# Patient Record
Sex: FEMALE | Race: BLACK OR AFRICAN AMERICAN | NOT HISPANIC OR LATINO | Employment: FULL TIME | URBAN - METROPOLITAN AREA
[De-identification: names, ages, dates, MRNs, and addresses within clinical notes are randomized per-mention and may not be internally consistent; named-entity substitution may affect disease eponyms.]

---

## 2017-02-13 ENCOUNTER — TELEPHONE (OUTPATIENT)
Dept: FAMILY MEDICINE | Facility: CLINIC | Age: 72
End: 2017-02-13

## 2017-02-13 DIAGNOSIS — Z00.00 ROUTINE GENERAL MEDICAL EXAMINATION AT A HEALTH CARE FACILITY: Primary | ICD-10-CM

## 2017-02-13 DIAGNOSIS — I10 ESSENTIAL HYPERTENSION: ICD-10-CM

## 2017-02-13 NOTE — TELEPHONE ENCOUNTER
----- Message from Ellen Mcrae sent at 2/13/2017  8:58 AM CST -----  Contact: call  work [phone]  Doctor appointment and lab have been scheduled.  Please link lab orders to the lab appointment.  Date of doctor appointment:  02/17/17  Physical or EP:  epp  Date of lab appointment:  02/17/17  Comments: pt called this morning asking about lab appointment for Friday, if orders are not able to be put in this quickly please call pt to let her know

## 2017-02-14 ENCOUNTER — LAB VISIT (OUTPATIENT)
Dept: LAB | Facility: HOSPITAL | Age: 72
End: 2017-02-14
Attending: FAMILY MEDICINE
Payer: MEDICARE

## 2017-02-14 DIAGNOSIS — I10 ESSENTIAL HYPERTENSION: ICD-10-CM

## 2017-02-14 DIAGNOSIS — Z00.00 ROUTINE GENERAL MEDICAL EXAMINATION AT A HEALTH CARE FACILITY: ICD-10-CM

## 2017-02-14 LAB
ALBUMIN SERPL BCP-MCNC: 3.2 G/DL
ALP SERPL-CCNC: 126 U/L
ALT SERPL W/O P-5'-P-CCNC: 17 U/L
ANION GAP SERPL CALC-SCNC: 8 MMOL/L
AST SERPL-CCNC: 25 U/L
BASOPHILS # BLD AUTO: 0.04 K/UL
BASOPHILS NFR BLD: 0.7 %
BILIRUB SERPL-MCNC: 0.5 MG/DL
BUN SERPL-MCNC: 11 MG/DL
CALCIUM SERPL-MCNC: 9.6 MG/DL
CHLORIDE SERPL-SCNC: 105 MMOL/L
CHOLEST/HDLC SERPL: 3.1 {RATIO}
CO2 SERPL-SCNC: 26 MMOL/L
CREAT SERPL-MCNC: 0.8 MG/DL
DIFFERENTIAL METHOD: ABNORMAL
EOSINOPHIL # BLD AUTO: 0.3 K/UL
EOSINOPHIL NFR BLD: 5.1 %
ERYTHROCYTE [DISTWIDTH] IN BLOOD BY AUTOMATED COUNT: 15.5 %
EST. GFR  (AFRICAN AMERICAN): >60 ML/MIN/1.73 M^2
EST. GFR  (NON AFRICAN AMERICAN): >60 ML/MIN/1.73 M^2
GLUCOSE SERPL-MCNC: 96 MG/DL
HCT VFR BLD AUTO: 37.7 %
HDL/CHOLESTEROL RATIO: 32.7 %
HDLC SERPL-MCNC: 208 MG/DL
HDLC SERPL-MCNC: 68 MG/DL
HGB BLD-MCNC: 12.6 G/DL
LDLC SERPL CALC-MCNC: 128.4 MG/DL
LYMPHOCYTES # BLD AUTO: 1.9 K/UL
LYMPHOCYTES NFR BLD: 32 %
MCH RBC QN AUTO: 27.3 PG
MCHC RBC AUTO-ENTMCNC: 33.4 %
MCV RBC AUTO: 82 FL
MONOCYTES # BLD AUTO: 0.5 K/UL
MONOCYTES NFR BLD: 8.6 %
NEUTROPHILS # BLD AUTO: 3.2 K/UL
NEUTROPHILS NFR BLD: 53.3 %
NONHDLC SERPL-MCNC: 140 MG/DL
PLATELET # BLD AUTO: 300 K/UL
PMV BLD AUTO: 11 FL
POTASSIUM SERPL-SCNC: 3.7 MMOL/L
PROT SERPL-MCNC: 7.4 G/DL
RBC # BLD AUTO: 4.61 M/UL
SODIUM SERPL-SCNC: 139 MMOL/L
TRIGL SERPL-MCNC: 58 MG/DL
TSH SERPL DL<=0.005 MIU/L-ACNC: 2.16 UIU/ML
WBC # BLD AUTO: 5.91 K/UL

## 2017-02-14 PROCEDURE — 36415 COLL VENOUS BLD VENIPUNCTURE: CPT | Mod: PO

## 2017-02-14 PROCEDURE — 84443 ASSAY THYROID STIM HORMONE: CPT

## 2017-02-14 PROCEDURE — 80053 COMPREHEN METABOLIC PANEL: CPT

## 2017-02-14 PROCEDURE — 80061 LIPID PANEL: CPT

## 2017-02-14 PROCEDURE — 85025 COMPLETE CBC W/AUTO DIFF WBC: CPT

## 2017-02-17 ENCOUNTER — OFFICE VISIT (OUTPATIENT)
Dept: FAMILY MEDICINE | Facility: CLINIC | Age: 72
End: 2017-02-17
Payer: MEDICARE

## 2017-02-17 VITALS — HEART RATE: 80 BPM | TEMPERATURE: 98 F | BODY MASS INDEX: 34.16 KG/M2 | HEIGHT: 65 IN | WEIGHT: 205 LBS

## 2017-02-17 DIAGNOSIS — M79.605 PAIN IN BOTH LOWER EXTREMITIES: ICD-10-CM

## 2017-02-17 DIAGNOSIS — Z13.9 SCREENING: ICD-10-CM

## 2017-02-17 DIAGNOSIS — E78.5 HYPERLIPIDEMIA, UNSPECIFIED HYPERLIPIDEMIA TYPE: ICD-10-CM

## 2017-02-17 DIAGNOSIS — M79.606 PAIN OF LOWER EXTREMITY, UNSPECIFIED LATERALITY: ICD-10-CM

## 2017-02-17 DIAGNOSIS — I10 ESSENTIAL HYPERTENSION: ICD-10-CM

## 2017-02-17 DIAGNOSIS — E27.9 ADRENAL ABNORMALITY: ICD-10-CM

## 2017-02-17 DIAGNOSIS — Z29.89 NEED FOR PROPHYLACTIC IMMUNOTHERAPY: ICD-10-CM

## 2017-02-17 DIAGNOSIS — M79.604 PAIN IN BOTH LOWER EXTREMITIES: ICD-10-CM

## 2017-02-17 DIAGNOSIS — E87.8 ELECTROLYTE ABNORMALITY: ICD-10-CM

## 2017-02-17 DIAGNOSIS — I70.0 AORTIC ATHEROSCLEROSIS: ICD-10-CM

## 2017-02-17 DIAGNOSIS — Z78.0 MENOPAUSE: ICD-10-CM

## 2017-02-17 DIAGNOSIS — R06.2 WHEEZING: Primary | ICD-10-CM

## 2017-02-17 DIAGNOSIS — Z12.31 OTHER SCREENING MAMMOGRAM: ICD-10-CM

## 2017-02-17 DIAGNOSIS — J45.20 MILD INTERMITTENT ASTHMA WITHOUT COMPLICATION: ICD-10-CM

## 2017-02-17 DIAGNOSIS — D64.9 ANEMIA, UNSPECIFIED TYPE: ICD-10-CM

## 2017-02-17 PROCEDURE — 99397 PER PM REEVAL EST PAT 65+ YR: CPT | Mod: S$GLB,,, | Performed by: FAMILY MEDICINE

## 2017-02-17 PROCEDURE — 99499 UNLISTED E&M SERVICE: CPT | Mod: S$GLB,,, | Performed by: FAMILY MEDICINE

## 2017-02-17 PROCEDURE — 99999 PR PBB SHADOW E&M-EST. PATIENT-LVL IV: CPT | Mod: PBBFAC,,, | Performed by: FAMILY MEDICINE

## 2017-02-17 RX ORDER — FOLIC ACID 0.8 MG
800 TABLET ORAL DAILY
Qty: 100 TABLET | Refills: 3 | COMMUNITY
Start: 2017-02-17 | End: 2020-07-16 | Stop reason: SDUPTHER

## 2017-02-17 RX ORDER — TRAMADOL HYDROCHLORIDE 50 MG/1
50 TABLET ORAL EVERY 6 HOURS PRN
COMMUNITY
End: 2017-02-17 | Stop reason: SDUPTHER

## 2017-02-17 RX ORDER — ALBUTEROL SULFATE 90 UG/1
2 AEROSOL, METERED RESPIRATORY (INHALATION) EVERY 6 HOURS PRN
Qty: 54 G | Refills: 3 | Status: SHIPPED | OUTPATIENT
Start: 2017-02-17 | End: 2018-04-10 | Stop reason: SDUPTHER

## 2017-02-17 RX ORDER — POTASSIUM CHLORIDE 20 MEQ/1
TABLET, EXTENDED RELEASE ORAL
Qty: 180 TABLET | Refills: 3 | Status: SHIPPED | OUTPATIENT
Start: 2017-02-17 | End: 2018-02-19 | Stop reason: SDUPTHER

## 2017-02-17 RX ORDER — HYDROCHLOROTHIAZIDE 25 MG/1
25 TABLET ORAL DAILY
Qty: 90 TABLET | Refills: 3 | Status: SHIPPED | OUTPATIENT
Start: 2017-02-17 | End: 2018-03-06 | Stop reason: SDUPTHER

## 2017-02-17 RX ORDER — AMLODIPINE BESYLATE 10 MG/1
10 TABLET ORAL DAILY
Qty: 90 TABLET | Refills: 3 | Status: SHIPPED | OUTPATIENT
Start: 2017-02-17 | End: 2018-03-06 | Stop reason: SDUPTHER

## 2017-02-17 RX ORDER — CETIRIZINE HYDROCHLORIDE 10 MG/1
10 TABLET ORAL DAILY
Qty: 90 TABLET | Refills: 3 | COMMUNITY
Start: 2017-02-17 | End: 2017-07-05 | Stop reason: ALTCHOICE

## 2017-02-17 RX ORDER — ROSUVASTATIN CALCIUM 5 MG/1
5 TABLET, COATED ORAL DAILY
Qty: 90 TABLET | Refills: 3 | Status: SHIPPED | OUTPATIENT
Start: 2017-02-17 | End: 2018-03-20

## 2017-02-17 RX ORDER — TRAMADOL HYDROCHLORIDE 50 MG/1
50 TABLET ORAL EVERY 6 HOURS PRN
Qty: 60 TABLET | Refills: 2 | Status: SHIPPED | OUTPATIENT
Start: 2017-02-17 | End: 2017-09-19

## 2017-02-17 RX ORDER — MONTELUKAST SODIUM 10 MG/1
10 TABLET ORAL NIGHTLY
Qty: 90 TABLET | Refills: 3 | Status: SHIPPED | OUTPATIENT
Start: 2017-02-17 | End: 2017-07-31

## 2017-02-17 NOTE — MR AVS SNAPSHOT
[facility]  [address], [address]  [facility] [address]  Phone: [phone]  Fax: [phone]                  Michela Franco   2017 8:20 AM   Office Visit    Description:  Female : 1945   Provider:  Elizabeth Blank MD   Department:  [facility]           Reason for Visit     Annual Exam           Diagnoses this Visit        Comments    Wheezing    -  Primary     Pain in both lower extremities         Hyperlipidemia, unspecified hyperlipidemia type         Need for prophylactic immunotherapy         Mild intermittent asthma without complication         Electrolyte abnormality         Essential hypertension         Anemia, unspecified type         Pain of lower extremity, unspecified laterality         Adrenal abnormality         Other screening mammogram         Menopause         Screening                To Do List           Goals (5 Years of Data)     None       These Medications        Disp Refills Start End    rosuvastatin (CRESTOR) 5 MG tablet 90 tablet 3 2017    Take 1 tablet (5 mg total) by mouth once daily. - Oral    Pharmacy: [facility] - [place] [id number] [facility] Ph #: [phone]       albuterol 90 mcg/actuation inhaler 54 g 3 2017     Inhale 2 puffs into the lungs every 6 (six) hours as needed for Wheezing. - Inhalation    Pharmacy: [facility] - [facility] [address] [facility] Ph #: [phone]       amlodipine (NORVASC) 10 MG tablet 90 tablet 3 2017     Take 1 tablet (10 mg total) by mouth once daily. - Oral    Pharmacy: [facility] - [facility] [address] [facility] Ph #: [phone]       hydrochlorothiazide (HYDRODIURIL) 25 MG tablet 90 tablet 3 2017     Take 1 tablet (25 mg total) by mouth once daily. - Oral    Pharmacy: [facility] - [facility] [address] [facility] Ph #: [phone]        montelukast (SINGULAIR) 10 mg tablet 90 tablet 3 2/17/2017     Take 1 tablet (10 mg total) by mouth every evening. - Oral    Pharmacy: [facility] - [place] [id number] [facility] Ph #: [phone]       potassium chloride SA (K-DUR,KLOR-CON) 20 MEQ tablet 180 tablet 3 2/17/2017     2 tablets daily po    Pharmacy: [facility] - [address] Ph #: [phone]       tramadol (ULTRAM) 50 mg tablet 60 tablet 2 2/17/2017     Take 1 tablet (50 mg total) by mouth every 6 (six) hours as needed for Pain. - Oral    Pharmacy: [facility] - [address] Ph #: [phone]         PURCHASE these Medications (No prescription required)        Start End    cetirizine (ZYRTEC) 10 MG tablet 2/17/2017     Sig - Route: Take 1 tablet (10 mg total) by mouth once daily. - Oral    Class: OTC    folic acid (FOLVITE) 800 MCG Tab 2/17/2017     Sig - Route: Take 1 tablet (800 mcg total) by mouth once daily. - Oral    Class: OTC      [facility]     [facility] On Call Nurse Care Line - 24/7 Assistance  Registered nurses in the [facility] provide clinical advisement, health education, appointment booking, and other advisory services.  Call for this free service at [phone].             Medications           Message regarding Medications     Verify the changes and/or additions to your medication regime listed below are the same as discussed with your clinician today.  If any of these changes or additions are incorrect, please notify your healthcare provider.        START taking these NEW medications        Refills    rosuvastatin (CRESTOR) 5 MG tablet 3    Sig: Take 1 tablet (5 mg total) by mouth once daily.    Class: Normal    Route: Oral    tramadol (ULTRAM) 50 mg tablet 2    Sig: Take 1 tablet (50 mg total) by mouth every 6 (six) hours as needed for Pain.    Class: Print    Route: Oral      CHANGE how you are taking  these medications     Start Taking Instead of    amlodipine (NORVASC) 10 MG tablet amlodipine (NORVASC) 10 MG tablet    Dosage:  Take 1 tablet (10 mg total) by mouth once daily. Dosage:  TAKE 1 TABLET EVERY DAY    Reason for Change:  Reorder     cetirizine (ZYRTEC) 10 MG tablet cetirizine (ZYRTEC) 10 MG tablet    Dosage:  Take 1 tablet (10 mg total) by mouth once daily. Dosage:  Take 10 mg by mouth once daily.    Reason for Change:  Reorder     montelukast (SINGULAIR) 10 mg tablet montelukast (SINGULAIR) 10 mg tablet    Dosage:  Take 1 tablet (10 mg total) by mouth every evening. Dosage:  TAKE 1 TABLET EVERY EVENING    Reason for Change:  Reorder       STOP taking these medications     baclofen (LIORESAL) 10 MG tablet Take 1 tablet (10 mg total) by mouth 3 (three) times daily.    hydrOXYzine HCl (ATARAX) 25 MG tablet Take 25 mg by mouth every 6 (six) hours as needed for Itching.    ranitidine (ZANTAC) 300 MG tablet Take 1 tablet daily for the next 7-10 days    ADVAIR DISKUS 500-50 mcg/dose DsDv diskus inhaler INHALE 1 PUFF INTO THE LUNGS TWO TIMES DAILY           Verify that the below list of medications is an accurate representation of the medications you are currently taking.  If none reported, the list may be blank. If incorrect, please contact your healthcare provider. Carry this list with you in case of emergency.           Current Medications     ACTIVATED CHARCOAL (CHARCOCAPS ORAL) Take by mouth.    albuterol 90 mcg/actuation inhaler Inhale 2 puffs into the lungs every 6 (six) hours as needed for Wheezing.    amlodipine (NORVASC) 10 MG tablet Take 1 tablet (10 mg total) by mouth once daily.    budesonide (PULMICORT) 0.5 mg/2 mL nebulizer solution Instill 1/2 respule each nostril twice per day.  Let dwell for 2-5 min.    cetirizine (ZYRTEC) 10 MG tablet Take 1 tablet (10 mg total) by mouth once daily.    cholecalciferol, vitamin D3, 10,000 unit Cap Take 360 mg by mouth once daily. Take 6 capsules (6,000 units  "total) by mouth once daily    COD LIVER OIL ORAL Take 1 tablet by mouth once daily.    DYMISTA 137-50 mcg/spray [address] 1 SPRAY BY NASAL ROUTE 2 (TWO) TIMES DAILY AS NEEDED.    ferrous sulfate 325 (65 FE) MG EC tablet Take 1 tablet (325 mg total) by mouth 3 (three) times daily with meals.    folic acid (FOLVITE) 800 MCG Tab Take 1 tablet (800 mcg total) by mouth once daily.    hydrochlorothiazide (HYDRODIURIL) 25 MG tablet Take 1 tablet (25 mg total) by mouth once daily.    ibuprofen (ADVIL,MOTRIN) 800 MG tablet Take 1 tablet (800 mg total) by mouth every 6 (six) hours as needed.    inhalation device (AEROCHAMBER PLUS FLOW-VU) Use with inhaler    montelukast (SINGULAIR) 10 mg tablet Take 1 tablet (10 mg total) by mouth every evening.    multivitamin (MULTIVITAMIN) per tablet Take 1 tablet by mouth once daily.      potassium chloride SA (K-DUR,KLOR-CON) 20 MEQ tablet 2 tablets daily po    rosuvastatin (CRESTOR) 5 MG tablet Take 1 tablet (5 mg total) by mouth once daily.    tramadol (ULTRAM) 50 mg tablet Take 1 tablet (50 mg total) by mouth every 6 (six) hours as needed for Pain.    VITAMIN B-12 50 mcg [facility]            Clinical Reference Information           Your Vitals Were     Pulse Temp Height Weight BMI    80 98.2 °F (36.8 °C) (Oral) 5' 4.96" (1.65 m) 93 kg (205 lb 0.4 oz) 34.16 kg/m2      Allergies as of 2/17/2017     Adhesive    Diazepam    Gabapentin    Keppra [Levetiracetam]    Mold      Immunizations Administered on Date of Encounter - 2/17/2017     None      Orders Placed During Today's Visit      Normal Orders This Visit    Ambulatory referral to Allergy     Future Labs/Procedures Expected by Expires    CT Abdomen With Contrast  2/17/2017 2/17/2018    DXA Bone Density Spine And Hip  2/17/2017 2/17/2018    Hepatitis C antibody  2/17/2017 4/18/2018    Mammo Digital Screening Bilateral With CAD  2/17/2017 4/17/2018    Cardiology Lab Ankle Brachial Indices W Stress  As directed 2/17/2018      [facility] " Sign-Up     Activating your [facility] account is as easy as 1-2-3!     1) Visit [email or web address], select Sign Up Now, enter this activation code and your date of birth, then select Next.  Activation code not generated  Current Patient Portal Status: Account disabled      2) Create a username and password to use when you visit [facility] in the future and select a security question in case you lose your password and select Next.    3) Enter your e-mail address and click Sign Up!    Additional Information  If you have questions, please e-mail [email or web address] or call [phone] to talk to our [facility] staff. Remember, [facility] is NOT to be used for urgent needs. For medical emergencies, dial 911.         Language Assistance Services     ATTENTION: Language assistance services are available, free of charge. Please call [phone].      ATENCIÓN: Si habla jenniferañol, tiene a ferreira disposición servicios gratuitos de asistencia lingüística. Llame al [phone].     CHÚ Ý: N?u b?n nói Ti?ng Vi?t, có các d?ch v? h? tr? ngôn ng? mi?n phí dành cho b?n. G?i s? [phone].         [facility] complies with applicable Federal civil rights laws and does not discriminate on the basis of race, color, national origin, age, disability, or sex.

## 2017-02-19 NOTE — PROGRESS NOTES
Michela Franco is a 71 y.o. female   Routine physical  Source of history: Patient  Past Medical History   Diagnosis Date    Allergy     Hyperlipidemia     Hypertension     Neuromuscular disorder     Osteoporosis     Recurrent sinusitis 3/25/2014     Patient  reports that she quit smoking about 6 years ago. Her smoking use included Cigarettes. She has never used smokeless tobacco. She reports that she does not drink alcohol or use illicit drugs.  Family History   Problem Relation Age of Onset    No Known Problems Mother     No Known Problems Father     Kidney cancer Sister     Cancer Sister      renal    Hypertension Daughter     No Known Problems Sister     Hypertension Daughter      ROS:  GENERAL: No fever, chills, fatigability or weight loss.  SKIN: No rashes, itching or changes in color or texture of skin.  HEAD: No headaches or recent head trauma.  EYES: Visual acuity fine. No photophobia, ocular pain or diplopia.  EARS: Denies ear pain, discharge or vertigo.  NOSE: No loss of smell, no epistaxis or postnasal drip.  MOUTH & THROAT: No hoarseness or change in voice. No excessive gum bleeding.  NODES: Denies swollen glands.  CHEST: Denies SHAH, cyanosis, wheezing, cough and sputum production.  CARDIOVASCULAR: Denies chest pain, PND, orthopnea or reduced exercise tolerance.  ABDOMEN: Appetite fine. No weight loss. Denies diarrhea, abdominal pain, hematemesis or blood in stool.  URINARY: No flank pain, dysuria or hematuria.  PERIPHERAL VASCULAR: No claudication or cyanosis.  MUSCULOSKELETAL: No joint stiffness or swelling. Denies back pain.  NEUROLOGIC: No history of seizures, paralysis, alteration of gait or coordination.    OBJECTIVE:  APPEARANCE: normal appearance  Vitals:    02/17/17 0801   Pulse: 80   Temp: 98.2 °F (36.8 °C)     SKIN: Normal skin turgor, no lesions.  HEENT: Both external auditory canals clear. Both tympanic membranes intact. PERRL. EOMI. Disk margins sharp. No tonsillar  enlargement. No pharyngeal erythema or exudate. No stridor.  NECK: No bruits. No cervical spine tenderness. No cervical lymphadenopathy. No thyromegaly.  NODES: No cervical, axillary or inguinal lymph node enlargement.  CHEST: Breath sounds clear bilaterally. Lungs clear to auscultation & percussion. Good air movement. No rales. No retractions. No rhonchi. No stridor. No wheezes.  CARDIOVASCULAR: Normal S1, S2. No murmurs. No edema.  BREASTS: no masses palpated in either breast or axillary area, symmetry noted.  ABDOMEN: Bowel sounds normal. No palpable aortic enlargement. No CVA tenderness. No pulsatile mass. No rebound tenderness.  Large midline diaphysis, umbilical hernia non-strangulated  PERIPHERAL VASCULAR: Femoral pulses present and symmetrical. No edema.  MUSCULOSKELETAL: Degenerative changes of both ankles, foot, knee, wrist and hand.  BACK: No CVA tenderness. There is no spasm, tenderness or radiculopathy noted with palpation and there is full range of motion.   NEUROLOGIC:   Cranial Nerves: II-XII grossly intact.  Motor: 5/5 strength major flexors/extensors. No tremor.  DTR's: Knees, Ankles 2+ and equal bilaterally; downgoing toes.  Sensory: Intact to light touch distally.  Gait & Posture: Normal gait and fine motion. No cerebellar signs.  MENTAL STATUS: Alert. Oriented x 3. Language skills normal. Memory intact. No suicidal ideation. Normal affect. Normal cognitive functions. Normal serial 7s. Can do simple math. Well kept appearance.    ASSESSMENT/PLAN:   Michela was seen today for annual exam.    Diagnoses and all orders for this visit:    Wheezing  -     Cancel: Ambulatory referral to Pulmonology  -     albuterol 90 mcg/actuation inhaler; Inhale 2 puffs into the lungs every 6 (six) hours as needed for Wheezing.  -     montelukast (SINGULAIR) 10 mg tablet; Take 1 tablet (10 mg total) by mouth every evening.    Pain in both lower extremities  -     Cardiology Lab Ankle Brachial Indices W Stress;  Future    Hyperlipidemia, unspecified hyperlipidemia type  -     rosuvastatin (CRESTOR) 5 MG tablet; Take 1 tablet (5 mg total) by mouth once daily.    Need for prophylactic immunotherapy  -     Ambulatory referral to Allergy    Mild intermittent asthma without complication  -     albuterol 90 mcg/actuation inhaler; Inhale 2 puffs into the lungs every 6 (six) hours as needed for Wheezing.  -     cetirizine (ZYRTEC) 10 MG tablet; Take 1 tablet (10 mg total) by mouth once daily.    Electrolyte abnormality  -     potassium chloride SA (K-DUR,KLOR-CON) 20 MEQ tablet; 2 tablets daily po    Essential hypertension  -     amlodipine (NORVASC) 10 MG tablet; Take 1 tablet (10 mg total) by mouth once daily.  -     hydrochlorothiazide (HYDRODIURIL) 25 MG tablet; Take 1 tablet (25 mg total) by mouth once daily.    Anemia, unspecified type  -     folic acid (FOLVITE) 800 MCG Tab; Take 1 tablet (800 mcg total) by mouth once daily.    Pain of lower extremity, unspecified laterality  -     tramadol (ULTRAM) 50 mg tablet; Take 1 tablet (50 mg total) by mouth every 6 (six) hours as needed for Pain.    Adrenal abnormality  -     CT Abdomen With Contrast; Future    Other screening mammogram  -     Mammo Digital Screening Bilateral With CAD; Future    Menopause  -     DXA Bone Density Spine And Hip; Future    Screening  -     Hepatitis C antibody; Future    labs discussed at this appt will contact pt when results of the other tests availalble

## 2017-03-03 RX ORDER — IBUPROFEN 800 MG/1
TABLET ORAL
Qty: 270 TABLET | Refills: 1 | Status: SHIPPED | OUTPATIENT
Start: 2017-03-03 | End: 2017-06-12 | Stop reason: SDUPTHER

## 2017-03-03 RX ORDER — METHYLPREDNISOLONE 4 MG/1
TABLET ORAL
Qty: 10 TABLET | Refills: 0 | Status: SHIPPED | OUTPATIENT
Start: 2017-03-03 | End: 2017-03-14

## 2017-03-13 ENCOUNTER — HOSPITAL ENCOUNTER (OUTPATIENT)
Dept: RADIOLOGY | Facility: HOSPITAL | Age: 72
Discharge: HOME | End: 2017-03-13
Attending: FAMILY MEDICINE
Payer: MEDICARE

## 2017-03-13 ENCOUNTER — CLINICAL SUPPORT (OUTPATIENT)
Dept: CARDIOLOGY | Facility: CLINIC | Age: 72
End: 2017-03-13
Payer: MEDICARE

## 2017-03-13 ENCOUNTER — HOSPITAL ENCOUNTER (OUTPATIENT)
Dept: RADIOLOGY | Facility: CLINIC | Age: 72
Discharge: HOME | End: 2017-03-13
Attending: FAMILY MEDICINE
Payer: MEDICARE

## 2017-03-13 DIAGNOSIS — M79.605 PAIN IN BOTH LOWER EXTREMITIES: ICD-10-CM

## 2017-03-13 DIAGNOSIS — Z12.31 OTHER SCREENING MAMMOGRAM: ICD-10-CM

## 2017-03-13 DIAGNOSIS — Z78.0 MENOPAUSE: ICD-10-CM

## 2017-03-13 DIAGNOSIS — M79.604 PAIN IN BOTH LOWER EXTREMITIES: ICD-10-CM

## 2017-03-13 DIAGNOSIS — E27.9 ADRENAL ABNORMALITY: ICD-10-CM

## 2017-03-13 LAB — VASCULAR ANKLE BRACHIAL INDEX (ABI) LEFT: 0.96 (ref 0.9–1.2)

## 2017-03-13 PROCEDURE — 25500020 PHARM REV CODE 255: Performed by: FAMILY MEDICINE

## 2017-03-13 PROCEDURE — 77067 SCR MAMMO BI INCL CAD: CPT | Mod: TC

## 2017-03-13 PROCEDURE — 77063 BREAST TOMOSYNTHESIS BI: CPT | Mod: 26,,, | Performed by: RADIOLOGY

## 2017-03-13 PROCEDURE — 74160 CT ABDOMEN W/CONTRAST: CPT | Mod: TC

## 2017-03-13 PROCEDURE — 93924 LWR XTR VASC STDY BILAT: CPT | Mod: S$GLB,,, | Performed by: INTERNAL MEDICINE

## 2017-03-13 PROCEDURE — 74160 CT ABDOMEN W/CONTRAST: CPT | Mod: 26,,, | Performed by: RADIOLOGY

## 2017-03-13 PROCEDURE — 77080 DXA BONE DENSITY AXIAL: CPT | Mod: 26,,, | Performed by: INTERNAL MEDICINE

## 2017-03-13 PROCEDURE — 77067 SCR MAMMO BI INCL CAD: CPT | Mod: 26,,, | Performed by: RADIOLOGY

## 2017-03-13 RX ADMIN — IOHEXOL 100 ML: 350 INJECTION, SOLUTION INTRAVENOUS at 04:03

## 2017-03-14 ENCOUNTER — OFFICE VISIT (OUTPATIENT)
Dept: ALLERGY | Facility: CLINIC | Age: 72
End: 2017-03-14
Payer: MEDICARE

## 2017-03-14 VITALS
BODY MASS INDEX: 39.73 KG/M2 | HEART RATE: 92 BPM | WEIGHT: 202.38 LBS | OXYGEN SATURATION: 96 % | SYSTOLIC BLOOD PRESSURE: 110 MMHG | HEIGHT: 60 IN | DIASTOLIC BLOOD PRESSURE: 68 MMHG

## 2017-03-14 DIAGNOSIS — R93.89 ABNORMAL CHEST X-RAY: ICD-10-CM

## 2017-03-14 DIAGNOSIS — J45.41 MODERATE PERSISTENT ASTHMA WITH ACUTE EXACERBATION: ICD-10-CM

## 2017-03-14 DIAGNOSIS — J06.9 RECURRENT URI (UPPER RESPIRATORY INFECTION): ICD-10-CM

## 2017-03-14 DIAGNOSIS — J32.4 CHRONIC PANSINUSITIS: Primary | ICD-10-CM

## 2017-03-14 PROCEDURE — 99499 UNLISTED E&M SERVICE: CPT | Mod: S$GLB,,, | Performed by: ALLERGY & IMMUNOLOGY

## 2017-03-14 PROCEDURE — 99999 PR PBB SHADOW E&M-EST. PATIENT-LVL V: CPT | Mod: PBBFAC,,, | Performed by: ALLERGY & IMMUNOLOGY

## 2017-03-14 RX ORDER — ALBUTEROL SULFATE 0.63 MG/3ML
0.63 SOLUTION RESPIRATORY (INHALATION) EVERY 6 HOURS PRN
Qty: 60 VIAL | Refills: 3 | Status: SHIPPED | OUTPATIENT
Start: 2017-03-14 | End: 2018-03-14

## 2017-03-14 RX ORDER — EPINEPHRINE 0.3 MG/.3ML
INJECTION SUBCUTANEOUS
COMMUNITY
End: 2017-10-30

## 2017-03-14 RX ORDER — BUDESONIDE AND FORMOTEROL FUMARATE DIHYDRATE 160; 4.5 UG/1; UG/1
2 AEROSOL RESPIRATORY (INHALATION) EVERY 12 HOURS
Qty: 1 INHALER | Refills: 3 | Status: SHIPPED | OUTPATIENT
Start: 2017-03-14 | End: 2018-04-04 | Stop reason: SDUPTHER

## 2017-03-14 RX ORDER — BUDESONIDE AND FORMOTEROL FUMARATE DIHYDRATE 160; 4.5 UG/1; UG/1
2 AEROSOL RESPIRATORY (INHALATION) EVERY 12 HOURS
Qty: 3 INHALER | Refills: 3 | Status: SHIPPED | OUTPATIENT
Start: 2017-03-14 | End: 2017-04-04 | Stop reason: SDUPTHER

## 2017-03-14 RX ORDER — BUDESONIDE 0.5 MG/2ML
INHALANT ORAL
Qty: 120 ML | Refills: 2 | Status: SHIPPED | OUTPATIENT
Start: 2017-03-14 | End: 2017-10-24

## 2017-03-14 NOTE — LETTER
March 19, 2017      Elizabeth Blank MD  [address] [facility]  [address]  [facility] [address]           [facility]  [address]  [facility] [address]  Phone: [phone]  Fax: [phone]          Patient: Michela Franco   MR Number: [id number]   YOB: 1945   Date of Visit: 3/14/2017       Dear Dr. Elizabeth Blank:    Thank you for referring Michela Franco to me for evaluation. Attached you will find relevant portions of my assessment and plan of care.    If you have questions, please do not hesitate to call me. I look forward to following Michela Franco along with you.    Sincerely,    Brit Finney, [facility]    Enclosure  CC:  No Recipients    If you would like to receive this communication electronically, please contact [email or web address] or [phone] to request more information on [employer] access.    For providers and/or their staff who would like to refer a patient to [facility], please contact us through our one-stop-shop provider referral line, [facility], at [phone].    If you feel you have received this communication in error or would no longer like to receive these types of communications, please e-mail [email or web address]

## 2017-03-14 NOTE — MR AVS SNAPSHOT
[facility] - Allergy/ Immunology  [address]  [place] [address]  Phone: [phone]  Fax: [phone]                  Michela Franco   3/14/2017 9:00 AM   Office Visit    Description:  Female : 1945   Provider:  KASIE Valencia   Department:  [facility] - Allergy/ Immunology           Reason for Visit     Follow-up     Sinusitis           Diagnoses this Visit        Comments    Chronic pansinusitis    -  Primary     Recurrent URI (upper respiratory infection)         Abnormal chest x-ray         Moderate persistent asthma with acute exacerbation                To Do List           Goals (5 Years of Data)     None      Follow-Up and Disposition     Return in about 2 weeks (around 3/28/2017).       These Medications        Disp Refills Start End    budesonide-formoterol 160-4.5 mcg (SYMBICORT) 160-4.5 mcg/actuation HFAA 1 Inhaler 3 3/14/2017 3/14/2018    Inhale 2 puffs into the lungs every 12 (twelve) hours. Controller - Inhalation    Pharmacy: [id number] [place] - [address] Ph #: [phone]       albuterol (ACCUNEB) 0.63 mg/3 mL Nebu 60 vial 3 3/14/2017 3/14/2018    Take 3 mLs (0.63 mg total) by nebulization every 6 (six) hours as needed. Rescue - Nebulization    Pharmacy: [facility] #[id number] - [place], [place] - [address] Ph #: [phone]       budesonide-formoterol 160-4.5 mcg (SYMBICORT) 160-4.5 mcg/actuation HFAA 3 Inhaler 3 3/14/2017 3/14/2018    Inhale 2 puffs into the lungs every 12 (twelve) hours. Controller - Inhalation    Pharmacy: [facility] [place] [address] [facility] Ph #: [phone]         [facility]     [facility] On Call Nurse Care Line -  Assistance  Registered nurses in the [facility] On Call Center provide clinical advisement, health education, appointment booking, and other advisory services.  Call for this free service at [phone].             Medications            Message regarding Medications     Verify the changes and/or additions to your medication regime listed below are the same as discussed with your clinician today.  If any of these changes or additions are incorrect, please notify your healthcare provider.        START taking these NEW medications        Refills    budesonide-formoterol 160-4.5 mcg (SYMBICORT) 160-4.5 mcg/actuation HFAA 3    Sig: Inhale 2 puffs into the lungs every 12 (twelve) hours. Controller    Class: Normal    Route: Inhalation    albuterol (ACCUNEB) 0.63 mg/3 mL Nebu 3    Sig: Take 3 mLs (0.63 mg total) by nebulization every 6 (six) hours as needed. Rescue    Class: Normal    Route: Nebulization    budesonide-formoterol 160-4.5 mcg (SYMBICORT) 160-4.5 mcg/actuation HFAA 3    Sig: Inhale 2 puffs into the lungs every 12 (twelve) hours. Controller    Class: Normal    Route: Inhalation      STOP taking these medications     methylPREDNISolone (MEDROL) 4 MG Tab TAKE 1 TABLET ONE TIME DAILY    VITAMIN B-12 50 mcg Lozg            Verify that the below list of medications is an accurate representation of the medications you are currently taking.  If none reported, the list may be blank. If incorrect, please contact your healthcare provider. Carry this list with you in case of emergency.           Current Medications     ACTIVATED CHARCOAL (CHARCOCAPS ORAL) Take by mouth.    albuterol 90 mcg/actuation inhaler Inhale 2 puffs into the lungs every 6 (six) hours as needed for Wheezing.    amlodipine (NORVASC) 10 MG tablet Take 1 tablet (10 mg total) by mouth once daily.    budesonide (PULMICORT) 0.5 mg/2 mL nebulizer solution Instill 1/2 respule each nostril twice per day.  Let dwell for 2-5 min.    Ca cmb no.9-[id number]-[id number] 120-1,000-10 mg-unit-mg Tab Take 1 tablet by mouth once daily.    cetirizine (ZYRTEC) 10 MG tablet Take 1 tablet (10 mg total) by mouth once daily.    cholecalciferol, vitamin D3, 10,000 unit Cap Take 360 mg by mouth once daily.  Take 6 capsules (6,000 units total) by mouth once daily    COD LIVER OIL ORAL Take 1 tablet by mouth once daily.    DYMISTA 137-50 mcg/spray [place] [address] 1 SPRAY BY NASAL ROUTE 2 (TWO) TIMES DAILY AS NEEDED.    epinephrine (EPIPEN) 0.3 mg/0.3 mL AtIn Inject into the muscle.    ferrous sulfate 325 (65 FE) MG EC tablet Take 1 tablet (325 mg total) by mouth 3 (three) times daily with meals.    folic acid (FOLVITE) 800 MCG Tab Take 1 tablet (800 mcg total) by mouth once daily.    hydrochlorothiazide (HYDRODIURIL) 25 MG tablet Take 1 tablet (25 mg total) by mouth once daily.    ibuprofen (ADVIL,MOTRIN) 800 MG tablet TAKE 1 TABLET EVERY 6 HOURS AS NEEDED    inhalation device (AEROCHAMBER PLUS FLOW-VU) Use with inhaler    montelukast (SINGULAIR) 10 mg tablet Take 1 tablet (10 mg total) by mouth every evening.    multivitamin (MULTIVITAMIN) per tablet Take 1 tablet by mouth once daily.      potassium chloride SA (K-DUR,KLOR-CON) 20 MEQ tablet 2 tablets daily po    rosuvastatin (CRESTOR) 5 MG tablet Take 1 tablet (5 mg total) by mouth once daily.    tramadol (ULTRAM) 50 mg tablet Take 1 tablet (50 mg total) by mouth every 6 (six) hours as needed for Pain.    albuterol (ACCUNEB) 0.63 mg/3 mL Nebu Take 3 mLs (0.63 mg total) by nebulization every 6 (six) hours as needed. Rescue    budesonide-formoterol 160-4.5 mcg (SYMBICORT) 160-4.5 mcg/actuation HFAA Inhale 2 puffs into the lungs every 12 (twelve) hours. Controller    budesonide-formoterol 160-4.5 mcg (SYMBICORT) 160-4.5 mcg/actuation HFAA Inhale 2 puffs into the lungs every 12 (twelve) hours. Controller           Clinical Reference Information           Your Vitals Were     BP Pulse Height Weight SpO2 BMI    110/68 (BP Location: Right arm, Patient Position: Sitting, BP Method: Manual) 92 5' (1.524 m) 91.8 kg (202 lb 6.1 oz) 96% 39.53 kg/m2      Blood Pressure          Most Recent Value    BP  110/68      Allergies as of 3/14/2017     Adhesive    Diazepam    Gabapentin     Keppra [Levetiracetam]    Mold      Immunizations Administered on Date of Encounter - 3/14/2017     None      Orders Placed During Today's Visit     Future Labs/Procedures Expected by Expires    C-reactive protein  3/14/2017 5/13/2018    CT Chest W Wo Contrast  3/14/2017 3/14/2018    Haemophilius influenzae B Ab IgG  3/14/2017 5/13/2018    IgA  3/14/2017 5/13/2018    IgE  3/14/2017 5/13/2018    IgG 1, 2, 3, and 4  3/14/2017 5/13/2018    IgG  3/14/2017 5/13/2018    IgM  3/14/2017 5/13/2018    NEUTROPHIL OXIDATIVE BURST, BLOOD  3/14/2017 5/13/2018    S.pneumoniae IgG Serotypes  3/14/2017 5/13/2018    Tetanus toxoid, IgG  3/14/2017 5/13/2018    Tetanus toxoid, IgG  3/14/2017 5/13/2018      [facility] Sign-Up     Activating your [facility] account is as easy as 1-2-3!     1) Visit [email or web address], select Sign Up Now, enter this activation code and your date of birth, then select Next.  Activation code not generated  Current Patient Portal Status: Account disabled      2) Create a username and password to use when you visit [facility] in the future and select a security question in case you lose your password and select Next.    3) Enter your e-mail address and click Sign Up!    Additional Information  If you have questions, please e-mail [email or web address] or call [phone] to talk to our [facility] staff. Remember, [facility] is NOT to be used for urgent needs. For medical emergencies, dial 911.         Instructions    Start Budesonide 0.5 1/2 ampule in each nostril in the evening  Dymista 1 spary each nostril in the AM  Start:  Albuterol 0.083% 1 ampule in the nebulizer AM and PM before your Symbicort dose  Start Symbicort 160/4.5 HFA 2 puffs inhaled twice a day  Continue until revisit in 2 weeks    Continue: Singulair and  Zyrtec  Skin testing at a later date, Must hold Zyrtec and all antihistamines, Zantac, Pepcid, sleep aids 5 days prior to skin testing. If positive consider weekly allergy injections    Get Lab  work drawn  Get CT scan of the chest with and without contrast    Follow up with Dr. Boo ENT for evaluation of Pansinusitis for evaluation and treatment including culture and sensitivity.       Language Assistance Services     ATTENTION: Language assistance services are available, free of charge. Please call [phone].      ATENCIÓN: Si habla español, tiene a ferreira disposición servicios gratuitos de asistencia lingüística. Llame al [phone].     CHÚ Ý: N?u b?n nói Ti?ng Vi?t, có các d?ch v? h? tr? ngôn ng? mi?n phí dành cho b?n. G?i s? [phone].         [facility] - All[facility]/ Immun[facility] complies with applicable Federal civil rights laws and does not discriminate on the basis of race, color, national origin, age, disability, or sex.

## 2017-03-14 NOTE — PATIENT INSTRUCTIONS
Start Budesonide 0.5 1/2 ampule in each nostril in the evening  Dymista 1 spary each nostril in the AM  Start:  Albuterol 0.083% 1 ampule in the nebulizer AM and PM before your Symbicort dose  Start Symbicort 160/4.5 HFA 2 puffs inhaled twice a day  Continue until revisit in 2 weeks    Continue: Singulair and  Zyrtec  Skin testing at a later date, Must hold Zyrtec and all antihistamines, Zantac, Pepcid, sleep aids 5 days prior to skin testing. If positive consider weekly allergy injections    Get Lab work drawn  Get CT scan of the chest with and without contrast    Follow up with Dr. Boo ENT for evaluation of Pansinusitis for evaluation and treatment including culture and sensitivity.

## 2017-03-14 NOTE — PROGRESS NOTES
Subjective:       Patient ID: Michela Franco is a 71 y.o. female.    Chief Complaint: Follow-up and Sinusitis (prednisone makes me feel better for 2 weeks and then it starts up again.  wants to find out the cause of chronic sinus issues)    HPI Comments: Patient is new to me in the [facility] system. She presents with significant Allergic rhinitis, severe post nasal drip that triggers her asthma, feels like she has pneumonia. Chest is heavy, occasional wheezing and chronic cough. Most recent exacerbation was in the last 3 weeks. She has a history of balloon sinuplasty approximately 2 years ago. Which was effective for 1 year, then symptoms flared up again. She feels that her post nasal drip drains down her throat and causes a cough and asthma symptoms. The PND makes her throat raw and irritated.    Review of Systems   Constitutional: Negative for activity change, appetite change, chills, diaphoresis, fatigue, fever and unexpected weight change.   HENT: Positive for congestion, postnasal drip, rhinorrhea, sinus pressure and sore throat. Negative for dental problem, drooling, ear discharge, ear pain, facial swelling, hearing loss, mouth sores, nosebleeds, sneezing, tinnitus, trouble swallowing and voice change.    Eyes: Negative for photophobia, pain, discharge, redness, itching and visual disturbance.   Respiratory: Positive for cough and wheezing. Negative for apnea, choking, chest tightness, shortness of breath and stridor.    Cardiovascular: Negative for chest pain, palpitations and leg swelling.   Gastrointestinal: Negative for abdominal distention, abdominal pain, constipation, diarrhea, nausea and vomiting.   Endocrine: Negative for cold intolerance, heat intolerance, polydipsia, polyphagia and polyuria.   Genitourinary: Negative for difficulty urinating, dysuria, enuresis, flank pain, frequency, hematuria, menstrual problem, pelvic pain and urgency.   Musculoskeletal: Negative for arthralgias, back pain, gait  problem, joint swelling, myalgias, neck pain and neck stiffness.   Skin: Negative for color change, pallor, rash and wound.   Allergic/Immunologic: Negative for environmental allergies, food allergies and immunocompromised state.   Neurological: Negative for dizziness, tremors, seizures, syncope, facial asymmetry, speech difficulty, weakness, light-headedness, numbness and headaches.   Hematological: Negative for adenopathy. Does not bruise/bleed easily.   Psychiatric/Behavioral: Negative for agitation, behavioral problems, confusion, decreased concentration, dysphoric mood, hallucinations, self-injury, sleep disturbance and suicidal ideas. The patient is not nervous/anxious and is not hyperactive.    All other systems reviewed and are negative.    Objective:   Physical Exam   Constitutional: She is oriented to person, place, and time. She appears well-developed and well-nourished. She is active and cooperative.  Non-toxic appearance. She does not have a sickly appearance. She does not appear ill. No distress. She is not intubated.   HENT:   Head: Normocephalic and atraumatic.   Right Ear: Hearing, tympanic membrane, external ear and ear canal normal. No lacerations. No drainage, swelling or tenderness. No foreign bodies. No mastoid tenderness. Tympanic membrane is not injected, not scarred, not perforated, not erythematous, not retracted and not bulging. Tympanic membrane mobility is normal. No middle ear effusion. No hemotympanum. No decreased hearing is noted.   Left Ear: Hearing, tympanic membrane, external ear and ear canal normal. No lacerations. No drainage, swelling or tenderness. No foreign bodies. No mastoid tenderness. Tympanic membrane is not injected, not scarred, not perforated, not erythematous, not retracted and not bulging. Tympanic membrane mobility is normal.  No middle ear effusion. No hemotympanum. No decreased hearing is noted.   Nose: Mucosal edema and rhinorrhea present. No nose lacerations,  sinus tenderness, nasal deformity, septal deviation or nasal septal hematoma. No epistaxis.  No foreign bodies. Right sinus exhibits no maxillary sinus tenderness and no frontal sinus tenderness. Left sinus exhibits no maxillary sinus tenderness and no frontal sinus tenderness.   Mouth/Throat: Uvula is midline, oropharynx is clear and moist and mucous membranes are normal. She does not have dentures. No oral lesions. No trismus in the jaw. Normal dentition. No dental abscesses, uvula swelling, lacerations or dental caries. No oropharyngeal exudate, posterior oropharyngeal edema, posterior oropharyngeal erythema or tonsillar abscesses. No tonsillar exudate.   Edematous nasal turbinates encompassing 90-95% of nasal passage way. Mucosal edema with clear rhinorrhea.  Posterior pharynx cobblestone in appearance.   Eyes: Conjunctivae, EOM and lids are normal. Pupils are equal, round, and reactive to light. Right eye exhibits no chemosis, no discharge, no exudate and no hordeolum. No foreign body present in the right eye. Left eye exhibits no chemosis, no discharge, no exudate and no hordeolum. No foreign body present in the left eye. Right conjunctiva is not injected. Right conjunctiva has no hemorrhage. Left conjunctiva is not injected. Left conjunctiva has no hemorrhage. No scleral icterus.   Neck: Trachea normal, normal range of motion, full passive range of motion without pain and phonation normal. Neck supple. No tracheal tenderness, no spinous process tenderness and no muscular tenderness present. No rigidity. No tracheal deviation, no edema, no erythema and normal range of motion present. No thyroid mass and no thyromegaly present.   Cardiovascular: Normal rate, regular rhythm, S1 normal, S2 normal, normal heart sounds and normal pulses.  Exam reveals no gallop and no friction rub.    No murmur heard.  Pulmonary/Chest: Effort normal and breath sounds normal. No accessory muscle usage or stridor. No apnea, no  tachypnea and no bradypnea. She is not intubated. No respiratory distress. She has no decreased breath sounds. She has no wheezes. She has no rhonchi. She has no rales. She exhibits no tenderness.   Hacky cough, no wheezing today.   Abdominal: Soft. Normal appearance and bowel sounds are normal. She exhibits no distension and no mass. There is no tenderness.   Musculoskeletal: Normal range of motion. She exhibits no edema, tenderness or deformity.   Lymphadenopathy:        Head (right side): No submental, no submandibular, no tonsillar, no preauricular, no posterior auricular and no occipital adenopathy present.        Head (left side): No submental, no submandibular, no tonsillar, no preauricular, no posterior auricular and no occipital adenopathy present.     She has no cervical adenopathy.        Right cervical: No superficial cervical, no deep cervical and no posterior cervical adenopathy present.       Left cervical: No superficial cervical, no deep cervical and no posterior cervical adenopathy present.   Neurological: She is alert and oriented to person, place, and time. She has normal strength. She is not disoriented.   Skin: Skin is warm, dry and intact. No abrasion, no bruising, no burn, no ecchymosis, no laceration, no lesion, no petechiae, no purpura and no rash noted. Rash is not macular, not maculopapular, not nodular, not pustular, not vesicular and not urticarial. She is not diaphoretic. No cyanosis or erythema. No pallor. Nails show no clubbing.   Psychiatric: She has a normal mood and affect. Her speech is normal and behavior is normal. Judgment and thought content normal. Cognition and memory are normal.   Nursing note and vitals reviewed.    Assessment:     1. Chronic pansinusitis    2. Recurrent URI (upper respiratory infection)    3. Abnormal chest x-ray    4. Moderate persistent asthma with acute exacerbation    Rule out immune deficiency lab work up  Plan:   Michela was seen today for  follow-up and sinusitis.    Diagnoses and all orders for this visit:    Chronic pansinusitis  -     S.pneumoniae IgG Serotypes; Future  -     budesonide (PULMICORT) 0.5 mg/2 mL nebulizer solution; 1/2 ampule in nasally at hour of sleep as directed    Recurrent URI (upper respiratory infection)  -     IgE; Future  -     IgG; Future  -     IgG 1, 2, 3, and 4; Future  -     IgA; Future  -     IgM; Future  -     Tetanus toxoid, IgG; Future  -     NEUTROPHIL OXIDATIVE BURST, BLOOD; Future  -     Haemophilius influenzae B Ab IgG; Future  -     C-reactive protein; Future  -     Tetanus toxoid, IgG; Future  -     S.pneumoniae IgG Serotypes; Future  -     budesonide (PULMICORT) 0.5 mg/2 mL nebulizer solution; 1/2 ampule in nasally at hour of sleep as directed    Abnormal chest x-ray  -     Cancel: CT Chest W Wo Contrast; Future    Moderate persistent asthma with acute exacerbation  -     budesonide-formoterol 160-4.5 mcg (SYMBICORT) 160-4.5 mcg/actuation HFAA; Inhale 2 puffs into the lungs every 12 (twelve) hours. Controller  -     albuterol (ACCUNEB) 0.63 mg/3 mL Nebu; Take 3 mLs (0.63 mg total) by nebulization every 6 (six) hours as needed. Rescue  -     Discontinue: budesonide-formoterol 160-4.5 mcg (SYMBICORT) 160-4.5 mcg/actuation HFAA; Inhale 2 puffs into the lungs every 12 (twelve) hours. Controller      Return in about 2 weeks (around 3/28/2017) for review lab work and medication treatment plan, hold antihistamines for skin testing.    Start Budesonide 0.5 1/2 ampule in each nostril in the evening  Dymista 1 spary each nostril in the AM  Start:  Albuterol 0.083% 1 ampule in the nebulizer AM and PM before your Symbicort dose  Start Symbicort 160/4.5 HFA 2 puffs inhaled twice a day  Continue until revisit in 2 weeks    Continue: Singulair and  Zyrtec  Skin testing at a later date, Must hold Zyrtec and all antihistamines, Zantac, Pepcid, sleep aids 5 days prior to skin testing. If positive consider weekly allergy  injections    Get Lab work drawn  Get CT scan of the chest with and without contrast    Follow up with Dr. oBo ENT for evaluation of Pansinusitis for evaluation and treatment including culture and sensitivity.    Discussed options and strategies  Reviewed medications risk v. Benefit  Reviewed pathophysiology  All questions answered  Emotional support provided  Pt verbalized understanding of all the above and treatment plan.      KASIE Valencia

## 2017-03-15 ENCOUNTER — TELEPHONE (OUTPATIENT)
Dept: FAMILY MEDICINE | Facility: CLINIC | Age: 72
End: 2017-03-15

## 2017-03-16 RX ORDER — METHYLPREDNISOLONE 4 MG/1
TABLET ORAL
Qty: 10 TABLET | Refills: 0 | OUTPATIENT
Start: 2017-03-16

## 2017-03-16 NOTE — TELEPHONE ENCOUNTER
Attempted to call patient regarding her CAT scan results no answer left a message for patient to return our call.  Hemorrhagic cyst seen patient needs follow-up with urology Dr. Daniels in the near future.  We'll attempt to contact the patient again

## 2017-03-17 ENCOUNTER — TELEPHONE (OUTPATIENT)
Dept: ORTHOPEDICS | Facility: CLINIC | Age: 72
End: 2017-03-17

## 2017-03-17 NOTE — TELEPHONE ENCOUNTER
Pt called in stating received message from our office stating to call back. Pt denies seeing Dr. Esparza. Pt informed no one from the office called. Understanding verbalized.

## 2017-03-24 ENCOUNTER — TELEPHONE (OUTPATIENT)
Dept: ALLERGY | Facility: CLINIC | Age: 72
End: 2017-03-24

## 2017-03-24 NOTE — TELEPHONE ENCOUNTER
Spoke with patient.  Appointment for CT scheduled for 4/1/17.  Appointment with Lenore Finney 4/4/17.  Patient will also have labs drawn on day of her CT.

## 2017-03-24 NOTE — TELEPHONE ENCOUNTER
Spoke with pt regarding her CT scan which in unchanged from the previous. Will repeat in 6 months because of the hemorrhagic cyst and hx of renal cell carcinoma in her sister.

## 2017-03-24 NOTE — TELEPHONE ENCOUNTER
----- Message from Shanda Chaudhari sent at 3/22/2017 12:49 PM CDT -----  Contact: Self/[phone]  Pt said that she is calling in regards to needing to speak with someone in the office pt stated that she was told by  Lenore Hazel that someone was going to call her to schedule some tests but no one has contacted pt. Please call and advise            Thank you

## 2017-04-01 ENCOUNTER — HOSPITAL ENCOUNTER (OUTPATIENT)
Dept: RADIOLOGY | Facility: HOSPITAL | Age: 72
Discharge: HOME | End: 2017-04-01
Attending: ALLERGY & IMMUNOLOGY
Payer: MEDICARE

## 2017-04-01 DIAGNOSIS — R93.89 ABNORMAL CHEST X-RAY: ICD-10-CM

## 2017-04-01 PROCEDURE — 71250 CT THORAX DX C-: CPT | Mod: 26,,, | Performed by: RADIOLOGY

## 2017-04-01 PROCEDURE — 71250 CT THORAX DX C-: CPT | Mod: TC

## 2017-04-04 ENCOUNTER — OFFICE VISIT (OUTPATIENT)
Dept: ALLERGY | Facility: CLINIC | Age: 72
End: 2017-04-04
Payer: MEDICARE

## 2017-04-04 VITALS
WEIGHT: 207.69 LBS | OXYGEN SATURATION: 99 % | HEIGHT: 60 IN | HEART RATE: 80 BPM | DIASTOLIC BLOOD PRESSURE: 80 MMHG | SYSTOLIC BLOOD PRESSURE: 142 MMHG | BODY MASS INDEX: 40.78 KG/M2

## 2017-04-04 DIAGNOSIS — J06.9 RECURRENT UPPER RESPIRATORY INFECTION (URI): ICD-10-CM

## 2017-04-04 DIAGNOSIS — J30.89 ALLERGIC RHINITIS DUE TO OTHER ALLERGEN: ICD-10-CM

## 2017-04-04 DIAGNOSIS — J45.41 MODERATE PERSISTENT ASTHMA WITH ACUTE EXACERBATION: Primary | ICD-10-CM

## 2017-04-04 DIAGNOSIS — J32.4 CHRONIC PANSINUSITIS: ICD-10-CM

## 2017-04-04 PROBLEM — J32.0 MAXILLARY SINUSITIS: Status: ACTIVE | Noted: 2017-04-04

## 2017-04-04 PROBLEM — J32.9 CHRONIC SINUSITIS: Status: ACTIVE | Noted: 2017-04-04

## 2017-04-04 PROCEDURE — 99499 UNLISTED E&M SERVICE: CPT | Mod: S$GLB,,, | Performed by: ALLERGY & IMMUNOLOGY

## 2017-04-04 PROCEDURE — 99999 PR PBB SHADOW E&M-EST. PATIENT-LVL V: CPT | Mod: PBBFAC,,, | Performed by: ALLERGY & IMMUNOLOGY

## 2017-04-04 NOTE — PATIENT INSTRUCTIONS
Continue Budesonide 0.5 1/2 ampule in each nostril in the evening  Dymista 1 spary each nostril in the AM  Start:  Albuterol 0.083% 1 ampule in the nebulizer AM and PM before your Symbicort dose  Start Symbicort 160/4.5 HFA 2 puffs inhaled twice a day  Continue until revisit in 2 weeks     Continue: Singulair and Zyrtec  Skin testing Must hold Zyrtec and all antihistamines, Zantac, Pepcid, sleep aids 5 days prior to skin testing. If positive consider weekly allergy injections    Follow up with Dr. Boo ENT for evaluation of Pansinusitis for evaluation and treatment including culture and sensitivity.

## 2017-04-04 NOTE — MR AVS SNAPSHOT
[facility] - All[facility] Immunology  [address]  [place] [address]  Phone: [phone]  Fax: [phone]                  Michela Franco   2017 8:30 AM   Office Visit    Description:  Female : 1945   Provider:  KASIE Valencia   Department:  [facility]           Reason for Visit     Follow-up           Diagnoses this Visit        Comments    Mild intermittent asthma without complication    -  Primary     Allergic rhinitis due to other allergen         Chronic maxillary sinusitis                To Do List           Goals (5 Years of Data)     None      Follow-Up and Disposition     Return in about 2 weeks (around 2017) for 60 minute EEP for skin testing hold all antihistamines 5 days prior to skin testing.      [facility] On [facility]     [facility] On Call Nurse Care Line -  Assistance  Unless otherwise directed by your provider, please contact [facility], our nurse care line that is available for  assistance.     Registered nurses in the [facility] On Call Center provide: appointment scheduling, clinical advisement, health education, and other advisory services.  Call: [phone] (toll free)               Medications           Message regarding Medications     Verify the changes and/or additions to your medication regime listed below are the same as discussed with your clinician today.  If any of these changes or additions are incorrect, please notify your healthcare provider.        STOP taking these medications     Ca cmb [id number] 120-1,000-10 mg-unit-mg Tab Take 1 tablet by mouth once daily.           Verify that the below list of medications is an accurate representation of the medications you are currently taking.  If none reported, the list may be blank. If incorrect, please contact your healthcare provider. Carry this list with you in case of emergency.           Current Medications     ACTIVATED CHARCOAL (CHARCOCAPS ORAL) Take  by mouth as needed.     albuterol (ACCUNEB) 0.63 mg/3 mL Nebu Take 3 mLs (0.63 mg total) by nebulization every 6 (six) hours as needed. Rescue    albuterol 90 mcg/actuation inhaler Inhale 2 puffs into the lungs every 6 (six) hours as needed for Wheezing.    amlodipine (NORVASC) 10 MG tablet Take 1 tablet (10 mg total) by mouth once daily.    budesonide (PULMICORT) 0.5 mg/2 mL nebulizer solution Instill 1/2 respule each nostril twice per day.  Let dwell for 2-5 min.    budesonide (PULMICORT) 0.5 mg/2 mL nebulizer solution 1/2 ampule in nasally at hour of sleep as directed    budesonide-formoterol 160-4.5 mcg (SYMBICORT) 160-4.5 mcg/actuation HFAA Inhale 2 puffs into the lungs every 12 (twelve) hours. Controller    cetirizine (ZYRTEC) 10 MG tablet Take 1 tablet (10 mg total) by mouth once daily.    cholecalciferol, vitamin D3, 10,000 unit Cap Take 360 mg by mouth once daily. Take 6 capsules (6,000 units total) by mouth once daily    COD LIVER OIL ORAL Take 1 tablet by mouth once daily.    DYMISTA 137-50 mcg/spray [place] PLACE 1 SPRAY BY NASAL ROUTE 2 (TWO) TIMES DAILY AS NEEDED.    epinephrine (EPIPEN) 0.3 mg/0.3 mL AtIn Inject into the muscle.    ferrous sulfate 325 (65 FE) MG EC tablet Take 1 tablet (325 mg total) by mouth 3 (three) times daily with meals.    folic acid (FOLVITE) 800 MCG Tab Take 1 tablet (800 mcg total) by mouth once daily.    hydrochlorothiazide (HYDRODIURIL) 25 MG tablet Take 1 tablet (25 mg total) by mouth once daily.    ibuprofen (ADVIL,MOTRIN) 800 MG tablet TAKE 1 TABLET EVERY 6 HOURS AS NEEDED    inhalation device (AEROCHAMBER PLUS FLOW-VU) Use with inhaler    montelukast (SINGULAIR) 10 mg tablet Take 1 tablet (10 mg total) by mouth every evening.    multivitamin (MULTIVITAMIN) per tablet Take 1 tablet by mouth once daily.      potassium chloride SA (K-DUR,KLOR-CON) 20 MEQ tablet 2 tablets daily po    rosuvastatin (CRESTOR) 5 MG tablet Take 1 tablet (5 mg total) by mouth once daily.    tramadol  (ULTRAM) 50 mg tablet Take 1 tablet (50 mg total) by mouth every 6 (six) hours as needed for Pain.           Clinical Reference Information           Your Vitals Were     BP Pulse Height Weight SpO2 BMI    142/80 (BP Location: Left arm, Patient Position: Sitting, BP Method: Manual) 80 5' (1.524 m) 94.2 kg (207 lb 10.8 oz) 99% 40.56 kg/m2      Blood Pressure          Most Recent Value    BP  (!)  142/80      Allergies as of 4/4/2017     Adhesive    Diazepam    Gabapentin    Keppra [Levetiracetam]    Mold      Immunizations Administered on Date of Encounter - 4/4/2017     None      Orders Placed During Today's Visit      Normal Orders This Visit    Ambulatory Referral to ENT       [facility] Sign-Up     Activating your [facility] account is as easy as 1-2-3!     1) Visit [email or web address], select Sign Up Now, enter this activation code and your date of birth, then select Next.  Activation code not generated  Current Patient Portal Status: Account disabled      2) Create a username and password to use when you visit [facility] in the future and select a security question in case you lose your password and select Next.    3) Enter your e-mail address and click Sign Up!    Additional Information  If you have questions, please e-mail [email or web address] or call [phone] to talk to our [facility] staff. Remember, [facility] is NOT to be used for urgent needs. For medical emergencies, dial 911.         Instructions    Continue Budesonide 0.5 1/2 ampule in each nostril in the evening  Dymista 1 spary each nostril in the AM  Start:  Albuterol 0.083% 1 ampule in the nebulizer AM and PM before your Symbicort dose  Start Symbicort 160/4.5 HFA 2 puffs inhaled twice a day  Continue until revisit in 2 weeks     Continue: Singulair and Zyrtec  Skin testing Must hold Zyrtec and all antihistamines, Zantac, Pepcid, sleep aids 5 days prior to skin testing. If positive consider weekly allergy injections            Language Assistance  Services     ATTENTION: Language assistance services are available, free of charge. Please call [phone].      ATENCIÓN: Si habla jenniferañol, tiene a ferreira disposición servicios gratuitos de asistencia lingüística. Llame al [phone].     CHÚ Ý: N?u b?n nói Ti?ng Vi?t, có các d?ch v? h? tr? ngôn ng? mi?n phí dành cho b?n. G?i s? [phone].         [facility] complies with applicable Federal civil rights laws and does not discriminate on the basis of race, color, national origin, age, disability, or sex.

## 2017-04-04 NOTE — PROGRESS NOTES
Subjective:       Patient ID: Michela Franco is a 71 y.o. female.    Chief Complaint: Follow-up (sinus drainage, breathing treatment before coming today)    HPI Comments: Patient is known to me in the allergy department. She is here for follow up. She has had some improvement on the current treatment plan. Still has suboptimally symptom control. She did not hold her antihistamines for today's visit she states she did not know she was supposed to. She has not seen or made a follow up appt. With  ENT. Reviewed lab work up and CT of Chest results. Some lab still pending. Will continue with immune evaluation lab work up.  Long discussion with patient with question and answer and review of pathophysiology. Multifactorial issue of suspected severe allergic rhinitis contributing to Chronic sinusitis, and triggering asthma symptoms. ENT consult is necessary for culture guided antibiotic therapy. Strongly suggest skin testing and pending those results, allergy injections. Patient may be a Xolair candidate.  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 She has a hearing aid, however it only makes everyone's voices louder and she does not care for it.    Review of Systems   Constitutional: Negative for activity change, appetite change, chills, diaphoresis, fatigue, fever and unexpected weight change.   HENT: Positive for congestion, postnasal drip, rhinorrhea and sore throat. Negative for dental problem, drooling, ear discharge, ear pain, facial swelling, hearing loss, mouth sores, nosebleeds, sinus pressure, sneezing, tinnitus, trouble swallowing and voice change.    Eyes: Negative for photophobia, pain, discharge, redness, itching and visual disturbance.   Respiratory: Positive for cough. Negative for apnea, choking, chest tightness, shortness of breath, wheezing and stridor.    Cardiovascular: Negative for chest pain,  palpitations and leg swelling.   Gastrointestinal: Negative for abdominal distention, abdominal pain, constipation, diarrhea, nausea and vomiting.   Endocrine: Negative for cold intolerance, heat intolerance, polydipsia, polyphagia and polyuria.   Genitourinary: Negative for difficulty urinating, dysuria, enuresis, flank pain, frequency, hematuria, menstrual problem, pelvic pain and urgency.   Musculoskeletal: Negative for arthralgias, back pain, gait problem, joint swelling, myalgias, neck pain and neck stiffness.   Skin: Negative for color change, pallor, rash and wound.   Allergic/Immunologic: Negative for environmental allergies, food allergies and immunocompromised state.   Neurological: Negative for dizziness, tremors, seizures, syncope, facial asymmetry, speech difficulty, weakness, light-headedness, numbness and headaches.   Hematological: Negative for adenopathy. Does not bruise/bleed easily.   Psychiatric/Behavioral: Negative for agitation, behavioral problems, confusion, decreased concentration, dysphoric mood, hallucinations, self-injury, sleep disturbance and suicidal ideas. The patient is not nervous/anxious and is not hyperactive.    All other systems reviewed and are negative.    Objective:   Physical Exam   Constitutional: She is oriented to person, place, and time. She appears well-developed and well-nourished. She is active and cooperative.  Non-toxic appearance. She does not have a sickly appearance. She does not appear ill. No distress. She is not intubated.   HENT:   Head: Normocephalic and atraumatic.   Right Ear: Hearing, tympanic membrane, external ear and ear canal normal. No lacerations. No drainage, swelling or tenderness. No foreign bodies. No mastoid tenderness. Tympanic membrane is not injected, not scarred, not perforated, not erythematous, not retracted and not bulging. Tympanic membrane mobility is normal. No middle ear effusion. No hemotympanum. No decreased hearing is noted.    Left Ear: Hearing, tympanic membrane, external ear and ear canal normal. No lacerations. No drainage, swelling or tenderness. No foreign bodies. No mastoid tenderness. Tympanic membrane is not injected, not scarred, not perforated, not erythematous, not retracted and not bulging. Tympanic membrane mobility is normal.  No middle ear effusion. No hemotympanum. No decreased hearing is noted.   Nose: Mucosal edema present. No rhinorrhea, nose lacerations, sinus tenderness, nasal deformity, septal deviation or nasal septal hematoma. No epistaxis.  No foreign bodies. Right sinus exhibits maxillary sinus tenderness. Right sinus exhibits no frontal sinus tenderness. Left sinus exhibits maxillary sinus tenderness. Left sinus exhibits no frontal sinus tenderness.   Mouth/Throat: Uvula is midline, oropharynx is clear and moist and mucous membranes are normal. She does not have dentures. No oral lesions. No trismus in the jaw. Normal dentition. No dental abscesses, uvula swelling, lacerations or dental caries. No oropharyngeal exudate, posterior oropharyngeal edema, posterior oropharyngeal erythema or tonsillar abscesses. No tonsillar exudate.   Enlarged lower nasal turbinates approximately 95%-100% of nasal passage way with mucosal edema and clear rhinorrhea.  Posterior pharynx with cobblestone appearance and mild erythema. Clear PND   Eyes: Conjunctivae, EOM and lids are normal. Pupils are equal, round, and reactive to light. Right eye exhibits no chemosis, no discharge, no exudate and no hordeolum. No foreign body present in the right eye. Left eye exhibits no chemosis, no discharge, no exudate and no hordeolum. No foreign body present in the left eye. Right conjunctiva is not injected. Right conjunctiva has no hemorrhage. Left conjunctiva is not injected. Left conjunctiva has no hemorrhage. No scleral icterus.   Neck: Trachea normal, normal range of motion, full passive range of motion without pain and phonation normal.  Neck supple. No tracheal tenderness, no spinous process tenderness and no muscular tenderness present. No rigidity. No tracheal deviation, no edema, no erythema and normal range of motion present. No thyroid mass and no thyromegaly present.   Cardiovascular: Normal rate, regular rhythm, S1 normal, S2 normal, normal heart sounds and normal pulses.  Exam reveals no gallop and no friction rub.    No murmur heard.  Pulmonary/Chest: Effort normal and breath sounds normal. No accessory muscle usage or stridor. No apnea, no tachypnea and no bradypnea. She is not intubated. No respiratory distress. She has no decreased breath sounds. She has no wheezes. She has no rhonchi. She has no rales. She exhibits no tenderness.   Abdominal: Soft. Normal appearance and bowel sounds are normal. She exhibits no distension and no mass. There is no tenderness.   Musculoskeletal: Normal range of motion. She exhibits no edema, tenderness or deformity.   Lymphadenopathy:        Head (right side): No submental, no submandibular, no tonsillar, no preauricular, no posterior auricular and no occipital adenopathy present.        Head (left side): No submental, no submandibular, no tonsillar, no preauricular, no posterior auricular and no occipital adenopathy present.     She has no cervical adenopathy.        Right cervical: No superficial cervical, no deep cervical and no posterior cervical adenopathy present.       Left cervical: No superficial cervical, no deep cervical and no posterior cervical adenopathy present.   Neurological: She is alert and oriented to person, place, and time. She has normal strength. She is not disoriented.   Skin: Skin is warm, dry and intact. No abrasion, no bruising, no burn, no ecchymosis, no laceration, no lesion, no petechiae, no purpura and no rash noted. Rash is not macular, not maculopapular, not nodular, not pustular, not vesicular and not urticarial. She is not diaphoretic. No cyanosis or erythema. No  pallor. Nails show no clubbing.   Psychiatric: She has a normal mood and affect. Her speech is normal and behavior is normal. Judgment and thought content normal. Cognition and memory are normal.   Nursing note and vitals reviewed.    Assessment:     1. Moderate persistent asthma with acute exacerbation    2. Allergic rhinitis due to other allergen    3. Chronic pansinusitis    4. Recurrent upper respiratory infection (URI)      Plan:   Michela was seen today for follow-up.    Diagnoses and all orders for this visit:    Moderate persistent asthma with acute exacerbation    Allergic rhinitis due to other allergen    Chronic pansinusitis  -     Ambulatory Referral to ENT    Recurrent upper respiratory infection (URI)    Continue Budesonide 0.5 1/2 ampule in each nostril in the evening  Dymista 1 spary each nostril in the AM  Start:  Albuterol 0.083% 1 ampule in the nebulizer AM and PM before your Symbicort dose  Start Symbicort 160/4.5 HFA 2 puffs inhaled twice a day  Continue until revisit in 2 weeks     Continue: Singulair and Zyrtec  Skin testing Must hold Zyrtec and all antihistamines, Zantac, Pepcid, sleep aids 5 days prior to skin testing. If positive consider weekly allergy injections    Follow up with Dr. Boo ENT for evaluation of Pansinusitis for evaluation and treatment including culture and sensitivity. Culture guided antibiotic therapy.    Consider GERD/LPR contributing to cough    Return in about 2 weeks (around 4/18/2017) for 60 minute EEP for skin testing hold all antihistamines 5 days prior to skin testing.  40 minute face to face discussion of Pathophysiology and review of lab and radiologic testing  Patient is frustrated she is not well despite medication management she states she is consistent with treatment plan  Discussed options and strategies  Reviewed medications risk v. Benefit  Reviewed pathophysiology  All questions answered  Emotional support provided  Pt verbalized understanding of all  the above and treatment plan.      KASIE Valencia

## 2017-04-06 ENCOUNTER — TELEPHONE (OUTPATIENT)
Dept: INTERNAL MEDICINE | Facility: CLINIC | Age: 72
End: 2017-04-06

## 2017-04-06 NOTE — TELEPHONE ENCOUNTER
----- Message from Yuko Jones sent at 4/6/2017  9:05 AM CDT -----    There was an issue with a test ordered on this patient from 04/01/17.  Please call the [facility] as soon as possible at [phone] ext. [phone] for complete details.  Anyone in the [facility] lab will be able to assist you with further information.

## 2017-04-21 ENCOUNTER — OFFICE VISIT (OUTPATIENT)
Dept: ALLERGY | Facility: CLINIC | Age: 72
End: 2017-04-21
Payer: MEDICARE

## 2017-04-21 VITALS
WEIGHT: 206.56 LBS | HEART RATE: 88 BPM | HEIGHT: 65 IN | DIASTOLIC BLOOD PRESSURE: 60 MMHG | SYSTOLIC BLOOD PRESSURE: 122 MMHG | BODY MASS INDEX: 34.41 KG/M2

## 2017-04-21 DIAGNOSIS — J45.41 MODERATE PERSISTENT ASTHMA WITH ACUTE EXACERBATION: ICD-10-CM

## 2017-04-21 DIAGNOSIS — J32.4 CHRONIC PANSINUSITIS: Primary | ICD-10-CM

## 2017-04-21 PROCEDURE — 99999 PR PBB SHADOW E&M-EST. PATIENT-LVL IV: CPT | Mod: PBBFAC,,, | Performed by: ALLERGY & IMMUNOLOGY

## 2017-04-21 PROCEDURE — 99499 UNLISTED E&M SERVICE: CPT | Mod: S$GLB,,, | Performed by: ALLERGY & IMMUNOLOGY

## 2017-04-21 RX ORDER — PREDNISONE 20 MG/1
TABLET ORAL
Qty: 27 TABLET | Refills: 0 | Status: SHIPPED | OUTPATIENT
Start: 2017-04-21 | End: 2017-05-05

## 2017-04-21 RX ORDER — AMOXICILLIN AND CLAVULANATE POTASSIUM 875; 125 MG/1; MG/1
1 TABLET, FILM COATED ORAL 2 TIMES DAILY
Qty: 42 TABLET | Refills: 0 | Status: SHIPPED | OUTPATIENT
Start: 2017-04-21 | End: 2017-05-19 | Stop reason: ALTCHOICE

## 2017-04-21 NOTE — PROGRESS NOTES
Subjective:       Patient ID: Michela Franco is a 71 y.o. female.    Chief Complaint: Other (feels like she has pneumonia, had fever, this week, SOB, chest tightness, coughing up thick mucus, altuterol used at 2 p.m.)    Patient is new to me in the [facility] system. She presents today with symptoms of a URI, chronic pansinusitis, allergic rhinitis. She would like to be skin tested to see what she is allergic to. She has a significant atopic history and past sinus surgery.      Review of Systems   Constitutional: Negative for activity change, appetite change, chills, diaphoresis, fatigue, fever and unexpected weight change.   HENT: Positive for congestion, postnasal drip, rhinorrhea and sinus pressure. Negative for dental problem, drooling, ear discharge, ear pain, facial swelling, hearing loss, mouth sores, nosebleeds, sneezing, sore throat, tinnitus, trouble swallowing and voice change.    Eyes: Negative for photophobia, pain, discharge, redness, itching and visual disturbance.   Respiratory: Positive for cough. Negative for apnea, choking, chest tightness, shortness of breath, wheezing and stridor.    Cardiovascular: Negative for chest pain, palpitations and leg swelling.   Gastrointestinal: Negative for abdominal distention, abdominal pain, constipation, diarrhea, nausea and vomiting.   Endocrine: Negative for cold intolerance, heat intolerance, polydipsia, polyphagia and polyuria.   Genitourinary: Negative for difficulty urinating, dysuria, enuresis, flank pain, frequency, hematuria, menstrual problem, pelvic pain and urgency.   Musculoskeletal: Negative for arthralgias, back pain, gait problem, joint swelling, myalgias, neck pain and neck stiffness.   Skin: Negative for color change, pallor, rash and wound.   Allergic/Immunologic: Negative for environmental allergies, food allergies and immunocompromised state.   Neurological: Negative for dizziness, tremors, seizures, syncope, facial asymmetry, speech  difficulty, weakness, light-headedness, numbness and headaches.   Hematological: Negative for adenopathy. Does not bruise/bleed easily.   Psychiatric/Behavioral: Negative for agitation, behavioral problems, confusion, decreased concentration, dysphoric mood, hallucinations, self-injury, sleep disturbance and suicidal ideas. The patient is not nervous/anxious and is not hyperactive.    All other systems reviewed and are negative.    Objective:   Physical Exam   Constitutional: She is oriented to person, place, and time. She appears well-developed and well-nourished. She is active and cooperative.  Non-toxic appearance. She does not have a sickly appearance. She does not appear ill. No distress. She is not intubated.   HENT:   Head: Normocephalic and atraumatic.   Right Ear: Hearing, tympanic membrane, external ear and ear canal normal. No lacerations. No drainage, swelling or tenderness. No foreign bodies. No mastoid tenderness. Tympanic membrane is not injected, not scarred, not perforated, not erythematous, not retracted and not bulging. Tympanic membrane mobility is normal. No middle ear effusion. No hemotympanum. No decreased hearing is noted.   Left Ear: Hearing, tympanic membrane, external ear and ear canal normal. No lacerations. No drainage, swelling or tenderness. No foreign bodies. No mastoid tenderness. Tympanic membrane is not injected, not scarred, not perforated, not erythematous, not retracted and not bulging. Tympanic membrane mobility is normal.  No middle ear effusion. No hemotympanum. No decreased hearing is noted.   Nose: Nose normal. No mucosal edema, rhinorrhea, nose lacerations, sinus tenderness, nasal deformity, septal deviation or nasal septal hematoma. No epistaxis.  No foreign bodies. Right sinus exhibits no maxillary sinus tenderness and no frontal sinus tenderness. Left sinus exhibits no maxillary sinus tenderness and no frontal sinus tenderness.   Mouth/Throat: Uvula is midline,  oropharynx is clear and moist and mucous membranes are normal. She does not have dentures. No oral lesions. No trismus in the jaw. Normal dentition. No dental abscesses, uvula swelling, lacerations or dental caries. No oropharyngeal exudate, posterior oropharyngeal edema, posterior oropharyngeal erythema or tonsillar abscesses. No tonsillar exudate.   Eyes: Conjunctivae, EOM and lids are normal. Pupils are equal, round, and reactive to light. Right eye exhibits no chemosis, no discharge, no exudate and no hordeolum. No foreign body present in the right eye. Left eye exhibits no chemosis, no discharge, no exudate and no hordeolum. No foreign body present in the left eye. Right conjunctiva is not injected. Right conjunctiva has no hemorrhage. Left conjunctiva is not injected. Left conjunctiva has no hemorrhage. No scleral icterus.   Neck: Trachea normal, normal range of motion, full passive range of motion without pain and phonation normal. Neck supple. No tracheal tenderness, no spinous process tenderness and no muscular tenderness present. No no neck rigidity. No tracheal deviation, no edema, no erythema and normal range of motion present. No thyroid mass and no thyromegaly present.   Cardiovascular: Normal rate, regular rhythm, S1 normal, S2 normal, normal heart sounds and normal pulses.  Exam reveals no gallop and no friction rub.    No murmur heard.  Pulmonary/Chest: Effort normal and breath sounds normal. No accessory muscle usage or stridor. No apnea, no tachypnea and no bradypnea. She is not intubated. No respiratory distress. She has no decreased breath sounds. She has no wheezes. She has no rhonchi. She has no rales. She exhibits no tenderness.   Abdominal: Soft. Normal appearance and bowel sounds are normal. She exhibits no distension and no mass. There is no tenderness.   Musculoskeletal: Normal range of motion. She exhibits no edema, tenderness or deformity.   Lymphadenopathy:        Head (right side): No  submental, no submandibular, no tonsillar, no preauricular, no posterior auricular and no occipital adenopathy present.        Head (left side): No submental, no submandibular, no tonsillar, no preauricular, no posterior auricular and no occipital adenopathy present.     She has no cervical adenopathy.        Right cervical: No superficial cervical, no deep cervical and no posterior cervical adenopathy present.       Left cervical: No superficial cervical, no deep cervical and no posterior cervical adenopathy present.   Neurological: She is alert and oriented to person, place, and time. She has normal strength. She is not disoriented.   Skin: Skin is warm, dry and intact. No abrasion, no bruising, no burn, no ecchymosis, no laceration, no lesion, no petechiae, no purpura and no rash noted. Rash is not macular, not maculopapular, not nodular, not pustular, not vesicular and not urticarial. She is not diaphoretic. No cyanosis or erythema. No pallor. Nails show no clubbing.   Psychiatric: She has a normal mood and affect. Her speech is normal and behavior is normal. Judgment and thought content normal. Cognition and memory are normal.   Nursing note and vitals reviewed.    Assessment:     1. Chronic pansinusitis    2. Moderate persistent asthma with acute exacerbation      Plan:   Michela was seen today for other.    Diagnoses and all orders for this visit:    Chronic pansinusitis  -     Discontinue: amoxicillin-clavulanate 875-125mg (AUGMENTIN) 875-125 mg per tablet; Take 1 tablet by mouth 2 (two) times daily.    Moderate persistent asthma with acute exacerbation  -     Discontinue: predniSONE (DELTASONE) 20 MG tablet; 1 tab 3 times a day for 6 days then 1 tab 2 times a day for 3 days then 1 tab once a day for 3 days      Return in about 6 weeks (around 6/2/2017) for 60 min EEP for skin testing inhalents. Pnemovax.    Repeat titers 6 weeks post vaccination  Repeat IgG,M,A, E and subclasses lymphocyte panel at that  time as well  Once off Prednisone get Immunizations for travel to [facility]    Discussed options and strategies  Reviewed medications risk v. Benefit  Reviewed pathophysiology  All questions answered  Emotional support provided  Pt verbalized understanding of all the above and treatment plan.      KASIE Valencia

## 2017-05-04 ENCOUNTER — TELEPHONE (OUTPATIENT)
Dept: FAMILY MEDICINE | Facility: CLINIC | Age: 72
End: 2017-05-04

## 2017-05-04 NOTE — TELEPHONE ENCOUNTER
Attempted to contact patient regarding scheduled appt on may 8 (Need Vacinnation to travel to [place]) detailed voicemail left advising patient that she would need to schedule an appt with our travel clinic who will be able to assist her with the required vaccines needed in the area she's traveling.  Traveling clinic number provided.

## 2017-05-05 ENCOUNTER — OFFICE VISIT (OUTPATIENT)
Dept: INFECTIOUS DISEASES | Facility: CLINIC | Age: 72
End: 2017-05-05
Payer: MEDICARE

## 2017-05-05 VITALS
DIASTOLIC BLOOD PRESSURE: 70 MMHG | HEIGHT: 65 IN | SYSTOLIC BLOOD PRESSURE: 138 MMHG | HEART RATE: 80 BPM | BODY MASS INDEX: 35.55 KG/M2 | TEMPERATURE: 98 F | WEIGHT: 213.38 LBS

## 2017-05-05 DIAGNOSIS — Z71.84 TRAVEL ADVICE ENCOUNTER: Primary | ICD-10-CM

## 2017-05-05 DIAGNOSIS — Z00.00 ROUTINE HEALTH MAINTENANCE: ICD-10-CM

## 2017-05-05 PROCEDURE — 3078F DIAST BP <80 MM HG: CPT | Mod: S$GLB,,, | Performed by: INTERNAL MEDICINE

## 2017-05-05 PROCEDURE — 90632 HEPA VACCINE ADULT IM: CPT | Mod: S$GLB,,, | Performed by: INTERNAL MEDICINE

## 2017-05-05 PROCEDURE — 90471 IMMUNIZATION ADMIN: CPT | Mod: S$GLB,,, | Performed by: INTERNAL MEDICINE

## 2017-05-05 PROCEDURE — 1159F MED LIST DOCD IN RCRD: CPT | Mod: S$GLB,,, | Performed by: INTERNAL MEDICINE

## 2017-05-05 PROCEDURE — 99999 PR PBB SHADOW E&M-EST. PATIENT-LVL IV: CPT | Mod: PBBFAC,,, | Performed by: INTERNAL MEDICINE

## 2017-05-05 PROCEDURE — 3075F SYST BP GE 130 - 139MM HG: CPT | Mod: S$GLB,,, | Performed by: INTERNAL MEDICINE

## 2017-05-05 PROCEDURE — 99204 OFFICE O/P NEW MOD 45 MIN: CPT | Mod: 25,S$GLB,, | Performed by: INTERNAL MEDICINE

## 2017-05-05 PROCEDURE — 1160F RVW MEDS BY RX/DR IN RCRD: CPT | Mod: S$GLB,,, | Performed by: INTERNAL MEDICINE

## 2017-05-05 PROCEDURE — 90734 MENACWYD/MENACWYCRM VACC IM: CPT | Mod: S$GLB,,, | Performed by: INTERNAL MEDICINE

## 2017-05-05 PROCEDURE — 90472 IMMUNIZATION ADMIN EACH ADD: CPT | Mod: S$GLB,,, | Performed by: INTERNAL MEDICINE

## 2017-05-05 PROCEDURE — 90717 YELLOW FEVER VACCINE SUBQ: CPT | Mod: S$GLB,,, | Performed by: INTERNAL MEDICINE

## 2017-05-05 PROCEDURE — 1126F AMNT PAIN NOTED NONE PRSNT: CPT | Mod: S$GLB,,, | Performed by: INTERNAL MEDICINE

## 2017-05-05 RX ORDER — ATOVAQUONE AND PROGUANIL HYDROCHLORIDE 250; 100 MG/1; MG/1
TABLET, FILM COATED ORAL
Qty: 18 TABLET | Refills: 0 | Status: SHIPPED | OUTPATIENT
Start: 2017-05-05 | End: 2017-09-19

## 2017-05-05 RX ORDER — AZITHROMYCIN 500 MG/1
TABLET, FILM COATED ORAL
Qty: 2 TABLET | Refills: 0 | Status: SHIPPED | OUTPATIENT
Start: 2017-05-05 | End: 2017-07-05 | Stop reason: ALTCHOICE

## 2017-05-05 RX ORDER — BACLOFEN 10 MG/1
TABLET ORAL
COMMUNITY
Start: 2017-04-25 | End: 2017-07-05 | Stop reason: ALTCHOICE

## 2017-05-05 NOTE — PROGRESS NOTES
Travel Consult  Chief Complaint   Patient presents with    Travel Consult     [facility]     Michela Franco is here for travel consultation.  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  Past Medical History:   Diagnosis Date    Allergy     Asthma     allergy induced asthms    Hyperlipidemia     Hypertension     Neuromuscular disorder     Osteoporosis     Recurrent sinusitis 3/25/2014    Recurrent upper respiratory infection (URI)     Urticaria      Adhesive; Diazepam; Gabapentin; Keppra [levetiracetam]; and Mold  Immunization History   Administered Date(s) Administered    Influenza - High Dose 11/06/2012, 12/03/2013, 10/10/2016    Pneumococcal Conjugate 06/03/2014    Pneumococcal Polysaccharide - 23 Valent 03/25/2014    Tdap 12/03/2013     Review of Systems   Constitutional: Negative for chills and fever.   HENT: Negative for congestion.    Eyes: Negative for blurred vision.   Respiratory: Negative for cough, sputum production and shortness of breath.    Cardiovascular: Negative for chest pain and leg swelling.   Gastrointestinal: Negative for abdominal pain, diarrhea, nausea and vomiting.   Genitourinary: Negative for dysuria and urgency.   Musculoskeletal: Negative for neck pain.   Skin: Negative for rash.   Neurological: Negative for dizziness and headaches.   Endo/Heme/Allergies: Negative for polydipsia.   Psychiatric/Behavioral: Negative for depression.     Vitals:    05/05/17 1505   BP: 138/70   Pulse: 80   Temp: 98.3 °F (36.8 °C)     Physical Exam   Constitutional: She is oriented to person, place, and time and well-developed, well-nourished, and  in no distress. No distress.   HENT:   Head: Normocephalic and atraumatic.   Mouth/Throat: No oropharyngeal exudate.   Eyes: Conjunctivae are normal.   Neck: Neck supple.   Cardiovascular: Normal rate and regular rhythm.    No murmur heard.  Pulmonary/Chest: Effort normal and breath sounds normal. No respiratory distress. She has no wheezes.   Abdominal: Soft. Bowel sounds are normal. She exhibits no distension. There is no tenderness. There is no rebound.   Musculoskeletal: She exhibits no edema or tenderness.   Neurological: She is alert and oriented to person, place, and time.   Skin: Skin is warm and dry.           ASSESSMENT: 72 y/o F h/o HTN Traveling to [facility] x 10 days  Will give:  Yellow fever, hep A, MMR, menactra, PO typhoid, malarone ppx, azithro PRN complicated diarrhea  Has had tdap, prevnar/pneumovax, flu  PLAN:  1. The Patient was provided with an extensive travel guidance packet which provides travel information specific to the patients itinerary.   2. The patient's medical history was reviewed and the patient was counseled on:  · Dietary precautions.  · Personal protective measures to prevent insect-borne diseases (e.g., malaria, dengue).  · Precautions to prevent exposure to rabies and seek treatment for possible exposures.  · Precautions against sun exposure.  · Precautions against development of DVT during flight.  · Personal and travel safety.  3. The patient's immunization history was reviewed and, based on the patient's itinerary, immunizations were ordered.    4. The patient was encouraged to contact us about any problems that may develop after immunization and possible side effects were reviewed.    5. The patient was instructed to purchase Imodium over the counter to take in case diarrhea (without blood or fever) develops.  An antibiotic was ordered for treatment if severe or bloody diarrhea develops and the patient was instructed on use and possible side effects.    6. The patient was  also instructed to purchase insect repellent containing DEET or [place] and apply according to repellent label instructions.  If indicated by the patients itinerary an anti-malarial agent was prescribed for malaria prophylaxis and possible side effects were reviewed.    7. The patient was instructed to contact us if problems develop after travel.

## 2017-05-05 NOTE — PROGRESS NOTES
Patient received Menactra, Hep A, and Yellow Fever vaccines w/yellow card documentation. Tolerated well and left in NAD

## 2017-05-19 ENCOUNTER — OFFICE VISIT (OUTPATIENT)
Dept: ALLERGY | Facility: CLINIC | Age: 72
End: 2017-05-19
Payer: MEDICARE

## 2017-05-19 VITALS
WEIGHT: 207.25 LBS | BODY MASS INDEX: 34.53 KG/M2 | DIASTOLIC BLOOD PRESSURE: 64 MMHG | HEART RATE: 80 BPM | SYSTOLIC BLOOD PRESSURE: 108 MMHG | HEIGHT: 65 IN

## 2017-05-19 DIAGNOSIS — J30.81 ALLERGIC RHINITIS DUE TO ANIMAL (CAT) (DOG) HAIR AND DANDER: ICD-10-CM

## 2017-05-19 DIAGNOSIS — J45.40 MODERATE PERSISTENT ASTHMA WITHOUT COMPLICATION: ICD-10-CM

## 2017-05-19 DIAGNOSIS — J32.4 CHRONIC PANSINUSITIS: Primary | ICD-10-CM

## 2017-05-19 DIAGNOSIS — J06.9 RECURRENT URI (UPPER RESPIRATORY INFECTION): ICD-10-CM

## 2017-05-19 DIAGNOSIS — J30.89 ALLERGIC RHINITIS DUE TO OTHER ALLERGEN: ICD-10-CM

## 2017-05-19 PROCEDURE — 99499 UNLISTED E&M SERVICE: CPT | Mod: S$GLB,,, | Performed by: ALLERGY & IMMUNOLOGY

## 2017-05-19 PROCEDURE — 99999 PR PBB SHADOW E&M-EST. PATIENT-LVL V: CPT | Mod: PBBFAC,,, | Performed by: ALLERGY & IMMUNOLOGY

## 2017-05-19 NOTE — MR AVS SNAPSHOT
[facility] - Allergy/ Immunology  [address]  [facility] [address]  Phone: [phone]  Fax: [phone]                  Michela Franco   2017 8:00 AM   Office Visit    Description:  Female : 1945   Provider:  KASIE Valencia   Department:  [facility] - Allergy/ Immunology           Reason for Visit     Other           Diagnoses this Visit        Comments    Chronic pansinusitis    -  Primary     Allergic rhinitis due to other allergen         Moderate persistent asthma without complication         Allergic rhinitis due to animal (cat) (dog) hair and dander         Recurrent URI (upper respiratory infection)                To Do List           Future Appointments        Provider Department Dept Phone    2017 1:00 PM MD [facility] - Otorhinolaryngology [phone]    2017 4:30 PM INJECTION, INFECTIOUS DISEASES [facility] ID Injection Room [phone]      Goals (5 Years of Data)     None      Follow-Up and Disposition     Return in about 3 months (around 2017) for after follow up with Dr. Boo.      [facility]     [facility] Nurse Care Line -  Assistance  Unless otherwise directed by your provider, please contact [facility], our nurse care line that is available for  assistance.     Registered nurses in the [facility] provide: appointment scheduling, clinical advisement, health education, and other advisory services.  Call: [phone] (toll free)               Medications           Message regarding Medications     Verify the changes and/or additions to your medication regime listed below are the same as discussed with your clinician today.  If any of these changes or additions are incorrect, please notify your healthcare provider.        These medications were administered today        Dose Freq    pneumococcal vaccine injection 0.5 mL 0.5 mL vaccine x 1 dose    Sig: Inject 0.5 mLs into the muscle vaccine x 1  dose for Meets Vaccination Criteria.    Class: Normal    Route: Intramuscular      STOP taking these medications     amoxicillin-clavulanate 875-125mg (AUGMENTIN) 875-125 mg per tablet Take 1 tablet by mouth 2 (two) times daily.           Verify that the below list of medications is an accurate representation of the medications you are currently taking.  If none reported, the list may be blank. If incorrect, please contact your healthcare provider. Carry this list with you in case of emergency.           Current Medications     albuterol (ACCUNEB) 0.63 mg/3 mL Nebu Take 3 mLs (0.63 mg total) by nebulization every 6 (six) hours as needed. Rescue    albuterol 90 mcg/actuation inhaler Inhale 2 puffs into the lungs every 6 (six) hours as needed for Wheezing.    amlodipine (NORVASC) 10 MG tablet Take 1 tablet (10 mg total) by mouth once daily.    budesonide (PULMICORT) 0.5 mg/2 mL nebulizer solution 1/2 ampule in nasally at hour of sleep as directed    budesonide-formoterol 160-4.5 mcg (SYMBICORT) 160-4.5 mcg/actuation HFAA Inhale 2 puffs into the lungs every 12 (twelve) hours. Controller    cholecalciferol, vitamin D3, 10,000 unit Cap Take 360 mg by mouth once daily. Take 6 capsules (6,000 units total) by mouth once daily    COD LIVER OIL ORAL Take 1 tablet by mouth once daily.    ferrous sulfate 325 (65 FE) MG EC tablet Take 1 tablet (325 mg total) by mouth 3 (three) times daily with meals.    folic acid (FOLVITE) 800 MCG Tab Take 1 tablet (800 mcg total) by mouth once daily.    hydrochlorothiazide (HYDRODIURIL) 25 MG tablet Take 1 tablet (25 mg total) by mouth once daily.    ibuprofen (ADVIL,MOTRIN) 800 MG tablet TAKE 1 TABLET EVERY 6 HOURS AS NEEDED    inhalation device (AEROCHAMBER PLUS FLOW-VU) Use with inhaler    montelukast (SINGULAIR) 10 mg tablet Take 1 tablet (10 mg total) by mouth every evening.    multivitamin (MULTIVITAMIN) per tablet Take 1 tablet by mouth once daily.      potassium chloride SA  "(K-DUR,KLOR-CON) 20 MEQ tablet 2 tablets daily po    rosuvastatin (CRESTOR) 5 MG tablet Take 1 tablet (5 mg total) by mouth once daily.    ACTIVATED CHARCOAL (CHARCOCAPS ORAL) Take by mouth as needed.     atovaquone-proguanil (MALARONE) 250-100 mg Tab Take one tablet daily for malaria prevention. Begin one day before entering malarious area and continue for 1 week after return.    azithromycin (ZITHROMAX) 500 MG tablet Take 2 tablets once if needed for severe diarrhea.    baclofen (LIORESAL) 10 MG tablet     cetirizine (ZYRTEC) 10 MG tablet Take 1 tablet (10 mg total) by mouth once daily.    DYMISTA 137-50 mcg/spray [place] PLACE 1 SPRAY BY NASAL ROUTE 2 (TWO) TIMES DAILY AS NEEDED.    epinephrine (EPIPEN) 0.3 mg/0.3 mL AtIn Inject into the muscle.    tramadol (ULTRAM) 50 mg tablet Take 1 tablet (50 mg total) by mouth every 6 (six) hours as needed for Pain.    typhoid (VIVOTIF) DR capsule One capsule every other day by mouth for 4 doses.           Clinical Reference Information           Your Vitals Were     BP Pulse Height Weight BMI    108/64 80 5' 5" (1.651 m) 94 kg (207 lb 3.7 oz) 34.49 kg/m2      Blood Pressure          Most Recent Value    BP  108/64      Allergies as of 5/19/2017     Adhesive    Diazepam    Gabapentin    Keppra [Levetiracetam]    Mold      Immunizations Administered on Date of Encounter - 5/19/2017     Name Date Dose VIS Date Route    Pneumococcal Polysaccharide - 23 Valent  Incomplete 0.5 mL 4/24/2015 Intramuscular      Orders Placed During Today's Visit     Future Labs/Procedures Expected by Expires    S.pneumoniae IgG Serotypes  6/23/2017 7/18/2018      Instructions    Follow up with Dr. Boo ENT for Chronic sinusitis rhinoscopy and culture       Language Assistance Services     ATTENTION: Language assistance services are available, free of charge. Please call [phone].      ATENCIÓN: Si habla español, tiene a ferreira disposición servicios gratuitos de asistencia lingüística. Llame al " [phone].     MEI Ý: N?u b?n nói Ti?ng Vi?t, có các d?ch v? h? tr? ngôn ng? mi?n phí dành cho b?n. G?i s? [phone].         [facility] complies with applicable Federal civil rights laws and does not discriminate on the basis of race, color, national origin, age, disability, or sex.

## 2017-05-19 NOTE — PROGRESS NOTES
Subjective:       Patient ID: Michela Franco is a 71 y.o. female.    Chief Complaint: Other (here for skin testing, needs refill of Dymista.  Has drip from sinuses that drips into lungs and causes wheezing)    HPI Comments: Patient is known to me in the [facility] system. 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 PND drips into throat and triggers chest symptoms. Today she is significantly improved on medication regimen. Cough decreased, PND significantly improved but still present, ET dysfunction improved, still decreased hearing. She has a significant history for Chronic sinusitis. She is pended to see Dr. Boo ENT for evaluation. She is here today for follow up skin testing. Her goal is to be well for August trip to [place].    Review of Systems   Constitutional: Negative for activity change, appetite change, chills, diaphoresis, fatigue, fever and unexpected weight change.   HENT: Positive for congestion, postnasal drip, rhinorrhea and sinus pressure. Negative for dental problem, drooling, ear discharge, ear pain, facial swelling, hearing loss, mouth sores, nosebleeds, sneezing, sore throat, tinnitus, trouble swallowing and voice change.    Eyes: Negative for photophobia, pain, discharge, redness, itching and visual disturbance.   Respiratory: Positive for cough. Negative for apnea, choking, chest tightness, shortness of breath, wheezing and stridor.    Cardiovascular: Negative for chest pain, palpitations and leg swelling.   Gastrointestinal: Negative for abdominal distention, abdominal pain, constipation, diarrhea, nausea and vomiting.   Endocrine: Negative for cold intolerance, heat intolerance, polydipsia, polyphagia and polyuria.   Genitourinary: Negative for difficulty urinating, dysuria, enuresis, flank pain, frequency, hematuria, menstrual problem, pelvic pain and urgency.   Musculoskeletal: Negative for arthralgias, back  pain, gait problem, joint swelling, myalgias, neck pain and neck stiffness.   Skin: Negative for color change, pallor, rash and wound.   Allergic/Immunologic: Positive for environmental allergies. Negative for food allergies and immunocompromised state.   Neurological: Negative for dizziness, tremors, seizures, syncope, facial asymmetry, speech difficulty, weakness, light-headedness, numbness and headaches.   Hematological: Negative for adenopathy. Does not bruise/bleed easily.   Psychiatric/Behavioral: Negative for agitation, behavioral problems, confusion, decreased concentration, dysphoric mood, hallucinations, self-injury, sleep disturbance and suicidal ideas. The patient is not nervous/anxious and is not hyperactive.    All other systems reviewed and are negative.    Objective:   Physical Exam   Constitutional: She is oriented to person, place, and time. She appears well-developed and well-nourished. She is active and cooperative.  Non-toxic appearance. She does not have a sickly appearance. She does not appear ill. No distress. She is not intubated.   HENT:   Head: Normocephalic and atraumatic.   Right Ear: Hearing, tympanic membrane, external ear and ear canal normal. No lacerations. No drainage, swelling or tenderness. No foreign bodies. No mastoid tenderness. Tympanic membrane is not injected, not scarred, not perforated, not erythematous, not retracted and not bulging. Tympanic membrane mobility is normal. No middle ear effusion. No hemotympanum. No decreased hearing is noted.   Left Ear: Hearing, tympanic membrane, external ear and ear canal normal. No lacerations. No drainage, swelling or tenderness. No foreign bodies. No mastoid tenderness. Tympanic membrane is not injected, not scarred, not perforated, not erythematous, not retracted and not bulging. Tympanic membrane mobility is normal.  No middle ear effusion. No hemotympanum. No decreased hearing is noted.   Nose: Nose normal. No mucosal edema,  rhinorrhea, nose lacerations, sinus tenderness, nasal deformity, septal deviation or nasal septal hematoma. No epistaxis.  No foreign bodies. Right sinus exhibits no maxillary sinus tenderness and no frontal sinus tenderness. Left sinus exhibits no maxillary sinus tenderness and no frontal sinus tenderness.   Mouth/Throat: Uvula is midline, oropharynx is clear and moist and mucous membranes are normal. She does not have dentures. No oral lesions. No trismus in the jaw. Normal dentition. No dental abscesses, uvula swelling, lacerations or dental caries. No oropharyngeal exudate, posterior oropharyngeal edema, posterior oropharyngeal erythema or tonsillar abscesses. No tonsillar exudate.   Eyes: Conjunctivae, EOM and lids are normal. Pupils are equal, round, and reactive to light. Right eye exhibits no chemosis, no discharge, no exudate and no hordeolum. No foreign body present in the right eye. Left eye exhibits no chemosis, no discharge, no exudate and no hordeolum. No foreign body present in the left eye. Right conjunctiva is not injected. Right conjunctiva has no hemorrhage. Left conjunctiva is not injected. Left conjunctiva has no hemorrhage. No scleral icterus.   Neck: Trachea normal, normal range of motion, full passive range of motion without pain and phonation normal. Neck supple. No tracheal tenderness, no spinous process tenderness and no muscular tenderness present. No rigidity. No tracheal deviation, no edema, no erythema and normal range of motion present. No thyroid mass and no thyromegaly present.   Cardiovascular: Normal rate, regular rhythm, S1 normal, S2 normal, normal heart sounds and normal pulses.  Exam reveals no gallop and no friction rub.    No murmur heard.  Pulmonary/Chest: Effort normal and breath sounds normal. No accessory muscle usage or stridor. No apnea, no tachypnea and no bradypnea. She is not intubated. No respiratory distress. She has no decreased breath sounds. She has no wheezes.  She has no rhonchi. She has no rales. She exhibits no tenderness.   Abdominal: Soft. Normal appearance and bowel sounds are normal. She exhibits no distension and no mass. There is no tenderness.   Musculoskeletal: Normal range of motion. She exhibits no edema, tenderness or deformity.   Lymphadenopathy:        Head (right side): No submental, no submandibular, no tonsillar, no preauricular, no posterior auricular and no occipital adenopathy present.        Head (left side): No submental, no submandibular, no tonsillar, no preauricular, no posterior auricular and no occipital adenopathy present.     She has no cervical adenopathy.        Right cervical: No superficial cervical, no deep cervical and no posterior cervical adenopathy present.       Left cervical: No superficial cervical, no deep cervical and no posterior cervical adenopathy present.   Neurological: She is alert and oriented to person, place, and time. She has normal strength. She is not disoriented.   Skin: Skin is warm, dry and intact. No abrasion, no bruising, no burn, no ecchymosis, no laceration, no lesion, no petechiae, no purpura and no rash noted. Rash is not macular, not maculopapular, not nodular, not pustular, not vesicular and not urticarial. She is not diaphoretic. No cyanosis or erythema. No pallor. Nails show no clubbing.   Psychiatric: She has a normal mood and affect. Her speech is normal and behavior is normal. Judgment and thought content normal. Cognition and memory are normal.   Nursing note and vitals reviewed.    Assessment:     1. Chronic pansinusitis    2. Allergic rhinitis due to other allergen    3. Moderate persistent asthma without complication    4. Allergic rhinitis due to animal (cat) (dog) hair and dander    5. Recurrent URI (upper respiratory infection)    5. Prick skin test #60 inhalants: No strong evidence of atopy for explain ation of symptoms by prick skin testing: Cat +2  Plan:   Michela was seen today for  other.    Diagnoses and all orders for this visit:    Chronic pansinusitis  -     S.pneumoniae IgG Serotypes; Future    Allergic rhinitis due to other allergen    Moderate persistent asthma without complication    Allergic rhinitis due to animal (cat) (dog) hair and dander    Recurrent URI (upper respiratory infection)  -     pneumococcal vaccine injection 0.5 mL; Inject 0.5 mLs into the muscle vaccine x 1 dose for Meets Vaccination Criteria.  -     S.pneumoniae IgG Serotypes; Future      Return in about 3 months (around 8/19/2017) for after follow up with Dr. Boo.    Discussed options and strategies  Reviewed medications risk v. Benefit  Reviewed pathophysiology  All questions answered  Emotional support provided  Pt verbalized understanding of all the above and treatment plan.      KASIE Valencia

## 2017-06-13 RX ORDER — IBUPROFEN 800 MG/1
TABLET ORAL
Qty: 270 TABLET | Refills: 1 | Status: SHIPPED | OUTPATIENT
Start: 2017-06-13 | End: 2017-09-18 | Stop reason: SDUPTHER

## 2017-07-05 ENCOUNTER — OFFICE VISIT (OUTPATIENT)
Dept: OTOLARYNGOLOGY | Facility: CLINIC | Age: 72
End: 2017-07-05
Payer: MEDICARE

## 2017-07-05 VITALS
DIASTOLIC BLOOD PRESSURE: 84 MMHG | SYSTOLIC BLOOD PRESSURE: 138 MMHG | BODY MASS INDEX: 34.45 KG/M2 | HEART RATE: 91 BPM | WEIGHT: 207 LBS

## 2017-07-05 DIAGNOSIS — J34.3 NASAL TURBINATE HYPERTROPHY: ICD-10-CM

## 2017-07-05 DIAGNOSIS — J32.4 CHRONIC PANSINUSITIS: Primary | ICD-10-CM

## 2017-07-05 DIAGNOSIS — J31.0 CHRONIC RHINITIS: ICD-10-CM

## 2017-07-05 PROCEDURE — 87077 CULTURE AEROBIC IDENTIFY: CPT

## 2017-07-05 PROCEDURE — 87186 SC STD MICRODIL/AGAR DIL: CPT

## 2017-07-05 PROCEDURE — 1126F AMNT PAIN NOTED NONE PRSNT: CPT | Mod: S$GLB,,, | Performed by: OTOLARYNGOLOGY

## 2017-07-05 PROCEDURE — 87075 CULTR BACTERIA EXCEPT BLOOD: CPT

## 2017-07-05 PROCEDURE — 87070 CULTURE OTHR SPECIMN AEROBIC: CPT

## 2017-07-05 PROCEDURE — 99214 OFFICE O/P EST MOD 30 MIN: CPT | Mod: 25,S$GLB,, | Performed by: OTOLARYNGOLOGY

## 2017-07-05 PROCEDURE — 31231 NASAL ENDOSCOPY DX: CPT | Mod: S$GLB,,, | Performed by: OTOLARYNGOLOGY

## 2017-07-05 PROCEDURE — 99999 PR PBB SHADOW E&M-EST. PATIENT-LVL III: CPT | Mod: PBBFAC,,, | Performed by: OTOLARYNGOLOGY

## 2017-07-05 PROCEDURE — 1159F MED LIST DOCD IN RCRD: CPT | Mod: S$GLB,,, | Performed by: OTOLARYNGOLOGY

## 2017-07-05 NOTE — LETTER
2017    Brit Finney, FN  [address] [place] [address]           OTOLARYNGOLOGY AND COMMUNICATION SCIENCES    Jonathan Alfaro MD, FACS          SURGICAL AND MEDICAL DISEASES OF HEAD AND NECK  MD Jonathan Emery MD, FACS  Min Bella III, MD    OTOLOGY, NEUROTOLOGY and SKULL-BASE SURGERY  John Jensen MD, FACS  Joshua Suresh MD, Director    PEDIATRIC OTOLARYNGOLOGY & OTOLOGY  YUE Blair MD, [facility]  Mike Hong MD, FACS    FACIAL PLASTIC and RECONSTRUCTIVE SURGERY  MCKAY Jensen III, MD, FACS    RHINOLOGY and SINUS SURGERY  Alexys Boo MD, MPH, FACS  Director   MCKAY Jensen III, MD, FACS    LARYNGOLOGY  Mu Buckley MD    SPEECH-LANGUAGE PATHOLOGY  Cherelle Dutta, PhD, [facility]-SLP  Sammie Miller, MS, CCC-SLP  Purvi Renae, MS, CCC-SLP  Yoselin Eduardo MA, [facility]-SLP    AUDIOLOGY SECTION  Mercedez Ahmadi, MS, CCC-A  ROD Casey, CCC-A  Gerald Escalante, CCC-A  Gerald German, CCC-A  Gokul Pham Jr., ROD, CCA-A  ROD Olson, CCC-A  Gerald Kuo, CCC-A    [facility]  Head and Neck Surgical Oncology  LUIS Cordova, FNP-C  Pedatric Otolaryngology  LUIS Sandoval, PNP-C       Re:  Michela Franco  :  1945    Dear Dr. Finney,    Thank you for referring your patient, Michela Franco, to us for evaluation and treatment.  I have enclosed a copy of my clinic note for your review and records.  If you have any questions please do not hesitate to contact our office.     Thank you for allowing me to participate in the care of your patient.    Sincerely,        Alexys Boo MD, MPH, FACS    Director, Rhinology and Sinus Surgery  Department of Otorhinolaryngology  [facility]    Encl:  Progress note       [facility] [address] [place] [address]  phone [phone]  fax [phone]   [email or web address]

## 2017-07-05 NOTE — Clinical Note
July 5, 2017      Brit Finney, FNP  [address]  [facility] [address]           [facility]  [address]  [facility] [address]  Phone: [phone]  Fax: [phone]          Patient: Michela Franco   MR Number: [id number]   YOB: 1945   Date of Visit: 7/5/2017       Dear Brit Finney:    Thank you for referring Michela Franco to me for evaluation. Attached you will find relevant portions of my assessment and plan of care.    If you have questions, please do not hesitate to call me. I look forward to following Michela Franco along with you.    Sincerely,    Alexys Boo MD    Enclosure  CC:  No Recipients    If you would like to receive this communication electronically, please contact [email or web address] or [phone] to request more information on [employer] access.    For providers and/or their staff who would like to refer a patient to [facility], please contact us through our one-stop-shop provider referral line, [facility], at [phone].    If you feel you have received this communication in error or would no longer like to receive these types of communications, please e-mail [email or web address]

## 2017-07-05 NOTE — PROGRESS NOTES
Subjective:      Michela Franco is a 72 y.o. female who was referred to me by Brit Finney in consultation for sinusitis.    She relates a long history for many years of chronic sinonasal congestion, thick postnasal drip that causes her to cough and become dyspneic, with associated hyposmia and bilateral facial and frontal sinus pressure.  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  She has used nasal saline extensively as well as Dymista as prescribed by her allergist, though feels this does not help that much and moreover causes nosebleeds.  She has had allergy testing which reportedly has been negative.  She denies prior nasal trauma, though had a sinus balloon dilation in [place] about 2 years ago with limited improvements.    SNOT-22 score = 33, NOSE score = 60%    Global QOL = 55%    Days missed = Less than 5    PTC = deferred      Past Medical History  She has a past medical history of Allergy; Asthma; Hyperlipidemia; Hypertension; Neuromuscular disorder; Osteoporosis; Recurrent sinusitis; Recurrent upper respiratory infection (URI); and Urticaria.    Past Surgical History  She has a past surgical history that includes Hysterectomy and Sinus surgery.    Family History  Her family history includes Cancer in her sister; Hypertension in her daughter and daughter; Kidney cancer in her sister; No Known Problems in her father, mother, and sister.    Social History  She reports that she quit smoking about 6 years ago. Her smoking use included Cigarettes. She has never used smokeless tobacco. She reports that she drinks alcohol. She reports that she does not use drugs.    Allergies  She is allergic to adhesive; diazepam; gabapentin; keppra [levetiracetam]; and mold.    Medications   She has a current medication list which includes the following prescription(s): activated charcoal, albuterol, albuterol, amlodipine, atovaquone-proguanil, budesonide, budesonide-formoterol  160-4.5 mcg, cholecalciferol (vitamin d3), cod liver oil, epinephrine, ferrous sulfate, folic acid, hydrochlorothiazide, ibuprofen, inhalation spacing device, montelukast, multivitamin, potassium chloride sa, rosuvastatin, tramadol, typhoid, and dymista.    Review of Systems  Review of Systems   Constitutional: Negative for fatigue, fever and unexpected weight change.   HENT: Positive for congestion, hearing loss, postnasal drip, rhinorrhea and sinus pressure. Negative for dental problem, ear discharge, ear pain, facial swelling, hoarse voice, nosebleeds, sore throat, tinnitus, trouble swallowing and voice change.    Eyes: Negative for photophobia, discharge, itching and visual disturbance.   Respiratory: Positive for apnea, cough and wheezing. Negative for shortness of breath.    Cardiovascular: Negative for chest pain and palpitations.   Gastrointestinal: Negative for abdominal pain, nausea and vomiting.   Endocrine: Negative for cold intolerance and heat intolerance.   Genitourinary: Negative for difficulty urinating.   Musculoskeletal: Positive for arthralgias. Negative for back pain, myalgias and neck pain.   Skin: Negative for rash.   Allergic/Immunologic: Positive for environmental allergies. Negative for food allergies.   Neurological: Negative for dizziness, seizures, syncope, weakness and headaches.   Hematological: Negative for adenopathy. Does not bruise/bleed easily.   Psychiatric/Behavioral: Negative for decreased concentration, dysphoric mood and sleep disturbance. The patient is not nervous/anxious.           Objective:     /84   Pulse 91   Wt 93.9 kg (207 lb 0.2 oz)   BMI 34.45 kg/m²        Constitutional:   She appears well-developed. She is cooperative. Normal speech.  No hoarse voice.      Head:  Normocephalic. Salivary glands normal.  Facial strength is normal.      Ears:    Right Ear: No drainage or tenderness. Tympanic membrane is not perforated. Tympanic membrane mobility is normal.  No middle ear effusion. No decreased hearing is noted.   Left Ear: No drainage or tenderness. Tympanic membrane is not perforated. Tympanic membrane mobility is normal.  No middle ear effusion. No decreased hearing is noted.     Nose:  Mucosal edema present. No rhinorrhea, septal deviation or polyps. No epistaxis. Turbinate hypertrophy.  Turbinates normal and no turbinate masses.  Right sinus exhibits no maxillary sinus tenderness and no frontal sinus tenderness. Left sinus exhibits no maxillary sinus tenderness and no frontal sinus tenderness.     Mouth/Throat  Oropharynx clear and moist without lesions or asymmetry and normal uvula midline. She does not have dentures. Normal dentition. No oral lesions or mucous membrane lesions. No oropharyngeal exudate or posterior oropharyngeal erythema. Tonsils present, +1.  Mirror exam not performed due to patient tolerance.  Mirror exam not performed due to patient tolerance.      Neck:  Neck normal without thyromegaly masses, asymmetry, normal tracheal structure, crepitus, and tenderness, thyroid normal, trachea normal and no adenopathy. Normal range of motion present.     She has no cervical adenopathy.     Cardiovascular:   Regular rhythm.      Pulmonary/Chest:   Effort normal.     Psychiatric:   She has a normal mood and affect. Her speech is normal and behavior is normal.     Neurological:   No cranial nerve deficit.     Skin:   No rash noted.       Procedure    Nasal endoscopy performed.  See procedure note.     Left nasal valve     Left MT     Left posterior exudate, swabbed for culture     Right MT     Right posterior middle meatus exudate        Data Reviewed    WBC (K/uL)   Date Value   02/14/2017 5.91     Eosinophil% (%)   Date Value   02/14/2017 5.1     Eos # (K/uL)   Date Value   02/14/2017 0.3     Platelets (K/uL)   Date Value   02/14/2017 300     Glucose (mg/dL)   Date Value   02/14/2017 96     IgE (IU/mL)   Date Value   04/01/2017 1476 (H)     IgG - Serum    Date Value Ref Range Status   04/01/2017 1176 650 - 1600 mg/dL Final     Comment:     IgG Cord Blood Reference Range: 650-1600 mg/dL.     IgM (mg/dL)   Date Value   04/01/2017 37 (L)     IgA (mg/dL)   Date Value   04/01/2017 415 (H)       I independently reviewed the images of the CT sinuses dated 3/28/14. Pertinent findings include diffuse patchy opacification in all sinuses with relatively straight septum.       Assessment:     1. Chronic pansinusitis    2. Nasal turbinate hypertrophy    3. Chronic rhinitis         Plan:     I had a long discussion with the patient regarding her condition and the further workup and management options.  I swabbed her sinus exudate for culture and will use the results to direct antibiotic therapy as indicated.  If directed antibacterial therapy is not successful, then repead CT imaging would be indicated, with an eye toward possible surgical management.    Return for test results.

## 2017-07-05 NOTE — PROCEDURES
Nasal/sinus endoscopy  Date/Time: 7/5/2017 1:51 PM  Performed by: AIDA MCKENZIE  Authorized by: AIDA MCKENZIE     Consent Done?:  Yes (Verbal)  Anesthesia:     Local anesthetic:  Lidocaine 4% and Emerson-Synephrine 1/2%    Patient tolerance:  Patient tolerated the procedure well with no immediate complications  Nose:     Procedure Performed:  Nasal Endoscopy  External:      No external nasal deformity  Intranasal:      Mucosa no polyps     Mucosa ulcers not present     No mucosa lesions present     Enlarged turbinates     No septum gross deformity  Nasopharynx:      No mucosa lesions     Adenoids not present     Posterior choanae patent     Eustachian tube patent     Thick mucopurulent exudate from both middle meatus flowing posteriorly.  Swabbed for culture on left.

## 2017-07-08 LAB — BACTERIA SPEC AEROBE CULT: NORMAL

## 2017-07-10 LAB — BACTERIA SPEC ANAEROBE CULT: NORMAL

## 2017-07-13 ENCOUNTER — TELEPHONE (OUTPATIENT)
Dept: OTOLARYNGOLOGY | Facility: CLINIC | Age: 72
End: 2017-07-13

## 2017-07-13 DIAGNOSIS — J32.4 CHRONIC PANSINUSITIS: Primary | ICD-10-CM

## 2017-07-13 RX ORDER — CIPROFLOXACIN 500 MG/1
500 TABLET ORAL 2 TIMES DAILY
Qty: 28 TABLET | Refills: 0 | Status: SHIPPED | OUTPATIENT
Start: 2017-07-13 | End: 2017-07-27

## 2017-07-13 NOTE — TELEPHONE ENCOUNTER
The sinus culture showed a bacteria called pseudomonas.  I'm calling in cipro to her [facility].  Please let her know.

## 2017-07-31 ENCOUNTER — OFFICE VISIT (OUTPATIENT)
Dept: ALLERGY | Facility: CLINIC | Age: 72
End: 2017-07-31
Payer: MEDICARE

## 2017-07-31 ENCOUNTER — TELEPHONE (OUTPATIENT)
Dept: INFECTIOUS DISEASES | Facility: CLINIC | Age: 72
End: 2017-07-31

## 2017-07-31 VITALS
BODY MASS INDEX: 35.01 KG/M2 | HEIGHT: 65 IN | DIASTOLIC BLOOD PRESSURE: 66 MMHG | HEART RATE: 85 BPM | TEMPERATURE: 99 F | WEIGHT: 210.13 LBS | SYSTOLIC BLOOD PRESSURE: 122 MMHG | OXYGEN SATURATION: 95 %

## 2017-07-31 DIAGNOSIS — J30.89 ALLERGIC RHINITIS DUE TO OTHER ALLERGEN: ICD-10-CM

## 2017-07-31 DIAGNOSIS — J32.4 CHRONIC PANSINUSITIS: Primary | ICD-10-CM

## 2017-07-31 DIAGNOSIS — J30.81 ALLERGIC RHINITIS DUE TO ANIMAL (CAT) (DOG) HAIR AND DANDER: ICD-10-CM

## 2017-07-31 DIAGNOSIS — J06.9 RECURRENT UPPER RESPIRATORY INFECTION (URI): ICD-10-CM

## 2017-07-31 DIAGNOSIS — J41.1 CHRONIC BRONCHITIS WITH PRODUCTIVE MUCOPURULENT COUGH: ICD-10-CM

## 2017-07-31 PROCEDURE — 99999 PR PBB SHADOW E&M-EST. PATIENT-LVL V: CPT | Mod: PBBFAC,,, | Performed by: ALLERGY & IMMUNOLOGY

## 2017-07-31 PROCEDURE — 99499 UNLISTED E&M SERVICE: CPT | Mod: S$GLB,,, | Performed by: ALLERGY & IMMUNOLOGY

## 2017-07-31 RX ORDER — CETIRIZINE HYDROCHLORIDE 10 MG/1
10 TABLET ORAL DAILY
COMMUNITY
End: 2017-09-19

## 2017-07-31 RX ORDER — ALENDRONATE SODIUM 70 MG/1
70 TABLET ORAL
COMMUNITY
End: 2017-09-19

## 2017-07-31 NOTE — TELEPHONE ENCOUNTER
----- Message from Melyssa Rosario sent at 7/31/2017  9:31 AM CDT -----  Contact: Dr montenegro pt   Pt finished antiboitic     cipro   -  Not any better     -   Allergy called and pt   Wants pt seen today     Please call pt   [phone]   With work in     Thanks

## 2017-07-31 NOTE — TELEPHONE ENCOUNTER
----- Message from Melyssa Rosario sent at 7/31/2017  9:31 AM CDT -----  Contact: Dr montenegro pt   Pt finished antiboitic     cipro   -  Not any better     -   Allergy called and pt   Wants pt seen today     Please call pt   [phone]   With work in     Thanks

## 2017-07-31 NOTE — PATIENT INSTRUCTIONS
Follow up with Dr. Kimbrough Infectious disease  Follow up with Dr. Chowdhury Pulmonology  Call Dr. Boo to see if he would like to continue Cipro until able to see Dr. Kimbrough ID if unable to get appt. Today.   Culture positive: Pseudomonal Pan-Sinusitis

## 2017-07-31 NOTE — TELEPHONE ENCOUNTER
Patient stated allergy wants her to follow up with . Patient states she wants to be seen today. SHe has finished her cipro and allergy prescribe her another script for it but she still wants to be seen. Please Advise.

## 2017-07-31 NOTE — PROGRESS NOTES
Subjective:       Patient ID: Michela Franco is a 72 y.o. female.    Chief Complaint: Follow-up (sinusitis,nasal congestion, just completed 14 days Cipro)    Patient is known to me in the [facility] system. She presents for follow up. She stated she had seen Dr. Boo ENT who did a culture and placed on ora Cipro for 14 days. He also referred her to Dr. Kimbrough in [place]. However she has not had an appointment with him yet. She finished her oral antibiotics this past Friday 7/28/17. Reviewed culture report with patient and sensitivities. Discussed Cipro is the only oral antibiotic the other therapies are IV therapies. Patient is concerned as she is going to [place] in 2 weeks. She still has sinus symptoms and the cough has not resolved. Reviewed risk of under treated or incomplete resolution of the infection could lead to multidrug resistance. Cautioned her on the risk of her traveling to [place] with this type of URI and risk of additional infection with this type of travel including pre travel vaccinations. Patient verbalized understanding of all the above and treatment plan. She is to call Dr. Boo's office today to see if he would like to continue antibiotics orally until she is seen by ID. She is to call ID to see if she can get an appointment today. Referral made to [facility] Pulmonology Dr. Chowdhury for further evaluation of chronic cough and possibility of pseudomonal lung infection contributing to chronic cough. Patient verbalized understanding of all the above and treatment plan.    5-6/14 Pneumococcal titers protective post vaccination with pneumovax.      Alexys Boo MD on 7/13/2017 17:43  Micro Results Same Day   Aerobic culture   Order: [id number]   Status:  Final result   Visible to patient:  No (Not Released) Next appt:  11/06/2017 at 04:30 PM in Infectious Diseases (INJECTION, INFECTIOUS DISEASES) Dx:  Chronic pansinusitis    3wk ago  Aerobic Bacterial Culture  PSEUDOMONAS AERUGINOSA   Moderate   Resulting  "Agency [facility]  Susceptibility        Pseudomonas aeruginosa    CULTURE, AEROBIC  (SPECIFY SOURCE)    Amikacin <=16 mcg/mL"><=16 mcg/mL Sensitive    Cefepime <=8 mcg/mL"><=8 mcg/mL Sensitive    Ciprofloxacin <=1 mcg/mL"><=1 mcg/mL Sensitive    Gentamicin <=4 mcg/mL"><=4 mcg/mL Sensitive    Meropenem <=4 mcg/mL"><=4 mcg/mL Sensitive    Piperacillin/Tazo <=16 mcg/mL"><=16 mcg/mL Sensitive    Tobramycin <=4 mcg/mL"><=4 mcg/mL Sensitive         Narrative       Paranasal sinus culture    Specimen Collected: 07/05/17 14:03 Last Resulted: 07/08/17 13:08 Order Details Lab and Collection Details Routing Result History           Lab Inquiry View Complete Results  Culture, Anaerobic   Order: [id number]   Status:  Final result   Visible to patient:  No (Not Released) Next appt:  11/06/2017 at 04:30 PM in Infectious Diseases (INJECTION, INFECTIOUS DISEASES) Dx:  Chronic pansinusitis    3wk ago  Anaerobic Culture  No anaerobes isolated  Resulting Agency [facility]    Specimen Collected: 07/05/17 14:03 Last Resulted: 07/10/17 09:49           Reviewed By       S.pneumoniae IgG Serotypes   Order: [id number]   Status:  Final result   Visible to patient:  No (Not Released) Next appt:  11/06/2017 at 04:30 PM in Infectious Diseases (INJECTION, INFECTIOUS DISEASES) Dx:  Chronic pansinusitis; Recurrent URI (...     Ref Range & Units 3wk ago  (7/5/17) 4mo ago  (4/1/17) 3yr ago  (7/2/14) 3yr ago  (5/6/14) 3yr ago  (3/14/14)   S.pneumoniae Type 1 mcg/mL 11.5 2.6 5.1 7.1 <0.3   S.pneumoniae Type 3 mcg/mL 3.8 1.5 0.7 0.6 0.7   Strep pneumo Type 4 mcg/mL 0.8 <0.3 <0.3 <0.3 <0.3   S.pneumoniae Type 5 mcg/mL 2.8 1.1 0.6 0.8 <0.3   S.pneumoniae Type 8 mcg/mL 0.9 <0.3 0.6 1.0 <0.3   S.pneumoniae Type 9Nmcg/mL 1.0 <0.3 0.3 0.5 <0.3   S.pneumoniae Type 12Fmcg/mL <0.3 <0.3 <0.3 <0.3 <0.3   Strep pneumo Type 14mcg/mL 5.5 3.2 2.9 4.7 3.4   S.pneumoniae Type 19Fmcg/mL 2.5 0.6 0.9 1.3 <0.3   S.pneumoniae Type 23Fmcg/mL 1.2 <0.3 <0.3 <0.3 <0.3   S.pneumoniae Type " 6Bmcg/mL 0.3 0.3 <0.3 <0.3 <0.3   S.pneumoniae Type 7Fmcg/mL 0.8 <0.3 <0.3 <0.3 <0.3   S.pneumoniae Type 18Cmcg/mL 1.9 0.4 0.7 1.2 <0.3   S.pneumoniae Type 9V Absmcg/mL 0.9 0.4CM <0.3CM <0.3CM <0.3CM  Comments: Note: Serotype designations are American nomenclature, with   [place] nomenclature in parentheses.   Studies from the 1980's using radioimmunoassay suggested that   vaccine-induced S. pneumoniae type-specific antibody levels of   approximately 2.0 mcg/mL were protective against invasive   pneumococcal disease. Newer methods (CITLALI and multiplexed   immunoassay)incorporating an absorption step to remove cross-   reactive antibodies yield results that are comparable to each   other, but are lower than those obtained with the original   radioimmunoassay. Rigorous studies of protective antibody   levels as determined by the newer methods have not been   performed. In addition to antibody quantity, protection also   depends on antibody avidity and opsonophagocytic activity.   Evaluation of the response to pneumococcal vaccination is   best accomplished by comparing pre-vaccination and post-   vaccination antibody levels. A 2- to 4-fold increase in type-   specific antibodies measureed 4-6 weeks after vaccination   is expected in immunocompetent adults. The number of serotypes   for which a 2- to 4-fold increase is observed varies greatly   among individuals; a consensus panel has suggested that   individuals older than 5 years should respond to at least   70% of pneumococcal serotypes. Adults >65 years old may   exhibit a smaller (<2-fold) increase in type-specific antibody   levels.   This test was developed and its performance characteristics   have been determined by [email or web address]. Performance   characteristics refer to the analytical performance of the   test.   Test(s) performed at:   [email or web address]   Caio Cosme M.D.,    [address] [place] [id number] [id number]    Resulting Agency  [facility]    Specimen Collected: 07/05/17 12:15 Last Resulted: 07/10/17 07:10 Lab Flowsheet Order Details View Encounter Lab and Collection Details Routing                       Review of Systems   Constitutional: Negative for activity change, appetite change, chills, diaphoresis, fatigue, fever and unexpected weight change.   HENT: Positive for congestion, postnasal drip, rhinorrhea and sinus pressure. Negative for dental problem, drooling, ear discharge, ear pain, facial swelling, hearing loss, mouth sores, nosebleeds, sneezing, sore throat, tinnitus, trouble swallowing and voice change.         Culture of sinus Pseudomonas see micro lab work up and sensitivities per Dr. Boo   Eyes: Negative for photophobia, pain, discharge, redness, itching and visual disturbance.   Respiratory: Positive for cough. Negative for apnea, choking, chest tightness, shortness of breath, wheezing and stridor.    Cardiovascular: Negative for chest pain, palpitations and leg swelling.   Gastrointestinal: Negative for abdominal distention, abdominal pain, constipation, diarrhea, nausea and vomiting.   Endocrine: Negative for cold intolerance, heat intolerance, polydipsia, polyphagia and polyuria.   Genitourinary: Negative for difficulty urinating, dysuria, enuresis, flank pain, frequency, hematuria, menstrual problem, pelvic pain and urgency.   Musculoskeletal: Negative for arthralgias, back pain, gait problem, joint swelling, myalgias, neck pain and neck stiffness.   Skin: Negative for color change, pallor, rash and wound.   Allergic/Immunologic: Positive for environmental allergies and immunocompromised state. Negative for food allergies.   Neurological: Negative for dizziness, tremors, seizures, syncope, facial asymmetry, speech difficulty, weakness, light-headedness, numbness and headaches.   Hematological: Negative for adenopathy. Does not bruise/bleed easily.   Psychiatric/Behavioral: Negative  for agitation, behavioral problems, confusion, decreased concentration, dysphoric mood, hallucinations, self-injury, sleep disturbance and suicidal ideas. The patient is not nervous/anxious and is not hyperactive.    All other systems reviewed and are negative.    Objective:   Physical Exam   Constitutional: She is oriented to person, place, and time. She appears well-developed and well-nourished. She is active and cooperative.  Non-toxic appearance. She does not have a sickly appearance. She does not appear ill. No distress. She is not intubated.   HENT:   Head: Normocephalic and atraumatic.   Right Ear: Hearing, tympanic membrane, external ear and ear canal normal. No lacerations. No drainage, swelling or tenderness. No foreign bodies. No mastoid tenderness. Tympanic membrane is not injected, not scarred, not perforated, not erythematous, not retracted and not bulging. Tympanic membrane mobility is normal. No middle ear effusion. No hemotympanum. No decreased hearing is noted.   Left Ear: Hearing, tympanic membrane, external ear and ear canal normal. No lacerations. No drainage, swelling or tenderness. No foreign bodies. No mastoid tenderness. Tympanic membrane is not injected, not scarred, not perforated, not erythematous, not retracted and not bulging. Tympanic membrane mobility is normal.  No middle ear effusion. No hemotympanum. No decreased hearing is noted.   Nose: Nose normal. No mucosal edema, rhinorrhea, nose lacerations, sinus tenderness, nasal deformity, septal deviation or nasal septal hematoma. No epistaxis.  No foreign bodies. Right sinus exhibits no maxillary sinus tenderness and no frontal sinus tenderness. Left sinus exhibits no maxillary sinus tenderness and no frontal sinus tenderness.   Mouth/Throat: Uvula is midline, oropharynx is clear and moist and mucous membranes are normal. She does not have dentures. No oral lesions. No trismus in the jaw. Normal dentition. No dental abscesses, uvula  swelling, lacerations or dental caries. No oropharyngeal exudate, posterior oropharyngeal edema, posterior oropharyngeal erythema or tonsillar abscesses. No tonsillar exudate.   Eyes: Conjunctivae, EOM and lids are normal. Pupils are equal, round, and reactive to light. Right eye exhibits no chemosis, no discharge, no exudate and no hordeolum. No foreign body present in the right eye. Left eye exhibits no chemosis, no discharge, no exudate and no hordeolum. No foreign body present in the left eye. Right conjunctiva is not injected. Right conjunctiva has no hemorrhage. Left conjunctiva is not injected. Left conjunctiva has no hemorrhage. No scleral icterus.   Neck: Trachea normal, normal range of motion, full passive range of motion without pain and phonation normal. Neck supple. No tracheal tenderness, no spinous process tenderness and no muscular tenderness present. No neck rigidity. No tracheal deviation, no edema, no erythema and normal range of motion present. No thyroid mass and no thyromegaly present.   Cardiovascular: Normal rate, regular rhythm, S1 normal, S2 normal, normal heart sounds and normal pulses.  Exam reveals no gallop and no friction rub.    No murmur heard.  Pulmonary/Chest: Effort normal and breath sounds normal. No accessory muscle usage or stridor. No apnea, no tachypnea and no bradypnea. She is not intubated. No respiratory distress. She has no decreased breath sounds. She has no wheezes. She has no rhonchi. She has no rales. She exhibits no tenderness.   Bilateral lung sounds are clear with good airflow in all fields.   Cough wet, barky junky sounding, occasionally productive.   Abdominal: Soft. Normal appearance and bowel sounds are normal. She exhibits no distension and no mass. There is no tenderness.   Musculoskeletal: Normal range of motion. She exhibits no edema, tenderness or deformity.   Lymphadenopathy:        Head (right side): No submental, no submandibular, no tonsillar, no  preauricular, no posterior auricular and no occipital adenopathy present.        Head (left side): No submental, no submandibular, no tonsillar, no preauricular, no posterior auricular and no occipital adenopathy present.     She has no cervical adenopathy.        Right cervical: No superficial cervical, no deep cervical and no posterior cervical adenopathy present.       Left cervical: No superficial cervical, no deep cervical and no posterior cervical adenopathy present.   Neurological: She is alert and oriented to person, place, and time. She has normal strength. She is not disoriented.   Skin: Skin is warm, dry and intact. No abrasion, no bruising, no burn, no ecchymosis, no laceration, no lesion, no petechiae, no purpura and no rash noted. Rash is not macular, not maculopapular, not nodular, not pustular, not vesicular and not urticarial. She is not diaphoretic. No cyanosis or erythema. No pallor. Nails show no clubbing.   Psychiatric: She has a normal mood and affect. Her speech is normal and behavior is normal. Judgment and thought content normal. Cognition and memory are normal.   Nursing note and vitals reviewed.    Assessment:     1. Chronic pansinusitis    2. Chronic bronchitis with productive mucopurulent cough    3. Recurrent upper respiratory infection (URI)    4. Allergic rhinitis due to other allergen    5. Allergic rhinitis due to animal (cat) (dog) hair and dander      Plan:   Michela was seen today for follow-up.    Diagnoses and all orders for this visit:    Chronic pansinusitis  -     Ambulatory referral to Infectious Disease  -     Ambulatory referral to Pulmonology    Chronic bronchitis with productive mucopurulent cough  -     Ambulatory referral to Pulmonology    Recurrent upper respiratory infection (URI)    Allergic rhinitis due to other allergen    Allergic rhinitis due to animal (cat) (dog) hair and dander      Return in about 3 months (around 10/31/2017) for after follow up with  specialty: [place] Dr. Kimbrough, Pulmo Dr. Chowdhury,  Dr. Boo ENT.    Discussed options and strategies  Reviewed medications risk v. Benefit  Reviewed pathophysiology  All questions answered  Emotional support provided  Pt verbalized understanding of all the above and treatment plan.      KASIE Valencia

## 2017-08-02 ENCOUNTER — TELEPHONE (OUTPATIENT)
Dept: OTOLARYNGOLOGY | Facility: CLINIC | Age: 72
End: 2017-08-02

## 2017-08-02 ENCOUNTER — OFFICE VISIT (OUTPATIENT)
Dept: OTOLARYNGOLOGY | Facility: CLINIC | Age: 72
End: 2017-08-02
Payer: MEDICARE

## 2017-08-02 VITALS
WEIGHT: 209.44 LBS | HEART RATE: 80 BPM | DIASTOLIC BLOOD PRESSURE: 82 MMHG | BODY MASS INDEX: 34.85 KG/M2 | SYSTOLIC BLOOD PRESSURE: 141 MMHG

## 2017-08-02 DIAGNOSIS — J32.4 CHRONIC PANSINUSITIS: Primary | ICD-10-CM

## 2017-08-02 DIAGNOSIS — J34.3 NASAL TURBINATE HYPERTROPHY: ICD-10-CM

## 2017-08-02 PROCEDURE — 99213 OFFICE O/P EST LOW 20 MIN: CPT | Mod: S$GLB,,, | Performed by: NURSE PRACTITIONER

## 2017-08-02 PROCEDURE — 1159F MED LIST DOCD IN RCRD: CPT | Mod: S$GLB,,, | Performed by: NURSE PRACTITIONER

## 2017-08-02 PROCEDURE — 99999 PR PBB SHADOW E&M-EST. PATIENT-LVL III: CPT | Mod: PBBFAC,,, | Performed by: NURSE PRACTITIONER

## 2017-08-02 RX ORDER — TRIAMCINOLONE ACETONIDE 55 UG/1
2 SPRAY, METERED NASAL DAILY
Qty: 10.8 G | Refills: 2 | Status: CANCELLED | OUTPATIENT
Start: 2017-08-02

## 2017-08-02 RX ORDER — AZELASTINE HYDROCHLORIDE, FLUTICASONE PROPIONATE 137; 50 UG/1; UG/1
1 SPRAY, METERED NASAL 2 TIMES DAILY
Qty: 23 G | Refills: 2 | Status: SHIPPED | OUTPATIENT
Start: 2017-08-02 | End: 2017-08-12

## 2017-08-02 NOTE — PROGRESS NOTES
Subjective:       Patient ID: Michela Franco is a 72 y.o. female.    Chief Complaint: Sinus Problem (DR MCKENZIE)    Michela Franco presents with worsening sinusitis. She was recently treated with oral Cipro post sinus culture which was positive for pseudomonas.   Sinusitis   This is a chronic problem. The current episode started more than 1 month ago. The problem has been gradually worsening since onset. There has been no fever. She is experiencing no pain. Associated symptoms include congestion and sinus pressure. Pertinent negatives include no chills, coughing, diaphoresis, ear pain, headaches, hoarse voice, neck pain, shortness of breath, sneezing, sore throat or swollen glands. Past treatments include antibiotics. The treatment provided no relief.      Past Medical History: Patient has a past medical history of Allergy; Asthma; Hyperlipidemia; Hypertension; Neuromuscular disorder; Osteoporosis; Recurrent sinusitis (3/25/2014); Recurrent upper respiratory infection (URI); and Urticaria.    Past Surgical History: Patient has a past surgical history that includes Hysterectomy and Sinus surgery.    Social History: Patient reports that she quit smoking about 6 years ago. Her smoking use included Cigarettes. She has never used smokeless tobacco. She reports that she drinks alcohol. She reports that she does not use drugs.    Family History: family history includes Cancer in her sister; Hypertension in her daughter and daughter; Kidney cancer in her sister; No Known Problems in her father, mother, and sister.    Medications:   Current Outpatient Prescriptions   Medication Sig    ACTIVATED CHARCOAL (CHARCOCAPS ORAL) Take by mouth as needed.     albuterol (ACCUNEB) 0.63 mg/3 mL Nebu Take 3 mLs (0.63 mg total) by nebulization every 6 (six) hours as needed. Rescue    albuterol 90 mcg/actuation inhaler Inhale 2 puffs into the lungs every 6 (six) hours as needed for Wheezing.    alendronate (FOSAMAX) 70 MG tablet Take  70 mg by mouth.    amlodipine (NORVASC) 10 MG tablet Take 1 tablet (10 mg total) by mouth once daily.    atovaquone-proguanil (MALARONE) 250-100 mg Tab Take one tablet daily for malaria prevention. Begin one day before entering malarious area and continue for 1 week after return.    azelastine-fluticasone (DYMISTA) 137-50 mcg/spray Spry nassal spray 1 spray by Each Nare route 2 (two) times daily.    benzonatate (TESSALON) 200 MG capsule Take 1 capsule (200 mg total) by mouth 3 (three) times daily as needed for Cough.    budesonide (PULMICORT) 0.5 mg/2 mL nebulizer solution 1/2 ampule in nasally at hour of sleep as directed    budesonide-formoterol 160-4.5 mcg (SYMBICORT) 160-4.5 mcg/actuation HFAA Inhale 2 puffs into the lungs every 12 (twelve) hours. Controller    cetirizine (ZYRTEC) 10 MG tablet Take 10 mg by mouth once daily.    cholecalciferol, vitamin D3, 10,000 unit Cap Take 360 mg by mouth once daily. Take 6 capsules (6,000 units total) by mouth once daily    COD LIVER OIL ORAL Take 1 tablet by mouth once daily.    DYMISTA 137-50 mcg/spray Fedora PLACE 1 SPRAY BY NASAL ROUTE 2 (TWO) TIMES DAILY AS NEEDED.    epinephrine (EPIPEN) 0.3 mg/0.3 mL AtIn Inject into the muscle.    esomeprazole (NEXIUM) 40 mg GrPS Take 40 mg by mouth 2 (two) times daily before meals.    ferrous sulfate 325 (65 FE) MG EC tablet Take 1 tablet (325 mg total) by mouth 3 (three) times daily with meals. (Patient taking differently: Take 325 mg by mouth once daily. )    folic acid (FOLVITE) 800 MCG Tab Take 1 tablet (800 mcg total) by mouth once daily.    hydrochlorothiazide (HYDRODIURIL) 25 MG tablet Take 1 tablet (25 mg total) by mouth once daily.    ibuprofen (ADVIL,MOTRIN) 800 MG tablet TAKE 1 TABLET EVERY 6 HOURS AS NEEDED    inhalation device (AEROCHAMBER PLUS FLOW-VU) Use with inhaler    multivitamin (MULTIVITAMIN) per tablet Take 1 tablet by mouth once daily.      potassium chloride SA (K-DUR,KLOR-CON) 20 MEQ tablet  2 tablets daily po    rosuvastatin (CRESTOR) 5 MG tablet Take 1 tablet (5 mg total) by mouth once daily.    tramadol (ULTRAM) 50 mg tablet Take 1 tablet (50 mg total) by mouth every 6 (six) hours as needed for Pain.    typhoid (VIVOTIF) DR capsule One capsule every other day by mouth for 4 doses.     No current facility-administered medications for this visit.        Allergies: Patient is allergic to adhesive; diazepam; gabapentin; keppra [levetiracetam]; and mold.    Review of Systems   Constitutional: Negative for activity change, appetite change, chills, diaphoresis, fatigue, fever and unexpected weight change.   HENT: Positive for congestion and sinus pressure. Negative for dental problem, ear discharge, ear pain, facial swelling, hearing loss, hoarse voice, nosebleeds, postnasal drip, rhinorrhea, sneezing, sore throat, tinnitus, trouble swallowing and voice change.    Eyes: Negative for pain and visual disturbance.   Respiratory: Negative for cough, chest tightness, shortness of breath, wheezing and stridor.    Cardiovascular: Negative for chest pain.   Musculoskeletal: Negative for gait problem and neck pain.   Skin: Negative for color change and rash.   Allergic/Immunologic: Negative for environmental allergies.   Neurological: Negative for dizziness, seizures, syncope, facial asymmetry, speech difficulty, weakness, light-headedness, numbness and headaches.   Psychiatric/Behavioral: Negative for agitation and confusion. The patient is not nervous/anxious.        Objective:       BP (!) 141/82 (BP Location: Right arm, Patient Position: Sitting, BP Method: Automatic)   Pulse 80   Wt 95 kg (209 lb 7 oz)   BMI 34.85 kg/m²     Physical Exam   Constitutional: She is oriented to person, place, and time. She appears well-developed and well-nourished.   HENT:   Head: Normocephalic and atraumatic. Not macrocephalic and not microcephalic. Head is without raccoon's eyes, without Frausto's sign, without abrasion,  without contusion, without laceration, without right periorbital erythema and without left periorbital erythema. Hair is normal.   Right Ear: Tympanic membrane, external ear and ear canal normal. No lacerations. No drainage, swelling or tenderness. No foreign bodies. No mastoid tenderness. Tympanic membrane is not injected, not scarred, not perforated, not erythematous, not retracted and not bulging. Tympanic membrane mobility is normal. No middle ear effusion. No hemotympanum. No decreased hearing is noted.   Left Ear: Tympanic membrane, external ear and ear canal normal. No lacerations. No drainage, swelling or tenderness. No foreign bodies. No mastoid tenderness. Tympanic membrane is not injected, not scarred, not perforated, not erythematous, not retracted and not bulging. Tympanic membrane mobility is normal.  No middle ear effusion. No hemotympanum. No decreased hearing is noted.   Nose: Nose normal. No mucosal edema, rhinorrhea, nose lacerations, sinus tenderness, nasal deformity, septal deviation or nasal septal hematoma. No epistaxis.  No foreign bodies. Right sinus exhibits no maxillary sinus tenderness and no frontal sinus tenderness. Left sinus exhibits no maxillary sinus tenderness and no frontal sinus tenderness.   Mouth/Throat: Uvula is midline, oropharynx is clear and moist and mucous membranes are normal.   Eyes: Conjunctivae, EOM and lids are normal. Pupils are equal, round, and reactive to light.   Neck: Trachea normal and normal range of motion. Neck supple. No spinous process tenderness and no muscular tenderness present. No neck rigidity. No edema, no erythema and normal range of motion present. No thyroid mass and no thyromegaly present.   Pulmonary/Chest: Effort normal.   Abdominal: Soft.   Musculoskeletal: Normal range of motion.   Lymphadenopathy:        Head (right side): No submental, no submandibular, no tonsillar, no preauricular and no posterior auricular adenopathy present.         Head (left side): No submental, no submandibular, no tonsillar, no preauricular, no posterior auricular and no occipital adenopathy present.     She has no cervical adenopathy.   Neurological: She is alert and oriented to person, place, and time. No cranial nerve deficit or sensory deficit.   Skin: Skin is warm and dry.   Psychiatric: She has a normal mood and affect. Her behavior is normal. Judgment and thought content normal.   Nursing note and vitals reviewed.      Assessment:       1. Chronic pansinusitis    2. Nasal turbinate hypertrophy        Plan:       CT [employer] of Sinuses.  Sent culture inf. to [facility] for appropriate topical antibiotic.  OTC Nasonex.  RTC  In 1 week and F/U with Dr. Boo.

## 2017-08-02 NOTE — PATIENT INSTRUCTIONS
CT [employer] of Sinuses.  Sent culture inf. to [facility] for appropriate topical antibiotic.  OTC Nasonex.  RTC  In 1 week and F/U with Dr. Boo.

## 2017-08-02 NOTE — TELEPHONE ENCOUNTER
----- Message from Vasquez Dan sent at 8/2/2017 12:08 PM CDT -----  Contact: [phone]  Pt returning staff call, please follow up at soonest convenience

## 2017-08-03 DIAGNOSIS — R05.9 COUGH: ICD-10-CM

## 2017-08-03 DIAGNOSIS — K21.9 LPRD (LARYNGOPHARYNGEAL REFLUX DISEASE): Primary | ICD-10-CM

## 2017-08-03 PROCEDURE — 99499 UNLISTED E&M SERVICE: CPT | Mod: S$GLB,,, | Performed by: ALLERGY & IMMUNOLOGY

## 2017-08-03 RX ORDER — ESOMEPRAZOLE MAGNESIUM 40 MG/1
40 GRANULE, DELAYED RELEASE ORAL
Qty: 60 EACH | Refills: 6 | Status: SHIPPED | OUTPATIENT
Start: 2017-08-03 | End: 2017-09-19

## 2017-08-03 RX ORDER — BENZONATATE 200 MG/1
200 CAPSULE ORAL 3 TIMES DAILY PRN
Qty: 90 CAPSULE | Refills: 1 | Status: SHIPPED | OUTPATIENT
Start: 2017-08-03 | End: 2017-08-13

## 2017-08-04 ENCOUNTER — TELEPHONE (OUTPATIENT)
Dept: OTOLARYNGOLOGY | Facility: CLINIC | Age: 72
End: 2017-08-04

## 2017-08-04 NOTE — TELEPHONE ENCOUNTER
----- Message from Sally Lacy sent at 8/4/2017  9:04 AM CDT -----  Contact: patient  Please call above patient said she was waiting on a call from yesterday

## 2017-08-04 NOTE — TELEPHONE ENCOUNTER
----- Message from Paty De Dios sent at 8/4/2017 11:36 AM CDT -----  Contact: [facility] at [phone]  [facility] is calling about the above pt.  Please call at above number.  They did receive a culture and report and will be sending a recommendation over to you.  Will be faxing it over.  Thanks

## 2017-08-09 ENCOUNTER — HOSPITAL ENCOUNTER (OUTPATIENT)
Dept: RADIOLOGY | Facility: HOSPITAL | Age: 72
Discharge: HOME | End: 2017-08-09
Attending: NURSE PRACTITIONER
Payer: MEDICARE

## 2017-08-09 DIAGNOSIS — J32.4 CHRONIC PANSINUSITIS: ICD-10-CM

## 2017-08-09 PROCEDURE — 70486 CT MAXILLOFACIAL W/O DYE: CPT | Mod: 26,,, | Performed by: RADIOLOGY

## 2017-08-09 PROCEDURE — 70486 CT MAXILLOFACIAL W/O DYE: CPT | Mod: TC

## 2017-08-30 ENCOUNTER — OFFICE VISIT (OUTPATIENT)
Dept: OTOLARYNGOLOGY | Facility: CLINIC | Age: 72
End: 2017-08-30
Payer: MEDICARE

## 2017-08-30 VITALS
WEIGHT: 212.31 LBS | BODY MASS INDEX: 35.33 KG/M2 | SYSTOLIC BLOOD PRESSURE: 138 MMHG | HEART RATE: 98 BPM | DIASTOLIC BLOOD PRESSURE: 84 MMHG

## 2017-08-30 DIAGNOSIS — J32.4 CHRONIC PANSINUSITIS: Primary | ICD-10-CM

## 2017-08-30 DIAGNOSIS — J34.3 NASAL TURBINATE HYPERTROPHY: ICD-10-CM

## 2017-08-30 PROCEDURE — 3075F SYST BP GE 130 - 139MM HG: CPT | Mod: S$GLB,,, | Performed by: OTOLARYNGOLOGY

## 2017-08-30 PROCEDURE — 1126F AMNT PAIN NOTED NONE PRSNT: CPT | Mod: S$GLB,,, | Performed by: OTOLARYNGOLOGY

## 2017-08-30 PROCEDURE — 3008F BODY MASS INDEX DOCD: CPT | Mod: S$GLB,,, | Performed by: OTOLARYNGOLOGY

## 2017-08-30 PROCEDURE — 99214 OFFICE O/P EST MOD 30 MIN: CPT | Mod: S$GLB,,, | Performed by: OTOLARYNGOLOGY

## 2017-08-30 PROCEDURE — 1159F MED LIST DOCD IN RCRD: CPT | Mod: S$GLB,,, | Performed by: OTOLARYNGOLOGY

## 2017-08-30 PROCEDURE — 3079F DIAST BP 80-89 MM HG: CPT | Mod: S$GLB,,, | Performed by: OTOLARYNGOLOGY

## 2017-08-30 PROCEDURE — 99999 PR PBB SHADOW E&M-EST. PATIENT-LVL III: CPT | Mod: PBBFAC,,, | Performed by: OTOLARYNGOLOGY

## 2017-08-30 NOTE — PROGRESS NOTES
Subjective:      Michela is a 72 y.o. female who comes for follow-up of sinusitis.  Marginally better on tobramycin sinus rinse, unable to tolerate oral cipro very well.  Saw NP Regino without, ordered scan and referred back to me.  Still very congested bilaterally, thick postnasal drip, midfacial pressure.    SNOT-22 score = 44, NOSE score = 80%    The patient's medications, allergies, past medical, surgical, social and family histories were reviewed and updated as appropriate.    A detailed review of systems was obtained with pertinent positives as per the above HPI, and otherwise negative.        Objective:     /84   Pulse 98   Wt 96.3 kg (212 lb 4.9 oz)   BMI 35.33 kg/m²        Constitutional:   She appears well-developed. She is cooperative.     Head:  Normocephalic.     Nose:  Mucosal edema present. No rhinorrhea, septal deviation or polyps. No epistaxis. Turbinate hypertrophy.  Turbinates normal and no turbinate masses.  Right sinus exhibits maxillary sinus tenderness. Right sinus exhibits no frontal sinus tenderness. Left sinus exhibits maxillary sinus tenderness. Left sinus exhibits no frontal sinus tenderness.     Mouth/Throat  Oropharynx clear and moist without lesions or asymmetry. No oropharyngeal exudate or posterior oropharyngeal erythema.     Neck:  No adenopathy. Normal range of motion present.     She has no cervical adenopathy.       Procedure    None        Data Reviewed    I independently reviewed the images of the CT sinuses dated 8/9/17. Pertinent findings include complete opacification of bilateral ethmoid, with partial thickening of all other sinuses.      Assessment:     1. Chronic pansinusitis    2. Nasal turbinate hypertrophy         Plan:     She would benefit from endoscopic sinus surgery for the treatment of her condition.  This would include all sinuses and would be bilateral.  Inferior turbinate reduction would be included.  I discussed the risks, benefits and  alternatives to surgery with the patient, as well as the expected postoperative course.  I gave her the opportunity to ask questions and I answered all of them.  I provided relevant printed information on her condition for her to review at home.  Same-day discharge is anticipated.  She will need evaluation in the pre-anesthesia clinic.   The surgery will be tentatively scheduled for early October.  She will need to return for a postoperative visit 1 week after surgery.  Return for surgery.

## 2017-09-01 ENCOUNTER — TELEPHONE (OUTPATIENT)
Dept: OTOLARYNGOLOGY | Facility: CLINIC | Age: 72
End: 2017-09-01

## 2017-09-01 NOTE — TELEPHONE ENCOUNTER
Patient advised that she should not travel for 2 weeks as per Dr. Boo. Patient stated that she will call me back 9/5/17 to let me know when she can schedule surgery.

## 2017-09-05 ENCOUNTER — TELEPHONE (OUTPATIENT)
Dept: OTOLARYNGOLOGY | Facility: CLINIC | Age: 72
End: 2017-09-05

## 2017-09-05 DIAGNOSIS — J32.4 CHRONIC PANSINUSITIS: Primary | ICD-10-CM

## 2017-09-05 DIAGNOSIS — J34.3 NASAL TURBINATE HYPERTROPHY: ICD-10-CM

## 2017-09-14 ENCOUNTER — DOCUMENTATION ONLY (OUTPATIENT)
Dept: OTOLARYNGOLOGY | Facility: CLINIC | Age: 72
End: 2017-09-14

## 2017-09-14 NOTE — PROGRESS NOTES
Asked to clarify recent antibiotic usage, as follows (from to EPIC record):    4/21/17 Augmentin 28 days  5/5/17 Azithromycin  7/13/17 Ciprofloxacin 14 days  8/4/17 Tobramycin 30 days (ongoing)

## 2017-09-18 RX ORDER — IBUPROFEN 800 MG/1
TABLET ORAL
Qty: 270 TABLET | Refills: 1 | Status: ON HOLD | OUTPATIENT
Start: 2017-09-18 | End: 2017-09-22 | Stop reason: HOSPADM

## 2017-09-19 ENCOUNTER — HOSPITAL ENCOUNTER (OUTPATIENT)
Dept: PREADMISSION TESTING | Facility: HOSPITAL | Age: 72
Discharge: HOME | End: 2017-09-19
Attending: ANESTHESIOLOGY
Payer: MEDICARE

## 2017-09-19 ENCOUNTER — HOSPITAL ENCOUNTER (OUTPATIENT)
Dept: CARDIOLOGY | Facility: CLINIC | Age: 72
Discharge: HOME | End: 2017-09-19
Attending: ANESTHESIOLOGY
Payer: MEDICARE

## 2017-09-19 ENCOUNTER — ANESTHESIA EVENT (OUTPATIENT)
Dept: SURGERY | Facility: HOSPITAL | Age: 72
End: 2017-09-19
Payer: MEDICARE

## 2017-09-19 VITALS
TEMPERATURE: 98 F | HEIGHT: 65 IN | RESPIRATION RATE: 16 BRPM | HEART RATE: 85 BPM | OXYGEN SATURATION: 98 % | DIASTOLIC BLOOD PRESSURE: 67 MMHG | BODY MASS INDEX: 34.49 KG/M2 | SYSTOLIC BLOOD PRESSURE: 137 MMHG | WEIGHT: 207 LBS

## 2017-09-19 DIAGNOSIS — J32.4 CHRONIC PANSINUSITIS: ICD-10-CM

## 2017-09-19 DIAGNOSIS — I10 ESSENTIAL HYPERTENSION: Primary | ICD-10-CM

## 2017-09-19 DIAGNOSIS — I10 ESSENTIAL HYPERTENSION: ICD-10-CM

## 2017-09-19 PROCEDURE — 93000 ELECTROCARDIOGRAM COMPLETE: CPT | Mod: S$GLB,,, | Performed by: INTERNAL MEDICINE

## 2017-09-19 NOTE — DISCHARGE INSTRUCTIONS
Your surgery has been scheduled for:__________________________________________    You should report to:  ____[facility], located on the [place] side of the first floor of the           [facility] ([phone])  ____[facility], located on the [facility] side of the            Second floor of the [facility] ([phone])  ____3rd Floor [facility] located on the [facility] side of the [facility] [phone]  Please Note   - Tell your doctor if you take Aspirin, products containing Aspirin, herbal medications  or blood thinners, such as Coumadin, Ticlid, or Plavix.  (Consult your provider regarding holding or stopping before surgery).  - Arrange for someone to drive you home following surgery.  You will not be allowed to leave the surgical facility alone or drive yourself home following sedation and anesthesia.  Before Surgery  - Stop taking all herbal medications 14days prior to surgery  - No Motrin/Advil (Ibuprofen) 7 days before surgery  - No Aleve (Naproxen) 7 days before surgery  - Stop Taking Asprin, products containing Asprin _____days before surgery  - Stop taking blood thinners_______days before surgery  - Refrain from drinking alcoholic beverages for 24hours before and after surgery  - Stop or limit smoking _________days before surgery  Night before Surgery  - DO NOT EAT OR DRINK ANYTHING AFTER MIDNIGHT, INCLUDING GUM, HARD CANDY, MINTS, OR CHEWING TOBACCO.  - Take a shower or bath (shower is recommended).  Bathe with Hibiclens soap or an antibacterial soap from the neck down.  If not supplied by your surgeon, hibiclens soap will need to be purchased over the counter in pharmacy.  Rinse soap off thoroughly.  - Shampoo your hair with your regular shampoo  The Day of Surgery  - Take another bath or shower with hibiclens or any antibacterial soap, to reduce the chance of infection.  - Take heart and blood  pressure medications with a small sip of water, as advised by the perioperative team.  - Do not take fluid pills  - You may brush your teeth and rinse your mouth, but do not swall any additional water.   - Do not apply perfumes, powder, body lotions or deodorant on the day of surgery.  - Nail polish should be removed.  - Do not wear makeup or moisturizer  - Wear comfortable clothes, such as a button front shirt and loose fitting pants.  - Leave all jewelry, including body piercings, and valuables at home.    - Bring any devices you will neeed after surgery such as crutches or canes.  - If you have sleep apnea, please bring your CPAP machine  In the event that your physical condition changes including the onset of a cold or respiratory illness, or if you have to delay or cancel your surgery, please notify your surgeon.  Anesthesia: General Anesthesia  Youre due to have surgery. During surgery, youll be given medication called anesthesia. (It is also called anesthetic.) This will keep you comfortable and pain-free. Your anesthesia provider will use general anesthesia. This sheet tells you more about it.  What is general anesthesia?     You are watched continuously during your procedure by the anesthesia provider   General anesthesia puts you into a state like deep sleep. It goes into the bloodstream (IV anesthetics), into the lungs (gas anesthetics), or both. You feel nothing during the procedure. You will not remember it. During the procedure, the anesthesia provider monitors you continuously. He or she checks your heart rate and rhythm, blood pressure, breathing, and blood oxygen.  · IV Anesthetics. IV anesthetics are given through an IV line in your arm. Theyre often given first. This is so you are asleep before a gas anesthetic is started. Some kinds of IV anesthetics relieve pain. Others relax you. Your doctor will decide which kind is best in your case.  · Gas Anesthetics. Gas anesthetics are breathed into  the lungs. They are often used to keep you asleep. They can be given through a facemask or a tube placed in your larynx or trachea (breathing tube).  ? If you have a facemask, your anesthesia provider will most likely place it over your nose and mouth while youre still awake. Youll breathe oxygen through the mask as your IV anesthetic is started. Gas anesthetic may be added through the mask.  ? If you have a tube in the larynx or trachea, it will be inserted into your throat after youre asleep.  Anesthesia tools and medications  You will likely have:  · IV anesthetics. These are put into an IV line into your bloodstream.  · Gas anesthetics. You breathe these anesthetics into your lungs, where they pass into your bloodstream.  · Pulse oximeter. This is a small clip that is attached to the end of your finger. This measures your blood oxygen level.  · Electrocardiography leads (electrodes). These are small sticky pads that are placed on your chest. They record your heart rate and rhythm.  · Blood pressure cuff. This reads your blood pressure.  Risks and possible complications  General anesthesia has some risks. These include:  · Breathing problems  · Nausea and vomiting  · Sore throat or hoarseness (usually temporary)  · Allergic reaction to the anesthetic  · Irregular heartbeat (rare)  · Cardiac arrest (rare)   Anesthesia safety  · Follow all instructions you are given for how long not to eat or drink before your procedure.  · Be sure your doctor knows what medications and drugs you take. This includes over-the-counter medications, herbs, supplements, alcohol or other drugs. You will be asked when those were last taken.  · Have an adult family member or friend drive you home after the procedure.  · For the first 24 hours after your surgery:  ? Do not drive or use heavy equipment.  ? Have a trusted family member or spouse make important decisions or sign documents.  ? Avoid alcohol.  ? Have a responsible adult stay  with you. He or she can watch for problems and help keep you safe.  Date Last Reviewed: 10/16/2014  © 9424-5884 [employer]. [address] [place] [address], [place] [address]. All rights reserved. This information is not intended as a substitute for professional medical care. Always follow your healthcare professional's instructions.

## 2017-09-19 NOTE — ANESTHESIA PREPROCEDURE EVALUATION
09/19/2017  Michela Franco is a 72 y.o., female.    Anesthesia Evaluation         Review of Systems  Anesthesia Hx:  History of prior surgery of interest to airway management or planning: Previous anesthesia: MAC 12/2014: Colonoscopy with MAC.  Denies Family Hx of Anesthesia complications.   Denies Personal Hx of Anesthesia complications.   Social:  Patient's occupation is . Tobacco Use: Former smoker, quit smoking <10 years Alcohol Use: Pt consumes occasional,    Hematology/Oncology:         -- Anemia:   EENT/Dental:   Nasal Problems: include Hypertrophic Turbinates Chronic pansinusitis Denies Throat Disease.  Denies Jaw Problems   Cardiovascular:  Functional Capacity good / => 4 METS, walking 2x weekly 1 mile: + SOB/denies CP  Denies Coronary Artery Disease.  Denies Congestive Heart Failure (CHF)  Denies Deep Venous Thrombosis (DVT)  Hypertension , Recent typical clinic B/P of 137/67 @ POC visit    Pulmonary:  Asthma: (last use of inhaler x 1 day ago; due to chest tightness)   Inhaler use is rescue inhaler PRN.  Obstructive Sleep Apnea (JOHN), CPAP used.   Renal/:  Denies Kidney Function/Disease    Hepatic/GI:  Esophageal / Stomach Disorders Gerd  Denies Liver Disease    Musculoskeletal:  Musculoskeletal General/Symptoms: sciatica.  Joint Disease:  Arthritis neck  DJD Bone Disorders: Osteoporosis    Neurological:  Denies Seizure Disorder  Denies CVA - Cerebrovasular Accident  Denies TIA - Transient Ischemic Attack    Endocrine:  Denies Diabetes  Denies Thyroid Disease  Metabolic Disorders, Hyperlipoproteinemia      Physical Exam  General:  Well nourished    Airway/Jaw/Neck:  Airway Findings: Mallampati: III TM Distance: Normal, at least 6 cm  Jaw/Neck Findings:  Neck ROM: Normal ROM     Eyes/Ears/Nose:  Eyes/Ears/Nose Findings:    Dental:  Dental Findings: Upper Dentures, lower partial  dentures   Chest/Lungs:  Chest/Lungs Findings: Decreased Breath Sounds Bilateral, Normal Respiratory Rate     Heart/Vascular:  Heart Findings: Rate: Normal  Rhythm: Regular Rhythm  Vascular Findings:  Edema  Edema Locations: RLE  Vascular Exam Findings: trace edema        Mental Status:  Mental Status Findings:  Cooperative, Alert and Oriented         Anesthesia Plan  Type of Anesthesia, risks & benefits discussed:  Anesthesia Type:  general  Patient's Preference: GA  Intra-op Monitoring Plan: standard ASA monitors  Intra-op Monitoring Plan Comments:   Post Op Pain Control Plan: multimodal analgesia, IV/PO Opiods PRN and per primary service following discharge from PACU  Post Op Pain Control Plan Comments:   Induction:   IV  Beta Blocker:  Patient is not currently on a Beta-Blocker (No further documentation required).       Informed Consent: Patient understands risks and agrees with Anesthesia plan.  Questions answered. Anesthesia consent signed with patient.  ASA Score: 2     Day of Surgery Review of History & Physical:    H&P update referred to the surgeon.     Anesthesia Plan Notes: The patient's PMH was reviewed and PE was performed  Plan for GETA        Ready For Surgery From Anesthesia Perspective.         Tori Schneider RN

## 2017-09-21 ENCOUNTER — TELEPHONE (OUTPATIENT)
Dept: OTOLARYNGOLOGY | Facility: CLINIC | Age: 72
End: 2017-09-21

## 2017-09-21 NOTE — TELEPHONE ENCOUNTER
Spoke with pt and gave her arrival time of 6:15am for surgery on Friday 09/22/17 wit Dr. Wiggins. Pt understood and agreed.

## 2017-09-22 ENCOUNTER — ANESTHESIA (OUTPATIENT)
Dept: SURGERY | Facility: HOSPITAL | Age: 72
End: 2017-09-22
Payer: MEDICARE

## 2017-09-22 ENCOUNTER — SURGERY (OUTPATIENT)
Age: 72
End: 2017-09-22

## 2017-09-22 ENCOUNTER — HOSPITAL ENCOUNTER (OUTPATIENT)
Facility: HOSPITAL | Age: 72
Discharge: HOME | End: 2017-09-22
Attending: OTOLARYNGOLOGY | Admitting: OTOLARYNGOLOGY
Payer: MEDICARE

## 2017-09-22 DIAGNOSIS — J32.4 PANSINUSITIS: ICD-10-CM

## 2017-09-22 DIAGNOSIS — J32.4 CHRONIC PANSINUSITIS: ICD-10-CM

## 2017-09-22 DIAGNOSIS — J34.3 NASAL TURBINATE HYPERTROPHY: ICD-10-CM

## 2017-09-22 PROBLEM — J34.89: Status: ACTIVE | Noted: 2017-09-22

## 2017-09-22 PROCEDURE — 27000221 HC OXYGEN, UP TO 24 HOURS

## 2017-09-22 PROCEDURE — 25000003 PHARM REV CODE 250: Performed by: NURSE ANESTHETIST, CERTIFIED REGISTERED

## 2017-09-22 PROCEDURE — 71000039 HC RECOVERY, EACH ADD'L HOUR: Performed by: OTOLARYNGOLOGY

## 2017-09-22 PROCEDURE — 88305 TISSUE EXAM BY PATHOLOGIST: CPT | Mod: 26,,, | Performed by: PATHOLOGY

## 2017-09-22 PROCEDURE — D9220A PRA ANESTHESIA: Mod: CRNA,,, | Performed by: NURSE ANESTHETIST, CERTIFIED REGISTERED

## 2017-09-22 PROCEDURE — 87076 CULTURE ANAEROBE IDENT EACH: CPT

## 2017-09-22 PROCEDURE — 37000009 HC ANESTHESIA EA ADD 15 MINS: Performed by: OTOLARYNGOLOGY

## 2017-09-22 PROCEDURE — 25000003 PHARM REV CODE 250: Performed by: OTOLARYNGOLOGY

## 2017-09-22 PROCEDURE — 63600175 PHARM REV CODE 636 W HCPCS: Performed by: STUDENT IN AN ORGANIZED HEALTH CARE EDUCATION/TRAINING PROGRAM

## 2017-09-22 PROCEDURE — 71000033 HC RECOVERY, INTIAL HOUR: Performed by: OTOLARYNGOLOGY

## 2017-09-22 PROCEDURE — 63600175 PHARM REV CODE 636 W HCPCS: Performed by: OTOLARYNGOLOGY

## 2017-09-22 PROCEDURE — 94760 N-INVAS EAR/PLS OXIMETRY 1: CPT

## 2017-09-22 PROCEDURE — 61782 SCAN PROC CRANIAL EXTRA: CPT | Mod: ,,, | Performed by: OTOLARYNGOLOGY

## 2017-09-22 PROCEDURE — 30140 RESECT INFERIOR TURBINATE: CPT | Mod: 50,51,, | Performed by: OTOLARYNGOLOGY

## 2017-09-22 PROCEDURE — 87102 FUNGUS ISOLATION CULTURE: CPT

## 2017-09-22 PROCEDURE — 31276 NSL/SINS NDSC FRNT TISS RMVL: CPT | Mod: 50,,, | Performed by: OTOLARYNGOLOGY

## 2017-09-22 PROCEDURE — 36000710: Performed by: OTOLARYNGOLOGY

## 2017-09-22 PROCEDURE — 31255 NSL/SINS NDSC W/TOT ETHMDCT: CPT | Mod: 50,51,, | Performed by: OTOLARYNGOLOGY

## 2017-09-22 PROCEDURE — 71000015 HC POSTOP RECOV 1ST HR: Performed by: OTOLARYNGOLOGY

## 2017-09-22 PROCEDURE — 25000003 PHARM REV CODE 250

## 2017-09-22 PROCEDURE — 71000016 HC POSTOP RECOV ADDL HR: Performed by: OTOLARYNGOLOGY

## 2017-09-22 PROCEDURE — 37000008 HC ANESTHESIA 1ST 15 MINUTES: Performed by: OTOLARYNGOLOGY

## 2017-09-22 PROCEDURE — 27201423 OPTIME MED/SURG SUP & DEVICES STERILE SUPPLY: Performed by: OTOLARYNGOLOGY

## 2017-09-22 PROCEDURE — 63600175 PHARM REV CODE 636 W HCPCS: Performed by: NURSE ANESTHETIST, CERTIFIED REGISTERED

## 2017-09-22 PROCEDURE — 31267 ENDOSCOPY MAXILLARY SINUS: CPT | Mod: 50,51,, | Performed by: OTOLARYNGOLOGY

## 2017-09-22 PROCEDURE — D9220A PRA ANESTHESIA: Mod: ANES,,, | Performed by: ANESTHESIOLOGY

## 2017-09-22 PROCEDURE — C2625 STENT, NON-COR, TEM W/DEL SY: HCPCS | Performed by: OTOLARYNGOLOGY

## 2017-09-22 PROCEDURE — 87075 CULTR BACTERIA EXCEPT BLOOD: CPT

## 2017-09-22 PROCEDURE — 87070 CULTURE OTHR SPECIMN AEROBIC: CPT

## 2017-09-22 PROCEDURE — 31288 NASAL/SINUS ENDOSCOPY SURG: CPT | Mod: 50,51,, | Performed by: OTOLARYNGOLOGY

## 2017-09-22 PROCEDURE — 36000711: Performed by: OTOLARYNGOLOGY

## 2017-09-22 PROCEDURE — 88305 TISSUE EXAM BY PATHOLOGIST: CPT | Performed by: PATHOLOGY

## 2017-09-22 DEVICE — IMPLANT PROPEL MOMETASONE: Type: IMPLANTABLE DEVICE | Site: SINUS | Status: FUNCTIONAL

## 2017-09-22 RX ORDER — CEPHALEXIN 500 MG/1
500 CAPSULE ORAL EVERY 8 HOURS
Qty: 15 CAPSULE | Refills: 0 | OUTPATIENT
Start: 2017-09-22 | End: 2017-09-22

## 2017-09-22 RX ORDER — PROPOFOL 10 MG/ML
VIAL (ML) INTRAVENOUS CONTINUOUS PRN
Status: DISCONTINUED | OUTPATIENT
Start: 2017-09-22 | End: 2017-09-22

## 2017-09-22 RX ORDER — CEFAZOLIN SODIUM 2 G/50ML
2 SOLUTION INTRAVENOUS ONCE
Status: COMPLETED | OUTPATIENT
Start: 2017-09-22 | End: 2017-09-22

## 2017-09-22 RX ORDER — PROPOFOL 10 MG/ML
VIAL (ML) INTRAVENOUS
Status: DISCONTINUED | OUTPATIENT
Start: 2017-09-22 | End: 2017-09-22

## 2017-09-22 RX ORDER — ONDANSETRON 2 MG/ML
4 INJECTION INTRAMUSCULAR; INTRAVENOUS EVERY 6 HOURS PRN
Status: DISCONTINUED | OUTPATIENT
Start: 2017-09-22 | End: 2017-09-22 | Stop reason: HOSPADM

## 2017-09-22 RX ORDER — SODIUM CHLORIDE 9 MG/ML
INJECTION, SOLUTION INTRAVENOUS CONTINUOUS PRN
Status: DISCONTINUED | OUTPATIENT
Start: 2017-09-22 | End: 2017-09-22

## 2017-09-22 RX ORDER — LIDOCAINE HCL/PF 100 MG/5ML
SYRINGE (ML) INTRAVENOUS
Status: DISCONTINUED | OUTPATIENT
Start: 2017-09-22 | End: 2017-09-22

## 2017-09-22 RX ORDER — EPINEPHRINE 1 MG/ML
INJECTION, SOLUTION INTRACARDIAC; INTRAMUSCULAR; INTRAVENOUS; SUBCUTANEOUS
Status: DISCONTINUED | OUTPATIENT
Start: 2017-09-22 | End: 2017-09-22 | Stop reason: HOSPADM

## 2017-09-22 RX ORDER — ONDANSETRON 4 MG/1
8 TABLET, ORALLY DISINTEGRATING ORAL EVERY 8 HOURS PRN
Qty: 20 TABLET | Refills: 0 | Status: SHIPPED | OUTPATIENT
Start: 2017-09-22 | End: 2017-09-28

## 2017-09-22 RX ORDER — CEPHALEXIN 500 MG/1
500 CAPSULE ORAL EVERY 8 HOURS
Qty: 15 CAPSULE | Refills: 0 | Status: SHIPPED | OUTPATIENT
Start: 2017-09-22 | End: 2017-09-22 | Stop reason: HOSPADM

## 2017-09-22 RX ORDER — AMOXICILLIN AND CLAVULANATE POTASSIUM 875; 125 MG/1; MG/1
1 TABLET, FILM COATED ORAL 2 TIMES DAILY
Qty: 20 TABLET | Refills: 0 | Status: SHIPPED | OUTPATIENT
Start: 2017-09-22 | End: 2017-10-02

## 2017-09-22 RX ORDER — HYDROMORPHONE HYDROCHLORIDE 1 MG/ML
1 INJECTION, SOLUTION INTRAMUSCULAR; INTRAVENOUS; SUBCUTANEOUS EVERY 4 HOURS PRN
Status: DISCONTINUED | OUTPATIENT
Start: 2017-09-22 | End: 2017-09-22 | Stop reason: HOSPADM

## 2017-09-22 RX ORDER — HYDROCODONE BITARTRATE AND ACETAMINOPHEN 5; 325 MG/1; MG/1
TABLET ORAL
Status: COMPLETED
Start: 2017-09-22 | End: 2017-09-22

## 2017-09-22 RX ORDER — PROMETHAZINE HYDROCHLORIDE 25 MG/1
25 TABLET ORAL EVERY 6 HOURS PRN
Status: DISCONTINUED | OUTPATIENT
Start: 2017-09-22 | End: 2017-09-22 | Stop reason: HOSPADM

## 2017-09-22 RX ORDER — PHENYLEPHRINE HYDROCHLORIDE 10 MG/ML
INJECTION INTRAVENOUS
Status: DISCONTINUED | OUTPATIENT
Start: 2017-09-22 | End: 2017-09-22

## 2017-09-22 RX ORDER — SODIUM CHLORIDE 0.9 % (FLUSH) 0.9 %
3 SYRINGE (ML) INJECTION
Status: DISCONTINUED | OUTPATIENT
Start: 2017-09-22 | End: 2017-09-22 | Stop reason: HOSPADM

## 2017-09-22 RX ORDER — DEXAMETHASONE SODIUM PHOSPHATE 4 MG/ML
INJECTION, SOLUTION INTRA-ARTICULAR; INTRALESIONAL; INTRAMUSCULAR; INTRAVENOUS; SOFT TISSUE
Status: DISCONTINUED | OUTPATIENT
Start: 2017-09-22 | End: 2017-09-22

## 2017-09-22 RX ORDER — MUPIROCIN 20 MG/G
OINTMENT TOPICAL
Status: DISCONTINUED | OUTPATIENT
Start: 2017-09-22 | End: 2017-09-22 | Stop reason: HOSPADM

## 2017-09-22 RX ORDER — ROCURONIUM BROMIDE 10 MG/ML
INJECTION, SOLUTION INTRAVENOUS
Status: DISCONTINUED | OUTPATIENT
Start: 2017-09-22 | End: 2017-09-22

## 2017-09-22 RX ORDER — LIDOCAINE HYDROCHLORIDE AND EPINEPHRINE 10; 10 MG/ML; UG/ML
INJECTION, SOLUTION INFILTRATION; PERINEURAL
Status: DISCONTINUED | OUTPATIENT
Start: 2017-09-22 | End: 2017-09-22 | Stop reason: HOSPADM

## 2017-09-22 RX ORDER — REMIFENTANIL HYDROCHLORIDE 1 MG/ML
INJECTION, POWDER, LYOPHILIZED, FOR SOLUTION INTRAVENOUS CONTINUOUS PRN
Status: DISCONTINUED | OUTPATIENT
Start: 2017-09-22 | End: 2017-09-22

## 2017-09-22 RX ORDER — OXYCODONE AND ACETAMINOPHEN 7.5; 325 MG/1; MG/1
1 TABLET ORAL EVERY 4 HOURS PRN
Qty: 40 TABLET | Refills: 0 | Status: SHIPPED | OUTPATIENT
Start: 2017-09-22 | End: 2017-09-22

## 2017-09-22 RX ORDER — MIDAZOLAM HYDROCHLORIDE 1 MG/ML
INJECTION, SOLUTION INTRAMUSCULAR; INTRAVENOUS
Status: DISCONTINUED | OUTPATIENT
Start: 2017-09-22 | End: 2017-09-22

## 2017-09-22 RX ORDER — FENTANYL CITRATE 50 UG/ML
INJECTION, SOLUTION INTRAMUSCULAR; INTRAVENOUS
Status: DISCONTINUED | OUTPATIENT
Start: 2017-09-22 | End: 2017-09-22

## 2017-09-22 RX ORDER — FENTANYL CITRATE 50 UG/ML
25 INJECTION, SOLUTION INTRAMUSCULAR; INTRAVENOUS EVERY 5 MIN PRN
Status: DISCONTINUED | OUTPATIENT
Start: 2017-09-22 | End: 2017-09-22 | Stop reason: HOSPADM

## 2017-09-22 RX ORDER — OXYCODONE AND ACETAMINOPHEN 7.5; 325 MG/1; MG/1
1 TABLET ORAL EVERY 4 HOURS PRN
Qty: 40 TABLET | Refills: 0 | Status: SHIPPED | OUTPATIENT
Start: 2017-09-22 | End: 2017-10-24

## 2017-09-22 RX ORDER — ONDANSETRON 2 MG/ML
INJECTION INTRAMUSCULAR; INTRAVENOUS
Status: DISCONTINUED | OUTPATIENT
Start: 2017-09-22 | End: 2017-09-22

## 2017-09-22 RX ORDER — HYDROCODONE BITARTRATE AND ACETAMINOPHEN 5; 325 MG/1; MG/1
1 TABLET ORAL EVERY 4 HOURS PRN
Status: DISCONTINUED | OUTPATIENT
Start: 2017-09-22 | End: 2017-09-22 | Stop reason: HOSPADM

## 2017-09-22 RX ORDER — SODIUM CHLORIDE 9 MG/ML
INJECTION, SOLUTION INTRAVENOUS CONTINUOUS
Status: DISCONTINUED | OUTPATIENT
Start: 2017-09-22 | End: 2017-09-22 | Stop reason: HOSPADM

## 2017-09-22 RX ORDER — LIDOCAINE HYDROCHLORIDE 10 MG/ML
1 INJECTION, SOLUTION EPIDURAL; INFILTRATION; INTRACAUDAL; PERINEURAL ONCE
Status: COMPLETED | OUTPATIENT
Start: 2017-09-22 | End: 2017-09-22

## 2017-09-22 RX ORDER — ONDANSETRON 4 MG/1
8 TABLET, ORALLY DISINTEGRATING ORAL EVERY 8 HOURS PRN
Qty: 20 TABLET | Refills: 0 | OUTPATIENT
Start: 2017-09-22 | End: 2017-09-22

## 2017-09-22 RX ADMIN — CEFAZOLIN SODIUM 2 G: 2 SOLUTION INTRAVENOUS at 07:09

## 2017-09-22 RX ADMIN — DEXAMETHASONE SODIUM PHOSPHATE 8 MG: 4 INJECTION, SOLUTION INTRAMUSCULAR; INTRAVENOUS at 07:09

## 2017-09-22 RX ADMIN — SODIUM CHLORIDE: 0.9 INJECTION, SOLUTION INTRAVENOUS at 06:09

## 2017-09-22 RX ADMIN — FENTANYL CITRATE 50 MCG: 50 INJECTION, SOLUTION INTRAMUSCULAR; INTRAVENOUS at 10:09

## 2017-09-22 RX ADMIN — LIDOCAINE HYDROCHLORIDE AND EPINEPHRINE 10 ML: 10; 10 INJECTION, SOLUTION INFILTRATION; PERINEURAL at 09:09

## 2017-09-22 RX ADMIN — MUPIROCIN 1 TUBE: 20 OINTMENT TOPICAL at 09:09

## 2017-09-22 RX ADMIN — LIDOCAINE HYDROCHLORIDE 0.2 MG: 10 INJECTION, SOLUTION EPIDURAL; INFILTRATION; INTRACAUDAL; PERINEURAL at 06:09

## 2017-09-22 RX ADMIN — PROPOFOL 150 MG: 10 INJECTION, EMULSION INTRAVENOUS at 07:09

## 2017-09-22 RX ADMIN — THROMBIN, TOPICAL (BOVINE) 30000 UNITS: KIT at 11:09

## 2017-09-22 RX ADMIN — HYDROCODONE BITARTRATE AND ACETAMINOPHEN 1 TABLET: 5; 325 TABLET ORAL at 12:09

## 2017-09-22 RX ADMIN — PROPOFOL 125 MCG/KG/MIN: 10 INJECTION, EMULSION INTRAVENOUS at 07:09

## 2017-09-22 RX ADMIN — SODIUM CHLORIDE: 0.9 INJECTION, SOLUTION INTRAVENOUS at 07:09

## 2017-09-22 RX ADMIN — PHENYLEPHRINE HYDROCHLORIDE 50 MCG: 10 INJECTION INTRAVENOUS at 08:09

## 2017-09-22 RX ADMIN — FENTANYL CITRATE 50 MCG: 50 INJECTION, SOLUTION INTRAMUSCULAR; INTRAVENOUS at 07:09

## 2017-09-22 RX ADMIN — ROCURONIUM BROMIDE 50 MG: 10 INJECTION, SOLUTION INTRAVENOUS at 07:09

## 2017-09-22 RX ADMIN — LIDOCAINE HYDROCHLORIDE 75 MG: 20 INJECTION, SOLUTION INTRAVENOUS at 07:09

## 2017-09-22 RX ADMIN — EPINEPHRINE 3 MG: 1 INJECTION, SOLUTION INTRAMUSCULAR; SUBCUTANEOUS at 09:09

## 2017-09-22 RX ADMIN — Medication 0.2 MCG/KG/MIN: at 07:09

## 2017-09-22 RX ADMIN — PROPOFOL 50 MG: 10 INJECTION, EMULSION INTRAVENOUS at 10:09

## 2017-09-22 RX ADMIN — COCAINE HYDROCHLORIDE 4 ML: 40 SOLUTION TOPICAL at 09:09

## 2017-09-22 RX ADMIN — MIDAZOLAM HYDROCHLORIDE 2 MG: 1 INJECTION, SOLUTION INTRAMUSCULAR; INTRAVENOUS at 07:09

## 2017-09-22 RX ADMIN — ONDANSETRON 4 MG: 2 INJECTION INTRAMUSCULAR; INTRAVENOUS at 07:09

## 2017-09-22 NOTE — BRIEF OP NOTE
[facility]  Brief Operative Note     SUMMARY     Surgery Date: 9/22/2017     Surgeon(s) and Role:     * Alexys Boo MD - Primary     * Etian Lehman MD - Resident, assisting     Pre-op Diagnosis:  Chronic pansinusitis [J32.4]  Nasal turbinate hypertrophy [J34.3]    Post-op Diagnosis:  Post-Op Diagnosis Codes:     * Chronic pansinusitis [J32.4]     * Nasal turbinate hypertrophy [J34.3]    Procedure(s) (LRB):  RESECTION-TURBINATES (SMR) (Bilateral)  SINUS SURGERY FUNCTIONAL ENDOSCOPIC WITH NAVIGATION (Bilateral)    Anesthesia: General    Description of the findings of the procedure: See op note, bilateral ESS with SMRT    Estimated Blood Loss: 50 cc         Specimens:   Specimen (12h ago through future)    Start     Ordered    09/22/17 1045  Specimen to Pathology - Surgery  Once     Comments:  4.) Left Sphenoid sinus - permanent.      09/22/17 1131    09/22/17 0946  Specimen to Pathology - Surgery  Once     Comments:  1.) Right Ethmoid sinus - permanent2.) Left Maxillary sinus content - permanent3.) Left Ethmoid sinus - permanent      09/22/17 1029          Discharge Note    SUMMARY     Admit Date: 9/22/2017    Discharge Date:  09/22/2017     Hospital Course: 72 y.o. female admitted for above procedures, tolerated well. Patient was transferred to PACU for recovery. After an appropriate period under direct observation, patient will be discharged home with pain medication, zofran, and antibiotics.        Final Diagnosis: Post-Op Diagnosis Codes:     * Chronic pansinusitis [J32.4]     * Nasal turbinate hypertrophy [J34.3]    Disposition: Home or Self Care    Follow Up/Patient Instructions:     Medications:  Reconciled Home Medications:   Current Discharge Medication List      START taking these medications    Details   amoxicillin-clavulanate 875-125mg (AUGMENTIN) 875-125 mg per tablet Take 1 tablet by mouth 2 (two) times daily.  Qty: 20 tablet, Refills: 0      ondansetron (ZOFRAN-ODT) 4 MG TbDL  Take 2 tablets (8 mg total) by mouth every 8 (eight) hours as needed (nausea).  Qty: 20 tablet, Refills: 0      oxycodone-acetaminophen (PERCOCET) 7.5-325 mg per tablet Take 1 tablet by mouth every 4 (four) hours as needed for Pain.  Qty: 40 tablet, Refills: 0         CONTINUE these medications which have NOT CHANGED    Details   albuterol (ACCUNEB) 0.63 mg/3 mL Nebu Take 3 mLs (0.63 mg total) by nebulization every 6 (six) hours as needed. Rescue  Qty: 60 vial, Refills: 3    Associated Diagnoses: Moderate persistent asthma with acute exacerbation      albuterol 90 mcg/actuation inhaler Inhale 2 puffs into the lungs every 6 (six) hours as needed for Wheezing.  Qty: 54 g, Refills: 3    Associated Diagnoses: Mild intermittent asthma without complication; Wheezing      budesonide-formoterol 160-4.5 mcg (SYMBICORT) 160-4.5 mcg/actuation HFAA Inhale 2 puffs into the lungs every 12 (twelve) hours. Controller  Qty: 1 Inhaler, Refills: 3    Associated Diagnoses: Moderate persistent asthma with acute exacerbation      cholecalciferol, vitamin D3, 10,000 unit Cap Take 360 mg by mouth once daily. Take 6 capsules (6,000 units total) by mouth once daily      COD LIVER OIL ORAL Take 1 tablet by mouth once daily.      ferrous sulfate 325 (65 FE) MG EC tablet Take 1 tablet (325 mg total) by mouth 3 (three) times daily with meals.  Qty: 100 tablet, Refills: 3      folic acid (FOLVITE) 800 MCG Tab Take 1 tablet (800 mcg total) by mouth once daily.  Qty: 100 tablet, Refills: 3    Associated Diagnoses: Anemia, unspecified type      hydrochlorothiazide (HYDRODIURIL) 25 MG tablet Take 1 tablet (25 mg total) by mouth once daily.  Qty: 90 tablet, Refills: 3    Associated Diagnoses: Essential hypertension      multivitamin (MULTIVITAMIN) per tablet Take 1 tablet by mouth once daily.        potassium chloride SA (K-DUR,KLOR-CON) 20 MEQ tablet 2 tablets daily po  Qty: 180 tablet, Refills: 3    Associated Diagnoses: Electrolyte abnormality       rosuvastatin (CRESTOR) 5 MG tablet Take 1 tablet (5 mg total) by mouth once daily.  Qty: 90 tablet, Refills: 3    Associated Diagnoses: Hyperlipidemia, unspecified hyperlipidemia type      ACTIVATED CHARCOAL (CHARCOCAPS ORAL) Take by mouth as needed.       amlodipine (NORVASC) 10 MG tablet Take 1 tablet (10 mg total) by mouth once daily.  Qty: 90 tablet, Refills: 3    Associated Diagnoses: Essential hypertension      budesonide (PULMICORT) 0.5 mg/2 mL nebulizer solution 1/2 ampule in nasally at hour of sleep as directed  Qty: 120 mL, Refills: 2    Associated Diagnoses: Chronic pansinusitis; Recurrent URI (upper respiratory infection)      epinephrine (EPIPEN) 0.3 mg/0.3 mL AtIn Inject into the muscle.      inhalation device (AEROCHAMBER PLUS FLOW-VU) Use with inhaler  Qty: 1 Device, Refills: 1    Associated Diagnoses: Wheezing         STOP taking these medications       ibuprofen (ADVIL,MOTRIN) 800 MG tablet Comments:   Reason for Stopping:               Discharge Procedure Orders  Diet general     Weight bearing restrictions (specify)   Order Comments: Do not lift > 10 lb for 1 week. Slowly increase your activity to your baseline level. No stooping, straining, or strenuous exercise. However, walking is encouraged.  Change moustache dressing as needed. Do not blow your nose. Use saline rinses as instructed by Dr. Boo.     Call MD for:  temperature >100.4     Call MD for:  persistent nausea and vomiting     Call MD for:  severe uncontrolled pain     Call MD for:  difficulty breathing, headache or visual disturbances     Call MD for:  redness, tenderness, or signs of infection (pain, swelling, redness, odor or green/yellow discharge around incision site)     Call MD for:  persistent dizziness or light-headedness     Change dressing (specify)   Order Comments: Dressing change: moustache dressing change as needed for oozing. It is normal to have oozing from the nose for the first several days after sinus and nasal  surgery.       Follow-up Information     Alexys Boo MD. Schedule an appointment as soon as possible for a visit in 1 week.    Specialty:  Otolaryngology  Why:  Post-op visit  Contact information:  [address]  [facility] [id number]  [phone]

## 2017-09-22 NOTE — PLAN OF CARE
Problem: Patient Care Overview  Goal: Plan of Care Review  Outcome: Outcome(s) achieved Date Met: 09/22/17  Awake and alert. VSS. Denies pain or nausea. Tolerating liquids well. Voiding well. DC instructions given to patient and family and they verbalize understanding. Dr. Boo had come by earlier and pulled bilateral nasal packing. Stated he wanted patient observed for half an hour for bleeding. It has been completed and patient's bleeding has been small to moderate. OK for discharge.

## 2017-09-22 NOTE — OP NOTE
DATE OF OPERATION: 9/22/2017    SURGEON:  Alexys Boo MD     ASSISTANT SURGEON:  Eitan Lehman MD     OPERATION:     1. Bilateral image-guided endoscopic total ethmoidectomy.  2. Bilateral image-guided endoscopic maxillary antrostomy.  3. Bilateral image-guided endoscopic sphenoidotomy.  4. Bilateral image-guided endoscopic frontal dissection with Draf IIA sinusotomy.  5. Bilateral inferior turbinate reduction with submucosal resection.     PREOPERATIVE DIAGNOSIS:      1. Chronic rhinosinusitis.  2. Hypertrophic turbinates.        POSTOPERATIVE DIAGNOSIS:      1. Chronic rhinosinusitis.  2. Hypertrophic turbinates.        ANESTHESIA: General.     COMPLICATIONS: None.     ESTIMATED BLOOD LOSS: 300 mL     SPECIMEN: Bilateral ethmoid.  Left maxillary and sphenoid sinus contents.     WOUND EXPECTANCY: Clean-contaminated.    DRESSING: Propel in bilateral middle meatus.  Bilateral Telfa dressings.     FINDINGS: Compound inferior turbinate hypertrophy.  Diffuse polypoid edema of all sinuses with inspissated thick white mucus in left maxillary and sphenoid and right ethmoid and frontal.     INDICATIONS: Chronic rhinosinusitis, not controlled with maximal medical therapy.     I discussed the risks, benefits and alternatives of surgical correction of the chronically obstructed sinuses and associated turbinate hypertrophy with the patient as well as the expected postoperative course. I gave her the opportunity to ask questions and I answered all of them. On the morning of surgery I again met with the patient and reviewed the indications for surgery and she consented to proceed.     DESCRIPTION OF PROCEDURE: The patient was brought to the operating room and placed supine on the operating table. The patient was placed under general anesthesia and intubated. The patient was positioned with a donut under the head and the image-guidance headset for the Impact Radius Fusion system was applied.  Image-guided navigation was indicated to  facilitate exenteration of all ethmoid cells and the extent of sinusotomy.  The CT scan disc was loaded into the image-guidance system and registered with the patient tracking system according to the 's instructions. The pointer was calibrated and registration was verified using predefined landmarks.  Cottonoid pledgets soaked with 4% cocaine was placed into the nasal cavity bilaterally for mucosal decongestion.  Prophylactic cefazolin was given prior to the surgery start. A time-out was performed to confirm the proper patient, site and procedure.  The CT images were again reviewed prior to surgical start.  The patient was prepped and draped in the usual fashion.  The bed was placed in 20-degree reverse Trendelenberg position.     A 0-degree endoscope was used to examine the nasal cavity.  Local injections of 1% lidocaine with 1:100,000 parts epinephrine were then performed around the middle turbinates bilaterally as well as the inferior turbinate and the sphenopalatine ganglion region bilaterally.    Inferior turbinate reduction was then performed.  Incisions were made in the anterior head of the inferior turbinate using a #6400 blade.  A [place] elevator was then tunnelled medially to the turbinate bone and used to segmentally outfracture and morselize the bone.  Then, a 2 mm blade on the powered tissue shaver was tunneled submucosally and used to resect soft tissue of the turbinate while overlying mucosa was preserved.   An identical procedure was performed bilaterally.     Attention was then turned to the sinuses. The left middle meatus was topically decongested using pledgets containing 10,000 units of thrombin with 1:10,000 parts epinephrine.    The uncinate process was then visualized and a micro-hillary backbiter was used to incise the uncinate process. The uncinate was dissected to its superior attachment and removed using cutting forceps.  A koby bullosa was not present.       After removal of  the bone, the natural ostium of the maxillary sinus was visible. The lumen was visualized with a 30-degree scope and entered using a curved suction to confirm the dimension. The antrostomy was enlarged with cutting instruments to include the natural sinus ostium, taking care not to move anterior to the maxillary line so as to avoid injury to the nasolacrimal duct.  Additional bone was removed using forceps.  Mucopurulent exudate  was present within the maxillary sinus.  This was sampled for bacterial and fungal cultures.  Polypoid tissue was also removed and sent for pathologic evaluation.     The ethmoid bulla was entered using a microdebrider and anterior ethmoidectomy was performed.  The roof of the bulla was removed with forceps and the suprabullar recess was exposed. The grand lamella of the middle turbinate was then traversed and posterior ethmoidectomy was performed.  An Onodi cell was not present.  The mucosa was hypertrophic throughout the sinuses, indicative of chronic inflammation.  Topical hemostatic agents on pledgets were then placed into the ethmoid cavity for hemostasis.    The sphenoid sinus rostrum was identified and the sinus was entered in a low and medial fashion, adjacent to the superior turbinate. This sphenoidotomy was enlarged to include the posterior segment of this superior turbinate and the natural ostium of the sphenoid sinus.  Polypoid tissue was present within the sphenoid sinus.  This was thoroughly removed and sent for pathologic evaluation.    Dissection was performed at the site of the nasofrontal recess.  Using a 70-degree scope, a suprabullar cell was dissected and uncapped in a medial-to-lateral direction.  An agger nasi cell was then opened from an retrograde approach.  A supra-agger frontal cell was present which was also opened in a retrograde fashion using angulated cutting instruments.  Afterward, the frontal sinus ostium was visible but stenotic.  Therefore, the ostium was  enlarged anteriorly using cutting instruments, taking care to avoid circumerential mucosal injury.  Powered instrumentation was not required.  The floor of the frontal sinus was removed between the lamina of the orbit and the suspension of the middle turbinate to complete a Draf IIA sinusotomy.  There was a bifurcation at the frontal recess, which required opening of both medial and lateral frontal tracts.  The anterior ethmoidal artery was identified and avoided with assistance from the image-guidance system probe.  At the conclusion of dissection there was excellent visualization into the frontal sinus, which would not otherwise have been possible.     Attention was then turned to the right side of the sinonasal tract.  A similar procedure was performed on this side, including uncinectomy, maxillary antrostomy, total ethmoidectomy, sphenoidotomy and frontal sinus dissection.     At the conclusion of these procedures, the image-guidance probe was used to verify that all ethmoid cells had been properly opened and that the skull base was visible and that the lamina papyracea had not been traversed on either side.  All sinuses were copiously irrigated with warm normal saline solution.     At this point, the pledgets were all removed. Kimberlee hemostatic agent was placed into the surgical sites.  A Propel steroid-eluting stent was placed into the bilateral ethmoid cavities to stent open the surgical site.  The nasal cavity was dressed with folded Telfa sponges.  Mupirocin ointment was applied to the vestibule bilaterally.  At this point, the headset was removed and the drapes were taken down. Intravenous dexamethasone was given toward the end of the case. The patient was turned back toward the anesthesiologist and awakened from anesthesia, extubated and transferred to the recovery room in stable condition.      POSTOPERATIVE PLAN:  Sponges out in PACU.  Budesonide at first POV.

## 2017-09-22 NOTE — TRANSFER OF CARE
"Anesthesia Transfer of Care Note    Patient: Michela Franco    Procedure(s) Performed: Procedure(s) (LRB):  RESECTION-TURBINATES (SMR) (Bilateral)  SINUS SURGERY FUNCTIONAL ENDOSCOPIC WITH NAVIGATION (Bilateral)    Patient location: PACU    Anesthesia Type: general    Transport from OR: Transported from OR on 6-10 L/min O2 by face mask with adequate spontaneous ventilation    Post pain: adequate analgesia    Post assessment: no apparent anesthetic complications    Post vital signs: stable    Level of consciousness: awake, alert and oriented    Nausea/Vomiting: no nausea/vomiting    Complications: none    Transfer of care protocol was followed      Last vitals:   Visit Vitals  /72 (BP Location: Left arm, Patient Position: Lying)   Pulse 99   Temp 36.4 °C (97.6 °F) (Axillary)   Resp 16   Ht 5' 5" (1.651 m)   Wt 93.9 kg (207 lb)   SpO2 100%   Breastfeeding? No   BMI 34.45 kg/m²     "

## 2017-09-22 NOTE — OR NURSING
Labs:  1.) Right Ethmoid sinus - permanent  2.) Left Maxillary sinus content - permanent  3.) Left Ethmoid sinus - permanent    Left Maxillary sinus for: anaerobic, aerobic and fungal

## 2017-09-22 NOTE — DISCHARGE INSTRUCTIONS
INSTRUCTIONS TO FOLLOW AFTER SINUS SURGERY  DR. McCOUL - [facility]    Office hours:  Weekdays 8:00 am to 5:00 pm.  Please call [phone] and ask to speak with his nurse, Keturah.    After-hours & weekends:  Please call [phone] and ask to speak with the ENT resident doctor.    Your first office visit with Dr. Boo after surgery should have been already scheduled.  If you dont know when it is, call Dr. Sweet nurse Keturah at [phone].    Please call IMMMEDIATELY if you have:  - Temperature of 101° F or greater  - Any unusual, painful swelling  - Any active bleeding that saturates more than a 4x4 gauze  - Any thick drainage green or yellow drainage  - Changes in vision or swelling around the eye  - Pain not relieved by your prescribed pain medication    ACTIVITY:    Sleep on your back with the head of the bead elevated, up on 2-3 pillows, or in a recliner for the first 3 to 5 days. This will help with swelling.     After surgery you may have a lot of nasal drainage. This is normal. Do not wipe, touch, or dab your nose. You may breathe through your nose if youre able but avoid inhaling forcibly. Let all drainage fall on your mustache dressing and change it as needed.    You may wake up after surgery with thick white stockings on. Wear them until you are walking around more. It is important to walk around often while at home to keep your blood circulating and prevent blood clots.    If you use CPAP or BiPAP to sleep at night, you should wait at least 48 hours before resuming use.  Dr. Boo will advise you when it is safe to do this.    You may shower 24 hours after surgery.    RESTRICTED ACTIVITIES AFTER SURGERY:    Do NOT blow your nose for 2 weeks. If you have to sneeze or cough, do so with your mouth open.     AVOID all heavy lifting, straining or bending for 2 weeks.     AVOID any sexual activity for 2 weeks after surgery.    AVOID semi-contact sports or vigorous exercising for 3-4 weeks.   Ema will let you know when you are cleared to resume exercise.    AVOID flying or swimming for 2 weeks.      Do NOT operate a motor vehicle or any type of heavy machinery within 24 hours of taking pain medication.    DO NOT smoke or be around smokers.    AVOID irritating substances that might make you sneeze, such as dust, chalk, harsh chemicals, and allergic triggers.  This might also include spicy foods.    DRESSINGS:    Change the gauze mustache dressing under your nose as needed. (If unsure what this dressing is or how to do this, ask your doctor or nurse before you leave the hospital.) You may have pinkish-red drainage for 2-3 days.    Usually there is no gauze packing placed inside the nose.  If packing is necessary, you will be informed by your surgeon.  Do not touch or pull at the packing. The packing will be removed by your doctor at your first visit after surgery.     You may also have a dissolvable stent or dissolvable sponge placed into the sinuses during surgery.  These usually do not need to be removed unless you are told otherwise by Dr. Boo.  You may notice small fragments of these items come out of your nose in the weeks following surgery.    MEDICATIONS:    After surgery, you will be sent home with a prescription for pain medication and an anti-nausea medication.  Antibiotics are usually not necessary.    Most people need prescription pain medication for the first few days after surgery.  If you find that this is not necessary, you may use over-the-counter Tylenol (acetaminophen) instead.    Avoid Aspirin, ibuprofen, Motrin, Advil, Aleve and Vitamin E for 1 week before surgery and 1 week after surgery. There are many other medications to avoid as well due to the fact they act as blood thinners.      Some people have problems with bowel movements after surgery. If you have NOT had a bowel movement 3-5 days after surgery, go to your local pharmacy and purchase an over the counter stool softener  such as COLACE. You can also ask the pharmacist for his or her recommendation. If you still do not have a bowel movement after starting the softener, please call Dr. Sweet office.    You will need these over-the-counter medications after surgery:    SALINE SINUS RINSE (Aamir Med brand):  You will use this to rinse out your nose and sinuses after surgery.  Begin doing this the day after surgery, unless instructed otherwise by Dr. Boo.  You should do this 2 times a day, following the instructions on the box.  AFRIN (regular strength): Only use if you have nasal congestion or bleeding. Use 2 times per day for 3 days, stop for 1 day, continue 2 times per day for 3 days, then stop completely.  NOTE:  You may not need to do this at all.    DIET:    Avoid hot and spicy foods for 1 week after surgery.    Begin with bland foods the evening after surgery and advance to your regular diet as tolerated.  It is not necessary to take only soft food unless you are recovering from tonsil surgery.    Drink plenty of fluids (water is best).     Avoid alcoholic and caffeinated beverages for 1 week after surgery because they can cause you to become dehydrated.

## 2017-09-22 NOTE — INTERVAL H&P NOTE
The patient has been examined and the H&P has been reviewed:    I concur with the findings and no changes have occurred since H&P was written.    Anesthesia/Surgery risks, benefits and alternative options discussed and understood by patient/family.          Active Hospital Problems    Diagnosis  POA    Pansinusitis [J32.4]  Yes      Resolved Hospital Problems    Diagnosis Date Resolved POA   No resolved problems to display.

## 2017-09-22 NOTE — ANESTHESIA POSTPROCEDURE EVALUATION
"Anesthesia Post Evaluation    Patient: Michela Franco    Procedure(s) Performed: Procedure(s) (LRB):  RESECTION-TURBINATES (SMR) (Bilateral)  SINUS SURGERY FUNCTIONAL ENDOSCOPIC WITH NAVIGATION (Bilateral)    Final Anesthesia Type: general  Patient location during evaluation: PACU  Patient participation: Yes- Able to Participate  Level of consciousness: awake and alert  Post-procedure vital signs: reviewed and stable  Pain management: adequate  Airway patency: patent  PONV status at discharge: No PONV  Anesthetic complications: no      Cardiovascular status: blood pressure returned to baseline  Respiratory status: unassisted and spontaneous ventilation  Hydration status: euvolemic  Follow-up not needed.        Visit Vitals  /72   Pulse 84   Temp 36.7 °C (98.1 °F) (Temporal)   Resp 18   Ht 5' 5" (1.651 m)   Wt 93.9 kg (207 lb)   SpO2 99%   Breastfeeding? No   BMI 34.45 kg/m²       Pain/Lee Ann Score: Pain Assessment Performed: Yes (9/22/2017  2:52 PM)  Presence of Pain: denies (9/22/2017  2:52 PM)  Pain Rating Prior to Med Admin: 0 (9/22/2017 12:06 PM)  Lee Ann Score: 10 (9/22/2017  1:52 PM)      "

## 2017-09-25 VITALS
HEIGHT: 65 IN | BODY MASS INDEX: 34.49 KG/M2 | HEART RATE: 84 BPM | TEMPERATURE: 98 F | DIASTOLIC BLOOD PRESSURE: 72 MMHG | WEIGHT: 207 LBS | RESPIRATION RATE: 18 BRPM | OXYGEN SATURATION: 99 % | SYSTOLIC BLOOD PRESSURE: 136 MMHG

## 2017-09-25 LAB — BACTERIA SPEC AEROBE CULT: NORMAL

## 2017-09-26 LAB — BACTERIA SPEC ANAEROBE CULT: NORMAL

## 2017-09-28 ENCOUNTER — OFFICE VISIT (OUTPATIENT)
Dept: OTOLARYNGOLOGY | Facility: CLINIC | Age: 72
End: 2017-09-28
Payer: MEDICARE

## 2017-09-28 VITALS
SYSTOLIC BLOOD PRESSURE: 126 MMHG | BODY MASS INDEX: 34.34 KG/M2 | HEART RATE: 100 BPM | WEIGHT: 206.38 LBS | DIASTOLIC BLOOD PRESSURE: 75 MMHG

## 2017-09-28 DIAGNOSIS — J32.4 CHRONIC PANSINUSITIS: ICD-10-CM

## 2017-09-28 DIAGNOSIS — J31.0 CHRONIC RHINITIS, UNSPECIFIED TYPE: Primary | ICD-10-CM

## 2017-09-28 PROCEDURE — 31237 NSL/SINS NDSC SURG BX POLYPC: CPT | Mod: 50,79,S$GLB, | Performed by: OTOLARYNGOLOGY

## 2017-09-28 PROCEDURE — 1125F AMNT PAIN NOTED PAIN PRSNT: CPT | Mod: S$GLB,,, | Performed by: OTOLARYNGOLOGY

## 2017-09-28 PROCEDURE — 3008F BODY MASS INDEX DOCD: CPT | Mod: S$GLB,,, | Performed by: OTOLARYNGOLOGY

## 2017-09-28 PROCEDURE — 99213 OFFICE O/P EST LOW 20 MIN: CPT | Mod: 25,24,S$GLB, | Performed by: OTOLARYNGOLOGY

## 2017-09-28 PROCEDURE — 3074F SYST BP LT 130 MM HG: CPT | Mod: S$GLB,,, | Performed by: OTOLARYNGOLOGY

## 2017-09-28 PROCEDURE — 1159F MED LIST DOCD IN RCRD: CPT | Mod: S$GLB,,, | Performed by: OTOLARYNGOLOGY

## 2017-09-28 PROCEDURE — 99999 PR PBB SHADOW E&M-EST. PATIENT-LVL III: CPT | Mod: PBBFAC,,, | Performed by: OTOLARYNGOLOGY

## 2017-09-28 PROCEDURE — 3078F DIAST BP <80 MM HG: CPT | Mod: S$GLB,,, | Performed by: OTOLARYNGOLOGY

## 2017-09-28 NOTE — PROGRESS NOTES
Subjective:      Michela Franco is a 72 y.o. female who comes for follow-up 1 week status-post endoscopic sinus surgery.  Doing well, congested as expected, minimal pain or bleeding.  Coughing less, still postnasal drip.  Using sinus rinse daily.    QOL assessment deferred.      Objective:     There were no vitals taken for this visit.     General:   not in distress   Nasal:  edematous mucosa   no septal hematoma   no bleeding   Oral Cavity:   clear   Oropharynx:   no bleeding   Neck:   nontender       Procedure    Endoscopic debridement performed.  See procedure note.        Data Reviewed    Pathology report indicated chronic inflammation with eosinophilia.  Cultures showed P acnes.      Assessment:     Doing well following bilateral endoscopic sinus surgery.  She also underwent septum/turbinate surgery which is unrelated to the reason for today's visit.    1. Chronic rhinitis, unspecified type    2. Chronic pansinusitis         Plan:     Resume budesonide rinse BID.  Return in about 3 weeks (around 10/19/2017).

## 2017-09-28 NOTE — PROCEDURES
Nasal/sinus endoscopy  Date/Time: 9/28/2017 9:22 AM  Performed by: AIDA MCKENZIE  Authorized by: AIDA MCKENZIE     Consent Done?:  Yes (Verbal)  Anesthesia:     Local anesthetic:  Lidocaine 4% and Emerson-Synephrine 1/2%    Patient tolerance:  Patient tolerated the procedure well with no immediate complications  Nose:     Procedure Performed:  Removal of Debridement  External:      No external nasal deformity  Intranasal:      Mucosa no polyps     Mucosa ulcers not present     No mucosa lesions present     Turbinates not enlarged     No septum gross deformity  Nasopharynx:      No mucosa lesions     Adenoids not present     Posterior choanae patent     Eustachian tube patent     Debridement of both ethmoid cavities performed with Rocha forceps.  Sinuses otherwise patent.  Propel stents in place.

## 2017-09-28 NOTE — LETTER
2017           OTOLARYNGOLOGY AND COMMUNICATION SCIENCES    Jonathan Alfaro MD, FACS          SURGICAL AND MEDICAL DISEASES OF HEAD AND NECK  MD Jonathan Emery MD, FACS  Min Bella III, MD    OTOLOGY, NEUROTOLOGY and SKULL-BASE SURGERY  John Jensen MD, FACS  Joshua Suresh MD, Director    PEDIATRIC OTOLARYNGOLOGY & OTOLOGY  YUE Blair MD, FAAP  Mike Hong MD, FACS    FACIAL PLASTIC and RECONSTRUCTIVE SURGERY  MCKAY Jensen III, MD, FACS    RHINOLOGY and SINUS SURGERY  Alexys Boo MD, MPH, FACS  Director   MCKAY Jensen III, MD, FACS    LARYNGOLOGY  Mu Buckley MD    SPEECH-LANGUAGE PATHOLOGY  Cherelle Dutta, PhD, [facility]-SLP  Sammie Miller, MS, CCC-SLP  Purvi Renae, MS, CCC-SLP  Yoselin Eduardo MA, [facility]-SLP    AUDIOLOGY SECTION  Mercedez Ahmadi, MS, CCC-A  ROD Casey, CCC-A  Bridgette Minor, Gerald, CCC-A  Gerald German, CCC-A  Gokul Pham Jr., ROD, CCA-A  ROD Olson, CCC-A  Libertad Lacy, Gerald, CCC-A    [facility]  Head and Neck Surgical Oncology  LUIS Cordova, FNP-C  Pedatric Otolaryngology  LUIS Sandoval, PNP-C       Re:  Michela Franco  :  1945    To whom it may concern:    Michela Franco underwent surgery on 17.  She may return to work without restrictions as of 10/2/17.      Feel free to contact me with any questions.    Sincerely,        Alexys Boo MD, MPH, FACS    Director, Rhinology and Sinus Surgery  Department of Otorhinolaryngology   [facility]         [facility] [address] [place] [address]  phone [phone]  fax [phone]  [email or web address]

## 2017-10-18 DIAGNOSIS — J06.9 RECURRENT URI (UPPER RESPIRATORY INFECTION): ICD-10-CM

## 2017-10-18 DIAGNOSIS — J32.4 CHRONIC PANSINUSITIS: ICD-10-CM

## 2017-10-18 RX ORDER — BUDESONIDE 0.5 MG/2ML
INHALANT ORAL
Qty: 120 ML | Refills: 0 | OUTPATIENT
Start: 2017-10-18

## 2017-10-24 ENCOUNTER — OFFICE VISIT (OUTPATIENT)
Dept: OTOLARYNGOLOGY | Facility: CLINIC | Age: 72
End: 2017-10-24
Payer: MEDICARE

## 2017-10-24 VITALS
DIASTOLIC BLOOD PRESSURE: 83 MMHG | HEART RATE: 94 BPM | WEIGHT: 213.38 LBS | BODY MASS INDEX: 35.51 KG/M2 | SYSTOLIC BLOOD PRESSURE: 142 MMHG

## 2017-10-24 DIAGNOSIS — J31.0 CHRONIC RHINITIS, UNSPECIFIED TYPE: ICD-10-CM

## 2017-10-24 DIAGNOSIS — J32.4 CHRONIC PANSINUSITIS: Primary | ICD-10-CM

## 2017-10-24 DIAGNOSIS — J45.40 MODERATE PERSISTENT ASTHMA WITHOUT COMPLICATION: ICD-10-CM

## 2017-10-24 LAB — FUNGUS SPEC CULT: NORMAL

## 2017-10-24 PROCEDURE — 99999 PR PBB SHADOW E&M-EST. PATIENT-LVL IV: CPT | Mod: PBBFAC,,, | Performed by: OTOLARYNGOLOGY

## 2017-10-24 PROCEDURE — 99214 OFFICE O/P EST MOD 30 MIN: CPT | Mod: 25,24,S$GLB, | Performed by: OTOLARYNGOLOGY

## 2017-10-24 PROCEDURE — 31231 NASAL ENDOSCOPY DX: CPT | Mod: 79,S$GLB,, | Performed by: OTOLARYNGOLOGY

## 2017-10-24 RX ORDER — GUAIFENESIN 600 MG/1
600 TABLET, EXTENDED RELEASE ORAL 2 TIMES DAILY
COMMUNITY
End: 2021-11-24 | Stop reason: SDUPTHER

## 2017-10-24 RX ORDER — BUDESONIDE 0.5 MG/2ML
0.5 INHALANT ORAL DAILY
Qty: 180 ML | Refills: 1 | Status: SHIPPED | OUTPATIENT
Start: 2017-10-24 | End: 2018-05-28

## 2017-10-24 RX ORDER — AMOXICILLIN AND CLAVULANATE POTASSIUM 875; 125 MG/1; MG/1
1 TABLET, FILM COATED ORAL 2 TIMES DAILY
Qty: 28 TABLET | Refills: 0 | Status: SHIPPED | OUTPATIENT
Start: 2017-10-24 | End: 2017-11-07

## 2017-10-24 RX ORDER — LORATADINE 10 MG/1
10 TABLET ORAL DAILY
COMMUNITY
End: 2019-03-08

## 2017-10-24 RX ORDER — CETIRIZINE HYDROCHLORIDE 10 MG/1
10 TABLET ORAL DAILY
COMMUNITY
End: 2019-03-08

## 2017-10-24 NOTE — PROGRESS NOTES
Subjective:      Michela Franco is a 72 y.o. female who comes for follow-up 4 weeks status-post endoscopic sinus surgery.  Congestion improving, but still postnasal drip and cough.  No pain or headache.  Using budesonide rinse in saline BID.    QOL assessment deferred.      Objective:     BP (!) 142/83   Pulse 94   Wt 96.8 kg (213 lb 6.5 oz)   BMI 35.51 kg/m²      General:   not in distress   Nasal:  edematous mucosa   no septal hematoma   no bleeding   Oral Cavity:   clear   Oropharynx:   no bleeding   Neck:   nontender       Procedure    Nasal endoscopy performed.  See procedure note.     Left MT with mucus     After suctioning     Right MT with mucus     After suctioning     Right posterior mucopurulent draiange        Data Reviewed    Pathology report indicated chronic inflammation with eosinophilia.         Assessment:     Doing well following bilateral endoscopic sinus surgery.  She also underwent septum/turbinate surgery which is unrelated to the reason for today's visit.    1. Chronic pansinusitis    2. Chronic rhinitis, unspecified type         Plan:     Rx Augmentin 14 days.  Refilled budesonide sinus rinse BID.  Return in about 2 months (around 12/24/2017).

## 2017-10-24 NOTE — PROCEDURES
Nasal/sinus endoscopy  Date/Time: 10/24/2017 11:53 AM  Performed by: AIDA MCKENZIE  Authorized by: AIDA MCKENZIE     Consent Done?:  Yes (Verbal)  Anesthesia:     Local anesthetic:  Lidocaine 4% and Emerson-Synephrine 1/2%    Patient tolerance:  Patient tolerated the procedure well with no immediate complications  Nose:     Procedure Performed:  Nasal Endoscopy  External:      No external nasal deformity  Intranasal:      Mucosa no polyps     Mucosa ulcers not present     No mucosa lesions present     Turbinates not enlarged     No septum gross deformity  Nasopharynx:      No mucosa lesions     Adenoids not present     Posterior choanae patent     Eustachian tube patent     Sinuses patent but moderate edema of middle turbinates bilaterally.  Propel stents partially present but dissolving.  Milky white exudate from right ethmoid, suctioned.

## 2017-10-30 ENCOUNTER — OFFICE VISIT (OUTPATIENT)
Dept: PULMONOLOGY | Facility: CLINIC | Age: 72
End: 2017-10-30
Payer: MEDICARE

## 2017-10-30 ENCOUNTER — LAB VISIT (OUTPATIENT)
Dept: LAB | Facility: HOSPITAL | Age: 72
End: 2017-10-30
Payer: MEDICARE

## 2017-10-30 VITALS
SYSTOLIC BLOOD PRESSURE: 134 MMHG | DIASTOLIC BLOOD PRESSURE: 75 MMHG | OXYGEN SATURATION: 98 % | HEART RATE: 72 BPM | BODY MASS INDEX: 33.41 KG/M2 | WEIGHT: 207.88 LBS | HEIGHT: 66 IN

## 2017-10-30 DIAGNOSIS — R05.3 CHRONIC COUGH: ICD-10-CM

## 2017-10-30 DIAGNOSIS — J32.4 CHRONIC PANSINUSITIS: Primary | ICD-10-CM

## 2017-10-30 DIAGNOSIS — J32.4 CHRONIC PANSINUSITIS: ICD-10-CM

## 2017-10-30 LAB — IGE SERPL-ACNC: 1878 IU/ML

## 2017-10-30 PROCEDURE — 86003 ALLG SPEC IGE CRUDE XTRC EA: CPT

## 2017-10-30 PROCEDURE — 99999 PR PBB SHADOW E&M-EST. PATIENT-LVL IV: CPT | Mod: PBBFAC,,, | Performed by: INTERNAL MEDICINE

## 2017-10-30 PROCEDURE — 87305 ASPERGILLUS AG IA: CPT

## 2017-10-30 PROCEDURE — 82785 ASSAY OF IGE: CPT

## 2017-10-30 PROCEDURE — 99214 OFFICE O/P EST MOD 30 MIN: CPT | Mod: S$GLB,,, | Performed by: INTERNAL MEDICINE

## 2017-10-30 PROCEDURE — 36415 COLL VENOUS BLD VENIPUNCTURE: CPT
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
## 2017-10-30 NOTE — LETTER
November 3, 2017      Brit Finney, FNP  [address]  [place] [address]           [facility]  [address]  [facility] [address]  Phone: [phone]          Patient: Michela Franco   MR Number: [id number]   YOB: 1945   Date of Visit: 10/30/2017       Dear Brit Finney:    Thank you for referring Michela Franco to me for evaluation. Attached you will find relevant portions of my assessment and plan of care.    If you have questions, please do not hesitate to call me. I look forward to following Michela Franco along with you.    Sincerely,    Asif Chowdhury MD    Enclosure  CC:  No Recipients    If you would like to receive this communication electronically, please contact [email or web address] or [phone] to request more information on [employer] Link access.    For providers and/or their staff who would like to refer a patient to [facility], please contact us through our one-stop-shop provider referral line, [facility], at [phone].    If you feel you have received this communication in error or would no longer like to receive these types of communications, please e-mail [email or web address]

## 2017-10-31 DIAGNOSIS — J30.2 SEASONAL ALLERGIES: ICD-10-CM

## 2017-10-31 RX ORDER — AZELASTINE HYDROCHLORIDE AND FLUTICASONE PROPIONATE 137; 50 UG/1; UG/1
SPRAY, METERED NASAL
Qty: 23 G | Refills: 1 | Status: SHIPPED | OUTPATIENT
Start: 2017-10-31 | End: 2017-12-20

## 2017-11-01 LAB
A FUMIGATUS IGE QN: <0.35 KU/L
DEPRECATED A FUMIGATUS IGE RAST QL: NORMAL
GALACTOMANNAN AG SERPL IA-ACNC: 0.68 INDEX

## 2017-11-06 ENCOUNTER — CLINICAL SUPPORT (OUTPATIENT)
Dept: INFECTIOUS DISEASES | Facility: CLINIC | Age: 72
End: 2017-11-06
Payer: MEDICARE

## 2017-11-06 ENCOUNTER — TELEPHONE (OUTPATIENT)
Dept: FAMILY MEDICINE | Facility: CLINIC | Age: 72
End: 2017-11-06

## 2017-11-06 DIAGNOSIS — I10 ESSENTIAL HYPERTENSION: ICD-10-CM

## 2017-11-06 DIAGNOSIS — Z23 NEED FOR VACCINATION: Primary | ICD-10-CM

## 2017-11-06 DIAGNOSIS — Z00.00 ROUTINE GENERAL MEDICAL EXAMINATION AT A HEALTH CARE FACILITY: Primary | ICD-10-CM

## 2017-11-06 PROCEDURE — 90632 HEPA VACCINE ADULT IM: CPT | Mod: S$GLB,,, | Performed by: INTERNAL MEDICINE

## 2017-11-06 PROCEDURE — 99999 PR PBB SHADOW E&M-EST. PATIENT-LVL III: CPT | Mod: PBBFAC,,,

## 2017-11-06 PROCEDURE — 90471 IMMUNIZATION ADMIN: CPT | Mod: S$GLB,,, | Performed by: INTERNAL MEDICINE

## 2017-11-06 NOTE — TELEPHONE ENCOUNTER
----- Message from Brittnee Eckert sent at 11/6/2017  1:46 PM CST -----  Contact: SELF [phone]  Type: Orders Request    What orders/ testing are being requested? Labs     Is there a future appointment scheduled for the patient with PCP? Yes       When? 2/21/17    Comments: please advise, Thanks !

## 2017-11-07 ENCOUNTER — HOSPITAL ENCOUNTER (OUTPATIENT)
Dept: PULMONOLOGY | Facility: CLINIC | Age: 72
Discharge: HOME | End: 2017-11-07
Payer: MEDICARE

## 2017-11-07 DIAGNOSIS — R05.3 CHRONIC COUGH: ICD-10-CM

## 2017-11-07 DIAGNOSIS — J32.4 CHRONIC PANSINUSITIS: ICD-10-CM

## 2017-11-07 LAB
PRE FEV1 FVC: 66
PRE FEV1: 1.22
PRE FVC: 1.86
PREDICTED FEV1 FVC: 78
PREDICTED FEV1: 2.3
PREDICTED FVC: 2.96

## 2017-11-07 PROCEDURE — 94010 BREATHING CAPACITY TEST: CPT | Mod: S$GLB,,, | Performed by: INTERNAL MEDICINE

## 2017-11-07 PROCEDURE — 94729 DIFFUSING CAPACITY: CPT | Mod: S$GLB,,, | Performed by: INTERNAL MEDICINE

## 2017-11-07 PROCEDURE — 94727 GAS DIL/WSHOT DETER LNG VOL: CPT | Mod: S$GLB,,, | Performed by: INTERNAL MEDICINE

## 2017-11-08 ENCOUNTER — TELEPHONE (OUTPATIENT)
Dept: PULMONOLOGY | Facility: CLINIC | Age: 72
End: 2017-11-08

## 2017-11-08 DIAGNOSIS — R05.3 CHRONIC COUGH: Primary | ICD-10-CM

## 2017-11-08 RX ORDER — VORICONAZOLE 50 MG/1
300 TABLET, FILM COATED ORAL 2 TIMES DAILY
Qty: 360 TABLET | Refills: 3 | Status: SHIPPED | OUTPATIENT
Start: 2017-11-08 | End: 2017-11-30 | Stop reason: ALTCHOICE

## 2017-11-08 RX ORDER — PREDNISONE 20 MG/1
40 TABLET ORAL DAILY
Qty: 60 TABLET | Refills: 2 | Status: SHIPPED | OUTPATIENT
Start: 2017-11-08 | End: 2017-12-08

## 2017-11-08 NOTE — TELEPHONE ENCOUNTER
----- Message from Roslyn Brian sent at 11/8/2017  3:13 PM CST -----  Contact: Self   [phone]  Pt is returning the  phone call in regards to new medication with pft's test call back to discuss

## 2017-11-08 NOTE — TELEPHONE ENCOUNTER
L/M with Ms. Franco requesting that she call back to discuss new prescriptions. Her PFTs demonstrated obstructive disease in addition to her elevated total IgE & aspergillus antigen that suggests she may have ABPA.    Will start prednisone 40mg daily and voriconazole 300mg BID. She should get her voriconazole level checked after 1.5-2 weeks to monitor levels.    We plan to treat for three months. We would like to see her back in 4-8 weeks with spirometry & 6MWT to determine if she has had a clinical response.    Discussed with Dr. Chowdhury.    Deandra Swift 11/08/2017 2:43 PM  [facility] Fellow

## 2017-11-09 ENCOUNTER — TELEPHONE (OUTPATIENT)
Dept: ALLERGY | Facility: CLINIC | Age: 72
End: 2017-11-09

## 2017-11-09 NOTE — TELEPHONE ENCOUNTER
Patient seen by [facility] previously patient has negative prick skin test, had an elevated IgE, and peripheral eosinophilia.  She has had difficult to control asthma which has required oral steroids in the past.  She recently had sinus surgery for pansinusitis by Dr. Boo and is currently on a budesonide nasal rinse.  I've requested a revisit to see her; she may be a candidate for Nucala monoclonal antibiotic therapy.

## 2017-11-13 ENCOUNTER — TELEPHONE (OUTPATIENT)
Dept: PULMONOLOGY | Facility: CLINIC | Age: 72
End: 2017-11-13

## 2017-11-13 NOTE — TELEPHONE ENCOUNTER
----- Message from Roslyn Brian sent at 11/13/2017  8:41 AM CST -----  Contact: self [phone]  Patient calling in regards to advice on her medication. Please give patient a call

## 2017-11-13 NOTE — TELEPHONE ENCOUNTER
Insurance would not cover Vfend. Patient picked up the prednisone and is asking if she should start the prednisone now or wait until the vfend gets sorted out.

## 2017-11-15 DIAGNOSIS — B44.81 ABPA (ALLERGIC BRONCHOPULMONARY ASPERGILLOSIS): Primary | ICD-10-CM

## 2017-11-28 ENCOUNTER — TELEPHONE (OUTPATIENT)
Dept: PULMONOLOGY | Facility: CLINIC | Age: 72
End: 2017-11-28

## 2017-11-28 NOTE — TELEPHONE ENCOUNTER
----- Message from Roslyn Brian sent at 11/28/2017  8:30 AM CST -----  Contact: Self     [phone]  Luciana  -   Pt has questions abut her medication call the pt back to discuss call  Back number [phone]

## 2017-11-28 NOTE — TELEPHONE ENCOUNTER
Spoke with patient and advised of Dr. Chowdhury's recommendations. Appointment arranged with Dr. Kimbrough in Infectious Disease on 11/30 at 3:30. Patient agreeable to date and time.

## 2017-11-28 NOTE — TELEPHONE ENCOUNTER
Patient is asking if you have gotten any additional information on the medication she needs to take. She states she started the prednisone as you advised and is once again having problems with it. She notes swelling, constipation and inability to sleep. Can you recommend anything that might help with these side effects? Any luck with alternative medication?

## 2017-11-30 ENCOUNTER — OFFICE VISIT (OUTPATIENT)
Dept: INFECTIOUS DISEASES | Facility: CLINIC | Age: 72
End: 2017-11-30
Payer: MEDICARE

## 2017-11-30 VITALS
DIASTOLIC BLOOD PRESSURE: 74 MMHG | WEIGHT: 216.5 LBS | TEMPERATURE: 99 F | BODY MASS INDEX: 36.96 KG/M2 | SYSTOLIC BLOOD PRESSURE: 140 MMHG | HEART RATE: 87 BPM | HEIGHT: 64 IN

## 2017-11-30 DIAGNOSIS — B44.81 ABPA (ALLERGIC BRONCHOPULMONARY ASPERGILLOSIS): ICD-10-CM

## 2017-11-30 PROCEDURE — 99499 UNLISTED E&M SERVICE: CPT | Mod: S$GLB,,, | Performed by: INTERNAL MEDICINE

## 2017-11-30 PROCEDURE — 99214 OFFICE O/P EST MOD 30 MIN: CPT | Mod: S$GLB,,, | Performed by: INTERNAL MEDICINE

## 2017-11-30 PROCEDURE — 99999 PR PBB SHADOW E&M-EST. PATIENT-LVL IV: CPT | Mod: PBBFAC,,, | Performed by: INTERNAL MEDICINE

## 2017-11-30 RX ORDER — ITRACONAZOLE 100 MG/1
200 CAPSULE ORAL 2 TIMES DAILY
Qty: 120 CAPSULE | Refills: 0 | Status: SHIPPED | OUTPATIENT
Start: 2017-11-30 | End: 2017-12-29 | Stop reason: SDUPTHER

## 2017-11-30 NOTE — LETTER
November 30, 2017      Deandra Swift, DO  [address]  [facility] [address]           [facility]  [address]  [facility] [address]  Phone: [phone]  Fax: [phone]          Patient: Michela Franco   MR Number: [id number]   YOB: 1945   Date of Visit: 11/30/2017       Dear Dr. Deandra Swift:    Thank you for referring Michela Franco to me for evaluation. Attached you will find relevant portions of my assessment and plan of care.    If you have questions, please do not hesitate to call me. I look forward to following Michela Franco along with you.    Sincerely,    Solitario Kimbrough MD    Enclosure  CC:  No Recipients    If you would like to receive this communication electronically, please contact [email or web address] or [phone] to request more information on [employer] Link access.    For providers and/or their staff who would like to refer a patient to [facility], please contact us through our one-stop-shop provider referral line, [facility], at [phone].    If you feel you have received this communication in error or would no longer like to receive these types of communications, please e-mail [email or web address]

## 2017-11-30 NOTE — PROGRESS NOTES
Infectious Diseases Clinic Note    Subjective:       Patient ID: Michela Franco is a 72 y.o. female.    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  Currently total IGE 1878 and serum aspergillus antigen is positive at 0.6 and given above there was concern for ABPA and pt was started on prednisone and voriconazole, patient has been unable to afford the voriconazole.  She is here today for comanagement with pulmonary and further eval    She c/o same symptoms today of cough, wheezing and nasal congestion    10/30  A. Fumigatus IgE negative (was positive 0.36 3 years ago)  Total IGE 1878, 3914 3 years ago  Serum aspergillus antigen 0.676    Past Medical History:   Diagnosis Date    Allergy     Hyperlipidemia     Hypertension     Neuromuscular disorder     JOHN on CPAP     Osteoporosis     Recurrent sinusitis 3/25/2014    Recurrent upper respiratory infection (URI)     Urticaria        Social History     Social History    Marital status: Single     Spouse name: N/A    Number of children: N/A    Years of education: N/A     Occupational History          [employer]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 , Disp: , Rfl:     albuterol (ACCUNEB) 0.63 mg/3 mL Nebu, Take 3 mLs (0.63 mg total) by nebulization every 6 (six) hours as needed. Rescue, Disp: 60 vial, Rfl: 3    albuterol 90 mcg/actuation inhaler, Inhale 2 puffs into the lungs every 6 (six) hours as needed for Wheezing., Disp: 54 g, Rfl: 3    amlodipine (NORVASC) 10 MG tablet, Take 1 tablet (10 mg total) by mouth once daily., Disp: 90 tablet, Rfl: 3    budesonide (PULMICORT) 0.5 mg/2 mL nebulizer solution, Take 2 mLs (0.5 mg total) by nebulization once daily. For use in saline irrigation as directed., Disp: 180 mL, Rfl: 1    budesonide-formoterol 160-4.5 mcg (SYMBICORT) 160-4.5 mcg/actuation HFAA, Inhale 2 puffs into the lungs every 12 (twelve) hours. Controller, Disp: 1 Inhaler, Rfl: 3    cetirizine (ZYRTEC) 10 MG tablet, Take 10 mg by mouth once daily., Disp: , Rfl:     cholecalciferol, vitamin D3, 10,000 unit Cap, Take 360 mg by mouth once daily. Take 6 capsules (6,000 units total) by mouth once daily, Disp: , Rfl:     COD LIVER OIL ORAL, Take 1 tablet by mouth once daily., Disp: , Rfl:     DYMISTA 137-50 mcg/spray [place] nassal spray, PLACE 1 SPRAY BY NASAL ROUTE 2 (TWO) TIMES DAILY AS NEEDED., Disp: 23 g, Rfl: 1    ferrous sulfate 325 (65 FE) MG EC tablet, Take 1 tablet (325 mg total) by mouth 3 (three) times daily with meals. (Patient taking differently: Take 325 mg by mouth once daily. ), Disp: 100 tablet, Rfl: 3    folic acid (FOLVITE) 800 MCG Tab, Take 1 tablet (800 mcg total) by mouth once daily., Disp: 100 tablet, Rfl: 3    guaiFENesin (MUCINEX) 600 mg 12 hr tablet, Take 600 mg by mouth 2 (two) times daily., Disp: , Rfl:     hydrochlorothiazide (HYDRODIURIL) 25 MG tablet, Take 1 tablet (25 mg total) by mouth once daily., Disp: 90 tablet, Rfl: 3    loratadine (CLARITIN) 10 mg tablet, Take 10 mg by mouth once daily., Disp: , Rfl:     multivitamin (MULTIVITAMIN) per tablet, Take  1 tablet by mouth once daily.  , Disp: , Rfl:     potassium chloride SA (K-DUR,KLOR-CON) 20 MEQ tablet, 2 tablets daily po, Disp: 180 tablet, Rfl: 3    predniSONE (DELTASONE) 20 MG tablet, Take 2 tablets (40 mg total) by mouth once daily., Disp: 60 tablet, Rfl: 2    rosuvastatin (CRESTOR) 5 MG tablet, Take 1 tablet (5 mg total) by mouth once daily., Disp: 90 tablet, Rfl: 3    voriconazole (VFEND) 50 MG Tab, Take 6 tablets (300 mg total) by mouth 2 (two) times daily., Disp: 360 tablet, Rfl: 3    Review of Systems   Constitutional: Negative for activity change, chills and fever.   HENT: Positive for congestion. Negative for mouth sores, rhinorrhea, sinus pressure and sore throat.    Eyes: Negative for photophobia, pain and redness.   Respiratory: Positive for cough. Negative for chest tightness, shortness of breath and wheezing.    Cardiovascular: Negative for chest pain and leg swelling.   Gastrointestinal: Negative for abdominal distention, abdominal pain, diarrhea, nausea and vomiting.   Endocrine: Negative for polyuria.   Genitourinary: Negative for decreased urine volume, dysuria and flank pain.   Musculoskeletal: Negative for joint swelling and neck pain.   Skin: Negative for color change.   Allergic/Immunologic: Negative for food allergies.   Neurological: Negative for dizziness, weakness and headaches.   Hematological: Negative for adenopathy.   Psychiatric/Behavioral: Negative for agitation and confusion. The patient is not nervous/anxious.            Objective:       Vitals:    11/30/17 1531   BP: (!) 140/74   Pulse: 87   Temp: 98.6 °F (37 °C)       There were no vitals filed for this visit.  Physical Exam   Constitutional: She is oriented to person, place, and time. She appears well-developed and well-nourished.   HENT:   Head: Normocephalic and atraumatic.   Eyes: Pupils are equal, round, and reactive to light.   Neck: Normal range of motion. Neck supple.   Cardiovascular: Normal rate.     Pulmonary/Chest: No respiratory distress. She has wheezes.   Abdominal: Soft. Bowel sounds are normal.   Musculoskeletal: She exhibits no edema or tenderness.   Neurological: She is alert and oriented to person, place, and time.   Skin: Skin is warm and dry.   Psychiatric: She has a normal mood and affect.           Assessment/Plan:       No diagnosis found.    73 y/o F ex smoker, h/o HTN, HLD, chronic recurrent sinusitis, elevated IgE and no significant peripheral eosinophilia on most recent CBCs, pansinsusitis s/p debridement and on budesonide nasal rinse, found to have Obstructive disease by PFTs with CT chest 4/2017 suggesting early bronchiectasis in bases (per pulmonary note) and micronodule in RUL stable since 4/2014.  Currently total IGE 1878 and serum aspergillus antigen is positive at 0.6 and given above there was concern for ABPA and pt was started on prednisone and voriconazole, patient has been unable to afford the voriconazole.  She is here today for comanagement with pulmonary and further eval.  Agree with assesment of ABPA  - will give Itraconazole trial x 3 months instead of voriconazole  - For now continue steroids per pulmonary team management  - f/u 1 month  - discussed with Dr. Chowdhury

## 2017-12-01 ENCOUNTER — TELEPHONE (OUTPATIENT)
Dept: PULMONOLOGY | Facility: CLINIC | Age: 72
End: 2017-12-01

## 2017-12-04 ENCOUNTER — TELEPHONE (OUTPATIENT)
Dept: PULMONOLOGY | Facility: CLINIC | Age: 72
End: 2017-12-04

## 2017-12-04 RX ORDER — PREDNISONE 10 MG/1
40 TABLET ORAL DAILY
Qty: 120 TABLET | Refills: 1 | Status: SHIPPED | OUTPATIENT
Start: 2017-12-04 | End: 2017-12-20 | Stop reason: DRUGHIGH

## 2017-12-04 NOTE — TELEPHONE ENCOUNTER
Spoke to Ms. Franco about her ABPA regimen. She is still on prednisone, but has only about 1 week supply left. She started itraconazole 2 days ago. She dislikes taking the steroids, but we discussed the importance of them short-term, with the hope that she will no longer require them after a course of antifungals. She thinks her cough is overall unchanged, but her SOB is slightly better.    We will decrease prednisone to 30mg, with the goal of dropping her to 20mg daily in another month.    Deandra Swift 12/04/2017 2:14 PM  [facility] Fellow

## 2017-12-20 ENCOUNTER — OFFICE VISIT (OUTPATIENT)
Dept: OTOLARYNGOLOGY | Facility: CLINIC | Age: 72
End: 2017-12-20
Payer: MEDICARE

## 2017-12-20 VITALS
WEIGHT: 220.88 LBS | BODY MASS INDEX: 37.92 KG/M2 | HEART RATE: 79 BPM | DIASTOLIC BLOOD PRESSURE: 72 MMHG | SYSTOLIC BLOOD PRESSURE: 127 MMHG

## 2017-12-20 DIAGNOSIS — J31.0 CHRONIC RHINITIS, UNSPECIFIED TYPE: Primary | ICD-10-CM

## 2017-12-20 DIAGNOSIS — J32.4 CHRONIC PANSINUSITIS: ICD-10-CM

## 2017-12-20 DIAGNOSIS — J45.40 MODERATE PERSISTENT ASTHMA WITHOUT COMPLICATION: ICD-10-CM

## 2017-12-20 PROCEDURE — 31231 NASAL ENDOSCOPY DX: CPT | Mod: 79,S$GLB,, | Performed by: OTOLARYNGOLOGY

## 2017-12-20 PROCEDURE — 99999 PR PBB SHADOW E&M-EST. PATIENT-LVL IV: CPT | Mod: PBBFAC,,, | Performed by: OTOLARYNGOLOGY

## 2017-12-20 PROCEDURE — 99499 UNLISTED E&M SERVICE: CPT | Mod: S$GLB,,, | Performed by: OTOLARYNGOLOGY

## 2017-12-20 PROCEDURE — 99213 OFFICE O/P EST LOW 20 MIN: CPT | Mod: 25,24,S$GLB, | Performed by: OTOLARYNGOLOGY

## 2017-12-20 RX ORDER — PREDNISONE 10 MG/1
10 TABLET ORAL DAILY
COMMUNITY
End: 2018-04-04 | Stop reason: SDUPTHER

## 2017-12-20 RX ORDER — AZELASTINE HYDROCHLORIDE, FLUTICASONE PROPIONATE 137; 50 UG/1; UG/1
1 SPRAY, METERED NASAL 2 TIMES DAILY
Qty: 23 G | Refills: 2 | Status: SHIPPED | OUTPATIENT
Start: 2017-12-20 | End: 2018-01-19

## 2017-12-20 NOTE — PROCEDURES
Nasal/sinus endoscopy  Date/Time: 12/20/2017 2:37 PM  Performed by: AIDA MCKENZIE  Authorized by: AIDA MCKENZIE     Consent Done?:  Yes (Verbal)  Anesthesia:     Local anesthetic:  Lidocaine 4% and Emerson-Synephrine 1/2%    Patient tolerance:  Patient tolerated the procedure well with no immediate complications  Nose:     Procedure Performed:  Nasal Endoscopy  External:      No external nasal deformity  Intranasal:      Mucosa no polyps     Mucosa ulcers not present     No mucosa lesions present     Turbinates not enlarged     No septum gross deformity  Nasopharynx:      No mucosa lesions     Adenoids not present     Posterior choanae patent     Eustachian tube patent     Sinuses all patent.  Mild diffuse edema and mucus.  No purlence or polyps.

## 2017-12-20 NOTE — PROGRESS NOTES
Subjective:      Michela Franco is a 72 y.o. female who comes for follow-up nearly 3 months status-post endoscopic sinus surgery.  Her last visit with me was on 10/24/2017.  Doing fine, breathing well, still a mild postnasal drip.  Most bothered now by cough, junk in lungs, being treated for ABPA with prednisone and itraconazole.  Using budesonide sinus rinse BID.    SNOT-22 score = 33, NOSE score = 25%      Objective:     /72   Pulse 79   Wt 100.2 kg (220 lb 14.4 oz)   BMI 37.92 kg/m²      General:   not in distress   Nasal:  edematous mucosa   no septal hematoma   no bleeding   Oral Cavity:   clear   Oropharynx:   no bleeding   Neck:   nontender       Procedure    Nasal endoscopy performed.  See procedure note.     Left nasal valve     Left ethmoid     Left maxillary     Left frontal     Right ethmoid     Right maxillary     Right frontal        Data Reviewed    Pathology report indicated chronic inflammation with eosinophilia.  Cultures showed P acnes.      Assessment:     Doing well following bilateral endoscopic sinus surgery.  She also underwent septum/turbinate surgery which is unrelated to the reason for today's visit.    1. Chronic rhinitis, unspecified type    2. Chronic pansinusitis    3. Moderate persistent asthma without complication         Plan:     Rx Dymista daily.  Reduce budesonide rinse to once daily.  Continue ABPA management per ID.  Return in about 3 months (around 3/20/2018).

## 2017-12-29 ENCOUNTER — OFFICE VISIT (OUTPATIENT)
Dept: INFECTIOUS DISEASES | Facility: CLINIC | Age: 72
End: 2017-12-29
Payer: MEDICARE

## 2017-12-29 VITALS
HEIGHT: 66 IN | WEIGHT: 213.19 LBS | DIASTOLIC BLOOD PRESSURE: 76 MMHG | HEART RATE: 66 BPM | SYSTOLIC BLOOD PRESSURE: 139 MMHG | TEMPERATURE: 98 F | BODY MASS INDEX: 34.26 KG/M2

## 2017-12-29 DIAGNOSIS — B44.81 ABPA (ALLERGIC BRONCHOPULMONARY ASPERGILLOSIS): Primary | ICD-10-CM

## 2017-12-29 PROCEDURE — 99214 OFFICE O/P EST MOD 30 MIN: CPT | Mod: S$GLB,,, | Performed by: INTERNAL MEDICINE

## 2017-12-29 PROCEDURE — 99499 UNLISTED E&M SERVICE: CPT | Mod: S$GLB,,, | Performed by: INTERNAL MEDICINE

## 2017-12-29 PROCEDURE — 99999 PR PBB SHADOW E&M-EST. PATIENT-LVL IV: CPT | Mod: PBBFAC,,, | Performed by: INTERNAL MEDICINE

## 2017-12-29 RX ORDER — ITRACONAZOLE 100 MG/1
200 CAPSULE ORAL 2 TIMES DAILY
Qty: 40 CAPSULE | Refills: 0 | Status: SHIPPED | OUTPATIENT
Start: 2017-12-29 | End: 2018-01-08

## 2017-12-29 NOTE — PROGRESS NOTES
Infectious Diseases Clinic Note    Subjective:       Patient ID: Michela Franco is a 72 y.o. female.    Chief Complaint: No chief complaint on file.    HPI    Still c/o cough with no improvement for past month on itraconazole  Past Medical History:   Diagnosis Date    Allergy     Hyperlipidemia     Hypertension     Neuromuscular disorder     JOHN on CPAP     Osteoporosis     Recurrent sinusitis 3/25/2014    Recurrent upper respiratory infection (URI)     Urticaria        Social History     Social History    Marital status: Single     Spouse name: N/A    Number of children: N/A    Years of education: N/A     Occupational History          [employer] for advocacy     Social History Main Topics    Smoking status: Former Smoker     Packs/day: 0.25     Years: 40.00     Types: Cigarettes     Quit date: 1/1/2005    Smokeless tobacco: Never Used    Alcohol use Yes      Comment: rare beer    Drug use: No    Sexual activity: Not Currently     Other Topics Concern    Not on file     Social History Narrative    No narrative on file         Current Outpatient Prescriptions:     ACTIVATED CHARCOAL (CHARCOCAPS ORAL), Take by mouth as needed. , Disp: , Rfl:     albuterol (ACCUNEB) 0.63 mg/3 mL Nebu, Take 3 mLs (0.63 mg total) by nebulization every 6 (six) hours as needed. Rescue, Disp: 60 vial, Rfl: 3    albuterol 90 mcg/actuation inhaler, Inhale 2 puffs into the lungs every 6 (six) hours as needed for Wheezing., Disp: 54 g, Rfl: 3    amlodipine (NORVASC) 10 MG tablet, Take 1 tablet (10 mg total) by mouth once daily., Disp: 90 tablet, Rfl: 3    azelastine-fluticasone 137-50 mcg/spray [place] nassal spray, 1 spray by Each Nare route 2 (two) times daily., Disp: 23 g, Rfl: 2    budesonide (PULMICORT) 0.5 mg/2 mL nebulizer solution, Take 2 mLs (0.5 mg total) by nebulization once daily. For use in saline irrigation as directed., Disp: 180 mL, Rfl: 1    budesonide-formoterol 160-4.5 mcg  (SYMBICORT) 160-4.5 mcg/actuation HFAA, Inhale 2 puffs into the lungs every 12 (twelve) hours. Controller, Disp: 1 Inhaler, Rfl: 3    cetirizine (ZYRTEC) 10 MG tablet, Take 10 mg by mouth once daily., Disp: , Rfl:     cholecalciferol, vitamin D3, 10,000 unit Cap, Take 360 mg by mouth once daily. Take 6 capsules (6,000 units total) by mouth once daily, Disp: , Rfl:     COD LIVER OIL ORAL, Take 1 tablet by mouth once daily., Disp: , Rfl:     ferrous sulfate 325 (65 FE) MG EC tablet, Take 1 tablet (325 mg total) by mouth 3 (three) times daily with meals. (Patient taking differently: Take 325 mg by mouth once daily. ), Disp: 100 tablet, Rfl: 3    folic acid (FOLVITE) 800 MCG Tab, Take 1 tablet (800 mcg total) by mouth once daily., Disp: 100 tablet, Rfl: 3    guaiFENesin (MUCINEX) 600 mg 12 hr tablet, Take 600 mg by mouth 2 (two) times daily., Disp: , Rfl:     hydrochlorothiazide (HYDRODIURIL) 25 MG tablet, Take 1 tablet (25 mg total) by mouth once daily., Disp: 90 tablet, Rfl: 3    itraconazole (SPORANOX) 100 mg Cap, Take 2 capsules (200 mg total) by mouth 2 (two) times daily. Take 200 mg thre times a day for three days followed by 200 mg twice daily, Disp: 120 capsule, Rfl: 0    loratadine (CLARITIN) 10 mg tablet, Take 10 mg by mouth once daily., Disp: , Rfl:     multivitamin (MULTIVITAMIN) per tablet, Take 1 tablet by mouth once daily.  , Disp: , Rfl:     potassium chloride SA (K-DUR,KLOR-CON) 20 MEQ tablet, 2 tablets daily po, Disp: 180 tablet, Rfl: 3    predniSONE (DELTASONE) 10 MG tablet, Take 10 mg by mouth once daily., Disp: , Rfl:     rosuvastatin (CRESTOR) 5 MG tablet, Take 1 tablet (5 mg total) by mouth once daily., Disp: 90 tablet, Rfl: 3    Review of Systems   Constitutional: Negative for activity change, chills and fever.   HENT: Positive for congestion. Negative for mouth sores, rhinorrhea, sinus pressure and sore throat.    Eyes: Negative for photophobia, pain and redness.   Respiratory:  Positive for cough. Negative for chest tightness, shortness of breath and wheezing.    Cardiovascular: Negative for chest pain and leg swelling.   Gastrointestinal: Negative for abdominal distention, abdominal pain, diarrhea, nausea and vomiting.   Endocrine: Negative for polyuria.   Genitourinary: Negative for decreased urine volume, dysuria and flank pain.   Musculoskeletal: Negative for joint swelling and neck pain.   Skin: Negative for color change.   Allergic/Immunologic: Negative for food allergies.   Neurological: Negative for dizziness, weakness and headaches.   Hematological: Negative for adenopathy.   Psychiatric/Behavioral: Negative for agitation and confusion. The patient is not nervous/anxious.            Objective:       Vitals:    12/29/17 1111   BP: 139/76   Pulse: 66   Temp: 98.1 °F (36.7 °C)       There were no vitals filed for this visit.  Physical Exam   Constitutional: She is oriented to person, place, and time. She appears well-developed and well-nourished.   HENT:   Head: Normocephalic and atraumatic.   Eyes: Pupils are equal, round, and reactive to light.   Neck: Normal range of motion. Neck supple.   Cardiovascular: Normal rate.    Pulmonary/Chest: No respiratory distress. She has wheezes.   Abdominal: Soft. Bowel sounds are normal.   Musculoskeletal: She exhibits no edema or tenderness.   Neurological: She is alert and oriented to person, place, and time.   Skin: Skin is warm and dry.   Psychiatric: She has a normal mood and affect.           Assessment/Plan:       No diagnosis found.    71 y/o F ex smoker, h/o HTN, HLD, chronic recurrent sinusitis, elevated IgE and no significant peripheral eosinophilia on most recent CBCs, pansinsusitis s/p debridement and on budesonide nasal rinse, found to have Obstructive disease by PFTs with CT chest 4/2017 suggesting early bronchiectasis in bases (per pulmonary note) and micronodule in RUL stable since 4/2014.  Currently total IGE 1878 and serum  aspergillus antigen is positive at 0.6 and given above there was concern for ABPA and pt was started on prednisone and voriconazole, patient has been unable to afford the voriconazole.  She is here today for comanagement with pulmonary and further eval.  Agree with assesment of ABPA and started on itraconazole 11/30 and after 1 month has no clinical response  - check itra level, asp ag, BDG , LFTs  - Back to pulm for eval - utility in repeating CT chest or bronch.  - Refill 10 days and f/u level and give further refill if need dose change based on level

## 2018-01-11 ENCOUNTER — TELEPHONE (OUTPATIENT)
Dept: INFECTIOUS DISEASES | Facility: HOSPITAL | Age: 73
End: 2018-01-11

## 2018-01-11 RX ORDER — ITRACONAZOLE 100 MG/1
200 CAPSULE ORAL 2 TIMES DAILY
Qty: 120 CAPSULE | Refills: 0 | Status: SHIPPED | OUTPATIENT
Start: 2018-01-11 | End: 2018-01-31 | Stop reason: SDUPTHER

## 2018-01-11 NOTE — TELEPHONE ENCOUNTER
Pt still feeling poorly, but asp ag has normalized, Continue itraconazole same dose for at least 3 months

## 2018-01-17 ENCOUNTER — TELEPHONE (OUTPATIENT)
Dept: PULMONOLOGY | Facility: CLINIC | Age: 73
End: 2018-01-17

## 2018-01-17 NOTE — TELEPHONE ENCOUNTER
----- Message from Melyssa Rosario sent at 1/11/2018  1:09 PM CST -----  Contact: pt  Pt has   Chest congestion  - wheezing -  Mucous  Not coming up   Little bit  Is yellowish   First in morning a little sore throat -    Had about a month      Ph [phone]  Please call her      Needs med?    Changes in breathing treatments?  [facility]  [address]

## 2018-01-22 ENCOUNTER — OFFICE VISIT (OUTPATIENT)
Dept: PULMONOLOGY | Facility: CLINIC | Age: 73
End: 2018-01-22
Payer: MEDICARE

## 2018-01-22 ENCOUNTER — HOSPITAL ENCOUNTER (OUTPATIENT)
Dept: PULMONOLOGY | Facility: CLINIC | Age: 73
Discharge: HOME | End: 2018-01-22
Payer: MEDICARE

## 2018-01-22 VITALS
WEIGHT: 215 LBS | DIASTOLIC BLOOD PRESSURE: 72 MMHG | SYSTOLIC BLOOD PRESSURE: 138 MMHG | HEIGHT: 64 IN | HEART RATE: 83 BPM | BODY MASS INDEX: 36.7 KG/M2 | OXYGEN SATURATION: 97 %

## 2018-01-22 DIAGNOSIS — R05.3 CHRONIC COUGH: ICD-10-CM

## 2018-01-22 DIAGNOSIS — B44.81 ABPA (ALLERGIC BRONCHOPULMONARY ASPERGILLOSIS): Primary | ICD-10-CM

## 2018-01-22 LAB
POST FEV1 FVC: 0.74
POST FEV1: 1.59
POST FVC: 2.16
PRE FEV1 FVC: 69
PRE FEV1: 1.39
PRE FVC: 2.01
PREDICTED FEV1 FVC: 78
PREDICTED FEV1: 2.22
PREDICTED FVC: 2.85

## 2018-01-22 PROCEDURE — 94060 EVALUATION OF WHEEZING: CPT | Mod: S$GLB,,, | Performed by: INTERNAL MEDICINE

## 2018-01-22 PROCEDURE — 99499 UNLISTED E&M SERVICE: CPT | Mod: S$GLB,,, | Performed by: INTERNAL MEDICINE

## 2018-01-22 PROCEDURE — 99999 PR PBB SHADOW E&M-EST. PATIENT-LVL IV: CPT | Mod: PBBFAC,,, | Performed by: INTERNAL MEDICINE

## 2018-01-22 PROCEDURE — 99214 OFFICE O/P EST MOD 30 MIN: CPT | Mod: 25,S$GLB,, | Performed by: INTERNAL MEDICINE

## 2018-01-23 ENCOUNTER — LAB VISIT (OUTPATIENT)
Dept: LAB | Facility: HOSPITAL | Age: 73
End: 2018-01-23
Attending: INTERNAL MEDICINE
Payer: MEDICARE

## 2018-01-23 DIAGNOSIS — B44.81 ABPA (ALLERGIC BRONCHOPULMONARY ASPERGILLOSIS): ICD-10-CM

## 2018-01-23 LAB — IGE SERPL-ACNC: 559 IU/ML

## 2018-01-23 PROCEDURE — 82785 ASSAY OF IGE: CPT

## 2018-01-23 PROCEDURE — 36415 COLL VENOUS BLD VENIPUNCTURE: CPT

## 2018-01-28 NOTE — PROGRESS NOTES
Subjective:       Patient ID: Michela Franco is a 72 y.o. female.    Chief Complaint: Cough (chest congestion)    HPI     Ms. Franco returns for evaluation of persistent asthma symptoms despite high doses of steroids and treatment for aspergillus as presumed cause of ABPA.  At her last Pulmonary visit in October 2017, it was felt likely that she had ABPA based upon the significant asthma, high IgE levels, positive aspergillus antigen, and mild bronchiectasis on chest CT scan.  She has been followed in Infectious Diseases by Dr. Kimbrough who has supervised treatment with itraconazole since the end of November 2017.  Ms. Franco has been compliant with therapy and has completed ~ 7 weeks of anti-fungal therapy.  Despite this, she reports no real improvement in her respiratory symptoms.  She continues to have cough that is typically non-productive and wheezes daily.  In addition, her appetite and weight have increased significantly while taking steroids.  She very much would like to reduce the prednisone dose.  She expresses great frustration with the overall lack of progress during treatment.    Ms. Franco's current respiratory regimen includes the following: nebulized albuterol/budesonide twice daily; albuterol MDI twice daily; Symbicort (160/4.5) twice daily; guaifenesin 600 mg twice daily; and prednisone 30 mg daily.  She is also taking itraconazole 200 mg twice daily.    Review of Systems   Constitutional: Positive for weight gain, appetite change and fatigue. Negative for fever, chills and night sweats.   HENT: Positive for sinus pressure and congestion.    Respiratory: Positive for cough, shortness of breath, wheezing, asthma nighttime symptoms, dyspnea on extertion and use of rescue inhaler. Negative for hemoptysis and sputum production.    Cardiovascular: Negative for chest pain, palpitations and leg swelling.       Objective:      Physical Exam   Constitutional: She appears not cachectic. No distress.   HENT:    Mouth/Throat: No oropharyngeal exudate.   Cardiovascular: Normal rate, regular rhythm and normal heart sounds.  Exam reveals no gallop.    No murmur heard.  Pulmonary/Chest: Effort normal. No respiratory distress. She has wheezes. She has no rhonchi. She has no rales.   Musculoskeletal: She exhibits no edema.   Skin: Nails show no clubbing.   Psychiatric: Judgment normal.   Nursing note and vitals reviewed.    Personal Diagnostic Review    PFTs 02/14/2014 03/28/2014 11/07/2017 01/22/2018   FVC  (pre-BD) 1.88 liters 2.31 liters 1.86 liters 2.01 liters   FVC%  62 76 63 71   FEV1 (pre-BD) 1.41 liters 1.78 liters 1.22 liters 1.39 liters   FEV1%  59 75 53 63   FEV1/FVC  75 77 66 69   FEF 25-75  1.10 1.63 0.64 0.90   FEF 25-75%  48 71 29 42   FVC (post-BD) 2.07 liters   2.16 liters   FVC% 68   76   FEV1 (post-BD) 1.61 liters   1.59 liters   FEV1% 68   72   FEV1/FVC 78   74   FEF 25-75 1.34   1.17   FEF 25-75% 58   55   TLC    3.74 liters    TLC%    75    RV    1.91 liters    RV%    95    DLCO    14.5 mL/mmHg/min    DLCO%    80    VA   3.69 liters    IVC            2/20/2014 10:33 3/14/2014 10:22 4/8/2014 10:26 5/6/2014 10:25 6/3/2014 09:45 7/2/2014 16:07 4/1/2017 08:22 10/30/2017 16:06 1/23/2018 11:29   IgE 3,321 (H) 6,552 (H) 3,280 (H) 1,862 (H) 3914 (H) 2589 (H) 1476 (H) 1878 (H) 559 (H)      10/30/2017 16:06 12/29/2017 12:05   Aspergillus Antigen 0.676 (A) <0.500         Assessment:       1. ABPA (allergic bronchopulmonary aspergillosis)    2. Chronic cough        Outpatient Encounter Prescriptions as of 1/22/2018   Medication Sig Dispense Refill    ACTIVATED CHARCOAL (CHARCOCAPS ORAL) Take by mouth as needed.       albuterol (ACCUNEB) 0.63 mg/3 mL Nebu Take 3 mLs (0.63 mg total) by nebulization every 6 (six) hours as needed. Rescue 60 vial 3    albuterol 90 mcg/actuation inhaler Inhale 2 puffs into the lungs every 6 (six) hours as needed for Wheezing. 54 g 3    amlodipine (NORVASC) 10 MG tablet Take 1 tablet (10  mg total) by mouth once daily. 90 tablet 3    budesonide (PULMICORT) 0.5 mg/2 mL nebulizer solution Take 2 mLs (0.5 mg total) by nebulization once daily. For use in saline irrigation as directed. 180 mL 1    budesonide-formoterol 160-4.5 mcg (SYMBICORT) 160-4.5 mcg/actuation HFAA Inhale 2 puffs into the lungs every 12 (twelve) hours. Controller 1 Inhaler 3    cetirizine (ZYRTEC) 10 MG tablet Take 10 mg by mouth once daily.      cholecalciferol, vitamin D3, 10,000 unit Cap Take 360 mg by mouth once daily. Take 6 capsules (6,000 units total) by mouth once daily      COD LIVER OIL ORAL Take 1 tablet by mouth once daily.      ferrous sulfate 325 (65 FE) MG EC tablet Take 1 tablet (325 mg total) by mouth 3 (three) times daily with meals. (Patient taking differently: Take 325 mg by mouth once daily. ) 100 tablet 3    folic acid (FOLVITE) 800 MCG Tab Take 1 tablet (800 mcg total) by mouth once daily. 100 tablet 3    guaiFENesin (MUCINEX) 600 mg 12 hr tablet Take 600 mg by mouth 2 (two) times daily.      hydrochlorothiazide (HYDRODIURIL) 25 MG tablet Take 1 tablet (25 mg total) by mouth once daily. 90 tablet 3    itraconazole (SPORANOX) 100 mg Cap Take 2 capsules (200 mg total) by mouth 2 (two) times daily. 120 capsule 0    loratadine (CLARITIN) 10 mg tablet Take 10 mg by mouth once daily.      multivitamin (MULTIVITAMIN) per tablet Take 1 tablet by mouth once daily.        potassium chloride SA (K-DUR,KLOR-CON) 20 MEQ tablet 2 tablets daily po 180 tablet 3    predniSONE (DELTASONE) 10 MG tablet Take 10 mg by mouth once daily.      rosuvastatin (CRESTOR) 5 MG tablet Take 1 tablet (5 mg total) by mouth once daily. 90 tablet 3     No facility-administered encounter medications on file as of 1/22/2018.      Orders Placed This Encounter   Procedures    IGE     Standing Status:   Future     Number of Occurrences:   1     Standing Expiration Date:   3/23/2019     Plan:   Subjectively, Ms. Franco notices no real  improvement with increased intensity of treatment for presumed ABPA.  Objectively, there has been modest improvement in airflow obstruction and IgE levels are improved with normalization of aspergillus antigen.  We will see how she tolerates decrease in steroid dose.  If her IgE remains elevated, she may be a candidate for injectable IgE-specific therapy.      · Okay to decrease prednisone to 20 mg daily.  · Depending upon change in IgE level in response to current treatment, we will consider whether it would be beneficial to begin specific injectable medications to target IgE in asthma.  · Repeat breathing tests (spirometry) in about 3 weeks while on reduced prednisone dose.    I have copied Dr. Arthur (Primary Care) on this note.    Asif Chowdhury MD  Pulmonary/Critical Care Medicine

## 2018-01-28 NOTE — PATIENT INSTRUCTIONS
· Okay to decrease prednisone to 20 mg daily.  · Depending upon change in IgE level in response to current treatment, we will consider whether it would be beneficial to begin specific injectable medications to target IgE in asthma.  · Repeat breathing tests (spirometry) in about 3 weeks while on reduced prednisone dose.

## 2018-01-31 ENCOUNTER — OFFICE VISIT (OUTPATIENT)
Dept: INFECTIOUS DISEASES | Facility: CLINIC | Age: 73
End: 2018-01-31
Payer: MEDICARE

## 2018-01-31 VITALS
BODY MASS INDEX: 35.63 KG/M2 | SYSTOLIC BLOOD PRESSURE: 138 MMHG | HEART RATE: 80 BPM | WEIGHT: 213.88 LBS | HEIGHT: 65 IN | DIASTOLIC BLOOD PRESSURE: 77 MMHG | TEMPERATURE: 98 F

## 2018-01-31 DIAGNOSIS — B44.81 ABPA (ALLERGIC BRONCHOPULMONARY ASPERGILLOSIS): Primary | ICD-10-CM

## 2018-01-31 PROCEDURE — 1126F AMNT PAIN NOTED NONE PRSNT: CPT | Mod: S$GLB,,, | Performed by: INTERNAL MEDICINE

## 2018-01-31 PROCEDURE — 99999 PR PBB SHADOW E&M-EST. PATIENT-LVL IV: CPT | Mod: PBBFAC,,, | Performed by: INTERNAL MEDICINE

## 2018-01-31 PROCEDURE — 3008F BODY MASS INDEX DOCD: CPT | Mod: S$GLB,,, | Performed by: INTERNAL MEDICINE

## 2018-01-31 PROCEDURE — 1159F MED LIST DOCD IN RCRD: CPT | Mod: S$GLB,,, | Performed by: INTERNAL MEDICINE

## 2018-01-31 PROCEDURE — 99214 OFFICE O/P EST MOD 30 MIN: CPT | Mod: S$GLB,,, | Performed by: INTERNAL MEDICINE

## 2018-01-31 RX ORDER — ITRACONAZOLE 100 MG/1
200 CAPSULE ORAL 2 TIMES DAILY
Qty: 120 CAPSULE | Refills: 0 | Status: SHIPPED | OUTPATIENT
Start: 2018-01-31 | End: 2018-03-02 | Stop reason: SDUPTHER

## 2018-01-31 NOTE — PROGRESS NOTES
Infectious Diseases Clinic Note    Subjective:       Patient ID: Michela Franco is a 72 y.o. female.    Chief Complaint: Follow-up    HPI    Feeling improved subjectively from cough and pulmonary symptoms, also improvement objectively with airflow obstruction and IgE levels decreased significantly and aspergillus ag serum now negative    Past Medical History:   Diagnosis Date    Allergy     Hyperlipidemia     Hypertension     Neuromuscular disorder     JOHN on CPAP     Osteoporosis     Recurrent sinusitis 3/25/2014    Recurrent upper respiratory infection (URI)     Urticaria        Social History     Social History    Marital status: Single     Spouse name: N/A    Number of children: N/A    Years of education: N/A     Occupational History          [employer] for advocacy     Social History Main Topics    Smoking status: Former Smoker     Packs/day: 0.25     Years: 40.00     Types: Cigarettes     Quit date: 1/1/2005    Smokeless tobacco: Never Used    Alcohol use Yes      Comment: rare beer    Drug use: No    Sexual activity: Not Currently     Other Topics Concern    Not on file     Social History Narrative    No narrative on file         Current Outpatient Prescriptions:     ACTIVATED CHARCOAL (CHARCOCAPS ORAL), Take by mouth as needed. , Disp: , Rfl:     albuterol (ACCUNEB) 0.63 mg/3 mL Nebu, Take 3 mLs (0.63 mg total) by nebulization every 6 (six) hours as needed. Rescue, Disp: 60 vial, Rfl: 3    albuterol 90 mcg/actuation inhaler, Inhale 2 puffs into the lungs every 6 (six) hours as needed for Wheezing., Disp: 54 g, Rfl: 3    amlodipine (NORVASC) 10 MG tablet, Take 1 tablet (10 mg total) by mouth once daily., Disp: 90 tablet, Rfl: 3    budesonide-formoterol 160-4.5 mcg (SYMBICORT) 160-4.5 mcg/actuation HFAA, Inhale 2 puffs into the lungs every 12 (twelve) hours. Controller, Disp: 1 Inhaler, Rfl: 3    cetirizine (ZYRTEC) 10 MG tablet, Take 10 mg by mouth once  daily., Disp: , Rfl:     cholecalciferol, vitamin D3, 10,000 unit Cap, Take 360 mg by mouth once daily. Take 6 capsules (6,000 units total) by mouth once daily, Disp: , Rfl:     COD LIVER OIL ORAL, Take 1 tablet by mouth once daily., Disp: , Rfl:     ferrous sulfate 325 (65 FE) MG EC tablet, Take 1 tablet (325 mg total) by mouth 3 (three) times daily with meals. (Patient taking differently: Take 325 mg by mouth once daily. ), Disp: 100 tablet, Rfl: 3    folic acid (FOLVITE) 800 MCG Tab, Take 1 tablet (800 mcg total) by mouth once daily., Disp: 100 tablet, Rfl: 3    guaiFENesin (MUCINEX) 600 mg 12 hr tablet, Take 600 mg by mouth 2 (two) times daily., Disp: , Rfl:     hydrochlorothiazide (HYDRODIURIL) 25 MG tablet, Take 1 tablet (25 mg total) by mouth once daily., Disp: 90 tablet, Rfl: 3    itraconazole (SPORANOX) 100 mg Cap, Take 2 capsules (200 mg total) by mouth 2 (two) times daily., Disp: 120 capsule, Rfl: 0    loratadine (CLARITIN) 10 mg tablet, Take 10 mg by mouth once daily., Disp: , Rfl:     multivitamin (MULTIVITAMIN) per tablet, Take 1 tablet by mouth once daily.  , Disp: , Rfl:     potassium chloride SA (K-DUR,KLOR-CON) 20 MEQ tablet, 2 tablets daily po, Disp: 180 tablet, Rfl: 3    predniSONE (DELTASONE) 10 MG tablet, Take 10 mg by mouth once daily., Disp: , Rfl:     rosuvastatin (CRESTOR) 5 MG tablet, Take 1 tablet (5 mg total) by mouth once daily., Disp: 90 tablet, Rfl: 3    budesonide (PULMICORT) 0.5 mg/2 mL nebulizer solution, Take 2 mLs (0.5 mg total) by nebulization once daily. For use in saline irrigation as directed., Disp: 180 mL, Rfl: 1    Review of Systems   Constitutional: Negative for activity change, chills and fever.   HENT: Negative for congestion, mouth sores, rhinorrhea, sinus pressure and sore throat.    Eyes: Negative for photophobia, pain and redness.   Respiratory: Positive for cough. Negative for chest tightness, shortness of breath and wheezing.    Cardiovascular:  Negative for chest pain and leg swelling.   Gastrointestinal: Negative for abdominal distention, abdominal pain, diarrhea, nausea and vomiting.   Endocrine: Negative for polyuria.   Genitourinary: Negative for decreased urine volume, dysuria and flank pain.   Musculoskeletal: Negative for joint swelling and neck pain.   Skin: Negative for color change.   Allergic/Immunologic: Negative for food allergies.   Neurological: Negative for dizziness, weakness and headaches.   Hematological: Negative for adenopathy.   Psychiatric/Behavioral: Negative for agitation and confusion. The patient is not nervous/anxious.            Objective:      Vitals:    01/31/18 1003   BP: 138/77   Pulse: 80   Temp: 98.3 °F (36.8 °C)     Physical Exam   Constitutional: She is oriented to person, place, and time. She appears well-developed and well-nourished.   HENT:   Head: Normocephalic and atraumatic.   Eyes: Pupils are equal, round, and reactive to light.   Neck: Normal range of motion. Neck supple.   Cardiovascular: Normal rate.    Pulmonary/Chest: Effort normal and breath sounds normal.   Mild diffuse wheezes b/l   Abdominal: Soft. Bowel sounds are normal.   Musculoskeletal: She exhibits no edema or tenderness.   Neurological: She is alert and oriented to person, place, and time.   Skin: Skin is warm and dry.   Psychiatric: She has a normal mood and affect.           Assessment/Plan:       No diagnosis found.        73 y/o F ex smoker, h/o HTN, HLD, chronic recurrent sinusitis, elevated IgE and no significant peripheral eosinophilia on most recent CBCs, pansinsusitis s/p debridement and on budesonide nasal rinse, found to have Obstructive disease by PFTs with CT chest 4/2017 suggesting early bronchiectasis in bases (per pulmonary note) and micronodule in RUL stable since 4/2014. Initially total IGE 1878 and serum aspergillus antigen is positive at 0.6 and given above there was concern for ABPA and pt was started on prednisone and  voriconazole, patient has been unable to afford the voriconazole.  She is undergoing comanagement with pulmonary.  Agree with assesment of ABPA and started on itraconazole 11/30 and after 1 month had no clinical response though now here today for f/u Feeling improved subjectively from cough and pulmonary symptoms, also improvement objectively with airflow obstruction and IgE levels decreased significantly and aspergillus ag serum now negative  - continue itraconazole  - f/u with Dr. Everett for input on immunological/allergic phenomena   - continue f/u with pulm - will check labs upon RTC in 1 month

## 2018-02-05 ENCOUNTER — PES CALL (OUTPATIENT)
Dept: ADMINISTRATIVE | Facility: CLINIC | Age: 73
End: 2018-02-05

## 2018-02-12 ENCOUNTER — HOSPITAL ENCOUNTER (OUTPATIENT)
Dept: PULMONOLOGY | Facility: CLINIC | Age: 73
Discharge: HOME | End: 2018-02-12
Payer: MEDICARE

## 2018-02-12 DIAGNOSIS — R05.3 CHRONIC COUGH: ICD-10-CM

## 2018-02-12 LAB
POST FEV1 FVC: 0.76
POST FEV1: 1.85
POST FVC: 2.43
PRE FEV1 FVC: 68
PRE FEV1: 1.51
PRE FVC: 2.21
PREDICTED FEV1 FVC: 78
PREDICTED FEV1: 2.3
PREDICTED FVC: 2.96

## 2018-02-12 PROCEDURE — 94060 EVALUATION OF WHEEZING: CPT | Mod: S$GLB,,, | Performed by: INTERNAL MEDICINE

## 2018-02-16 ENCOUNTER — LAB VISIT (OUTPATIENT)
Dept: LAB | Facility: HOSPITAL | Age: 73
End: 2018-02-16
Attending: FAMILY MEDICINE
Payer: MEDICARE

## 2018-02-16 DIAGNOSIS — Z13.9 ENCOUNTER FOR SCREENING, UNSPECIFIED: ICD-10-CM

## 2018-02-16 DIAGNOSIS — Z00.00 ROUTINE GENERAL MEDICAL EXAMINATION AT A HEALTH CARE FACILITY: ICD-10-CM

## 2018-02-16 DIAGNOSIS — I10 ESSENTIAL HYPERTENSION: ICD-10-CM

## 2018-02-16 DIAGNOSIS — E87.8 ELECTROLYTE ABNORMALITY: ICD-10-CM

## 2018-02-16 LAB
ALBUMIN SERPL BCP-MCNC: 3.1 G/DL
ALP SERPL-CCNC: 79 U/L
ALT SERPL W/O P-5'-P-CCNC: 18 U/L
ANION GAP SERPL CALC-SCNC: 11 MMOL/L
AST SERPL-CCNC: 24 U/L
BASOPHILS # BLD AUTO: 0.07 K/UL
BASOPHILS NFR BLD: 0.8 %
BILIRUB SERPL-MCNC: 0.5 MG/DL
BUN SERPL-MCNC: 11 MG/DL
CALCIUM SERPL-MCNC: 9.8 MG/DL
CHLORIDE SERPL-SCNC: 102 MMOL/L
CHOLEST SERPL-MCNC: 210 MG/DL
CHOLEST/HDLC SERPL: 2.3 {RATIO}
CO2 SERPL-SCNC: 27 MMOL/L
CREAT SERPL-MCNC: 0.9 MG/DL
DIFFERENTIAL METHOD: ABNORMAL
EOSINOPHIL # BLD AUTO: 0.1 K/UL
EOSINOPHIL NFR BLD: 1.3 %
ERYTHROCYTE [DISTWIDTH] IN BLOOD BY AUTOMATED COUNT: 18.3 %
EST. GFR  (AFRICAN AMERICAN): >60 ML/MIN/1.73 M^2
EST. GFR  (NON AFRICAN AMERICAN): >60 ML/MIN/1.73 M^2
GLUCOSE SERPL-MCNC: 86 MG/DL
HCT VFR BLD AUTO: 38.1 %
HCV AB SERPL QL IA: NEGATIVE
HDLC SERPL-MCNC: 93 MG/DL
HDLC SERPL: 44.3 %
HGB BLD-MCNC: 12.1 G/DL
IMM GRANULOCYTES # BLD AUTO: 0.06 K/UL
IMM GRANULOCYTES NFR BLD AUTO: 0.7 %
LDLC SERPL CALC-MCNC: 105.6 MG/DL
LYMPHOCYTES # BLD AUTO: 2.5 K/UL
LYMPHOCYTES NFR BLD: 27.4 %
MCH RBC QN AUTO: 25.1 PG
MCHC RBC AUTO-ENTMCNC: 31.8 G/DL
MCV RBC AUTO: 79 FL
MONOCYTES # BLD AUTO: 0.7 K/UL
MONOCYTES NFR BLD: 7.4 %
NEUTROPHILS # BLD AUTO: 5.7 K/UL
NEUTROPHILS NFR BLD: 62.4 %
NONHDLC SERPL-MCNC: 117 MG/DL
NRBC BLD-RTO: 0 /100 WBC
PLATELET # BLD AUTO: 327 K/UL
PMV BLD AUTO: 10.5 FL
POTASSIUM SERPL-SCNC: 3.4 MMOL/L
PROT SERPL-MCNC: 7.7 G/DL
RBC # BLD AUTO: 4.83 M/UL
SODIUM SERPL-SCNC: 140 MMOL/L
TRIGL SERPL-MCNC: 57 MG/DL
TSH SERPL DL<=0.005 MIU/L-ACNC: 1.88 UIU/ML
WBC # BLD AUTO: 9.09 K/UL

## 2018-02-16 PROCEDURE — 86803 HEPATITIS C AB TEST: CPT

## 2018-02-16 PROCEDURE — 85025 COMPLETE CBC W/AUTO DIFF WBC: CPT

## 2018-02-16 PROCEDURE — 84443 ASSAY THYROID STIM HORMONE: CPT

## 2018-02-16 PROCEDURE — 36415 COLL VENOUS BLD VENIPUNCTURE: CPT | Mod: PO

## 2018-02-16 PROCEDURE — 80061 LIPID PANEL: CPT

## 2018-02-16 PROCEDURE — 80053 COMPREHEN METABOLIC PANEL: CPT

## 2018-02-19 RX ORDER — POTASSIUM CHLORIDE 20 MEQ/1
60 TABLET, EXTENDED RELEASE ORAL DAILY
Qty: 270 TABLET | Refills: 3 | Status: SHIPPED | OUTPATIENT
Start: 2018-02-19 | End: 2018-03-06 | Stop reason: SDUPTHER

## 2018-03-02 ENCOUNTER — OFFICE VISIT (OUTPATIENT)
Dept: INFECTIOUS DISEASES | Facility: CLINIC | Age: 73
End: 2018-03-02
Payer: MEDICARE

## 2018-03-02 VITALS
SYSTOLIC BLOOD PRESSURE: 132 MMHG | DIASTOLIC BLOOD PRESSURE: 74 MMHG | TEMPERATURE: 98 F | BODY MASS INDEX: 34.75 KG/M2 | HEIGHT: 65 IN | HEART RATE: 86 BPM | WEIGHT: 208.56 LBS

## 2018-03-02 DIAGNOSIS — B44.81 ABPA (ALLERGIC BRONCHOPULMONARY ASPERGILLOSIS): Primary | ICD-10-CM

## 2018-03-02 PROCEDURE — 99999 PR PBB SHADOW E&M-EST. PATIENT-LVL IV: CPT | Mod: PBBFAC,,, | Performed by: INTERNAL MEDICINE

## 2018-03-02 PROCEDURE — 99499 UNLISTED E&M SERVICE: CPT | Mod: S$GLB,,, | Performed by: INTERNAL MEDICINE

## 2018-03-02 PROCEDURE — 3078F DIAST BP <80 MM HG: CPT | Mod: S$GLB,,, | Performed by: INTERNAL MEDICINE

## 2018-03-02 PROCEDURE — 99214 OFFICE O/P EST MOD 30 MIN: CPT | Mod: S$GLB,,, | Performed by: INTERNAL MEDICINE

## 2018-03-02 PROCEDURE — 3075F SYST BP GE 130 - 139MM HG: CPT | Mod: S$GLB,,, | Performed by: INTERNAL MEDICINE

## 2018-03-02 RX ORDER — ITRACONAZOLE 100 MG/1
200 CAPSULE ORAL 2 TIMES DAILY
Qty: 120 CAPSULE | Refills: 0 | Status: SHIPPED | OUTPATIENT
Start: 2018-03-02 | End: 2018-03-12 | Stop reason: SDUPTHER

## 2018-03-02 RX ORDER — ITRACONAZOLE 100 MG/1
200 CAPSULE ORAL 2 TIMES DAILY
Qty: 120 CAPSULE | Refills: 0 | Status: SHIPPED | OUTPATIENT
Start: 2018-03-02 | End: 2018-03-02 | Stop reason: SDUPTHER

## 2018-03-02 NOTE — PROGRESS NOTES
Infectious Diseases Clinic Note    Subjective:       Patient ID: Michela Franco is a 72 y.o. female.    Chief Complaint: Follow-up    HPI    Feeling very well  Saw allergy at [facility] and has f/u with them  Going to [place]    Past Medical History:   Diagnosis Date    Allergy     Hyperlipidemia     Hypertension     Neuromuscular disorder     JOHN on CPAP     Osteoporosis     Recurrent sinusitis 3/25/2014    Recurrent upper respiratory infection (URI)     Urticaria        Social History     Social History    Marital status: Single     Spouse name: N/A    Number of children: N/A    Years of education: N/A     Occupational History          [employer] for [employer]     Social History Main Topics    Smoking status: Former Smoker     Packs/day: 0.25     Years: 40.00     Types: Cigarettes     Quit date: 1/1/2005    Smokeless tobacco: Never Used    Alcohol use Yes      Comment: rare beer    Drug use: No    Sexual activity: Not Currently     Other Topics Concern    Not on file     Social History Narrative    No narrative on file         Current Outpatient Prescriptions:     cholecalciferol, vitamin D3, 10,000 unit Cap, Take 360 mg by mouth once daily. Take 6 capsules (6,000 units total) by mouth once daily, Disp: , Rfl:     COD LIVER OIL ORAL, Take 1 tablet by mouth once daily., Disp: , Rfl:     ferrous sulfate 325 (65 FE) MG EC tablet, Take 1 tablet (325 mg total) by mouth 3 (three) times daily with meals. (Patient taking differently: Take 325 mg by mouth once daily. ), Disp: 100 tablet, Rfl: 3    folic acid (FOLVITE) 800 MCG Tab, Take 1 tablet (800 mcg total) by mouth once daily., Disp: 100 tablet, Rfl: 3    guaiFENesin (MUCINEX) 600 mg 12 hr tablet, Take 600 mg by mouth 2 (two) times daily., Disp: , Rfl:     hydrochlorothiazide (HYDRODIURIL) 25 MG tablet, Take 1 tablet (25 mg total) by mouth once daily., Disp: 90 tablet, Rfl: 3    itraconazole (SPORANOX) 100 mg Cap,  Take 2 capsules (200 mg total) by mouth 2 (two) times daily., Disp: 120 capsule, Rfl: 0    loratadine (CLARITIN) 10 mg tablet, Take 10 mg by mouth once daily., Disp: , Rfl:     multivitamin (MULTIVITAMIN) per tablet, Take 1 tablet by mouth once daily.  , Disp: , Rfl:     potassium chloride SA (K-DUR,KLOR-CON) 20 MEQ tablet, Take 3 tablets (60 mEq total) by mouth once daily. 2 tablets daily po, Disp: 270 tablet, Rfl: 3    predniSONE (DELTASONE) 10 MG tablet, Take 10 mg by mouth once daily., Disp: , Rfl:     ACTIVATED CHARCOAL (CHARCOCAPS ORAL), Take by mouth as needed. , Disp: , Rfl:     albuterol (ACCUNEB) 0.63 mg/3 mL Nebu, Take 3 mLs (0.63 mg total) by nebulization every 6 (six) hours as needed. Rescue, Disp: 60 vial, Rfl: 3    albuterol 90 mcg/actuation inhaler, Inhale 2 puffs into the lungs every 6 (six) hours as needed for Wheezing., Disp: 54 g, Rfl: 3    amlodipine (NORVASC) 10 MG tablet, Take 1 tablet (10 mg total) by mouth once daily., Disp: 90 tablet, Rfl: 3    budesonide (PULMICORT) 0.5 mg/2 mL nebulizer solution, Take 2 mLs (0.5 mg total) by nebulization once daily. For use in saline irrigation as directed., Disp: 180 mL, Rfl: 1    budesonide-formoterol 160-4.5 mcg (SYMBICORT) 160-4.5 mcg/actuation HFAA, Inhale 2 puffs into the lungs every 12 (twelve) hours. Controller, Disp: 1 Inhaler, Rfl: 3    cetirizine (ZYRTEC) 10 MG tablet, Take 10 mg by mouth once daily., Disp: , Rfl:     rosuvastatin (CRESTOR) 5 MG tablet, Take 1 tablet (5 mg total) by mouth once daily., Disp: 90 tablet, Rfl: 3    Review of Systems   Constitutional: Negative for activity change, chills and fever.   HENT: Negative for congestion, mouth sores, rhinorrhea, sinus pressure and sore throat.    Eyes: Negative for photophobia, pain and redness.   Respiratory: Positive for cough. Negative for chest tightness, shortness of breath and wheezing.    Cardiovascular: Negative for chest pain and leg swelling.   Gastrointestinal:  Negative for abdominal distention, abdominal pain, diarrhea, nausea and vomiting.   Endocrine: Negative for polyuria.   Genitourinary: Negative for decreased urine volume, dysuria and flank pain.   Musculoskeletal: Negative for joint swelling and neck pain.   Skin: Negative for color change.   Allergic/Immunologic: Negative for food allergies.   Neurological: Negative for dizziness, weakness and headaches.   Hematological: Negative for adenopathy.   Psychiatric/Behavioral: Negative for agitation and confusion. The patient is not nervous/anxious.            Objective:      Vitals:    03/02/18 1508   BP: 132/74   Pulse: 86   Temp: 98 °F (36.7 °C)     Physical Exam   Constitutional: She is oriented to person, place, and time. She appears well-developed and well-nourished. No distress.   HENT:   Head: Normocephalic and atraumatic.   Mouth/Throat: Oropharynx is clear and moist.   Eyes: Conjunctivae and EOM are normal. No scleral icterus.   Neck: Normal range of motion. Neck supple.   Cardiovascular: Normal rate and regular rhythm.    No murmur heard.  Pulmonary/Chest: Effort normal and breath sounds normal. No respiratory distress. She has no wheezes.   Abdominal: Soft. Bowel sounds are normal. She exhibits no distension.   Musculoskeletal: Normal range of motion. She exhibits no edema or tenderness.   Lymphadenopathy:     She has no cervical adenopathy.   Neurological: She is alert and oriented to person, place, and time. Coordination normal.   Skin: Skin is warm and dry. No rash noted. No erythema.   Psychiatric: She has a normal mood and affect. Her behavior is normal.           Assessment/Plan:       No diagnosis found.    71 y/o F ex smoker, h/o HTN, HLD, chronic recurrent sinusitis, elevated IgE and no significant peripheral eosinophilia on most recent CBCs, pansinsusitis s/p debridement and on budesonide nasal rinse, found to have Obstructive disease by PFTs with CT chest 4/2017 suggesting early bronchiectasis in  bases (per pulmonary note) and micronodule in RUL stable since 4/2014. Initially total IGE 1878 and serum aspergillus antigen is positive at 0.6 and given above there was concern for ABPA and pt was started on prednisone and voriconazole, patient has been unable to afford the voriconazole.  She is undergoing comanagement with pulmonary.  Agree with assesment of ABPA and started on itraconazole 11/30 and after 1 month had no clinical response though now here today for f/u Feeling improved subjectively from cough and pulmonary symptoms, also improvement objectively with airflow obstruction and IgE levels decreased significantly and aspergillus ag serum now negative here for f/u doing very well  - continue itraconazole x at least 1 more month for at least 16 week course  - LFTs stable last labs  - RTC in 1 month

## 2018-03-06 DIAGNOSIS — E87.8 ELECTROLYTE ABNORMALITY: ICD-10-CM

## 2018-03-06 DIAGNOSIS — I10 ESSENTIAL HYPERTENSION: ICD-10-CM

## 2018-03-06 RX ORDER — AMLODIPINE BESYLATE 10 MG/1
TABLET ORAL
Qty: 90 TABLET | Refills: 3 | Status: SHIPPED | OUTPATIENT
Start: 2018-03-06 | End: 2018-05-01 | Stop reason: SDUPTHER

## 2018-03-06 RX ORDER — POTASSIUM CHLORIDE 20 MEQ/1
TABLET, EXTENDED RELEASE ORAL
Qty: 180 TABLET | Refills: 3 | Status: SHIPPED | OUTPATIENT
Start: 2018-03-06 | End: 2018-05-01 | Stop reason: SDUPTHER

## 2018-03-06 RX ORDER — HYDROCHLOROTHIAZIDE 25 MG/1
TABLET ORAL
Qty: 90 TABLET | Refills: 3 | Status: SHIPPED | OUTPATIENT
Start: 2018-03-06 | End: 2018-05-01

## 2018-03-12 RX ORDER — ITRACONAZOLE 100 MG/1
200 CAPSULE ORAL 2 TIMES DAILY
Qty: 120 CAPSULE | Refills: 0 | Status: SHIPPED | OUTPATIENT
Start: 2018-03-12 | End: 2018-03-23 | Stop reason: SDUPTHER

## 2018-03-12 NOTE — TELEPHONE ENCOUNTER
Patient states need to push back visit because of her trip. But needs another refill of medication to have for her trip.

## 2018-03-12 NOTE — TELEPHONE ENCOUNTER
----- Message from Libra Escalante sent at 3/12/2018  9:53 AM CDT -----  Contact: Self [phone]  PT needs a prescription refill - she does not know the name, but states Franchesca would. She is requesting a call at [phone].

## 2018-03-20 ENCOUNTER — OFFICE VISIT (OUTPATIENT)
Dept: INTERNAL MEDICINE | Facility: CLINIC | Age: 73
End: 2018-03-20
Payer: MEDICARE

## 2018-03-20 ENCOUNTER — OFFICE VISIT (OUTPATIENT)
Dept: OTOLARYNGOLOGY | Facility: CLINIC | Age: 73
End: 2018-03-20
Payer: MEDICARE

## 2018-03-20 VITALS — DIASTOLIC BLOOD PRESSURE: 73 MMHG | HEART RATE: 70 BPM | SYSTOLIC BLOOD PRESSURE: 117 MMHG

## 2018-03-20 VITALS
WEIGHT: 207.5 LBS | OXYGEN SATURATION: 99 % | DIASTOLIC BLOOD PRESSURE: 64 MMHG | SYSTOLIC BLOOD PRESSURE: 110 MMHG | HEIGHT: 65 IN | BODY MASS INDEX: 34.57 KG/M2 | HEART RATE: 67 BPM

## 2018-03-20 DIAGNOSIS — I70.0 AORTIC ATHEROSCLEROSIS: ICD-10-CM

## 2018-03-20 DIAGNOSIS — I10 ESSENTIAL HYPERTENSION: ICD-10-CM

## 2018-03-20 DIAGNOSIS — B44.81 ABPA (ALLERGIC BRONCHOPULMONARY ASPERGILLOSIS): ICD-10-CM

## 2018-03-20 DIAGNOSIS — E55.9 VITAMIN D DEFICIENCY: ICD-10-CM

## 2018-03-20 DIAGNOSIS — J06.9 RECURRENT UPPER RESPIRATORY INFECTION (URI): ICD-10-CM

## 2018-03-20 DIAGNOSIS — J32.4 CHRONIC PANSINUSITIS: ICD-10-CM

## 2018-03-20 DIAGNOSIS — J32.4 CHRONIC PANSINUSITIS: Primary | ICD-10-CM

## 2018-03-20 DIAGNOSIS — Z00.00 ENCOUNTER FOR PREVENTIVE HEALTH EXAMINATION: Primary | ICD-10-CM

## 2018-03-20 DIAGNOSIS — M81.0 OSTEOPOROSIS, UNSPECIFIED OSTEOPOROSIS TYPE, UNSPECIFIED PATHOLOGICAL FRACTURE PRESENCE: ICD-10-CM

## 2018-03-20 DIAGNOSIS — J31.0 CHRONIC RHINITIS, UNSPECIFIED TYPE: ICD-10-CM

## 2018-03-20 DIAGNOSIS — K21.9 GASTROESOPHAGEAL REFLUX DISEASE, ESOPHAGITIS PRESENCE NOT SPECIFIED: ICD-10-CM

## 2018-03-20 DIAGNOSIS — J84.10 CALCIFIED GRANULOMA OF LUNG: ICD-10-CM

## 2018-03-20 DIAGNOSIS — G57.10 MERALGIA PARESTHETICA, UNSPECIFIED LATERALITY: ICD-10-CM

## 2018-03-20 DIAGNOSIS — Z86.010 HISTORY OF COLON POLYPS: ICD-10-CM

## 2018-03-20 DIAGNOSIS — J45.40 MODERATE PERSISTENT ASTHMA WITHOUT COMPLICATION: ICD-10-CM

## 2018-03-20 DIAGNOSIS — E27.9 ADRENAL ABNORMALITY: ICD-10-CM

## 2018-03-20 DIAGNOSIS — R09.A2 GLOBUS SENSATION: ICD-10-CM

## 2018-03-20 PROBLEM — J98.4 CALCIFIED GRANULOMA OF LUNG: Status: ACTIVE | Noted: 2018-03-20

## 2018-03-20 PROCEDURE — 99999 PR PBB SHADOW E&M-EST. PATIENT-LVL V: CPT | Mod: PBBFAC,,, | Performed by: NURSE PRACTITIONER

## 2018-03-20 PROCEDURE — 99499 UNLISTED E&M SERVICE: CPT | Mod: S$GLB,,, | Performed by: NURSE PRACTITIONER

## 2018-03-20 PROCEDURE — 3078F DIAST BP <80 MM HG: CPT | Mod: CPTII,S$GLB,, | Performed by: OTOLARYNGOLOGY

## 2018-03-20 PROCEDURE — 31575 DIAGNOSTIC LARYNGOSCOPY: CPT | Mod: S$GLB,,, | Performed by: OTOLARYNGOLOGY

## 2018-03-20 PROCEDURE — G0439 PPPS, SUBSEQ VISIT: HCPCS | Mod: S$GLB,,, | Performed by: NURSE PRACTITIONER

## 2018-03-20 PROCEDURE — 99499 UNLISTED E&M SERVICE: CPT | Mod: S$GLB,,, | Performed by: OTOLARYNGOLOGY

## 2018-03-20 PROCEDURE — 99999 PR PBB SHADOW E&M-EST. PATIENT-LVL IV: CPT | Mod: PBBFAC,,, | Performed by: OTOLARYNGOLOGY

## 2018-03-20 PROCEDURE — 3074F SYST BP LT 130 MM HG: CPT | Mod: CPTII,S$GLB,, | Performed by: OTOLARYNGOLOGY

## 2018-03-20 PROCEDURE — 99213 OFFICE O/P EST LOW 20 MIN: CPT | Mod: 25,S$GLB,, | Performed by: OTOLARYNGOLOGY

## 2018-03-20 NOTE — PATIENT INSTRUCTIONS
Counseling and Referral of Other Preventative  (Italic type indicates deductible and co-insurance are waived)    Patient Name: Michela Franco  Today's Date: 3/20/2018    Health Maintenance       Date Due Completion Date    High Dose Statin 06/16/1968 No longer taking    Mammogram 03/13/2019 3/13/2017    DEXA SCAN 03/13/2020 3/13/2017    Lipid Panel 02/16/2023 2/16/2018    TETANUS VACCINE 12/03/2023 12/3/2013    Colonoscopy 12/19/2024 12/19/2014        No orders of the defined types were placed in this encounter.    The following information is provided to all patients.  This information is to help you find resources for any of the problems found today that may be affecting your health:                Living healthy guide: www.[facility].[place].gov      Understanding Diabetes: www.diabetes.org      Eating healthy: www.cdc.gov/healthyweight      [facility] home safety checklist: www.cdc.gov/steadi/patient.html      Agency on Aging: www.goea.[place].gov      Alcoholics anonymous (AA): www.aa.org      Physical Activity: www.leatha.nih.gov/pm6dutx      Tobacco use: www.quitwithusla.org

## 2018-03-20 NOTE — PROCEDURES
Laryngoscopy  Date/Time: 3/20/2018 10:06 AM  Performed by: AIDA MCKENZIE  Authorized by: AIDA MCKENZIE     Consent Done?:  Yes (Verbal)  Anesthesia:     Local anesthetic:  Lidocaine 4% and Emerson-Synephrine 1/2%    Patient tolerance:  Patient tolerated the procedure well with no immediate complications  Laryngoscopy:     Areas examined:  Nasopharynx, oropharynx, hypopharynx, larynx, vocal cords and nasal cavities    Laryngoscope size:  4 mm  Nose Intranasal:      Mucosa no polyps     No mucosa lesions present     No septum gross deformity     Turbinates not enlarged  Nasopharynx:      No mucosa lesions     Adenoids not present     Posterior choanae patent     Eustachian tube patent  Larynx/hypopharynx:      No epiglottis lesions     No epiglottis edema     No AE folds lesions     No vocal cord polyps     Equal and normal bilateral     No hypopharynx lesions     No piriform sinus pooling     No piriform sinus lesions     No post cricoid edema     No post cricoid erythema     Sinuses all patent and clear, only scant mucus.  No polyps or purulence.  Larynx wnl, with normal right tonsil lightly contacting tip of epiglottis.

## 2018-03-20 NOTE — PROGRESS NOTES
"Michela Franco presented for a  Medicare AWV and comprehensive Health Risk Assessment today. The following components were reviewed and updated:    · Medical history  · Family History  · Social history  · Allergies and Current Medications  · Health Risk Assessment  · Health Maintenance  · Care Team     ** See Completed Assessments for Annual Wellness Visit within the encounter summary.**       The following assessments were completed:  · Living Situation  · CAGE  · Depression Screening  · Timed Get Up and Go  · Whisper Test  · Cognitive Function Screening  ·   ·   · Nutrition Screening  · ADL Screening  · PAQ Screening    Vitals:    03/20/18 0809   BP: 110/64   Pulse: 67   SpO2: 99%   Weight: 94.1 kg (207 lb 8 oz)   Height: 5' 5" (1.651 m)     Body mass index is 34.53 kg/m².  Physical Exam   Constitutional: She is oriented to person, place, and time. She appears well-developed and well-nourished.   HENT:   Head: Normocephalic and atraumatic.   Nose: Nose normal.   Eyes: Conjunctivae and EOM are normal.   Cardiovascular: Normal rate, regular rhythm, normal heart sounds and intact distal pulses.    No murmur heard.  Pulmonary/Chest: Effort normal. No respiratory distress.   Musculoskeletal: Normal range of motion.   Neurological: She is alert and oriented to person, place, and time.   Skin: Skin is warm and dry.   Psychiatric: Her behavior is normal. Judgment and thought content normal. Her affect is blunt.   Nursing note and vitals reviewed.        Diagnoses and health risks identified today and associated recommendations/orders:    1. Encounter for preventive health examination  Assessment performed. Health maintenance updated. Chart review completed.  Chart review does not indicate COPD diagnosis.    2. Aortic atherosclerosis  Noted on imaging. Stable with current regimen. Followed by PCP.    3. Adrenal abnormality  Stable. Chronic. Followed by PCP.    4. ABPA (allergic bronchopulmonary aspergillosis)  Chronic. " Continue treatment as instructed by Pulmonology. Followed by Pulmonology.    5. Chronic pansinusitis  Chronic. Continue treatment as instructed by Pulmonology. Followed by Pulmonology, Evaluated by Hematology Oncology and ENT.    6. Recurrent upper respiratory infection (URI)  Chronic. Continue treatment as instructed by Pulmonology. Followed by Pulmonology and ENT.    7. Moderate persistent asthma without complication  Stable. Patient reports that she was initially thought to have asthma but was told she does not. Current workup with pulmonology.    8. Essential hypertension  Chronic. Stable on current regimen. Followed by PCP.    9. Vitamin D deficiency  Chronic. Stable on current regimen. Followed by PCP.    10. Gastroesophageal reflux disease, esophagitis presence not specified  Chronic. Stable with current regimen. Followed by PCP.    11. History of colon polyps  Stable. Last colonoscopy 2014. Repeat due in 3 years (2017)  Will discuss with PCP.    12. Osteoporosis, unspecified osteoporosis type, unspecified pathological fracture presence  Chronic. Stable with current regimen. Followed by PCP.    13. Meralgia paresthetica, unspecified laterality  Chronic. Stable on current regimen. Followed by PCP and Neurology..  Right leg numbness. Previously treated with Keppra.    14. Calcified granuloma of lung  Noted on imaging. Continue current regimen as outlined by Pulmonology. Followed by Stable.    15. BMI 34.0-34.9,adult  Does not have a consistent exercise regimen. Does yard work on the weekends and  Walks occasionally at work on lunch break. Encouragement provided. 30 minutes 5X weekly is recommended. Followed by PCP.      Provided Evelena with a 5-10 year written screening schedule and personal prevention plan. Recommendations were developed using the USPSTF age appropriate recommendations. Education, counseling, and referrals were provided as needed. After Visit Summary printed and given to patient which  includes a list of additional screenings\tests needed.    Follow-up for follow up with Primary Care Provider as instructed, ;sooner if problems, HRA in 1 year.    KASIE Cabrales

## 2018-03-20 NOTE — PROGRESS NOTES
Subjective:      Michela is a 72 y.o. female who comes for follow-up of sinusitis.  Her last visit with me was on 12/20/2017.  Now 6 months status-post endoscopic sinus surgery.   Doing very well, breathing great, able to blow nose, minimal postnasal drip, no facial pressure or discharge.  Using budesonide rinse BID.  Dymista was not covered by insurance.    SNOT-22 score = 10, NOSE score = 5%    The patient's medications, allergies, past medical, surgical, social and family histories were reviewed and updated as appropriate.    A detailed review of systems was obtained with pertinent positives as per the above HPI, and otherwise negative.        Objective:     /73   Pulse 70        Constitutional:   She appears well-developed. She is cooperative.     Head:  Normocephalic.     Nose:  No mucosal edema, rhinorrhea, septal deviation or polyps. No epistaxis. Turbinates normal, no turbinate masses and no turbinate hypertrophy.  Right sinus exhibits no maxillary sinus tenderness and no frontal sinus tenderness. Left sinus exhibits no maxillary sinus tenderness and no frontal sinus tenderness.     Mouth/Throat  Oropharynx clear and moist without lesions or asymmetry. No oropharyngeal exudate or posterior oropharyngeal erythema.     Neck:  No adenopathy. Normal range of motion present.     She has no cervical adenopathy.       Procedure    Flexible laryngoscopy performed.  See procedure note.     Left ethmoid     Left maxillary     Right ethmoid     Right maxillary     Larynx wnl with right tonsil contacting epiglottis        Data Reviewed    WBC (K/uL)   Date Value   02/16/2018 9.09     Eosinophil% (%)   Date Value   02/16/2018 1.3     Eos # (K/uL)   Date Value   02/16/2018 0.1     Platelets (K/uL)   Date Value   02/16/2018 327     Glucose (mg/dL)   Date Value   02/16/2018 86     IgE (IU/mL)   Date Value   01/23/2018 559 (H)       Pathology report indicated chronic inflammation with eosinophilia.            Assessment:     1. Chronic pansinusitis    2. Chronic rhinitis, unspecified type    3. Moderate persistent asthma without complication    4. Globus sensation         Plan:     Reduced budesonide rinse to once daily.  Reassurance about throat, normal tonsil appears to be contacting epiglottis.  Offered referral to second opinion, which she declines.  Follow-up in about 6 months (around 9/20/2018).

## 2018-03-23 ENCOUNTER — TELEPHONE (OUTPATIENT)
Dept: INFECTIOUS DISEASES | Facility: CLINIC | Age: 73
End: 2018-03-23

## 2018-03-23 RX ORDER — ITRACONAZOLE 100 MG/1
200 CAPSULE ORAL 2 TIMES DAILY
Qty: 120 CAPSULE | Refills: 0 | Status: SHIPPED | OUTPATIENT
Start: 2018-03-23 | End: 2018-04-22

## 2018-03-23 RX ORDER — ATOVAQUONE AND PROGUANIL HYDROCHLORIDE 250; 100 MG/1; MG/1
TABLET, FILM COATED ORAL
Qty: 4 TABLET | Refills: 0 | Status: SHIPPED | OUTPATIENT
Start: 2018-03-23 | End: 2018-05-28

## 2018-03-23 NOTE — TELEPHONE ENCOUNTER
----- Message from Franchesca Conklin MA sent at 3/23/2018  8:28 AM CDT -----  Contact: Michela tel:    [phone]   Please advise.  ----- Message -----  From: Concepción Bridgette  Sent: 3/23/2018   8:13 AM  To: SOHA Merino Dr.'s pt./   Needs several perscriptions sent  ATOVAQUONE / PROGUANIL  250/100mgs. / Getting ready to go to [place]. Leaving April 12.   CDC said to take it two weeks ahead of time. / also   Needs Azithromycin 500mgs. Only has enough for 1 dose/ VIvotif  ec capsules - took these 1 year ago,  Does she need to take these again?    Pharmacy: [facility] - tel: [phone]   Artem Drummond.  Needs to start taking all the pills on 3/29th.    Pls call to discuss this.

## 2018-04-03 ENCOUNTER — OFFICE VISIT (OUTPATIENT)
Dept: FAMILY MEDICINE | Facility: CLINIC | Age: 73
End: 2018-04-03
Payer: MEDICARE

## 2018-04-03 VITALS
RESPIRATION RATE: 20 BRPM | BODY MASS INDEX: 34.49 KG/M2 | DIASTOLIC BLOOD PRESSURE: 60 MMHG | HEIGHT: 65 IN | SYSTOLIC BLOOD PRESSURE: 120 MMHG | TEMPERATURE: 99 F | WEIGHT: 207 LBS

## 2018-04-03 DIAGNOSIS — R60.0 BILATERAL LEG EDEMA: Primary | ICD-10-CM

## 2018-04-03 LAB
BACTERIA #/AREA URNS AUTO: NORMAL /HPF
BILIRUB UR QL STRIP: NEGATIVE
CLARITY UR REFRACT.AUTO: CLEAR
COLOR UR AUTO: ABNORMAL
GLUCOSE UR QL STRIP: NEGATIVE
HGB UR QL STRIP: NEGATIVE
KETONES UR QL STRIP: NEGATIVE
LEUKOCYTE ESTERASE UR QL STRIP: ABNORMAL
MICROSCOPIC COMMENT: NORMAL
NITRITE UR QL STRIP: NEGATIVE
PH UR STRIP: 7 [PH] (ref 5–8)
PROT UR QL STRIP: NEGATIVE
RBC #/AREA URNS AUTO: 0 /HPF (ref 0–4)
SP GR UR STRIP: 1.01 (ref 1–1.03)
SQUAMOUS #/AREA URNS AUTO: 5 /HPF
URN SPEC COLLECT METH UR: ABNORMAL
UROBILINOGEN UR STRIP-ACNC: NEGATIVE EU/DL
WBC #/AREA URNS AUTO: 2 /HPF (ref 0–5)

## 2018-04-03 PROCEDURE — 81001 URINALYSIS AUTO W/SCOPE: CPT

## 2018-04-03 PROCEDURE — 99214 OFFICE O/P EST MOD 30 MIN: CPT | Mod: S$GLB,,, | Performed by: INTERNAL MEDICINE

## 2018-04-03 PROCEDURE — 3078F DIAST BP <80 MM HG: CPT | Mod: CPTII,S$GLB,, | Performed by: INTERNAL MEDICINE

## 2018-04-03 PROCEDURE — 3074F SYST BP LT 130 MM HG: CPT | Mod: CPTII,S$GLB,, | Performed by: INTERNAL MEDICINE

## 2018-04-03 PROCEDURE — 99999 PR PBB SHADOW E&M-EST. PATIENT-LVL III: CPT | Mod: PBBFAC,,, | Performed by: INTERNAL MEDICINE

## 2018-04-03 PROCEDURE — 99499 UNLISTED E&M SERVICE: CPT | Mod: S$PBB,,, | Performed by: INTERNAL MEDICINE

## 2018-04-03 NOTE — PROGRESS NOTES
Subjective:        Patient ID: Michela Franco is a 72 y.o. female.    Chief Complaint: Joint Swelling (bilateral)    HPI   Michela Franco presents with c/o bilateral ankle swelling x 1 month.  Swelling is equal in both legs, worse at the end of the day and better in the morning after waking up or after elevating her legs.  Pt is a  and is either sitting at her desk or walking around.  She denies chest pain, orthopnea, rash, pain associated with the swelling.  Pt has sleep apnea and sleeps with CPAP at night and has asthma so occasional SOB.  She denies recent changes in her medications or doses, changes to diet.  She has been on Prednisone long term.     Pt is leaving later this week to see [place] kids in [place] and then leaving next week for a 2 week trip to [facility].    Review of Systems  as per [facility]      Objective:        Vitals:    04/03/18 1019   BP: 120/60   Resp: 20   Temp: 98.5 °F (36.9 °C)     Physical Exam   Constitutional: She is oriented to person, place, and time. She appears well-developed and well-nourished. No distress.   Cardiovascular: Normal rate, regular rhythm and normal heart sounds.    No murmur heard.  Pulmonary/Chest: Effort normal and breath sounds normal. No respiratory distress. She has no wheezes. She has no rales.   Musculoskeletal: She exhibits edema (trace pitting in b/l LEs up to mid-tibial region). She exhibits no tenderness.   Neurological: She is alert and oriented to person, place, and time.   Skin: Skin is warm and dry. No rash noted. No erythema.   Psychiatric: She has a normal mood and affect. Her behavior is normal.   Vitals reviewed.      Lab results from 2/2018 reviewed    Assessment:         1. Bilateral leg edema              Plan:         Michela was seen today for joint swelling.    Diagnoses and all orders for this visit:    Bilateral leg edema: Recent labs show normal renal function.  No hx of liver dz.  Will check UA and echo to r/o proteinuria and CHF.   Swelling likely 2/2 venous insufficiency.  - Recommend pt wear compression stockings (pt will get OTC, declined Rx as she doesn't think she'll have time to get these before she travels) when sitting or standing for long periods, also for long distance travel.  Get up and walk around or flex legs in place to help with venous return.  -     Urinalysis  -     2D Echo w/ Color Flow Doppler; Future        Patient Instructions   For long plane flights or car rides:  - over the counter compression stockings  - drink plenty of water  - get up and walk around or pump/flex your feet in your seat

## 2018-04-03 NOTE — PATIENT INSTRUCTIONS
For long plane flights or car rides:  - over the counter compression stockings  - drink plenty of water  - get up and walk around or pump/flex your feet in your seat

## 2018-04-04 ENCOUNTER — TELEPHONE (OUTPATIENT)
Dept: FAMILY MEDICINE | Facility: CLINIC | Age: 73
End: 2018-04-04

## 2018-04-04 ENCOUNTER — TELEPHONE (OUTPATIENT)
Dept: PULMONOLOGY | Facility: CLINIC | Age: 73
End: 2018-04-04

## 2018-04-04 ENCOUNTER — CLINICAL SUPPORT (OUTPATIENT)
Dept: CARDIOLOGY | Facility: CLINIC | Age: 73
End: 2018-04-04
Attending: INTERNAL MEDICINE
Payer: MEDICARE

## 2018-04-04 DIAGNOSIS — I51.7 CARDIOMEGALY: ICD-10-CM

## 2018-04-04 DIAGNOSIS — B44.81 ALLERGIC BRONCHOPULMONARY ASPERGILLOSIS: ICD-10-CM

## 2018-04-04 DIAGNOSIS — R60.0 BILATERAL LEG EDEMA: ICD-10-CM

## 2018-04-04 DIAGNOSIS — J45.41 MODERATE PERSISTENT ASTHMA WITH ACUTE EXACERBATION: Primary | ICD-10-CM

## 2018-04-04 LAB
DIASTOLIC DYSFUNCTION: YES
ESTIMATED PA SYSTOLIC PRESSURE: 34.36
MITRAL VALVE REGURGITATION: ABNORMAL
RETIRED EF AND QEF - SEE NOTES: 55 (ref 55–65)
TRICUSPID VALVE REGURGITATION: ABNORMAL

## 2018-04-04 PROCEDURE — 93306 TTE W/DOPPLER COMPLETE: CPT | Mod: S$GLB,,, | Performed by: INTERNAL MEDICINE

## 2018-04-04 RX ORDER — BUDESONIDE AND FORMOTEROL FUMARATE DIHYDRATE 160; 4.5 UG/1; UG/1
2 AEROSOL RESPIRATORY (INHALATION) EVERY 12 HOURS
Qty: 1 INHALER | Refills: 3 | Status: SHIPPED | OUTPATIENT
Start: 2018-04-04 | End: 2018-05-01 | Stop reason: SDUPTHER

## 2018-04-04 RX ORDER — PREDNISONE 10 MG/1
10 TABLET ORAL DAILY
Qty: 60 TABLET | Refills: 1 | Status: SHIPPED | OUTPATIENT
Start: 2018-04-04 | End: 2018-05-28 | Stop reason: SDUPTHER

## 2018-04-04 NOTE — TELEPHONE ENCOUNTER
----- Message from Melyssa Rosario sent at 4/4/2018  9:23 AM CDT -----  Contact: PT  PT CALLED TWO WEEKS AGO   CALLED  TWICE THIS WEEK     NO RESPONSE     Pt is not happy about this     1)  Wants results from  Breathing tests   Ph [phone]   _____________________________________________________________    2) Needs to know about adjusting prednisone  Been on it a long time      Started 40mg   No down to 20mg  About 2 months         Needs  New instructions    And  NEW PRESCRIPTION if going down to 5mg     ___________________________________  3)            REFILL     90 DAY SUPPLY        Jairbrocit         JENNIE NICHOLSON ------- [facility]    Out of med     Thanks

## 2018-04-04 NOTE — TELEPHONE ENCOUNTER
----- Message from Ellen Mcrae sent at 4/4/2018  9:12 AM CDT -----  Contact: call pt at [phone]  Patient is returning a phone call.  Who left a message for the patient: tete  Does patient know what this is regarding:  Test results   Comments:

## 2018-04-05 ENCOUNTER — TELEPHONE (OUTPATIENT)
Dept: FAMILY MEDICINE | Facility: CLINIC | Age: 73
End: 2018-04-05

## 2018-04-05 DIAGNOSIS — J45.909 ASTHMA, UNSPECIFIED ASTHMA SEVERITY, UNSPECIFIED WHETHER COMPLICATED, UNSPECIFIED WHETHER PERSISTENT: Primary | ICD-10-CM

## 2018-04-05 DIAGNOSIS — J45.20 MILD INTERMITTENT ASTHMA WITHOUT COMPLICATION: ICD-10-CM

## 2018-04-05 DIAGNOSIS — I50.32 CHRONIC DIASTOLIC HEART FAILURE: Primary | ICD-10-CM

## 2018-04-05 NOTE — TELEPHONE ENCOUNTER
"Spoke with Ms. Franco by phone to discuss most recent test results and assess her progress.  Since reducing prednisone to 20 mg daily, she has continued to do very well.  She has only occasional cough and has improved exercise tolerance.  She is taking itraconazole, Symbicort, and prednisone as prescribed.  She feels so well that she is planning to travel to [facility] next week!!  As of February, her spirometry has improved but not all the way back to her 2014 "baseline" => post-bronchodilator spirometry is ~ the same as her baseline pre-bronchodilator study in 2014.    Okay to reduce prednisone to 10 mg daily, although she should take extra doses on her trip to [facility] in case she has steroid withdrawal or symptom relapse.  She will continue her other medications.    We will plan to see her in clinic in early June with pre-/post-bronchodilator spirometry at that visit.    I have sent refill prescriptions (for prednisone and Symbicort) to her preferred [facility] pharmacy.  I will also forward copies of my most recent clinic notes and the following tables to Dr. Omid Everett ([facility]) for his review.      PFTs 02/14/2014 03/28/2014 11/07/2017 01/22/2018 02/12/2018   FVC  (pre-BD) 1.88 liters 2.31 liters 1.86 liters 2.01 liters 2.21 liters   FVC%  62 76 63 71 75   FEV1 (pre-BD) 1.41 liters 1.78 liters 1.22 liters 1.39 liters 1.51 liters   FEV1%  59 75 53 63 66   FEV1/FVC  75 77 66 69 68   FEF 25-75  1.10 1.63 0.64 0.90 0.92   FEF 25-75%  48 71 29 42 42   FVC (post-BD) 2.07 liters     2.16 liters 2.43 liters   FVC% 68     76 82   FEV1 (post-BD) 1.61 liters     1.59 liters 1.85 liters   FEV1% 68     72 80   FEV1/FVC 78     74 78   FEF 25-75 1.34     1.17 1.50   FEF 25-75% 58     55 69   TLC      3.74 liters      TLC%      75      RV      1.91 liters      RV%      95      DLCO      14.5 mL/mmHg/min      DLCO%      80      [place]     3.69 liters      IVC                    2/20/2014 10:33 3/14/2014 10:22 4/8/2014 10:26 " 5/6/2014 10:25 6/3/2014 09:45 7/2/2014 16:07 4/1/2017 08:22 10/30/2017 16:06 1/23/2018 11:29   IgE 3,321 (H) 6,552 (H) 3,280 (H) 1,862 (H) 3914 (H) 2589 (H) 1476 (H) 1878 (H) 559 (H)        Asif Chowdhury MD  Pulmonary/Critical Care Medicine

## 2018-04-10 RX ORDER — ALBUTEROL SULFATE 90 UG/1
2 AEROSOL, METERED RESPIRATORY (INHALATION) EVERY 6 HOURS PRN
Qty: 54 G | Refills: 3 | Status: SHIPPED | OUTPATIENT
Start: 2018-04-10 | End: 2018-05-01 | Stop reason: SDUPTHER

## 2018-04-10 RX ORDER — FUROSEMIDE 20 MG/1
20 TABLET ORAL DAILY PRN
Qty: 30 TABLET | Refills: 3 | Status: SHIPPED | OUTPATIENT
Start: 2018-04-10 | End: 2018-05-01 | Stop reason: SDUPTHER

## 2018-04-10 NOTE — TELEPHONE ENCOUNTER
Pt returned call, reviewed echo results showing mild diastolic HF.  Recommend elevation, compression stockings and Lasix 20mg qd PRN leg swelling.  Refill of Albuterol also sent to [facility] per pt's request.  Pt has appt with PCP on 5/1/18.

## 2018-05-01 ENCOUNTER — OFFICE VISIT (OUTPATIENT)
Dept: FAMILY MEDICINE | Facility: CLINIC | Age: 73
End: 2018-05-01
Payer: MEDICARE

## 2018-05-01 VITALS
WEIGHT: 201.25 LBS | TEMPERATURE: 98 F | BODY MASS INDEX: 33.53 KG/M2 | SYSTOLIC BLOOD PRESSURE: 118 MMHG | DIASTOLIC BLOOD PRESSURE: 60 MMHG | HEIGHT: 65 IN | RESPIRATION RATE: 20 BRPM

## 2018-05-01 DIAGNOSIS — I50.32 CHRONIC DIASTOLIC HEART FAILURE: ICD-10-CM

## 2018-05-01 DIAGNOSIS — Z12.39 SCREENING BREAST EXAMINATION: ICD-10-CM

## 2018-05-01 DIAGNOSIS — K59.00 CONSTIPATION, UNSPECIFIED CONSTIPATION TYPE: ICD-10-CM

## 2018-05-01 DIAGNOSIS — I10 ESSENTIAL HYPERTENSION: ICD-10-CM

## 2018-05-01 DIAGNOSIS — R19.07 ABDOMINAL SWELLING, GENERALIZED: Primary | ICD-10-CM

## 2018-05-01 DIAGNOSIS — J45.41 MODERATE PERSISTENT ASTHMA WITH ACUTE EXACERBATION: ICD-10-CM

## 2018-05-01 DIAGNOSIS — E78.5 HYPERLIPIDEMIA, UNSPECIFIED HYPERLIPIDEMIA TYPE: ICD-10-CM

## 2018-05-01 DIAGNOSIS — D36.9 TUBULAR ADENOMA: ICD-10-CM

## 2018-05-01 DIAGNOSIS — B44.81 ALLERGIC BRONCHOPULMONARY ASPERGILLOSIS: ICD-10-CM

## 2018-05-01 DIAGNOSIS — I51.89 DIASTOLIC DYSFUNCTION: ICD-10-CM

## 2018-05-01 DIAGNOSIS — E87.8 ELECTROLYTE ABNORMALITY: ICD-10-CM

## 2018-05-01 DIAGNOSIS — J45.20 MILD INTERMITTENT ASTHMA WITHOUT COMPLICATION: ICD-10-CM

## 2018-05-01 PROCEDURE — G0101 CA SCREEN;PELVIC/BREAST EXAM: HCPCS | Mod: S$GLB,,, | Performed by: FAMILY MEDICINE

## 2018-05-01 PROCEDURE — 3078F DIAST BP <80 MM HG: CPT | Mod: CPTII,S$GLB,, | Performed by: FAMILY MEDICINE

## 2018-05-01 PROCEDURE — 3074F SYST BP LT 130 MM HG: CPT | Mod: CPTII,S$GLB,, | Performed by: FAMILY MEDICINE

## 2018-05-01 PROCEDURE — 99999 PR PBB SHADOW E&M-EST. PATIENT-LVL III: CPT | Mod: PBBFAC,,, | Performed by: FAMILY MEDICINE

## 2018-05-01 PROCEDURE — 99397 PER PM REEVAL EST PAT 65+ YR: CPT | Mod: 25,S$GLB,, | Performed by: FAMILY MEDICINE

## 2018-05-01 PROCEDURE — 99499 UNLISTED E&M SERVICE: CPT | Mod: S$PBB,,, | Performed by: FAMILY MEDICINE

## 2018-05-01 RX ORDER — BUDESONIDE AND FORMOTEROL FUMARATE DIHYDRATE 160; 4.5 UG/1; UG/1
2 AEROSOL RESPIRATORY (INHALATION) EVERY 12 HOURS
Qty: 1 INHALER | Refills: 3 | Status: SHIPPED | OUTPATIENT
Start: 2018-05-01 | End: 2018-05-28

## 2018-05-01 RX ORDER — AMLODIPINE BESYLATE 10 MG/1
TABLET ORAL
Qty: 90 TABLET | Refills: 3 | Status: SHIPPED | OUTPATIENT
Start: 2018-05-01 | End: 2018-12-18 | Stop reason: SDUPTHER

## 2018-05-01 RX ORDER — POTASSIUM CHLORIDE 20 MEQ/1
40 TABLET, EXTENDED RELEASE ORAL DAILY
Qty: 180 TABLET | Refills: 3 | Status: SHIPPED | OUTPATIENT
Start: 2018-05-01 | End: 2020-07-16 | Stop reason: SDUPTHER

## 2018-05-01 RX ORDER — ALBUTEROL SULFATE 90 UG/1
2 AEROSOL, METERED RESPIRATORY (INHALATION) EVERY 6 HOURS PRN
Qty: 54 G | Refills: 3 | Status: SHIPPED | OUTPATIENT
Start: 2018-05-01 | End: 2019-09-20

## 2018-05-01 RX ORDER — TIOTROPIUM BROMIDE INHALATION SPRAY 3.12 UG/1
2 SPRAY, METERED RESPIRATORY (INHALATION) DAILY
Qty: 4 G | Refills: 5 | Status: SHIPPED | OUTPATIENT
Start: 2018-05-01 | End: 2018-07-11

## 2018-05-01 RX ORDER — ROSUVASTATIN CALCIUM 5 MG/1
5 TABLET, COATED ORAL DAILY
Qty: 90 TABLET | Refills: 3 | Status: SHIPPED | OUTPATIENT
Start: 2018-05-01 | End: 2019-03-18 | Stop reason: SDUPTHER

## 2018-05-01 RX ORDER — LOSARTAN POTASSIUM 25 MG/1
25 TABLET ORAL DAILY
Qty: 90 TABLET | Refills: 3 | Status: SHIPPED | OUTPATIENT
Start: 2018-05-01 | End: 2018-05-01 | Stop reason: SDUPTHER

## 2018-05-01 RX ORDER — TIOTROPIUM BROMIDE INHALATION SPRAY 3.12 UG/1
SPRAY, METERED RESPIRATORY (INHALATION)
COMMUNITY
Start: 2018-04-03 | End: 2018-05-01 | Stop reason: SDUPTHER

## 2018-05-01 RX ORDER — FUROSEMIDE 20 MG/1
20 TABLET ORAL 2 TIMES DAILY PRN
Qty: 60 TABLET | Refills: 3 | Status: SHIPPED | OUTPATIENT
Start: 2018-05-01 | End: 2018-08-23 | Stop reason: SDUPTHER

## 2018-05-01 RX ORDER — LOSARTAN POTASSIUM 25 MG/1
25 TABLET ORAL DAILY
Qty: 90 TABLET | Refills: 3 | Status: SHIPPED | OUTPATIENT
Start: 2018-05-01 | End: 2018-12-18 | Stop reason: SDUPTHER

## 2018-05-01 RX ORDER — LACTULOSE 10 G/15ML
20 SOLUTION ORAL DAILY PRN
Qty: 300 ML | Refills: 0 | Status: SHIPPED | OUTPATIENT
Start: 2018-05-01 | End: 2018-05-11

## 2018-05-01 RX ORDER — FUROSEMIDE 20 MG/1
20 TABLET ORAL 2 TIMES DAILY PRN
Qty: 60 TABLET | Refills: 3 | Status: SHIPPED | OUTPATIENT
Start: 2018-05-01 | End: 2018-05-01 | Stop reason: SDUPTHER

## 2018-05-01 NOTE — PROGRESS NOTES
Michela Franco is a 72 y.o. female   Routine physical  Source of history: Patient  Past Medical History:   Diagnosis Date    Allergy     Chronic diastolic heart failure 4/5/2018    Hyperlipidemia     Hypertension     Neuromuscular disorder     JOHN on CPAP     Osteoporosis     Recurrent sinusitis 3/25/2014    Recurrent upper respiratory infection (URI)     Urticaria      Patient  reports that she quit smoking about 13 years ago. Her smoking use included Cigarettes. She has a 10.00 pack-year smoking history. She has never used smokeless tobacco. She reports that she drinks alcohol. She reports that she does not use drugs.  Family History   Problem Relation Age of Onset    No Known Problems Mother      healthy in 90s    No Known Problems Father      accident-related death in 50s    Kidney cancer Sister     Cancer Sister      renal    Hypertension Daughter     No Known Problems Sister     Hypertension Daughter      ROS:  GENERAL: No fever, chills, fatigability or weight loss.  SKIN: No rashes, itching or changes in color or texture of skin.  HEAD: No headaches or recent head trauma.  EYES: Visual acuity fine. No photophobia, ocular pain or diplopia.  EARS: Denies ear pain, discharge or vertigo.  NOSE: No loss of smell, no epistaxis or postnasal drip.  MOUTH & THROAT: No hoarseness or change in voice. No excessive gum bleeding.  NODES: Denies swollen glands.  CHEST: Denies SHAH, cyanosis, wheezing, cough and sputum production.  CARDIOVASCULAR: Denies chest pain, PND, orthopnea or reduced exercise tolerance.  ABDOMEN: Appetite fine. No weight loss. Denies diarrhea, abdominal pain, hematemesis or blood in stool.  URINARY: No flank pain, dysuria or hematuria.  PERIPHERAL VASCULAR: No claudication or cyanosis.  MUSCULOSKELETAL: No joint stiffness or swelling. Denies back pain.  NEUROLOGIC: No history of seizures, paralysis, alteration of gait or coordination.    OBJECTIVE:  APPEARANCE: overweight no acute  distress  Vitals:    05/01/18 0819   BP: 118/60   Resp: 20   Temp: 97.8 °F (36.6 °C)     SKIN: Normal skin turgor, no lesions.  HEENT: Both external auditory canals clear. Both tympanic membranes intact. PERRL. EOMI. Disk margins sharp. No tonsillar enlargement. No pharyngeal erythema or exudate. No stridor.  NECK: No bruits. No cervical spine tenderness. No cervical lymphadenopathy. No thyromegaly.  NODES: No cervical, axillary or inguinal lymph node enlargement.  CHEST: Breath sounds clear bilaterally. Lungs clear to auscultation & percussion. Good air movement. No rales. No retractions. No rhonchi. No stridor. No wheezes.  CARDIOVASCULAR: Normal S1, S2. No murmurs. No edema.  BREASTS: no masses palpated in either breast or axillary area, symmetry noted.  ABDOMEN: Bowel sounds normal. No palpable aortic enlargement. No CVA tenderness. No pulsatile mass. No rebound tenderness.  Significant diaphasis, fluid wave exhibited.  PERIPHERAL VASCULAR: Femoral pulses present and symmetrical. No edema.  MUSCULOSKELETAL: Degenerative changes of both ankles, foot, knee, wrist and hand.  BACK: No CVA tenderness. There is no spasm, tenderness or radiculopathy noted with palpation and there is full range of motion.   NEUROLOGIC:   Cranial Nerves: II-XII grossly intact.  Motor: 5/5 strength major flexors/extensors. No tremor.  DTR's: Knees, Ankles 2+ and equal bilaterally; downgoing toes.  Sensory: Intact to light touch distally.  Gait & Posture: Normal gait and fine motion. No cerebellar signs.  MENTAL STATUS: Alert. Oriented x 3. Language skills normal. Memory intact. No suicidal ideation.  Well kept appearance.    ASSESSMENT/PLAN:   Michela was seen today for annual exam.    Diagnoses and all orders for this visit:    Abdominal swelling, generalized  -     US Abdomen Complete; Future    Electrolyte abnormality  -     potassium chloride SA (K-DUR,KLOR-CON) 20 MEQ tablet; Take 2 tablets (40 mEq total) by mouth once  daily.    Essential hypertension  -     amLODIPine (NORVASC) 10 MG tablet; TAKE 1 TABLET ONE TIME DAILY    Mild intermittent asthma without complication  -     SPIRIVA RESPIMAT 2.5 mcg/actuation Mist; Inhale 2 puffs into the lungs once daily.  -     albuterol 90 mcg/actuation inhaler; Inhale 2 puffs into the lungs every 6 (six) hours as needed for Wheezing.    Moderate persistent asthma with acute exacerbation  -     budesonide-formoterol 160-4.5 mcg (SYMBICORT) 160-4.5 mcg/actuation HFAA; Inhale 2 puffs into the lungs every 12 (twelve) hours. Controller    Allergic bronchopulmonary aspergillosis  -     budesonide-formoterol 160-4.5 mcg (SYMBICORT) 160-4.5 mcg/actuation HFAA; Inhale 2 puffs into the lungs every 12 (twelve) hours. Controller    Screening breast examination  -     Mammo Digital Screening Bilateral With CAD; Future    Diastolic dysfunction  -     Discontinue: losartan (COZAAR) 25 MG tablet; Take 1 tablet (25 mg total) by mouth once daily.  -     losartan (COZAAR) 25 MG tablet; Take 1 tablet (25 mg total) by mouth once daily.    Chronic diastolic heart failure  -     Discontinue: furosemide (LASIX) 20 MG tablet; Take 1 tablet (20 mg total) by mouth 2 (two) times daily as needed (leg swelling).  -     furosemide (LASIX) 20 MG tablet; Take 1 tablet (20 mg total) by mouth 2 (two) times daily as needed (leg swelling).    Hyperlipidemia, unspecified hyperlipidemia type  -     rosuvastatin (CRESTOR) 5 MG tablet; Take 1 tablet (5 mg total) by mouth once daily.  -     Lipid panel; Future  -     Hepatic function panel; Future    Constipation, unspecified constipation type  -     lactulose (CHRONULAC) 20 gram/30 mL Soln; Take 30 mLs (20 g total) by mouth daily as needed.    Tubular adenoma  -     Case request GI: COLONOSCOPY    labs discussed at this appt.

## 2018-05-07 DIAGNOSIS — Z86.010 ENCOUNTER FOR COLONOSCOPY DUE TO HISTORY OF COLONIC POLYP: Primary | ICD-10-CM

## 2018-05-07 DIAGNOSIS — Z12.11 ENCOUNTER FOR COLONOSCOPY DUE TO HISTORY OF COLONIC POLYP: Primary | ICD-10-CM

## 2018-05-07 RX ORDER — POLYETHYLENE GLYCOL 3350, SODIUM SULFATE ANHYDROUS, SODIUM BICARBONATE, SODIUM CHLORIDE, POTASSIUM CHLORIDE 236; 22.74; 6.74; 5.86; 2.97 G/4L; G/4L; G/4L; G/4L; G/4L
4 POWDER, FOR SOLUTION ORAL ONCE
Qty: 4000 ML | Refills: 0 | Status: SHIPPED | OUTPATIENT
Start: 2018-05-07 | End: 2018-05-07

## 2018-05-18 ENCOUNTER — ANESTHESIA (OUTPATIENT)
Dept: ENDOSCOPY | Facility: HOSPITAL | Age: 73
End: 2018-05-18
Payer: MEDICARE

## 2018-05-18 ENCOUNTER — SURGERY (OUTPATIENT)
Age: 73
End: 2018-05-18

## 2018-05-18 ENCOUNTER — HOSPITAL ENCOUNTER (OUTPATIENT)
Facility: HOSPITAL | Age: 73
Discharge: HOME | End: 2018-05-18
Attending: INTERNAL MEDICINE | Admitting: INTERNAL MEDICINE
Payer: MEDICARE

## 2018-05-18 ENCOUNTER — ANESTHESIA EVENT (OUTPATIENT)
Dept: ENDOSCOPY | Facility: HOSPITAL | Age: 73
End: 2018-05-18
Payer: MEDICARE

## 2018-05-18 VITALS
BODY MASS INDEX: 34.49 KG/M2 | TEMPERATURE: 98 F | RESPIRATION RATE: 18 BRPM | SYSTOLIC BLOOD PRESSURE: 145 MMHG | OXYGEN SATURATION: 99 % | WEIGHT: 207 LBS | DIASTOLIC BLOOD PRESSURE: 68 MMHG | HEIGHT: 65 IN | HEART RATE: 76 BPM

## 2018-05-18 DIAGNOSIS — Z12.11 COLON CANCER SCREENING: Primary | ICD-10-CM

## 2018-05-18 DIAGNOSIS — Z12.11 SCREENING FOR MALIGNANT NEOPLASM OF COLON: ICD-10-CM

## 2018-05-18 PROCEDURE — 88305 TISSUE EXAM BY PATHOLOGIST: CPT | Mod: 59 | Performed by: PATHOLOGY

## 2018-05-18 PROCEDURE — 45380 COLONOSCOPY AND BIOPSY: CPT | Performed by: INTERNAL MEDICINE

## 2018-05-18 PROCEDURE — 25000003 PHARM REV CODE 250: Performed by: INTERNAL MEDICINE

## 2018-05-18 PROCEDURE — 37000008 HC ANESTHESIA 1ST 15 MINUTES: Performed by: INTERNAL MEDICINE

## 2018-05-18 PROCEDURE — 88305 TISSUE EXAM BY PATHOLOGIST: CPT | Mod: 26,,, | Performed by: PATHOLOGY

## 2018-05-18 PROCEDURE — 37000009 HC ANESTHESIA EA ADD 15 MINS: Performed by: INTERNAL MEDICINE

## 2018-05-18 PROCEDURE — 27201012 HC FORCEPS, HOT/COLD, DISP: Performed by: INTERNAL MEDICINE

## 2018-05-18 PROCEDURE — E9220 PRA ENDO ANESTHESIA: HCPCS | Mod: PT,,, | Performed by: NURSE ANESTHETIST, CERTIFIED REGISTERED

## 2018-05-18 PROCEDURE — 45380 COLONOSCOPY AND BIOPSY: CPT | Mod: PT,,, | Performed by: INTERNAL MEDICINE

## 2018-05-18 PROCEDURE — 63600175 PHARM REV CODE 636 W HCPCS: Performed by: NURSE ANESTHETIST, CERTIFIED REGISTERED

## 2018-05-18 RX ORDER — PROPOFOL 10 MG/ML
VIAL (ML) INTRAVENOUS
Status: DISCONTINUED | OUTPATIENT
Start: 2018-05-18 | End: 2018-05-18

## 2018-05-18 RX ORDER — PROPOFOL 10 MG/ML
VIAL (ML) INTRAVENOUS CONTINUOUS PRN
Status: DISCONTINUED | OUTPATIENT
Start: 2018-05-18 | End: 2018-05-18

## 2018-05-18 RX ORDER — SODIUM CHLORIDE 9 MG/ML
INJECTION, SOLUTION INTRAVENOUS CONTINUOUS
Status: DISCONTINUED | OUTPATIENT
Start: 2018-05-18 | End: 2018-05-18 | Stop reason: HOSPADM

## 2018-05-18 RX ORDER — LIDOCAINE HCL/PF 100 MG/5ML
SYRINGE (ML) INTRAVENOUS
Status: DISCONTINUED | OUTPATIENT
Start: 2018-05-18 | End: 2018-05-18

## 2018-05-18 RX ADMIN — SODIUM CHLORIDE: 9 INJECTION, SOLUTION INTRAVENOUS at 08:05

## 2018-05-18 RX ADMIN — PROPOFOL 80 MG: 10 INJECTION, EMULSION INTRAVENOUS at 09:05

## 2018-05-18 RX ADMIN — LIDOCAINE HYDROCHLORIDE 50 MG: 20 INJECTION, SOLUTION INTRAVENOUS at 09:05

## 2018-05-18 RX ADMIN — PROPOFOL 20 MG: 10 INJECTION, EMULSION INTRAVENOUS at 09:05

## 2018-05-18 RX ADMIN — PROPOFOL 140 MCG/KG/MIN: 10 INJECTION, EMULSION INTRAVENOUS at 09:05

## 2018-05-18 NOTE — ANESTHESIA POSTPROCEDURE EVALUATION
"Anesthesia Post Evaluation    Patient: Michela Franco    Procedure(s) Performed: Procedure(s) (LRB):  COLONOSCOPY (N/A)    Final Anesthesia Type: general  Patient location during evaluation: PACU  Patient participation: Yes- Able to Participate  Level of consciousness: awake and alert and oriented  Post-procedure vital signs: reviewed and stable  Pain management: adequate  Airway patency: patent  PONV status at discharge: No PONV  Anesthetic complications: no      Cardiovascular status: stable  Respiratory status: unassisted, spontaneous ventilation and room air  Hydration status: euvolemic  Follow-up not needed.        Visit Vitals  BP (!) 145/68 (BP Location: Left arm, Patient Position: Lying)   Pulse 76   Temp 36.5 °C (97.7 °F) (Temporal)   Resp 18   Ht 5' 5" (1.651 m)   Wt 93.9 kg (207 lb)   SpO2 99%   Breastfeeding? No   BMI 34.45 kg/m²       Pain/Lee Ann Score: Pain Assessment Performed: Yes (5/18/2018 10:48 AM)  Presence of Pain: denies (5/18/2018 10:48 AM)  Lee Ann Score: 10 (5/18/2018 10:33 AM)      "

## 2018-05-18 NOTE — [employer] PATIENT INSTRUCTIONS
Discharge Summary/Instructions after an Endoscopic Procedure  Patient Name: Michela Franco  Patient MRN: [id number]  Patient YOB: 1945  Friday, May 18, 2018  Calixto Brian MD  RESTRICTIONS:  During your procedure today, you received medications for sedation.  These   medications may affect your judgment, balance and coordination.  Therefore,   for 24 hours, you have the following restrictions:   - DO NOT drive a car, operate machinery, make legal/financial decisions,   sign important papers or drink alcohol.    ACTIVITY:  The following day: return to full activity including work, except no heavy   lifting, straining or running for 3 days if polyps were removed.  DIET:  Eat and drink normally unless instructed otherwise.     TREATMENT FOR COMMON SIDE EFFECTS:  - Mild abdominal pain, nausea, belching, bloating or excessive gas:  rest,   eat lightly and use a heating pad.  - Sore Throat: treat with throat lozenges and/or gargle with warm salt   water.  - Because air was used during the procedure, expelling large amounts of air   from your rectum or belching is normal.  - If a bowel prep was taken, you may not have a bowel movement for 1-3 days.    This is normal.  SYMPTOMS TO WATCH FOR AND REPORT TO YOUR PHYSICIAN:  1. Abdominal pain or bloating, other than gas cramps.  2. Chest pain.  3. Back pain.  4. Signs of infection such as: chills or fever occurring within 24 hours   after the procedure.  5. Rectal bleeding, which would show as bright red, maroon, or black stools.   (A tablespoon of blood from the rectum is not serious, especially if   hemorrhoids are present.)  6. Vomiting.  7. Weakness or dizziness.  GO DIRECTLY TO THE NEAREST EMERGENCY ROOM IF YOU HAVE ANY OF THE FOLLOWING:      Difficulty breathing              Chills and/or fever over 101 F   Persistent vomiting and/or vomiting blood   Severe abdominal pain   Severe chest pain   Black, tarry stools   Bleeding- more than one tablespoon   Any  other symptom or condition that you feel may need urgent attention  Your doctor recommends these additional instructions:  If any biopsies were taken, your doctors clinic will contact you in 1 to 2   weeks with any results.  - Patient has a contact number available for emergencies.  The signs and   symptoms of potential delayed complications were discussed with the   patient.  Return to normal activities tomorrow.  Written discharge   instructions were provided to the patient.   - Discharge patient to home.   - Resume previous diet.   - Await pathology results.   - Repeat colonoscopy in 5 years for surveillance.   - Continue present medications.  For questions, problems or results please call your physician - Calixto Brian MD at Work:  [phone].  [facility], EMERGENCY ROOM PHONE NUMBER: [phone]  IF A COMPLICATION OR EMERGENCY SITUATION ARISES AND YOU ARE UNABLE TO REACH   YOUR PHYSICIAN - GO DIRECTLY TO THE EMERGENCY ROOM.  Calixto Brian MD  5/18/2018 10:21:12 AM  This report has been verified and signed electronically.  [employer]

## 2018-05-18 NOTE — ANESTHESIA PREPROCEDURE EVALUATION
05/18/2018  Michela Franco is a 72 y.o., female.  Patient Active Problem List   Diagnosis    Hypokalemia    HTN (hypertension)    GERD (gastroesophageal reflux disease)    DJD (degenerative joint disease)    Osteoporosis    Moderate persistent asthma without complication    Chronic rhinitis    History of colon polyps    Anemia    Vitamin D deficiency    Aortic atherosclerosis    Adrenal abnormality    Allergic rhinitis due to other allergen    Chronic pansinusitis    Recurrent upper respiratory infection (URI)    Allergic rhinitis due to animal (cat) (dog) hair and dander    Nose discharge    Nasal turbinate hypertrophy    Chronic cough    ABPA (allergic bronchopulmonary aspergillosis)    Meralgia paraesthetica    Calcified granuloma of lung    BMI 34.0-34.9,adult    Chronic diastolic heart failure    Screening for malignant neoplasm of colon     Past Medical History:   Diagnosis Date    Allergy     Chronic diastolic heart failure 4/5/2018    Hyperlipidemia     Hypertension     Neuromuscular disorder     JOHN on CPAP     Osteoporosis     Recurrent sinusitis 3/25/2014    Recurrent upper respiratory infection (URI)     Urticaria    ECHO 4/2018:    1 - Eccentric hypertrophy.     2 - Normal left ventricular systolic function (EF 55-60%).     3 - Normal right ventricular systolic function .     4 - Impaired LV relaxation, normal LAP (grade 1 diastolic dysfunction).     5 - Moderate left atrial enlargement.     6 - The estimated PA systolic pressure is 34 mmHg.      Anesthesia Evaluation    I have reviewed the Patient Summary Reports.     I have reviewed the Medications.   Steroids Taken In Past Year:     Review of Systems  Anesthesia Hx:  No problems with previous Anesthesia Denies Hx of Anesthetic complications  Denies Family Hx of Anesthesia complications.   Denies Personal Hx  of Anesthesia complications.   Social:  Former Smoker    Hematology/Oncology:  Hematology Normal   Oncology Normal     EENT/Dental:EENT/Dental Normal   Cardiovascular:   Exercise tolerance: good Hypertension    Pulmonary:   Asthma Recent URI Sleep Apnea, CPAP    Renal/:  Renal/ Normal     Hepatic/GI:   Bowel Prep. GERD    Musculoskeletal:   Arthritis     Neurological:   Neuromuscular Disease,    Endocrine:  Endocrine Normal    Dermatological:  Skin Normal    Psych:  Psychiatric Normal           Physical Exam  General:  Well nourished    Airway/Jaw/Neck:  Airway Findings: Mouth Opening: Normal Tongue: Normal  General Airway Assessment: Adult  Mallampati: II  TM Distance: Normal, at least 6 cm  Jaw/Neck Findings:  Neck ROM: Normal ROM     Eyes/Ears/Nose:  EYES/EARS/NOSE FINDINGS: Normal   Dental:  Dental Findings: lower partial dentures, Upper Dentures   Chest/Lungs:  Chest/Lungs Findings: Clear to auscultation, Normal Respiratory Rate     Heart/Vascular:  Heart Findings: Rate: Normal  Sounds: Normal     Abdomen:  Abdomen Findings: Normal    Musculoskeletal:  Musculoskeletal Findings: Normal   Skin:  Skin Findings: Normal    Mental Status:  Mental Status Findings:  Cooperative, Alert and Oriented         Anesthesia Plan  Type of Anesthesia, risks & benefits discussed:  Anesthesia Type:  general  Patient's Preference: General  Intra-op Monitoring Plan: standard ASA monitors  Intra-op Monitoring Plan Comments:   Post Op Pain Control Plan:   Post Op Pain Control Plan Comments:   Induction:   IV  Beta Blocker:  Patient is not currently on a Beta-Blocker (No further documentation required).       Informed Consent: Patient understands risks and agrees with Anesthesia plan.  Questions answered. Anesthesia consent signed with patient.  ASA Score: 3     Day of Surgery Review of History & Physical: I have interviewed and examined the patient. I have reviewed the patient's H&P dated:  There are no significant changes.  H&P  update referred to the provider.     Anesthesia Plan Notes: NPO confirmed        Ready For Surgery From Anesthesia Perspective.

## 2018-05-18 NOTE — TRANSFER OF CARE
"Anesthesia Transfer of Care Note    Patient: Michela Franco    Procedure(s) Performed: Procedure(s) (LRB):  COLONOSCOPY (N/A)    Patient location: PACU    Anesthesia Type: general    Transport from OR: Transported from OR on 6-10 L/min O2 by face mask with adequate spontaneous ventilation    Post pain: adequate analgesia    Post assessment: no apparent anesthetic complications and tolerated procedure well    Post vital signs: stable    Level of consciousness: awake, alert and oriented    Nausea/Vomiting: no nausea/vomiting    Complications: none    Transfer of care protocol was followed      Last vitals:   Visit Vitals  BP (!) 116/55 (BP Location: Left arm, Patient Position: Lying)   Pulse 78   Temp 36.5 °C (97.7 °F) (Temporal)   Resp 18   Ht 5' 5" (1.651 m)   Wt 93.9 kg (207 lb)   SpO2 99%   Breastfeeding? No   BMI 34.45 kg/m²     "

## 2018-05-18 NOTE — INTERVAL H&P NOTE
The patient has been examined and the H&P has been reviewed:    There is no interval changes since last encounter.    Colonoscopy: Previous colon polyps  Sedation: GA  ASA: Per anesthesia  Mallampati: Per anesthesia    Endoscopy risks, benefits and alternative options discussed and understood by patient/family.          Active Hospital Problems    Diagnosis  POA    Screening for malignant neoplasm of colon [Z12.11]  Not Applicable      Resolved Hospital Problems    Diagnosis Date Resolved POA   No resolved problems to display.

## 2018-05-18 NOTE — DISCHARGE INSTRUCTIONS
Colonoscopy     A camera attached to a flexible tube with a viewing lens is used to take video pictures.     Colonoscopy is a test to view the inside of your lower digestive tract (colon and rectum). Sometimes it can show the last part of the small intestine (ileum). During the test, small pieces of tissue may be removed for testing. This is called a biopsy. Small growths, such as polyps, may also be removed.   Why is colonoscopy done?  The test is done to help look for colon cancer. And it can help find the source of abdominal pain, bleeding, and changes in bowel habits. It may be needed once a year, depending on factors such as your:  · Age  · Health history  · Family health history  · Symptoms  · Results from any prior colonoscopy  Risks and possible complications  These include:  · Bleeding               · A puncture or tear in the colon   · Risks of anesthesia  · A cancer lesion not being seen  Getting ready   To prepare for the test:  · Talk with your healthcare provider about the risks of the test (see below). Also ask your healthcare provider about alternatives to the test.  · Tell your healthcare provider about any medicines you take. Also tell him or her about any health conditions you may have.  · Make sure your rectum and colon are empty for the test. Follow the diet and bowel prep instructions exactly. If you dont, the test may need to be rescheduled.  · Plan for a friend or family member to drive you home after the test.     Colonoscopy provides an inside view of the entire colon.     You may discuss the results with your doctor right away or at a future visit.  During the test   The test is usually done in the hospital on an outpatient basis. This means you go home the same day. The procedure takes about 30 minutes. During that time:  · You are given relaxing (sedating) medicine through an IV line. You may be drowsy, or fully asleep.  · The healthcare provider will first give you a physical exam to  check for anal and rectal problems.  · Then the anus is lubricated and the scope inserted.  · If you are awake, you may have a feeling similar to needing to have a bowel movement. You may also feel pressure as air is pumped into the colon. Its OK to pass gas during the procedure.  · Biopsy, polyp removal, or other treatments may be done during the test.  After the test   You may have gas right after the test. It can help to try to pass it to help prevent later bloating. Your healthcare provider may discuss the results with you right away. Or you may need to schedule a follow-up visit to talk about the results. After the test, you can go back to your normal eating and other activities. You may be tired from the sedation and need to rest for a few hours.  Date Last Reviewed: 11/1/2016 © 2000-2017 [employer]. [address] [place] [address], [place] [address]. All rights reserved. This information is not intended as a substitute for professional medical care. Always follow your healthcare professional's instructions.

## 2018-05-20 RX ORDER — ITRACONAZOLE 100 MG/1
200 CAPSULE ORAL 2 TIMES DAILY
Qty: 120 CAPSULE | Refills: 0 | Status: SHIPPED | OUTPATIENT
Start: 2018-05-20 | End: 2018-06-19

## 2018-05-22 ENCOUNTER — HOSPITAL ENCOUNTER (OUTPATIENT)
Dept: RADIOLOGY | Facility: HOSPITAL | Age: 73
Discharge: HOME | End: 2018-05-22
Attending: FAMILY MEDICINE
Payer: MEDICARE

## 2018-05-22 DIAGNOSIS — R19.07 ABDOMINAL SWELLING, GENERALIZED: ICD-10-CM

## 2018-05-22 DIAGNOSIS — Z12.39 SCREENING BREAST EXAMINATION: ICD-10-CM

## 2018-05-22 PROCEDURE — 77063 BREAST TOMOSYNTHESIS BI: CPT | Mod: 26,,, | Performed by: RADIOLOGY

## 2018-05-22 PROCEDURE — 76700 US EXAM ABDOM COMPLETE: CPT | Mod: 26,,, | Performed by: RADIOLOGY

## 2018-05-22 PROCEDURE — 76700 US EXAM ABDOM COMPLETE: CPT | Mod: TC

## 2018-05-22 PROCEDURE — 77067 SCR MAMMO BI INCL CAD: CPT | Mod: TC

## 2018-05-22 PROCEDURE — 77067 SCR MAMMO BI INCL CAD: CPT | Mod: 26,,, | Performed by: RADIOLOGY

## 2018-05-23 ENCOUNTER — TELEPHONE (OUTPATIENT)
Dept: PULMONOLOGY | Facility: CLINIC | Age: 73
End: 2018-05-23

## 2018-05-23 NOTE — TELEPHONE ENCOUNTER
----- Message from Asif Chowdhury MD sent at 5/23/2018 10:38 AM CDT -----  Contact: Self  Yes.  The fellow will be with me that day.  Make sure she gets spirometry before the visit.  DET    ----- Message -----  From: Rosa Isela King  Sent: 5/23/2018   9:01 AM  To: MD Dr Luciana Hernandez is it ok to double book on this day you already have a 2 pm slot filled already  ----- Message -----  From: Asif Chowdhury MD  Sent: 5/22/2018   4:54 PM  To: Rosa Isela King    Please have her come in next Monday at 2:00 with pre/post-bronchodilator spirometry.  Thanks, DET    ----- Message -----  From: Rosa Isela King  Sent: 5/22/2018  11:26 AM  To: Asif Chowdhury MD    Spoken with pt stating she would like to come off the prednisone. Pt stated she would like a follow  Visit very soon  with you. Please advise   ----- Message -----  From: Jody Dale  Sent: 5/22/2018   8:15 AM  To: Luciana BELLO Staff    Pt is asking for a FU appt, add to waiting list. Pt says that her prednisone needs adjusting please call.

## 2018-05-25 ENCOUNTER — TELEPHONE (OUTPATIENT)
Dept: ENDOSCOPY | Facility: HOSPITAL | Age: 73
End: 2018-05-25

## 2018-05-28 ENCOUNTER — OFFICE VISIT (OUTPATIENT)
Dept: PULMONOLOGY | Facility: CLINIC | Age: 73
End: 2018-05-28
Payer: MEDICARE

## 2018-05-28 ENCOUNTER — HOSPITAL ENCOUNTER (OUTPATIENT)
Dept: PULMONOLOGY | Facility: CLINIC | Age: 73
Discharge: HOME | End: 2018-05-28
Payer: MEDICARE

## 2018-05-28 ENCOUNTER — LAB VISIT (OUTPATIENT)
Dept: LAB | Facility: HOSPITAL | Age: 73
End: 2018-05-28
Payer: MEDICARE

## 2018-05-28 VITALS
SYSTOLIC BLOOD PRESSURE: 134 MMHG | OXYGEN SATURATION: 98 % | HEIGHT: 65 IN | WEIGHT: 211 LBS | HEART RATE: 76 BPM | DIASTOLIC BLOOD PRESSURE: 72 MMHG | BODY MASS INDEX: 35.16 KG/M2

## 2018-05-28 DIAGNOSIS — J45.41 MODERATE PERSISTENT ASTHMA WITH ACUTE EXACERBATION: ICD-10-CM

## 2018-05-28 DIAGNOSIS — B44.81 ABPA (ALLERGIC BRONCHOPULMONARY ASPERGILLOSIS): Primary | ICD-10-CM

## 2018-05-28 DIAGNOSIS — B44.81 ABPA (ALLERGIC BRONCHOPULMONARY ASPERGILLOSIS): ICD-10-CM

## 2018-05-28 DIAGNOSIS — J45.909 ASTHMA, UNSPECIFIED ASTHMA SEVERITY, UNSPECIFIED WHETHER COMPLICATED, UNSPECIFIED WHETHER PERSISTENT: ICD-10-CM

## 2018-05-28 DIAGNOSIS — B44.81 ALLERGIC BRONCHOPULMONARY ASPERGILLOSIS: ICD-10-CM

## 2018-05-28 LAB
IGE SERPL-ACNC: 253 IU/ML
POST FEV1 FVC: 0.75
POST FEV1: 1.63
POST FVC: 2.16
PRE FEV1 FVC: 70
PRE FEV1: 1.62
PRE FVC: 2.3
PREDICTED FEV1 FVC: 78
PREDICTED FEV1: 2.3
PREDICTED FVC: 2.96

## 2018-05-28 PROCEDURE — 82785 ASSAY OF IGE: CPT

## 2018-05-28 PROCEDURE — 99214 OFFICE O/P EST MOD 30 MIN: CPT | Mod: 25,GC,S$GLB, | Performed by: INTERNAL MEDICINE

## 2018-05-28 PROCEDURE — 36415 COLL VENOUS BLD VENIPUNCTURE: CPT

## 2018-05-28 PROCEDURE — 94060 EVALUATION OF WHEEZING: CPT | Mod: S$GLB,,, | Performed by: INTERNAL MEDICINE

## 2018-05-28 PROCEDURE — 3078F DIAST BP <80 MM HG: CPT | Mod: CPTII,S$GLB,, | Performed by: INTERNAL MEDICINE

## 2018-05-28 PROCEDURE — 99999 PR PBB SHADOW E&M-EST. PATIENT-LVL IV: CPT | Mod: PBBFAC,,, | Performed by: INTERNAL MEDICINE

## 2018-05-28 PROCEDURE — 3075F SYST BP GE 130 - 139MM HG: CPT | Mod: CPTII,S$GLB,, | Performed by: INTERNAL MEDICINE

## 2018-05-28 RX ORDER — FLUTICASONE PROPIONATE AND SALMETEROL 50; 500 UG/1; UG/1
1 POWDER RESPIRATORY (INHALATION) 2 TIMES DAILY
Refills: 3 | COMMUNITY
Start: 2018-05-15 | End: 2019-05-21 | Stop reason: SDUPTHER

## 2018-05-28 RX ORDER — PREDNISONE 5 MG/1
5 TABLET ORAL DAILY
Qty: 60 TABLET | Refills: 0 | Status: SHIPPED | OUTPATIENT
Start: 2018-05-28 | End: 2018-06-27

## 2018-05-28 NOTE — PATIENT INSTRUCTIONS
Decrease prednisone to 5 mg (1/2 tablet daily) for 2 weeks, then decrease to 5 mg (1/2 tablet) every other day for 2 weeks.  Discontinue the prednisone and itraconazole in four weeks.  We will plan on follow up visit in the Fall.

## 2018-05-28 NOTE — PROGRESS NOTES
Subjective:       Patient ID: Michela Franco is a 72 y.o. female.    Chief Complaint: Cough (reflux); Edema; and Fatigue    Ms. Franco is a 73 y/o woman being seen to evaluate her asthma and ABPA that led to uncontrolled coughing for greater than a decade. She was on high-dose steroids with itraconazole since early December and prednisone since November. She has been on decreased dose steroids (prednisone 10mg) for several months but is interested in stopping them due to side effects- hair loss, insomina, leg edema, hepatomegaly. She remains in spiriva & symbicort plus rescue albuterol, which she needs about once per day when she is significantly exerting herself. No nighttime symptoms. Her cough, sputum production, and dyspnea are improved. She tolerated a recent trip to [place] well without an exacerbation.      Review of Systems   Constitutional: Negative for fever, chills, activity change and night sweats.   Respiratory: Positive for cough, sputum production, shortness of breath, dyspnea on extertion and use of rescue inhaler. Negative for asthma nighttime symptoms.    Cardiovascular: Positive for leg swelling. Negative for chest pain.   Skin: Negative for rash.   Gastrointestinal: Positive for abdominal distention and acid reflux. Negative for nausea and vomiting.       Objective:      Physical Exam   Constitutional: She appears well-developed and well-nourished.   HENT:   Head: Normocephalic.   Neck: Neck supple.   Cardiovascular: Normal rate and regular rhythm.    No murmur heard.  Pulmonary/Chest: Normal expansion, effort normal and breath sounds normal. No stridor. No respiratory distress. She has no wheezes.   Abdominal: Soft. She exhibits no distension.   Musculoskeletal: She exhibits edema.   Neurological: She is alert.   Skin: Skin is warm and dry. No rash noted.   Psychiatric: She has a normal mood and affect. Her behavior is normal.   Vitals reviewed.    Personal Diagnostic Review  Pulmonary function  test 05/28/2018: FEV1: 1.62L  (71 % predicted), FVC:  2.30L (78 % predicted), FEV1/FVC:  70. No significant response to bronchodilators.  Pulmonary function tests 2/12/2018: FEV1: 1.51  (66 % predicted), FVC:  2.21 (75 % predicted), FEV1/FVC:  68. Significant response to bronchodilators.  Pulmonary function tests: FEV1: 1.22L  (53 % predicted), FVC:  1.86 (63 % predicted), FEV1/FVC:  66, TLC: 3.74 (75 % predicted) RV 1.91 (95% predicted) DLCO: 14.5 (80% predicted)  No flowsheet data found.      Assessment:       1. ABPA (allergic bronchopulmonary aspergillosis)    2. Moderate persistent asthma with acute exacerbation    3. Allergic bronchopulmonary aspergillosis        Outpatient Encounter Prescriptions as of 5/28/2018   Medication Sig Dispense Refill    ACTIVATED CHARCOAL (CHARCOCAPS ORAL) Take by mouth as needed.       ADVAIR DISKUS 500-50 mcg/dose DsDv diskus inhaler 1 puff 2 (two) times daily.  3    albuterol 90 mcg/actuation inhaler Inhale 2 puffs into the lungs every 6 (six) hours as needed for Wheezing. 54 g 3    amLODIPine (NORVASC) 10 MG tablet TAKE 1 TABLET ONE TIME DAILY 90 tablet 3    cetirizine (ZYRTEC) 10 MG tablet Take 10 mg by mouth once daily.      cholecalciferol, vitamin D3, 10,000 unit Cap Take 360 mg by mouth once daily. Take 6 capsules (6,000 units total) by mouth once daily      COD LIVER OIL ORAL Take 1 tablet by mouth once daily.      ferrous sulfate 325 (65 FE) MG EC tablet Take 1 tablet (325 mg total) by mouth 3 (three) times daily with meals. (Patient taking differently: Take 325 mg by mouth once daily. Taking once daily) 100 tablet 3    folic acid (FOLVITE) 800 MCG Tab Take 1 tablet (800 mcg total) by mouth once daily. 100 tablet 3    furosemide (LASIX) 20 MG tablet Take 1 tablet (20 mg total) by mouth 2 (two) times daily as needed (leg swelling). 60 tablet 3    guaiFENesin (MUCINEX) 600 mg 12 hr tablet Take 600 mg by mouth 2 (two) times daily.      itraconazole (SPORANOX)  100 mg Cap TAKE 2 CAPSULES (200 MG TOTAL) BY MOUTH 2 (TWO) TIMES DAILY. 120 capsule 0    loratadine (CLARITIN) 10 mg tablet Take 10 mg by mouth once daily.      losartan (COZAAR) 25 MG tablet Take 1 tablet (25 mg total) by mouth once daily. 90 tablet 3    multivitamin (MULTIVITAMIN) per tablet Take 1 tablet by mouth once daily.        potassium chloride SA (K-DUR,KLOR-CON) 20 MEQ tablet Take 2 tablets (40 mEq total) by mouth once daily. 180 tablet 3    predniSONE (DELTASONE) 5 MG tablet Take 1 tablet (5 mg total) by mouth once daily. 60 tablet 0    rosuvastatin (CRESTOR) 5 MG tablet Take 1 tablet (5 mg total) by mouth once daily. 90 tablet 3    SPIRIVA RESPIMAT 2.5 mcg/actuation Mist Inhale 2 puffs into the lungs once daily. 4 g 5    [DISCONTINUED] atovaquone-proguanil (MALARONE) 250-100 mg Tab Take one tablet daily for malaria prevention. Begin one day before entering malarious area and continue for 1 week after return. 4 tablet 0    [DISCONTINUED] budesonide (PULMICORT) 0.5 mg/2 mL nebulizer solution Take 2 mLs (0.5 mg total) by nebulization once daily. For use in saline irrigation as directed. 180 mL 1    [DISCONTINUED] budesonide-formoterol 160-4.5 mcg (SYMBICORT) 160-4.5 mcg/actuation HFAA Inhale 2 puffs into the lungs every 12 (twelve) hours. Controller 1 Inhaler 3    [DISCONTINUED] predniSONE (DELTASONE) 10 MG tablet Take 1 tablet (10 mg total) by mouth once daily. 60 tablet 1     No facility-administered encounter medications on file as of 5/28/2018.      Orders Placed This Encounter   Procedures    IGE     Standing Status:   Future     Number of Occurrences:   1     Standing Expiration Date:   7/27/2019     Plan:           ABPA; improved spirometry on combination treatment with prednisone & itraconazole  -Prednisone taper- 5mg daily for 2 weeks, then 5 QOD x 2 weeks, then stop as long as she is not having side effects from the decreasing dose  -con't itraconazole until the course of prednisone  is completed  -IgE level today  -anticipate that if her symptoms return in the future she may require anti-IgE therapy to manage her symptoms    Ms. Franco was seen and discussed with Dr. Chowdhury.    Deandra Swift 05/28/2018 8:30 PM  [facility]

## 2018-05-29 ENCOUNTER — TELEPHONE (OUTPATIENT)
Dept: GASTROENTEROLOGY | Facility: CLINIC | Age: 73
End: 2018-05-29

## 2018-05-29 NOTE — TELEPHONE ENCOUNTER
----- Message from Calixto Brian MD sent at 5/29/2018  9:40 AM CDT -----  Please notify patient, the polyps were benign.

## 2018-05-29 NOTE — TELEPHONE ENCOUNTER
----- Message from Brooke Ta sent at 5/29/2018 10:55 AM CDT -----  Contact: Self- [phone]  Roc- pt is returning a missed call in regards to her test results- please contact pt at [phone]

## 2018-07-03 ENCOUNTER — LAB VISIT (OUTPATIENT)
Dept: LAB | Facility: HOSPITAL | Age: 73
End: 2018-07-03
Attending: FAMILY MEDICINE
Payer: MEDICARE

## 2018-07-03 DIAGNOSIS — E78.5 HYPERLIPIDEMIA, UNSPECIFIED HYPERLIPIDEMIA TYPE: ICD-10-CM

## 2018-07-03 LAB
ALBUMIN SERPL BCP-MCNC: 3.2 G/DL
ALP SERPL-CCNC: 119 U/L
ALT SERPL W/O P-5'-P-CCNC: 20 U/L
AST SERPL-CCNC: 29 U/L
BILIRUB DIRECT SERPL-MCNC: 0.3 MG/DL
BILIRUB SERPL-MCNC: 0.4 MG/DL
CHOLEST SERPL-MCNC: 168 MG/DL
CHOLEST/HDLC SERPL: 2.1 {RATIO}
HDLC SERPL-MCNC: 79 MG/DL
HDLC SERPL: 47 %
LDLC SERPL CALC-MCNC: 77.8 MG/DL
NONHDLC SERPL-MCNC: 89 MG/DL
PROT SERPL-MCNC: 7.8 G/DL
TRIGL SERPL-MCNC: 56 MG/DL

## 2018-07-03 PROCEDURE — 80076 HEPATIC FUNCTION PANEL: CPT

## 2018-07-03 PROCEDURE — 36415 COLL VENOUS BLD VENIPUNCTURE: CPT | Mod: PO

## 2018-07-03 PROCEDURE — 80061 LIPID PANEL: CPT

## 2018-07-11 ENCOUNTER — OFFICE VISIT (OUTPATIENT)
Dept: FAMILY MEDICINE | Facility: CLINIC | Age: 73
End: 2018-07-11
Payer: MEDICARE

## 2018-07-11 VITALS
BODY MASS INDEX: 33.55 KG/M2 | DIASTOLIC BLOOD PRESSURE: 62 MMHG | OXYGEN SATURATION: 93 % | HEIGHT: 66 IN | HEART RATE: 78 BPM | SYSTOLIC BLOOD PRESSURE: 126 MMHG | WEIGHT: 208.75 LBS | TEMPERATURE: 99 F

## 2018-07-11 DIAGNOSIS — J45.909 ASTHMA, UNSPECIFIED ASTHMA SEVERITY, UNSPECIFIED WHETHER COMPLICATED, UNSPECIFIED WHETHER PERSISTENT: Primary | ICD-10-CM

## 2018-07-11 PROCEDURE — 99999 PR PBB SHADOW E&M-EST. PATIENT-LVL III: CPT | Mod: PBBFAC,,, | Performed by: FAMILY MEDICINE

## 2018-07-11 PROCEDURE — 99214 OFFICE O/P EST MOD 30 MIN: CPT | Mod: S$GLB,,, | Performed by: FAMILY MEDICINE

## 2018-07-11 PROCEDURE — 3078F DIAST BP <80 MM HG: CPT | Mod: CPTII,S$GLB,, | Performed by: FAMILY MEDICINE

## 2018-07-11 PROCEDURE — 3074F SYST BP LT 130 MM HG: CPT | Mod: CPTII,S$GLB,, | Performed by: FAMILY MEDICINE

## 2018-07-11 RX ORDER — LACTULOSE 10 G/15ML
10 SOLUTION ORAL; RECTAL
COMMUNITY
Start: 2018-05-03 | End: 2018-07-11

## 2018-07-16 NOTE — PROGRESS NOTES
Subjective:       Patient ID: Michela Franco is a 73 y.o. female.    Chief Complaint: Hyperlipidemia and Results  pt needs to review her labs and medications  She needs a new prescription for a nebulizer and all supplies  HPIsee above  Review of Systems   Constitutional: Negative.    HENT: Negative.    Eyes: Negative.    Respiratory: Positive for wheezing.    Cardiovascular: Negative.    Gastrointestinal: Negative.    Endocrine: Negative.    Genitourinary: Negative.    Musculoskeletal: Negative.    Skin: Negative.    Allergic/Immunologic: Positive for environmental allergies.   Neurological: Negative.    Hematological: Negative.    Psychiatric/Behavioral: Negative.        Objective:      Physical Exam   Constitutional: She is oriented to person, place, and time. She appears well-developed and well-nourished. No distress.   HENT:   Head: Normocephalic and atraumatic.   Right Ear: External ear normal.   Left Ear: External ear normal.   Nose: Nose normal.   Mouth/Throat: Oropharynx is clear and moist.   Eyes: Conjunctivae and EOM are normal. Pupils are equal, round, and reactive to light.   Neck: Normal range of motion. Neck supple. No JVD present.   Cardiovascular: Normal rate and regular rhythm.    No murmur heard.  Pulmonary/Chest: Effort normal and breath sounds normal. No respiratory distress.   Abdominal: Soft. Bowel sounds are normal. There is no tenderness. There is no guarding.   Neurological: She is alert and oriented to person, place, and time. She displays normal reflexes. No cranial nerve deficit or sensory deficit. She exhibits normal muscle tone. Coordination normal.   Skin: Skin is warm and dry. She is not diaphoretic.   Psychiatric: She has a normal mood and affect. Her behavior is normal. Judgment and thought content normal.   Nursing note and vitals reviewed.      Assessment:       1. Asthma, unspecified asthma severity, unspecified whether complicated, unspecified whether persistent     2.  Hyperlipidemia controlled  3. Hypertension controlled  Plan:      labs reviewed.    med card   rosuvastatin (CRESTOR) 5 MG tablet 5 mg, Daily        predniSONE (DELTASONE) 5 MG tablet () 5 mg, Daily        potassium chloride SA (K-DUR,KLOR-CON) 20 MEQ tablet 40 mEq, Daily        Patient taking differently: 20 mEq Oral Daily, Reported on 2018       multivitamin (MULTIVITAMIN) per tablet 1 tablet, Daily        losartan (COZAAR) 25 MG tablet 25 mg, Daily        loratadine (CLARITIN) 10 mg tablet 10 mg, Daily        guaiFENesin (MUCINEX) 600 mg 12 hr tablet 600 mg, 2 times daily        furosemide (LASIX) 20 MG tablet 20 mg, 2 times daily PRN        folic acid (FOLVITE) 800 MCG Tab 800 mcg, Daily        ferrous sulfate 325 (65 FE) MG EC tablet 325 mg, 3 times daily with meals        Patient taking differently: 325 mg Oral Daily, Taking once daily, Reported on 3/20/2018       COD LIVER OIL ORAL 1 tablet, Daily        cholecalciferol, vitamin D3, 10,000 unit Cap 360 mg, Daily        cetirizine (ZYRTEC) 10 MG tablet 10 mg, Daily        amLODIPine (NORVASC) 10 MG tablet         albuterol 90 mcg/actuation inhaler 2 puff, Every 6 hours PRN        ADVAIR DISKUS 500-50 mcg/dose DsDv diskus inhaler 1 puff, 2 times daily        ACTIVATED CHARCOAL (CHARCOCAPS ORAL) As needed (PRN)             NEBULIZER KIT (SUPPLIES) FOR HOME USE for Asthma,    unspecified asthma severity, unspecified whether complicated,    unspecified whether persistent

## 2018-07-23 ENCOUNTER — HOSPITAL ENCOUNTER (OUTPATIENT)
Dept: RADIOLOGY | Facility: HOSPITAL | Age: 73
Discharge: HOME | End: 2018-07-23
Attending: FAMILY MEDICINE
Payer: MEDICARE

## 2018-07-23 ENCOUNTER — OFFICE VISIT (OUTPATIENT)
Dept: FAMILY MEDICINE | Facility: CLINIC | Age: 73
End: 2018-07-23
Payer: MEDICARE

## 2018-07-23 VITALS
SYSTOLIC BLOOD PRESSURE: 120 MMHG | BODY MASS INDEX: 33.73 KG/M2 | HEIGHT: 66 IN | HEART RATE: 87 BPM | DIASTOLIC BLOOD PRESSURE: 64 MMHG | TEMPERATURE: 98 F | WEIGHT: 209.88 LBS

## 2018-07-23 DIAGNOSIS — M25.552 PAIN OF LEFT HIP JOINT: Primary | ICD-10-CM

## 2018-07-23 DIAGNOSIS — M25.552 PAIN OF LEFT HIP JOINT: ICD-10-CM

## 2018-07-23 DIAGNOSIS — M81.0 OSTEOPOROSIS, UNSPECIFIED OSTEOPOROSIS TYPE, UNSPECIFIED PATHOLOGICAL FRACTURE PRESENCE: ICD-10-CM

## 2018-07-23 PROBLEM — D35.02 ADRENAL ADENOMA, LEFT: Status: ACTIVE | Noted: 2018-07-23

## 2018-07-23 PROCEDURE — 73502 X-RAY EXAM HIP UNI 2-3 VIEWS: CPT | Mod: TC,FY,PO,LT

## 2018-07-23 PROCEDURE — 3074F SYST BP LT 130 MM HG: CPT | Mod: CPTII,S$GLB,, | Performed by: FAMILY MEDICINE

## 2018-07-23 PROCEDURE — 3078F DIAST BP <80 MM HG: CPT | Mod: CPTII,S$GLB,, | Performed by: FAMILY MEDICINE

## 2018-07-23 PROCEDURE — 99999 PR PBB SHADOW E&M-EST. PATIENT-LVL V: CPT | Mod: PBBFAC,,, | Performed by: FAMILY MEDICINE

## 2018-07-23 PROCEDURE — 73502 X-RAY EXAM HIP UNI 2-3 VIEWS: CPT | Mod: 26,LT,, | Performed by: RADIOLOGY

## 2018-07-23 PROCEDURE — 99214 OFFICE O/P EST MOD 30 MIN: CPT | Mod: S$GLB,,, | Performed by: FAMILY MEDICINE

## 2018-07-23 RX ORDER — AZELASTINE 1 MG/ML
2 SPRAY, METERED NASAL 2 TIMES DAILY
Refills: 6 | COMMUNITY
Start: 2018-07-11 | End: 2018-08-28

## 2018-07-23 RX ORDER — TRAMADOL HYDROCHLORIDE 50 MG/1
50 TABLET ORAL EVERY 6 HOURS PRN
Qty: 30 TABLET | Refills: 0 | Status: SHIPPED | OUTPATIENT
Start: 2018-07-23 | End: 2018-12-24 | Stop reason: SDUPTHER

## 2018-07-23 NOTE — PROGRESS NOTES
"Subjective:     Patient ID: Michela Franco is a 73 y.o. female.    Chief Complaint: Leg Pain (left)    HPI anterior left leg pain since last Wednesday - since she was getting up out of a chair. It feels like a "catch" , it doesn't bother her if she is sitting still but then if she goes to walk or more it really is bothersome. No swelling.  The pain foes down the leg to her knee. Its sore to the touch in the anterior thigh.   Review of Systems  she has osteoporosis -which she feels is due to having taking prednisone for many years in the past for another problem  Objective:      Physical Exam   Constitutional: She is oriented to person, place, and time. She appears well-developed and well-nourished.   Cardiovascular: Intact distal pulses.    Abdominal: Soft.   Musculoskeletal: She exhibits tenderness (tenderness of anterior rt pubic bone on palpation.  pain with abduction of left hip , no deformity , pt lipms wihen putting pressure on left leg to walk, no edema or erythema or ecchymosis). She exhibits no edema or deformity.   Neurological: She is alert and oriented to person, place, and time.   Skin: Skin is warm and dry.   Psychiatric: She has a normal mood and affect. Her behavior is normal. Judgment and thought content normal.   Nursing note and vitals reviewed.      Assessment:     Michela was seen today for leg pain.    Diagnoses and all orders for this visit:    Pain of left hip joint  -     X-Ray Hip 2 or 3 views Left; Future  -     MRI Pelvis Without Contrast; Future  -     traMADol (ULTRAM) 50 mg tablet; Take 1 tablet (50 mg total) by mouth every 6 (six) hours as needed for Pain.    Osteoporosis, unspecified osteoporosis type, unspecified pathological fracture presence  -     X-Ray Hip 2 or 3 views Left; Future  -     MRI Pelvis Without Contrast; Future      "

## 2018-08-02 DIAGNOSIS — M15.9 PRIMARY OSTEOARTHRITIS INVOLVING MULTIPLE JOINTS: ICD-10-CM

## 2018-08-02 DIAGNOSIS — M81.0 OSTEOPOROSIS, UNSPECIFIED OSTEOPOROSIS TYPE, UNSPECIFIED PATHOLOGICAL FRACTURE PRESENCE: Primary | ICD-10-CM

## 2018-08-02 RX ORDER — METHOCARBAMOL 500 MG/1
500 TABLET, FILM COATED ORAL 4 TIMES DAILY
Qty: 40 TABLET | Refills: 0 | Status: SHIPPED | OUTPATIENT
Start: 2018-08-02 | End: 2018-08-12

## 2018-08-02 RX ORDER — METHOCARBAMOL 500 MG/1
500 TABLET, FILM COATED ORAL 4 TIMES DAILY
Qty: 40 TABLET | Refills: 0 | Status: SHIPPED | OUTPATIENT
Start: 2018-08-02 | End: 2018-08-02 | Stop reason: SDUPTHER

## 2018-08-02 RX ORDER — HYDROCODONE BITARTRATE AND ACETAMINOPHEN 5; 325 MG/1; MG/1
1 TABLET ORAL 3 TIMES DAILY PRN
Qty: 90 TABLET | Refills: 0 | Status: SHIPPED | OUTPATIENT
Start: 2018-08-02 | End: 2018-08-02 | Stop reason: SDUPTHER

## 2018-08-02 RX ORDER — HYDROCODONE BITARTRATE AND ACETAMINOPHEN 5; 325 MG/1; MG/1
1 TABLET ORAL 3 TIMES DAILY PRN
Qty: 90 TABLET | Refills: 0 | Status: SHIPPED | OUTPATIENT
Start: 2018-08-02 | End: 2018-08-28

## 2018-08-02 NOTE — TELEPHONE ENCOUNTER
Patient is still in continuous pain, no better or worse than when seen. The Tramadol has not given the patient any relief, and she is no longer taking it. Patient would like to know if a muscle relaxer or medication for nerve pain can be sent to the pharmacy. MRI scheduled for 08/08/18. Please advise.

## 2018-08-02 NOTE — TELEPHONE ENCOUNTER
When she was in I ordered an MRI- I was worried she might have a fracture because of her osteoporosis.   I dont see that she has had that-she needs it asap- I see now its not scheduled till the 8th. She needs it now. Please have her schedule that asap. In the meantime I could call in some stronger pain med. And a muscle relaxer . She is allergic to gabapentin.

## 2018-08-02 NOTE — TELEPHONE ENCOUNTER
"----- Message from Ellen Mcrae sent at 8/2/2018  9:00 AM CDT -----  Contact: call pt at [phone]  Patient would like to get medical advice.    Symptoms (please be specific): pain in left leg, difficult to walk on    How long has patient had these symptoms:  About 2 weeks    Pharmacy name and phone # (DON'T enter "on file" or "in chart"):      Any drug allergies:      Would you prefer a response via [employer]?:  no    Comments:  Pt states that she saw dr cline on 7/23/18 with no relief   "

## 2018-08-02 NOTE — TELEPHONE ENCOUNTER
Patient has been advised per Dr. Pierre's message. MRI rescheduled for tomorrow.     Please resend prescriptions to the patient's local pharmacy.

## 2018-08-03 ENCOUNTER — HOSPITAL ENCOUNTER (EMERGENCY)
Facility: HOSPITAL | Age: 73
Discharge: HOME | End: 2018-08-03
Payer: MEDICARE

## 2018-08-03 ENCOUNTER — TELEPHONE (OUTPATIENT)
Dept: FAMILY MEDICINE | Facility: CLINIC | Age: 73
End: 2018-08-03

## 2018-08-03 ENCOUNTER — HOSPITAL ENCOUNTER (OUTPATIENT)
Dept: RADIOLOGY | Facility: OTHER | Age: 73
Discharge: HOME | End: 2018-08-03
Attending: FAMILY MEDICINE
Payer: MEDICARE

## 2018-08-03 ENCOUNTER — TELEPHONE (OUTPATIENT)
Dept: ORTHOPEDICS | Facility: CLINIC | Age: 73
End: 2018-08-03

## 2018-08-03 VITALS
RESPIRATION RATE: 11 BRPM | TEMPERATURE: 99 F | OXYGEN SATURATION: 98 % | HEART RATE: 86 BPM | BODY MASS INDEX: 34.82 KG/M2 | DIASTOLIC BLOOD PRESSURE: 69 MMHG | HEIGHT: 65 IN | WEIGHT: 209 LBS | SYSTOLIC BLOOD PRESSURE: 146 MMHG

## 2018-08-03 DIAGNOSIS — M87.052 AVASCULAR NECROSIS OF HIP, LEFT: Primary | ICD-10-CM

## 2018-08-03 DIAGNOSIS — M25.552 PAIN OF LEFT HIP JOINT: ICD-10-CM

## 2018-08-03 DIAGNOSIS — M81.0 OSTEOPOROSIS, UNSPECIFIED OSTEOPOROSIS TYPE, UNSPECIFIED PATHOLOGICAL FRACTURE PRESENCE: ICD-10-CM

## 2018-08-03 PROCEDURE — 99283 EMERGENCY DEPT VISIT LOW MDM: CPT | Mod: ,,,

## 2018-08-03 PROCEDURE — 99284 EMERGENCY DEPT VISIT MOD MDM: CPT

## 2018-08-03 PROCEDURE — 73721 MRI JNT OF LWR EXTRE W/O DYE: CPT | Mod: 26,LT,, | Performed by: RADIOLOGY

## 2018-08-03 PROCEDURE — 73721 MRI JNT OF LWR EXTRE W/O DYE: CPT | Mod: TC,LT

## 2018-08-03 RX ORDER — MELOXICAM 7.5 MG/1
7.5 TABLET ORAL DAILY
Qty: 14 TABLET | Refills: 0 | Status: SHIPPED | OUTPATIENT
Start: 2018-08-03 | End: 2018-08-03

## 2018-08-03 RX ORDER — MELOXICAM 7.5 MG/1
7.5 TABLET ORAL DAILY
Qty: 14 TABLET | Refills: 0 | Status: SHIPPED | OUTPATIENT
Start: 2018-08-03 | End: 2018-09-04

## 2018-08-03 NOTE — ED TRIAGE NOTES
Pt came in from the clinic to be evaluated for a possible left hip fracture. Pt reported having some pain to the left hip when moving a certain way. Pt denies cp, SOB, blurred vision. Pt denies n./v.

## 2018-08-03 NOTE — TELEPHONE ENCOUNTER
Spoke with patient and advised that the PA from Ortho has reviewed her MRI and wants her to proceed to the ED at main campus at [facility] and Ortho will see her there.  Patient verbalizes understanding and asks me to please contact her if anything changes.

## 2018-08-03 NOTE — ED PROVIDER NOTES
"Encounter Date: 8/3/2018       History     Chief Complaint   Patient presents with    Hip Pain     had mri today, told to come to er with fractured hip, started hurting 2 weeks ago,      Mrs. Franco is a 73 year old female with h/o CHF (diastolic), HTN, HLD, URI and recurrent sinusitis presents to the ED with left hip pain. She reports the pain started a couple weeks ago when she stood up. She reports its a "catching" type pain but has improved since.           Review of patient's allergies indicates:   Allergen Reactions    Adhesive      PAPER TAPE    Diazepam Other (See Comments)     Nervous, jittery    Gabapentin      Nervous      Keppra [levetiracetam]      nervous    Mold      sneezing     Past Medical History:   Diagnosis Date    Allergy     Chronic diastolic heart failure 4/5/2018    Hyperlipidemia     Hypertension     Neuromuscular disorder     JOHN on CPAP     Osteoporosis     Recurrent sinusitis 3/25/2014    Recurrent upper respiratory infection (URI)     Urticaria      Past Surgical History:   Procedure Laterality Date    COLONOSCOPY N/A 5/18/2018    Procedure: COLONOSCOPY;  Surgeon: Calixto Brian MD;  Location: [address];  Service: Endoscopy;  Laterality: N/A;    HYSTERECTOMY      total    ROTATOR CUFF REPAIR Left     SINUS SURGERY       Family History   Problem Relation Age of Onset    No Known Problems Mother         healthy in 90s    No Known Problems Father         accident-related death in 50s    Kidney cancer Sister     Cancer Sister         renal    Hypertension Daughter     Ovarian cancer Sister     Hypertension Daughter      Social History   Substance Use Topics    Smoking status: Former Smoker     Packs/day: 0.25     Years: 40.00     Types: Cigarettes     Quit date: 1/1/2005    Smokeless tobacco: Never Used    Alcohol use Yes      Comment: rare beer     Review of Systems   Constitutional: Negative for chills, diaphoresis, fatigue and fever.   HENT: " Negative for ear discharge and sore throat.    Eyes: Negative for pain, redness and itching.   Respiratory: Positive for cough. Negative for chest tightness, shortness of breath, wheezing and stridor.    Cardiovascular: Negative for chest pain.   Gastrointestinal: Positive for abdominal distention. Negative for abdominal pain and nausea.   Genitourinary: Negative for difficulty urinating and dysuria.   Musculoskeletal: Positive for gait problem and joint swelling. Negative for back pain.   Skin: Negative for rash and wound.   Neurological: Negative for dizziness, facial asymmetry, weakness, light-headedness and headaches.   Hematological: Does not bruise/bleed easily.       Physical Exam     Initial Vitals [08/03/18 1620]   BP Pulse Resp Temp SpO2   137/71 88 18 98.8 °F (37.1 °C) 97 %      MAP       --         Physical Exam    Constitutional: She appears well-developed and well-nourished. She is not diaphoretic. No distress.   HENT:   Head: Normocephalic and atraumatic.   Right Ear: External ear normal.   Left Ear: External ear normal.   Neck: No tracheal deviation present. No JVD present.   Cardiovascular: Normal rate, regular rhythm and normal heart sounds.   Pulmonary/Chest: No stridor. No respiratory distress. She has wheezes. She has no rhonchi. She has no rales.   Abdominal: Soft. Bowel sounds are normal. She exhibits distension. There is no tenderness.   Musculoskeletal: Normal range of motion.   Neurological: She is alert and oriented to person, place, and time.   Skin: Skin is warm and dry.   Psychiatric: She has a normal mood and affect. Her behavior is normal. Judgment and thought content normal.         ED Course   Procedures  Labs Reviewed - No data to display       Imaging Results          CT Pelvis Without Contrast (Final result)  Result time 08/03/18 20:36:49   Procedure changed from CT Hip Without Contrast Right     Final result by Jonathan Anguiano MD (08/03/18 20:36:49)                 Impression:       Bilateral osteopenia of the femoral heads with early changes of left subchondral fracture.  Findings are consistent with most recent MRI and likely represent sequela of avascular necrosis.    Electronically signed by resident: Roc Ansari  Date:    08/03/2018  Time:    19:56    Electronically signed by: Jonathan Anguiano MD  Date:    08/03/2018  Time:    20:36             Narrative:    EXAMINATION:  CT PELVIS WITHOUT CONTRAST    CLINICAL HISTORY:  Hip pain, acute, fx suspect, xray negative or indeterminate;    TECHNIQUE:  2 mm axial images acquired of the pelvis with bone algorithm and coronal/sagittal reconstructions provided.  No IV contrast administered.    COMPARISON:  MRI hip from 08/03/2018 and left hip radiograph from 07/23/2018.    FINDINGS:  Bilateral femoral heads appear osteopenic with subtle, small focal subchondral defect in the superior aspect of the left femoral head and subtle underlying linear sclerosis.  Findings are consistent with recent MRI likely representing changes from bilateral avascular necrosis and early left subchondral fracture.  No large joint effusion seen.    Remaining osseous structures appear intact.  Degenerative changes identified involving the pubic symphysis, bilateral SI joints, and partially visualized lumbar spine.  There is significant sclerotic change with fusion at the superior aspect of the bilateral SI joints.    Partially visualized pelvis demonstrates colonic diverticulosis without evidence of diverticulitis, hysterectomy, and minimal scattered atherosclerosis.  Otherwise, unremarkable.                                 Medical Decision Making:   Initial Assessment:   AVN left hip  Differential Diagnosis:   Hip fracture, hip arthritis  Clinical Tests:   Radiological Study: Ordered and Reviewed  ED Management:  8:06 PM  CT ordered and reviewed. Spoke with Ortho. Ortho will see patient.              Attending Attestation:   Physician Attestation Statement for  Resident:  As the supervising MD  -: This is a 72 yo woman with multiple med problems and osteoporosis here with complaints of intermittent sharp hip pain that alteranates between her right and left hips for several weeks.  She was seen for MRI today by her PCP and called to come to ED after reading showed a a possible subchondral fracture on the left.  Radiology recommended and Repeat CT and consult to Orthopedics.  CT done in ED is c/w MRI finding. Pt in no pain at this time currently pacing in her room.  Ortho will see the patient in ED prior to disposition.                         Clinical Impression:   The encounter diagnosis was Avascular necrosis of hip, left.      Disposition:   Disposition: Discharged  Condition: Stable  Patient released in wheelchair.  Patient alone.  Evaluated with ortho and will follow up in clinic.                        Rodney Benton MD  Resident  08/03/18 [id number]       Rodney Benton MD  Resident  08/03/18 [id number]

## 2018-08-03 NOTE — TELEPHONE ENCOUNTER
----- Message from Sandy Hilton sent at 8/3/2018  2:55 PM CDT -----  As per Anastasia, Triage Ortho Nurse, she spoke to the dept - Jossue Rey Triage for ortho instructed that pt must go to the ER.

## 2018-08-03 NOTE — TELEPHONE ENCOUNTER
Ortho Telephone Triage Message  [phone]  Sandy Hilton/Dr. Pierre requests same day  Ortho appt for avascular necrosis/fracture L hip. Instructed that pt go to ED per ASIM Rey PA-C. Dr. Lucero/Ortho On Call notified.

## 2018-08-04 NOTE — H&P
Consult Note  Orthopaedics    SUBJECTIVE:     History of Present Illness:  Patient is a 73 y.o. female with three weeks of atraumatic left hip pain. No trauma noted. Has been ambulating with a limp that comes and goes. This has not been worsening. Was seen in ortho clinic several weeks ago and was told that we would do MRI in 2 weeks if things were not improving. MRI  Was recently done and patient was told to come to ED to r/o fracture.     Scheduled Meds:  Continuous Infusions:  PRN Meds:    Review of patient's allergies indicates:   Allergen Reactions    Adhesive      PAPER TAPE    Diazepam Other (See Comments)     Nervous, jittery    Gabapentin      Nervous      Keppra [levetiracetam]      nervous    Mold      sneezing       Past Medical History:   Diagnosis Date    Allergy     Chronic diastolic heart failure 4/5/2018    Hyperlipidemia     Hypertension     Neuromuscular disorder     JOHN on CPAP     Osteoporosis     Recurrent sinusitis 3/25/2014    Recurrent upper respiratory infection (URI)     Urticaria      Past Surgical History:   Procedure Laterality Date    COLONOSCOPY N/A 5/18/2018    Procedure: COLONOSCOPY;  Surgeon: Calixto Brian MD;  Location: [address]);  Service: Endoscopy;  Laterality: N/A;    HYSTERECTOMY      total    ROTATOR CUFF REPAIR Left     SINUS SURGERY       Family History   Problem Relation Age of Onset    No Known Problems Mother         healthy in 90s    No Known Problems Father         accident-related death in 50s    Kidney cancer Sister     Cancer Sister         renal    Hypertension Daughter     Ovarian cancer Sister     Hypertension Daughter      Social History   Substance Use Topics    Smoking status: Former Smoker     Packs/day: 0.25     Years: 40.00     Types: Cigarettes     Quit date: 1/1/2005    Smokeless tobacco: Never Used    Alcohol use Yes      Comment: rare beer        Review of Systems:  Constitutional: negative for fevers  Eyes:  no visual changes  ENT: negative for hearing loss  Respiratory: negative for dyspnea  Cardiovascular: negative for chest pain  Gastrointestinal: negative for abdominal pain  Genitourinary: negative for dysuria  Neurological: negative for headaches  Behavioral/Psych: negative for hallucinations  Endocrine: negative for temperature intolerance      OBJECTIVE:     Vital Signs (Most Recent)  Temp: 98.8 °F (37.1 °C) (08/03/18 1620)  Pulse: 86 (08/03/18 1843)  Resp: 11 (08/03/18 1843)  BP: (!) 146/69 (08/03/18 1843)  SpO2: 98 % (08/03/18 1843)    Physical Exam:  Gen:  No acute distress  CV:  Peripherally well-perfused.  Pulses 2+ bilaterally.  Lungs:  Normal respiratory effort.  Abdomen:  Soft, non-tender, non-distended  Head/Neck:  Normocephalic.  Atraumatic. No TTP, AROM and PROM intact without pain  Neuro:  CN intact without deficit, SILT throughout B/L Upper & Lower Extremities  Pelvis: No TTP, Stable to direct anterior pressure over ASIS.    LEFT LOWER EXTREMITY    INSPECTION  - Patient is seen walking around the room durind and prior to exam  PALPATION  - Mildly ttp in groin  RANGE OF MOTION  - AROM and PROM intact the hip with no pain  NEUROVASCULAR  - TA/EHL/Gastroc/FHL assessed in isolation without deficit  - SILT throughout  - DP and PT palpated  2+  - Capillary Refill <3s    Laboratory:  No results found for this or any previous visit (from the past 72 hour(s)).    Diagnostic Results:  MRI: Reviewed  Showing changes typical of AVN as well as reactive edema throughout femoral head.   ASSESSMENT/PLAN:     A/P: Michela Franco is a 73 y.o. with avascular necrosis of the femoral head. No acute fracture.      Plan:  - No acute orthopedic intervention at this time. Patient will f/u with ortho PA in 2 weeks. Denied need for assistive device. Mobic prescription given.       Roberto Lucero M.D. PGY1  Orthopedic Surgery

## 2018-08-04 NOTE — ED NOTES
Discharge instructions reviewed with and given to patient. Patient verbalizes understanding    Denies Pain. All belongings sent with patient.    No acute distress noted.     Neville WINTERS

## 2018-08-04 NOTE — ED NOTES
Received patient in no acute distress. Reviewed plan of care. Safety maintained. Vital signs stable, denies pain.    Will continue to monitor.     Neville WINTERS

## 2018-08-06 ENCOUNTER — TELEPHONE (OUTPATIENT)
Dept: ORTHOPEDICS | Facility: CLINIC | Age: 73
End: 2018-08-06

## 2018-08-06 NOTE — TELEPHONE ENCOUNTER
Spoke with patient to schedule appointment. Patient was made aware of date, time, and location. Verbalized understanding.    ----- Message from Varsha Rosa sent at 8/6/2018  3:11 PM CDT -----  Contact: Self [phone]  Patient Returning Your Phone Call

## 2018-08-06 NOTE — TELEPHONE ENCOUNTER
Left message for patient to return call to office.    ----- Message from Caron Grigsby MA sent at 8/6/2018  9:03 AM CDT -----      ----- Message -----  From: Sandy Hilton  Sent: 8/6/2018   8:43 AM  To: Candido Damon Staff    Pt Kelvinlawson CarmenJoan with a dx of Avascular necrosis of hip, left went to the ER as advised.  Pt was advised to get into [facility] today.  The soonest I can get on the schedule is 8/17.  Is there anyway someone can see her today or tomorrow.  If so, please call pt @ [phone]

## 2018-08-15 ENCOUNTER — OFFICE VISIT (OUTPATIENT)
Dept: ORTHOPEDICS | Facility: CLINIC | Age: 73
End: 2018-08-15
Payer: MEDICARE

## 2018-08-15 VITALS — HEIGHT: 65 IN | WEIGHT: 209 LBS | BODY MASS INDEX: 34.82 KG/M2

## 2018-08-15 DIAGNOSIS — M87.052 AVASCULAR NECROSIS OF BONE OF LEFT HIP: Primary | ICD-10-CM

## 2018-08-15 PROCEDURE — 99499 UNLISTED E&M SERVICE: CPT | Mod: HCNC,S$GLB,, | Performed by: ORTHOPAEDIC SURGERY

## 2018-08-15 PROCEDURE — 99999 PR PBB SHADOW E&M-EST. PATIENT-LVL III: CPT | Mod: PBBFAC,,, | Performed by: ORTHOPAEDIC SURGERY

## 2018-08-15 PROCEDURE — 99203 OFFICE O/P NEW LOW 30 MIN: CPT | Mod: S$GLB,,, | Performed by: ORTHOPAEDIC SURGERY

## 2018-08-15 RX ORDER — CALCITONIN SALMON 200 [IU]/.09ML
1 SPRAY, METERED NASAL DAILY
Qty: 1 BOTTLE | Refills: 2 | Status: SHIPPED | OUTPATIENT
Start: 2018-08-15 | End: 2019-03-08

## 2018-08-15 NOTE — PROGRESS NOTES
Subjective:      Patient ID: Michela Franco is a 73 y.o. female.    Chief Complaint: Pain of the Left Hip    HPI      Michela Franco is seen for evaluation and treatment of hip pain.  They have experienced problems with their left hip over the past 1 month Pain is located in the groin and  referred to the knee. They have been treated with Hydrocodone and NSAIDS.  The patient reports that several weeks ago her symptoms were severe and she had difficulty walking.  She notes significant improvement and now only has discomfort is start up.  History relevant for significant prednisone use in the past  Review of Systems   Constitution: Negative for fever and weight loss.   HENT: Negative for congestion.    Eyes: Negative for visual disturbance.   Cardiovascular: Negative for chest pain.   Respiratory: Negative for shortness of breath.    Hematologic/Lymphatic: Negative for bleeding problem. Does not bruise/bleed easily.   Skin: Negative for poor wound healing.   Musculoskeletal: Positive for joint pain.   Gastrointestinal: Negative for abdominal pain.   Genitourinary: Negative for dysuria.   Neurological: Negative for seizures.   Psychiatric/Behavioral: Negative for altered mental status.   Allergic/Immunologic: Negative for persistent infections.         Objective:            Ortho/SPM Exam      Left hip    The patient is not in acute distress.   Body habitus is:normal.   The patient walks without a limp.   The skin over the hip is:intact.   There is:no local tenderness.  Range of motion- Flexion 105, External rotation 30, internal rotation 20.  Resisted SLR negative.  Pain with rotation positive  Sciatic tension findings negative.  Shortening/lengthening compared to the contralateral side absent.  Pulses DP present, PT present.  Motor normal 5/5 strength in all tested muscle groups.   Sensory normal.    I reviewed the relevant radiographic images for the patient's condition:  I reviewed recent plain films, CT scan and  an MRI of the left hip. There is osteonecrosis, probably Lebron stage III.  Although the radiologist reports osteonecrosis the right hip, imaging to me is equivocal; I cannot specifically rule out a tiny lesion the superior femoral head.          Assessment:       Encounter Diagnosis   Name Primary?    Avascular necrosis of bone of left hip Yes          Plan:       Michela was seen today for pain.    Diagnoses and all orders for this visit:    Avascular necrosis of bone of left hip      I explained my diagnostic impression and the reasoning behind it in detail, using layman's terms.  Models and/or pictures were used to help in the explanation.    Various treatment options discussed included observation, Fosamax, calcitonin, and potentially arthroplasty. The pros and cons of each was discussed with particular attention to the side effects of Fosamax.    Considering the patient's age and spontaneous improvement I recommend the patient use calcitonin and be observed for time. She understands and agrees to this.    X-ray follow-up

## 2018-08-20 ENCOUNTER — TELEPHONE (OUTPATIENT)
Dept: ORTHOPEDICS | Facility: CLINIC | Age: 73
End: 2018-08-20

## 2018-08-20 RX ORDER — CALCITONIN SALMON 200 [IU]/.09ML
1 SPRAY, METERED NASAL DAILY
Qty: 1 BOTTLE | Refills: 2 | Status: SHIPPED | OUTPATIENT
Start: 2018-08-20 | End: 2018-09-04 | Stop reason: SDUPTHER

## 2018-08-23 DIAGNOSIS — I50.32 CHRONIC DIASTOLIC HEART FAILURE: ICD-10-CM

## 2018-08-23 RX ORDER — FUROSEMIDE 20 MG/1
20 TABLET ORAL 2 TIMES DAILY PRN
Qty: 60 TABLET | Refills: 3 | Status: SHIPPED | OUTPATIENT
Start: 2018-08-23 | End: 2018-12-14 | Stop reason: SDUPTHER

## 2018-08-28 ENCOUNTER — OFFICE VISIT (OUTPATIENT)
Dept: OTOLARYNGOLOGY | Facility: CLINIC | Age: 73
End: 2018-08-28
Payer: MEDICARE

## 2018-08-28 VITALS — HEART RATE: 90 BPM | SYSTOLIC BLOOD PRESSURE: 120 MMHG | DIASTOLIC BLOOD PRESSURE: 58 MMHG

## 2018-08-28 DIAGNOSIS — J32.4 CHRONIC PANSINUSITIS: ICD-10-CM

## 2018-08-28 DIAGNOSIS — J30.89 PERENNIAL ALLERGIC RHINITIS WITH SEASONAL VARIATION: Primary | ICD-10-CM

## 2018-08-28 DIAGNOSIS — J30.2 PERENNIAL ALLERGIC RHINITIS WITH SEASONAL VARIATION: Primary | ICD-10-CM

## 2018-08-28 DIAGNOSIS — J45.40 MODERATE PERSISTENT ASTHMA WITHOUT COMPLICATION: ICD-10-CM

## 2018-08-28 PROCEDURE — 31231 NASAL ENDOSCOPY DX: CPT | Mod: S$GLB,,, | Performed by: OTOLARYNGOLOGY

## 2018-08-28 PROCEDURE — 3074F SYST BP LT 130 MM HG: CPT | Mod: CPTII,S$GLB,, | Performed by: OTOLARYNGOLOGY

## 2018-08-28 PROCEDURE — 3078F DIAST BP <80 MM HG: CPT | Mod: CPTII,S$GLB,, | Performed by: OTOLARYNGOLOGY

## 2018-08-28 PROCEDURE — 99214 OFFICE O/P EST MOD 30 MIN: CPT | Mod: 25,S$GLB,, | Performed by: OTOLARYNGOLOGY

## 2018-08-28 PROCEDURE — 87070 CULTURE OTHR SPECIMN AEROBIC: CPT

## 2018-08-28 PROCEDURE — 87077 CULTURE AEROBIC IDENTIFY: CPT

## 2018-08-28 PROCEDURE — 87075 CULTR BACTERIA EXCEPT BLOOD: CPT

## 2018-08-28 PROCEDURE — 87186 SC STD MICRODIL/AGAR DIL: CPT

## 2018-08-28 PROCEDURE — 99999 PR PBB SHADOW E&M-EST. PATIENT-LVL III: CPT | Mod: PBBFAC,,, | Performed by: OTOLARYNGOLOGY

## 2018-08-28 RX ORDER — DOXYCYCLINE 100 MG/1
100 CAPSULE ORAL EVERY 12 HOURS
Qty: 42 CAPSULE | Refills: 0 | Status: SHIPPED | OUTPATIENT
Start: 2018-08-28 | End: 2018-09-04 | Stop reason: ALTCHOICE

## 2018-08-28 RX ORDER — BUDESONIDE 0.5 MG/2ML
0.5 INHALANT ORAL 2 TIMES DAILY
COMMUNITY
End: 2018-09-04 | Stop reason: ALTCHOICE

## 2018-08-28 RX ORDER — BUDESONIDE 1 MG/2ML
1 INHALANT ORAL 2 TIMES DAILY
Qty: 360 ML | Refills: 1 | Status: SHIPPED | OUTPATIENT
Start: 2018-08-28 | End: 2018-10-02

## 2018-08-28 RX ORDER — IBUPROFEN 800 MG/1
800 TABLET ORAL EVERY 6 HOURS PRN
COMMUNITY
End: 2018-10-18 | Stop reason: ALTCHOICE

## 2018-08-28 RX ORDER — ALBUTEROL SULFATE 0.63 MG/3ML
0.63 SOLUTION RESPIRATORY (INHALATION) 2 TIMES DAILY
COMMUNITY
End: 2019-09-20

## 2018-08-28 NOTE — PROGRESS NOTES
Subjective:      Michela is a 73 y.o. female who comes for follow-up of sinusitis.  Her last visit with me was on 3/20/2018.  Now 11 months status-post endoscopic sinus surgery.   Doing worse, increasing nasal congestion over past 2 months, bilateral with nasal blockage and hyposmia.  Blowing out thick green mucus, using saline rinse with budesonide BID.  Unwilling to use prednisone again due to reported bone loss in pelvis.    SNOT-22 score = 39, NOSE score = 65%, ETDQ-7 score = 2.3    The patient's medications, allergies, past medical, surgical, social and family histories were reviewed and updated as appropriate.    A detailed review of systems was obtained with pertinent positives as per the above HPI, and otherwise negative.        Objective:     BP (!) 120/58   Pulse 90        Constitutional:   She appears well-developed. She is cooperative.     Head:  Normocephalic.     Nose:  Mucosal edema and rhinorrhea present. No septal deviation or polyps. No epistaxis. Turbinates normal, no turbinate masses and no turbinate hypertrophy.  Right sinus exhibits no maxillary sinus tenderness and no frontal sinus tenderness. Left sinus exhibits no maxillary sinus tenderness and no frontal sinus tenderness.     Mouth/Throat  Oropharynx clear and moist without lesions or asymmetry. No oropharyngeal exudate or posterior oropharyngeal erythema.     Neck:  No adenopathy. Normal range of motion present.     She has no cervical adenopathy.       Procedure    Nasal endoscopy performed.  See procedure note.     Left nasal valve     Left MT     Left posterior purulent exudate, swabbed for culture     Right nasal valve     Right MT     Right posterior mucopurulent exudate        Data Reviewed    WBC (K/uL)   Date Value   02/16/2018 9.09     Eosinophil% (%)   Date Value   02/16/2018 1.3     Eos # (K/uL)   Date Value   02/16/2018 0.1     Platelets (K/uL)   Date Value   02/16/2018 327     Glucose (mg/dL)   Date Value   02/16/2018 86      IgE (IU/mL)   Date Value   05/28/2018 253 (H)       Pathology report indicated chronic inflammation with eosinophilia.    Cultures showed P acnes.      Assessment:     1. Perennial allergic rhinitis with seasonal variation    2. Chronic pansinusitis    3. Moderate persistent asthma without complication         Plan:     Rx doxycycline 21 days.  Rx increase budesonide 1.0 mg/2 mL sinus rinse BID.  Cultures taken, will call with results.  Follow-up in about 1 month (around 9/28/2018).

## 2018-08-28 NOTE — PROCEDURES
Nasal/sinus endoscopy  Date/Time: 8/28/2018 3:12 PM  Performed by: Alexys Boo MD  Authorized by: Alexys Boo MD     Consent Done?:  Yes (Verbal)  Anesthesia:     Local anesthetic:  Lidocaine 4% and Emerson-Synephrine 1/2%    Patient tolerance:  Patient tolerated the procedure well with no immediate complications  Nose:     Procedure Performed:  Nasal Endoscopy  External:      No external nasal deformity  Intranasal:      Mucosa no polyps     Mucosa ulcers not present     No mucosa lesions present     Turbinates not enlarged     No septum gross deformity  Nasopharynx:      No mucosa lesions     Adenoids not present     Posterior choanae patent     Eustachian tube patent     Marked edema and mucoid exudate throughout nasal cavity, more on left.  Purulent posterior drainage bilaterally, swabbed for culture.  No grossly apparent polyps.

## 2018-08-30 LAB — BACTERIA SPEC AEROBE CULT: NORMAL

## 2018-08-31 ENCOUNTER — TELEPHONE (OUTPATIENT)
Dept: OTOLARYNGOLOGY | Facility: CLINIC | Age: 73
End: 2018-08-31

## 2018-08-31 DIAGNOSIS — J32.4 CHRONIC PANSINUSITIS: Primary | ICD-10-CM

## 2018-08-31 RX ORDER — CIPROFLOXACIN 500 MG/1
500 TABLET ORAL 2 TIMES DAILY
Qty: 42 TABLET | Refills: 0 | Status: SHIPPED | OUTPATIENT
Start: 2018-08-31 | End: 2018-09-21

## 2018-08-31 NOTE — TELEPHONE ENCOUNTER
The sinus swab showed Pseudomonas.  The antibiotic I gave her won't work, so I'm calling in cipro instead to [facility], please let her know.

## 2018-09-04 ENCOUNTER — OFFICE VISIT (OUTPATIENT)
Dept: PULMONOLOGY | Facility: CLINIC | Age: 73
End: 2018-09-04
Payer: MEDICARE

## 2018-09-04 VITALS
BODY MASS INDEX: 32.42 KG/M2 | HEIGHT: 66 IN | DIASTOLIC BLOOD PRESSURE: 64 MMHG | HEART RATE: 75 BPM | WEIGHT: 201.75 LBS | OXYGEN SATURATION: 95 % | SYSTOLIC BLOOD PRESSURE: 112 MMHG

## 2018-09-04 DIAGNOSIS — B44.81 ABPA (ALLERGIC BRONCHOPULMONARY ASPERGILLOSIS): ICD-10-CM

## 2018-09-04 DIAGNOSIS — M25.552 PAIN OF BOTH HIP JOINTS: Primary | ICD-10-CM

## 2018-09-04 DIAGNOSIS — M25.551 PAIN OF BOTH HIP JOINTS: Primary | ICD-10-CM

## 2018-09-04 DIAGNOSIS — J45.40 MODERATE PERSISTENT ASTHMA WITHOUT COMPLICATION: ICD-10-CM

## 2018-09-04 LAB — BACTERIA SPEC ANAEROBE CULT: NORMAL

## 2018-09-04 PROCEDURE — 3074F SYST BP LT 130 MM HG: CPT | Mod: CPTII,,, | Performed by: INTERNAL MEDICINE

## 2018-09-04 PROCEDURE — 99213 OFFICE O/P EST LOW 20 MIN: CPT | Mod: S$PBB,,, | Performed by: INTERNAL MEDICINE

## 2018-09-04 PROCEDURE — 1101F PT FALLS ASSESS-DOCD LE1/YR: CPT | Mod: CPTII,,, | Performed by: INTERNAL MEDICINE

## 2018-09-04 PROCEDURE — 3078F DIAST BP <80 MM HG: CPT | Mod: CPTII,,, | Performed by: INTERNAL MEDICINE

## 2018-09-04 PROCEDURE — 99999 PR PBB SHADOW E&M-EST. PATIENT-LVL III: CPT | Mod: PBBFAC,,, | Performed by: INTERNAL MEDICINE

## 2018-09-04 PROCEDURE — 99213 OFFICE O/P EST LOW 20 MIN: CPT | Mod: PBBFAC | Performed by: INTERNAL MEDICINE

## 2018-09-04 RX ORDER — MELOXICAM 7.5 MG/1
7.5 TABLET ORAL DAILY
Qty: 14 TABLET | Refills: 3 | Status: SHIPPED | OUTPATIENT
Start: 2018-09-04 | End: 2018-10-18

## 2018-09-04 RX ORDER — BUDESONIDE 0.25 MG/2ML
0.25 INHALANT ORAL DAILY
Qty: 60 ML | Refills: 3 | Status: SHIPPED | OUTPATIENT
Start: 2018-09-04 | End: 2018-10-02

## 2018-09-04 NOTE — PROGRESS NOTES
"Subjective:       Patient ID: Michela Franco is a 73 y.o. female.    Chief Complaint: ABPA (allergic bronchopulmonary aspergillosis    HPI     Ms. Franco returns for interval follow up of ABPA that was initially diagnosed in November 2017.  She completed six months of itraconazole in June 2018 with improvement in IgE levels and successful reduction in prednisone dose.  She has been off prednisone and itraconazole for ~ 2 months.  She had been okay until the last couple weeks when she noted increased exertional dyspnea, wheezing, and cough typically productive of green sputum.  She has been using albuterol rescue inhaler regularly during this time because of worsening asthma.  She was recently seen at [facility] by Dr. Everett (Allergy) who reported that her IgE level had gone back up.  According to Ms. Franco, he recommended that she needed "a shot" for the IgE level.    Other pertinent findings recently have included recently diagnosed avascular necrosis (both hips) likely due to steroid administration.  She was also diagnosed with pseudomonas sinusitis by Dr. Boo in ENT, and she was started on ciprofloxacin at the end of last week.    Her respiratory regimen includes the following:  · Advair 500/50 twice daily  · Albuterol nebulizer treatments twice daily  · Albuterol MDI rescue inhaler ~twice daily while away from home  · Nasal budesonide rinse as per ENT  · Ciprofloxacin as per ENT  · CPAP compliant for obstructive sleep apnea    Review of Systems   Constitutional: Positive for activity change. Negative for fever, chills, appetite change and night sweats.   HENT: Positive for postnasal drip and sinus pressure. Negative for nosebleeds, trouble swallowing and voice change.    Respiratory: Positive for cough, sputum production, chest tightness, shortness of breath, wheezing, asthma nighttime symptoms, dyspnea on extertion and use of rescue inhaler. Negative for hemoptysis, previous hospitialization due to " pulmonary problems and pleurisy.    Cardiovascular: Positive for leg swelling. Negative for palpitations.   Musculoskeletal: Positive for arthralgias.   Gastrointestinal: Negative for nausea and acid reflux.   Neurological: Negative for syncope, weakness and light-headedness.       Objective:      Physical Exam   Constitutional: She is oriented to person, place, and time. No distress.   Weight down 10 pounds since last visit 3 months ago.   HENT:   Nose: No mucosal edema.   Mouth/Throat: Oropharynx is clear and moist. No oropharyngeal exudate.   Neck: No JVD present.   Cardiovascular: Normal rate, regular rhythm and normal heart sounds. Exam reveals no gallop.   No murmur heard.  Pulmonary/Chest: Symmetric chest wall expansion and effort normal. No stridor. No respiratory distress. She has no decreased breath sounds. She has wheezes. She has rhonchi. She has no rales.   SpO2 95% on room air.   Musculoskeletal: She exhibits edema (Trace bilateral).   Lymphadenopathy: No supraclavicular adenopathy is present.     She has no cervical adenopathy.   Neurological: She is alert and oriented to person, place, and time.   Skin: Nails show no clubbing.   Psychiatric: She has a normal mood and affect. Her behavior is normal.   Nursing note and vitals reviewed.    Personal Diagnostic Review    No new studies to review at this visit.  See prior notes for past spirometry results.      Assessment:       1. Pain of both hip joints    2. ABPA (allergic bronchopulmonary aspergillosis)    3. Moderate persistent asthma without complication        Outpatient Encounter Medications as of 9/4/2018   Medication Sig Dispense Refill    ADVAIR DISKUS 500-50 mcg/dose DsDv diskus inhaler 1 puff 2 (two) times daily.  3    albuterol (ACCUNEB) 0.63 mg/3 mL Nebu Take 0.63 mg by nebulization 2 (two) times daily. Rescue      albuterol 90 mcg/actuation inhaler Inhale 2 puffs into the lungs every 6 (six) hours as needed for Wheezing. 54 g 3     amLODIPine (NORVASC) 10 MG tablet TAKE 1 TABLET ONE TIME DAILY 90 tablet 3    budesonide 1 mg/2 mL NbSp Inhale 1 ampule into the lungs 2 (two) times daily. For use in saline irrigation as directed. 360 mL 1    calcitonin, salmon, (FORTICAL) 200 unit/actuation nasal spray 1 spray by Nasal route once daily. 1 Bottle 2    cholecalciferol, vitamin D3, 10,000 unit Cap Take 360 mg by mouth once daily. Take 6 capsules (6,000 units total) by mouth once daily      ciprofloxacin HCl (CIPRO) 500 MG tablet Take 1 tablet (500 mg total) by mouth 2 (two) times daily. for 21 days 42 tablet 0    COD LIVER OIL ORAL Take 1 tablet by mouth once daily.      ferrous sulfate 325 (65 FE) MG EC tablet Take 1 tablet (325 mg total) by mouth 3 (three) times daily with meals. (Patient taking differently: Take 325 mg by mouth once daily. Taking once daily) 100 tablet 3    folic acid (FOLVITE) 800 MCG Tab Take 1 tablet (800 mcg total) by mouth once daily. 100 tablet 3    furosemide (LASIX) 20 MG tablet TAKE 1 TABLET (20 MG TOTAL) BY MOUTH 2 (TWO) TIMES DAILY AS NEEDED (LEG SWELLING). 60 tablet 3    guaiFENesin (MUCINEX) 600 mg 12 hr tablet Take 600 mg by mouth 2 (two) times daily.      ibuprofen (ADVIL,MOTRIN) 800 MG tablet Take 800 mg by mouth every 6 (six) hours as needed for Pain.      loratadine (CLARITIN) 10 mg tablet Take 10 mg by mouth once daily.      losartan (COZAAR) 25 MG tablet Take 1 tablet (25 mg total) by mouth once daily. 90 tablet 3    multivitamin (MULTIVITAMIN) per tablet Take 1 tablet by mouth once daily.        potassium chloride SA (K-DUR,KLOR-CON) 20 MEQ tablet Take 2 tablets (40 mEq total) by mouth once daily. (Patient taking differently: Take 20 mEq by mouth once daily. ) 180 tablet 3    rosuvastatin (CRESTOR) 5 MG tablet Take 1 tablet (5 mg total) by mouth once daily. 90 tablet 3    [DISCONTINUED] budesonide (PULMICORT) 0.5 mg/2 mL nebulizer solution 0.5 mg 2 (two) times daily. Controller      ACTIVATED  CHARCOAL (CHARCOCAPS ORAL) Take by mouth as needed.       budesonide (PULMICORT) 0.25 mg/2 mL nebulizer solution Take 2 mLs (0.25 mg total) by nebulization once daily. Controller 60 mL 3    cetirizine (ZYRTEC) 10 MG tablet Take 10 mg by mouth once daily.      meloxicam (MOBIC) 7.5 MG tablet Take 1 tablet (7.5 mg total) by mouth once daily. 14 tablet 3    [DISCONTINUED] calcitonin, salmon, (FORTICAL) 200 unit/actuation nasal spray 1 spray by Nasal route once daily. 1 Bottle 2    [DISCONTINUED] doxycycline (MONODOX) 100 MG capsule Take 1 capsule (100 mg total) by mouth every 12 (twelve) hours. for 21 days 42 capsule 0    [DISCONTINUED] meloxicam (MOBIC) 7.5 MG tablet Take 1 tablet (7.5 mg total) by mouth once daily. 14 tablet 0     No facility-administered encounter medications on file as of 9/4/2018.      No orders of the defined types were placed in this encounter.    Plan:   Since stopping prednisone and itraconazole a couple months ago, Ms. Franco has lost ground.  This is much more noticeable over the last week or so.  She likely needs IgE-specific therapy, especially with the orthopedic complications of chronic high dose steroids.  We will try to get records from Dr. Everett's office ([facility]).  In the meantime, I have suggested that she use the nebulizer as needed when awakening at night and add budesonide to the nebulizer twice daily.    · Use albuterol MDI or nebulized when awakening from sleep in the early AM  · Add budesonide 0.25 mg to nebulized albuterol twice daily.  We will avoid oral steroids due to avascular necrosis.  · Okay to take meloxicam for hip pain  · Obtain records from Dr. Everett at [facility] => specifically the most recent IgE level  · Depending upon these results, we may need to repeat IgE and aspergillus antigen studies, as well as pre-/post-bronchodilator spirometry  · Consider IgE-specific immunotherapy    I have copied Dr. Arthur (Primary Care) and Dr. Kimbrough (Infectious  Diseases) on this note.    Asif Chowdhury MD  Pulmonary/Critical Care Medicine

## 2018-09-15 DIAGNOSIS — J32.4 CHRONIC PANSINUSITIS: ICD-10-CM

## 2018-09-17 RX ORDER — DOXYCYCLINE 100 MG/1
100 CAPSULE ORAL EVERY 12 HOURS
Qty: 42 CAPSULE | Refills: 0 | OUTPATIENT
Start: 2018-09-17 | End: 2018-10-08

## 2018-10-02 ENCOUNTER — OFFICE VISIT (OUTPATIENT)
Dept: OTOLARYNGOLOGY | Facility: CLINIC | Age: 73
End: 2018-10-02
Payer: MEDICARE

## 2018-10-02 VITALS — SYSTOLIC BLOOD PRESSURE: 134 MMHG | DIASTOLIC BLOOD PRESSURE: 78 MMHG | HEART RATE: 82 BPM

## 2018-10-02 DIAGNOSIS — J45.40 MODERATE PERSISTENT ASTHMA WITHOUT COMPLICATION: ICD-10-CM

## 2018-10-02 DIAGNOSIS — J30.2 PERENNIAL ALLERGIC RHINITIS WITH SEASONAL VARIATION: Primary | ICD-10-CM

## 2018-10-02 DIAGNOSIS — J32.4 CHRONIC PANSINUSITIS: ICD-10-CM

## 2018-10-02 DIAGNOSIS — J30.89 PERENNIAL ALLERGIC RHINITIS WITH SEASONAL VARIATION: Primary | ICD-10-CM

## 2018-10-02 PROCEDURE — 99999 PR PBB SHADOW E&M-EST. PATIENT-LVL III: CPT | Mod: PBBFAC,,, | Performed by: OTOLARYNGOLOGY

## 2018-10-02 PROCEDURE — 87075 CULTR BACTERIA EXCEPT BLOOD: CPT

## 2018-10-02 PROCEDURE — 1101F PT FALLS ASSESS-DOCD LE1/YR: CPT | Mod: CPTII,,, | Performed by: OTOLARYNGOLOGY

## 2018-10-02 PROCEDURE — 3078F DIAST BP <80 MM HG: CPT | Mod: CPTII,,, | Performed by: OTOLARYNGOLOGY

## 2018-10-02 PROCEDURE — 87186 SC STD MICRODIL/AGAR DIL: CPT

## 2018-10-02 PROCEDURE — 31231 NASAL ENDOSCOPY DX: CPT | Mod: PBBFAC | Performed by: OTOLARYNGOLOGY

## 2018-10-02 PROCEDURE — 99213 OFFICE O/P EST LOW 20 MIN: CPT | Mod: 25,S$PBB,, | Performed by: OTOLARYNGOLOGY

## 2018-10-02 PROCEDURE — 99213 OFFICE O/P EST LOW 20 MIN: CPT | Mod: PBBFAC | Performed by: OTOLARYNGOLOGY

## 2018-10-02 PROCEDURE — 3075F SYST BP GE 130 - 139MM HG: CPT | Mod: CPTII,,, | Performed by: OTOLARYNGOLOGY

## 2018-10-02 PROCEDURE — 87070 CULTURE OTHR SPECIMN AEROBIC: CPT

## 2018-10-02 PROCEDURE — 87077 CULTURE AEROBIC IDENTIFY: CPT

## 2018-10-02 RX ORDER — BUDESONIDE 0.5 MG/2ML
0.5 INHALANT ORAL DAILY
Qty: 180 ML | Refills: 1 | Status: SHIPPED | OUTPATIENT
Start: 2018-10-02 | End: 2019-03-08

## 2018-10-02 NOTE — PROGRESS NOTES
Subjective:      Michela is a 73 y.o. female who comes for follow-up of sinusitis.  Her last visit with me was on 8/28/2018.  Now 1 year status-post endoscopic sinus surgery.   Doing much better after 3 weeks of cipro.  Only mild postnasal drip and congestion.  Breathing well, no pain, some mild pressure.  Using 1.0 mg budesonide in sinus rinse.    SNOT-22 score = 19, NOSE score = 10%, ETDQ-7 score = 3.0    The patient's medications, allergies, past medical, surgical, social and family histories were reviewed and updated as appropriate.    A detailed review of systems was obtained with pertinent positives as per the above HPI, and otherwise negative.        Objective:     /78   Pulse 82        Constitutional:   She appears well-developed. She is cooperative. Normal speech.  No hoarse voice.      Head:  Normocephalic. Salivary glands normal.  Facial strength is normal.      Ears:    Right Ear: No drainage or tenderness. Tympanic membrane is not perforated. Tympanic membrane mobility is normal. No middle ear effusion. No decreased hearing is noted.   Left Ear: No drainage or tenderness. Tympanic membrane is not perforated. Tympanic membrane mobility is normal.  No middle ear effusion. No decreased hearing is noted.     Nose:  No mucosal edema, rhinorrhea, septal deviation or polyps. No epistaxis. Turbinates normal, no turbinate masses and no turbinate hypertrophy.  Right sinus exhibits no maxillary sinus tenderness and no frontal sinus tenderness. Left sinus exhibits no maxillary sinus tenderness and no frontal sinus tenderness.     Mouth/Throat  Oropharynx clear and moist without lesions or asymmetry and normal uvula midline. She does not have dentures. Normal dentition. No oral lesions or mucous membrane lesions. No oropharyngeal exudate or posterior oropharyngeal erythema. Mirror exam not performed due to patient tolerance.  Mirror exam not performed due to patient tolerance.      Neck:  Neck normal  without thyromegaly masses, asymmetry, normal tracheal structure, crepitus, and tenderness, thyroid normal, trachea normal and no adenopathy. Normal range of motion present.     She has no cervical adenopathy.     Cardiovascular:   Regular rhythm.      Pulmonary/Chest:   Effort normal.     Psychiatric:   She has a normal mood and affect. Her speech is normal and behavior is normal.     Neurological:   No cranial nerve deficit.     Skin:   No rash noted.       Procedure    Nasal endoscopy performed.  See procedure note.     Left MT     Left posterior ethmoid drainage, swabbed for culture     Right MT     Right SE recess clear        Data Reviewed    WBC (K/uL)   Date Value   02/16/2018 9.09     Eosinophil% (%)   Date Value   02/16/2018 1.3     Eos # (K/uL)   Date Value   02/16/2018 0.1     Platelets (K/uL)   Date Value   02/16/2018 327     Glucose (mg/dL)   Date Value   02/16/2018 86     IgE (IU/mL)   Date Value   05/28/2018 253 (H)       Pathology report indicated chronic inflammation with eosinophilia.    Cultures showed Psuedomonas last visit.      Assessment:     1. Perennial allergic rhinitis with seasonal variation    2. Chronic pansinusitis    3. Moderate persistent asthma without complication         Plan:     New cultures taken, may continue cipro vs compounded antibiotic in nebulizer.  Decrease budesonide to 0.5 mg dose in sinus rinse.  Continue Advair for lungs.  Follow-up in about 2 months (around 12/2/2018).

## 2018-10-02 NOTE — PROGRESS NOTES
"  Subjective:      Michela Franco is a 73 y.o. female who was {display:19197:o:"referred to me by *** in consultation","self-referred","referred to me by Dr. Alexys Boo in consultation"} for {display:19197:o:"epiphora","aural fullness","sinus pressure","rhinorrhea","reduced sense of smell","snoring","post-nasal drip","nasal obstruction","nasal congestion","nasal polyps","sinusitis"}.    ***    {display:19197:o:"QOL assessment deferred.","NOSE score = ***%","SNOT-22 score = ***","SNOT-22 score = ***, NOSE score = ***%, ETDQ-7 score = ***","SNOT-22 score = ***, NOSE score = ***%"}    Global QOL = ***%    Days missed = {display:19197:o:"More than 25","5 to 25","Less than 5"}      Past Medical History  She has a past medical history of Allergy, Chronic diastolic heart failure, Hyperlipidemia, Hypertension, Neuromuscular disorder, JOHN on CPAP, Osteoporosis, Recurrent sinusitis, Recurrent upper respiratory infection (URI), and Urticaria.    Past Surgical History  She has a past surgical history that includes Sinus surgery; Rotator cuff repair (Left); Hysterectomy; COLONOSCOPY (N/A, 5/18/2018); RESECTION-TURBINATES (SMR) (Bilateral, 9/22/2017); SINUS SURGERY FUNCTIONAL ENDOSCOPIC WITH NAVIGATION (Bilateral, 9/22/2017); and COLONOSCOPY (N/A, 12/19/2014).    Family History  Her family history includes Cancer in her sister; Hypertension in her daughter and daughter; Kidney cancer in her sister; No Known Problems in her father and mother; Ovarian cancer in her sister.    Social History  She reports that she quit smoking about 13 years ago. Her smoking use included cigarettes. She has a 10.00 pack-year smoking history. she has never used smokeless tobacco. She reports that she drinks alcohol. She reports that she does not use drugs.    Allergies  She is allergic to adhesive; diazepam; gabapentin; keppra [levetiracetam]; and mold.    Medications   She has a current medication list which includes the following " "prescription(s): activated charcoal, advair diskus, albuterol, albuterol, amlodipine, calcitonin (salmon), cetirizine, cholecalciferol (vitamin d3), cod liver oil, ferrous sulfate, folic acid, furosemide, guaifenesin, ibuprofen, loratadine, losartan, meloxicam, multivitamin, potassium chloride sa, rosuvastatin, and budesonide.    Review of Systems  Review of Systems       Objective:     /78   Pulse 82      Physical Exam    Procedure    {display:87343:a:"Cerumen removal performed.  See procedure note.","Flexible laryngoscopy performed.  See procedure note.","Nasal endoscopy performed.  See procedure note.","None"}        Data Reviewed    WBC (K/uL)   Date Value   02/16/2018 9.09     Eosinophil% (%)   Date Value   02/16/2018 1.3     Eos # (K/uL)   Date Value   02/16/2018 0.1     Platelets (K/uL)   Date Value   02/16/2018 327     Glucose (mg/dL)   Date Value   02/16/2018 86     IgE (IU/mL)   Date Value   05/28/2018 253 (H)       {display:14638:o:"No sinus imaging available.","I independently reviewed the images of the CT sinuses dated ***. Pertinent findings include ***."}       Assessment:     1. Perennial allergic rhinitis with seasonal variation    2. Chronic pansinusitis    3. Moderate persistent asthma without complication         Plan:     I had a long discussion with {display:50289:o:"the patient and her ***","the patient"} regarding her condition and the further workup and management options.  ***    Follow-up in about 2 months (around 12/2/2018).  "

## 2018-10-02 NOTE — PROCEDURES
Nasal/sinus endoscopy  Date/Time: 10/2/2018 9:02 AM  Performed by: Alexys Boo MD  Authorized by: Alexys Boo MD     Consent Done?:  Yes (Verbal)  Anesthesia:     Local anesthetic:  Lidocaine 4% and Emerson-Synephrine 1/2%    Patient tolerance:  Patient tolerated the procedure well with no immediate complications  Nose:     Procedure Performed:  Nasal Endoscopy  External:      No external nasal deformity  Intranasal:      Mucosa no polyps     Mucosa ulcers not present     No mucosa lesions present     Turbinates not enlarged     No septum gross deformity  Nasopharynx:      No mucosa lesions     Adenoids not present     Posterior choanae patent     Eustachian tube patent     Sinuses patent.  Small trail of yellow purulent exudate from left posterior ethmoid, swabbed for culture.  Right side clear.

## 2018-10-04 LAB — BACTERIA SPEC AEROBE CULT: NORMAL

## 2018-10-08 LAB — BACTERIA SPEC ANAEROBE CULT: NORMAL

## 2018-10-09 ENCOUNTER — TELEPHONE (OUTPATIENT)
Dept: OTOLARYNGOLOGY | Facility: CLINIC | Age: 73
End: 2018-10-09

## 2018-10-09 NOTE — TELEPHONE ENCOUNTER
The sinus swab showed a bacteria called Pseudomonas, which she has had before.  I want to call in cipro 500mg BID in a Nasoneb device from [facility].    Keturah, please take care of this.

## 2018-10-09 NOTE — TELEPHONE ENCOUNTER
I spoke with Evelyn at [facility], verbal order given as per Dr. Boo. Cipro 90 mg BID in a Nasoneb device from [facility], no refills.

## 2018-10-18 ENCOUNTER — OFFICE VISIT (OUTPATIENT)
Dept: ORTHOPEDICS | Facility: CLINIC | Age: 73
End: 2018-10-18
Payer: MEDICARE

## 2018-10-18 ENCOUNTER — HOSPITAL ENCOUNTER (OUTPATIENT)
Dept: RADIOLOGY | Facility: HOSPITAL | Age: 73
Discharge: HOME | End: 2018-10-18
Attending: ORTHOPAEDIC SURGERY
Payer: MEDICARE

## 2018-10-18 ENCOUNTER — TELEPHONE (OUTPATIENT)
Dept: ORTHOPEDICS | Facility: CLINIC | Age: 73
End: 2018-10-18

## 2018-10-18 VITALS — WEIGHT: 201 LBS | HEIGHT: 65 IN | BODY MASS INDEX: 33.49 KG/M2

## 2018-10-18 DIAGNOSIS — M87.052 AVASCULAR NECROSIS OF BONE OF LEFT HIP: ICD-10-CM

## 2018-10-18 DIAGNOSIS — M22.2X9 PATELLOFEMORAL STRESS SYNDROME, UNSPECIFIED LATERALITY: Primary | ICD-10-CM

## 2018-10-18 PROCEDURE — 99213 OFFICE O/P EST LOW 20 MIN: CPT | Mod: PBBFAC,25,PN | Performed by: ORTHOPAEDIC SURGERY

## 2018-10-18 PROCEDURE — 1101F PT FALLS ASSESS-DOCD LE1/YR: CPT | Mod: CPTII,,, | Performed by: ORTHOPAEDIC SURGERY

## 2018-10-18 PROCEDURE — 99999 PR PBB SHADOW E&M-EST. PATIENT-LVL III: CPT | Mod: PBBFAC,,, | Performed by: ORTHOPAEDIC SURGERY

## 2018-10-18 PROCEDURE — 99213 OFFICE O/P EST LOW 20 MIN: CPT | Mod: S$PBB,,, | Performed by: ORTHOPAEDIC SURGERY

## 2018-10-18 PROCEDURE — 73502 X-RAY EXAM HIP UNI 2-3 VIEWS: CPT | Mod: 26,LT,, | Performed by: RADIOLOGY

## 2018-10-18 PROCEDURE — 73502 X-RAY EXAM HIP UNI 2-3 VIEWS: CPT | Mod: TC,FY,LT

## 2018-10-18 RX ORDER — MELOXICAM 15 MG/1
15 TABLET ORAL DAILY
Qty: 30 TABLET | Refills: 1 | Status: SHIPPED | OUTPATIENT
Start: 2018-10-18 | End: 2018-10-19 | Stop reason: SDUPTHER

## 2018-10-18 NOTE — PROGRESS NOTES
Subjective:      Patient ID: Michela Franco is a 73 y.o. female.    Chief Complaint: Follow-up of the Left Hip    HPI  Follow-up for early osteonecrosis.  The patient feels somewhat better.  She complains of intermittent symptoms which are mainly gelling in the left knee. She has very mild left groin pain which she reports is not limiting her activity.  The patient does report that sometimes she feels that her left knee will give way.  Review of Systems   Constitution: Negative for fever and weight loss.   HENT: Negative for congestion.    Eyes: Negative for visual disturbance.   Cardiovascular: Negative for chest pain.   Respiratory: Negative for shortness of breath.    Hematologic/Lymphatic: Negative for bleeding problem. Does not bruise/bleed easily.   Skin: Negative for poor wound healing.   Musculoskeletal: Positive for joint pain.   Gastrointestinal: Negative for abdominal pain.   Genitourinary: Negative for dysuria.   Neurological: Negative for seizures.   Psychiatric/Behavioral: Negative for altered mental status.   Allergic/Immunologic: Negative for persistent infections.         Objective:            Ortho/SPM Exam    Left hip-no tenderness. Full active range of motion without irritability.    Left knee  The patient is not in acute distress.   Body habitus is normal.   Sclera appear normal  No respiratory distress  The patient walks without a limp.  Resisted SLR negative.   The skin over the knee is intact.  Knee effusion 0  Tendernes is located absent.  Range of motion- Flexion full, Extension full.   Ligament exam:   MCL intact   Lachman intact              Post sag intact    LCL intact  Patellar apprehension negative.  Popliteal cyst negative  Patellar crepitation absent.  Flexion/pinch negative.  Pulses DP present, PT present.  Motor normal 5/5 strength in all tested muscle groups.   Sensory normal.    I reviewed the relevant radiographic images for the patient's condition:  Today's films of the left  hip show good preservation of joint space. There is no collapse the femoral head. Soft tissue calcifications are noted.        Assessment:       Encounter Diagnoses   Name Primary?    Patellofemoral stress syndrome, unspecified laterality Yes    Avascular necrosis of bone of left hip         the osteonecrosis is stable.  The patient's symptoms are most suggestive of patellofemoral pain.  Plan:       Michela was seen today for follow-up.    Diagnoses and all orders for this visit:    Patellofemoral stress syndrome, unspecified laterality    Avascular necrosis of bone of left hip      I explained my diagnostic impression and the reasoning behind it in detail, using layman's terms.  Models and/or pictures were used to help in the explanation.    Discontinue ibuprofen and start meloxicam.    NSAID instructions.    Icing regimen explained for knee.    Physiotherapy.

## 2018-10-18 NOTE — TELEPHONE ENCOUNTER
Spoke with patient in regards to her being late to her appt . I explained to the patient that Doctor Ryan had to leave at after 4 . Verbalized understanding

## 2018-10-19 ENCOUNTER — TELEPHONE (OUTPATIENT)
Dept: ORTHOPEDICS | Facility: CLINIC | Age: 73
End: 2018-10-19

## 2018-10-19 DIAGNOSIS — M22.2X9 PATELLOFEMORAL STRESS SYNDROME, UNSPECIFIED LATERALITY: ICD-10-CM

## 2018-10-19 RX ORDER — MELOXICAM 15 MG/1
15 TABLET ORAL DAILY
Qty: 30 TABLET | Refills: 1 | Status: SHIPPED | OUTPATIENT
Start: 2018-10-19 | End: 2018-12-14 | Stop reason: SDUPTHER

## 2018-10-19 NOTE — TELEPHONE ENCOUNTER
----- Message from Chiqui Martinez sent at 10/19/2018  3:10 PM CDT -----  Contact: [phone]/self  Patient requesting to speak with you regarding having medication meloxicam (MOBIC) 15 MG tablet sent to [facility] on [address].

## 2018-10-19 NOTE — TELEPHONE ENCOUNTER
I will fax the prescription to her pharmacy.    If she wishes to discuss this she will need to make an appointment

## 2018-10-31 ENCOUNTER — IMMUNIZATION (OUTPATIENT)
Dept: PHARMACY | Facility: CLINIC | Age: 73
End: 2018-10-31
Payer: MEDICARE

## 2018-11-07 ENCOUNTER — CLINICAL SUPPORT (OUTPATIENT)
Dept: REHABILITATION | Facility: HOSPITAL | Age: 73
End: 2018-11-07
Attending: ORTHOPAEDIC SURGERY
Payer: MEDICARE

## 2018-11-07 DIAGNOSIS — M25.562 CHRONIC PAIN OF LEFT KNEE: ICD-10-CM

## 2018-11-07 DIAGNOSIS — G89.29 CHRONIC PAIN OF LEFT KNEE: ICD-10-CM

## 2018-11-07 DIAGNOSIS — M25.552 PAIN OF LEFT HIP JOINT: ICD-10-CM

## 2018-11-07 PROCEDURE — G8978 MOBILITY CURRENT STATUS: HCPCS | Mod: CL,HCNC

## 2018-11-07 PROCEDURE — G8979 MOBILITY GOAL STATUS: HCPCS | Mod: CK,HCNC

## 2018-11-07 PROCEDURE — 97163 PT EVAL HIGH COMPLEX 45 MIN: CPT | Mod: HCNC

## 2018-11-07 PROCEDURE — 97110 THERAPEUTIC EXERCISES: CPT | Mod: HCNC

## 2018-11-07 NOTE — PLAN OF CARE
[facility]   PATIENT EVALUATION    Name: Michela Franco  Clinic Number: [id number]    Diagnosis:   Encounter Diagnoses   Name Primary?    Pain of left hip joint     Chronic pain of left knee      Physician: Moiz Davis MD  Treatment Orders: PT Eval and Treat    History     Past Medical History:   Diagnosis Date    Allergy     Chronic diastolic heart failure 4/5/2018    Hyperlipidemia     Hypertension     Neuromuscular disorder     JOHN on CPAP     Osteoporosis     Recurrent sinusitis 3/25/2014    Recurrent upper respiratory infection (URI)     Urticaria      Current Outpatient Medications   Medication Sig    ACTIVATED CHARCOAL (CHARCOCAPS ORAL) Take by mouth as needed.     ADVAIR DISKUS 500-50 mcg/dose DsDv diskus inhaler 1 puff 2 (two) times daily.    albuterol (ACCUNEB) 0.63 mg/3 mL Nebu Take 0.63 mg by nebulization 2 (two) times daily. Rescue    albuterol 90 mcg/actuation inhaler Inhale 2 puffs into the lungs every 6 (six) hours as needed for Wheezing.    amLODIPine (NORVASC) 10 MG tablet TAKE 1 TABLET ONE TIME DAILY    budesonide (PULMICORT) 0.5 mg/2 mL nebulizer solution Take 2 mLs (0.5 mg total) by nebulization once daily. For use in saline irrigation as directed.    calcitonin, salmon, (FORTICAL) 200 unit/actuation nasal spray 1 spray by Nasal route once daily.    cetirizine (ZYRTEC) 10 MG tablet Take 10 mg by mouth once daily.    cholecalciferol, vitamin D3, 10,000 unit Cap Take 360 mg by mouth once daily. Take 6 capsules (6,000 units total) by mouth once daily    COD LIVER OIL ORAL Take 1 tablet by mouth once daily.    ferrous sulfate 325 (65 FE) MG EC tablet Take 1 tablet (325 mg total) by mouth 3 (three) times daily with meals. (Patient taking differently: Take 325 mg by mouth once daily. Taking once daily)    folic acid (FOLVITE) 800 MCG Tab Take 1 tablet (800 mcg total) by mouth once daily.    furosemide (LASIX) 20 MG tablet  "TAKE 1 TABLET (20 MG TOTAL) BY MOUTH 2 (TWO) TIMES DAILY AS NEEDED (LEG SWELLING).    guaiFENesin (MUCINEX) 600 mg 12 hr tablet Take 600 mg by mouth 2 (two) times daily.    loratadine (CLARITIN) 10 mg tablet Take 10 mg by mouth once daily.    losartan (COZAAR) 25 MG tablet Take 1 tablet (25 mg total) by mouth once daily.    meloxicam (MOBIC) 15 MG tablet Take 1 tablet (15 mg total) by mouth once daily.    multivitamin (MULTIVITAMIN) per tablet Take 1 tablet by mouth once daily.      potassium chloride SA (K-DUR,KLOR-CON) 20 MEQ tablet Take 2 tablets (40 mEq total) by mouth once daily. (Patient taking differently: Take 20 mEq by mouth once daily. )    rosuvastatin (CRESTOR) 5 MG tablet Take 1 tablet (5 mg total) by mouth once daily.     No current facility-administered medications for this visit.      Review of patient's allergies indicates:   Allergen Reactions    Adhesive      PAPER TAPE    Diazepam Other (See Comments)     Nervous, jittery    Gabapentin      Nervous      Keppra [levetiracetam]      nervous    Mold      sneezing     Precautions: standard    Evaluation Date: 11/7/18  Start Time: 710  Stop Time: 755  Visit # authorized: 1/1  Authorization period: 10/18/19  Plan of care expiration: 1/2/19  MD referral: patellofemoral stress syndrome    Hx of present illness: Pt had insidious onset of pain with no history of trauma. She went to the doctor who performed an MRI and told her she had a broken hip. She then had further imaging below which revealed avascular necrosis.    X-ray L hip 8/15/18: "FINDINGS: There are no acute fractures or dislocations or osteoblastic or lytic lesions or signs for avascular necrosis.  Soft tissues are unremarkable.  There are numerous phleboliths in the pelvis."    CT pelvis 8/3/18: "FINDINGS:  Bilateral femoral heads appear osteopenic with subtle, small focal subchondral defect in the superior aspect of the left femoral head and subtle underlying linear sclerosis. " " Findings are consistent with recent MRI likely representing changes from bilateral avascular necrosis and early left subchondral fracture.  No large joint effusion seen.  Remaining osseous structures appear intact.  Degenerative changes identified involving the pubic symphysis, bilateral SI joints, and partially visualized lumbar spine.  There is significant sclerotic change with fusion at the superior aspect of the bilateral SI joints.    Partially visualized pelvis demonstrates colonic diverticulosis without evidence of diverticulitis, hysterectomy, and minimal scattered atherosclerosis.  Otherwise, unremarkable."      Subjective     Evelena Joan states she was having difficulty walking due to L hip pain and knee pain so she went to the doctor. They did an MRI which found bone deterioration (avascular necrosis). Se reports her knee on the L side feels weak. Pt reports that she has difficulty walking long distances and standing up from a chair. Pt reports she has trouble climbing stairs and has to go up with RLE only, one leg at a time. Pt can get into the tub but can't get out- she isn't getting into the tub anymore. She has 4 steps to get into her home. Pt reports she has difficulty crossing her legs, putting on shoes/socks, and walking 2 blocks. Pt lives alone and doesn't have any help. Pt reports she used to walk a lot but now she just walks around the office and that's it. Pt is a .    Pain:  Location: L hip running down to lateral L knee  Description: aches and spasms and night  Activities Which Increase Pain: see above  Activities Which Decrease Pain: pain medication and ice, [place] gel  Pain Scale: 4/10 now 10/10 at worst 4/10 at best    Red flags:  Pt. denies bowel/bladder symptoms (urinary retention/fecal incontinence).   Recent weight loss? denies   Constant/Night pain that is unchanging with change of position? denies   PMH of CA? denies      Physical Therapy Goals: to be able to " walk more easily; to walk 3 miles per day    Objective     Observation: Pt ambulated into clinic with no AD.    Posture: trunk rotation forward on the R, elevated R iliac crest, depressed R scapula    Lumbar ROM: (measured in degrees)    Degrees Quality   Flexion   WNL 5x no cange   Extension   75% limited Anterior pelvic movement resulted in pulling feeling but with just lumbar extension 5x no change in pain       Dermatomes: (impaired/normal)  intact    Lower Extremity Strength (graded 0-5 out of 5)   RLE LLE   Hip flexion: 4+/5 4+/5 *pain   Hip ER 5/5 4-/5 *pain   Hip IR 5/5 5/5   Knee extension: 4+/5 4+/5   Knee flexion 4/5 4/5   Ankle dorsiflexion: 5/5 5/5   Hip abduction 4-/5 3-/5   Ankle plantarflexion 4/5 4/5   Hip extension: 3+/5 3+/5     Active/Passive Hip ROM: (measured in degrees)    RLE LLE   Flexion 120 120* pain   Abduction 35 35   Extension 0 0   ER 40 30   IR 30 20       Flexibility: min decreased R hamstrings  · Roc test: positive      · Palpation for condition: TTP L proximal ITB and greater trochanter    PT reviewed FOTO scores for Michela Franco on 11/7/18  FOTO score: 67% limited    Functional Limitations Reports - G Codes  Category: mobility  Tool: FOTO      Current ():  CL  Goal (): CK      TREATMENT:  Therapeutic exercise: Michela received therapeutic exercises to develop strength and endurance, flexibility for 8 minutes including:     Clamshells 2x10  Bridges x10  Hip flexor stretch x1 min L      Pt. Education: Instructed patient regarding:body mechanics, posture, activity modification/avoidance, and proper technique with all exercises. Michela demonstrated good return demonstration of activities and she was provided with a handout. No cultural, environmental, or spiritual barriers identified to treatment or learning.    HEP2GO CODE:  [id number]    Assessment   Patient is a 73 y.o. female referred to outpatient physical therapy who presents with a PT diagnosis of L hip and knee  pain demonstrating joint dysfunction and functional limitation as described below. Level of complexity is high;  based on patient's past medical history including the below co-morbidities and personal factors; functional limitations, and clinical presentation directly impacting his/her plan of care. Pt demonstrates good rehab potential. Pt will benefit from physcial therapy services in order to maximize pain free functional mobility. The following goals were discussed with the patient and patient is in agreement with them as to be addressed in the treatment plan. Pt was given a HEP consisting of clamshells, hip flexor stretch, and bridges. Pt verbally understood the instructions as they were given and demonstrated proper form and technique during therapy. Pt was advised to perform these exercises free of pain, and to stop performing them if pain occurs.         History  Co-morbidities and personal factors that may impact the plan of care Examination  Body Structures and Functions, activity limitations and participation restrictions that may impact the plan of care Clinical Presentation   Decision Making/ Complexity Score   Co-morbidities:   Obesity, osteoporosis, chronic diastolic heart failure, back pain            Personal Factors:   age Body Regions: hip, knee    Body Systems: musculoskeletal, cardiovascular      Activity limitations: walking, standing up, putting on shoes/socks, stairs      Participation Restrictions: exercise, taking baths   unstable   high       Medical necessity is demonstrated by the following IMPAIRMENTS/PROBLEM LIST:    1) Pain limiting function   2) Posture dysfunction   3) Core/Lumbar/LE weakness   4) Decreased lumbar joint mobility   6) Decreased soft tissue extensibility/fascia restriction   7) Decreased LE flexibility: hamstrings and hip flexors   8) Lack of HEP   9) LE paresthesia     GOALS:   Short Term Goals:  3 weeks  1. Patient will be proficient and compliant in HEP.  2. Improve  hip abductor strength by 1 muscle grade in order to improve stability when walking.  3. Pt will be able to ambulate >/=2 blocks without an increase in symptoms.    Long Term Goals: 8 weeks  1. Pt will report no difficulty putting on shoes/socks.  2. Pt will be able to enter her home using step to gait pattern up stairs.  3. Pt will be </= 47% limited in mobility according to FOTO.       Plan     Pt will be treated by physical therapy 2 times a week for 8 weeks for pt. education, HEP, therapeutic exercises, neuromuscular re-education, soft tissue and joint mobilizations; and modalities prn to achieve established goals. Michela may at times be seen by a PTA as part of the Rehab Team.     I certify the need for these services furnished under this plan of treatment and while under my care.  ______________________________ Physician/Referring Practitioner  Date of Signature      Fina Major, PT, DPT

## 2018-11-07 NOTE — PROGRESS NOTES
[facility]   PATIENT EVALUATION    Name: Michela Franco  Clinic Number: [id number]    Diagnosis:   Encounter Diagnoses   Name Primary?    Pain of left hip joint     Chronic pain of left knee      Physician: Moiz Davis MD  Treatment Orders: PT Eval and Treat    History     Past Medical History:   Diagnosis Date    Allergy     Chronic diastolic heart failure 4/5/2018    Hyperlipidemia     Hypertension     Neuromuscular disorder     JOHN on CPAP     Osteoporosis     Recurrent sinusitis 3/25/2014    Recurrent upper respiratory infection (URI)     Urticaria      Current Outpatient Medications   Medication Sig    ACTIVATED CHARCOAL (CHARCOCAPS ORAL) Take by mouth as needed.     ADVAIR DISKUS 500-50 mcg/dose DsDv diskus inhaler 1 puff 2 (two) times daily.    albuterol (ACCUNEB) 0.63 mg/3 mL Nebu Take 0.63 mg by nebulization 2 (two) times daily. Rescue    albuterol 90 mcg/actuation inhaler Inhale 2 puffs into the lungs every 6 (six) hours as needed for Wheezing.    amLODIPine (NORVASC) 10 MG tablet TAKE 1 TABLET ONE TIME DAILY    budesonide (PULMICORT) 0.5 mg/2 mL nebulizer solution Take 2 mLs (0.5 mg total) by nebulization once daily. For use in saline irrigation as directed.    calcitonin, salmon, (FORTICAL) 200 unit/actuation nasal spray 1 spray by Nasal route once daily.    cetirizine (ZYRTEC) 10 MG tablet Take 10 mg by mouth once daily.    cholecalciferol, vitamin D3, 10,000 unit Cap Take 360 mg by mouth once daily. Take 6 capsules (6,000 units total) by mouth once daily    COD LIVER OIL ORAL Take 1 tablet by mouth once daily.    ferrous sulfate 325 (65 FE) MG EC tablet Take 1 tablet (325 mg total) by mouth 3 (three) times daily with meals. (Patient taking differently: Take 325 mg by mouth once daily. Taking once daily)    folic acid (FOLVITE) 800 MCG Tab Take 1 tablet (800 mcg total) by mouth once daily.    furosemide (LASIX) 20 MG tablet  "TAKE 1 TABLET (20 MG TOTAL) BY MOUTH 2 (TWO) TIMES DAILY AS NEEDED (LEG SWELLING).    guaiFENesin (MUCINEX) 600 mg 12 hr tablet Take 600 mg by mouth 2 (two) times daily.    loratadine (CLARITIN) 10 mg tablet Take 10 mg by mouth once daily.    losartan (COZAAR) 25 MG tablet Take 1 tablet (25 mg total) by mouth once daily.    meloxicam (MOBIC) 15 MG tablet Take 1 tablet (15 mg total) by mouth once daily.    multivitamin (MULTIVITAMIN) per tablet Take 1 tablet by mouth once daily.      potassium chloride SA (K-DUR,KLOR-CON) 20 MEQ tablet Take 2 tablets (40 mEq total) by mouth once daily. (Patient taking differently: Take 20 mEq by mouth once daily. )    rosuvastatin (CRESTOR) 5 MG tablet Take 1 tablet (5 mg total) by mouth once daily.     No current facility-administered medications for this visit.      Review of patient's allergies indicates:   Allergen Reactions    Adhesive      PAPER TAPE    Diazepam Other (See Comments)     Nervous, jittery    Gabapentin      Nervous      Keppra [levetiracetam]      nervous    Mold      sneezing     Precautions: standard    Evaluation Date: 11/7/18  Start Time: 710  Stop Time: 755  Visit # authorized: 1/1  Authorization period: 10/18/19  Plan of care expiration: 1/2/19  MD referral: patellofemoral stress syndrome    Hx of present illness: Pt had insidious onset of pain with no history of trauma. She went to the doctor who performed an MRI and told her she had a broken hip. She then had further imaging below which revealed avascular necrosis.    X-ray L hip 8/15/18: "FINDINGS: There are no acute fractures or dislocations or osteoblastic or lytic lesions or signs for avascular necrosis.  Soft tissues are unremarkable.  There are numerous phleboliths in the pelvis."    CT pelvis 8/3/18: "FINDINGS:  Bilateral femoral heads appear osteopenic with subtle, small focal subchondral defect in the superior aspect of the left femoral head and subtle underlying linear sclerosis. " " Findings are consistent with recent MRI likely representing changes from bilateral avascular necrosis and early left subchondral fracture.  No large joint effusion seen.  Remaining osseous structures appear intact.  Degenerative changes identified involving the pubic symphysis, bilateral SI joints, and partially visualized lumbar spine.  There is significant sclerotic change with fusion at the superior aspect of the bilateral SI joints.    Partially visualized pelvis demonstrates colonic diverticulosis without evidence of diverticulitis, hysterectomy, and minimal scattered atherosclerosis.  Otherwise, unremarkable."      Subjective     Evelena Joan states she was having difficulty walking due to L hip pain and knee pain so she went to the doctor. They did an MRI which found bone deterioration (avascular necrosis). Se reports her knee on the L side feels weak. Pt reports that she has difficulty walking long distances and standing up from a chair. Pt reports she has trouble climbing stairs and has to go up with RLE only, one leg at a time. Pt can get into the tub but can't get out- she isn't getting into the tub anymore. She has 4 steps to get into her home. Pt reports she has difficulty crossing her legs, putting on shoes/socks, and walking 2 blocks. Pt lives alone and doesn't have any help. Pt reports she used to walk a lot but now she just walks around the office and that's it. Pt is a .    Pain:  Location: L hip running down to lateral L knee  Description: aches and spasms and night  Activities Which Increase Pain: see above  Activities Which Decrease Pain: pain medication and ice, [place] gel  Pain Scale: 4/10 now 10/10 at worst 4/10 at best    Red flags:  Pt. denies bowel/bladder symptoms (urinary retention/fecal incontinence).   Recent weight loss? denies   Constant/Night pain that is unchanging with change of position? denies   PMH of CA? denies      Physical Therapy Goals: to be able to " walk more easily; to walk 3 miles per day    Objective     Observation: Pt ambulated into clinic with no AD.    Posture: trunk rotation forward on the R, elevated R iliac crest, depressed R scapula    Lumbar ROM: (measured in degrees)    Degrees Quality   Flexion   WNL 5x no cange   Extension   75% limited Anterior pelvic movement resulted in pulling feeling but with just lumbar extension 5x no change in pain       Dermatomes: (impaired/normal)  intact    Lower Extremity Strength (graded 0-5 out of 5)   RLE LLE   Hip flexion: 4+/5 4+/5 *pain   Hip ER 5/5 4-/5 *pain   Hip IR 5/5 5/5   Knee extension: 4+/5 4+/5   Knee flexion 4/5 4/5   Ankle dorsiflexion: 5/5 5/5   Hip abduction 4-/5 3-/5   Ankle plantarflexion 4/5 4/5   Hip extension: 3+/5 3+/5     Active/Passive Hip ROM: (measured in degrees)    RLE LLE   Flexion 120 120* pain   Abduction 35 35   Extension 0 0   ER 40 30   IR 30 20       Flexibility: min decreased R hamstrings  · Roc test: positive      · Palpation for condition: TTP L proximal ITB and greater trochanter    PT reviewed FOTO scores for Michela Franco on 11/7/18  FOTO score: 67% limited    Functional Limitations Reports - G Codes  Category: mobility  Tool: FOTO      Current ():  CL  Goal (): CK      TREATMENT:  Therapeutic exercise: Michela received therapeutic exercises to develop strength and endurance, flexibility for 8 minutes including:     Clamshells 2x10  Bridges x10  Hip flexor stretch x1 min L      Pt. Education: Instructed patient regarding:body mechanics, posture, activity modification/avoidance, and proper technique with all exercises. Michela demonstrated good return demonstration of activities and she was provided with a handout. No cultural, environmental, or spiritual barriers identified to treatment or learning.    HEP2GO CODE:  [id number]    Assessment   Patient is a 73 y.o. female referred to outpatient physical therapy who presents with a PT diagnosis of L hip and knee  pain demonstrating joint dysfunction and functional limitation as described below. Level of complexity is high;  based on patient's past medical history including the below co-morbidities and personal factors; functional limitations, and clinical presentation directly impacting his/her plan of care. Pt demonstrates good rehab potential. Pt will benefit from physcial therapy services in order to maximize pain free functional mobility. The following goals were discussed with the patient and patient is in agreement with them as to be addressed in the treatment plan. Pt was given a HEP consisting of clamshells, hip flexor stretch, and bridges. Pt verbally understood the instructions as they were given and demonstrated proper form and technique during therapy. Pt was advised to perform these exercises free of pain, and to stop performing them if pain occurs.         History  Co-morbidities and personal factors that may impact the plan of care Examination  Body Structures and Functions, activity limitations and participation restrictions that may impact the plan of care Clinical Presentation   Decision Making/ Complexity Score   Co-morbidities:   Obesity, osteoporosis, chronic diastolic heart failure, back pain            Personal Factors:   age Body Regions: hip, knee    Body Systems: musculoskeletal, cardiovascular      Activity limitations: walking, standing up, putting on shoes/socks, stairs      Participation Restrictions: exercise, taking baths   unstable   high       Medical necessity is demonstrated by the following IMPAIRMENTS/PROBLEM LIST:    1) Pain limiting function   2) Posture dysfunction   3) Core/Lumbar/LE weakness   4) Decreased lumbar joint mobility   6) Decreased soft tissue extensibility/fascia restriction   7) Decreased LE flexibility: hamstrings and hip flexors   8) Lack of HEP   9) LE paresthesia     GOALS:   Short Term Goals:  3 weeks  1. Patient will be proficient and compliant in HEP.  2. Improve  hip abductor strength by 1 muscle grade in order to improve stability when walking.  3. Pt will be able to ambulate >/=2 blocks without an increase in symptoms.    Long Term Goals: 8 weeks  1. Pt will report no difficulty putting on shoes/socks.  2. Pt will be able to enter her home using step to gait pattern up stairs.  3. Pt will be </= 47% limited in mobility according to FOTO.       Plan     Pt will be treated by physical therapy 2 times a week for 8 weeks for pt. education, HEP, therapeutic exercises, neuromuscular re-education, soft tissue and joint mobilizations; and modalities prn to achieve established goals. Michela may at times be seen by a PTA as part of the Rehab Team.     I certify the need for these services furnished under this plan of treatment and while under my care.  ______________________________ Physician/Referring Practitioner  Date of Signature      Fina Major, PT, DPT

## 2018-11-19 ENCOUNTER — CLINICAL SUPPORT (OUTPATIENT)
Dept: REHABILITATION | Facility: HOSPITAL | Age: 73
End: 2018-11-19
Attending: ORTHOPAEDIC SURGERY
Payer: MEDICARE

## 2018-11-19 DIAGNOSIS — M25.552 PAIN OF LEFT HIP JOINT: Primary | ICD-10-CM

## 2018-11-19 DIAGNOSIS — M25.562 CHRONIC PAIN OF LEFT KNEE: ICD-10-CM

## 2018-11-19 DIAGNOSIS — G89.29 CHRONIC PAIN OF LEFT KNEE: ICD-10-CM

## 2018-11-19 PROCEDURE — 97110 THERAPEUTIC EXERCISES: CPT | Mod: HCNC

## 2018-11-19 NOTE — PROGRESS NOTES
"                                                  Physical Therapy Daily Note     Date: 11/19/2018  Name: Michela Franco  Clinic Number: [id number]  Diagnosis:   Encounter Diagnoses   Name Primary?    Pain of left hip joint Yes    Chronic pain of left knee      Physician: Moiz Davis MD    Precautions: standard    Evaluation Date: 11/7/18  Start Time: 1400  Stop Time: 1453  Visit # authorized: 2/21  Authorization period: 12/31/18  Plan of care expiration: 1/2/19  MD referral: patellofemoral stress syndrome    Hx of present illness: Pt had insidious onset of pain with no history of trauma. She went to the doctor who performed an MRI and told her she had a broken hip. She then had further imaging below which revealed avascular necrosis.    X-ray L hip 8/15/18: "FINDINGS: There are no acute fractures or dislocations or osteoblastic or lytic lesions or signs for avascular necrosis.  Soft tissues are unremarkable.  There are numerous phleboliths in the pelvis."    CT pelvis 8/3/18: "FINDINGS:  Bilateral femoral heads appear osteopenic with subtle, small focal subchondral defect in the superior aspect of the left femoral head and subtle underlying linear sclerosis.  Findings are consistent with recent MRI likely representing changes from bilateral avascular necrosis and early left subchondral fracture.  No large joint effusion seen.  Remaining osseous structures appear intact.  Degenerative changes identified involving the pubic symphysis, bilateral SI joints, and partially visualized lumbar spine.  There is significant sclerotic change with fusion at the superior aspect of the bilateral SI joints.    Partially visualized pelvis demonstrates colonic diverticulosis without evidence of diverticulitis, hysterectomy, and minimal scattered atherosclerosis.  Otherwise, unremarkable."      Subjective     Pt reports her L leg is doing pretty well today and she has tried out the HEP exercises at home without difficulty. " "Reports she has been taking cod liver oil to keep her joints lubricated.    Objective     Patient received individual therapy to increase strength, endurance, ROM, flexibility, posture and core stabilization with activities as follows:     Michela received therapeutic exercises to develop strength, endurance, ROM, flexibility, posture and core stabilization for 53 minutes including:     NuStep x5 min  PPT 20x5"  Adduction squeeze 20x5"  DKTC with SB 2x10  SLR 2x5 B  Bridges 2x10  Clamshells 2x10  Hip flexor stretch x1 min B  Shuttle press 3c 2x10  Calf raises 2x10   gastroc stretch x1 min  Step ups on airex 2x10 LLE leading  Standing hip abduction 2x10 B      Written Home Exercises Provided: no new exercises provided this session  Pt demo good understanding of the education provided. Michela demonstrated good return demonstration of activities.     Education provided: DOMS; avascular necrosis  Michela verbalized good understanding of education provided.   No spiritual or educational barriers to learning identified.    Assessment     Pt tolerated tx well. Pt significantly limited in hip extension on LLE. Pt educated in avascular necrosis and the importance of strengthening/weight bearing to stabilize the hip joint. Added various therex above to challenge LE strength/ROM/stability. Pt required verbal cues with all exercises. During SLR, patient with protruding hernia so she was cued to engage core musculature. Pt will continue to benefit from skilled PT in order to decrease pain, increase strength/ROM, and improve functional mobility.    Medical necessity is demonstrated by the following IMPAIRMENTS/PROBLEM LIST:    1) Pain limiting function   2) Posture dysfunction   3) Core/Lumbar/LE weakness   4) Decreased lumbar joint mobility   6) Decreased soft tissue extensibility/fascia restriction   7) Decreased LE flexibility: hamstrings and hip flexors   8) Lack of HEP   9) LE paresthesia     GOALS:   Short Term Goals:  3 " weeks  1. Patient will be proficient and compliant in HEP.  2. Improve hip abductor strength by 1 muscle grade in order to improve stability when walking.  3. Pt will be able to ambulate >/=2 blocks without an increase in symptoms.    Long Term Goals: 8 weeks  1. Pt will report no difficulty putting on shoes/socks.  2. Pt will be able to enter her home using step to gait pattern up stairs.  3. Pt will be </= 47% limited in mobility according to FOTO.            Plan   Continue with established Plan of Care towards PT goals.      Therapist: Fina Major, PT, DPT

## 2018-11-27 ENCOUNTER — CLINICAL SUPPORT (OUTPATIENT)
Dept: REHABILITATION | Facility: HOSPITAL | Age: 73
End: 2018-11-27
Attending: ORTHOPAEDIC SURGERY
Payer: MEDICARE

## 2018-11-27 DIAGNOSIS — G89.29 CHRONIC PAIN OF LEFT KNEE: ICD-10-CM

## 2018-11-27 DIAGNOSIS — M25.552 PAIN OF LEFT HIP JOINT: Primary | ICD-10-CM

## 2018-11-27 DIAGNOSIS — M25.562 CHRONIC PAIN OF LEFT KNEE: ICD-10-CM

## 2018-11-27 PROCEDURE — 97110 THERAPEUTIC EXERCISES: CPT | Mod: HCNC

## 2018-11-27 NOTE — PROGRESS NOTES
"                                                  Physical Therapy Daily Note     Date: 11/27/2018  Name: Michela Franco  Clinic Number: [id number]  Diagnosis:   Encounter Diagnoses   Name Primary?    Pain of left hip joint Yes    Chronic pain of left knee      Physician: Moiz Davis MD    Precautions: standard    Evaluation Date: 11/7/18  Start Time: 1000  Stop Time: 1059  Visit # authorized: 3/21  Authorization period: 12/31/18  Plan of care expiration: 1/2/19  MD referral: patellofemoral stress syndrome    Hx of present illness: Pt had insidious onset of pain with no history of trauma. She went to the doctor who performed an MRI and told her she had a broken hip. She then had further imaging below which revealed avascular necrosis.    X-ray L hip 8/15/18: "FINDINGS: There are no acute fractures or dislocations or osteoblastic or lytic lesions or signs for avascular necrosis.  Soft tissues are unremarkable.  There are numerous phleboliths in the pelvis."    CT pelvis 8/3/18: "FINDINGS:  Bilateral femoral heads appear osteopenic with subtle, small focal subchondral defect in the superior aspect of the left femoral head and subtle underlying linear sclerosis.  Findings are consistent with recent MRI likely representing changes from bilateral avascular necrosis and early left subchondral fracture.  No large joint effusion seen.  Remaining osseous structures appear intact.  Degenerative changes identified involving the pubic symphysis, bilateral SI joints, and partially visualized lumbar spine.  There is significant sclerotic change with fusion at the superior aspect of the bilateral SI joints.    Partially visualized pelvis demonstrates colonic diverticulosis without evidence of diverticulitis, hysterectomy, and minimal scattered atherosclerosis.  Otherwise, unremarkable."      Subjective     Pt reports her L leg is still having trouble getting up stairs. Reports she doesn't really have pain, but more " "stiffness. Reports she did a lot of cooking over the holiday weekend. Pt was not sore following last treatment session. Pt reports she goes back to see her doctor tomorrow.    Objective     Patient received individual therapy to increase strength, endurance, ROM, flexibility, posture and core stabilization with activities as follows:     Michela received therapeutic exercises to develop strength, endurance, ROM, flexibility, posture and core stabilization for 59 minutes including:     NuStep x5 min  PPT 20x5"  Adduction squeeze 20x5"  DKTC with SB 2x10  SLR 2x10 B  Bridges 3x10  Clamshells 3x10  Hip flexor stretch x1 min L  Piriformis stretch 2-way (modified and figure 4 without pulling thigh to chest) 2x30" L  Shuttle press 3c 2x10  Calf raises 2x10   gastroc stretch x1 min  Step ups on airex 2x10 LLE leading  Standing hip abduction 2x10 B  Step ups on level 2 step x21   TKE MSC x30      Written Home Exercises Provided: pelvic tilts  Pt demo good understanding of the education provided. Evelena demonstrated good return demonstration of activities.     Education provided: DOMS; avascular necrosis  Evelena verbalized good understanding of education provided.   No spiritual or educational barriers to learning identified.    Assessment     Pt tolerated tx well. Pt demos improved strength with bridges, SLR, and clamshells. PT added 2-way piriformis stretch to program to improve hip mobility. Added step ups and TKE to program to improve functional mobility and LLE strength. Pt required max verbal cues to prevent hernia protrusion during pelvic tilts and PPT with adduction squeeze-pt given pelvic tilts for HEP to improve coordination. Pt will continue to benefit from skilled PT in order to decrease pain, increase strength/ROM, and improve functional mobility.    Medical necessity is demonstrated by the following IMPAIRMENTS/PROBLEM LIST:    1) Pain limiting function   2) Posture dysfunction   3) Core/Lumbar/LE " weakness   4) Decreased lumbar joint mobility   6) Decreased soft tissue extensibility/fascia restriction   7) Decreased LE flexibility: hamstrings and hip flexors   8) Lack of HEP   9) LE paresthesia     GOALS:   Short Term Goals:  3 weeks  1. Patient will be proficient and compliant in HEP.  2. Improve hip abductor strength by 1 muscle grade in order to improve stability when walking.  3. Pt will be able to ambulate >/=2 blocks without an increase in symptoms.    Long Term Goals: 8 weeks  1. Pt will report no difficulty putting on shoes/socks.  2. Pt will be able to enter her home using step to gait pattern up stairs.  3. Pt will be </= 47% limited in mobility according to FOTO.         Plan   Continue with established Plan of Care towards PT goals.      Therapist: Fina Major, PT, DPT

## 2018-11-29 ENCOUNTER — OFFICE VISIT (OUTPATIENT)
Dept: ORTHOPEDICS | Facility: CLINIC | Age: 73
End: 2018-11-29
Payer: MEDICARE

## 2018-11-29 VITALS — BODY MASS INDEX: 33.49 KG/M2 | WEIGHT: 201 LBS | HEIGHT: 65 IN

## 2018-11-29 DIAGNOSIS — M87.052 AVASCULAR NECROSIS OF BONE OF LEFT HIP: Primary | ICD-10-CM

## 2018-11-29 DIAGNOSIS — G57.13 MERALGIA PARESTHETICA OF BOTH LOWER EXTREMITIES: ICD-10-CM

## 2018-11-29 PROCEDURE — 99999 PR PBB SHADOW E&M-EST. PATIENT-LVL III: CPT | Mod: PBBFAC,HCNC,, | Performed by: ORTHOPAEDIC SURGERY

## 2018-11-29 PROCEDURE — 1101F PT FALLS ASSESS-DOCD LE1/YR: CPT | Mod: CPTII,HCNC,S$GLB, | Performed by: ORTHOPAEDIC SURGERY

## 2018-11-29 PROCEDURE — 99213 OFFICE O/P EST LOW 20 MIN: CPT | Mod: HCNC,S$GLB,, | Performed by: ORTHOPAEDIC SURGERY

## 2018-11-29 RX ORDER — ALENDRONATE SODIUM 70 MG/1
70 TABLET ORAL
Qty: 4 TABLET | Refills: 2 | Status: SHIPPED | OUTPATIENT
Start: 2018-11-29 | End: 2019-02-17 | Stop reason: SDUPTHER

## 2018-11-29 NOTE — PROGRESS NOTES
Subjective:      Patient ID: Michela Franco is a 73 y.o. female.    Chief Complaint: Follow-up and Pain of the Left Hip    HPI  The patient is being treated for left patellofemoral pain and bilateral hip osteonecrosis.  She denies any severe pain in her hips.  She complains of numbness in her thighs.  She has some anterior left knee pain on stairs  Review of Systems   Musculoskeletal: Positive for joint pain.   Neurological: Positive for numbness and paresthesias.         Objective:            Ortho/SPM Exam  Left hip    The patient is not in acute distress.   Body habitus is:normal.   Sclerae normal  The patient walks without a limp.   No respiratory distress  The skin over the hip is:intact.   There is:no local tenderness.   Range of motion- Flexion 85, External rotation 20, internal rotation 5.  Resisted SLR negative.  Pain with rotation positive  Sciatic tension findings negative.  Shortening/lengthening compared to the contralateral side exam deferred.  Pulses DP present, PT present.  Motor normal 5/5 strength in all tested muscle groups.   Sensory there is decreased light touch sensation in both lateral thighs.  No Tinel is noted over the ASIS on either side.          Assessment:       Encounter Diagnoses   Name Primary?    Avascular necrosis of bone of left hip Yes    Meralgia paresthetica of both lower extremities           Plan:       Michela was seen today for follow-up and pain.    Diagnoses and all orders for this visit:    Avascular necrosis of bone of left hip    Meralgia paresthetica of both lower extremities      Stage III avascular necrosis of the left hip with possible stage I on the right also noted by MRI.    I explained my diagnostic impression and the reasoning behind it in detail, using layman's terms.  Models and/or pictures were used to help in the explanation.    Fosamax recommended.  The usual side effects associated with this drug as well as the fact that the efficacy is not completely  predictable was explained. Treatment alternatives were discussed. Patient understands and consents to its use.  Proper administration technique explained. Patient warned to stop if there is any severe GI side effects.    Meralgia paresthetica    Diagnosis explained    Avoidance of compression of affected nerve explained

## 2018-11-30 ENCOUNTER — CLINICAL SUPPORT (OUTPATIENT)
Dept: REHABILITATION | Facility: HOSPITAL | Age: 73
End: 2018-11-30
Attending: ORTHOPAEDIC SURGERY
Payer: MEDICARE

## 2018-11-30 DIAGNOSIS — M25.552 PAIN OF LEFT HIP JOINT: Primary | ICD-10-CM

## 2018-11-30 DIAGNOSIS — G89.29 CHRONIC PAIN OF LEFT KNEE: ICD-10-CM

## 2018-11-30 DIAGNOSIS — M25.562 CHRONIC PAIN OF LEFT KNEE: ICD-10-CM

## 2018-11-30 PROCEDURE — G8978 MOBILITY CURRENT STATUS: HCPCS | Mod: CL,HCNC

## 2018-11-30 PROCEDURE — G8979 MOBILITY GOAL STATUS: HCPCS | Mod: CK,HCNC

## 2018-11-30 PROCEDURE — 97110 THERAPEUTIC EXERCISES: CPT | Mod: HCNC

## 2018-11-30 NOTE — PROGRESS NOTES
Physical Therapy Daily Note         Name: Michela Franco  Clinic Number: [id number]  Diagnosis:   Encounter Diagnoses   Name Primary?    Pain of left hip joint Yes    Chronic pain of left knee      Physician: Moiz Davis MD  Treatment Orders: PT Eval and Treat  Past Medical History:   Diagnosis Date    Allergy     Chronic diastolic heart failure 4/5/2018    Hyperlipidemia     Hypertension     Neuromuscular disorder     JOHN on CPAP     Osteoporosis     Recurrent sinusitis 3/25/2014    Recurrent upper respiratory infection (URI)     Urticaria      Current Outpatient Medications   Medication Sig    ACTIVATED CHARCOAL (CHARCOCAPS ORAL) Take by mouth as needed.     ADVAIR DISKUS 500-50 mcg/dose DsDv diskus inhaler 1 puff 2 (two) times daily.    albuterol (ACCUNEB) 0.63 mg/3 mL Nebu Take 0.63 mg by nebulization 2 (two) times daily. Rescue    albuterol 90 mcg/actuation inhaler Inhale 2 puffs into the lungs every 6 (six) hours as needed for Wheezing.    alendronate (FOSAMAX) 70 MG tablet Take 1 tablet (70 mg total) by mouth every 7 days.    amLODIPine (NORVASC) 10 MG tablet TAKE 1 TABLET ONE TIME DAILY    budesonide (PULMICORT) 0.5 mg/2 mL nebulizer solution Take 2 mLs (0.5 mg total) by nebulization once daily. For use in saline irrigation as directed.    calcitonin, salmon, (FORTICAL) 200 unit/actuation nasal spray 1 spray by Nasal route once daily.    cetirizine (ZYRTEC) 10 MG tablet Take 10 mg by mouth once daily.    cholecalciferol, vitamin D3, 10,000 unit Cap Take 360 mg by mouth once daily. Take 6 capsules (6,000 units total) by mouth once daily    COD LIVER OIL ORAL Take 1 tablet by mouth once daily.    ferrous sulfate 325 (65 FE) MG EC tablet Take 1 tablet (325 mg total) by mouth 3 (three) times daily with meals. (Patient taking differently: Take 325 mg by mouth once daily. Taking once daily)    folic acid (FOLVITE) 800 MCG Tab Take 1  "tablet (800 mcg total) by mouth once daily.    furosemide (LASIX) 20 MG tablet TAKE 1 TABLET (20 MG TOTAL) BY MOUTH 2 (TWO) TIMES DAILY AS NEEDED (LEG SWELLING).    guaiFENesin (MUCINEX) 600 mg 12 hr tablet Take 600 mg by mouth 2 (two) times daily.    loratadine (CLARITIN) 10 mg tablet Take 10 mg by mouth once daily.    losartan (COZAAR) 25 MG tablet Take 1 tablet (25 mg total) by mouth once daily.    meloxicam (MOBIC) 15 MG tablet Take 1 tablet (15 mg total) by mouth once daily.    multivitamin (MULTIVITAMIN) per tablet Take 1 tablet by mouth once daily.      potassium chloride SA (K-DUR,KLOR-CON) 20 MEQ tablet Take 2 tablets (40 mEq total) by mouth once daily. (Patient taking differently: Take 20 mEq by mouth once daily. )    rosuvastatin (CRESTOR) 5 MG tablet Take 1 tablet (5 mg total) by mouth once daily.     No current facility-administered medications for this visit.      Review of patient's allergies indicates:   Allergen Reactions    Adhesive      PAPER TAPE    Diazepam Other (See Comments)     Nervous, jittery    Gabapentin      Nervous      Keppra [levetiracetam]      nervous    Mold      sneezing     Precautions: standard     Evaluation Date: 11/7/18  Start Time: 8:03  Stop Time: 8:48  Total 1:1 30 min  Visit # authorized: 4/21  Authorization period: 12/31/18  Plan of care expiration: 1/2/19  MD referral: patellofemoral stress syndrome     Hx of present illness: Pt had insidious onset of pain with no history of trauma. She went to the doctor who performed an MRI and told her she had a broken hip. She then had further imaging below which revealed avascular necrosis.     X-ray L hip 8/15/18: "FINDINGS: There are no acute fractures or dislocations or osteoblastic or lytic lesions or signs for avascular necrosis.  Soft tissues are unremarkable.  There are numerous phleboliths in the pelvis."     CT pelvis 8/3/18: "FINDINGS:  Bilateral femoral heads appear osteopenic with subtle, small focal " "subchondral defect in the superior aspect of the left femoral head and subtle underlying linear sclerosis.  Findings are consistent with recent MRI likely representing changes from bilateral avascular necrosis and early left subchondral fracture.  No large joint effusion seen.  Remaining osseous structures appear intact.  Degenerative changes identified involving the pubic symphysis, bilateral SI joints, and partially visualized lumbar spine.  There is significant sclerotic change with fusion at the superior aspect of the bilateral SI joints.    Partially visualized pelvis demonstrates colonic diverticulosis without evidence of diverticulitis, hysterectomy, and minimal scattered atherosclerosis.  Otherwise, unremarkable."       Subjective     Pt reports she is feeling very stiff this visit more so than pain. Pain level depends on which day she moves. She has to leave therapy early today to get to work.    Objective     Michela received individual therapeutic exercises to develop strength, endurance, ROM, flexibility, posture and core stabilization for 45 minutes including:    NuStep x5 min  PPT 20x5"  PPT BKFO  Adduction squeeze 20x5"  DKTC with SB 2x10  SLR 2x10 B  Bridges 3x10  Clamshells 3x10  Hip flexor stretch x1 min L  Piriformis stretch 2-way (modified and figure 4 without pulling thigh to chest) 2x30" L  Shuttle press 3c 3x10  Calf raises 2x10   gastroc stretch x1 min  Step ups on airex 2x10 LLE leading  Standing hip abduction 2x10 B  Step ups on level 2 step x21 NP  TKE MSC x30 NP    Written Home Exercises Provided: pelvic tilts  Pt demo good understanding of the education provided. Michela demonstrated good return demonstration of activities.      Education provided: DOMS; avascular necrosis  Evelena verbalized good understanding of education provided.   No spiritual or educational barriers to learning identified.    Assessment     Patient tolerated treatment well. Pt had to leave treatment early this " visit, unable to perform all exercises. Added BKFO with good tolerance. Pt requests to start next treatment with step ups, since she didn't have time this visit. This is a 73 y.o. female referred to outpatient physical therapy and presents with a medical diagnosis of chronic R hip pain and L knee pain and demonstrates limitations as described in the problem list. Pt prognosis is Good. Pt will continue to benefit from skilled outpatient physical therapy to address the deficits listed in the problem list, provide pt/family education and to maximize pt's level of independence in the home and community environment.     Medical necessity is demonstrated by the following IMPAIRMENTS/PROBLEM LIST:               1) Pain limiting function              2) Posture dysfunction              3) Core/Lumbar/LE weakness              4) Decreased lumbar joint mobility              6) Decreased soft tissue extensibility/fascia restriction              7) Decreased LE flexibility: hamstrings and hip flexors              8) Lack of HEP              9) LE paresthesia      GOALS:   Short Term Goals:  3 weeks  1. Patient will be proficient and compliant in HEP.  2. Improve hip abductor strength by 1 muscle grade in order to improve stability when walking.  3. Pt will be able to ambulate >/=2 blocks without an increase in symptoms.     Long Term Goals: 8 weeks  1. Pt will report no difficulty putting on shoes/socks.  2. Pt will be able to enter her home using step to gait pattern up stairs.  3. Pt will be </= 47% limited in mobility according to FOTO.        Plan     Continue with established Plan of Care towards PT goals.    Therapist: Shakira Saavedra, PT, DPT  11/30/2018

## 2018-12-03 ENCOUNTER — CLINICAL SUPPORT (OUTPATIENT)
Dept: REHABILITATION | Facility: HOSPITAL | Age: 73
End: 2018-12-03
Attending: ORTHOPAEDIC SURGERY
Payer: MEDICARE

## 2018-12-03 DIAGNOSIS — G89.29 CHRONIC PAIN OF LEFT KNEE: ICD-10-CM

## 2018-12-03 DIAGNOSIS — M25.552 PAIN OF LEFT HIP JOINT: Primary | ICD-10-CM

## 2018-12-03 DIAGNOSIS — M25.562 CHRONIC PAIN OF LEFT KNEE: ICD-10-CM

## 2018-12-03 PROCEDURE — 97110 THERAPEUTIC EXERCISES: CPT | Mod: HCNC

## 2018-12-03 PROCEDURE — G8979 MOBILITY GOAL STATUS: HCPCS | Mod: CK,HCNC

## 2018-12-03 PROCEDURE — G8978 MOBILITY CURRENT STATUS: HCPCS | Mod: CK,HCNC

## 2018-12-03 NOTE — PROGRESS NOTES
Physical Therapy Daily Note         Name: Michela Franco  Clinic Number: [id number]  Diagnosis:   Encounter Diagnoses   Name Primary?    Pain of left hip joint Yes    Chronic pain of left knee      Physician: Moiz Davis MD  Treatment Orders: PT Eval and Treat  Past Medical History:   Diagnosis Date    Allergy     Chronic diastolic heart failure 4/5/2018    Hyperlipidemia     Hypertension     Neuromuscular disorder     JOHN on CPAP     Osteoporosis     Recurrent sinusitis 3/25/2014    Recurrent upper respiratory infection (URI)     Urticaria      Current Outpatient Medications   Medication Sig    ACTIVATED CHARCOAL (CHARCOCAPS ORAL) Take by mouth as needed.     ADVAIR DISKUS 500-50 mcg/dose DsDv diskus inhaler 1 puff 2 (two) times daily.    albuterol (ACCUNEB) 0.63 mg/3 mL Nebu Take 0.63 mg by nebulization 2 (two) times daily. Rescue    albuterol 90 mcg/actuation inhaler Inhale 2 puffs into the lungs every 6 (six) hours as needed for Wheezing.    alendronate (FOSAMAX) 70 MG tablet Take 1 tablet (70 mg total) by mouth every 7 days.    amLODIPine (NORVASC) 10 MG tablet TAKE 1 TABLET ONE TIME DAILY    budesonide (PULMICORT) 0.5 mg/2 mL nebulizer solution Take 2 mLs (0.5 mg total) by nebulization once daily. For use in saline irrigation as directed.    calcitonin, salmon, (FORTICAL) 200 unit/actuation nasal spray 1 spray by Nasal route once daily.    cetirizine (ZYRTEC) 10 MG tablet Take 10 mg by mouth once daily.    cholecalciferol, vitamin D3, 10,000 unit Cap Take 360 mg by mouth once daily. Take 6 capsules (6,000 units total) by mouth once daily    COD LIVER OIL ORAL Take 1 tablet by mouth once daily.    ferrous sulfate 325 (65 FE) MG EC tablet Take 1 tablet (325 mg total) by mouth 3 (three) times daily with meals. (Patient taking differently: Take 325 mg by mouth once daily. Taking once daily)    folic acid (FOLVITE) 800 MCG Tab Take 1  "tablet (800 mcg total) by mouth once daily.    furosemide (LASIX) 20 MG tablet TAKE 1 TABLET (20 MG TOTAL) BY MOUTH 2 (TWO) TIMES DAILY AS NEEDED (LEG SWELLING).    guaiFENesin (MUCINEX) 600 mg 12 hr tablet Take 600 mg by mouth 2 (two) times daily.    loratadine (CLARITIN) 10 mg tablet Take 10 mg by mouth once daily.    losartan (COZAAR) 25 MG tablet Take 1 tablet (25 mg total) by mouth once daily.    meloxicam (MOBIC) 15 MG tablet Take 1 tablet (15 mg total) by mouth once daily.    multivitamin (MULTIVITAMIN) per tablet Take 1 tablet by mouth once daily.      potassium chloride SA (K-DUR,KLOR-CON) 20 MEQ tablet Take 2 tablets (40 mEq total) by mouth once daily. (Patient taking differently: Take 20 mEq by mouth once daily. )    rosuvastatin (CRESTOR) 5 MG tablet Take 1 tablet (5 mg total) by mouth once daily.     No current facility-administered medications for this visit.      Review of patient's allergies indicates:   Allergen Reactions    Adhesive      PAPER TAPE    Diazepam Other (See Comments)     Nervous, jittery    Gabapentin      Nervous      Keppra [levetiracetam]      nervous    Mold      sneezing       Precautions: standard     Evaluation Date: 11/7/18  Start Time: 7:04  Stop Time: 7:58  Total 1:1 54 min  Visit # authorized: 5/21  Authorization period: 12/31/18  Plan of care expiration: 1/2/19  MD referral: patellofemoral stress syndrome     Hx of present illness: Pt had insidious onset of pain with no history of trauma. She went to the doctor who performed an MRI and told her she had a broken hip. She then had further imaging below which revealed avascular necrosis.     X-ray L hip 8/15/18: "FINDINGS: There are no acute fractures or dislocations or osteoblastic or lytic lesions or signs for avascular necrosis.  Soft tissues are unremarkable.  There are numerous phleboliths in the pelvis."     CT pelvis 8/3/18: "FINDINGS:  Bilateral femoral heads appear osteopenic with subtle, small focal " "subchondral defect in the superior aspect of the left femoral head and subtle underlying linear sclerosis.  Findings are consistent with recent MRI likely representing changes from bilateral avascular necrosis and early left subchondral fracture.  No large joint effusion seen.  Remaining osseous structures appear intact.  Degenerative changes identified involving the pubic symphysis, bilateral SI joints, and partially visualized lumbar spine.  There is significant sclerotic change with fusion at the superior aspect of the bilateral SI joints.    Partially visualized pelvis demonstrates colonic diverticulosis without evidence of diverticulitis, hysterectomy, and minimal scattered atherosclerosis.  Otherwise, unremarkable."     Subjective     Pt reports she feels stiff this morning and has a hard time getting moving in when she gets up. She feel like her joints need lubrication and she needs to strengthen her muscles.   Pain Scale: Michela rates pain at L knee/hip on a scale of 0-10 to be 1 currently.    Objective     Michela received individual therapeutic exercises to develop strength, endurance, ROM, flexibility, posture and core stabilization for 54 minutes including:     NuStep x5 min  PPT 20x5" NP  PPT BKFO x20  PPT marches x20  Adduction squeeze 20x5"  DKTC with SB 3x10  SLR 2x10 B  Bridges 3x10  Clamshells 3x10  Hip flexor stretch x1 min L  Piriformis stretch 2-way (modified and figure 4 without pulling thigh to chest) 2x30" L  Shuttle press 3c 3x10  Calf raises 2x10   gastroc stretch x1 min  Step ups on airex 2x10 LLE leading  Standing hip abduction 2x10 B  Step ups on level 2 step x20   Step downs lateral level 2 2x10  TKE MSC x30      Written Home Exercises Provided: pelvic tilts  Pt demo good understanding of the education provided. Michela demonstrated good return demonstration of activities.      Education provided: DOMS; avascular necrosis, eating before therapy  Michela verbalized good understanding of " education provided.   No spiritual or educational barriers to learning identified.    Assessment     Patient tolerated treatment well. Pt requires alternating sides between clamshell sets due to pain with L SL. Pt with occasional dizziness when transitioning lying to sit/stand and did not eat breakfast this morning. Pt educated on exercising on an empty stomach and importance of eating before therapy. Added and progressed there-ex with good tolerance. Pt requires cuing for valgum with step downs and shuttle. Pt improving core awareness. This is a 73 y.o. female referred to outpatient physical therapy and presents with a medical diagnosis of chronic L hip and knee pain and demonstrates limitations as described in the problem list. Pt prognosis is Good. Pt will continue to benefit from skilled outpatient physical therapy to address the deficits listed in the problem list, provide pt/family education and to maximize pt's level of independence in the home and community environment.     Medical necessity is demonstrated by the following IMPAIRMENTS/PROBLEM LIST:               1) Pain limiting function              2) Posture dysfunction              3) Core/Lumbar/LE weakness              4) Decreased lumbar joint mobility              6) Decreased soft tissue extensibility/fascia restriction              7) Decreased LE flexibility: hamstrings and hip flexors              8) Lack of HEP              9) LE paresthesia      GOALS:   Short Term Goals:  3 weeks  1. Patient will be proficient and compliant in HEP.  2. Improve hip abductor strength by 1 muscle grade in order to improve stability when walking.  3. Pt will be able to ambulate >/=2 blocks without an increase in symptoms.- MET     Long Term Goals: 8 weeks  1. Pt will report no difficulty putting on shoes/socks.  2. Pt will be able to enter her home using step to gait pattern up stairs.  3. Pt will be </= 47% limited in mobility according to FOTO.     Plan      Continue with established Plan of Care towards PT goals.    Therapist: Shakira Saavedra, PT, DPT  12/3/2018

## 2018-12-04 ENCOUNTER — OFFICE VISIT (OUTPATIENT)
Dept: OTOLARYNGOLOGY | Facility: CLINIC | Age: 73
End: 2018-12-04
Payer: MEDICARE

## 2018-12-04 VITALS — DIASTOLIC BLOOD PRESSURE: 68 MMHG | HEART RATE: 84 BPM | SYSTOLIC BLOOD PRESSURE: 131 MMHG

## 2018-12-04 DIAGNOSIS — J45.40 MODERATE PERSISTENT ASTHMA WITHOUT COMPLICATION: ICD-10-CM

## 2018-12-04 DIAGNOSIS — J32.4 CHRONIC PANSINUSITIS: ICD-10-CM

## 2018-12-04 DIAGNOSIS — H61.22 IMPACTED CERUMEN OF LEFT EAR: ICD-10-CM

## 2018-12-04 DIAGNOSIS — J30.89 PERENNIAL ALLERGIC RHINITIS WITH SEASONAL VARIATION: Primary | ICD-10-CM

## 2018-12-04 DIAGNOSIS — J30.2 PERENNIAL ALLERGIC RHINITIS WITH SEASONAL VARIATION: Primary | ICD-10-CM

## 2018-12-04 PROCEDURE — 69210 REMOVE IMPACTED EAR WAX UNI: CPT | Mod: 51,HCNC,S$GLB, | Performed by: OTOLARYNGOLOGY

## 2018-12-04 PROCEDURE — 1101F PT FALLS ASSESS-DOCD LE1/YR: CPT | Mod: CPTII,HCNC,S$GLB, | Performed by: OTOLARYNGOLOGY

## 2018-12-04 PROCEDURE — 99214 OFFICE O/P EST MOD 30 MIN: CPT | Mod: 25,HCNC,S$GLB, | Performed by: OTOLARYNGOLOGY

## 2018-12-04 PROCEDURE — 31231 NASAL ENDOSCOPY DX: CPT | Mod: HCNC,S$GLB,, | Performed by: OTOLARYNGOLOGY

## 2018-12-04 PROCEDURE — 99999 PR PBB SHADOW E&M-EST. PATIENT-LVL III: CPT | Mod: PBBFAC,HCNC,, | Performed by: OTOLARYNGOLOGY

## 2018-12-04 NOTE — PROCEDURES
Ear Cerumen Removal  Date/Time: 12/4/2018 9:54 AM  Performed by: Alexys Boo MD  Authorized by: Alexys Boo MD     Consent Done?:  Yes (Verbal)    Local anesthetic:  None  Location details:  Left ear  Procedure type: curette    Cerumen  Removal Results:  Cerumen completely removed  Patient tolerance:  Patient tolerated the procedure well with no immediate complications

## 2018-12-04 NOTE — PROCEDURES
Nasal/sinus endoscopy  Date/Time: 12/4/2018 10:16 AM  Performed by: Alexys Boo MD  Authorized by: Alexys Boo MD     Consent Done?:  Yes (Verbal)  Anesthesia:     Local anesthetic:  Lidocaine 4% and Emerson-Synephrine 1/2%    Patient tolerance:  Patient tolerated the procedure well with no immediate complications  Nose:     Procedure Performed:  Nasal Endoscopy  External:      No external nasal deformity  Intranasal:      Mucosa no polyps     Mucosa ulcers not present     No mucosa lesions present     Turbinates not enlarged     No septum gross deformity  Nasopharynx:      No mucosa lesions     Adenoids not present     Posterior choanae patent     Eustachian tube patent     Purulence and crusting resolved.  Sinuses all patent bilaterally.  Diffuse mild to moderate edema and mucoid exudate.  No polyps.

## 2018-12-04 NOTE — PROGRESS NOTES
Subjective:      Michela is a 73 y.o. female who comes for follow-up of sinusitis.  Her last visit with me was on 10/2/2018.  Now 14 months status-post endoscopic sinus surgery.   Finished course of topical cipro via nasoneb, used every other day because of epistaxis and irrigation, alternated with saline vehicle.  Now using only saline, no longer blowing out green mucus.  Overall feels better, some postnasal drip and congestion.    SNOT-22 score = 22, NOSE score = 35%, ETDQ-7 score = 3.7    The patient's medications, allergies, past medical, surgical, social and family histories were reviewed and updated as appropriate.    A detailed review of systems was obtained with pertinent positives as per the above HPI, and otherwise negative.        Objective:     /68   Pulse 84        Constitutional:   She appears well-developed. She is cooperative. Normal speech.  No hoarse voice.      Head:  Normocephalic. Salivary glands normal.  Facial strength is normal.      Ears:    Right Ear: No drainage or tenderness. Tympanic membrane is not perforated. Tympanic membrane mobility is normal. No middle ear effusion. No decreased hearing is noted.   Left Ear: No drainage or tenderness. Tympanic membrane is not perforated. Tympanic membrane mobility is normal.  No middle ear effusion. No decreased hearing is noted.     Nose:  No mucosal edema, rhinorrhea, septal deviation or polyps. No epistaxis. Turbinates normal, no turbinate masses and no turbinate hypertrophy.  Right sinus exhibits no maxillary sinus tenderness and no frontal sinus tenderness. Left sinus exhibits no maxillary sinus tenderness and no frontal sinus tenderness.     Mouth/Throat  Oropharynx clear and moist without lesions or asymmetry and normal uvula midline. She does not have dentures. Normal dentition. No oral lesions or mucous membrane lesions. No oropharyngeal exudate or posterior oropharyngeal erythema. Mirror exam not performed due to patient  tolerance.  Mirror exam not performed due to patient tolerance.      Neck:  Neck normal without thyromegaly masses, asymmetry, normal tracheal structure, crepitus, and tenderness, thyroid normal, trachea normal and no adenopathy. Normal range of motion present.     She has no cervical adenopathy.     Cardiovascular:   Regular rhythm.      Pulmonary/Chest:   Effort normal.     Psychiatric:   She has a normal mood and affect. Her speech is normal and behavior is normal.     Neurological:   No cranial nerve deficit.     Skin:   No rash noted.       Procedure    Cerumen impaction removed.  See procedure note.    Nasal endoscopy performed.  See procedure note.     Left ethmoid     Left maxillary     Right ethmoid     Right maxillary        Data Reviewed    WBC (K/uL)   Date Value   02/16/2018 9.09     Eosinophil% (%)   Date Value   02/16/2018 1.3     Eos # (K/uL)   Date Value   02/16/2018 0.1     Platelets (K/uL)   Date Value   02/16/2018 327     Glucose (mg/dL)   Date Value   02/16/2018 86     IgE (IU/mL)   Date Value   05/28/2018 253 (H)       Pathology report indicated chronic inflammation with eosinophilia.    Cultures showed Pseudomonas last visit.      Assessment:     1. Perennial allergic rhinitis with seasonal variation    2. Chronic pansinusitis    3. Moderate persistent asthma without complication    4. Impacted cerumen of left ear         Plan:     Infection resolved.  Resume budesonide 0.5 mg sinus rinse daily.  Follow-up if symptoms worsen or fail to improve.

## 2018-12-06 NOTE — PROGRESS NOTES
Physical Therapy Daily Note         Name: Michela Franco  Clinic Number: [id number]  Diagnosis:   Encounter Diagnoses   Name Primary?    Pain of left hip joint Yes    Chronic pain of left knee      Physician: Moiz Davis MD  Treatment Orders: PT Eval and Treat  Past Medical History:   Diagnosis Date    Allergy     Chronic diastolic heart failure 4/5/2018    Hyperlipidemia     Hypertension     Neuromuscular disorder     JOHN on CPAP     Osteoporosis     Recurrent sinusitis 3/25/2014    Recurrent upper respiratory infection (URI)     Urticaria      Current Outpatient Medications   Medication Sig    ACTIVATED CHARCOAL (CHARCOCAPS ORAL) Take by mouth as needed.     ADVAIR DISKUS 500-50 mcg/dose DsDv diskus inhaler 1 puff 2 (two) times daily.    albuterol (ACCUNEB) 0.63 mg/3 mL Nebu Take 0.63 mg by nebulization 2 (two) times daily. Rescue    albuterol 90 mcg/actuation inhaler Inhale 2 puffs into the lungs every 6 (six) hours as needed for Wheezing.    alendronate (FOSAMAX) 70 MG tablet Take 1 tablet (70 mg total) by mouth every 7 days.    amLODIPine (NORVASC) 10 MG tablet TAKE 1 TABLET ONE TIME DAILY    budesonide (PULMICORT) 0.5 mg/2 mL nebulizer solution Take 2 mLs (0.5 mg total) by nebulization once daily. For use in saline irrigation as directed.    calcitonin, salmon, (FORTICAL) 200 unit/actuation nasal spray 1 spray by Nasal route once daily.    cetirizine (ZYRTEC) 10 MG tablet Take 10 mg by mouth once daily.    cholecalciferol, vitamin D3, 10,000 unit Cap Take 360 mg by mouth once daily. Take 6 capsules (6,000 units total) by mouth once daily    COD LIVER OIL ORAL Take 1 tablet by mouth once daily.    ferrous sulfate 325 (65 FE) MG EC tablet Take 1 tablet (325 mg total) by mouth 3 (three) times daily with meals. (Patient taking differently: Take 325 mg by mouth once daily. Taking once daily)    folic acid (FOLVITE) 800 MCG Tab Take 1  "tablet (800 mcg total) by mouth once daily.    furosemide (LASIX) 20 MG tablet TAKE 1 TABLET (20 MG TOTAL) BY MOUTH 2 (TWO) TIMES DAILY AS NEEDED (LEG SWELLING).    guaiFENesin (MUCINEX) 600 mg 12 hr tablet Take 600 mg by mouth 2 (two) times daily.    loratadine (CLARITIN) 10 mg tablet Take 10 mg by mouth once daily.    losartan (COZAAR) 25 MG tablet Take 1 tablet (25 mg total) by mouth once daily.    meloxicam (MOBIC) 15 MG tablet Take 1 tablet (15 mg total) by mouth once daily.    multivitamin (MULTIVITAMIN) per tablet Take 1 tablet by mouth once daily.      potassium chloride SA (K-DUR,KLOR-CON) 20 MEQ tablet Take 2 tablets (40 mEq total) by mouth once daily. (Patient taking differently: Take 20 mEq by mouth once daily. )    rosuvastatin (CRESTOR) 5 MG tablet Take 1 tablet (5 mg total) by mouth once daily.     No current facility-administered medications for this visit.      Review of patient's allergies indicates:   Allergen Reactions    Adhesive      PAPER TAPE    Diazepam Other (See Comments)     Nervous, jittery    Gabapentin      Nervous      Keppra [levetiracetam]      nervous    Mold      sneezing     Precautions: standard     Evaluation Date: 11/7/18  Start Time: 8:02  Stop Time: 8:45  Total 1:1 30  Visit # authorized: 7/21  Authorization period: 12/31/18  Plan of care expiration: 1/2/19  MD referral: patellofemoral stress syndrome     Hx of present illness: Pt had insidious onset of pain with no history of trauma. She went to the doctor who performed an MRI and told her she had a broken hip. She then had further imaging below which revealed avascular necrosis.     X-ray L hip 8/15/18: "FINDINGS: There are no acute fractures or dislocations or osteoblastic or lytic lesions or signs for avascular necrosis.  Soft tissues are unremarkable.  There are numerous phleboliths in the pelvis."     CT pelvis 8/3/18: "FINDINGS:  Bilateral femoral heads appear osteopenic with subtle, small focal " "subchondral defect in the superior aspect of the left femoral head and subtle underlying linear sclerosis.  Findings are consistent with recent MRI likely representing changes from bilateral avascular necrosis and early left subchondral fracture.  No large joint effusion seen.  Remaining osseous structures appear intact.  Degenerative changes identified involving the pubic symphysis, bilateral SI joints, and partially visualized lumbar spine.  There is significant sclerotic change with fusion at the superior aspect of the bilateral SI joints.    Partially visualized pelvis demonstrates colonic diverticulosis without evidence of diverticulitis, hysterectomy, and minimal scattered atherosclerosis.  Otherwise, unremarkable."    Subjective     Pt reports a strain/discomfort in quad region. She reports she still has some problem with stairs. Pt reports no pain in hip. Pt reports she has to leave early to get to work.   Pain Scale: Michela rates pain at L knee on a scale of 0-10 to be 5 currently.    Objective     Michela received individual therapeutic exercises to develop strength, endurance, ROM, flexibility, posture and core stabilization for 43 minutes including:    NuStep x5 min  PPT 20x5" NP  PPT BKFO x20  PPT marches x20  Adduction squeeze 20x5" NP  DKTC with SB 3x10 NP  SLR 3x10 B  Bridges 3x10  Clamshells 3x10  Hip flexor stretch x1 min L  Piriformis stretch 2-way (modified and figure 4 without pulling thigh to chest) 2x30" L NP  Shuttle press 3c 3x10  Calf raises 3x10   gastroc stretch 2x1 min  Step ups on airex 2x10 LLE leading  Standing hip abduction 2x10 B  Step ups on level 2 step x20   Step downs lateral level 2 2x10  TKE MSC x30     Written Home Exercises Provided: pelvic tilts  Pt demo good understanding of the education provided. Michela demonstrated good return demonstration of activities.      Education provided: DOMS; avascular necrosis, eating before therapy  Michela verbalized good understanding of " education provided.   No spiritual or educational barriers to learning identified.     PT reviewed FOTO scores for Michela Franco on 11/7/18  FOTO score: 67% limited     Functional Limitations Reports - G Codes  Category: mobility  Tool: FOTO        Current ():  CL  Goal (): CK    Assessment     This is a 73 y.o. female referred to outpatient physical therapy and presents with a medical diagnosis of chronic L hip pain and demonstrates limitations as described in the problem list.  Patient tolerated treatment well with no adverse effects or difficulty. Unable to perform all interventions due to pts need to leave early for work. Pt continuing to improve core mindfulness but continues to demo knee valgum with step ups and shuttle requiring cuing. Pt making good progress towards goals.Pt with easier time putting on socks without pain.Pt prognosis is Good. Pt will continue to benefit from skilled outpatient physical therapy to address the deficits listed in the problem list, provide pt/family education and to maximize pt's level of independence in the home and community environment.     Medical necessity is demonstrated by the following IMPAIRMENTS/PROBLEM LIST:               1) Pain limiting function              2) Posture dysfunction              3) Core/Lumbar/LE weakness              4) Decreased lumbar joint mobility              6) Decreased soft tissue extensibility/fascia restriction              7) Decreased LE flexibility: hamstrings and hip flexors              8) Lack of HEP              9) LE paresthesia      GOALS:   Short Term Goals:  3 weeks  1. Patient will be proficient and compliant in HEP.  2. Improve hip abductor strength by 1 muscle grade in order to improve stability when walking.  3. Pt will be able to ambulate >/=2 blocks without an increase in symptoms.- MET     Long Term Goals: 8 weeks  1. Pt will report no difficulty putting on shoes/socks.  2. Pt will be able to enter her home  using step to gait pattern up stairs.  3. Pt will be </= 47% limited in mobility according to FOTO.     Plan     Continue with established Plan of Care towards PT goals.    Therapist: Shakira Saavedra, PT, DPT  12/7/2018

## 2018-12-07 ENCOUNTER — CLINICAL SUPPORT (OUTPATIENT)
Dept: REHABILITATION | Facility: HOSPITAL | Age: 73
End: 2018-12-07
Attending: ORTHOPAEDIC SURGERY
Payer: MEDICARE

## 2018-12-07 DIAGNOSIS — G89.29 CHRONIC PAIN OF LEFT KNEE: ICD-10-CM

## 2018-12-07 DIAGNOSIS — M25.552 PAIN OF LEFT HIP JOINT: Primary | ICD-10-CM

## 2018-12-07 DIAGNOSIS — M25.562 CHRONIC PAIN OF LEFT KNEE: ICD-10-CM

## 2018-12-07 PROCEDURE — G8979 MOBILITY GOAL STATUS: HCPCS | Mod: CK,HCNC

## 2018-12-07 PROCEDURE — 97110 THERAPEUTIC EXERCISES: CPT | Mod: HCNC

## 2018-12-07 PROCEDURE — G8978 MOBILITY CURRENT STATUS: HCPCS | Mod: CL,HCNC

## 2018-12-10 ENCOUNTER — CLINICAL SUPPORT (OUTPATIENT)
Dept: REHABILITATION | Facility: HOSPITAL | Age: 73
End: 2018-12-10
Attending: ORTHOPAEDIC SURGERY
Payer: MEDICARE

## 2018-12-10 DIAGNOSIS — G89.29 CHRONIC PAIN OF LEFT KNEE: ICD-10-CM

## 2018-12-10 DIAGNOSIS — M25.562 CHRONIC PAIN OF LEFT KNEE: ICD-10-CM

## 2018-12-10 DIAGNOSIS — M25.552 PAIN OF LEFT HIP JOINT: Primary | ICD-10-CM

## 2018-12-10 PROCEDURE — G8979 MOBILITY GOAL STATUS: HCPCS | Mod: CK,HCNC

## 2018-12-10 PROCEDURE — G8978 MOBILITY CURRENT STATUS: HCPCS | Mod: CL,HCNC

## 2018-12-10 PROCEDURE — 97110 THERAPEUTIC EXERCISES: CPT | Mod: HCNC

## 2018-12-10 NOTE — PROGRESS NOTES
Physical Therapy Daily Note         Name: Michela Franco  Clinic Number: [id number]  Diagnosis:   Encounter Diagnoses   Name Primary?    Pain of left hip joint Yes    Chronic pain of left knee      Physician: Moiz Davis MD  Treatment Orders: PT Eval and Treat  Past Medical History:   Diagnosis Date    Allergy     Chronic diastolic heart failure 4/5/2018    Hyperlipidemia     Hypertension     Neuromuscular disorder     JOHN on CPAP     Osteoporosis     Recurrent sinusitis 3/25/2014    Recurrent upper respiratory infection (URI)     Urticaria      Current Outpatient Medications   Medication Sig    ACTIVATED CHARCOAL (CHARCOCAPS ORAL) Take by mouth as needed.     ADVAIR DISKUS 500-50 mcg/dose DsDv diskus inhaler 1 puff 2 (two) times daily.    albuterol (ACCUNEB) 0.63 mg/3 mL Nebu Take 0.63 mg by nebulization 2 (two) times daily. Rescue    albuterol 90 mcg/actuation inhaler Inhale 2 puffs into the lungs every 6 (six) hours as needed for Wheezing.    alendronate (FOSAMAX) 70 MG tablet Take 1 tablet (70 mg total) by mouth every 7 days.    amLODIPine (NORVASC) 10 MG tablet TAKE 1 TABLET ONE TIME DAILY    budesonide (PULMICORT) 0.5 mg/2 mL nebulizer solution Take 2 mLs (0.5 mg total) by nebulization once daily. For use in saline irrigation as directed.    calcitonin, salmon, (FORTICAL) 200 unit/actuation nasal spray 1 spray by Nasal route once daily.    cetirizine (ZYRTEC) 10 MG tablet Take 10 mg by mouth once daily.    cholecalciferol, vitamin D3, 10,000 unit Cap Take 360 mg by mouth once daily. Take 6 capsules (6,000 units total) by mouth once daily    COD LIVER OIL ORAL Take 1 tablet by mouth once daily.    ferrous sulfate 325 (65 FE) MG EC tablet Take 1 tablet (325 mg total) by mouth 3 (three) times daily with meals. (Patient taking differently: Take 325 mg by mouth once daily. Taking once daily)    folic acid (FOLVITE) 800 MCG Tab Take 1  "tablet (800 mcg total) by mouth once daily.    furosemide (LASIX) 20 MG tablet TAKE 1 TABLET (20 MG TOTAL) BY MOUTH 2 (TWO) TIMES DAILY AS NEEDED (LEG SWELLING).    guaiFENesin (MUCINEX) 600 mg 12 hr tablet Take 600 mg by mouth 2 (two) times daily.    loratadine (CLARITIN) 10 mg tablet Take 10 mg by mouth once daily.    losartan (COZAAR) 25 MG tablet Take 1 tablet (25 mg total) by mouth once daily.    meloxicam (MOBIC) 15 MG tablet Take 1 tablet (15 mg total) by mouth once daily.    multivitamin (MULTIVITAMIN) per tablet Take 1 tablet by mouth once daily.      potassium chloride SA (K-DUR,KLOR-CON) 20 MEQ tablet Take 2 tablets (40 mEq total) by mouth once daily. (Patient taking differently: Take 20 mEq by mouth once daily. )    rosuvastatin (CRESTOR) 5 MG tablet Take 1 tablet (5 mg total) by mouth once daily.     No current facility-administered medications for this visit.      Review of patient's allergies indicates:   Allergen Reactions    Adhesive      PAPER TAPE    Diazepam Other (See Comments)     Nervous, jittery    Gabapentin      Nervous      Keppra [levetiracetam]      nervous    Mold      sneezing       Precautions: standard     Evaluation Date: 11/7/18  Start Time: 7:04  Stop Time: 7:58  Total 1:1 60  Visit # authorized: 8/21  Authorization period: 12/31/18  Plan of care expiration: 1/2/19  MD referral: patellofemoral stress syndrome     Hx of present illness: Pt had insidious onset of pain with no history of trauma. She went to the doctor who performed an MRI and told her she had a broken hip. She then had further imaging below which revealed avascular necrosis.     X-ray L hip 8/15/18: "FINDINGS: There are no acute fractures or dislocations or osteoblastic or lytic lesions or signs for avascular necrosis.  Soft tissues are unremarkable.  There are numerous phleboliths in the pelvis."     CT pelvis 8/3/18: "FINDINGS:  Bilateral femoral heads appear osteopenic with subtle, small focal " "subchondral defect in the superior aspect of the left femoral head and subtle underlying linear sclerosis.  Findings are consistent with recent MRI likely representing changes from bilateral avascular necrosis and early left subchondral fracture.  No large joint effusion seen.  Remaining osseous structures appear intact.  Degenerative changes identified involving the pubic symphysis, bilateral SI joints, and partially visualized lumbar spine.  There is significant sclerotic change with fusion at the superior aspect of the bilateral SI joints.    Partially visualized pelvis demonstrates colonic diverticulosis without evidence of diverticulitis, hysterectomy, and minimal scattered atherosclerosis.  Otherwise, unremarkable."       Subjective     Pt reports she feels stiff in the mornings. She reports it always takes some time to get it moving and if she sits for a little while it begins to stiffen back up. She reports she got out of bed and moved it the wrong way and felt a 7/10 pain, but its only when she gets going.  Pain Scale: Michela rates pain at L hip on a scale of 0-10 to be 0 currently.    Objective     Michela received individual therapeutic exercises to develop strength, endurance, ROM, flexibility, posture and core stabilization for 54 minutes including:    NuStep x5 min  PPT 20x5" NP  PPT BKFO x30  PPT marches x20  Adduction squeeze 20x5"   DKTC with SB 3x10   SLR 3x10 B   Bridges 3x10  Clamshells 2x10 RTB  Hip flexor stretch x1 min L  Piriformis stretch 2-way (modified and figure 4 without pulling thigh to chest) 2x30" L   Shuttle press 3c 3x10  Calf raises 3x10   gastroc stretch 2x1 min  Step ups on airex 2x10 LLE leading  Standing hip abduction 2x10 B  Standing hip ext 2x10 B  Step ups on level 2 step x20   Step downs lateral level 2 2x10  Swiss ball squats 2x10  TKE MSC x30      Written Home Exercises Provided: pelvic tilts  Pt demo good understanding of the education provided. Michela demonstrated " good return demonstration of activities.      Education provided: DOMS; avascular necrosis, exercises in bed  Evelena verbalized good understanding of education provided.   No spiritual or educational barriers to learning identified.     PT reviewed FOTO scores for Evelena Joan on 11/7/18  FOTO score: 67% limited    PT reviewed FOTO scores for Evelena Joan on 12/10/18  FOTO score 61% limited    Functional Limitations Reports - G Codes  Category: mobility  Tool: FOTO        Current ():  CL  Goal (): CK  Assessment     Patient tolerated treatment well with no adverse effects. Pt continuing to progress there-ex with good tolerance. Added SB squats and standing hip ext to continue working on strengthening in a weight bearing position. Pt with some pain transitioning from sitting to standing positions but decreased pain once she took a few steps. Updated FOTO with 6% improvement in function. This is a 73 y.o. female referred to outpatient physical therapy and presents with a medical diagnosis of chronic L hip pain and demonstrates limitations as described in the problem list. Pt prognosis is Good. Pt will continue to benefit from skilled outpatient physical therapy to address the deficits listed in the problem list, provide pt/family education and to maximize pt's level of independence in the home and community environment.     Medical necessity is demonstrated by the following IMPAIRMENTS/PROBLEM LIST:               1) Pain limiting function              2) Posture dysfunction              3) Core/Lumbar/LE weakness              4) Decreased lumbar joint mobility              6) Decreased soft tissue extensibility/fascia restriction              7) Decreased LE flexibility: hamstrings and hip flexors              8) Lack of HEP              9) LE paresthesia      GOALS:   Short Term Goals:  3 weeks  1. Patient will be proficient and compliant in HEP.  2. Improve hip abductor strength by 1 muscle grade in  order to improve stability when walking.  3. Pt will be able to ambulate >/=2 blocks without an increase in symptoms.- MET     Long Term Goals: 8 weeks  1. Pt will report no difficulty putting on shoes/socks.  2. Pt will be able to enter her home using step to gait pattern up stairs.  3. Pt will be </= 47% limited in mobility according to FOTO.- in progress  Plan     Continue with established Plan of Care towards PT goals.    Therapist: Shakira Saavedra, PT, DPT  12/10/2018

## 2018-12-14 ENCOUNTER — CLINICAL SUPPORT (OUTPATIENT)
Dept: REHABILITATION | Facility: HOSPITAL | Age: 73
End: 2018-12-14
Attending: ORTHOPAEDIC SURGERY
Payer: MEDICARE

## 2018-12-14 DIAGNOSIS — I50.32 CHRONIC DIASTOLIC HEART FAILURE: ICD-10-CM

## 2018-12-14 DIAGNOSIS — M25.562 CHRONIC PAIN OF LEFT KNEE: ICD-10-CM

## 2018-12-14 DIAGNOSIS — G89.29 CHRONIC PAIN OF LEFT KNEE: ICD-10-CM

## 2018-12-14 DIAGNOSIS — M22.2X9 PATELLOFEMORAL STRESS SYNDROME, UNSPECIFIED LATERALITY: ICD-10-CM

## 2018-12-14 DIAGNOSIS — M25.552 PAIN OF LEFT HIP JOINT: ICD-10-CM

## 2018-12-14 PROCEDURE — 97110 THERAPEUTIC EXERCISES: CPT | Mod: HCNC

## 2018-12-14 PROCEDURE — 97140 MANUAL THERAPY 1/> REGIONS: CPT | Mod: HCNC

## 2018-12-14 RX ORDER — MELOXICAM 15 MG/1
TABLET ORAL
Qty: 30 TABLET | Refills: 1 | Status: SHIPPED | OUTPATIENT
Start: 2018-12-14 | End: 2019-03-08

## 2018-12-14 RX ORDER — FUROSEMIDE 20 MG/1
20 TABLET ORAL 2 TIMES DAILY PRN
Qty: 60 TABLET | Refills: 3 | Status: SHIPPED | OUTPATIENT
Start: 2018-12-14 | End: 2018-12-18 | Stop reason: SDUPTHER

## 2018-12-14 NOTE — PROGRESS NOTES
Physical Therapy Daily Note         Name: Michela Franco  Clinic Number: [id number]  Diagnosis:   Encounter Diagnoses   Name Primary?    Pain of left hip joint     Chronic pain of left knee      Physician: Moiz Davis MD  Treatment Orders: PT Eval and Treat  Past Medical History:   Diagnosis Date    Allergy     Chronic diastolic heart failure 4/5/2018    Hyperlipidemia     Hypertension     Neuromuscular disorder     JOHN on CPAP     Osteoporosis     Recurrent sinusitis 3/25/2014    Recurrent upper respiratory infection (URI)     Urticaria      Current Outpatient Medications   Medication Sig    ACTIVATED CHARCOAL (CHARCOCAPS ORAL) Take by mouth as needed.     ADVAIR DISKUS 500-50 mcg/dose DsDv diskus inhaler 1 puff 2 (two) times daily.    albuterol (ACCUNEB) 0.63 mg/3 mL Nebu Take 0.63 mg by nebulization 2 (two) times daily. Rescue    albuterol 90 mcg/actuation inhaler Inhale 2 puffs into the lungs every 6 (six) hours as needed for Wheezing.    alendronate (FOSAMAX) 70 MG tablet Take 1 tablet (70 mg total) by mouth every 7 days.    amLODIPine (NORVASC) 10 MG tablet TAKE 1 TABLET ONE TIME DAILY    budesonide (PULMICORT) 0.5 mg/2 mL nebulizer solution Take 2 mLs (0.5 mg total) by nebulization once daily. For use in saline irrigation as directed.    calcitonin, salmon, (FORTICAL) 200 unit/actuation nasal spray 1 spray by Nasal route once daily.    cetirizine (ZYRTEC) 10 MG tablet Take 10 mg by mouth once daily.    cholecalciferol, vitamin D3, 10,000 unit Cap Take 360 mg by mouth once daily. Take 6 capsules (6,000 units total) by mouth once daily    COD LIVER OIL ORAL Take 1 tablet by mouth once daily.    ferrous sulfate 325 (65 FE) MG EC tablet Take 1 tablet (325 mg total) by mouth 3 (three) times daily with meals. (Patient taking differently: Take 325 mg by mouth once daily. Taking once daily)    folic acid (FOLVITE) 800 MCG Tab Take 1  "tablet (800 mcg total) by mouth once daily.    furosemide (LASIX) 20 MG tablet TAKE 1 TABLET (20 MG TOTAL) BY MOUTH 2 (TWO) TIMES DAILY AS NEEDED (LEG SWELLING).    guaiFENesin (MUCINEX) 600 mg 12 hr tablet Take 600 mg by mouth 2 (two) times daily.    loratadine (CLARITIN) 10 mg tablet Take 10 mg by mouth once daily.    losartan (COZAAR) 25 MG tablet Take 1 tablet (25 mg total) by mouth once daily.    meloxicam (MOBIC) 15 MG tablet Take 1 tablet (15 mg total) by mouth once daily.    multivitamin (MULTIVITAMIN) per tablet Take 1 tablet by mouth once daily.      potassium chloride SA (K-DUR,KLOR-CON) 20 MEQ tablet Take 2 tablets (40 mEq total) by mouth once daily. (Patient taking differently: Take 20 mEq by mouth once daily. )    rosuvastatin (CRESTOR) 5 MG tablet Take 1 tablet (5 mg total) by mouth once daily.     No current facility-administered medications for this visit.      Review of patient's allergies indicates:   Allergen Reactions    Adhesive      PAPER TAPE    Diazepam Other (See Comments)     Nervous, jittery    Gabapentin      Nervous      Keppra [levetiracetam]      nervous    Mold      sneezing       Precautions: standard     Evaluation Date: 11/7/18  Start Time: 8:00 (pt reports having to leave about 20 minutes early today.)  Stop Time: 8:40  Total 1:1  30 min  Visit # authorized: 9/21  Authorization period: 12/31/18  Plan of care expiration: 1/2/19  MD referral: patellofemoral stress syndrome     Hx of present illness: Pt had insidious onset of pain with no history of trauma. She went to the doctor who performed an MRI and told her she had a broken hip. She then had further imaging below which revealed avascular necrosis.     X-ray L hip 8/15/18: "FINDINGS: There are no acute fractures or dislocations or osteoblastic or lytic lesions or signs for avascular necrosis.  Soft tissues are unremarkable.  There are numerous phleboliths in the pelvis."     CT pelvis 8/3/18: "FINDINGS:  Bilateral " "femoral heads appear osteopenic with subtle, small focal subchondral defect in the superior aspect of the left femoral head and subtle underlying linear sclerosis.  Findings are consistent with recent MRI likely representing changes from bilateral avascular necrosis and early left subchondral fracture.  No large joint effusion seen.  Remaining osseous structures appear intact.  Degenerative changes identified involving the pubic symphysis, bilateral SI joints, and partially visualized lumbar spine.  There is significant sclerotic change with fusion at the superior aspect of the bilateral SI joints.    Partially visualized pelvis demonstrates colonic diverticulosis without evidence of diverticulitis, hysterectomy, and minimal scattered atherosclerosis.  Otherwise, unremarkable."       Subjective     Pt reports that she feels like her LLE is shorter and she has been walking with a limp. Pt reports decreased pain level after manual pelvic adjustment.  Pain Scale: Michela rates pain at L hip on a scale of 0-10 to be 8 currently.    Objective     Michela received individual therapeutic exercises to develop strength, endurance, ROM, flexibility, posture and core stabilization for 30 minutes including:    NuStep x5 min-  PPT 20x5" -  PPT BKFO x30 NP  PPT marches x20 NP  Adduction squeeze 20x5" -  DKTC with SB 3x10 -  SLR 3x10 B  -  Bridges 3x10-  Clamshells 2x10 RTB NP  Hip flexor stretch x1 min L NP  Piriformis stretch 2-way (modified and figure 4 without pulling thigh to chest) 2x30" L -  Shuttle press 3c 3x10-  Calf raises 3x10 -  gastroc stretch 2x1 min NP  Step ups on airex 2x10 LLE leading-  Standing hip abduction 2x10 B-  Standing hip ext 2x10 B-  Step ups on level 2 step x20 NP  Step downs lateral level 2 2x10NP  Swiss ball squats 2x10 NP  TKE MSC x30  NP    Manual interventions x 10 min  - Pelvic re-alignment for R side rotated forward and L side rotated backward      Written Home Exercises Provided: pelvic " tilts  Pt demo good understanding of the education provided. Evelena demonstrated good return demonstration of activities.      Education provided: DOMS; avascular necrosis, exercises in bed  Evelena verbalized good understanding of education provided.   No spiritual or educational barriers to learning identified.     PT reviewed FOTO scores for Evelena Joan on 11/7/18  FOTO score: 67% limited    PT reviewed FOTO scores for Evelena Joan on 12/10/18  FOTO score 61% limited    Functional Limitations Reports - G Codes  Category: mobility  Tool: FOTO        Current ():  CL  Goal (): CK  Assessment     Patient entered clinic c/ pelvis out of alignment.  PT performed manual pelvis re-alignment techniques and pt able to ambulate c/ improved mechanics following manual. tolerated treatment well with no adverse effects.  Pt had to leave session early due to personal reasons.  Pt continuing to progress there-ex with good tolerance.This is a 73 y.o. female referred to outpatient physical therapy and presents with a medical diagnosis of chronic L hip pain and demonstrates limitations as described in the problem list. Pt prognosis is Good. Pt will continue to benefit from skilled outpatient physical therapy to address the deficits listed in the problem list, provide pt/family education and to maximize pt's level of independence in the home and community environment.     Medical necessity is demonstrated by the following IMPAIRMENTS/PROBLEM LIST:               1) Pain limiting function              2) Posture dysfunction              3) Core/Lumbar/LE weakness              4) Decreased lumbar joint mobility              6) Decreased soft tissue extensibility/fascia restriction              7) Decreased LE flexibility: hamstrings and hip flexors              8) Lack of HEP              9) LE paresthesia      GOALS:   Short Term Goals:  3 weeks  1. Patient will be proficient and compliant in HEP.  2. Improve hip  abductor strength by 1 muscle grade in order to improve stability when walking.  3. Pt will be able to ambulate >/=2 blocks without an increase in symptoms.- MET     Long Term Goals: 8 weeks  1. Pt will report no difficulty putting on shoes/socks.  2. Pt will be able to enter her home using step to gait pattern up stairs.  3. Pt will be </= 47% limited in mobility according to FOTO.- in progress  Plan     Continue with established Plan of Care towards PT goals.    Therapist: James Abarca, PT, DPT  12/14/2018

## 2018-12-18 DIAGNOSIS — I50.32 CHRONIC DIASTOLIC HEART FAILURE: ICD-10-CM

## 2018-12-18 DIAGNOSIS — M76.60 ACHILLES TENDINITIS: ICD-10-CM

## 2018-12-18 DIAGNOSIS — M25.552 PAIN OF LEFT HIP JOINT: ICD-10-CM

## 2018-12-18 DIAGNOSIS — I10 ESSENTIAL HYPERTENSION: ICD-10-CM

## 2018-12-18 DIAGNOSIS — I51.89 DIASTOLIC DYSFUNCTION: ICD-10-CM

## 2018-12-18 RX ORDER — LOSARTAN POTASSIUM 25 MG/1
25 TABLET ORAL DAILY
Qty: 90 TABLET | Refills: 1 | Status: SHIPPED | OUTPATIENT
Start: 2018-12-18 | End: 2019-05-21

## 2018-12-18 RX ORDER — FUROSEMIDE 20 MG/1
20 TABLET ORAL 2 TIMES DAILY PRN
Qty: 180 TABLET | Refills: 1 | Status: SHIPPED | OUTPATIENT
Start: 2018-12-18 | End: 2020-03-11 | Stop reason: SDUPTHER

## 2018-12-18 RX ORDER — IBUPROFEN 800 MG/1
800 TABLET ORAL EVERY 8 HOURS PRN
Qty: 90 TABLET | Refills: 0 | Status: SHIPPED | OUTPATIENT
Start: 2018-12-18 | End: 2018-12-28

## 2018-12-18 RX ORDER — AMLODIPINE BESYLATE 10 MG/1
TABLET ORAL
Qty: 90 TABLET | Refills: 1 | Status: SHIPPED | OUTPATIENT
Start: 2018-12-18 | End: 2019-05-21 | Stop reason: SDUPTHER

## 2018-12-19 DIAGNOSIS — M25.552 PAIN OF LEFT HIP JOINT: ICD-10-CM

## 2018-12-20 RX ORDER — TRAMADOL HYDROCHLORIDE 50 MG/1
50 TABLET ORAL EVERY 6 HOURS PRN
Qty: 30 TABLET | Refills: 0 | OUTPATIENT
Start: 2018-12-20 | End: 2018-12-30

## 2018-12-21 ENCOUNTER — CLINICAL SUPPORT (OUTPATIENT)
Dept: REHABILITATION | Facility: HOSPITAL | Age: 73
End: 2018-12-21
Attending: ORTHOPAEDIC SURGERY
Payer: MEDICARE

## 2018-12-21 DIAGNOSIS — M25.562 CHRONIC PAIN OF LEFT KNEE: ICD-10-CM

## 2018-12-21 DIAGNOSIS — M25.552 PAIN OF LEFT HIP JOINT: Primary | ICD-10-CM

## 2018-12-21 DIAGNOSIS — G89.29 CHRONIC PAIN OF LEFT KNEE: ICD-10-CM

## 2018-12-21 PROCEDURE — 97140 MANUAL THERAPY 1/> REGIONS: CPT

## 2018-12-21 PROCEDURE — G8979 MOBILITY GOAL STATUS: HCPCS | Mod: CK

## 2018-12-21 PROCEDURE — G8978 MOBILITY CURRENT STATUS: HCPCS | Mod: CL

## 2018-12-21 PROCEDURE — 97110 THERAPEUTIC EXERCISES: CPT

## 2018-12-21 NOTE — PROGRESS NOTES
Physical Therapy Daily Note         Name: Michela Franco  Clinic Number: [id number]  Diagnosis:   Encounter Diagnoses   Name Primary?    Pain of left hip joint Yes    Chronic pain of left knee      Physician: Moiz Davis MD  Treatment Orders: PT Eval and Treat  Past Medical History:   Diagnosis Date    Allergy     Chronic diastolic heart failure 4/5/2018    Hyperlipidemia     Hypertension     Neuromuscular disorder     JOHN on CPAP     Osteoporosis     Recurrent sinusitis 3/25/2014    Recurrent upper respiratory infection (URI)     Urticaria      Current Outpatient Medications   Medication Sig    ACTIVATED CHARCOAL (CHARCOCAPS ORAL) Take by mouth as needed.     ADVAIR DISKUS 500-50 mcg/dose DsDv diskus inhaler 1 puff 2 (two) times daily.    albuterol (ACCUNEB) 0.63 mg/3 mL Nebu Take 0.63 mg by nebulization 2 (two) times daily. Rescue    albuterol 90 mcg/actuation inhaler Inhale 2 puffs into the lungs every 6 (six) hours as needed for Wheezing.    alendronate (FOSAMAX) 70 MG tablet Take 1 tablet (70 mg total) by mouth every 7 days.    amLODIPine (NORVASC) 10 MG tablet TAKE 1 TABLET ONE TIME DAILY    budesonide (PULMICORT) 0.5 mg/2 mL nebulizer solution Take 2 mLs (0.5 mg total) by nebulization once daily. For use in saline irrigation as directed.    calcitonin, salmon, (FORTICAL) 200 unit/actuation nasal spray 1 spray by Nasal route once daily.    cetirizine (ZYRTEC) 10 MG tablet Take 10 mg by mouth once daily.    cholecalciferol, vitamin D3, 10,000 unit Cap Take 360 mg by mouth once daily. Take 6 capsules (6,000 units total) by mouth once daily    COD LIVER OIL ORAL Take 1 tablet by mouth once daily.    ferrous sulfate 325 (65 FE) MG EC tablet Take 1 tablet (325 mg total) by mouth 3 (three) times daily with meals. (Patient taking differently: Take 325 mg by mouth once daily. Taking once daily)    folic acid (FOLVITE) 800 MCG Tab Take 1  "tablet (800 mcg total) by mouth once daily.    furosemide (LASIX) 20 MG tablet Take 1 tablet (20 mg total) by mouth 2 (two) times daily as needed (leg swelling).    guaiFENesin (MUCINEX) 600 mg 12 hr tablet Take 600 mg by mouth 2 (two) times daily.    ibuprofen (ADVIL,MOTRIN) 800 MG tablet Take 1 tablet (800 mg total) by mouth every 8 (eight) hours as needed for Pain.    loratadine (CLARITIN) 10 mg tablet Take 10 mg by mouth once daily.    losartan (COZAAR) 25 MG tablet Take 1 tablet (25 mg total) by mouth once daily.    meloxicam (MOBIC) 15 MG tablet TAKE 1 TABLET BY MOUTH EVERY DAY    multivitamin (MULTIVITAMIN) per tablet Take 1 tablet by mouth once daily.      potassium chloride SA (K-DUR,KLOR-CON) 20 MEQ tablet Take 2 tablets (40 mEq total) by mouth once daily. (Patient taking differently: Take 20 mEq by mouth once daily. )    rosuvastatin (CRESTOR) 5 MG tablet Take 1 tablet (5 mg total) by mouth once daily.     No current facility-administered medications for this visit.      Review of patient's allergies indicates:   Allergen Reactions    Adhesive      PAPER TAPE    Diazepam Other (See Comments)     Nervous, jittery    Gabapentin      Nervous      Keppra [levetiracetam]      nervous    Mold      sneezing       Precautions: standard     Evaluation Date: 11/7/18  Start Time:8:04 Pt needs to leave at 8:45  Stop Time:8:45  Total 1:1  30 min  Visit # authorized: 10/21  Authorization period: 12/31/18  Plan of care expiration: 1/2/19  MD referral: patellofemoral stress syndrome     Hx of present illness: Pt had insidious onset of pain with no history of trauma. She went to the doctor who performed an MRI and told her she had a broken hip. She then had further imaging below which revealed avascular necrosis.     X-ray L hip 8/15/18: "FINDINGS: There are no acute fractures or dislocations or osteoblastic or lytic lesions or signs for avascular necrosis.  Soft tissues are unremarkable.  There are numerous " "phleboliths in the pelvis."     CT pelvis 8/3/18: "FINDINGS:  Bilateral femoral heads appear osteopenic with subtle, small focal subchondral defect in the superior aspect of the left femoral head and subtle underlying linear sclerosis.  Findings are consistent with recent MRI likely representing changes from bilateral avascular necrosis and early left subchondral fracture.  No large joint effusion seen.  Remaining osseous structures appear intact.  Degenerative changes identified involving the pubic symphysis, bilateral SI joints, and partially visualized lumbar spine.  There is significant sclerotic change with fusion at the superior aspect of the bilateral SI joints.    Partially visualized pelvis demonstrates colonic diverticulosis without evidence of diverticulitis, hysterectomy, and minimal scattered atherosclerosis.  Otherwise, unremarkable."          Subjective     Pt reports she feels better since the last treatment. She felt that the pelvic realignment helped to get her hips straight. Pt attempted MET at home but it didn't feel the same. Pt wants to keep pelvis in that good alignment but doesn't know what it is that brings her back into malalignment. PT reports stiffness and discomfort rather than pain.  Pain Scale: Michela rates pain at hip and knee pain on a scale of 0-10 to be 1 currently.    Objective     Michela received individual therapeutic exercises to develop strength, endurance, ROM, flexibility, posture and core stabilization for 33 minutes including:  NuStep x5 min-   PPT 20x5" -  PPT BKFO x30 NP  PPT marches x20 NP  Adduction squeeze 20x5" -  DKTC with SB 3x10 - NP  SLR 3x10 B  -  Bridges 3x10-  Clamshells 2x10 RTB   Hip flexor stretch x1 min L NP   Piriformis stretch 2-way (modified and figure 4 without pulling thigh to chest) 2x30" L - NP  Shuttle press 3c 3x10-  Calf raises 3x10 -  gastroc stretch 2x1 min NP  Step ups on airex 2x10 LLE leading- NP  Standing hip abduction 2x10 B-  Standing " "hip ext 2x10 B-  Step ups on level 2 step x30   Step downs lateral level 2 2x10  Swiss ball squats 2x10 NP  TKE MSC x30  NP  MET R ant L post innominate 5x5"  Crab walks 1 lap      Evelena received the following manual therapy techniques: Joint mobilizations were applied  for 8 minutes including:     Pelvic re-alignment for R side rotated forward and L side rotated backward     Education provided: DOMS; avascular necrosis, exercises in bed  Evelena verbalized good understanding of education provided.   No spiritual or educational barriers to learning identified.     PT reviewed FOTO scores for Evelena Joan on 11/7/18  FOTO score: 67% limited     PT reviewed FOTO scores for Evelena Joan on 12/10/18  FOTO score 61% limited     Functional Limitations Reports - G Codes  Category: mobility  Tool: FOTO        Current ():  CL  Goal (): CK    Assessment     Patient tolerated treatment well without adverse effects. Added crab walks and re-educated pt on MET for home. Pt with improved ability to ambulate after pelvic re-alignment. Emphasis on strengthening the pelvic stabilizers this visit.    This is a 73 y.o. female referred to outpatient physical therapy and presents with a medical diagnosis of L hip and knee pain and demonstrates limitations as described in the problem list. Pt prognosis is Good. Pt will continue to benefit from skilled outpatient physical therapy to address the deficits listed in the problem list, provide pt/family education and to maximize pt's level of independence in the home and community environment.     Medical necessity is demonstrated by the following IMPAIRMENTS/PROBLEM LIST:               1) Pain limiting function              2) Posture dysfunction              3) Core/Lumbar/LE weakness              4) Decreased lumbar joint mobility              6) Decreased soft tissue extensibility/fascia restriction              7) Decreased LE flexibility: hamstrings and hip " flexors              8) Lack of HEP              9) LE paresthesia      GOALS:   Short Term Goals:  3 weeks  1. Patient will be proficient and compliant in HEP.  2. Improve hip abductor strength by 1 muscle grade in order to improve stability when walking.  3. Pt will be able to ambulate >/=2 blocks without an increase in symptoms.- MET     Long Term Goals: 8 weeks  1. Pt will report no difficulty putting on shoes/socks.  2. Pt will be able to enter her home using step to gait pattern up stairs.  3. Pt will be </= 47% limited in mobility according to FOTO.- in progress     Plan     Continue with established Plan of Care towards PT goals.    Therapist: Shakira Saavedra, PT, DPT  12/21/2018

## 2018-12-24 ENCOUNTER — CLINICAL SUPPORT (OUTPATIENT)
Dept: REHABILITATION | Facility: HOSPITAL | Age: 73
End: 2018-12-24
Attending: ORTHOPAEDIC SURGERY
Payer: MEDICARE

## 2018-12-24 DIAGNOSIS — M25.552 PAIN OF LEFT HIP JOINT: ICD-10-CM

## 2018-12-24 DIAGNOSIS — G89.29 CHRONIC PAIN OF LEFT KNEE: ICD-10-CM

## 2018-12-24 DIAGNOSIS — M25.562 CHRONIC PAIN OF LEFT KNEE: ICD-10-CM

## 2018-12-24 DIAGNOSIS — M25.552 PAIN OF LEFT HIP JOINT: Primary | ICD-10-CM

## 2018-12-24 PROCEDURE — 97110 THERAPEUTIC EXERCISES: CPT

## 2018-12-24 PROCEDURE — G8979 MOBILITY GOAL STATUS: HCPCS | Mod: CK

## 2018-12-24 PROCEDURE — 97140 MANUAL THERAPY 1/> REGIONS: CPT

## 2018-12-24 PROCEDURE — G8978 MOBILITY CURRENT STATUS: HCPCS | Mod: CL

## 2018-12-24 RX ORDER — TRAMADOL HYDROCHLORIDE 50 MG/1
50 TABLET ORAL EVERY 6 HOURS PRN
Qty: 30 TABLET | Refills: 0 | Status: SHIPPED | OUTPATIENT
Start: 2018-12-24 | End: 2019-01-03

## 2018-12-24 NOTE — PROGRESS NOTES
Physical Therapy Daily Note         Name: Michela Franco  Clinic Number: [id number]  Diagnosis:   Encounter Diagnoses   Name Primary?    Pain of left hip joint Yes    Chronic pain of left knee      Physician: Moiz Davis MD  Treatment Orders: PT Eval and Treat  Past Medical History:   Diagnosis Date    Allergy     Chronic diastolic heart failure 4/5/2018    Hyperlipidemia     Hypertension     Neuromuscular disorder     JOHN on CPAP     Osteoporosis     Recurrent sinusitis 3/25/2014    Recurrent upper respiratory infection (URI)     Urticaria      Current Outpatient Medications   Medication Sig    ACTIVATED CHARCOAL (CHARCOCAPS ORAL) Take by mouth as needed.     ADVAIR DISKUS 500-50 mcg/dose DsDv diskus inhaler 1 puff 2 (two) times daily.    albuterol (ACCUNEB) 0.63 mg/3 mL Nebu Take 0.63 mg by nebulization 2 (two) times daily. Rescue    albuterol 90 mcg/actuation inhaler Inhale 2 puffs into the lungs every 6 (six) hours as needed for Wheezing.    alendronate (FOSAMAX) 70 MG tablet Take 1 tablet (70 mg total) by mouth every 7 days.    amLODIPine (NORVASC) 10 MG tablet TAKE 1 TABLET ONE TIME DAILY    budesonide (PULMICORT) 0.5 mg/2 mL nebulizer solution Take 2 mLs (0.5 mg total) by nebulization once daily. For use in saline irrigation as directed.    calcitonin, salmon, (FORTICAL) 200 unit/actuation nasal spray 1 spray by Nasal route once daily.    cetirizine (ZYRTEC) 10 MG tablet Take 10 mg by mouth once daily.    cholecalciferol, vitamin D3, 10,000 unit Cap Take 360 mg by mouth once daily. Take 6 capsules (6,000 units total) by mouth once daily    COD LIVER OIL ORAL Take 1 tablet by mouth once daily.    ferrous sulfate 325 (65 FE) MG EC tablet Take 1 tablet (325 mg total) by mouth 3 (three) times daily with meals. (Patient taking differently: Take 325 mg by mouth once daily. Taking once daily)    folic acid (FOLVITE) 800 MCG Tab Take 1  "tablet (800 mcg total) by mouth once daily.    furosemide (LASIX) 20 MG tablet Take 1 tablet (20 mg total) by mouth 2 (two) times daily as needed (leg swelling).    guaiFENesin (MUCINEX) 600 mg 12 hr tablet Take 600 mg by mouth 2 (two) times daily.    ibuprofen (ADVIL,MOTRIN) 800 MG tablet Take 1 tablet (800 mg total) by mouth every 8 (eight) hours as needed for Pain.    loratadine (CLARITIN) 10 mg tablet Take 10 mg by mouth once daily.    losartan (COZAAR) 25 MG tablet Take 1 tablet (25 mg total) by mouth once daily.    meloxicam (MOBIC) 15 MG tablet TAKE 1 TABLET BY MOUTH EVERY DAY    multivitamin (MULTIVITAMIN) per tablet Take 1 tablet by mouth once daily.      potassium chloride SA (K-DUR,KLOR-CON) 20 MEQ tablet Take 2 tablets (40 mEq total) by mouth once daily. (Patient taking differently: Take 20 mEq by mouth once daily. )    rosuvastatin (CRESTOR) 5 MG tablet Take 1 tablet (5 mg total) by mouth once daily.     No current facility-administered medications for this visit.      Review of patient's allergies indicates:   Allergen Reactions    Adhesive      PAPER TAPE    Diazepam Other (See Comments)     Nervous, jittery    Gabapentin      Nervous      Keppra [levetiracetam]      nervous    Mold      sneezing       Precautions: standard     Evaluation Date: 11/7/18  Start Time:7:08   Stop Time:7:55  Total 1:1  47 min  Visit # authorized: 11/21  Authorization period: 12/31/18  Plan of care expiration: 1/2/19  MD referral: patellofemoral stress syndrome     Hx of present illness: Pt had insidious onset of pain with no history of trauma. She went to the doctor who performed an MRI and told her she had a broken hip. She then had further imaging below which revealed avascular necrosis.     X-ray L hip 8/15/18: "FINDINGS: There are no acute fractures or dislocations or osteoblastic or lytic lesions or signs for avascular necrosis.  Soft tissues are unremarkable.  There are numerous phleboliths in the " "pelvis."     CT pelvis 8/3/18: "FINDINGS:  Bilateral femoral heads appear osteopenic with subtle, small focal subchondral defect in the superior aspect of the left femoral head and subtle underlying linear sclerosis.  Findings are consistent with recent MRI likely representing changes from bilateral avascular necrosis and early left subchondral fracture.  No large joint effusion seen.  Remaining osseous structures appear intact.  Degenerative changes identified involving the pubic symphysis, bilateral SI joints, and partially visualized lumbar spine.  There is significant sclerotic change with fusion at the superior aspect of the bilateral SI joints.    Partially visualized pelvis demonstrates colonic diverticulosis without evidence of diverticulitis, hysterectomy, and minimal scattered atherosclerosis.  Otherwise, unremarkable."    Subjective     Pt reports she's feeling stiff today. She has been doing pelvic MET every two days but still feels a little uneven. She feels shoes with slightly elevated heel may contribute to her pain. She will continue to monitor at home.   Pain Scale: Michela rates pain at R hip on a scale of 0-10 to be 2 currently.    Objective     Michela received individual therapeutic exercises to develop strength, endurance, ROM, flexibility, posture and core stabilization for 39 minutes including:    NuStep x5 min-   PPT 20x5" -  PPT BKFO x30 NP  PPT marches x20 NP  Adduction squeeze 20x5" -  DKTC with SB 2x10 -   SLR 3x10 B  - NP  Bridges 3x10-  Clamshells 2x10 RTB   Hip flexor stretch x1 min L NP   Piriformis stretch 2-way (modified and figure 4 without pulling thigh to chest) 2x30" L -   Shuttle press 3c 3x10-  Calf raises 3x10 -  gastroc stretch 2x1 min   Step ups on airex 2x10 LLE leading- NP  Standing hip abduction 2x10 B-  Standing hip ext 2x10 B-  Step ups on level 3 step x20  B  Step overs lateral level 2 2x10  Swiss ball squats 2x10   TKE MSC x30  NP  MET R ant L post innominate 5x5" " NP  Crab walks 1 lap     Aliceelenlawson received the following manual therapy techniques: Joint mobilizations were applied for 8 minutes including:  Inf glides B  LAD  Pelvic re-alignment for R side rotated forward and L side rotated backward NP    Education provided: DOMS; avascular necrosis, crab walks  Evelena verbalized good understanding of education provided.   No spiritual or educational barriers to learning identified.     PT reviewed FOTO scores for Evelena Joan on 11/7/18  FOTO score: 67% limited     PT reviewed FOTO scores for Evelena Joan on 12/10/18  FOTO score 61% limited     Functional Limitations Reports - G Codes  Category: mobility  Tool: FOTO     Current ():  CL  Goal (): CK    Assessment     Patient tolerated treatment well without adverse effects. Hip mobilizations performed to decrease stiffness this visit. Pt continued to progress well with there-ex this visit. Increased step up height with good tolerance. Pt provided with OTB for crab walks at home.This is a 73 y.o. female referred to outpatient physical therapy and presents with a medical diagnosis of R hip and knee pain and demonstrates limitations as described in the problem list. Pt prognosis is Good. Pt will continue to benefit from skilled outpatient physical therapy to address the deficits listed in the problem list, provide pt/family education and to maximize pt's level of independence in the home and community environment.     Medical necessity is demonstrated by the following IMPAIRMENTS/PROBLEM LIST:               1) Pain limiting function              2) Posture dysfunction              3) Core/Lumbar/LE weakness              4) Decreased lumbar joint mobility              6) Decreased soft tissue extensibility/fascia restriction              7) Decreased LE flexibility: hamstrings and hip flexors              8) Lack of HEP              9) LE paresthesia      GOALS:   Short Term Goals:  3 weeks  1. Patient will be  proficient and compliant in HEP.  2. Improve hip abductor strength by 1 muscle grade in order to improve stability when walking.  3. Pt will be able to ambulate >/=2 blocks without an increase in symptoms.- MET     Long Term Goals: 8 weeks  1. Pt will report no difficulty putting on shoes/socks.  2. Pt will be able to enter her home using step to gait pattern up stairs.  3. Pt will be </= 47% limited in mobility according to FOTO.- in progress     Plan     Continue with established Plan of Care towards PT goals.    Therapist: Shakira Saavedra, PT, DPT  12/24/2018

## 2018-12-24 NOTE — TELEPHONE ENCOUNTER
"----- Message from Debora Minor sent at 12/24/2018 10:22 AM CST -----  Contact: [facility] [phone] [facility]Phone)  RX request - refill or new RX.  Is this a refill or new RX:    RX name and strength: Tramadol 50mg  Directions:   Is this a 30 day or 90 day RX:    Local pharmacy or mail order pharmacy:    Pharmacy name and phone # (DON'T enter "on file" or "in chart"): [facility] - [place], [place] - [address] [phone] (Phone)  [phone] (Fax)      Comments:        "

## 2018-12-28 ENCOUNTER — CLINICAL SUPPORT (OUTPATIENT)
Dept: REHABILITATION | Facility: HOSPITAL | Age: 73
End: 2018-12-28
Attending: ORTHOPAEDIC SURGERY
Payer: MEDICARE

## 2018-12-28 DIAGNOSIS — M25.562 CHRONIC PAIN OF LEFT KNEE: ICD-10-CM

## 2018-12-28 DIAGNOSIS — M25.552 PAIN OF LEFT HIP JOINT: ICD-10-CM

## 2018-12-28 DIAGNOSIS — G89.29 CHRONIC PAIN OF LEFT KNEE: ICD-10-CM

## 2018-12-28 PROCEDURE — 97110 THERAPEUTIC EXERCISES: CPT

## 2018-12-28 PROCEDURE — 97116 GAIT TRAINING THERAPY: CPT

## 2018-12-28 NOTE — PROGRESS NOTES
Physical Therapy Daily Note         Name: Michela Franco  Clinic Number: [id number]  Diagnosis:   Encounter Diagnoses   Name Primary?    Pain of left hip joint     Chronic pain of left knee      Physician: Moiz Davis MD  Treatment Orders: PT Eval and Treat  Past Medical History:   Diagnosis Date    Allergy     Chronic diastolic heart failure 4/5/2018    Hyperlipidemia     Hypertension     Neuromuscular disorder     JOHN on CPAP     Osteoporosis     Recurrent sinusitis 3/25/2014    Recurrent upper respiratory infection (URI)     Urticaria      Current Outpatient Medications   Medication Sig    ACTIVATED CHARCOAL (CHARCOCAPS ORAL) Take by mouth as needed.     ADVAIR DISKUS 500-50 mcg/dose DsDv diskus inhaler 1 puff 2 (two) times daily.    albuterol (ACCUNEB) 0.63 mg/3 mL Nebu Take 0.63 mg by nebulization 2 (two) times daily. Rescue    albuterol 90 mcg/actuation inhaler Inhale 2 puffs into the lungs every 6 (six) hours as needed for Wheezing.    alendronate (FOSAMAX) 70 MG tablet Take 1 tablet (70 mg total) by mouth every 7 days.    amLODIPine (NORVASC) 10 MG tablet TAKE 1 TABLET ONE TIME DAILY    budesonide (PULMICORT) 0.5 mg/2 mL nebulizer solution Take 2 mLs (0.5 mg total) by nebulization once daily. For use in saline irrigation as directed.    calcitonin, salmon, (FORTICAL) 200 unit/actuation nasal spray 1 spray by Nasal route once daily.    cetirizine (ZYRTEC) 10 MG tablet Take 10 mg by mouth once daily.    cholecalciferol, vitamin D3, 10,000 unit Cap Take 360 mg by mouth once daily. Take 6 capsules (6,000 units total) by mouth once daily    COD LIVER OIL ORAL Take 1 tablet by mouth once daily.    ferrous sulfate 325 (65 FE) MG EC tablet Take 1 tablet (325 mg total) by mouth 3 (three) times daily with meals. (Patient taking differently: Take 325 mg by mouth once daily. Taking once daily)    folic acid (FOLVITE) 800 MCG Tab Take 1  "tablet (800 mcg total) by mouth once daily.    furosemide (LASIX) 20 MG tablet Take 1 tablet (20 mg total) by mouth 2 (two) times daily as needed (leg swelling).    guaiFENesin (MUCINEX) 600 mg 12 hr tablet Take 600 mg by mouth 2 (two) times daily.    ibuprofen (ADVIL,MOTRIN) 800 MG tablet Take 1 tablet (800 mg total) by mouth every 8 (eight) hours as needed for Pain.    loratadine (CLARITIN) 10 mg tablet Take 10 mg by mouth once daily.    losartan (COZAAR) 25 MG tablet Take 1 tablet (25 mg total) by mouth once daily.    meloxicam (MOBIC) 15 MG tablet TAKE 1 TABLET BY MOUTH EVERY DAY    multivitamin (MULTIVITAMIN) per tablet Take 1 tablet by mouth once daily.      potassium chloride SA (K-DUR,KLOR-CON) 20 MEQ tablet Take 2 tablets (40 mEq total) by mouth once daily. (Patient taking differently: Take 20 mEq by mouth once daily. )    rosuvastatin (CRESTOR) 5 MG tablet Take 1 tablet (5 mg total) by mouth once daily.    traMADol (ULTRAM) 50 mg tablet Take 1 tablet (50 mg total) by mouth every 6 (six) hours as needed for Pain.     No current facility-administered medications for this visit.      Review of patient's allergies indicates:   Allergen Reactions    Adhesive      PAPER TAPE    Diazepam Other (See Comments)     Nervous, jittery    Gabapentin      Nervous      Keppra [levetiracetam]      nervous    Mold      sneezing       Precautions: standard     Evaluation Date: 11/7/18  Start Time: 8:03  Stop Time: 9:00  Total 1:1  30 min  Visit # authorized: 12/21  Authorization period: 12/31/18  Plan of care expiration: 1/2/19  MD referral: patellofemoral stress syndrome     Hx of present illness: Pt had insidious onset of pain with no history of trauma. She went to the doctor who performed an MRI and told her she had a broken hip. She then had further imaging below which revealed avascular necrosis.     X-ray L hip 8/15/18: "FINDINGS: There are no acute fractures or dislocations or osteoblastic or lytic " "lesions or signs for avascular necrosis.  Soft tissues are unremarkable.  There are numerous phleboliths in the pelvis."     CT pelvis 8/3/18: "FINDINGS:  Bilateral femoral heads appear osteopenic with subtle, small focal subchondral defect in the superior aspect of the left femoral head and subtle underlying linear sclerosis.  Findings are consistent with recent MRI likely representing changes from bilateral avascular necrosis and early left subchondral fracture.  No large joint effusion seen.  Remaining osseous structures appear intact.  Degenerative changes identified involving the pubic symphysis, bilateral SI joints, and partially visualized lumbar spine.  There is significant sclerotic change with fusion at the superior aspect of the bilateral SI joints.    Partially visualized pelvis demonstrates colonic diverticulosis without evidence of diverticulitis, hysterectomy, and minimal scattered atherosclerosis.  Otherwise, unremarkable."    Subjective     Pt reports she's feeling stiff today. She has been doing pelvic MET every two days but still feels a little uneven.  Pain Scale: Michela rates pain at R hip on a scale of 0-10 to be 0 currently.    Objective     Michela received individual therapeutic exercises to develop strength, endurance, ROM, flexibility, posture and core stabilization for 36  minutes including:    NuStep x5 min  PPT 20x5"   PPT BKFO x30 NP  PPT marches x20 NP  Adduction squeeze 20x5"   DKTC with SB 2x10   SLR 3x10 B  - NP  Bridges 3x10  Clamshells 2x10 RTB   Hip flexor stretch x1 min L   Piriformis stretch 2-way (modified and figure 4 without pulling thigh to chest) 2x30" L -   Shuttle press 3c 3x10  Calf raises 3x10   gastroc stretch 2x1 min    Step ups on airex 2x10 LLE leading- NP  Standing hip abduction 2x10 B-  Standing hip ext 2x10 B-  Step ups on level 3 step x20  B  Step overs lateral level 2 2x10  Swiss ball squats 2x10   TKE MSC x30  NP  MET R ant L post innominate 5x5"   Crab " walks 1 lap     Gait training- 8 minutes  Stair training       Michela received the following manual therapy techniques: Joint mobilizations were applied for 3 minutes including:  Inf glides B -NP  LAD-NP  Pelvic re-alignment for R side rotated forward and L side rotated backward     Ice for 10 minutes    Education provided: DOMS; avascular necrosis, crab walks  Michela verbalized good understanding of education provided.   No spiritual or educational barriers to learning identified.     PT reviewed FOTO scores for Michela Carmenrly on 11/7/18  FOTO score: 67% limited     PT reviewed FOTO scores for Michela Joan on 12/10/18  FOTO score 61% limited     Functional Limitations Reports - G Codes  Category: mobility  Tool: FOTO     Current ():  CL  Goal (): CK    Assessment     PT added stair training to focus on reciprocal pattern. Patient tolerated treatment well without adverse effects.  Pt continued to progress well with there-ex this visit. Pt provided with OTB for crab walks at home.This is a 73 y.o. female referred to outpatient physical therapy and presents with a medical diagnosis of R hip and knee pain and demonstrates limitations as described in the problem list. Pt prognosis is Good. Pt will continue to benefit from skilled outpatient physical therapy to address the deficits listed in the problem list, provide pt/family education and to maximize pt's level of independence in the home and community environment.     Medical necessity is demonstrated by the following IMPAIRMENTS/PROBLEM LIST:               1) Pain limiting function              2) Posture dysfunction              3) Core/Lumbar/LE weakness              4) Decreased lumbar joint mobility              6) Decreased soft tissue extensibility/fascia restriction              7) Decreased LE flexibility: hamstrings and hip flexors              8) Lack of HEP              9) LE paresthesia      GOALS:   Short Term Goals:  3 weeks  1. Patient  will be proficient and compliant in HEP.  2. Improve hip abductor strength by 1 muscle grade in order to improve stability when walking.  3. Pt will be able to ambulate >/=2 blocks without an increase in symptoms.- MET     Long Term Goals: 8 weeks  1. Pt will report no difficulty putting on shoes/socks.  2. Pt will be able to enter her home using step to gait pattern up stairs.  3. Pt will be </= 47% limited in mobility according to FOTO.- in progress     Plan     Continue with established Plan of Care towards PT goals.    Therapist: James Abarca, PT, DPT  12/28/2018

## 2018-12-31 ENCOUNTER — CLINICAL SUPPORT (OUTPATIENT)
Dept: REHABILITATION | Facility: HOSPITAL | Age: 73
End: 2018-12-31
Attending: ORTHOPAEDIC SURGERY
Payer: MEDICARE

## 2018-12-31 DIAGNOSIS — M25.552 PAIN OF LEFT HIP JOINT: Primary | ICD-10-CM

## 2018-12-31 DIAGNOSIS — M25.562 CHRONIC PAIN OF LEFT KNEE: ICD-10-CM

## 2018-12-31 DIAGNOSIS — G89.29 CHRONIC PAIN OF LEFT KNEE: ICD-10-CM

## 2018-12-31 PROCEDURE — 97116 GAIT TRAINING THERAPY: CPT | Mod: HCNC

## 2018-12-31 PROCEDURE — G8978 MOBILITY CURRENT STATUS: HCPCS | Mod: CL,HCNC

## 2018-12-31 PROCEDURE — 97110 THERAPEUTIC EXERCISES: CPT | Mod: HCNC

## 2018-12-31 PROCEDURE — G8979 MOBILITY GOAL STATUS: HCPCS | Mod: CK,HCNC

## 2018-12-31 NOTE — PROGRESS NOTES
Physical Therapy Daily Note         Name: Michela Franco  Clinic Number: [id number]  Diagnosis:   Encounter Diagnoses   Name Primary?    Pain of left hip joint Yes    Chronic pain of left knee      Physician: Moiz Davis MD  Treatment Orders: PT Eval and Treat  Past Medical History:   Diagnosis Date    Allergy     Chronic diastolic heart failure 4/5/2018    Hyperlipidemia     Hypertension     Neuromuscular disorder     JOHN on CPAP     Osteoporosis     Recurrent sinusitis 3/25/2014    Recurrent upper respiratory infection (URI)     Urticaria      Current Outpatient Medications   Medication Sig    ACTIVATED CHARCOAL (CHARCOCAPS ORAL) Take by mouth as needed.     ADVAIR DISKUS 500-50 mcg/dose DsDv diskus inhaler 1 puff 2 (two) times daily.    albuterol (ACCUNEB) 0.63 mg/3 mL Nebu Take 0.63 mg by nebulization 2 (two) times daily. Rescue    albuterol 90 mcg/actuation inhaler Inhale 2 puffs into the lungs every 6 (six) hours as needed for Wheezing.    alendronate (FOSAMAX) 70 MG tablet Take 1 tablet (70 mg total) by mouth every 7 days.    amLODIPine (NORVASC) 10 MG tablet TAKE 1 TABLET ONE TIME DAILY    budesonide (PULMICORT) 0.5 mg/2 mL nebulizer solution Take 2 mLs (0.5 mg total) by nebulization once daily. For use in saline irrigation as directed.    calcitonin, salmon, (FORTICAL) 200 unit/actuation nasal spray 1 spray by Nasal route once daily.    cetirizine (ZYRTEC) 10 MG tablet Take 10 mg by mouth once daily.    cholecalciferol, vitamin D3, 10,000 unit Cap Take 360 mg by mouth once daily. Take 6 capsules (6,000 units total) by mouth once daily    COD LIVER OIL ORAL Take 1 tablet by mouth once daily.    ferrous sulfate 325 (65 FE) MG EC tablet Take 1 tablet (325 mg total) by mouth 3 (three) times daily with meals. (Patient taking differently: Take 325 mg by mouth once daily. Taking once daily)    folic acid (FOLVITE) 800 MCG Tab Take 1  "tablet (800 mcg total) by mouth once daily.    furosemide (LASIX) 20 MG tablet Take 1 tablet (20 mg total) by mouth 2 (two) times daily as needed (leg swelling).    guaiFENesin (MUCINEX) 600 mg 12 hr tablet Take 600 mg by mouth 2 (two) times daily.    loratadine (CLARITIN) 10 mg tablet Take 10 mg by mouth once daily.    losartan (COZAAR) 25 MG tablet Take 1 tablet (25 mg total) by mouth once daily.    meloxicam (MOBIC) 15 MG tablet TAKE 1 TABLET BY MOUTH EVERY DAY    multivitamin (MULTIVITAMIN) per tablet Take 1 tablet by mouth once daily.      potassium chloride SA (K-DUR,KLOR-CON) 20 MEQ tablet Take 2 tablets (40 mEq total) by mouth once daily. (Patient taking differently: Take 20 mEq by mouth once daily. )    rosuvastatin (CRESTOR) 5 MG tablet Take 1 tablet (5 mg total) by mouth once daily.    traMADol (ULTRAM) 50 mg tablet Take 1 tablet (50 mg total) by mouth every 6 (six) hours as needed for Pain.     No current facility-administered medications for this visit.      Review of patient's allergies indicates:   Allergen Reactions    Adhesive      PAPER TAPE    Diazepam Other (See Comments)     Nervous, jittery    Gabapentin      Nervous      Keppra [levetiracetam]      nervous    Mold      sneezing       Evaluation Date: 11/7/18  Start Time: 7:10 (arrived late)  Stop Time: 8:02  Total 1:1  52 min  Visit # authorized: 13/21  Authorization period: 12/31/18  Plan of care expiration: 1/2/19  MD referral: patellofemoral stress syndrome     Hx of present illness: Pt had insidious onset of pain with no history of trauma. She went to the doctor who performed an MRI and told her she had a broken hip. She then had further imaging below which revealed avascular necrosis.     X-ray L hip 8/15/18: "FINDINGS: There are no acute fractures or dislocations or osteoblastic or lytic lesions or signs for avascular necrosis.  Soft tissues are unremarkable.  There are numerous phleboliths in the pelvis."     CT pelvis " "8/3/18: "FINDINGS:  Bilateral femoral heads appear osteopenic with subtle, small focal subchondral defect in the superior aspect of the left femoral head and subtle underlying linear sclerosis.  Findings are consistent with recent MRI likely representing changes from bilateral avascular necrosis and early left subchondral fracture.  No large joint effusion seen.  Remaining osseous structures appear intact.  Degenerative changes identified involving the pubic symphysis, bilateral SI joints, and partially visualized lumbar spine.  There is significant sclerotic change with fusion at the superior aspect of the bilateral SI joints.    Partially visualized pelvis demonstrates colonic diverticulosis without evidence of diverticulitis, hysterectomy, and minimal scattered atherosclerosis.  Otherwise, unremarkable."    Subjective     Pt reports she's feeling stiff today. She has been doing the exercises. She feels like she has been moving a lot but still gets stiff when she stops moving.     Pain Scale: Michela rates pain at L hip on a scale of 0-10 to be 1 currently.    Objective     Michela received individual therapeutic exercises to develop strength, endurance, ROM, flexibility, posture and core stabilization for 30 minutes including:    NuStep x5 min  PPT 20x5" NP  PPT BKFO x30 NP  PPT marches x20 NP  Adduction squeeze 20x5" NP  DKTC with SB 2x10 NP  SLR 3x10 B  - NP  Bridges 3x10  Clamshells 3x10 RTB   Hip flexor stretch x1 min L   Piriformis stretch 2-way (modified and figure 4 without pulling thigh to chest) 2x30" L -   Shuttle press 3c 3x10  Calf raises 3x10   gastroc stretch 2x1 min    Step ups on airex 2x10 LLE leading- NP  Standing hip abduction 2x10 B-  Standing hip ext 2x10 B-  Step ups on level 3 step x20  B NP- stair training this visit  Step overs lateral level 2 2x10  Swiss ball squats 3x10   TKE MSC x30    MET R ant L post innominate 5x5"   Crab walks 1 lap      Gait training- 12 minutes  Stair training "     Michela received the following manual therapy techniques: Joint mobilizations were applied for 5 minutes including:  Inf glides B -NP  LAD R  Pelvic re-alignment for R side rotated forward and L side rotated backward      Ice for 10 minutes    Education provided: DOMS; avascular necrosis, crab walks  Michela verbalized good understanding of education provided.   No spiritual or educational barriers to learning identified.     PT reviewed FOTO scores for Evelena Joan on 11/7/18  FOTO score: 67% limited     PT reviewed FOTO scores for Evelena Joan on 12/10/18  FOTO score 61% limited     Functional Limitations Reports - G Codes  Category: mobility  Tool: FOTO     Current ():  CL  Goal (): CK    Assessment     Patient tolerated treatment well without adverse effects. Performed stair training this visit. Pt with difficulty ascending step with L LE, pushing up through leg demonstrating lateral trunk lean. No trouble with descending steps.  Unable to perform all exercises due to late arrival and increased time for stair training. Pt is making progress towards goals. This is a 73 y.o. female referred to outpatient physical therapy and presents with a medical diagnosis of L hip pain and demonstrates limitations as described in the problem list. Pt prognosis is Good. Pt will continue to benefit from skilled outpatient physical therapy to address the deficits listed in the problem list, provide pt/family education and to maximize pt's level of independence in the home and community environment.     Medical necessity is demonstrated by the following IMPAIRMENTS/PROBLEM LIST:               1) Pain limiting function              2) Posture dysfunction              3) Core/Lumbar/LE weakness              4) Decreased lumbar joint mobility              6) Decreased soft tissue extensibility/fascia restriction              7) Decreased LE flexibility: hamstrings and hip flexors              8) Lack of  HEP              9) LE paresthesia      GOALS:   Short Term Goals:  3 weeks  1. Patient will be proficient and compliant in HEP.  2. Improve hip abductor strength by 1 muscle grade in order to improve stability when walking.  3. Pt will be able to ambulate >/=2 blocks without an increase in symptoms.- MET     Long Term Goals: 8 weeks  1. Pt will report no difficulty putting on shoes/socks.  2. Pt will be able to enter her home using step to gait pattern up stairs.  3. Pt will be </= 47% limited in mobility according to FOTO.- in progress       Plan     Continue with established Plan of Care towards PT goals.    Therapist: Shakira Saavedra, PT, DPT  12/31/2018

## 2019-01-02 ENCOUNTER — TELEPHONE (OUTPATIENT)
Dept: FAMILY MEDICINE | Facility: CLINIC | Age: 74
End: 2019-01-02

## 2019-01-02 DIAGNOSIS — I10 ESSENTIAL HYPERTENSION: Primary | ICD-10-CM

## 2019-01-02 DIAGNOSIS — Z00.00 ROUTINE GENERAL MEDICAL EXAMINATION AT A HEALTH CARE FACILITY: ICD-10-CM

## 2019-01-02 NOTE — TELEPHONE ENCOUNTER
----- Message from Jose Gutierrez sent at 1/2/2019  9:53 AM CST -----  Contact: Daughter  05/21/19 Annual Physical need lab orders placed and linked  05/13/19 Labs    Thank you

## 2019-01-07 ENCOUNTER — CLINICAL SUPPORT (OUTPATIENT)
Dept: REHABILITATION | Facility: HOSPITAL | Age: 74
End: 2019-01-07
Attending: ORTHOPAEDIC SURGERY
Payer: MEDICARE

## 2019-01-07 DIAGNOSIS — M25.562 CHRONIC PAIN OF LEFT KNEE: ICD-10-CM

## 2019-01-07 DIAGNOSIS — G89.29 CHRONIC PAIN OF LEFT KNEE: ICD-10-CM

## 2019-01-07 DIAGNOSIS — M25.552 PAIN OF LEFT HIP JOINT: Primary | ICD-10-CM

## 2019-01-07 PROCEDURE — G8979 MOBILITY GOAL STATUS: HCPCS | Mod: CK,HCNC

## 2019-01-07 PROCEDURE — 97110 THERAPEUTIC EXERCISES: CPT | Mod: HCNC

## 2019-01-07 PROCEDURE — G8978 MOBILITY CURRENT STATUS: HCPCS | Mod: CL,HCNC

## 2019-01-07 NOTE — PROGRESS NOTES
Physical Therapy Daily Note         Name: Michela Franco  Clinic Number: [id number]  Diagnosis:   Encounter Diagnoses   Name Primary?    Pain of left hip joint Yes    Chronic pain of left knee      Physician: Moiz Davis MD  Treatment Orders: PT Eval and Treat  Past Medical History:   Diagnosis Date    Allergy     Chronic diastolic heart failure 4/5/2018    Hyperlipidemia     Hypertension     Neuromuscular disorder     JOHN on CPAP     Osteoporosis     Recurrent sinusitis 3/25/2014    Recurrent upper respiratory infection (URI)     Urticaria      Current Outpatient Medications   Medication Sig    ACTIVATED CHARCOAL (CHARCOCAPS ORAL) Take by mouth as needed.     ADVAIR DISKUS 500-50 mcg/dose DsDv diskus inhaler 1 puff 2 (two) times daily.    albuterol (ACCUNEB) 0.63 mg/3 mL Nebu Take 0.63 mg by nebulization 2 (two) times daily. Rescue    albuterol 90 mcg/actuation inhaler Inhale 2 puffs into the lungs every 6 (six) hours as needed for Wheezing.    alendronate (FOSAMAX) 70 MG tablet Take 1 tablet (70 mg total) by mouth every 7 days.    amLODIPine (NORVASC) 10 MG tablet TAKE 1 TABLET ONE TIME DAILY    budesonide (PULMICORT) 0.5 mg/2 mL nebulizer solution Take 2 mLs (0.5 mg total) by nebulization once daily. For use in saline irrigation as directed.    calcitonin, salmon, (FORTICAL) 200 unit/actuation nasal spray 1 spray by Nasal route once daily.    cetirizine (ZYRTEC) 10 MG tablet Take 10 mg by mouth once daily.    cholecalciferol, vitamin D3, 10,000 unit Cap Take 360 mg by mouth once daily. Take 6 capsules (6,000 units total) by mouth once daily    COD LIVER OIL ORAL Take 1 tablet by mouth once daily.    ferrous sulfate 325 (65 FE) MG EC tablet Take 1 tablet (325 mg total) by mouth 3 (three) times daily with meals. (Patient taking differently: Take 325 mg by mouth once daily. Taking once daily)    folic acid (FOLVITE) 800 MCG Tab Take 1  "tablet (800 mcg total) by mouth once daily.    furosemide (LASIX) 20 MG tablet Take 1 tablet (20 mg total) by mouth 2 (two) times daily as needed (leg swelling).    guaiFENesin (MUCINEX) 600 mg 12 hr tablet Take 600 mg by mouth 2 (two) times daily.    loratadine (CLARITIN) 10 mg tablet Take 10 mg by mouth once daily.    losartan (COZAAR) 25 MG tablet Take 1 tablet (25 mg total) by mouth once daily.    meloxicam (MOBIC) 15 MG tablet TAKE 1 TABLET BY MOUTH EVERY DAY    multivitamin (MULTIVITAMIN) per tablet Take 1 tablet by mouth once daily.      potassium chloride SA (K-DUR,KLOR-CON) 20 MEQ tablet Take 2 tablets (40 mEq total) by mouth once daily. (Patient taking differently: Take 20 mEq by mouth once daily. )    rosuvastatin (CRESTOR) 5 MG tablet Take 1 tablet (5 mg total) by mouth once daily.     No current facility-administered medications for this visit.      Review of patient's allergies indicates:   Allergen Reactions    Adhesive      PAPER TAPE    Diazepam Other (See Comments)     Nervous, jittery    Gabapentin      Nervous      Keppra [levetiracetam]      nervous    Mold      sneezing       Evaluation Date: 11/7/18  Start Time: 7:38 (arrived late)  Stop Time: 8:15  Total 1:1 37 min  Visit # authorized: 13/21  Authorization period: 12/31/18  Plan of care expiration: 1/2/19  MD referral: patellofemoral stress syndrome     Hx of present illness: Pt had insidious onset of pain with no history of trauma. She went to the doctor who performed an MRI and told her she had a broken hip. She then had further imaging below which revealed avascular necrosis.     X-ray L hip 8/15/18: "FINDINGS: There are no acute fractures or dislocations or osteoblastic or lytic lesions or signs for avascular necrosis.  Soft tissues are unremarkable.  There are numerous phleboliths in the pelvis."     CT pelvis 8/3/18: "FINDINGS:  Bilateral femoral heads appear osteopenic with subtle, small focal subchondral defect in the " "superior aspect of the left femoral head and subtle underlying linear sclerosis.  Findings are consistent with recent MRI likely representing changes from bilateral avascular necrosis and early left subchondral fracture.  No large joint effusion seen.  Remaining osseous structures appear intact.  Degenerative changes identified involving the pubic symphysis, bilateral SI joints, and partially visualized lumbar spine.  There is significant sclerotic change with fusion at the superior aspect of the bilateral SI joints.    Partially visualized pelvis demonstrates colonic diverticulosis without evidence of diverticulitis, hysterectomy, and minimal scattered atherosclerosis.  Otherwise, unremarkable."    Subjective     Pt reports she's not feeling too bad today. She's having more stiffness than pain today.   Pain Scale: Michela rates pain at L hip on a scale of 0-10 to be 1 currently.    Objective     Michela received individual therapeutic exercises to develop strength, endurance, ROM, flexibility, posture and core stabilization for 37 minutes including:  NuStep x5 min  PPT 20x5" NP  PPT BKFO x30 NP  PPT marches x20 NP  Adduction squeeze 20x5" NP  DKTC with SB 2x10 NP  SLR 3x10 B  -   Bridges 3x10  Clamshells 3x10 RTB   Hip flexor stretch x1 min L   Piriformis stretch 2-way (modified and figure 4 without pulling thigh to chest) 2x30" L -   Shuttle press 4c 3x10-   Calf raises 3x10   gastroc stretch 2x1 min    Step ups on airex 2x10 LLE leading- NP  Standing hip abduction 3x10 B-  Standing hip ext 2x10 B-  Swiss ball squats 3x10   TKE MSC x30    MET R ant L post innominate 5x5" NP  Crab walks 1 lap   Step ups on level 4 step x20  B  Step overs lateral level 2 2x10     Gait training- 12 minutes NP  Stair training       Michela received the following manual therapy techniques:  were applied for 00 minutes including: NP  Inf glides B -NP  LAD R NP  Pelvic re-alignment for R side rotated forward and L side rotated " backward NP    Ice for 10 minutes    Written Home Exercises Provided: at eval  Pt demo good understanding of the education provided. Evelena demonstrated good return demonstration of activities.     Education provided re: DOMS; avascular necrosis, crab walks  Evelena verbalized good understanding of education provided.   No spiritual or educational barriers to learning provided    PT reviewed FOTO scores for Evelena Joan on 11/7/18  FOTO score: 67% limited     PT reviewed FOTO scores for Evelena Joan on 12/10/18  FOTO score 61% limited     Functional Limitations Reports - G Codes  Category: mobility  Tool: FOTO  Current ():  CL  Goal (): CK  Assessment     Patient tolerated treatment well. Progressed stair height and shuttle resistance as well as repetitions of hip abd. Pt continuing to work on stairs due to decreased quad strength. Unable to perform all exercises due to limited treatment time due to pts late arrival and need to leave early for work. Pt is progressing well, but may be limited due to frequent late arrival and need to leave early. Have discussed this issue with pt several times. She has been consistent with HEP.This is a 73 y.o. female referred to outpatient physical therapy and presents with a medical diagnosis of L hip and knee pain and demonstrates limitations as described in the problem list. Pt prognosis is Good. Pt will continue to benefit from skilled outpatient physical therapy to address the deficits listed in the problem list, provide pt/family education and to maximize pt's level of independence in the home and community environment.     Medical necessity is demonstrated by the following IMPAIRMENTS/PROBLEM LIST:               1) Pain limiting function              2) Posture dysfunction              3) Core/Lumbar/LE weakness              4) Decreased lumbar joint mobility              6) Decreased soft tissue extensibility/fascia restriction              7) Decreased LE  flexibility: hamstrings and hip flexors              8) Lack of HEP              9) LE paresthesia      GOALS:   Short Term Goals:  3 weeks  1. Patient will be proficient and compliant in HEP.  2. Improve hip abductor strength by 1 muscle grade in order to improve stability when walking.  3. Pt will be able to ambulate >/=2 blocks without an increase in symptoms.- MET     Long Term Goals: 8 weeks  1. Pt will report no difficulty putting on shoes/socks.  2. Pt will be able to enter her home using step to gait pattern up stairs.  3. Pt will be </= 47% limited in mobility according to FOTO.- in progress        Plan     Continue with established Plan of Care towards PT goals.    Therapist: Shakira Saavedra, PT, DPT  1/7/2019

## 2019-01-14 RX ORDER — IBUPROFEN 800 MG/1
TABLET ORAL
Qty: 90 TABLET | Refills: 0 | Status: SHIPPED | OUTPATIENT
Start: 2019-01-14 | End: 2019-02-06 | Stop reason: SDUPTHER

## 2019-01-21 ENCOUNTER — TELEPHONE (OUTPATIENT)
Dept: FAMILY MEDICINE | Facility: CLINIC | Age: 74
End: 2019-01-21

## 2019-01-21 NOTE — TELEPHONE ENCOUNTER
----- Message from Neymar Thompson sent at 1/21/2019  8:03 AM CST -----  Contact: self/[phone]  Pt is calling stating that she has received a letter from her pharmacy stating that her medication losartan (COZAAR) 25 MG tablet. Pt needs to know what are her next steps to getting a new medication. Please advise.          Thanks

## 2019-01-21 NOTE — TELEPHONE ENCOUNTER
Spoke with patient states she was informed per her pharmacy that the losartan was recalled, patient would like to know if another medication can be called in.  Please advise.

## 2019-01-21 NOTE — TELEPHONE ENCOUNTER
Please advise pt if her lot of Losartan was recalled, the pharmacy can give her new medication that is not affected by the recall without a new prescription.

## 2019-02-01 ENCOUNTER — PES CALL (OUTPATIENT)
Dept: ADMINISTRATIVE | Facility: CLINIC | Age: 74
End: 2019-02-01

## 2019-02-06 RX ORDER — IBUPROFEN 800 MG/1
TABLET ORAL
Qty: 90 TABLET | Refills: 0 | Status: SHIPPED | OUTPATIENT
Start: 2019-02-06 | End: 2019-11-07 | Stop reason: SDUPTHER

## 2019-02-20 RX ORDER — ALENDRONATE SODIUM 70 MG/1
TABLET ORAL
Qty: 12 TABLET | Refills: 2 | Status: SHIPPED | OUTPATIENT
Start: 2019-02-20 | End: 2019-03-15 | Stop reason: SDUPTHER

## 2019-02-22 ENCOUNTER — TELEPHONE (OUTPATIENT)
Dept: OTOLARYNGOLOGY | Facility: CLINIC | Age: 74
End: 2019-02-22

## 2019-02-28 ENCOUNTER — DOCUMENTATION ONLY (OUTPATIENT)
Dept: REHABILITATION | Facility: HOSPITAL | Age: 74
End: 2019-02-28

## 2019-03-08 ENCOUNTER — OFFICE VISIT (OUTPATIENT)
Dept: OTOLARYNGOLOGY | Facility: CLINIC | Age: 74
End: 2019-03-08
Payer: MEDICARE

## 2019-03-08 VITALS — SYSTOLIC BLOOD PRESSURE: 127 MMHG | HEART RATE: 92 BPM | DIASTOLIC BLOOD PRESSURE: 78 MMHG

## 2019-03-08 DIAGNOSIS — J45.40 MODERATE PERSISTENT ASTHMA WITHOUT COMPLICATION: ICD-10-CM

## 2019-03-08 DIAGNOSIS — J30.2 PERENNIAL ALLERGIC RHINITIS WITH SEASONAL VARIATION: Primary | ICD-10-CM

## 2019-03-08 DIAGNOSIS — J30.89 PERENNIAL ALLERGIC RHINITIS WITH SEASONAL VARIATION: Primary | ICD-10-CM

## 2019-03-08 DIAGNOSIS — J32.4 CHRONIC PANSINUSITIS: ICD-10-CM

## 2019-03-08 DIAGNOSIS — J34.3 NASAL TURBINATE HYPERTROPHY: ICD-10-CM

## 2019-03-08 PROCEDURE — 87070 CULTURE OTHR SPECIMN AEROBIC: CPT | Mod: HCNC

## 2019-03-08 PROCEDURE — 1101F PR PT FALLS ASSESS DOC 0-1 FALLS W/OUT INJ PAST YR: ICD-10-PCS | Mod: HCNC,CPTII,S$GLB, | Performed by: OTOLARYNGOLOGY

## 2019-03-08 PROCEDURE — 99214 PR OFFICE/OUTPT VISIT, EST, LEVL IV, 30-39 MIN: ICD-10-PCS | Mod: 25,HCNC,S$GLB, | Performed by: OTOLARYNGOLOGY

## 2019-03-08 PROCEDURE — 3074F SYST BP LT 130 MM HG: CPT | Mod: HCNC,CPTII,S$GLB, | Performed by: OTOLARYNGOLOGY

## 2019-03-08 PROCEDURE — 99999 PR PBB SHADOW E&M-EST. PATIENT-LVL III: ICD-10-PCS | Mod: PBBFAC,HCNC,, | Performed by: OTOLARYNGOLOGY

## 2019-03-08 PROCEDURE — 87102 FUNGUS ISOLATION CULTURE: CPT | Mod: HCNC

## 2019-03-08 PROCEDURE — 31231 NASAL ENDOSCOPY DX: CPT | Mod: HCNC,S$GLB,, | Performed by: OTOLARYNGOLOGY

## 2019-03-08 PROCEDURE — 87185 SC STD ENZYME DETCJ PER NZM: CPT | Mod: HCNC

## 2019-03-08 PROCEDURE — 3078F DIAST BP <80 MM HG: CPT | Mod: HCNC,CPTII,S$GLB, | Performed by: OTOLARYNGOLOGY

## 2019-03-08 PROCEDURE — 99999 PR PBB SHADOW E&M-EST. PATIENT-LVL III: CPT | Mod: PBBFAC,HCNC,, | Performed by: OTOLARYNGOLOGY

## 2019-03-08 PROCEDURE — 3078F PR MOST RECENT DIASTOLIC BLOOD PRESSURE < 80 MM HG: ICD-10-PCS | Mod: HCNC,CPTII,S$GLB, | Performed by: OTOLARYNGOLOGY

## 2019-03-08 PROCEDURE — 3074F PR MOST RECENT SYSTOLIC BLOOD PRESSURE < 130 MM HG: ICD-10-PCS | Mod: HCNC,CPTII,S$GLB, | Performed by: OTOLARYNGOLOGY

## 2019-03-08 PROCEDURE — 99214 OFFICE O/P EST MOD 30 MIN: CPT | Mod: 25,HCNC,S$GLB, | Performed by: OTOLARYNGOLOGY

## 2019-03-08 PROCEDURE — 31231 NASAL/SINUS ENDOSCOPY: ICD-10-PCS | Mod: HCNC,S$GLB,, | Performed by: OTOLARYNGOLOGY

## 2019-03-08 PROCEDURE — 1101F PT FALLS ASSESS-DOCD LE1/YR: CPT | Mod: HCNC,CPTII,S$GLB, | Performed by: OTOLARYNGOLOGY

## 2019-03-08 PROCEDURE — 87075 CULTR BACTERIA EXCEPT BLOOD: CPT | Mod: HCNC

## 2019-03-08 RX ORDER — METHYLPREDNISOLONE 4 MG/1
TABLET ORAL
Qty: 1 PACKAGE | Refills: 0 | Status: SHIPPED | OUTPATIENT
Start: 2019-03-08 | End: 2019-06-14 | Stop reason: ALTCHOICE

## 2019-03-08 NOTE — PROGRESS NOTES
Subjective:      Michela is a 73 y.o. female who comes for follow-up of sinusitis.  Her last visit with me was on 12/4/2018.  Now nearly 1-1/2 years status-post endoscopic sinus surgery.   Worsening over past 6 weeks, bilateral daily congestion, moderate to severe, continual facial pressure and aching.  Using budesonide 0.25 mg respules, tried azelastine, feels it does not get into nose very far.  Daily fatigue as well.    SNOT-22 score = 78, NOSE score = 95%, ETDQ-7 score = 6.4    The patient's medications, allergies, past medical, surgical, social and family histories were reviewed and updated as appropriate.    A detailed review of systems was obtained with pertinent positives as per the above HPI, and otherwise negative.        Objective:     /78   Pulse 92        Constitutional:   She appears well-developed. She is cooperative. Normal speech.  No hoarse voice.      Head:  Normocephalic. Salivary glands normal.  Facial strength is normal.      Ears:    Right Ear: No drainage or tenderness. Tympanic membrane is not perforated. Tympanic membrane mobility is normal. No middle ear effusion. No decreased hearing is noted.   Left Ear: No drainage or tenderness. Tympanic membrane is not perforated. Tympanic membrane mobility is normal.  No middle ear effusion. No decreased hearing is noted.     Nose:  Mucosal edema present. No rhinorrhea, septal deviation or polyps. No epistaxis. Turbinate hypertrophy.  Turbinates normal and no turbinate masses.  Right sinus exhibits no maxillary sinus tenderness and no frontal sinus tenderness. Left sinus exhibits no maxillary sinus tenderness and no frontal sinus tenderness.     Mouth/Throat  Oropharynx clear and moist without lesions or asymmetry and normal uvula midline. She does not have dentures. Normal dentition. No oral lesions or mucous membrane lesions. No oropharyngeal exudate or posterior oropharyngeal erythema. Mirror exam not performed due to patient tolerance.   Mirror exam not performed due to patient tolerance.      Neck:  Neck normal without thyromegaly masses, asymmetry, normal tracheal structure, crepitus, and tenderness, thyroid normal, trachea normal and no adenopathy. Normal range of motion present.     She has no cervical adenopathy.     Cardiovascular:   Regular rhythm.      Pulmonary/Chest:   Effort normal.     Psychiatric:   She has a normal mood and affect. Her speech is normal and behavior is normal.     Neurological:   No cranial nerve deficit.     Skin:   No rash noted.       Procedure    Nasal endoscopy performed.  See procedure note.        Data Reviewed    WBC (K/uL)   Date Value   02/16/2018 9.09     Eosinophil% (%)   Date Value   02/16/2018 1.3     Eos # (K/uL)   Date Value   02/16/2018 0.1     Platelets (K/uL)   Date Value   02/16/2018 327     Glucose (mg/dL)   Date Value   02/16/2018 86     IgE (IU/mL)   Date Value   05/28/2018 253 (H)       Pathology report indicated chronic inflammation with eosinophilia.    Cultures showed Pseudomonas.      Assessment:     1. Perennial allergic rhinitis with seasonal variation    2. Chronic pansinusitis    3. Moderate persistent asthma without complication    4. Nasal turbinate hypertrophy         Plan:     Cultures taken, will call in additional antibiotics.  I prescribed a Medrol dose pack after counseling the patent on the potential risks and benefits.  Aim to minimize systemic side effects.  Continue budesonide 0.25 mg respules in sinus rinse for now.  Get CT sinuses, may consider revision ESS/BITR.  Follow-up for test results.

## 2019-03-08 NOTE — PROCEDURES
Nasal/sinus endoscopy  Date/Time: 3/8/2019 10:01 AM  Performed by: Alexys Boo MD  Authorized by: Alexys Boo MD     Consent Done?:  Yes (Verbal)  Anesthesia:     Local anesthetic:  Lidocaine 4% and Emerson-Synephrine 1/2% and NaHCO3 (sodium bicarbonate)    Patient tolerance:  Patient tolerated the procedure well with no immediate complications  Nose:     Procedure Performed:  Nasal Endoscopy  External:      No external nasal deformity  Intranasal:      Mucosa no polyps     Mucosa ulcers not present     No mucosa lesions present     Turbinates not enlarged     No septum gross deformity  Nasopharynx:      No mucosa lesions     Adenoids not present     Posterior choanae patent     Eustachian tube patent     Marked worsening of MT edema and middle meatus edema.  Posterior mucopurulent drainage, swabbed for culture.

## 2019-03-11 LAB — BACTERIA SPEC AEROBE CULT: NORMAL

## 2019-03-12 LAB — BACTERIA SPEC ANAEROBE CULT: NORMAL

## 2019-03-13 ENCOUNTER — TELEPHONE (OUTPATIENT)
Dept: OTOLARYNGOLOGY | Facility: CLINIC | Age: 74
End: 2019-03-13

## 2019-03-13 ENCOUNTER — HOSPITAL ENCOUNTER (OUTPATIENT)
Dept: RADIOLOGY | Facility: HOSPITAL | Age: 74
Discharge: HOME | End: 2019-03-13
Attending: OTOLARYNGOLOGY
Payer: MEDICARE

## 2019-03-13 DIAGNOSIS — J32.4 CHRONIC PANSINUSITIS: ICD-10-CM

## 2019-03-13 DIAGNOSIS — J32.4 CHRONIC PANSINUSITIS: Primary | ICD-10-CM

## 2019-03-13 PROCEDURE — 70486 CT MAXILLOFACIAL W/O DYE: CPT | Mod: 26,HCNC,, | Performed by: RADIOLOGY

## 2019-03-13 PROCEDURE — 70486 CT MAXILLOFACIAL W/O DYE: CPT | Mod: TC,HCNC

## 2019-03-13 PROCEDURE — 70486 CT MEDTRONIC SINUSES WITHOUT: ICD-10-PCS | Mod: 26,HCNC,, | Performed by: RADIOLOGY

## 2019-03-13 RX ORDER — CEFDINIR 300 MG/1
300 CAPSULE ORAL 2 TIMES DAILY
Qty: 28 CAPSULE | Refills: 0 | Status: SHIPPED | OUTPATIENT
Start: 2019-03-13 | End: 2019-03-27

## 2019-03-13 NOTE — TELEPHONE ENCOUNTER
The sinus swab showed a common bacteria (H flu BLP), which is different from the one she had previously.  I'm calling in a course of cefdinir to her [facility].  Please let her know.

## 2019-03-15 ENCOUNTER — OFFICE VISIT (OUTPATIENT)
Dept: ORTHOPEDICS | Facility: CLINIC | Age: 74
End: 2019-03-15
Payer: MEDICARE

## 2019-03-15 VITALS — BODY MASS INDEX: 33.49 KG/M2 | HEIGHT: 65 IN | WEIGHT: 201 LBS

## 2019-03-15 DIAGNOSIS — M87.052 AVASCULAR NECROSIS OF BONE OF LEFT HIP: Primary | ICD-10-CM

## 2019-03-15 PROCEDURE — 1101F PT FALLS ASSESS-DOCD LE1/YR: CPT | Mod: HCNC,CPTII,S$GLB, | Performed by: ORTHOPAEDIC SURGERY

## 2019-03-15 PROCEDURE — 99213 PR OFFICE/OUTPT VISIT, EST, LEVL III, 20-29 MIN: ICD-10-PCS | Mod: HCNC,S$GLB,, | Performed by: ORTHOPAEDIC SURGERY

## 2019-03-15 PROCEDURE — 99499 RISK ADDL DX/OHS AUDIT: ICD-10-PCS | Mod: HCNC,S$GLB,, | Performed by: ORTHOPAEDIC SURGERY

## 2019-03-15 PROCEDURE — 1101F PR PT FALLS ASSESS DOC 0-1 FALLS W/OUT INJ PAST YR: ICD-10-PCS | Mod: HCNC,CPTII,S$GLB, | Performed by: ORTHOPAEDIC SURGERY

## 2019-03-15 PROCEDURE — 99213 OFFICE O/P EST LOW 20 MIN: CPT | Mod: HCNC,S$GLB,, | Performed by: ORTHOPAEDIC SURGERY

## 2019-03-15 PROCEDURE — 99499 UNLISTED E&M SERVICE: CPT | Mod: HCNC,S$GLB,, | Performed by: ORTHOPAEDIC SURGERY

## 2019-03-15 PROCEDURE — 99999 PR PBB SHADOW E&M-EST. PATIENT-LVL III: CPT | Mod: PBBFAC,HCNC,, | Performed by: ORTHOPAEDIC SURGERY

## 2019-03-15 PROCEDURE — 99999 PR PBB SHADOW E&M-EST. PATIENT-LVL III: ICD-10-PCS | Mod: PBBFAC,HCNC,, | Performed by: ORTHOPAEDIC SURGERY

## 2019-03-15 RX ORDER — ALENDRONATE SODIUM 70 MG/1
TABLET ORAL
Qty: 12 TABLET | Refills: 2 | Status: SHIPPED | OUTPATIENT
Start: 2019-03-15 | End: 2019-05-21 | Stop reason: SDUPTHER

## 2019-03-15 NOTE — PROGRESS NOTES
Subjective:      Patient ID: Michela Franco is a 73 y.o. female.    Chief Complaint: Pain of the Left Hip    HPI follow-up for osteonecrosis.  The patient has been treated with Fosamax.  She has also done some physical therapy.  She states that she feels much better with minimal discomfort.  She notes a sense of weakness and stiffness and has occasional poorly localized numbness in her lower extremities.  She finds is tolerable at this time    Review of Systems   Constitution: Negative for fever and weight loss.   HENT: Negative for congestion.    Eyes: Negative for visual disturbance.   Cardiovascular: Negative for chest pain.   Respiratory: Negative for shortness of breath.    Hematologic/Lymphatic: Negative for bleeding problem. Does not bruise/bleed easily.   Skin: Negative for poor wound healing.   Gastrointestinal: Negative for abdominal pain.   Genitourinary: Negative for dysuria.   Neurological: Negative for seizures.   Psychiatric/Behavioral: Negative for altered mental status.   Allergic/Immunologic: Negative for persistent infections.         Objective:            Ortho/SPM Exam    Left hip      The patient is not in acute distress.   Body habitus is:normal.   Sclerae normal  The patient walks without a limp.   Respiratory distress:  none  The skin over the hip is:intact.   There is:no local tenderness.   Range of motion- Flexion 100, External rotation 30, internal rotation 25.  Resisted SLR negative.  Pain with rotation negative  Sciatic tension findings negative.  Shortening/lengthening compared to the contralateral side absent.  Pulses DP w present, PT present.  Motor normal 5/5 strength in all tested muscle groups.   Sensory normal.      Assessment:       Encounter Diagnosis   Name Primary?    Avascular necrosis of bone of left hip Yes          The patient is responding as hoped the medication.  Plan:       Michela was seen today for pain.    Diagnoses and all orders for this visit:    Avascular  necrosis of bone of left hip      I explained my diagnostic impression and the reasoning behind it in detail, using layman's terms.  Models and/or pictures were used to help in the explanation.    I reviewed the natural history of the condition  The treatment plan is to continue Fosamax for 1 year than recheck x-rays and less the patient's clinical condition changes acutely.      Informed consent given for Fosamax

## 2019-03-18 DIAGNOSIS — E78.5 HYPERLIPIDEMIA, UNSPECIFIED HYPERLIPIDEMIA TYPE: ICD-10-CM

## 2019-03-18 RX ORDER — ROSUVASTATIN CALCIUM 5 MG/1
5 TABLET, COATED ORAL DAILY
Qty: 90 TABLET | Refills: 3 | Status: SHIPPED | OUTPATIENT
Start: 2019-03-18 | End: 2019-05-21 | Stop reason: SDUPTHER

## 2019-03-18 NOTE — TELEPHONE ENCOUNTER
----- Message from Neymar Thompson sent at 3/18/2019  8:50 AM CDT -----  Contact: self/[phone]  Type: Rx    Name of medication(s): rosuvastatin (CRESTOR) 5 MG tablet    Is this a refill? New rx? Refill     Who prescribed medication? Dr. Arthur     Pharmacy Name, Phone, & Location:[facility]/pharmacy #[id number] [facility], [place] - [address]    Comments: Please advise.        Thank You

## 2019-03-27 ENCOUNTER — TELEPHONE (OUTPATIENT)
Dept: OTOLARYNGOLOGY | Facility: CLINIC | Age: 74
End: 2019-03-27

## 2019-03-27 NOTE — TELEPHONE ENCOUNTER
I spoke with her.  CT sinuses shows significant blockage and opacification of right-sided sinuses, mild on left.  Also reviewed letter from Dr. Everett, allergist at [facility], who is treating her for APBA with omalizumab since December 2018, as well as budesonide in rinse and nebulizer.    I recommended that she continue present course and observe.  If continued improvements, then continue with omalizumab.  If regression, then should consider revision sinus surgery, at least on right side.  Xhance or additional steroids likely to have marginal benefit.  She will call with progress report.

## 2019-04-09 LAB — FUNGUS SPEC CULT: NORMAL

## 2019-04-10 ENCOUNTER — PATIENT OUTREACH (OUTPATIENT)
Dept: ADMINISTRATIVE | Facility: HOSPITAL | Age: 74
End: 2019-04-10

## 2019-04-23 ENCOUNTER — TELEPHONE (OUTPATIENT)
Dept: OTOLARYNGOLOGY | Facility: CLINIC | Age: 74
End: 2019-04-23

## 2019-04-23 DIAGNOSIS — J45.40 MODERATE PERSISTENT ASTHMA WITHOUT COMPLICATION: Primary | ICD-10-CM

## 2019-04-25 ENCOUNTER — TELEPHONE (OUTPATIENT)
Dept: OTOLARYNGOLOGY | Facility: CLINIC | Age: 74
End: 2019-04-25

## 2019-04-25 RX ORDER — BUDESONIDE 0.5 MG/2ML
0.5 INHALANT ORAL DAILY
Qty: 180 ML | Refills: 1 | Status: SHIPPED | OUTPATIENT
Start: 2019-04-25 | End: 2020-06-03

## 2019-05-15 ENCOUNTER — LAB VISIT (OUTPATIENT)
Dept: LAB | Facility: HOSPITAL | Age: 74
End: 2019-05-15
Attending: FAMILY MEDICINE
Payer: MEDICARE

## 2019-05-15 DIAGNOSIS — I10 ESSENTIAL HYPERTENSION: ICD-10-CM

## 2019-05-15 DIAGNOSIS — Z00.00 ROUTINE GENERAL MEDICAL EXAMINATION AT A HEALTH CARE FACILITY: ICD-10-CM

## 2019-05-15 LAB
ALBUMIN SERPL BCP-MCNC: 3.6 G/DL (ref 3.5–5.2)
ALP SERPL-CCNC: 88 U/L (ref 55–135)
ALT SERPL W/O P-5'-P-CCNC: 18 U/L (ref 10–44)
ANION GAP SERPL CALC-SCNC: 9 MMOL/L (ref 8–16)
AST SERPL-CCNC: 30 U/L (ref 10–40)
BASOPHILS # BLD AUTO: 0.08 K/UL (ref 0–0.2)
BASOPHILS NFR BLD: 1.2 % (ref 0–1.9)
BILIRUB SERPL-MCNC: 0.4 MG/DL (ref 0.1–1)
BUN SERPL-MCNC: 16 MG/DL (ref 8–23)
CALCIUM SERPL-MCNC: 9.4 MG/DL (ref 8.7–10.5)
CHLORIDE SERPL-SCNC: 104 MMOL/L (ref 95–110)
CHOLEST SERPL-MCNC: 169 MG/DL (ref 120–199)
CHOLEST/HDLC SERPL: 2 {RATIO} (ref 2–5)
CO2 SERPL-SCNC: 25 MMOL/L (ref 23–29)
CREAT SERPL-MCNC: 0.8 MG/DL (ref 0.5–1.4)
DIFFERENTIAL METHOD: ABNORMAL
EOSINOPHIL # BLD AUTO: 0.5 K/UL (ref 0–0.5)
EOSINOPHIL NFR BLD: 6.8 % (ref 0–8)
ERYTHROCYTE [DISTWIDTH] IN BLOOD BY AUTOMATED COUNT: 17.6 % (ref 11.5–14.5)
EST. GFR  (AFRICAN AMERICAN): >60 ML/MIN/1.73 M^2
EST. GFR  (NON AFRICAN AMERICAN): >60 ML/MIN/1.73 M^2
GLUCOSE SERPL-MCNC: 90 MG/DL (ref 70–110)
HCT VFR BLD AUTO: 36.7 % (ref 37–48.5)
HDLC SERPL-MCNC: 83 MG/DL (ref 40–75)
HDLC SERPL: 49.1 % (ref 20–50)
HGB BLD-MCNC: 11.5 G/DL (ref 12–16)
IMM GRANULOCYTES # BLD AUTO: 0.01 K/UL (ref 0–0.04)
IMM GRANULOCYTES NFR BLD AUTO: 0.2 % (ref 0–0.5)
LDLC SERPL CALC-MCNC: 78.6 MG/DL (ref 63–159)
LYMPHOCYTES # BLD AUTO: 2 K/UL (ref 1–4.8)
LYMPHOCYTES NFR BLD: 30.8 % (ref 18–48)
MCH RBC QN AUTO: 27 PG (ref 27–31)
MCHC RBC AUTO-ENTMCNC: 31.3 G/DL (ref 32–36)
MCV RBC AUTO: 86 FL (ref 82–98)
MONOCYTES # BLD AUTO: 0.6 K/UL (ref 0.3–1)
MONOCYTES NFR BLD: 9.2 % (ref 4–15)
NEUTROPHILS # BLD AUTO: 3.4 K/UL (ref 1.8–7.7)
NEUTROPHILS NFR BLD: 51.8 % (ref 38–73)
NONHDLC SERPL-MCNC: 86 MG/DL
NRBC BLD-RTO: 0 /100 WBC
PLATELET # BLD AUTO: 318 K/UL (ref 150–350)
PMV BLD AUTO: 10.8 FL (ref 9.2–12.9)
POTASSIUM SERPL-SCNC: 3.7 MMOL/L (ref 3.5–5.1)
PROT SERPL-MCNC: 7.9 G/DL (ref 6–8.4)
RBC # BLD AUTO: 4.26 M/UL (ref 4–5.4)
SODIUM SERPL-SCNC: 138 MMOL/L (ref 136–145)
TRIGL SERPL-MCNC: 37 MG/DL (ref 30–150)
TSH SERPL DL<=0.005 MIU/L-ACNC: 1.57 UIU/ML (ref 0.4–4)
WBC # BLD AUTO: 6.6 K/UL (ref 3.9–12.7)

## 2019-05-15 PROCEDURE — 80061 LIPID PANEL: CPT | Mod: HCNC

## 2019-05-15 PROCEDURE — 80053 COMPREHEN METABOLIC PANEL: CPT | Mod: HCNC

## 2019-05-15 PROCEDURE — 84443 ASSAY THYROID STIM HORMONE: CPT | Mod: HCNC

## 2019-05-15 PROCEDURE — 36415 COLL VENOUS BLD VENIPUNCTURE: CPT | Mod: HCNC,PN

## 2019-05-15 PROCEDURE — 85025 COMPLETE CBC W/AUTO DIFF WBC: CPT | Mod: HCNC

## 2019-05-21 ENCOUNTER — HOSPITAL ENCOUNTER (OUTPATIENT)
Dept: RADIOLOGY | Facility: HOSPITAL | Age: 74
Discharge: HOME | End: 2019-05-21
Attending: FAMILY MEDICINE
Payer: MEDICARE

## 2019-05-21 ENCOUNTER — OFFICE VISIT (OUTPATIENT)
Dept: PRIMARY CARE CLINIC | Facility: CLINIC | Age: 74
End: 2019-05-21
Payer: MEDICARE

## 2019-05-21 VITALS
SYSTOLIC BLOOD PRESSURE: 126 MMHG | HEART RATE: 76 BPM | BODY MASS INDEX: 34.36 KG/M2 | DIASTOLIC BLOOD PRESSURE: 70 MMHG | OXYGEN SATURATION: 98 % | WEIGHT: 201.25 LBS | HEIGHT: 64 IN | TEMPERATURE: 99 F

## 2019-05-21 DIAGNOSIS — I70.0 AORTIC ATHEROSCLEROSIS: ICD-10-CM

## 2019-05-21 DIAGNOSIS — I51.89 DIASTOLIC DYSFUNCTION: ICD-10-CM

## 2019-05-21 DIAGNOSIS — E78.5 HYPERLIPIDEMIA, UNSPECIFIED HYPERLIPIDEMIA TYPE: ICD-10-CM

## 2019-05-21 DIAGNOSIS — M25.472 ANKLE SWELLING, LEFT: ICD-10-CM

## 2019-05-21 DIAGNOSIS — M79.89 FOOT SWELLING: ICD-10-CM

## 2019-05-21 DIAGNOSIS — M94.9 DISORDER OF CARTILAGE: ICD-10-CM

## 2019-05-21 DIAGNOSIS — J45.40 MODERATE PERSISTENT ASTHMA WITHOUT COMPLICATION: ICD-10-CM

## 2019-05-21 DIAGNOSIS — H91.90 HEARING LOSS, UNSPECIFIED HEARING LOSS TYPE, UNSPECIFIED LATERALITY: ICD-10-CM

## 2019-05-21 DIAGNOSIS — M85.80 OSTEOPENIA, UNSPECIFIED LOCATION: ICD-10-CM

## 2019-05-21 DIAGNOSIS — Z12.39 SCREENING BREAST EXAMINATION: Primary | ICD-10-CM

## 2019-05-21 DIAGNOSIS — I10 ESSENTIAL HYPERTENSION: ICD-10-CM

## 2019-05-21 PROBLEM — B44.81 ABPA (ALLERGIC BRONCHOPULMONARY ASPERGILLOSIS): Status: RESOLVED | Noted: 2017-11-30 | Resolved: 2019-05-21

## 2019-05-21 PROCEDURE — 3078F DIAST BP <80 MM HG: CPT | Mod: HCNC,CPTII,S$GLB, | Performed by: FAMILY MEDICINE

## 2019-05-21 PROCEDURE — 99999 PR PBB SHADOW E&M-EST. PATIENT-LVL V: ICD-10-PCS | Mod: PBBFAC,HCNC,, | Performed by: FAMILY MEDICINE

## 2019-05-21 PROCEDURE — 3078F PR MOST RECENT DIASTOLIC BLOOD PRESSURE < 80 MM HG: ICD-10-PCS | Mod: HCNC,CPTII,S$GLB, | Performed by: FAMILY MEDICINE

## 2019-05-21 PROCEDURE — 3074F SYST BP LT 130 MM HG: CPT | Mod: HCNC,CPTII,S$GLB, | Performed by: FAMILY MEDICINE

## 2019-05-21 PROCEDURE — 99397 PER PM REEVAL EST PAT 65+ YR: CPT | Mod: HCNC,S$GLB,, | Performed by: FAMILY MEDICINE

## 2019-05-21 PROCEDURE — 73610 X-RAY EXAM OF ANKLE: CPT | Mod: 50,TC,HCNC

## 2019-05-21 PROCEDURE — 73630 X-RAY EXAM OF FOOT: CPT | Mod: TC,HCNC,LT

## 2019-05-21 PROCEDURE — 3074F PR MOST RECENT SYSTOLIC BLOOD PRESSURE < 130 MM HG: ICD-10-PCS | Mod: HCNC,CPTII,S$GLB, | Performed by: FAMILY MEDICINE

## 2019-05-21 PROCEDURE — 73610 X-RAY EXAM OF ANKLE: CPT | Mod: 26,50,HCNC, | Performed by: RADIOLOGY

## 2019-05-21 PROCEDURE — 99499 RISK ADDL DX/OHS AUDIT: ICD-10-PCS | Mod: S$GLB,,, | Performed by: FAMILY MEDICINE

## 2019-05-21 PROCEDURE — 99397 PR PREVENTIVE VISIT,EST,65 & OVER: ICD-10-PCS | Mod: HCNC,S$GLB,, | Performed by: FAMILY MEDICINE

## 2019-05-21 PROCEDURE — 99999 PR PBB SHADOW E&M-EST. PATIENT-LVL V: CPT | Mod: PBBFAC,HCNC,, | Performed by: FAMILY MEDICINE

## 2019-05-21 PROCEDURE — 99499 UNLISTED E&M SERVICE: CPT | Mod: HCNC,S$GLB,, | Performed by: FAMILY MEDICINE

## 2019-05-21 PROCEDURE — 73610 XR ANKLE COMPLETE 3 VIEW BILATERAL: ICD-10-PCS | Mod: 26,50,HCNC, | Performed by: RADIOLOGY

## 2019-05-21 RX ORDER — ALENDRONATE SODIUM 70 MG/1
TABLET ORAL
Qty: 12 TABLET | Refills: 3 | Status: SHIPPED | OUTPATIENT
Start: 2019-05-21 | End: 2020-03-06 | Stop reason: SDUPTHER

## 2019-05-21 RX ORDER — FLUTICASONE PROPIONATE AND SALMETEROL 50; 500 UG/1; UG/1
1 POWDER RESPIRATORY (INHALATION) 2 TIMES DAILY
Qty: 60 EACH | Refills: 3 | Status: SHIPPED | OUTPATIENT
Start: 2019-05-21 | End: 2019-11-07 | Stop reason: SDUPTHER

## 2019-05-21 RX ORDER — ROSUVASTATIN CALCIUM 5 MG/1
5 TABLET, COATED ORAL DAILY
Qty: 90 TABLET | Refills: 3 | Status: SHIPPED | OUTPATIENT
Start: 2019-05-21 | End: 2020-07-16 | Stop reason: SDUPTHER

## 2019-05-21 RX ORDER — VALSARTAN 80 MG/1
80 TABLET ORAL DAILY
Qty: 90 TABLET | Refills: 3 | Status: SHIPPED | OUTPATIENT
Start: 2019-05-21 | End: 2019-07-19

## 2019-05-21 RX ORDER — AMLODIPINE BESYLATE 10 MG/1
TABLET ORAL
Qty: 90 TABLET | Refills: 3 | Status: SHIPPED | OUTPATIENT
Start: 2019-05-21 | End: 2020-05-14

## 2019-05-22 NOTE — PROGRESS NOTES
Patient Michela Franco, MRN [id number], was prescribed Advair during her visit on 5/21/19 in order to treat chronic obstructive pulmonary disease (ICD 10 J44.9). This addendum is made to the medical record on 05/22/2019.

## 2019-05-22 NOTE — PROGRESS NOTES
Michela Franco is a 73 y.o. female   Routine physical  Source of history: Patient  Past Medical History:   Diagnosis Date    Allergy     Chronic diastolic heart failure 4/5/2018    Hyperlipidemia     Hypertension     Neuromuscular disorder     JOHN on CPAP     Osteoporosis     Recurrent sinusitis 3/25/2014    Recurrent upper respiratory infection (URI)     Urticaria      Patient  reports that she quit smoking about 14 years ago. Her smoking use included cigarettes. She has a 10.00 pack-year smoking history. She has never used smokeless tobacco. She reports that she drinks alcohol. She reports that she does not use drugs.  Family History   Problem Relation Age of Onset    No Known Problems Mother         healthy in 90s    No Known Problems Father         accident-related death in 50s    Kidney cancer Sister     Cancer Sister         renal    Hypertension Daughter     Ovarian cancer Sister     Hypertension Daughter      ROS:  GENERAL: No fever, chills, fatigability or weight loss.  SKIN: No rashes, itching or changes in color or texture of skin.  HEAD: No headaches or recent head trauma.  EYES: Visual acuity fine. No photophobia, ocular pain or diplopia.  EARS: Denies ear pain, discharge or vertigo.  NOSE: No loss of smell, no epistaxis or postnasal drip.  MOUTH & THROAT: No hoarseness or change in voice. No excessive gum bleeding.  NODES: Denies swollen glands.  CHEST: Denies SHAH, cyanosis, wheezing, cough and sputum production.  CARDIOVASCULAR: Denies chest pain, PND, orthopnea or reduced exercise tolerance.  ABDOMEN: Appetite fine. No weight loss. Denies diarrhea, abdominal pain, hematemesis or blood in stool.  URINARY: No flank pain, dysuria or hematuria.  PERIPHERAL VASCULAR: No claudication or cyanosis.  MUSCULOSKELETAL: No joint stiffness or swelling. Denies back pain.  NEUROLOGIC: No history of seizures, paralysis, alteration of gait or coordination.    OBJECTIVE:  APPEARANCE:  Overweight no  acute distress  Vitals:    05/21/19 0950   BP: 126/70   Pulse: 76   Temp: 98.6 °F (37 °C)     SKIN: Normal skin turgor, no lesions.  HEENT: Both external auditory canals clear. Both tympanic membranes intact. PERRL. EOMI.   Disk margins sharp. No tonsillar enlargement. No pharyngeal erythema or exudate. No stridor.  NECK: No bruits. No cervical spine tenderness. No cervical lymphadenopathy. No thyromegaly.  NODES: No cervical, axillary or inguinal lymph node enlargement.  CHEST: Breath sounds clear bilaterally. Lungs clear to auscultation & percussion.   Good air movement. No rales. No retractions. No rhonchi. No stridor. No wheezes.  CARDIOVASCULAR: Normal S1, S2. No murmurs. No edema.  BREASTS: no masses palpated in either breast or axillary area, symmetry noted.  ABDOMEN: Bowel sounds normal. No palpable aortic enlargement. No CVA tenderness.   No pulsatile mass. No rebound tenderness.  PERIPHERAL VASCULAR: Femoral pulses present and symmetrical. No edema.  MUSCULOSKELETAL: Degenerative changes of both ankles, foot, knee, wrist and hand.  BACK: No CVA tenderness. There is no spasm, tenderness or radiculopathy noted with palpation and there is full range of motion.   NEUROLOGIC:   Cranial Nerves: II-XII grossly intact.  Motor: 5/5 strength major flexors/extensors. No tremor.  DTR's: Knees, Ankles 2+ and equal bilaterally; downgoing toes.  Sensory: Intact to light touch distally.  Gait & Posture: Normal gait and fine motion. No cerebellar signs.  MENTAL STATUS: Alert. Oriented x 3. Language skills normal. Memory intact. No suicidal ideation. Normal affect. Well kept appearance.    ASSESSMENT/PLAN:   Michela was seen today for annual exam, medication refill and immunizations.    Diagnoses and all orders for this visit:    Screening breast examination  -     Mammo Digital Screening Bilateral With CAD; Future    Osteopenia, unspecified location  -     DXA Bone Density Spine And Hip; Future  -     alendronate (FOSAMAX)  70 MG tablet; TAKE 1 TABLET EVERY 7 DAYS    Disorder of cartilage   -     DXA Bone Density Spine And Hip; Future    Diastolic dysfunction  -     valsartan (DIOVAN) 80 MG tablet; Take 1 tablet (80 mg total) by mouth once daily.    Hyperlipidemia, unspecified hyperlipidemia type  -     rosuvastatin (CRESTOR) 5 MG tablet; Take 1 tablet (5 mg total) by mouth once daily.    Moderate persistent asthma without complication  -     ADVAIR DISKUS 500-50 mcg/dose DsDv diskus inhaler; Inhale 1 puff into the lungs 2 (two) times daily.    Essential hypertension  -     amLODIPine (NORVASC) 10 MG tablet; TAKE 1 TABLET ONE TIME DAILY    Ankle swelling, left  -     X-Ray Ankle Complete Bilateral; Future    Foot swelling  -     Cancel: X-Ray Foot Complete Left; Future    Hearing loss, unspecified hearing loss type, unspecified laterality  -     Ambulatory consult to Audiology     Will contact patient with results of testing when available  Patient's labs reviewed at this appointment within normal limits with the exception of mild anemia that is stable

## 2019-05-22 NOTE — ASSESSMENT & PLAN NOTE
Patient on Crestor 5 mg p.o. q.day with normal cholesterols will continue same medications monitor condition date 05/21/2019

## 2019-05-28 ENCOUNTER — CLINICAL SUPPORT (OUTPATIENT)
Dept: AUDIOLOGY | Facility: CLINIC | Age: 74
End: 2019-05-28
Payer: MEDICARE

## 2019-05-28 DIAGNOSIS — H90.3 SENSORINEURAL HEARING LOSS, BILATERAL: Primary | ICD-10-CM

## 2019-05-28 PROCEDURE — 92557 COMPREHENSIVE HEARING TEST: CPT | Mod: HCNC,S$GLB,, | Performed by: AUDIOLOGIST

## 2019-05-28 PROCEDURE — 92557 PR COMPREHENSIVE HEARING TEST: ICD-10-PCS | Mod: HCNC,S$GLB,, | Performed by: AUDIOLOGIST

## 2019-06-07 ENCOUNTER — HOSPITAL ENCOUNTER (OUTPATIENT)
Dept: RADIOLOGY | Facility: HOSPITAL | Age: 74
Discharge: HOME | End: 2019-06-07
Attending: FAMILY MEDICINE
Payer: MEDICARE

## 2019-06-07 DIAGNOSIS — Z12.39 SCREENING BREAST EXAMINATION: ICD-10-CM

## 2019-06-07 PROCEDURE — 77063 BREAST TOMOSYNTHESIS BI: CPT | Mod: 26,HCNC,, | Performed by: RADIOLOGY

## 2019-06-07 PROCEDURE — 77067 SCR MAMMO BI INCL CAD: CPT | Mod: TC,HCNC,PO

## 2019-06-07 PROCEDURE — 77067 SCR MAMMO BI INCL CAD: CPT | Mod: 26,HCNC,, | Performed by: RADIOLOGY

## 2019-06-07 PROCEDURE — 77067 MAMMO DIGITAL SCREENING BILAT WITH TOMOSYNTHESIS_CAD: ICD-10-PCS | Mod: 26,HCNC,, | Performed by: RADIOLOGY

## 2019-06-07 PROCEDURE — 77063 MAMMO DIGITAL SCREENING BILAT WITH TOMOSYNTHESIS_CAD: ICD-10-PCS | Mod: 26,HCNC,, | Performed by: RADIOLOGY

## 2019-06-14 ENCOUNTER — OFFICE VISIT (OUTPATIENT)
Dept: ORTHOPEDICS | Facility: CLINIC | Age: 74
End: 2019-06-14
Payer: MEDICARE

## 2019-06-14 DIAGNOSIS — M87.052 AVASCULAR NECROSIS OF BONE OF LEFT HIP: Primary | ICD-10-CM

## 2019-06-14 PROCEDURE — 1101F PR PT FALLS ASSESS DOC 0-1 FALLS W/OUT INJ PAST YR: ICD-10-PCS | Mod: HCNC,CPTII,S$GLB, | Performed by: ORTHOPAEDIC SURGERY

## 2019-06-14 PROCEDURE — 99999 PR PBB SHADOW E&M-EST. PATIENT-LVL II: CPT | Mod: PBBFAC,HCNC,, | Performed by: ORTHOPAEDIC SURGERY

## 2019-06-14 PROCEDURE — 99213 OFFICE O/P EST LOW 20 MIN: CPT | Mod: HCNC,S$GLB,, | Performed by: ORTHOPAEDIC SURGERY

## 2019-06-14 PROCEDURE — 99213 PR OFFICE/OUTPT VISIT, EST, LEVL III, 20-29 MIN: ICD-10-PCS | Mod: HCNC,S$GLB,, | Performed by: ORTHOPAEDIC SURGERY

## 2019-06-14 PROCEDURE — 1101F PT FALLS ASSESS-DOCD LE1/YR: CPT | Mod: HCNC,CPTII,S$GLB, | Performed by: ORTHOPAEDIC SURGERY

## 2019-06-14 PROCEDURE — 99999 PR PBB SHADOW E&M-EST. PATIENT-LVL II: ICD-10-PCS | Mod: PBBFAC,HCNC,, | Performed by: ORTHOPAEDIC SURGERY

## 2019-06-14 NOTE — PROGRESS NOTES
Subjective:      Patient ID: Michela Franco is a 73 y.o. female.    Chief Complaint: Pain of the Left Hip    HPI follow-up for osteonecrosis.  Patient reports that her symptoms are stable. She reports start-up discomfort, but no real functional limitations on the base of hip pain. She is still on Fosamax, now reportedly being received from her primary doctor.    Review of Systems   Constitution: Negative for fever and weight loss.   HENT: Negative for congestion.    Eyes: Negative for visual disturbance.   Cardiovascular: Negative for chest pain.   Respiratory: Negative for shortness of breath.    Hematologic/Lymphatic: Negative for bleeding problem. Does not bruise/bleed easily.   Skin: Negative for poor wound healing.   Musculoskeletal: Positive for joint pain.   Gastrointestinal: Negative for abdominal pain.   Genitourinary: Negative for dysuria.   Neurological: Negative for seizures.   Psychiatric/Behavioral: Negative for altered mental status.   Allergic/Immunologic: Negative for persistent infections.         Objective:            Ortho/SPM Exam    Left hip    The patient is not in acute distress.   Body habitus is:normal.   Sclerae normal  The patient walks without a limp.   Respiratory distress:  none  The skin over the hip is:intact.   There is no local tenderness.   Range of motion- Flexion full, External rotation full, internal rotation full.  Resisted SLR negative.  Pain with rotation negative  Sciatic tension findings negative.  Shortening/lengthening compared to the contralateral side absent.  Pulses DP present, PT present.  Motor normal 5/5 strength in all tested muscle groups.   Sensory normal.          Assessment:       Encounter Diagnosis   Name Primary?    Avascular necrosis of bone of left hip Yes          The patient is responding to the current management.  Plan:       Michela was seen today for pain.    Diagnoses and all orders for this visit:    Avascular necrosis of bone of left  hip        I explained the natural history of the condition. Progression is certainly possible. Considering the good early response to Fosamax with respect to her hip as well as the fact that she has osteoporosis being appropriately managed with Fosamax, this medication should be continued.    I explained the potential role of surgery in the treatment of this condition to the patient.  They understand that if nonsurgical measures do not adequately control symptoms, surgery will be considered in the future.

## 2019-06-18 ENCOUNTER — PES CALL (OUTPATIENT)
Dept: ADMINISTRATIVE | Facility: CLINIC | Age: 74
End: 2019-06-18

## 2019-07-19 ENCOUNTER — HOSPITAL ENCOUNTER (OUTPATIENT)
Dept: RADIOLOGY | Facility: HOSPITAL | Age: 74
Discharge: HOME | End: 2019-07-19
Attending: FAMILY MEDICINE
Payer: MEDICARE

## 2019-07-19 ENCOUNTER — OFFICE VISIT (OUTPATIENT)
Dept: PRIMARY CARE CLINIC | Facility: CLINIC | Age: 74
End: 2019-07-19
Payer: MEDICARE

## 2019-07-19 VITALS
DIASTOLIC BLOOD PRESSURE: 70 MMHG | HEART RATE: 76 BPM | WEIGHT: 211.44 LBS | TEMPERATURE: 98 F | BODY MASS INDEX: 36.1 KG/M2 | HEIGHT: 64 IN | SYSTOLIC BLOOD PRESSURE: 138 MMHG

## 2019-07-19 DIAGNOSIS — R06.00 DYSPNEA, UNSPECIFIED TYPE: Primary | ICD-10-CM

## 2019-07-19 DIAGNOSIS — I10 HYPERTENSION, UNSPECIFIED TYPE: ICD-10-CM

## 2019-07-19 DIAGNOSIS — R06.00 DYSPNEA, UNSPECIFIED TYPE: ICD-10-CM

## 2019-07-19 PROCEDURE — 3075F SYST BP GE 130 - 139MM HG: CPT | Mod: HCNC,CPTII,S$GLB, | Performed by: FAMILY MEDICINE

## 2019-07-19 PROCEDURE — 3078F DIAST BP <80 MM HG: CPT | Mod: HCNC,CPTII,S$GLB, | Performed by: FAMILY MEDICINE

## 2019-07-19 PROCEDURE — 71046 XR CHEST PA AND LATERAL: ICD-10-PCS | Mod: 26,HCNC,, | Performed by: RADIOLOGY

## 2019-07-19 PROCEDURE — 3075F PR MOST RECENT SYSTOLIC BLOOD PRESS GE 130-139MM HG: ICD-10-PCS | Mod: HCNC,CPTII,S$GLB, | Performed by: FAMILY MEDICINE

## 2019-07-19 PROCEDURE — 99999 PR PBB SHADOW E&M-EST. PATIENT-LVL III: CPT | Mod: PBBFAC,HCNC,, | Performed by: FAMILY MEDICINE

## 2019-07-19 PROCEDURE — 99499 RISK ADDL DX/OHS AUDIT: ICD-10-PCS | Mod: HCNC,S$GLB,, | Performed by: FAMILY MEDICINE

## 2019-07-19 PROCEDURE — 99214 PR OFFICE/OUTPT VISIT, EST, LEVL IV, 30-39 MIN: ICD-10-PCS | Mod: HCNC,S$GLB,, | Performed by: FAMILY MEDICINE

## 2019-07-19 PROCEDURE — 99214 OFFICE O/P EST MOD 30 MIN: CPT | Mod: HCNC,S$GLB,, | Performed by: FAMILY MEDICINE

## 2019-07-19 PROCEDURE — 99999 PR PBB SHADOW E&M-EST. PATIENT-LVL III: ICD-10-PCS | Mod: PBBFAC,HCNC,, | Performed by: FAMILY MEDICINE

## 2019-07-19 PROCEDURE — 71046 X-RAY EXAM CHEST 2 VIEWS: CPT | Mod: TC,HCNC,PN

## 2019-07-19 PROCEDURE — 1101F PR PT FALLS ASSESS DOC 0-1 FALLS W/OUT INJ PAST YR: ICD-10-PCS | Mod: HCNC,CPTII,S$GLB, | Performed by: FAMILY MEDICINE

## 2019-07-19 PROCEDURE — 99499 UNLISTED E&M SERVICE: CPT | Mod: HCNC,S$GLB,, | Performed by: FAMILY MEDICINE

## 2019-07-19 PROCEDURE — 3078F PR MOST RECENT DIASTOLIC BLOOD PRESSURE < 80 MM HG: ICD-10-PCS | Mod: HCNC,CPTII,S$GLB, | Performed by: FAMILY MEDICINE

## 2019-07-19 PROCEDURE — 1101F PT FALLS ASSESS-DOCD LE1/YR: CPT | Mod: HCNC,CPTII,S$GLB, | Performed by: FAMILY MEDICINE

## 2019-07-19 PROCEDURE — 71046 X-RAY EXAM CHEST 2 VIEWS: CPT | Mod: 26,HCNC,, | Performed by: RADIOLOGY

## 2019-07-19 RX ORDER — VALSARTAN 160 MG/1
160 TABLET ORAL DAILY
Qty: 90 TABLET | Refills: 3 | Status: SHIPPED | OUTPATIENT
Start: 2019-07-19 | End: 2020-07-16

## 2019-07-19 NOTE — PROGRESS NOTES
Subjective:       Patient ID: Michela Franco is a 74 y.o. female.s     Chief Complaint: Hypertension (follow up) and Leg Swelling (both ankles)  pt has been having shortness of breath, uncontrolled blood pressures.  Pt has had episodes of worsening shortness of breath.  HPIsee above  Review of Systems   Constitutional: Positive for fatigue.   HENT: Negative.    Eyes: Negative.    Respiratory: Positive for shortness of breath and wheezing.    Cardiovascular: Negative.    Gastrointestinal: Negative.    Endocrine: Negative.    Genitourinary: Negative.    Musculoskeletal: Negative.    Skin: Negative.    Allergic/Immunologic: Negative.    Neurological: Negative.    Hematological: Negative.    Psychiatric/Behavioral: Negative.        Objective:      Physical Exam   Constitutional: She is oriented to person, place, and time. She appears well-developed and well-nourished. No distress.   HENT:   Head: Normocephalic and atraumatic.   Right Ear: External ear normal.   Left Ear: External ear normal.   Nose: Nose normal.   Mouth/Throat: Oropharynx is clear and moist.   Eyes: Pupils are equal, round, and reactive to light. Conjunctivae and EOM are normal.   Neck: Normal range of motion. Neck supple. No JVD present. No thyromegaly present.   Cardiovascular: Normal rate, regular rhythm, normal heart sounds and intact distal pulses.   No murmur heard.  Pulmonary/Chest: Effort normal and breath sounds normal. No respiratory distress. She has no rales.   Abdominal: Soft. Bowel sounds are normal. She exhibits no distension and no mass. There is no tenderness. There is no guarding.   Musculoskeletal: Normal range of motion. She exhibits no edema, tenderness or deformity.   Neurological: She is alert and oriented to person, place, and time. She displays normal reflexes. No cranial nerve deficit or sensory deficit. She exhibits normal muscle tone. Coordination normal.   Skin: Capillary refill takes less than 2 seconds. She is not  diaphoretic.   Psychiatric: She has a normal mood and affect. Her behavior is normal. Judgment and thought content normal.   Nursing note and vitals reviewed.      Assessment:       1. Dyspnea, unspecified type    2. Hypertension, unspecified type        Plan:   Michela was seen today for hypertension and leg swelling.    Diagnoses and all orders for this visit:    Dyspnea, unspecified type  -     X-Ray Chest PA And Lateral; Future  -     Transthoracic Echo (TTE) Complete 2D; Future    Hypertension, unspecified type  -     valsartan (DIOVAN) 160 MG tablet; Take 1 tablet (160 mg total) by mouth once daily.     will contact pt with result when available.  PT f/u for bp check in 2 weeks

## 2019-07-29 NOTE — PROGRESS NOTES
Patient, Michela Franco (MRN #[id number]), presented with a recorded BMI of 36.29 kg/m^2 and a documented comorbidity(s):  - Hypertension  to which the severe obesity is a contributing factor. This is consistent with the definition of severe obesity (BMI 35.0-39.9) with comorbidity (ICD-10 E66.01, Z68.35). The patient's severe obesity was monitored, evaluated, addressed and/or treated. This addendum to the medical record is made on 07/29/2019.

## 2019-09-03 ENCOUNTER — TELEPHONE (OUTPATIENT)
Dept: PRIMARY CARE CLINIC | Facility: CLINIC | Age: 74
End: 2019-09-03

## 2019-09-18 ENCOUNTER — TELEPHONE (OUTPATIENT)
Dept: PRIMARY CARE CLINIC | Facility: CLINIC | Age: 74
End: 2019-09-18

## 2019-09-18 NOTE — TELEPHONE ENCOUNTER
----- Message from Kenyatta Park sent at 9/18/2019  3:03 PM CDT -----  Contact: Self call  [phone]  Requesting to get a Rx for a new C pap machine. She requested this on 9/3/19.

## 2019-09-18 NOTE — TELEPHONE ENCOUNTER
Spoke with pt in regards to message pt sent stating that she needed a RX for a c pap machine. Advised pt that per note provider stated;  9:55 PM   Note      She got this from an outside provider.        . Pt advised she was going to go threw her paperwork and see who ordered it and reach out to them.

## 2019-09-19 ENCOUNTER — PATIENT OUTREACH (OUTPATIENT)
Dept: ADMINISTRATIVE | Facility: OTHER | Age: 74
End: 2019-09-19

## 2019-09-20 ENCOUNTER — OFFICE VISIT (OUTPATIENT)
Dept: ORTHOPEDICS | Facility: CLINIC | Age: 74
End: 2019-09-20
Payer: MEDICARE

## 2019-09-20 VITALS — BODY MASS INDEX: 36.02 KG/M2 | HEIGHT: 64 IN | WEIGHT: 211 LBS

## 2019-09-20 DIAGNOSIS — M87.052 AVASCULAR NECROSIS OF BONE OF LEFT HIP: Primary | ICD-10-CM

## 2019-09-20 PROCEDURE — 99999 PR PBB SHADOW E&M-EST. PATIENT-LVL III: ICD-10-PCS | Mod: PBBFAC,HCNC,, | Performed by: ORTHOPAEDIC SURGERY

## 2019-09-20 PROCEDURE — 99213 OFFICE O/P EST LOW 20 MIN: CPT | Mod: HCNC,S$GLB,, | Performed by: ORTHOPAEDIC SURGERY

## 2019-09-20 PROCEDURE — 1101F PR PT FALLS ASSESS DOC 0-1 FALLS W/OUT INJ PAST YR: ICD-10-PCS | Mod: HCNC,CPTII,S$GLB, | Performed by: ORTHOPAEDIC SURGERY

## 2019-09-20 PROCEDURE — 99213 PR OFFICE/OUTPT VISIT, EST, LEVL III, 20-29 MIN: ICD-10-PCS | Mod: HCNC,S$GLB,, | Performed by: ORTHOPAEDIC SURGERY

## 2019-09-20 PROCEDURE — 1101F PT FALLS ASSESS-DOCD LE1/YR: CPT | Mod: HCNC,CPTII,S$GLB, | Performed by: ORTHOPAEDIC SURGERY

## 2019-09-20 PROCEDURE — 99999 PR PBB SHADOW E&M-EST. PATIENT-LVL III: CPT | Mod: PBBFAC,HCNC,, | Performed by: ORTHOPAEDIC SURGERY

## 2019-09-20 RX ORDER — ALENDRONATE SODIUM 70 MG/1
70 TABLET ORAL
COMMUNITY
End: 2019-09-20 | Stop reason: SDUPTHER

## 2019-09-20 RX ORDER — FLUTICASONE PROPIONATE 50 MCG
1 SPRAY, SUSPENSION (ML) NASAL
COMMUNITY
End: 2020-06-02

## 2019-09-20 RX ORDER — ALENDRONATE SODIUM 70 MG/1
70 TABLET ORAL
Qty: 12 TABLET | Refills: 0 | Status: SHIPPED | OUTPATIENT
Start: 2019-09-20 | End: 2020-07-16 | Stop reason: ALTCHOICE

## 2019-09-20 RX ORDER — HYDROCHLOROTHIAZIDE 25 MG/1
25 TABLET ORAL
COMMUNITY
End: 2020-06-02

## 2019-09-20 RX ORDER — TRAMADOL HYDROCHLORIDE AND ACETAMINOPHEN 37.5; 325 MG/1; MG/1
1 TABLET, FILM COATED ORAL EVERY 6 HOURS PRN
COMMUNITY
End: 2020-06-02

## 2019-09-20 RX ORDER — EPINEPHRINE 0.3 MG/.3ML
INJECTION SUBCUTANEOUS
COMMUNITY
End: 2020-07-16 | Stop reason: ALTCHOICE

## 2019-09-20 NOTE — PROGRESS NOTES
Subjective:      Patient ID: Michela Franco is a 74 y.o. female.    Chief Complaint: Pain of the Left Hip    HPI follow-up for osteonecrosis.  The patient has been treated with Fosamax.  Denies localized pain in her hips.  Initially the left 1 was symptomatic, MRI suggested the right may also have a early presentation of osteonecrosis.  The patient does note numbness in her lateral thighs with prolonged walking, relieved by rest.  Denies any other focal neurologic issues.    Review of Systems   Constitution: Negative for fever and weight loss.   HENT: Negative for congestion.    Eyes: Negative for visual disturbance.   Cardiovascular: Negative for chest pain.   Respiratory: Negative for shortness of breath.    Hematologic/Lymphatic: Negative for bleeding problem. Does not bruise/bleed easily.   Skin: Negative for poor wound healing.   Gastrointestinal: Negative for abdominal pain.   Genitourinary: Negative for dysuria.   Neurological: Positive for numbness. Negative for seizures.   Psychiatric/Behavioral: Negative for altered mental status.   Allergic/Immunologic: Negative for persistent infections.         Objective:      Ortho/SPM Exam      Right hip  The patient is not in acute distress.   Body habitus is:normal.   Sclerae normal  The patient walks without a limp.   Respiratory distress:  none  The skin over the hip is:intact.   There is no local tenderness.   Range of motion- Flexion full, External rotation full, internal rotation full.  Resisted SLR negative.  Pain with rotation negative  Sciatic tension findings negative.  Shortening/lengthening compared to the contralateral side absent.  Pulses DP present, PT present.  Motor normal 5/5 strength in all tested muscle groups.   Sensory normal.    Left hip is identical              Assessment:       No diagnosis found.     The avascular necrosis seems to be responding well to the Fosamax.  The other subjective symptoms strongly suggest low-grade spinal  stenosis.          Plan:       There are no diagnoses linked to this encounter.      I explained my diagnostic impression and the reasoning behind it in detail, using layman's terms.  Models and/or pictures were used to help in the explanation.    Continue Fosamax 3 more months    Home exercise program to address spinal stenosis described in detail.    Weight loss recommended    We discussed the possibility of further imaging of the spine and agreed to defer this for now      X-ray hips at follow-up

## 2019-09-25 ENCOUNTER — TELEPHONE (OUTPATIENT)
Dept: PRIMARY CARE CLINIC | Facility: CLINIC | Age: 74
End: 2019-09-25

## 2019-09-25 NOTE — TELEPHONE ENCOUNTER
Please see message and advise    Pt is requesting a new RX for a c pap machine and supplies per past note pt got it form outside provider and cant find paperwork or who she got it from in the past. pt is requesting a new one from provider.    .----- Message from Kenyatta Park sent at 9/25/2019  1:51 PM CDT -----  Contact: Self Call [phone]   Need a new Rx for a New  C pap machine with supplies.

## 2019-09-26 ENCOUNTER — TELEPHONE (OUTPATIENT)
Dept: PRIMARY CARE CLINIC | Facility: CLINIC | Age: 74
End: 2019-09-26

## 2019-09-26 NOTE — TELEPHONE ENCOUNTER
Please see message and advise     Spoke with pt in regards to wanting to get a referral for sleep study so that she can get a c pap machine. Advised pt I will send over request to the attending provider.

## 2019-09-27 NOTE — TELEPHONE ENCOUNTER
Left VM advising pt that attending provider would prefer that the pt wait to be seen by the provider in order to get the referral for the sleep study so that she may get another C Pap machine

## 2019-10-02 ENCOUNTER — IMMUNIZATION (OUTPATIENT)
Dept: PHARMACY | Facility: CLINIC | Age: 74
End: 2019-10-02
Payer: MEDICARE

## 2019-10-02 ENCOUNTER — OFFICE VISIT (OUTPATIENT)
Dept: PRIMARY CARE CLINIC | Facility: CLINIC | Age: 74
End: 2019-10-02
Payer: MEDICARE

## 2019-10-02 ENCOUNTER — IMMUNIZATION (OUTPATIENT)
Dept: PHARMACY | Facility: CLINIC | Age: 74
End: 2019-10-02

## 2019-10-02 DIAGNOSIS — G47.33 OSA (OBSTRUCTIVE SLEEP APNEA): Primary | ICD-10-CM

## 2019-10-02 PROCEDURE — 1101F PT FALLS ASSESS-DOCD LE1/YR: CPT | Mod: HCNC,CPTII,S$GLB, | Performed by: FAMILY MEDICINE

## 2019-10-02 PROCEDURE — 3078F PR MOST RECENT DIASTOLIC BLOOD PRESSURE < 80 MM HG: ICD-10-PCS | Mod: HCNC,CPTII,S$GLB, | Performed by: FAMILY MEDICINE

## 2019-10-02 PROCEDURE — 99214 OFFICE O/P EST MOD 30 MIN: CPT | Mod: HCNC,S$GLB,, | Performed by: FAMILY MEDICINE

## 2019-10-02 PROCEDURE — 3074F SYST BP LT 130 MM HG: CPT | Mod: HCNC,CPTII,S$GLB, | Performed by: FAMILY MEDICINE

## 2019-10-02 PROCEDURE — 99214 PR OFFICE/OUTPT VISIT, EST, LEVL IV, 30-39 MIN: ICD-10-PCS | Mod: HCNC,S$GLB,, | Performed by: FAMILY MEDICINE

## 2019-10-02 PROCEDURE — 99999 PR PBB SHADOW E&M-EST. PATIENT-LVL III: CPT | Mod: PBBFAC,HCNC,, | Performed by: FAMILY MEDICINE

## 2019-10-02 PROCEDURE — 1101F PR PT FALLS ASSESS DOC 0-1 FALLS W/OUT INJ PAST YR: ICD-10-PCS | Mod: HCNC,CPTII,S$GLB, | Performed by: FAMILY MEDICINE

## 2019-10-02 PROCEDURE — 3074F PR MOST RECENT SYSTOLIC BLOOD PRESSURE < 130 MM HG: ICD-10-PCS | Mod: HCNC,CPTII,S$GLB, | Performed by: FAMILY MEDICINE

## 2019-10-02 PROCEDURE — 3078F DIAST BP <80 MM HG: CPT | Mod: HCNC,CPTII,S$GLB, | Performed by: FAMILY MEDICINE

## 2019-10-02 PROCEDURE — 99999 PR PBB SHADOW E&M-EST. PATIENT-LVL III: ICD-10-PCS | Mod: PBBFAC,HCNC,, | Performed by: FAMILY MEDICINE

## 2019-10-02 NOTE — PROGRESS NOTES
Subjective:       Patient ID: Michela Franco is a 74 y.o. female.    Chief Complaint: Sleep Apnea; Flu Vaccine; and Immunizations  pt needs new order for cpap equipment . She can no longer get parts  For her cpap machie.  HPIsee above  Review of Systems   Constitutional: Negative.    HENT: Negative.    Eyes: Negative.    Respiratory: Negative.    Cardiovascular: Negative.    Gastrointestinal: Negative.    Endocrine: Negative.    Genitourinary: Negative.    Musculoskeletal: Negative.    Allergic/Immunologic: Negative.    Neurological: Negative.    Hematological: Negative.    Psychiatric/Behavioral: Positive for sleep disturbance.       Objective:      Physical Exam   Constitutional: She is oriented to person, place, and time. She appears well-developed and well-nourished. She appears distressed.   HENT:   Head: Normocephalic and atraumatic.   Right Ear: External ear normal.   Left Ear: External ear normal.   Eyes: Pupils are equal, round, and reactive to light. Conjunctivae and EOM are normal.   Neck: Normal range of motion. Neck supple. No JVD present.   Pulmonary/Chest: Effort normal.   Neurological: She is alert and oriented to person, place, and time. She displays normal reflexes. No cranial nerve deficit or sensory deficit. She exhibits normal muscle tone. Coordination normal.   Skin: Skin is warm and dry. Capillary refill takes less than 2 seconds. No rash noted. No erythema. No pallor.   Psychiatric: She has a normal mood and affect. Her behavior is normal. Judgment and thought content normal.       Assessment:       1. JOHN (obstructive sleep apnea)     2.     Immunization due  Plan:       flu vacc [id number],65yr up,PF (FLUZONE HIGH-DOSE 2019-20, PF,) 180 mcg/0.5 mL Syrg        Vaccine Viral - Varicella    varicella-zoster [id number], PF, (SHINGRIX, PF,) 50 mcg/0.5 mL injection 0.5 mL, Once      See med card dated 10-2-19  tramadol-acetaminophen 37.5-325 mg (ULTRACET) 37.5-325 mg Tab 1 tablet, Every 6 hours PRN        Angiotensin II Receptor Blockers (ARBs)    valsartan (DIOVAN) 160 MG tablet 160 mg, Daily      Antihyperlipidemic - HMG CoA Reductase Inhibitors (statins)    rosuvastatin (CRESTOR) 5 MG tablet 5 mg, Daily      Antihyperlipidemic Agents - Dietary Source Combinations, Multivitamins, Vitamins - A and D Combinations, Systemic    COD LIVER OIL ORAL 1 tablet, Daily      Asthma Therapy - Inhaled Corticosteroids (Glucocorticoids)    budesonide (PULMICORT) 0.5 mg/2 mL nebulizer solution 0.5 mg, Daily      Asthma Therapy - Monoclonal Antibodies to Immunoglobulin E (IgE)    omalizumab (XOLAIR) 150 mg injection Every 14 days      Asthma/COPD Therapy - Beta Adrenergic-Glucocorticoid Combinations    ADVAIR DISKUS 500-50 mcg/dose DsDv diskus inhaler 1 puff, 2 times daily      Bone Resorption Inhibitors - Bisphosphonates    alendronate (FOSAMAX) 70 MG tablet        alendronate (FOSAMAX) 70 MG tablet 70 mg, Every 7 days       Flagged for Removal (Duplicate Order) DiscontinueKeep Active      Calcium Channel Blockers - Dihydropyridines    amLODIPine (NORVASC) 10 MG tablet       Cardiovascular Sympathomimetic - Anaphylaxis Therapy Single Agents    EPINEPHrine (EPIPEN 2-SANDY) 0.3 mg/0.3 mL AtIn       Diuretic - Loop    furosemide (LASIX) 20 MG tablet 20 mg, 2 times daily PRN      Diuretic - Thiazides and Related    hydroCHLOROthiazide (HYDRODIURIL) 25 MG tablet 25 mg      Expectorants - Single Agents, General    guaiFENesin (MUCINEX) 600 mg 12 hr tablet 600 mg, 2 times daily      Minerals and Electrolytes - Iron    ferrous sulfate 325 (65 FE) MG EC tablet 325 mg, 3 times daily with meals       Patient taking differently: 325 mg Oral Daily, Taking once daily, Reported on 3/20/2018      Minerals and Electrolytes - Potassium, Oral    potassium chloride SA (K-DUR,KLOR-CON) 20 MEQ tablet 40 mEq, Daily       Patient taking differently: 20 mEq Oral Daily, Reported on 7/11/2018      Multivitamins    multivitamin (MULTIVITAMIN) per tablet 1  tablet, Daily      Nasal Corticosteroids    fluticasone propionate (FLONASE) 50 mcg/actuation nasal spray 1 spray      NSAID Analgesics (HUSSEIN Non-Specific) - Propionic Acid Derivatives    ibuprofen (ADVIL,MOTRIN) 800 MG tablet       Vaccine Viral - Influenza A and B    flu vacc [id number],65yr up,PF (FLUZONE HIGH-DOSE 2019-20, PF,) 180 mcg/0.5 mL Syrg       Vaccine Viral - Varicella    varicella-zoster gE-AS01B, PF, (SHINGRIX, PF,) 50 mcg/0.5 mL injection 0.5 mL, Once      Vitamins - D Derivatives    cholecalciferol, vitamin D3, 10,000 unit Cap 360 mg, Daily        Discussed with pt no c-pap adjustment is needed it is working well but part of machine needs to be  Replaced and no replacement parts are available. CPAP FOR HOME USE  New rx for cpap and attachments.  Last sleep study 2015 on chart

## 2019-10-11 ENCOUNTER — TELEPHONE (OUTPATIENT)
Dept: PRIMARY CARE CLINIC | Facility: CLINIC | Age: 74
End: 2019-10-11

## 2019-10-11 NOTE — TELEPHONE ENCOUNTER
Spoke with pt and advised that she contact [facility] and have them to re fax what ever paperwork pt needs done.

## 2019-10-11 NOTE — TELEPHONE ENCOUNTER
please see message and advise     ----- Message from Maylin Sellers sent at 10/11/2019  8:53 AM CDT -----  Contact: Steffi Reed [phone]  Steffi carrera will like an copy of clinic note and doctor signature in regarding C-Pap Machine, fax to [phone], please advise.

## 2019-10-11 NOTE — TELEPHONE ENCOUNTER
----- Message from [place] sent at 10/11/2019  8:12 AM CDT -----  Contact: Self Call  [phone]  [facility] sent you a form to get her C#Pap machine and the office has not returned it. Please handled this today because she need the machine.      [facility]  Phone:  [phone],

## 2019-10-21 ENCOUNTER — TELEPHONE (OUTPATIENT)
Dept: PRIMARY CARE CLINIC | Facility: CLINIC | Age: 74
End: 2019-10-21

## 2019-10-21 NOTE — TELEPHONE ENCOUNTER
----- Message from Criselda Monk sent at 10/21/2019  2:34 PM CDT -----  Contact: [facility] [phone] option 2   [facility] is still trying to obtain the rest of the documentation that is needed for a new cpap/ the new settings as well as the doctor's signature/ signature and date. They need the form sent back that they faxed to you asap.

## 2019-10-28 ENCOUNTER — TELEPHONE (OUTPATIENT)
Dept: PRIMARY CARE CLINIC | Facility: CLINIC | Age: 74
End: 2019-10-28

## 2019-10-28 NOTE — TELEPHONE ENCOUNTER
Spoke with Steffi in regards to pt C pap. Asked that  They re fax over paperwork to be filled out by provider.      Contact: Steffi [phone] option 2   [facility] is still trying to obtain the rest of the documentation that is needed for a new cpap/ the new settings as well as the doctor's signature/ signature and date. They need the form sent back that they faxed to you asap.

## 2019-10-29 ENCOUNTER — TELEPHONE (OUTPATIENT)
Dept: PRIMARY CARE CLINIC | Facility: CLINIC | Age: 74
End: 2019-10-29

## 2019-10-29 NOTE — TELEPHONE ENCOUNTER
Spoke with pharmacy and re faxed over paperwork.    ----- Message from Jose Gutierrez sent at 10/29/2019  9:26 AM CDT -----  Contact: [employer] [phone]  [facility] calling regarding the Incorrect order that was requested sense August, is needing to have corrected, CPAP supplies and Machine. [employer] [phone]    Please call an advise  Thank you

## 2019-11-07 DIAGNOSIS — J45.40 MODERATE PERSISTENT ASTHMA WITHOUT COMPLICATION: ICD-10-CM

## 2019-11-07 RX ORDER — IBUPROFEN 800 MG/1
TABLET ORAL
Qty: 90 TABLET | Refills: 0 | Status: SHIPPED | OUTPATIENT
Start: 2019-11-07 | End: 2019-12-02 | Stop reason: SDUPTHER

## 2019-11-07 RX ORDER — FLUTICASONE PROPIONATE AND SALMETEROL 50; 500 UG/1; UG/1
POWDER RESPIRATORY (INHALATION)
Qty: 1 INHALER | Refills: 2 | Status: SHIPPED | OUTPATIENT
Start: 2019-11-07 | End: 2020-01-13

## 2019-11-18 ENCOUNTER — TELEPHONE (OUTPATIENT)
Dept: PRIMARY CARE CLINIC | Facility: CLINIC | Age: 74
End: 2019-11-18

## 2019-11-18 NOTE — TELEPHONE ENCOUNTER
Please see message and advise     Spoke with pt and was advised that pt gave provider some files back [place] that had a medication she was suppose to be taking but she never got because the price was to high to fill. Pt stated she sent it to provider and was follow up because she never received the medication. Provider told her she could get it at a cheaper price.       ----- Message from Elda Hdz sent at 11/18/2019  9:29 AM CST -----  Contact: Pt self Mobile/Home [phone]   Patient would like a call back in regards to her saying that she gave you some records from [facility] about her hospital there and she would like to speak with you about her records please.

## 2019-11-19 ENCOUNTER — TELEPHONE (OUTPATIENT)
Dept: PRIMARY CARE CLINIC | Facility: CLINIC | Age: 74
End: 2019-11-19

## 2019-11-19 NOTE — TELEPHONE ENCOUNTER
Spoke with pt in regards to message earlier and asked per provider;Is she talking about advair diskus? Pt stated she wanted to speak with provider and advised that provider gives her a call.

## 2019-11-19 NOTE — TELEPHONE ENCOUNTER
Spoke with pt and was advised she will bring a copy of the paperwork to the office on tomorrow.     ----- Message from Vonda Shelton sent at 11/19/2019 11:53 AM CST -----  Contact: [phone]  Patient is returning a phone call.  Who left a message for the patient:   Does patient know what this is regarding:    Comments: please advise, thanks

## 2019-11-19 NOTE — TELEPHONE ENCOUNTER
Attempted to call pt no answer left message.  Please see if you can find the records she is   Referring to in the chart . I cannot.

## 2019-11-20 ENCOUNTER — TELEPHONE (OUTPATIENT)
Dept: PRIMARY CARE CLINIC | Facility: CLINIC | Age: 74
End: 2019-11-20

## 2019-11-20 DIAGNOSIS — J45.909 ASTHMA, UNSPECIFIED ASTHMA SEVERITY, UNSPECIFIED WHETHER COMPLICATED, UNSPECIFIED WHETHER PERSISTENT: Primary | ICD-10-CM

## 2019-11-20 NOTE — TELEPHONE ENCOUNTER
As far as I can tell its the advair and budesonide she needed.  I consulted pharmacy assistance to get her these meds.  They should contact her directly.

## 2019-11-21 ENCOUNTER — TELEPHONE (OUTPATIENT)
Dept: PHARMACY | Facility: CLINIC | Age: 74
End: 2019-11-21

## 2019-11-25 ENCOUNTER — TELEPHONE (OUTPATIENT)
Dept: PULMONOLOGY | Facility: CLINIC | Age: 74
End: 2019-11-25

## 2019-11-25 DIAGNOSIS — R05.9 COUGH: Primary | ICD-10-CM

## 2019-11-25 DIAGNOSIS — R06.02 SOB (SHORTNESS OF BREATH): ICD-10-CM

## 2019-12-03 RX ORDER — IBUPROFEN 800 MG/1
TABLET ORAL
Qty: 90 TABLET | Refills: 0 | Status: SHIPPED | OUTPATIENT
Start: 2019-12-03 | End: 2019-12-29

## 2019-12-04 ENCOUNTER — HOSPITAL ENCOUNTER (OUTPATIENT)
Dept: PULMONOLOGY | Facility: CLINIC | Age: 74
Discharge: HOME | End: 2019-12-04
Payer: MEDICARE

## 2019-12-04 ENCOUNTER — OFFICE VISIT (OUTPATIENT)
Dept: PULMONOLOGY | Facility: CLINIC | Age: 74
End: 2019-12-04
Payer: MEDICARE

## 2019-12-04 VITALS
BODY MASS INDEX: 32.32 KG/M2 | HEIGHT: 65 IN | SYSTOLIC BLOOD PRESSURE: 140 MMHG | HEART RATE: 78 BPM | OXYGEN SATURATION: 97 % | DIASTOLIC BLOOD PRESSURE: 82 MMHG | WEIGHT: 194 LBS | BODY MASS INDEX: 33.12 KG/M2 | WEIGHT: 194 LBS | HEIGHT: 64 IN

## 2019-12-04 DIAGNOSIS — R06.02 SOB (SHORTNESS OF BREATH): ICD-10-CM

## 2019-12-04 DIAGNOSIS — J31.0 CHRONIC RHINITIS: ICD-10-CM

## 2019-12-04 DIAGNOSIS — R05.9 COUGH: ICD-10-CM

## 2019-12-04 DIAGNOSIS — J45.40 MODERATE PERSISTENT ASTHMA WITHOUT COMPLICATION: Primary | ICD-10-CM

## 2019-12-04 PROCEDURE — 3079F PR MOST RECENT DIASTOLIC BLOOD PRESSURE 80-89 MM HG: ICD-10-PCS | Mod: HCNC,CPTII,S$GLB, | Performed by: NURSE PRACTITIONER

## 2019-12-04 PROCEDURE — 3079F DIAST BP 80-89 MM HG: CPT | Mod: HCNC,CPTII,S$GLB, | Performed by: NURSE PRACTITIONER

## 2019-12-04 PROCEDURE — 3077F SYST BP >= 140 MM HG: CPT | Mod: HCNC,CPTII,S$GLB, | Performed by: NURSE PRACTITIONER

## 2019-12-04 PROCEDURE — 1159F PR MEDICATION LIST DOCUMENTED IN MEDICAL RECORD: ICD-10-PCS | Mod: HCNC,S$GLB,, | Performed by: NURSE PRACTITIONER

## 2019-12-04 PROCEDURE — 99214 PR OFFICE/OUTPT VISIT, EST, LEVL IV, 30-39 MIN: ICD-10-PCS | Mod: HCNC,25,S$GLB, | Performed by: NURSE PRACTITIONER

## 2019-12-04 PROCEDURE — 94729 PR C02/MEMBANE DIFFUSE CAPACITY: ICD-10-PCS | Mod: HCNC,S$GLB,, | Performed by: INTERNAL MEDICINE

## 2019-12-04 PROCEDURE — 1101F PR PT FALLS ASSESS DOC 0-1 FALLS W/OUT INJ PAST YR: ICD-10-PCS | Mod: HCNC,CPTII,S$GLB, | Performed by: NURSE PRACTITIONER

## 2019-12-04 PROCEDURE — 94618 PULMONARY STRESS TESTING: CPT | Mod: HCNC,S$GLB,, | Performed by: INTERNAL MEDICINE

## 2019-12-04 PROCEDURE — 99999 PR PBB SHADOW E&M-EST. PATIENT-LVL III: CPT | Mod: PBBFAC,HCNC,, | Performed by: NURSE PRACTITIONER

## 2019-12-04 PROCEDURE — 94618 PULMONARY STRESS TESTING: ICD-10-PCS | Mod: HCNC,S$GLB,, | Performed by: INTERNAL MEDICINE

## 2019-12-04 PROCEDURE — 94060 PR EVAL OF BRONCHOSPASM: ICD-10-PCS | Mod: HCNC,S$GLB,, | Performed by: INTERNAL MEDICINE

## 2019-12-04 PROCEDURE — 99214 OFFICE O/P EST MOD 30 MIN: CPT | Mod: HCNC,25,S$GLB, | Performed by: NURSE PRACTITIONER

## 2019-12-04 PROCEDURE — 94729 DIFFUSING CAPACITY: CPT | Mod: HCNC,S$GLB,, | Performed by: INTERNAL MEDICINE

## 2019-12-04 PROCEDURE — 3077F PR MOST RECENT SYSTOLIC BLOOD PRESSURE >= 140 MM HG: ICD-10-PCS | Mod: HCNC,CPTII,S$GLB, | Performed by: NURSE PRACTITIONER

## 2019-12-04 PROCEDURE — 1101F PT FALLS ASSESS-DOCD LE1/YR: CPT | Mod: HCNC,CPTII,S$GLB, | Performed by: NURSE PRACTITIONER

## 2019-12-04 PROCEDURE — 94060 EVALUATION OF WHEEZING: CPT | Mod: HCNC,S$GLB,, | Performed by: INTERNAL MEDICINE

## 2019-12-04 PROCEDURE — 1159F MED LIST DOCD IN RCRD: CPT | Mod: HCNC,S$GLB,, | Performed by: NURSE PRACTITIONER

## 2019-12-04 PROCEDURE — 99999 PR PBB SHADOW E&M-EST. PATIENT-LVL III: ICD-10-PCS | Mod: PBBFAC,HCNC,, | Performed by: NURSE PRACTITIONER

## 2019-12-04 NOTE — PROGRESS NOTES
Subjective:       Patient ID: Michela Franco is a 74 y.o. female.    Chief Complaint: Cough and Shortness of Breath    HPI:   Michela Franco is a 74 y.o. female who presents for evaluation of shortness of breath.   Followed by Dr. Chowdhury for allergic asthma, but is new to me.   Seen by Dr. Everett at [facility] for allergy.  Reports that she coughs all the time, constant PND, depending on the weather she does cough up a lot.  Has a lot of nasal congestion in AM and productive cough.  Benralzumab, Dupilumab, Xhance were recommended by allergy but she has been unable to afford them.  She was on Xolaire for a bit but found no relief.   Using ventolin BID, clears lungs a bit  Uses CPAP faithfully        Review of Systems   Constitutional: Negative for chills, activity change, fatigue and night sweats.   HENT: Positive for postnasal drip, rhinorrhea and congestion. Negative for trouble swallowing.    Eyes: Negative for itching.   Respiratory: Positive for cough, sputum production, chest tightness, wheezing, dyspnea on extertion and use of rescue inhaler. Negative for hemoptysis, choking and shortness of breath.    Cardiovascular: Negative for chest pain and palpitations.   Genitourinary: Negative for difficulty urinating.   Endocrine: Negative for cold intolerance and heat intolerance.    Musculoskeletal: Negative for arthralgias.   Skin: Negative for rash.   Gastrointestinal: Negative for nausea, vomiting and acid reflux.   Neurological: Negative for dizziness and light-headedness.   Hematological: Negative for adenopathy.   Psychiatric/Behavioral: Positive for sleep disturbance.         Social History     Tobacco Use    Smoking status: Former Smoker     Packs/day: 0.25     Years: 40.00     Pack years: 10.00     Types: Cigarettes     Last attempt to quit: 2005     Years since quittin.9    Smokeless tobacco: Never Used   Substance Use Topics    Alcohol use: Yes     Comment: rare beer       Review of patient's  allergies indicates:   Allergen Reactions    Adhesive      PAPER TAPE    Diazepam Other (See Comments)     Nervous, jittery    Gabapentin      Nervous      Keppra [levetiracetam]      nervous    Mold      sneezing     Past Medical History:   Diagnosis Date    Allergy     Chronic diastolic heart failure 4/5/2018    Hyperlipidemia     Hypertension     Neuromuscular disorder     JOHN on CPAP     Osteoporosis     Recurrent sinusitis 3/25/2014    Recurrent upper respiratory infection (URI)     Urticaria      Past Surgical History:   Procedure Laterality Date    COLONOSCOPY N/A 5/18/2018    Procedure: COLONOSCOPY;  Surgeon: Calixto Brian MD;  Location: [address]);  Service: Endoscopy;  Laterality: N/A;    HYSTERECTOMY      total    OOPHORECTOMY      ROTATOR CUFF REPAIR Left     SINUS SURGERY       Current Outpatient Medications on File Prior to Visit   Medication Sig    ADVAIR DISKUS 500-50 mcg/dose DsDv diskus inhaler INHALE 1 PUFF TWICE DAILY    alendronate (FOSAMAX) 70 MG tablet TAKE 1 TABLET EVERY 7 DAYS    alendronate (FOSAMAX) 70 MG tablet Take 1 tablet (70 mg total) by mouth every 7 days.    amLODIPine (NORVASC) 10 MG tablet TAKE 1 TABLET ONE TIME DAILY    cholecalciferol, vitamin D3, 10,000 unit Cap Take 360 mg by mouth once daily. Take 6 capsules (6,000 units total) by mouth once daily    COD LIVER OIL ORAL Take 1 tablet by mouth once daily.    ferrous sulfate 325 (65 FE) MG EC tablet Take 1 tablet (325 mg total) by mouth 3 (three) times daily with meals. (Patient taking differently: Take 325 mg by mouth once daily. Taking once daily)    folic acid (FOLVITE) 800 MCG Tab Take 1 tablet (800 mcg total) by mouth once daily.    guaiFENesin (MUCINEX) 600 mg 12 hr tablet Take 600 mg by mouth 2 (two) times daily.    ibuprofen (ADVIL,MOTRIN) 800 MG tablet TAKE 1 TABLET EVERY 8 HOURS AS NEEDED FOR PAIN    multivitamin (MULTIVITAMIN) per tablet Take 1 tablet by mouth once daily.       potassium chloride SA (K-DUR,KLOR-CON) 20 MEQ tablet Take 2 tablets (40 mEq total) by mouth once daily. (Patient taking differently: Take 20 mEq by mouth once daily. )    valsartan (DIOVAN) 160 MG tablet Take 1 tablet (160 mg total) by mouth once daily.    budesonide (PULMICORT) 0.5 mg/2 mL nebulizer solution Take 2 mLs (0.5 mg total) by nebulization once daily. For use in saline irrigation as directed.    EPINEPHrine (EPIPEN 2-SANDY) 0.3 mg/0.3 mL AtIn Inject into the muscle.    flu vacc [id number],65yr up,PF (FLUZONE HIGH-DOSE 2019-20, PF,) 180 mcg/0.5 mL Syrg inject 0.5 ml into the muscle for one dose (Patient not taking: Reported on 12/4/2019)    fluticasone propionate (FLONASE) 50 mcg/actuation nasal spray 1 spray by Nasal route.    furosemide (LASIX) 20 MG tablet Take 1 tablet (20 mg total) by mouth 2 (two) times daily as needed (leg swelling).    hydroCHLOROthiazide (HYDRODIURIL) 25 MG tablet Take 25 mg by mouth.    omalizumab (XOLAIR) 150 mg injection Inject into the skin every 14 (fourteen) days.    prenatal no122-iron-folic acid  mg-mcg Tab     rosuvastatin (CRESTOR) 5 MG tablet Take 1 tablet (5 mg total) by mouth once daily. (Patient not taking: Reported on 12/4/2019)    tramadol-acetaminophen 37.5-325 mg (ULTRACET) 37.5-325 mg Tab Take 1 tablet by mouth every 6 (six) hours as needed.     No current facility-administered medications on file prior to visit.        Objective:      Vitals:    12/04/19 0929   BP: (!) 140/82   Pulse: 78     Physical Exam   Constitutional: She is oriented to person, place, and time. She appears well-developed and well-nourished. No distress.   HENT:   Head: Normocephalic.   Nose: Mucosal edema present.   Neck: Normal range of motion. Neck supple.   Cardiovascular: Normal rate and regular rhythm.   No murmur heard.  Pulmonary/Chest: Normal expansion, symmetric chest wall expansion, effort normal and breath sounds normal. No respiratory distress. She has no  decreased breath sounds. She has no wheezes. She has no rhonchi. She has no rales.   Abdominal: Soft. She exhibits no distension. There is no hepatosplenomegaly. There is no tenderness.   Musculoskeletal: Normal range of motion. She exhibits no edema.   Lymphadenopathy:     She has no cervical adenopathy.   Neurological: She is alert and oriented to person, place, and time. Gait normal.   Skin: Skin is warm and dry. No cyanosis. Nails show no clubbing.   Psychiatric: She has a normal mood and affect.   Vitals reviewed.    Personal Diagnostic Review    PFTs personally reviewed and discussed with patient  Imaging personally reviewed with patient CXR 7/19/19          Assessment:     Problem List Items Addressed This Visit        ENT    Chronic rhinitis    Overview     Followed by Allergy at [facility], Dr. Everett.         Current Assessment & Plan     IgE level in the 2400s, Eos 22%.  Strongly recommend following closely with allergy and ENT, is due to see Dr. Boo            Pulmonary    Moderate persistent asthma without complication - Primary    Overview     On Advair 500-50, using as directed, increasing PRN IMTIAZ needs         Current Assessment & Plan     Patient's asthma is complicated by eosinophilia and increased IgE.  She has trialed biologic therapies in the past but they were either unhelpful or cost prohibitive.  Her PFTs now show obstruction, for which we will trial a LAMA.  I am unsure of how successful we will be with treating her cough and dyspnea if she is unable to recieve the biologics that would help her asthma.          Relevant Orders    IGE (Completed)    CBC auto differential (Completed)

## 2019-12-05 ENCOUNTER — PATIENT MESSAGE (OUTPATIENT)
Dept: PULMONOLOGY | Facility: CLINIC | Age: 74
End: 2019-12-05

## 2019-12-05 ENCOUNTER — TELEPHONE (OUTPATIENT)
Dept: PULMONOLOGY | Facility: CLINIC | Age: 74
End: 2019-12-05

## 2019-12-05 LAB
DLCO ADJ PRE: 13.67 ML/(MIN*MMHG) (ref 15.85–27.32)
DLCO SINGLE BREATH LLN: 15.85
DLCO SINGLE BREATH PRE REF: 63.3 %
DLCO SINGLE BREATH REF: 21.59
DLCOC SBVA LLN: 2.84
DLCOC SBVA PRE REF: 97.8 %
DLCOC SBVA REF: 4.23
DLCOC SINGLE BREATH LLN: 15.85
DLCOC SINGLE BREATH PRE REF: 63.3 %
DLCOC SINGLE BREATH REF: 21.59
DLCOCSBVAULN: 5.63
DLCOCSINGLEBREATHULN: 27.32
DLCOSINGLEBREATHULN: 27.32
DLCOVA LLN: 2.84
DLCOVA PRE REF: 97.8 %
DLCOVA PRE: 4.14 ML/(MIN*MMHG*L) (ref 2.84–5.63)
DLCOVA REF: 4.23
DLCOVAULN: 5.63
DLVAADJ PRE: 4.14 ML/(MIN*MMHG*L) (ref 2.84–5.63)
FEF 25 75 LLN: 0.59
FEF 25 75 PRE REF: 36.5 %
FEF 25 75 REF: 1.61
FET100 CHG: -2.9 %
FEV05 LLN: 0.87
FEV05 REF: 1.72
FEV1 CHG: 13.2 %
FEV1 FVC LLN: 65
FEV1 FVC PRE REF: 82.8 %
FEV1 FVC REF: 78
FEV1 LLN: 1.31
FEV1 PRE REF: 60.4 %
FEV1 REF: 1.9
FVC CHG: 6.3 %
FVC LLN: 1.72
FVC PRE REF: 72.4 %
FVC REF: 2.45
IVC PRE: 1.79 L (ref 1.72–3.19)
IVC SINGLE BREATH LLN: 1.72
IVC SINGLE BREATH PRE REF: 73 %
IVC SINGLE BREATH REF: 2.45
IVCSINGLEBREATHULN: 3.19
MVV LLN: 72
MVV REF: 85
PEF LLN: 2.77
PEF PRE REF: 87 %
PEF REF: 4.83
PHYSICIAN COMMENT: ABNORMAL
POST FEF 25 75: 0.78 L/S (ref 0.59–2.63)
POST FET 100: 5.09 SEC
POST FEV1 FVC: 68.98 % (ref 64.93–91.35)
POST FEV1: 1.3 L (ref 1.31–2.49)
POST FEV5: 1.04 L (ref 0.87–2.58)
POST FVC: 1.89 L (ref 1.72–3.19)
POST PEF: 4.21 L/S (ref 2.77–6.9)
PRE DLCO: 13.67 ML/(MIN*MMHG) (ref 15.85–27.32)
PRE FEF 25 75: 0.59 L/S (ref 0.59–2.63)
PRE FET 100: 5.24 SEC
PRE FEV05 REF: 51.9 %
PRE FEV1 FVC: 64.73 % (ref 64.93–91.35)
PRE FEV1: 1.15 L (ref 1.31–2.49)
PRE FEV5: 0.89 L (ref 0.87–2.58)
PRE FVC: 1.77 L (ref 1.72–3.19)
PRE PEF: 4.21 L/S (ref 2.77–6.9)
VA PRE: 3.3 L (ref 4.95–4.95)
VA SINGLE BREATH LLN: 4.95
VA SINGLE BREATH PRE REF: 66.7 %
VA SINGLE BREATH REF: 4.95
VASINGLEBREATHULN: 4.95

## 2019-12-06 ENCOUNTER — TELEPHONE (OUTPATIENT)
Dept: PULMONOLOGY | Facility: CLINIC | Age: 74
End: 2019-12-06

## 2019-12-06 ENCOUNTER — LAB VISIT (OUTPATIENT)
Dept: LAB | Facility: HOSPITAL | Age: 74
End: 2019-12-06
Payer: MEDICARE

## 2019-12-06 DIAGNOSIS — J45.40 MODERATE PERSISTENT ASTHMA WITHOUT COMPLICATION: Primary | ICD-10-CM

## 2019-12-06 DIAGNOSIS — J45.40 MODERATE PERSISTENT ASTHMA WITHOUT COMPLICATION: ICD-10-CM

## 2019-12-06 LAB
BASOPHILS # BLD AUTO: 0.08 K/UL (ref 0–0.2)
BASOPHILS NFR BLD: 1.3 % (ref 0–1.9)
DIFFERENTIAL METHOD: ABNORMAL
EOSINOPHIL # BLD AUTO: 1.4 K/UL (ref 0–0.5)
EOSINOPHIL NFR BLD: 22.8 % (ref 0–8)
ERYTHROCYTE [DISTWIDTH] IN BLOOD BY AUTOMATED COUNT: 17.2 % (ref 11.5–14.5)
HCT VFR BLD AUTO: 42.2 % (ref 37–48.5)
HGB BLD-MCNC: 12.9 G/DL (ref 12–16)
IGE SERPL-ACNC: 2492 IU/ML (ref 0–100)
IMM GRANULOCYTES # BLD AUTO: 0.02 K/UL (ref 0–0.04)
IMM GRANULOCYTES NFR BLD AUTO: 0.3 % (ref 0–0.5)
LYMPHOCYTES # BLD AUTO: 2 K/UL (ref 1–4.8)
LYMPHOCYTES NFR BLD: 32.3 % (ref 18–48)
MCH RBC QN AUTO: 26.7 PG (ref 27–31)
MCHC RBC AUTO-ENTMCNC: 30.6 G/DL (ref 32–36)
MCV RBC AUTO: 87 FL (ref 82–98)
MONOCYTES # BLD AUTO: 0.6 K/UL (ref 0.3–1)
MONOCYTES NFR BLD: 9.2 % (ref 4–15)
NEUTROPHILS # BLD AUTO: 2.2 K/UL (ref 1.8–7.7)
NEUTROPHILS NFR BLD: 34.1 % (ref 38–73)
NRBC BLD-RTO: 0 /100 WBC
PLATELET # BLD AUTO: 301 K/UL (ref 150–350)
PMV BLD AUTO: 10.4 FL (ref 9.2–12.9)
RBC # BLD AUTO: 4.83 M/UL (ref 4–5.4)
WBC # BLD AUTO: 6.31 K/UL (ref 3.9–12.7)

## 2019-12-06 PROCEDURE — 36415 COLL VENOUS BLD VENIPUNCTURE: CPT | Mod: HCNC

## 2019-12-06 PROCEDURE — 82785 ASSAY OF IGE: CPT | Mod: HCNC

## 2019-12-06 PROCEDURE — 85025 COMPLETE CBC W/AUTO DIFF WBC: CPT | Mod: HCNC

## 2019-12-06 NOTE — ASSESSMENT & PLAN NOTE
Patient's asthma is complicated by eosinophilia and increased IgE.  She has trialed biologic therapies in the past but they were either unhelpful or cost prohibitive.  Her PFTs now show obstruction, for which we will trial a LAMA.  I am unsure of how successful we will be with treating her cough and dyspnea if she is unable to recieve the biologics that would help her asthma.

## 2019-12-06 NOTE — TELEPHONE ENCOUNTER
Spoke with patient, informed her that I have received her message. I advised patient that she has been scheduled for [facility] on 12/18/19 for 2:00 pm. Patient verbalized that she understand and excepted the appointment.

## 2019-12-06 NOTE — TELEPHONE ENCOUNTER
----- Message from Concepción Angeles sent at 12/6/2019  1:52 PM CST -----  Contact: Michela  tel:    [phone]    Caller says she is returning a call to [place].    The call dropped.     Pls call again.

## 2019-12-06 NOTE — TELEPHONE ENCOUNTER
Spoke with patient, informed her that I have recievd her message. Patient states that CHAVA Hutchinson contacted her and left a voicemail informing patient that she wanted to advise her with her test results as well as setting up a appointment with another department. I verbalized to patient that I understand and advised patient that I will forward her message to CHAVA Hutchinson to review/advise. Patient verbalized that she understand.

## 2019-12-06 NOTE — TELEPHONE ENCOUNTER
----- Message from Joya Donovan sent at 12/6/2019 11:34 AM CST -----  Contact: pt  Reason: Returning call from Mrs Byrd    Communication: [phone]

## 2019-12-06 NOTE — ASSESSMENT & PLAN NOTE
IgE level in the 2400s, Eos 22%.  Strongly recommend following closely with allergy and ENT, is due to see Dr. Boo

## 2019-12-09 NOTE — PROCEDURES
Michela Franco is a 74 y.o.  female patient, who presents for a 6 minute walk test ordered by Evangelina Byrd DNP.  The diagnosis is Shortness of Breath.  The patient's BMI is 32.3 kg/m2.  Predicted distance (lower limit of normal) is 256.69 meters.      Test Results:    The test was completed without stopping.  The total time walked was 360 seconds.  During walking, the patient reported:  Dyspnea.  The patient used no assistive devices during testing.     12/04/2019---------Distance: 381 meters (1250 feet)     O2 Sat % Supplemental Oxygen Heart Rate Blood Pressure Dustin Scale   Pre-exercise  (Resting) 96 % Room Air 81 bpm 137/67 mmHg 3   During Exercise 95 % Room Air 91 bpm 170/77 mmHg 4   Post-exercise  (Recovery) 98 % Room Air  80 bpm 150/70 mmHg      Recovery Time:  138 seconds    Performing nurse/tech:  FRANCHESKA Henriquez    PREVIOUS STUDY:   The patient has not had a previous study.      CLINICAL INTERPRETATION:  Six minute walk distance is 381 meters (1250 feet) with somewhat heavy dyspnea.  During exercise, there was desaturation while breathing room air.  Blood pressure increased significantly and Heart rate remained stable with walking.  The patient did not report non-pulmonary symptoms during exercise.  No previous study performed.  Based upon age and body mass index, exercise capacity is normal.

## 2019-12-10 ENCOUNTER — TELEPHONE (OUTPATIENT)
Dept: PULMONOLOGY | Facility: CLINIC | Age: 74
End: 2019-12-10

## 2019-12-10 NOTE — TELEPHONE ENCOUNTER
Left message on patient voicemail, informing her that I have received her message. I advised patient that if she has any questions or concerns, she may contact the office. Office number was provided.

## 2019-12-10 NOTE — TELEPHONE ENCOUNTER
----- Message from Fina Dewitt sent at 12/10/2019  8:06 AM CST -----  Contact: PT  PT calling regarding the inhaler that was prescribed   PT said the insurance would pay part of the price but there is still a big amount to pay  PT is calling requesting the coupon that was spoken about, she said she could come pick it up.    Callback: [phone]

## 2019-12-13 ENCOUNTER — OFFICE VISIT (OUTPATIENT)
Dept: URGENT CARE | Facility: CLINIC | Age: 74
End: 2019-12-13
Payer: MEDICARE

## 2019-12-13 VITALS
HEIGHT: 65 IN | OXYGEN SATURATION: 98 % | TEMPERATURE: 98 F | HEART RATE: 85 BPM | BODY MASS INDEX: 32.32 KG/M2 | RESPIRATION RATE: 16 BRPM | WEIGHT: 194 LBS | DIASTOLIC BLOOD PRESSURE: 84 MMHG | SYSTOLIC BLOOD PRESSURE: 131 MMHG

## 2019-12-13 DIAGNOSIS — S16.1XXA STRAIN OF NECK MUSCLE, INITIAL ENCOUNTER: ICD-10-CM

## 2019-12-13 DIAGNOSIS — M79.672 LEFT FOOT PAIN: ICD-10-CM
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
## 2019-12-13 PROCEDURE — 99214 PR OFFICE/OUTPT VISIT, EST, LEVL IV, 30-39 MIN: ICD-10-PCS | Mod: S$GLB,,, | Performed by: PHYSICIAN ASSISTANT

## 2019-12-13 PROCEDURE — 99214 OFFICE O/P EST MOD 30 MIN: CPT | Mod: S$GLB,,, | Performed by: PHYSICIAN ASSISTANT

## 2019-12-13 PROCEDURE — 73630 X-RAY EXAM OF FOOT: CPT | Mod: LT,S$GLB,, | Performed by: RADIOLOGY

## 2019-12-13 PROCEDURE — 73630 XR FOOT COMPLETE 3 VIEW LEFT: ICD-10-PCS | Mod: LT,S$GLB,, | Performed by: RADIOLOGY

## 2019-12-13 RX ORDER — METHOCARBAMOL 750 MG/1
750 TABLET, FILM COATED ORAL 3 TIMES DAILY
Qty: 15 TABLET | Refills: 0 | Status: SHIPPED | OUTPATIENT
Start: 2019-12-13 | End: 2019-12-18

## 2019-12-13 RX ORDER — NAPROXEN 500 MG/1
500 TABLET ORAL 2 TIMES DAILY
Qty: 20 TABLET | Refills: 0 | Status: SHIPPED | OUTPATIENT
Start: 2019-12-13 | End: 2019-12-23
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
## 2019-12-18 ENCOUNTER — OFFICE VISIT (OUTPATIENT)
Dept: ALLERGY | Facility: CLINIC | Age: 74
End: 2019-12-18
Payer: MEDICARE

## 2019-12-18 VITALS — WEIGHT: 196 LBS | OXYGEN SATURATION: 96 % | BODY MASS INDEX: 32.65 KG/M2 | HEART RATE: 92 BPM | HEIGHT: 65 IN

## 2019-12-18 DIAGNOSIS — R05.3 COUGH, PERSISTENT: ICD-10-CM

## 2019-12-18 DIAGNOSIS — J31.0 CHRONIC RHINITIS: ICD-10-CM

## 2019-12-18 DIAGNOSIS — I10 ESSENTIAL HYPERTENSION: ICD-10-CM

## 2019-12-18 DIAGNOSIS — J32.4 CHRONIC PANSINUSITIS: Primary | ICD-10-CM

## 2019-12-18 DIAGNOSIS — M15.9 PRIMARY OSTEOARTHRITIS INVOLVING MULTIPLE JOINTS: ICD-10-CM

## 2019-12-18 DIAGNOSIS — R05.3 CHRONIC COUGH: ICD-10-CM

## 2019-12-18 DIAGNOSIS — J45.40 MODERATE PERSISTENT ASTHMA WITHOUT COMPLICATION: ICD-10-CM

## 2019-12-18 PROCEDURE — 1159F PR MEDICATION LIST DOCUMENTED IN MEDICAL RECORD: ICD-10-PCS | Mod: HCNC,S$GLB,, | Performed by: ALLERGY & IMMUNOLOGY

## 2019-12-18 PROCEDURE — 99999 PR PBB SHADOW E&M-EST. PATIENT-LVL III: CPT | Mod: PBBFAC,HCNC,, | Performed by: ALLERGY & IMMUNOLOGY

## 2019-12-18 PROCEDURE — 99999 PR PBB SHADOW E&M-EST. PATIENT-LVL III: ICD-10-PCS | Mod: PBBFAC,HCNC,, | Performed by: ALLERGY & IMMUNOLOGY

## 2019-12-18 PROCEDURE — 1126F PR PAIN SEVERITY QUANTIFIED, NO PAIN PRESENT: ICD-10-PCS | Mod: HCNC,S$GLB,, | Performed by: ALLERGY & IMMUNOLOGY

## 2019-12-18 PROCEDURE — 99499 UNLISTED E&M SERVICE: CPT | Mod: HCNC,S$GLB,, | Performed by: ALLERGY & IMMUNOLOGY

## 2019-12-18 PROCEDURE — 99214 PR OFFICE/OUTPT VISIT, EST, LEVL IV, 30-39 MIN: ICD-10-PCS | Mod: HCNC,S$GLB,, | Performed by: ALLERGY & IMMUNOLOGY

## 2019-12-18 PROCEDURE — 1101F PT FALLS ASSESS-DOCD LE1/YR: CPT | Mod: HCNC,CPTII,S$GLB, | Performed by: ALLERGY & IMMUNOLOGY

## 2019-12-18 PROCEDURE — 1159F MED LIST DOCD IN RCRD: CPT | Mod: HCNC,S$GLB,, | Performed by: ALLERGY & IMMUNOLOGY

## 2019-12-18 PROCEDURE — 99214 OFFICE O/P EST MOD 30 MIN: CPT | Mod: HCNC,S$GLB,, | Performed by: ALLERGY & IMMUNOLOGY

## 2019-12-18 PROCEDURE — 1126F AMNT PAIN NOTED NONE PRSNT: CPT | Mod: HCNC,S$GLB,, | Performed by: ALLERGY & IMMUNOLOGY

## 2019-12-18 PROCEDURE — 1101F PR PT FALLS ASSESS DOC 0-1 FALLS W/OUT INJ PAST YR: ICD-10-PCS | Mod: HCNC,CPTII,S$GLB, | Performed by: ALLERGY & IMMUNOLOGY

## 2019-12-18 PROCEDURE — 99499 RISK ADDL DX/OHS AUDIT: ICD-10-PCS | Mod: HCNC,S$GLB,, | Performed by: ALLERGY & IMMUNOLOGY

## 2019-12-18 NOTE — LETTER
December 20, 2019      Evangelina Byrd, DNP  [address]  [facility] [address]           [facility]  [address]  [facility] [address]  Phone: [phone]  Fax: [phone]          Patient: Michela Franco   MR Number: [id number]   YOB: 1945   Date of Visit: 12/18/2019       Dear Evangelina Byrd:    Thank you for referring Michela Franco to me for evaluation. Attached you will find relevant portions of my assessment and plan of care.    If you have questions, please do not hesitate to call me. I look forward to following Michela Franco along with you.    Sincerely,    GARRETT Solorzano III, MD    Enclosure  CC:  No Recipients    If you would like to receive this communication electronically, please contact [email or web address] or [phone] to request more information on [employer] access.    For providers and/or their staff who would like to refer a patient to [facility], please contact us through our one-stop-shop provider referral line, [facility] [place], operated [facility], at [phone].    If you feel you have received this communication in error or would no longer like to receive these types of communications, please e-mail [email or web address]

## 2019-12-19 ENCOUNTER — IMMUNIZATION (OUTPATIENT)
Dept: PHARMACY | Facility: CLINIC | Age: 74
End: 2019-12-19
Payer: MEDICARE

## 2019-12-20 ENCOUNTER — HOSPITAL ENCOUNTER (OUTPATIENT)
Dept: RADIOLOGY | Facility: HOSPITAL | Age: 74
Discharge: HOME | End: 2019-12-20
Attending: ORTHOPAEDIC SURGERY
Payer: MEDICARE

## 2019-12-20 ENCOUNTER — OFFICE VISIT (OUTPATIENT)
Dept: ORTHOPEDICS | Facility: CLINIC | Age: 74
End: 2019-12-20
Payer: MEDICARE

## 2019-12-20 DIAGNOSIS — M87.052 AVASCULAR NECROSIS OF BONE OF LEFT HIP: ICD-10-CM

## 2019-12-20 DIAGNOSIS — M87.052 AVASCULAR NECROSIS OF BONE OF LEFT HIP: Primary | ICD-10-CM

## 2019-12-20 PROCEDURE — 1159F PR MEDICATION LIST DOCUMENTED IN MEDICAL RECORD: ICD-10-PCS | Mod: HCNC,S$GLB,, | Performed by: ORTHOPAEDIC SURGERY

## 2019-12-20 PROCEDURE — 73522 X-RAY EXAM HIPS BI 3-4 VIEWS: CPT | Mod: 26,HCNC,, | Performed by: RADIOLOGY

## 2019-12-20 PROCEDURE — 1125F AMNT PAIN NOTED PAIN PRSNT: CPT | Mod: HCNC,S$GLB,, | Performed by: ORTHOPAEDIC SURGERY

## 2019-12-20 PROCEDURE — 73522 XR HIP 3 OR 4 VIEWS BILATERAL: ICD-10-PCS | Mod: 26,HCNC,, | Performed by: RADIOLOGY

## 2019-12-20 PROCEDURE — 99213 PR OFFICE/OUTPT VISIT, EST, LEVL III, 20-29 MIN: ICD-10-PCS | Mod: HCNC,S$GLB,, | Performed by: ORTHOPAEDIC SURGERY

## 2019-12-20 PROCEDURE — 99999 PR PBB SHADOW E&M-EST. PATIENT-LVL II: CPT | Mod: PBBFAC,HCNC,, | Performed by: ORTHOPAEDIC SURGERY

## 2019-12-20 PROCEDURE — 1101F PT FALLS ASSESS-DOCD LE1/YR: CPT | Mod: HCNC,CPTII,S$GLB, | Performed by: ORTHOPAEDIC SURGERY

## 2019-12-20 PROCEDURE — 1101F PR PT FALLS ASSESS DOC 0-1 FALLS W/OUT INJ PAST YR: ICD-10-PCS | Mod: HCNC,CPTII,S$GLB, | Performed by: ORTHOPAEDIC SURGERY

## 2019-12-20 PROCEDURE — 99213 OFFICE O/P EST LOW 20 MIN: CPT | Mod: HCNC,S$GLB,, | Performed by: ORTHOPAEDIC SURGERY

## 2019-12-20 PROCEDURE — 73522 X-RAY EXAM HIPS BI 3-4 VIEWS: CPT | Mod: 50,TC,HCNC,PN

## 2019-12-20 PROCEDURE — 99999 PR PBB SHADOW E&M-EST. PATIENT-LVL II: ICD-10-PCS | Mod: PBBFAC,HCNC,, | Performed by: ORTHOPAEDIC SURGERY

## 2019-12-20 PROCEDURE — 1159F MED LIST DOCD IN RCRD: CPT | Mod: HCNC,S$GLB,, | Performed by: ORTHOPAEDIC SURGERY

## 2019-12-20 PROCEDURE — 1125F PR PAIN SEVERITY QUANTIFIED, PAIN PRESENT: ICD-10-PCS | Mod: HCNC,S$GLB,, | Performed by: ORTHOPAEDIC SURGERY
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
## 2019-12-20 NOTE — PROGRESS NOTES
Subjective:      Patient ID: Michela Franco is a 74 y.o. female.    Chief Complaint: Pain of the Left Hip; Pain of the Left Knee; and Pain of the Right Knee    HPI    One year follow-up for osteonecrosis of both hips.  The patient has been treated with Fosamax and reports that her hips are comfortable.  She has twinges of discomfort that reportedly do not limit her mobility.          Review of Systems   Constitution: Negative for fever and weight loss.   HENT: Negative for congestion.    Eyes: Negative for visual disturbance.   Cardiovascular: Negative for chest pain.   Respiratory: Negative for shortness of breath.    Hematologic/Lymphatic: Negative for bleeding problem. Does not bruise/bleed easily.   Skin: Negative for poor wound healing.   Gastrointestinal: Negative for abdominal pain.   Genitourinary: Negative for dysuria.   Neurological: Negative for seizures.   Psychiatric/Behavioral: Negative for altered mental status.   Allergic/Immunologic: Negative for persistent infections.         Objective:      Ortho/SPM Exam      Right hip    The patient is not in acute distress.   Body habitus is:normal.   Sclerae normal  The patient walks without a limp.   Respiratory distress:  none  The skin over the hip is:intact.   There is no local tenderness.   Range of motion- Flexion full, External rotation full, internal rotation full.  Resisted SLR negative.  Pain with rotation negative  Sciatic tension findings negative.  Shortening/lengthening compared to the contralateral side absent.  Pulses DP present, PT present.  Motor normal 5/5 strength in all tested muscle groups.   Sensory normal.      Left is identical    I reviewed the relevant radiographic images for the patient's condition:  Films of both hips are remarkable for stage 2-3 osteonecrosis on the left and stage II on the right using the Lebron system. These changes are stable over the past year          Assessment:       Encounter Diagnosis   Name Primary?     Avascular necrosis of bone of left hip Yes        The condition appears to have stabilized with the use of Fosamax.  Considering the time elapsed, therapy with this struck his probably accomplished all that is likely to accomplish and the risk of continuation probably outweighs the likely benefit.          Plan:       Michela was seen today for pain, pain and pain.    Diagnoses and all orders for this visit:    Avascular necrosis of bone of left hip          I explained my diagnostic impression and the reasoning behind it in detail, using layman's terms.      Discontinue Fosamax    X-ray 1 year follow-up

## 2019-12-26 ENCOUNTER — HOSPITAL ENCOUNTER (OUTPATIENT)
Dept: RADIOLOGY | Facility: HOSPITAL | Age: 74
Discharge: HOME | End: 2019-12-26
Attending: ALLERGY & IMMUNOLOGY
Payer: MEDICARE

## 2019-12-26 DIAGNOSIS — R05.3 COUGH, PERSISTENT: ICD-10-CM

## 2019-12-26 PROCEDURE — 71250 CT THORAX DX C-: CPT | Mod: TC,HCNC

## 2019-12-26 PROCEDURE — 71250 CT CHEST WITHOUT CONTRAST: ICD-10-PCS | Mod: 26,HCNC,, | Performed by: RADIOLOGY

## 2019-12-26 PROCEDURE — 71250 CT THORAX DX C-: CPT | Mod: 26,HCNC,, | Performed by: RADIOLOGY

## 2019-12-29 RX ORDER — IBUPROFEN 800 MG/1
TABLET ORAL
Qty: 90 TABLET | Refills: 0 | Status: SHIPPED | OUTPATIENT
Start: 2019-12-29 | End: 2020-07-16 | Stop reason: SDUPTHER

## 2019-12-31 ENCOUNTER — OFFICE VISIT (OUTPATIENT)
Dept: OTOLARYNGOLOGY | Facility: CLINIC | Age: 74
End: 2019-12-31
Payer: MEDICARE

## 2019-12-31 VITALS — DIASTOLIC BLOOD PRESSURE: 80 MMHG | SYSTOLIC BLOOD PRESSURE: 125 MMHG | HEART RATE: 99 BPM

## 2019-12-31 DIAGNOSIS — J45.40 MODERATE PERSISTENT ASTHMA WITHOUT COMPLICATION: ICD-10-CM

## 2019-12-31 DIAGNOSIS — J30.2 PERENNIAL ALLERGIC RHINITIS WITH SEASONAL VARIATION: Primary | ICD-10-CM

## 2019-12-31 DIAGNOSIS — J30.89 PERENNIAL ALLERGIC RHINITIS WITH SEASONAL VARIATION: Primary | ICD-10-CM

## 2019-12-31 DIAGNOSIS — J32.4 CHRONIC PANSINUSITIS: ICD-10-CM

## 2019-12-31 PROCEDURE — 99215 OFFICE O/P EST HI 40 MIN: CPT | Mod: 25,HCNC,S$GLB, | Performed by: OTOLARYNGOLOGY

## 2019-12-31 PROCEDURE — 31231 NASAL ENDOSCOPY DX: CPT | Mod: HCNC,S$GLB,, | Performed by: OTOLARYNGOLOGY

## 2019-12-31 PROCEDURE — 31231 NASAL/SINUS ENDOSCOPY: ICD-10-PCS | Mod: HCNC,S$GLB,, | Performed by: OTOLARYNGOLOGY

## 2019-12-31 PROCEDURE — 3074F SYST BP LT 130 MM HG: CPT | Mod: HCNC,CPTII,S$GLB, | Performed by: OTOLARYNGOLOGY

## 2019-12-31 PROCEDURE — 3079F DIAST BP 80-89 MM HG: CPT | Mod: HCNC,CPTII,S$GLB, | Performed by: OTOLARYNGOLOGY

## 2019-12-31 PROCEDURE — 1159F MED LIST DOCD IN RCRD: CPT | Mod: HCNC,S$GLB,, | Performed by: OTOLARYNGOLOGY

## 2019-12-31 PROCEDURE — 1101F PT FALLS ASSESS-DOCD LE1/YR: CPT | Mod: HCNC,CPTII,S$GLB, | Performed by: OTOLARYNGOLOGY

## 2019-12-31 PROCEDURE — 1126F PR PAIN SEVERITY QUANTIFIED, NO PAIN PRESENT: ICD-10-PCS | Mod: HCNC,S$GLB,, | Performed by: OTOLARYNGOLOGY

## 2019-12-31 PROCEDURE — 99999 PR PBB SHADOW E&M-EST. PATIENT-LVL III: ICD-10-PCS | Mod: PBBFAC,HCNC,, | Performed by: OTOLARYNGOLOGY

## 2019-12-31 PROCEDURE — 3079F PR MOST RECENT DIASTOLIC BLOOD PRESSURE 80-89 MM HG: ICD-10-PCS | Mod: HCNC,CPTII,S$GLB, | Performed by: OTOLARYNGOLOGY

## 2019-12-31 PROCEDURE — 99215 PR OFFICE/OUTPT VISIT, EST, LEVL V, 40-54 MIN: ICD-10-PCS | Mod: 25,HCNC,S$GLB, | Performed by: OTOLARYNGOLOGY

## 2019-12-31 PROCEDURE — 99999 PR PBB SHADOW E&M-EST. PATIENT-LVL III: CPT | Mod: PBBFAC,HCNC,, | Performed by: OTOLARYNGOLOGY

## 2019-12-31 PROCEDURE — 1159F PR MEDICATION LIST DOCUMENTED IN MEDICAL RECORD: ICD-10-PCS | Mod: HCNC,S$GLB,, | Performed by: OTOLARYNGOLOGY

## 2019-12-31 PROCEDURE — 3074F PR MOST RECENT SYSTOLIC BLOOD PRESSURE < 130 MM HG: ICD-10-PCS | Mod: HCNC,CPTII,S$GLB, | Performed by: OTOLARYNGOLOGY

## 2019-12-31 PROCEDURE — 1101F PR PT FALLS ASSESS DOC 0-1 FALLS W/OUT INJ PAST YR: ICD-10-PCS | Mod: HCNC,CPTII,S$GLB, | Performed by: OTOLARYNGOLOGY

## 2019-12-31 PROCEDURE — 1126F AMNT PAIN NOTED NONE PRSNT: CPT | Mod: HCNC,S$GLB,, | Performed by: OTOLARYNGOLOGY

## 2019-12-31 RX ORDER — DOXYCYCLINE 100 MG/1
100 CAPSULE ORAL EVERY 12 HOURS
Qty: 42 CAPSULE | Refills: 0 | Status: SHIPPED | OUTPATIENT
Start: 2019-12-31 | End: 2019-12-31 | Stop reason: SDUPTHER

## 2019-12-31 RX ORDER — DOXYCYCLINE 100 MG/1
100 CAPSULE ORAL EVERY 12 HOURS
Qty: 42 CAPSULE | Refills: 0 | Status: SHIPPED | OUTPATIENT
Start: 2019-12-31 | End: 2020-01-21

## 2019-12-31 NOTE — PROCEDURES
Nasal/sinus endoscopy  Date/Time: 12/31/2019 3:15 PM  Performed by: Alexys Boo MD  Authorized by: Alexys Boo MD     Consent Done?:  Yes (Verbal)  Anesthesia:     Local anesthetic:  Lidocaine 4% and Emerson-Synephrine 1/2%    Patient tolerance:  Patient tolerated the procedure well with no immediate complications  Nose:     Procedure Performed:  Nasal Endoscopy  External:      No external nasal deformity  Intranasal:      Mucosa no polyps     Mucosa ulcers not present     No mucosa lesions present     Enlarged turbinates     Septum gross deformity  Nasopharynx:      No mucosa lesions     Adenoids not present     Posterior choanae patent     Eustachian tube patent     Diffuse mucosal edema and diffuse mucoid exudate bilaterally.  Focus of mucopurulence in right posterior nasal cavity.  No manuel polyps.

## 2019-12-31 NOTE — PROGRESS NOTES
Subjective:      Michela is a 74 y.o. female who comes for follow-up of sinusitis.  Her last visit with me was on 3/8/2019.  Now over 2 years status-post endoscopic sinus surgery.  Followed by Dr. Everett at [facility] for allergy.  Reports that she coughs all the time, constant PND, depending on the weather she does cough up a lot.  Has a lot of nasal congestion in AM and productive cough.  Benralzumab, Dupilumab, Xhance were recommended by allergy but she has been unable to afford them.  She was on Xolaire for a bit but found no relief.  Using ventolin BID, clears lungs a bit.  Uses CPAP faithfully.  Continues to be short of breath on a daily basis, bilateral nasal congestion, midfacial pressure, postnasal drip.    SNOT Total Score : (P) 70  NOSE Scale Percentage : (P) 90  ETDQ-7 Mean Item Score : (P) 4.43    The patient's medications, allergies, past medical, surgical, social and family histories were reviewed and updated as appropriate.    A detailed review of systems was obtained with pertinent positives as per the above HPI, and otherwise negative.        Objective:     /80   Pulse 99        Constitutional:   She appears well-developed. She is cooperative.     Head:  Normocephalic.     Nose:  No mucosal edema, rhinorrhea, septal deviation or polyps. No epistaxis. Turbinates normal, no turbinate masses and no turbinate hypertrophy.  Right sinus exhibits no maxillary sinus tenderness and no frontal sinus tenderness. Left sinus exhibits no maxillary sinus tenderness and no frontal sinus tenderness.     Mouth/Throat  Oropharynx clear and moist without lesions or asymmetry. No oropharyngeal exudate or posterior oropharyngeal erythema.     Neck:  No adenopathy. Normal range of motion present.     She has no cervical adenopathy.       Procedure    Nasal endoscopy performed.  See procedure note.        Data Reviewed    WBC (K/uL)   Date Value   12/18/2019 8.93     Eosinophil% (%)   Date Value   12/18/2019 17.7 (H)      Eos # (K/uL)   Date Value   12/18/2019 1.6 (H)     Platelets (K/uL)   Date Value   12/18/2019 312     Glucose (mg/dL)   Date Value   05/15/2019 90     IgE (IU/mL)   Date Value   12/18/2019 2460 (H)       Pathology report indicated chronic inflammation with eosinophilia.    Cultures showed H flu BLP, prior to that was Pseudomonas.      Assessment:     1. Perennial allergic rhinitis with seasonal variation    2. Chronic pansinusitis    3. Moderate persistent asthma without complication         Plan:     Rx doxycycline 21 day course.  Continue flonase daily.  If not improved will plan revision ESS and septoplasty, would need CT prior to confirm.  Follow up in about 2 weeks (around 1/14/2020).

## 2020-01-07 ENCOUNTER — OFFICE VISIT (OUTPATIENT)
Dept: ALLERGY | Facility: CLINIC | Age: 75
End: 2020-01-07
Payer: MEDICARE

## 2020-01-07 VITALS
SYSTOLIC BLOOD PRESSURE: 142 MMHG | HEIGHT: 65 IN | WEIGHT: 187.63 LBS | BODY MASS INDEX: 31.26 KG/M2 | DIASTOLIC BLOOD PRESSURE: 86 MMHG

## 2020-01-07 DIAGNOSIS — J45.50 SEVERE PERSISTENT ASTHMA WITHOUT COMPLICATION: ICD-10-CM

## 2020-01-07 DIAGNOSIS — D72.10 EOSINOPHILIA: Primary | ICD-10-CM

## 2020-01-07 DIAGNOSIS — J30.81 ALLERGIC RHINITIS DUE TO ANIMAL (CAT) (DOG) HAIR AND DANDER: ICD-10-CM

## 2020-01-07 PROCEDURE — 99999 PR PBB SHADOW E&M-EST. PATIENT-LVL V: ICD-10-PCS | Mod: PBBFAC,HCNC,GC, | Performed by: PEDIATRICS

## 2020-01-07 PROCEDURE — 1101F PT FALLS ASSESS-DOCD LE1/YR: CPT | Mod: HCNC,CPTII,GC,S$GLB | Performed by: PEDIATRICS

## 2020-01-07 PROCEDURE — 3079F PR MOST RECENT DIASTOLIC BLOOD PRESSURE 80-89 MM HG: ICD-10-PCS | Mod: HCNC,CPTII,GC,S$GLB | Performed by: PEDIATRICS

## 2020-01-07 PROCEDURE — 99214 OFFICE O/P EST MOD 30 MIN: CPT | Mod: HCNC,GC,S$GLB, | Performed by: PEDIATRICS

## 2020-01-07 PROCEDURE — 3077F PR MOST RECENT SYSTOLIC BLOOD PRESSURE >= 140 MM HG: ICD-10-PCS | Mod: HCNC,CPTII,GC,S$GLB | Performed by: PEDIATRICS

## 2020-01-07 PROCEDURE — 1126F AMNT PAIN NOTED NONE PRSNT: CPT | Mod: HCNC,GC,S$GLB, | Performed by: PEDIATRICS

## 2020-01-07 PROCEDURE — 3079F DIAST BP 80-89 MM HG: CPT | Mod: HCNC,CPTII,GC,S$GLB | Performed by: PEDIATRICS

## 2020-01-07 PROCEDURE — 99999 PR PBB SHADOW E&M-EST. PATIENT-LVL V: CPT | Mod: PBBFAC,HCNC,GC, | Performed by: PEDIATRICS

## 2020-01-07 PROCEDURE — 1159F MED LIST DOCD IN RCRD: CPT | Mod: HCNC,GC,S$GLB, | Performed by: PEDIATRICS

## 2020-01-07 PROCEDURE — 1101F PR PT FALLS ASSESS DOC 0-1 FALLS W/OUT INJ PAST YR: ICD-10-PCS | Mod: HCNC,CPTII,GC,S$GLB | Performed by: PEDIATRICS

## 2020-01-07 PROCEDURE — 99214 PR OFFICE/OUTPT VISIT, EST, LEVL IV, 30-39 MIN: ICD-10-PCS | Mod: HCNC,GC,S$GLB, | Performed by: PEDIATRICS

## 2020-01-07 PROCEDURE — 1126F PR PAIN SEVERITY QUANTIFIED, NO PAIN PRESENT: ICD-10-PCS | Mod: HCNC,GC,S$GLB, | Performed by: PEDIATRICS

## 2020-01-07 PROCEDURE — 3077F SYST BP >= 140 MM HG: CPT | Mod: HCNC,CPTII,GC,S$GLB | Performed by: PEDIATRICS

## 2020-01-07 PROCEDURE — 1159F PR MEDICATION LIST DOCUMENTED IN MEDICAL RECORD: ICD-10-PCS | Mod: HCNC,GC,S$GLB, | Performed by: PEDIATRICS

## 2020-01-07 RX ORDER — AZELASTINE 1 MG/ML
1 SPRAY, METERED NASAL 2 TIMES DAILY
Qty: 30 ML | Refills: 2 | Status: SHIPPED | OUTPATIENT
Start: 2020-01-07 | End: 2021-02-10

## 2020-01-07 NOTE — PROGRESS NOTES
ALLERGY & IMMUNOLOGY CLINIC - FOLLOW UP     HISTORY OF PRESENT ILLNESS     Patient ID: Michela Franco is a 74 y.o. female    CC: Follow up for lab review    HPI:  74 year old female with asthma and probable ABPM presents for follow up of lab results after seeing Dr. Solorzano on 12/20. Previously followed by [facility] by Dr. Farrell and Dr. Everett. Patient was on omalizumab since December of 2018 until last month without improvement seen in her asthma symptoms. Today reports continued poor control of her symptoms on Budesonide nebulizer treatments BID and Umecledinium (LAMA) once a day. Reports need for frequent albuterol due to symptoms and does get benefit when treated with albuterol. Was previously on Advair but D/C'd when started on nebulized budesonide. Has shown reversible obstruction in the past on PFT's. Most recent one on record showed low FEV1 without reversibility. Also reports uncontrolled rhinitis/sinus symptoms which seem to trigger her asthma symptoms. Is followed by ENT who has done surgery in the past and she is currently on day 7 of 21 of antibiotic course with doxycycline for sinus infection. Currently using budesonide saline mix spray 0.5 mg twice daily to each nostril for symptom control. Has had previous SPT done in August 2018 which were pan negative. Repeat immuno caps drawn at last visit are positive to dust, cockroach, multiple pollen's and molds.    Has been treated in the past with systemic steroids and antifungals for treatment of her likely ABPM with significant clinical improvement and significant reduction in total IgE. Currently wishes to avoid steroids if at all possible due to severe side effects in the past. Most recent total IgE in December was 2460. In May of 2018 while being treated with steroids and antifungals IgE had trended as low as 253. Peak IgE measurement was in 2014 and was 6552. This pattern of IgE is consistent with ABPM.   REVIEW OF SYSTEMS   CONST: no F/C/NS, no  "unintentional weight changes  NEURO: no H/A, no weakness, no paresthesias  EYES: no discharge, no pruritus, no erythema  EARS: no hearing loss, no sensation of fullness  NOSE: POSITIVE for congestion, rhinorrhea,  itching, sneezing  PULM: no SOB, no wheezing, no cough, no snoring  CV: no CP, no palpitations, no leg swelling  GI: no dysphagia, no heartburn, no pain, no N/V/D, no BRBPR/melena  URO: no dysuria, no hematuria, no nocturia  MSK: no joint pain, no muscle pain  DERM: no rashes, no skin breaks   PHYSICAL EXAM   VS: BP (!) 142/86 (BP Location: Left arm)   Ht 5' 5" (1.651 m)   Wt 85.1 kg (187 lb 9.8 oz)   BMI 31.22 kg/m²   GENERAL: AAOx4, NAD, well-appearing, cooperative  EYES: PERRL, EOMI, no conjunctival injection, no discharge, no infraorbital shiners  EARS: external auditory canals normal B/L, TM normal B/L  NOSE: NT 3+ and pink B/L, mild stringing mucous, no polyps  ORAL: MMM, no ulcers, no thrush, no cobblestoning  NECK: supple, trachea midline, no thyromegaly, no LAD  LUNGS: CTAB, no w/r/c, no increased WOB  HEART: RRR, normal S1/S2, no m/g/r  EXTREMITIES: +2 distal pulses, no c/c/e  LYMPHATICS: no cervical/submandibular LAD, no axillary LAD, no inguinal LAD  DERM: no rashes, no skin breaks, no dystrophic fingernails  NEURO: normal gait, no facial asymmetry     LABORATORY STUDIES     Component      Latest Ref Rng & Units 12/18/2019 12/6/2019 5/15/2019 2/16/2018   WBC      3.90 - 12.70 K/uL 8.93 6.31 6.60 9.09   RBC      4.00 - 5.40 M/uL 4.82 4.83 4.26 4.83   Hemoglobin      12.0 - 16.0 g/dL 13.0 12.9 11.5 (L) 12.1   Hematocrit      37.0 - 48.5 % 39.8 42.2 36.7 (L) 38.1   MCV      82 - 98 fL 83 87 86 79 (L)   MCH      27.0 - 31.0 pg 27.0 26.7 (L) 27.0 25.1 (L)   MCHC      32.0 - 36.0 g/dL 32.7 30.6 (L) 31.3 (L) 31.8 (L)   RDW      11.5 - 14.5 % 17.0 (H) 17.2 (H) 17.6 (H) 18.3 (H)   Platelets      150 - 350 K/uL 312 301 318 327   MPV      9.2 - 12.9 fL 9.6 10.4 10.8 10.5   Immature Granulocytes      " 0.0 - 0.5 %  0.3 0.2 0.7 (H)   Gran # (ANC)      1.8 - 7.7 K/uL 4.4 2.2 3.4 5.7   Immature Grans (Abs)      0.00 - 0.04 K/uL  0.02 0.01 0.06 (H)   Lymph #      1.0 - 4.8 K/uL 2.0 2.0 2.0 2.5   Mono #      0.3 - 1.0 K/uL 0.8 0.6 0.6 0.7   Eos #      0.0 - 0.5 K/uL 1.6 (H) 1.4 (H) 0.5 0.1   Baso #      0.00 - 0.20 K/uL 0.08 0.08 0.08 0.07   nRBC      0 /100 WBC  0 0 0   Gran%      38.0 - 73.0 % 49.2 34.1 (L) 51.8 62.4   Lymph%      18.0 - 48.0 % 22.8 32.3 30.8 27.4   Mono%      4.0 - 15.0 % 9.4 9.2 9.2 7.4   Eosinophil%      0.0 - 8.0 % 17.7 (H) 22.8 (H) 6.8 1.3   Basophil%      0.0 - 1.9 % 0.9 1.3 1.2 0.8   Differential Method       Automated Automated Automated Automated     Component      Latest Ref Rng & Units 12/29/2017 2/14/2017 2/11/2016 12/15/2014   WBC      3.90 - 12.70 K/uL 10.10 5.91 6.39 7.12   RBC      4.00 - 5.40 M/uL 5.04 4.61 4.09 4.16   Hemoglobin      12.0 - 16.0 g/dL 12.7 12.6 11.1 (L) 11.6 (L)   Hematocrit      37.0 - 48.5 % 39.7 37.7 34.5 (L) 34.7 (L)   MCV      82 - 98 fL 79 (L) 82 84 83   MCH      27.0 - 31.0 pg 25.2 (L) 27.3 27.1 27.9   MCHC      32.0 - 36.0 g/dL 32.0 33.4 32.2 33.4   RDW      11.5 - 14.5 % 17.2 (H) 15.5 (H) 16.0 (H) 15.8 (H)   Platelets      150 - 350 K/uL 297 300 345 241   MPV      9.2 - 12.9 fL 11.9 11.0 11.4 11.4   Immature Granulocytes      0.0 - 0.5 % 0.4      Gran # (ANC)      1.8 - 7.7 K/uL 5.6 3.2 3.1 3.5   Immature Grans (Abs)      0.00 - 0.04 K/uL 0.04      Lymph #      1.0 - 4.8 K/uL 3.3 1.9 2.4 2.1   Mono #      0.3 - 1.0 K/uL 1.0 0.5 0.7 0.6   Eos #      0.0 - 0.5 K/uL 0.1 0.3 0.1 0.8 (H)   Baso #      0.00 - 0.20 K/uL 0.04 0.04 0.05 0.08   nRBC      0 /100 WBC 0      Gran%      38.0 - 73.0 % 55.8 53.3 48.7 49.5   Lymph%      18.0 - 48.0 % 33.1 32.0 38.2 29.6   Mono%      4.0 - 15.0 % 9.6 8.6 10.6 8.6   Eosinophil%      0.0 - 8.0 % 0.7 5.1 1.4 11.1 (H)   Basophil%      0.0 - 1.9 % 0.4 0.7 0.8 1.1   Differential Method       Automated Automated Automated Automated      Component      Latest Ref Rng & Units 7/2/2014   WBC      3.90 - 12.70 K/uL 6.79   RBC      4.00 - 5.40 M/uL 4.44   Hemoglobin      12.0 - 16.0 g/dL 11.9 (L)   Hematocrit      37.0 - 48.5 % 37.9   MCV      82 - 98 fL 85   MCH      27.0 - 31.0 pg 26.8 (L)   MCHC      32.0 - 36.0 g/dL 31.4 (L)   RDW      11.5 - 14.5 % 15.5 (H)   Platelets      150 - 350 K/uL 270   MPV      9.2 - 12.9 fL 11.4   Immature Granulocytes      0.0 - 0.5 %    Gran # (ANC)      1.8 - 7.7 K/uL 3.2   Immature Grans (Abs)      0.00 - 0.04 K/uL    Lymph #      1.0 - 4.8 K/uL 2.1   Mono #      0.3 - 1.0 K/uL 0.8   Eos #      0.0 - 0.5 K/uL 0.6 (H)   Baso #      0.00 - 0.20 K/uL 0.08   nRBC      0 /100 WBC    Gran%      38.0 - 73.0 % 46.5   Lymph%      18.0 - 48.0 % 30.2   Mono%      4.0 - 15.0 % 12.4   Eosinophil%      0.0 - 8.0 % 9.4 (H)   Basophil%      0.0 - 1.9 % 1.2   Differential Method       Automated      ALLERGEN TESTING     Skin Prick: Negative inhalant skin testing in August 2018    Immunocaps:   Component      Latest Ref Rng & Units 12/18/2019   Allergen Acremonium (Cephalosporium) IgE      <0.10 kU/L <0.10   Allergen Acremonium (Cephalosporium) Class       CLASS 0   Botrytis Cinerea      <0.10 kU/L <0.10   Botrytis Cinerea Class       CLASS 0   Allergen Candida albicans IgE      <0.10 kU/L 0.68 (H)   Allergen Candida albicans Class       CLASS 1   Chaetomium      <0.10 kU/L <0.10   Chaetomium Glob. Class       CLASS 0   Cladosporium, IgE      <0.10 kU/L <0.10   Cladosporium Class       CLASS 0   Curvularia lunata      <0.10 kU/L <0.10   Curvularia Lunata Class       CLASS 0   Epicoccum purpurascens, IgE      <0.10 kU/L <0.10   Epicoccum pupurascens Class       CLASS 0   Helminthosporium Halodes IgE      <0.10 kU/L <0.10   Helminthosporium Class       CLASS 0   Allergen Trichoderma Viride IgE      <0.10 kU/L <0.10   Allergen Trichoderma Viride Class       CLASS 0   Stemphyllium, IgE      <0.10 kU/L <0.10   Stemphylium Herbarum  Class       CLASS 0   Allergen Rhodotorula IgE      <0.35 kU/L <0.35   Rhodotorula , IgE Class       0   Rhizopus Nigrican, IgE      <0.10 kU/L 0.31 (H)   Rhizopus Nigricans Class       CLASS 0/1   Phoma betae      <0.10 kU/L <0.10   Phoma Betae Class       CLASS 0   Penicillium, IgE      <0.10 kU/L 0.17 (H)   Penicillium Class       CLASS 0/1   Mucor racemosus, IgE      <0.10 kU/L 0.34 (H)   Mucor racemosus Class       CLASS 0/1   RAST Allergen for Trichophyton rubrum      kU/L <0.35     Component      Latest Ref Rng & Units 12/18/2019 12/18/2019 12/18/2019           3:07 PM  3:07 PM  3:07 PM   D. farinae      <0.10 kU/L   1.94 (H)   D. farinae Class         CLASS 2   Mite Dust Pteronyssinus IgE      <0.10 kU/L   0.19 (H)   D. pteronyssinus Class         CLASS 0/1   BERMUDA GRASS      <0.10 kU/L   0.71 (H)   [place] Grass Class         CLASS 2   [place] Grass      <0.10 kU/L   0.72 (H)   [place]ua Grass Class         CLASS 2   [place] IgE      <0.10 kU/L   0.17 (H)   [place] Class         CLASS 0/1   Plantain      <0.10 kU/L   0.63 (H)   [place] Plantain Class         CLASS 1   White Oak(Quercus alba) IgE      <0.10 kU/L   0.74 (H)   [place], Class         CLASS 2   Pecan [place] Tree      <0.10 kU/L   0.47 (H)   Pecan, Class         CLASS 1   Marshelder IgE      <0.10 kU/L   0.59 (H)   Marshelder Class         CLASS 1   Ragweed, Western IgE      <0.10 kU/L   0.54 (H)   Ragweed, Western, Class         CLASS 1   Alternaria alternata      <0.10 kU/L   <0.10   Altern. alternata Class         CLASS 0   Aspergillus Fumigatus IgE      <0.10 kU/L   0.22 (H)   A. fumigatus Class         CLASS 0/1   Cat Dander      <0.10 kU/L   <0.10   Cat Epithelium Class         CLASS 0   Cockroach, IgE      <0.10 kU/L CLASS 0/1 0.28 (H)    Dog Dander, IgE      <0.10 kU/L   0.51 (H)   Dog Dander Class         CLASS 1     Component      Latest Ref Rng & Units 12/18/2019 12/29/2017 10/30/2017 4/8/2014   Aspergillus Fumigatus IgE      <0.10 kU/L 0.22  (H)  <0.35 <0.35   A. fumigatus Class       CLASS 0/1  CLASS 0 CLASS 0   Aspergillus Antigen      <0.5 index  <0.500 0.676 (A)      Component      Latest Ref Rng & Units 2/20/2014   Aspergillus Fumigatus IgE      <0.10 kU/L 0.36 (H)   A. fumigatus Class       CLASS I   Aspergillus Antigen      <0.5 index         PULMONARY FUNCTION   PFTs:   5/28/2018  FVC: 2.30 (78%)  FEV1: 1.62 (70%)  Ratio: 70  No reversibility post bronchodilator.   IMAGING & OTHER DIAGNOSTICS   12/26/2019  CT Chest W/O contrast  Numerous micro nodules in the left lung base and patchy subsegmental opacity in the right lung base in addition to diffuse peribronchial cuffing, findings consistent with infection or noninfectious inflammation.  Consider following with chest radiograph to ensure that the abnormality in the lateral basal segment of the right lower lobe resolves.    Stable hepatic cysts.    Stable 1.5 cm nodule in the right thyroid lobe.   CHART REVIEW   Reviewed outside medical records from [facility].   ASSESSMENT & PLAN     Michela Franco is a 74 y.o. female with     Severe persistent asthma without complication: Poorly controlled at this time. Patient currently on ICS and LAMA, no longer on LABA. Would benefit from adding on LABA to ICS. As control has been poor on this regimen as well in the past patient would be a good candidate for biologic. No response in the past to omalizumab. Will write for dupilumab. Recommend Restarting Advair BID and D/C the budesoinde nebulizer treatments. Can continue LAMA and PRN albuterol. Patient to return dupixent form with salary information.  -     dupilumab (DUPIXENT) 300 mg/2 mL Syrg; Inject 4 mLs (600 mg total) into the skin once. for 1 dose  Dispense: 4 mL; Refill: 0  -     dupilumab (DUPIXENT) 300 mg/2 mL Syrg; Inject 2 mLs (300 mg total) into the skin every 14 (fourteen) days.  Dispense: 4 mL; Refill: 11    Eosinophilia: Will recheck prior to next visit.  -     CBC auto differential; Future;  Expected date: 03/16/2020    Allergic rhinitis due to animal (cat) (dog) hair and dander: Continue budesonide saline rinse. Will add on azelastine therapy to help control rhinitis symptoms. Improved control should help with control of asthma symptoms.  -     azelastine (ASTELIN) 137 mcg (0.1 %) nasal spray; 1 spray (137 mcg total) by Nasal route 2 (two) times daily.  Dispense: 30 mL; Refill: 2    Follow up: 2 months     Ryne Gould DO  Allergy Immunology Fellow

## 2020-01-08 ENCOUNTER — TELEPHONE (OUTPATIENT)
Dept: PHARMACY | Facility: CLINIC | Age: 75
End: 2020-01-08

## 2020-01-08 NOTE — TELEPHONE ENCOUNTER
LVM for callback to inform patient that [facility] received prescription for Dupixent and prior authorization is required.  [facility] will be back in touch once insurance determination is received.

## 2020-01-13 DIAGNOSIS — J45.40 MODERATE PERSISTENT ASTHMA WITHOUT COMPLICATION: ICD-10-CM

## 2020-01-13 RX ORDER — FLUTICASONE PROPIONATE AND SALMETEROL 50; 500 UG/1; UG/1
POWDER RESPIRATORY (INHALATION)
Qty: 1 EACH | Refills: 3 | Status: SHIPPED | OUTPATIENT
Start: 2020-01-13 | End: 2020-07-16 | Stop reason: SDUPTHER

## 2020-01-28 ENCOUNTER — TELEPHONE (OUTPATIENT)
Dept: PHARMACY | Facility: CLINIC | Age: 75
End: 2020-01-28

## 2020-01-28 NOTE — TELEPHONE ENCOUNTER
Patient did not realize Dupixent was a self-injection and is nervous. She would like to come in person for the initial instead on 1/29 @ 5 pm.     Greg Malik, PharmD  Clinical Pharmacist   [facility]   P: [phone]

## 2020-01-28 NOTE — TELEPHONE ENCOUNTER
Call to complete initial consult for Dupixent. No answer, LVM. Sent [facility] message.    Osiel Brian, PharmD  Clinical Pharmacist  [facility]  P: [phone]

## 2020-01-29 ENCOUNTER — TELEPHONE (OUTPATIENT)
Dept: PHARMACY | Facility: CLINIC | Age: 75
End: 2020-01-29

## 2020-01-30 NOTE — TELEPHONE ENCOUNTER
Initial Dupixent consult and injection training completed on 20 at [facility]. Dupixent loading dose was picked up at consultation. $0.00 copay. Patient intends to start Dupixent on 20. Provided Dupixent Brandee Nurse Educator number: [phone]. Confirmed 2 patient identifiers - name and . Therapy Appropriate.    --Injection experience: None  Watched injection training video at consultation    Counseled patient on storage:    Store syringes in the refrigerator.   Keep in original carton to protect from light.    Syringes can be stored at room temperature up to 14 days.   Do not heat, freeze, or shake the syringe.    Counseled patient on administration directions:   Inject (2 syringes) into the skin once for a loading dose, then 300 mg (1 syringe) every 14 days   Take out of the refrigerator 45 minutes prior to injection.   Wash hands before and after injection.   Monthly RX will come with gauze, Band-Aids, and alcohol swabs.   Patient may inject in either the tops of  thighs or  Lower abdomen- but at least 2 inches away from the belly button, or the outer or back part of his/ her upper arm (if a caregiver administers).   Patient is to wipe down the injection site with the alcohol pad, wait to dry.  Insert the needle at a 45 degree angle straight into the skin.  Hold the syringe steady and slowly push down the plunger, then remove the needle.    Do not use Dupixent if the syringe has been dropped on a hard surface or damaged.   Patient will use sharps container; once full, per [place] law, he/she may lock the sharps container and place in the trash. He/ She can then contact the Pharmacy and we will replace the sharps at no additional charge.    Patient was counseled on possible side effects:  · Injection site reaction (10%): redness, soreness, itching, bruising, lipoatrophy, swelling, lumps, pain and rash  · Dermatologic: Herpes simplex infection (2%)  · Gastrointestinal: Oral herpes  (4%)  · Respiratory: Oropharyngeal pain (2%)  · Immunologic: Antibody development (7%; neutralizin%)  · Ophthalmic: Conjunctivitis (10%), eye pruritus (1%)  · ER precautions advised for signs and symptoms of allergic reaction      Patient did not have any further questions. Patient was advised to keep a calendar to stay compliant. Consultation included: indication; goals of treatment; administration; storage and handling; side effects; how to handle side effects; the importance of compliance; how to handle missed doses; the importance of laboratory monitoring; the importance of keeping all follow up appointments. Patient understands to report any medication changes to [facility] and provider. All questions answered and addressed to patients satisfaction. Pharmacist will f/u with patient in 1 week from start, [facility] to contact patient in 1 week for refills.     Shashank Cesar, PharmD  Clinical Pharmacist  [facility]  P: [phone]

## 2020-02-11 ENCOUNTER — TELEPHONE (OUTPATIENT)
Dept: PHARMACY | Facility: CLINIC | Age: 75
End: 2020-02-11

## 2020-02-11 NOTE — TELEPHONE ENCOUNTER
Rx call for Dupixent refill pt reached confirmed pickup , copay 0.00 @004. No supplies is needed at this time, next inj is . Name and  confirmed. Patient has 0 doses on hand at this time. Patient has not started any new medications, has had no missed doses and no side effects present. Patient is currently taking the medication as directed by doctors instruction Inject 2 mLs (300 mg total) into the skin every 14 (fourteen) days. Patient does have a safe place in their residence to keep medication at desired temperature away from small children and pets. Patient also does have the capability of contacting 911 in the event of an emergency. Patient states they do not have any questions or concerns at this time.

## 2020-03-03 ENCOUNTER — LAB VISIT (OUTPATIENT)
Dept: LAB | Facility: HOSPITAL | Age: 75
End: 2020-03-03
Payer: MEDICARE

## 2020-03-03 ENCOUNTER — OFFICE VISIT (OUTPATIENT)
Dept: ALLERGY | Facility: CLINIC | Age: 75
End: 2020-03-03
Payer: MEDICARE

## 2020-03-03 VITALS — WEIGHT: 193.13 LBS | BODY MASS INDEX: 32.18 KG/M2 | HEIGHT: 65 IN

## 2020-03-03 DIAGNOSIS — J45.50 SEVERE PERSISTENT ASTHMA WITHOUT COMPLICATION: Primary | ICD-10-CM

## 2020-03-03 DIAGNOSIS — D72.10 EOSINOPHILIA: ICD-10-CM

## 2020-03-03 DIAGNOSIS — J30.81 ALLERGIC RHINITIS DUE TO ANIMAL (CAT) (DOG) HAIR AND DANDER: ICD-10-CM

## 2020-03-03 LAB
BASOPHILS # BLD AUTO: 0.08 K/UL (ref 0–0.2)
BASOPHILS NFR BLD: 0.7 % (ref 0–1.9)
DIFFERENTIAL METHOD: ABNORMAL
EOSINOPHIL # BLD AUTO: 0.7 K/UL (ref 0–0.5)
EOSINOPHIL NFR BLD: 5.9 % (ref 0–8)
ERYTHROCYTE [DISTWIDTH] IN BLOOD BY AUTOMATED COUNT: 16.9 % (ref 11.5–14.5)
HCT VFR BLD AUTO: 37 % (ref 37–48.5)
HGB BLD-MCNC: 11.2 G/DL (ref 12–16)
IMM GRANULOCYTES # BLD AUTO: 0.04 K/UL (ref 0–0.04)
IMM GRANULOCYTES NFR BLD AUTO: 0.3 % (ref 0–0.5)
LYMPHOCYTES # BLD AUTO: 1.9 K/UL (ref 1–4.8)
LYMPHOCYTES NFR BLD: 16.4 % (ref 18–48)
MCH RBC QN AUTO: 26.9 PG (ref 27–31)
MCHC RBC AUTO-ENTMCNC: 30.3 G/DL (ref 32–36)
MCV RBC AUTO: 89 FL (ref 82–98)
MONOCYTES # BLD AUTO: 1 K/UL (ref 0.3–1)
MONOCYTES NFR BLD: 8.3 % (ref 4–15)
NEUTROPHILS # BLD AUTO: 8 K/UL (ref 1.8–7.7)
NEUTROPHILS NFR BLD: 68.4 % (ref 38–73)
NRBC BLD-RTO: 0 /100 WBC
PLATELET # BLD AUTO: 273 K/UL (ref 150–350)
PMV BLD AUTO: 11.3 FL (ref 9.2–12.9)
RBC # BLD AUTO: 4.17 M/UL (ref 4–5.4)
WBC # BLD AUTO: 11.75 K/UL (ref 3.9–12.7)

## 2020-03-03 PROCEDURE — 1159F MED LIST DOCD IN RCRD: CPT | Mod: HCNC,GC,S$GLB, | Performed by: PEDIATRICS

## 2020-03-03 PROCEDURE — 36415 COLL VENOUS BLD VENIPUNCTURE: CPT | Mod: HCNC

## 2020-03-03 PROCEDURE — 99999 PR PBB SHADOW E&M-EST. PATIENT-LVL IV: CPT | Mod: PBBFAC,HCNC,GC, | Performed by: PEDIATRICS

## 2020-03-03 PROCEDURE — 99999 PR PBB SHADOW E&M-EST. PATIENT-LVL IV: ICD-10-PCS | Mod: PBBFAC,HCNC,GC, | Performed by: PEDIATRICS

## 2020-03-03 PROCEDURE — 99214 OFFICE O/P EST MOD 30 MIN: CPT | Mod: HCNC,GC,S$GLB, | Performed by: PEDIATRICS

## 2020-03-03 PROCEDURE — 1126F PR PAIN SEVERITY QUANTIFIED, NO PAIN PRESENT: ICD-10-PCS | Mod: HCNC,GC,S$GLB, | Performed by: PEDIATRICS

## 2020-03-03 PROCEDURE — 1126F AMNT PAIN NOTED NONE PRSNT: CPT | Mod: HCNC,GC,S$GLB, | Performed by: PEDIATRICS

## 2020-03-03 PROCEDURE — 85025 COMPLETE CBC W/AUTO DIFF WBC: CPT | Mod: HCNC

## 2020-03-03 PROCEDURE — 1101F PT FALLS ASSESS-DOCD LE1/YR: CPT | Mod: HCNC,CPTII,GC,S$GLB | Performed by: PEDIATRICS

## 2020-03-03 PROCEDURE — 1159F PR MEDICATION LIST DOCUMENTED IN MEDICAL RECORD: ICD-10-PCS | Mod: HCNC,GC,S$GLB, | Performed by: PEDIATRICS

## 2020-03-03 PROCEDURE — 1101F PR PT FALLS ASSESS DOC 0-1 FALLS W/OUT INJ PAST YR: ICD-10-PCS | Mod: HCNC,CPTII,GC,S$GLB | Performed by: PEDIATRICS

## 2020-03-03 PROCEDURE — 99214 PR OFFICE/OUTPT VISIT, EST, LEVL IV, 30-39 MIN: ICD-10-PCS | Mod: HCNC,GC,S$GLB, | Performed by: PEDIATRICS

## 2020-03-03 NOTE — PROGRESS NOTES
"ALLERGY & IMMUNOLOGY CLINIC - FOLLOW UP     HISTORY OF PRESENT ILLNESS     Patient ID: Michela Franco is a 74 y.o. female    CC: Follow Up    HPI: 74 year old female last seen in clinic on 1/7/2020 presents for follow up    Severe persistent asthma without complication: Significant improvement in symptoms since last visit. She has received 2 dupilumab doses. She by accident repeated the loading dose twice so we are delaying her next injection for one month since her last injection. She was prescribed Advair at last visit but has been using inconsistently and also has been using her LAMA inconsistently.    Eosinophilia: Persistently high in the past. Did not have lab drawn prior to visit today.     Allergic rhinitis due to animal (cat) (dog) hair and dander: Reporting significant improvement in symptom control since receiving 21 days of antibiotics at the time of last visit. Not using INCS or antihistamine nasal sprays consistently.   REVIEW OF SYSTEMS   CONST: no F/C/NS, no unintentional weight changes  NEURO: no H/A, no weakness, no paresthesias  EYES: no discharge, no pruritus, no erythema  EARS: no hearing loss, no sensation of fullness  NOSE: no congestion, no rhinorrhea, no discharge, no itching, no sneezing  PULM: no SOB, no wheezing, no cough, no snoring  CV: no CP, no palpitations, no leg swelling  GI: no dysphagia, no heartburn, no pain, no N/V/D, no BRBPR/melena  URO: no dysuria, no hematuria, no nocturia  MSK: no joint pain, no muscle pain  DERM: no rashes, no skin breaks   PHYSICAL EXAM   VS: Ht 5' 5" (1.651 m)   Wt 87.6 kg (193 lb 2 oz)   BMI 32.14 kg/m²   GENERAL: AAOx4, NAD, well-appearing, cooperative  EYES: PERRL, EOMI, no conjunctival injection, no discharge, no infraorbital shiners  EARS: external auditory canals normal B/L, TM normal B/L  NOSE: NT 3+ and pale B/L, no stringing mucous, no polyps  ORAL: MMM, no ulcers, no thrush, no cobblestoning  NECK: supple, trachea midline, no thyromegaly, " no LAD  LUNGS: CTAB, no w/r/c, no increased WOB  HEART: RRR, normal S1/S2, no m/g/r  EXTREMITIES: +2 distal pulses, no c/c/e  LYMPHATICS: no cervical/submandibular LAD, no axillary LAD, no inguinal LAD  DERM: no rashes, no skin breaks, no dystrophic fingernails  NEURO: normal gait, no facial asymmetry   LABORATORY STUDIES     Component      Latest Ref Rng & Units 12/18/2019   WBC      3.90 - 12.70 K/uL 8.93   RBC      4.00 - 5.40 M/uL 4.82   Hemoglobin      12.0 - 16.0 g/dL 13.0   Hematocrit      37.0 - 48.5 % 39.8   MCV      82 - 98 fL 83   MCH      27.0 - 31.0 pg 27.0   MCHC      32.0 - 36.0 g/dL 32.7   RDW      11.5 - 14.5 % 17.0 (H)   Platelets      150 - 350 K/uL 312   MPV      9.2 - 12.9 fL 9.6   Gran # (ANC)      1.8 - 7.7 K/uL 4.4   Lymph #      1.0 - 4.8 K/uL 2.0   Mono #      0.3 - 1.0 K/uL 0.8   Eos #      0.0 - 0.5 K/uL 1.6 (H)   Baso #      0.00 - 0.20 K/uL 0.08   Gran%      38.0 - 73.0 % 49.2   Lymph%      18.0 - 48.0 % 22.8   Mono%      4.0 - 15.0 % 9.4   Eosinophil%      0.0 - 8.0 % 17.7 (H)   Basophil%      0.0 - 1.9 % 0.9   Differential Method       Automated      ALLERGEN TESTING   Skin Prick: Negative inhalant skin testing in August 2018     Immunocaps:   Component      Latest Ref Rng & Units 12/18/2019   Allergen Acremonium (Cephalosporium) IgE      <0.10 kU/L <0.10   Allergen Acremonium (Cephalosporium) Class       CLASS 0   Botrytis Cinerea      <0.10 kU/L <0.10   Botrytis Cinerea Class       CLASS 0   Allergen Candida albicans IgE      <0.10 kU/L 0.68 (H)   Allergen Candida albicans Class       CLASS 1   Chaetomium      <0.10 kU/L <0.10   Chaetomium Glob. Class       CLASS 0   Cladosporium, IgE      <0.10 kU/L <0.10   Cladosporium Class       CLASS 0   Curvularia lunata      <0.10 kU/L <0.10   Curvularia Lunata Class       CLASS 0   Epicoccum purpurascens, IgE      <0.10 kU/L <0.10   Epicoccum pupurascens Class       CLASS 0   Helminthosporium Halodes IgE      <0.10 kU/L <0.10    Helminthosporium Class       CLASS 0   Allergen Trichoderma Viride IgE      <0.10 kU/L <0.10   Allergen Trichoderma Viride Class       CLASS 0   Stemphyllium, IgE      <0.10 kU/L <0.10   Stemphylium Herbarum Class       CLASS 0   Allergen Rhodotorula IgE      <0.35 kU/L <0.35   Rhodotorula , IgE Class       0   Rhizopus Nigrican, IgE      <0.10 kU/L 0.31 (H)   Rhizopus Nigricans Class       CLASS 0/1   Phoma betae      <0.10 kU/L <0.10   Phoma Betae Class       CLASS 0   Penicillium, IgE      <0.10 kU/L 0.17 (H)   Penicillium Class       CLASS 0/1   Mucor racemosus, IgE      <0.10 kU/L 0.34 (H)   Mucor racemosus Class       CLASS 0/1   RAST Allergen for Trichophyton rubrum      kU/L <0.35      Component      Latest Ref Rng & Units 12/18/2019 12/18/2019 12/18/2019           3:07 PM  3:07 PM  3:07 PM   D. farinae      <0.10 kU/L     1.94 (H)   D. farinae Class           CLASS 2   Mite Dust Pteronyssinus IgE      <0.10 kU/L     0.19 (H)   D. pteronyssinus Class           CLASS 0/1   BERMUDA GRASS      <0.10 kU/L     0.71 (H)   Bermuda Grass Class           CLASS 2   Joshua Grass      <0.10 kU/L     0.72 (H)   Joshua Grass Class           CLASS 2   Alexander IgE      <0.10 kU/L     0.17 (H)   Alexander Class           CLASS 0/1   Plantain      <0.10 kU/L     0.63 (H)   English Plantain Class           CLASS 1   White Oak(Quercus alba) IgE      <0.10 kU/L     0.74 (H)   [place], Class           CLASS 2   Pecan Goode Tree      <0.10 kU/L     0.47 (H)   Pecan, Class           CLASS 1   Marshelder IgE      <0.10 kU/L     0.59 (H)   Marshelder Class           CLASS 1   Ragweed, Western IgE      <0.10 kU/L     0.54 (H)   Ragweed, Western, Class           CLASS 1   Alternaria alternata      <0.10 kU/L     <0.10   Altern. alternata Class           CLASS 0   Aspergillus Fumigatus IgE      <0.10 kU/L     0.22 (H)   A. fumigatus Class           CLASS 0/1   Cat Dander      <0.10 kU/L     <0.10   Cat Epithelium Class           CLASS 0    Cockroach, IgE      <0.10 kU/L CLASS 0/1 0.28 (H)     Dog Dander, IgE      <0.10 kU/L     0.51 (H)   Dog Dander Class           CLASS 1      Component      Latest Ref Rng & Units 12/18/2019 12/29/2017 10/30/2017 4/8/2014   Aspergillus Fumigatus IgE      <0.10 kU/L 0.22 (H)   <0.35 <0.35   A. fumigatus Class       CLASS 0/1   CLASS 0 CLASS 0   Aspergillus Antigen      <0.5 index   <0.500 0.676 (A)        Component      Latest Ref Rng & Units 2/20/2014   Aspergillus Fumigatus IgE      <0.10 kU/L 0.36 (H)   A. fumigatus Class       CLASS I   Aspergillus Antigen      <0.5 index          PULMONARY FUNCTION   PFTs:   5/28/2018  FVC: 2.30 (78%)  FEV1: 1.62 (70%)  Ratio: 70  No reversibility post bronchodilator.   IMAGING & OTHER DIAGNOSTICS   12/26/2019  CT Chest W/O contrast  Numerous micro nodules in the left lung base and patchy subsegmental opacity in the right lung base in addition to diffuse peribronchial cuffing, findings consistent with infection or noninfectious inflammation.  Consider following with chest radiograph to ensure that the abnormality in the lateral basal segment of the right lower lobe resolves.    Stable hepatic cysts.    Stable 1.5 cm nodule in the right thyroid lobe.     CHART REVIEW   Reviewed previous notes, labs. Imaging.   ASSESSMENT & PLAN     Michela Franco is a 74 y.o. female with     Severe persistent asthma without complication: Significant improvement in symptom control since last visit on Dupixent. Continues to have poor compliance with ICS/LABA/LAMA. Stressed the extreme improtance of adherance with these regimens as she will need as optimal lung function as possible if she were to get sick. She expressed understanding.  - Continue Dupixent 300 mg subcutanous q2 weeks  - Continue Advair 500/50 1 puff BID  - Continue Umeclidinium 1 puff daily    Allergic rhinitis due to animal (cat) (dog) hair and dander: Under ok control but poorly complaint with medications. Reports some epistaxis  with azelastine use. Discussed that optimizing control of nasal symptoms will help prevent recurrence of severe asthma symptoms.   - Continue BID use of budesonide spray every day  - Azelastine use when symptomatic.    Eosinophilia: Persisntently high with most recent value greater then 1500. Multi co-morbidities likely contributing to eosinophilia but given degree of elevation need to monitor closely. If remains greater then 1500 will initiate hypereosinophilia workup.  - CBC to be drawn today.    Follow up: 3-4 months     Ryne Gould DO  Allergy Immunology Fellow

## 2020-03-06 ENCOUNTER — HOSPITAL ENCOUNTER (OUTPATIENT)
Dept: RADIOLOGY | Facility: HOSPITAL | Age: 75
Discharge: HOME | End: 2020-03-06
Attending: FAMILY MEDICINE
Payer: MEDICARE

## 2020-03-06 ENCOUNTER — OFFICE VISIT (OUTPATIENT)
Dept: PRIMARY CARE CLINIC | Facility: CLINIC | Age: 75
End: 2020-03-06
Payer: MEDICARE

## 2020-03-06 ENCOUNTER — TELEPHONE (OUTPATIENT)
Dept: ALLERGY | Facility: CLINIC | Age: 75
End: 2020-03-06

## 2020-03-06 VITALS
HEIGHT: 65 IN | SYSTOLIC BLOOD PRESSURE: 138 MMHG | WEIGHT: 191.38 LBS | HEART RATE: 80 BPM | BODY MASS INDEX: 31.89 KG/M2 | TEMPERATURE: 98 F | DIASTOLIC BLOOD PRESSURE: 70 MMHG

## 2020-03-06 DIAGNOSIS — M25.512 LEFT SHOULDER PAIN, UNSPECIFIED CHRONICITY: ICD-10-CM

## 2020-03-06 DIAGNOSIS — M54.2 NECK PAIN: ICD-10-CM
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
## 2020-03-06 PROCEDURE — 3078F DIAST BP <80 MM HG: CPT | Mod: HCNC,CPTII,S$GLB, | Performed by: FAMILY MEDICINE

## 2020-03-06 PROCEDURE — 73030 X-RAY EXAM OF SHOULDER: CPT | Mod: TC,HCNC,PN,LT

## 2020-03-06 PROCEDURE — 1101F PT FALLS ASSESS-DOCD LE1/YR: CPT | Mod: HCNC,CPTII,S$GLB, | Performed by: FAMILY MEDICINE

## 2020-03-06 PROCEDURE — 99999 PR PBB SHADOW E&M-EST. PATIENT-LVL IV: CPT | Mod: PBBFAC,HCNC,, | Performed by: FAMILY MEDICINE

## 2020-03-06 PROCEDURE — 3075F PR MOST RECENT SYSTOLIC BLOOD PRESS GE 130-139MM HG: ICD-10-PCS | Mod: HCNC,CPTII,S$GLB, | Performed by: FAMILY MEDICINE

## 2020-03-06 PROCEDURE — 1159F PR MEDICATION LIST DOCUMENTED IN MEDICAL RECORD: ICD-10-PCS | Mod: HCNC,S$GLB,, | Performed by: FAMILY MEDICINE

## 2020-03-06 PROCEDURE — 1101F PR PT FALLS ASSESS DOC 0-1 FALLS W/OUT INJ PAST YR: ICD-10-PCS | Mod: HCNC,CPTII,S$GLB, | Performed by: FAMILY MEDICINE

## 2020-03-06 PROCEDURE — 73030 X-RAY EXAM OF SHOULDER: CPT | Mod: 26,HCNC,LT, | Performed by: RADIOLOGY

## 2020-03-06 PROCEDURE — 1159F MED LIST DOCD IN RCRD: CPT | Mod: HCNC,S$GLB,, | Performed by: FAMILY MEDICINE

## 2020-03-06 PROCEDURE — 72040 X-RAY EXAM NECK SPINE 2-3 VW: CPT | Mod: TC,HCNC,PN

## 2020-03-06 PROCEDURE — 3078F PR MOST RECENT DIASTOLIC BLOOD PRESSURE < 80 MM HG: ICD-10-PCS | Mod: HCNC,CPTII,S$GLB, | Performed by: FAMILY MEDICINE

## 2020-03-06 PROCEDURE — 72040 XR CERVICAL SPINE AP LATERAL: ICD-10-PCS | Mod: 26,HCNC,, | Performed by: RADIOLOGY

## 2020-03-06 PROCEDURE — 99214 OFFICE O/P EST MOD 30 MIN: CPT | Mod: HCNC,S$GLB,, | Performed by: FAMILY MEDICINE

## 2020-03-06 PROCEDURE — 73030 XR SHOULDER COMPLETE 2 OR MORE VIEWS LEFT: ICD-10-PCS | Mod: 26,HCNC,LT, | Performed by: RADIOLOGY

## 2020-03-06 PROCEDURE — 3075F SYST BP GE 130 - 139MM HG: CPT | Mod: HCNC,CPTII,S$GLB, | Performed by: FAMILY MEDICINE

## 2020-03-06 PROCEDURE — 99999 PR PBB SHADOW E&M-EST. PATIENT-LVL IV: ICD-10-PCS | Mod: PBBFAC,HCNC,, | Performed by: FAMILY MEDICINE

## 2020-03-06 PROCEDURE — 99214 PR OFFICE/OUTPT VISIT, EST, LEVL IV, 30-39 MIN: ICD-10-PCS | Mod: HCNC,S$GLB,, | Performed by: FAMILY MEDICINE

## 2020-03-06 PROCEDURE — 72040 X-RAY EXAM NECK SPINE 2-3 VW: CPT | Mod: 26,HCNC,, | Performed by: RADIOLOGY

## 2020-03-06 RX ORDER — TRAMADOL HYDROCHLORIDE 50 MG/1
50 TABLET ORAL EVERY 6 HOURS PRN
Qty: 60 TABLET | Refills: 0 | Status: SHIPPED | OUTPATIENT
Start: 2020-03-06 | End: 2020-06-02

## 2020-03-06 RX ORDER — CYCLOBENZAPRINE HCL 10 MG
10 TABLET ORAL 3 TIMES DAILY PRN
Qty: 30 TABLET | Refills: 1 | Status: SHIPPED | OUTPATIENT
Start: 2020-03-06 | End: 2020-03-16

## 2020-03-06 NOTE — TELEPHONE ENCOUNTER
Action Required:     I have faxed over 's assistance forms to your office. The forms need to be completed by the physician for the patient's Dupixent prescription assistance. Please complete the prescription sections of the forms.     Once completed, you can fax them to [facility]. Any questions or concerns regarding the program or completion of the forms, please reach out to Kaci MCCARTHY at [facility].     Thank you,    Kaci Bland  [facility] [phone] (C)

## 2020-03-06 NOTE — TELEPHONE ENCOUNTER
Dupixent myway form is in your inbox. Please review and sign.    Place on my desk and I will fax back to [phone]

## 2020-03-10 ENCOUNTER — OFFICE VISIT (OUTPATIENT)
Dept: OTOLARYNGOLOGY | Facility: CLINIC | Age: 75
End: 2020-03-10
Payer: MEDICARE

## 2020-03-10 VITALS — DIASTOLIC BLOOD PRESSURE: 72 MMHG | SYSTOLIC BLOOD PRESSURE: 144 MMHG | HEART RATE: 80 BPM

## 2020-03-10 DIAGNOSIS — I50.32 CHRONIC DIASTOLIC HEART FAILURE: ICD-10-CM

## 2020-03-10 DIAGNOSIS — J30.89 PERENNIAL ALLERGIC RHINITIS WITH SEASONAL VARIATION: Primary | ICD-10-CM

## 2020-03-10 DIAGNOSIS — J30.2 PERENNIAL ALLERGIC RHINITIS WITH SEASONAL VARIATION: Primary | ICD-10-CM

## 2020-03-10 DIAGNOSIS — J45.40 MODERATE PERSISTENT ASTHMA WITHOUT COMPLICATION: ICD-10-CM

## 2020-03-10 DIAGNOSIS — J32.4 CHRONIC PANSINUSITIS: ICD-10-CM

## 2020-03-10 PROCEDURE — 1101F PR PT FALLS ASSESS DOC 0-1 FALLS W/OUT INJ PAST YR: ICD-10-PCS | Mod: HCNC,CPTII,S$GLB, | Performed by: OTOLARYNGOLOGY

## 2020-03-10 PROCEDURE — 1159F PR MEDICATION LIST DOCUMENTED IN MEDICAL RECORD: ICD-10-PCS | Mod: HCNC,S$GLB,, | Performed by: OTOLARYNGOLOGY

## 2020-03-10 PROCEDURE — 3078F PR MOST RECENT DIASTOLIC BLOOD PRESSURE < 80 MM HG: ICD-10-PCS | Mod: HCNC,CPTII,S$GLB, | Performed by: OTOLARYNGOLOGY

## 2020-03-10 PROCEDURE — 1126F AMNT PAIN NOTED NONE PRSNT: CPT | Mod: HCNC,S$GLB,, | Performed by: OTOLARYNGOLOGY

## 2020-03-10 PROCEDURE — 99214 OFFICE O/P EST MOD 30 MIN: CPT | Mod: 25,HCNC,S$GLB, | Performed by: OTOLARYNGOLOGY

## 2020-03-10 PROCEDURE — 99214 PR OFFICE/OUTPT VISIT, EST, LEVL IV, 30-39 MIN: ICD-10-PCS | Mod: 25,HCNC,S$GLB, | Performed by: OTOLARYNGOLOGY

## 2020-03-10 PROCEDURE — 1159F MED LIST DOCD IN RCRD: CPT | Mod: HCNC,S$GLB,, | Performed by: OTOLARYNGOLOGY

## 2020-03-10 PROCEDURE — 3078F DIAST BP <80 MM HG: CPT | Mod: HCNC,CPTII,S$GLB, | Performed by: OTOLARYNGOLOGY

## 2020-03-10 PROCEDURE — 1126F PR PAIN SEVERITY QUANTIFIED, NO PAIN PRESENT: ICD-10-PCS | Mod: HCNC,S$GLB,, | Performed by: OTOLARYNGOLOGY

## 2020-03-10 PROCEDURE — 1101F PT FALLS ASSESS-DOCD LE1/YR: CPT | Mod: HCNC,CPTII,S$GLB, | Performed by: OTOLARYNGOLOGY

## 2020-03-10 PROCEDURE — 99999 PR PBB SHADOW E&M-EST. PATIENT-LVL III: CPT | Mod: PBBFAC,HCNC,, | Performed by: OTOLARYNGOLOGY

## 2020-03-10 PROCEDURE — 31231 NASAL ENDOSCOPY DX: CPT | Mod: HCNC,S$GLB,, | Performed by: OTOLARYNGOLOGY

## 2020-03-10 PROCEDURE — 31231 NASAL/SINUS ENDOSCOPY: ICD-10-PCS | Mod: HCNC,S$GLB,, | Performed by: OTOLARYNGOLOGY

## 2020-03-10 PROCEDURE — 3077F SYST BP >= 140 MM HG: CPT | Mod: HCNC,CPTII,S$GLB, | Performed by: OTOLARYNGOLOGY

## 2020-03-10 PROCEDURE — 3077F PR MOST RECENT SYSTOLIC BLOOD PRESSURE >= 140 MM HG: ICD-10-PCS | Mod: HCNC,CPTII,S$GLB, | Performed by: OTOLARYNGOLOGY

## 2020-03-10 PROCEDURE — 99999 PR PBB SHADOW E&M-EST. PATIENT-LVL III: ICD-10-PCS | Mod: PBBFAC,HCNC,, | Performed by: OTOLARYNGOLOGY

## 2020-03-10 RX ORDER — DOXYCYCLINE 100 MG/1
100 CAPSULE ORAL EVERY 12 HOURS
Qty: 20 CAPSULE | Refills: 0 | Status: SHIPPED | OUTPATIENT
Start: 2020-03-10 | End: 2020-12-23

## 2020-03-10 NOTE — PROGRESS NOTES
Subjective:      Michela is a 74 y.o. female who comes for follow-up of sinusitis.  Her last visit with me was on 12/31/2019.  Now 2-1/2 years status-post endoscopic sinus surgery.   Finished doxycycline, felt much better for about 1 month.  Now 1 week ago has new yellow discharge, ear pressure, nasal congestion.  Used saline with budesonide, mucinex, azelastine.  Has had 2 months of Dupixent so far.    SNOT-22 score: : (P) 30  NOSE score:: (P) 45%  ETDQ-7 score:: (P) 3.3    The patient's medications, allergies, past medical, surgical, social and family histories were reviewed and updated as appropriate.    A detailed review of systems was obtained with pertinent positives as per the above HPI, and otherwise negative.        Objective:     BP (!) 144/72   Pulse 80        Constitutional:   She appears well-developed. She is cooperative.     Head:  Normocephalic.     Nose:  No mucosal edema, rhinorrhea, septal deviation or polyps. No epistaxis. Turbinates normal, no turbinate masses and no turbinate hypertrophy.  Right sinus exhibits no maxillary sinus tenderness and no frontal sinus tenderness. Left sinus exhibits no maxillary sinus tenderness and no frontal sinus tenderness.     Mouth/Throat  Oropharynx clear and moist without lesions or asymmetry. No oropharyngeal exudate or posterior oropharyngeal erythema.     Neck:  No adenopathy. Normal range of motion present.     She has no cervical adenopathy.       Procedure    Nasal endoscopy performed.  See procedure note.     Left MT     Left posterior drainage     Right nasal valve     Right posterior drainage        Data Reviewed    WBC (K/uL)   Date Value   03/03/2020 11.75     Eosinophil% (%)   Date Value   03/03/2020 5.9     Eos # (K/uL)   Date Value   03/03/2020 0.7 (H)     Platelets (K/uL)   Date Value   03/03/2020 273     Glucose (mg/dL)   Date Value   05/15/2019 90     IgE (IU/mL)   Date Value   12/18/2019 2460 (H)       Pathology report indicated chronic  inflammation with eosinophilia.    Cultures showed H flu BLP at last visit.      Assessment:     1. Perennial allergic rhinitis with seasonal variation    2. Chronic pansinusitis    3. Moderate persistent asthma without complication         Plan:     Get CT scan, discussed possible revision sinus surgery if indicated.  I discussed the risks, benefits and alternatives to surgery with the patient, as well as the expected postoperative course.  I gave her the opportunity to ask questions and I answered all of them.  I provided relevant printed information on her condition for her to review at home.  Same-day discharge is anticipated.  She will need evaluation in the pre-anesthesia clinic. She will also need a CT sinuses with Stealth protocol prior to surgery.  She will call when she decides to schedule surgery.  She will need to return for a postoperative visit 1 week after surgery.  Follow up for test results.  In meantime will call in doxycycline 10 day course to begin after the scan is done.

## 2020-03-10 NOTE — PROCEDURES
Nasal/sinus endoscopy  Date/Time: 3/10/2020 2:30 PM  Performed by: Alexys Boo MD  Authorized by: Alexys Boo MD     Consent Done?:  Yes (Verbal)  Anesthesia:     Local anesthetic:  Lidocaine 4% and Emerson-Synephrine 1/2%    Patient tolerance:  Patient tolerated the procedure well with no immediate complications  Nose:     Procedure Performed:  Nasal Endoscopy  External:      No external nasal deformity  Intranasal:      Mucosa no polyps     Mucosa ulcers not present     No mucosa lesions present     Enlarged turbinates     Septum gross deformity  Nasopharynx:      No mucosa lesions     Adenoids not present     Posterior choanae patent     Eustachian tube patent     Moderate MT edema, no manuel polyps.  Mucopurulent exudate from left maxillary sinus.

## 2020-03-11 RX ORDER — FUROSEMIDE 20 MG/1
20 TABLET ORAL 2 TIMES DAILY PRN
Qty: 60 TABLET | Refills: 3 | Status: SHIPPED | OUTPATIENT
Start: 2020-03-11 | End: 2020-06-30
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
## 2020-03-19 ENCOUNTER — TELEPHONE (OUTPATIENT)
Dept: PHARMACY | Facility: CLINIC | Age: 75
End: 2020-03-19

## 2020-03-19 NOTE — TELEPHONE ENCOUNTER
Good morning Dr. Gould & staff,    We have previously faxed the provider's portion of the patient's Dupixent assistance application to [phone].    I just wanted to follow up and ensure it was received and is currently being reviewed. If the previous fax number is incorrect, or a different one is preferred, just let me know and I would be happy to send it again.     Thank you,   Kaci Bland CPhT.  [facility] [phone]  Fax: [phone]

## 2020-03-19 NOTE — TELEPHONE ENCOUNTER
Call to assess continuation of dupixent. No answer, LVM. Sent [employer] message.    Osiel Brian, PharmD  Clinical Pharmacist  [facility]  P: [phone]

## 2020-04-06 ENCOUNTER — TELEPHONE (OUTPATIENT)
Dept: PRIMARY CARE CLINIC | Facility: CLINIC | Age: 75
End: 2020-04-06

## 2020-04-06 DIAGNOSIS — Z00.00 ROUTINE GENERAL MEDICAL EXAMINATION AT A HEALTH CARE FACILITY: Primary | ICD-10-CM

## 2020-04-06 DIAGNOSIS — I10 ESSENTIAL HYPERTENSION: ICD-10-CM

## 2020-04-06 NOTE — TELEPHONE ENCOUNTER
----- Message from Ami Fabian sent at 4/6/2020 11:57 AM CDT -----  Contact: Self   Pt is requesting orders for labs on 7/7 for annual on 7/16

## 2020-04-07 ENCOUNTER — DOCUMENTATION ONLY (OUTPATIENT)
Dept: ALLERGY | Facility: CLINIC | Age: 75
End: 2020-04-07

## 2020-04-07 ENCOUNTER — TELEPHONE (OUTPATIENT)
Dept: ALLERGY | Facility: CLINIC | Age: 75
End: 2020-04-07

## 2020-05-13 DIAGNOSIS — I10 ESSENTIAL HYPERTENSION: ICD-10-CM

## 2020-05-14 RX ORDER — AMLODIPINE BESYLATE 10 MG/1
TABLET ORAL
Qty: 90 TABLET | Refills: 3 | Status: SHIPPED | OUTPATIENT
Start: 2020-05-14 | End: 2020-07-16

## 2020-05-15 ENCOUNTER — PES CALL (OUTPATIENT)
Dept: ADMINISTRATIVE | Facility: CLINIC | Age: 75
End: 2020-05-15

## 2020-05-22 ENCOUNTER — TELEPHONE (OUTPATIENT)
Dept: PHARMACY | Facility: CLINIC | Age: 75
End: 2020-05-22

## 2020-05-22 NOTE — TELEPHONE ENCOUNTER
Call to patient to assess if she had been contacted by the [facility] to set up shipment. She states that she had not. will follow up with FA team to assess hold up. Patient had no other questions or concerns.    Osiel Brian, PharmD  Clinical Pharmacist  [facility]  P: [phone]

## 2020-06-02 ENCOUNTER — OFFICE VISIT (OUTPATIENT)
Dept: INTERNAL MEDICINE | Facility: CLINIC | Age: 75
End: 2020-06-02
Payer: MEDICARE

## 2020-06-02 ENCOUNTER — LAB VISIT (OUTPATIENT)
Dept: LAB | Facility: HOSPITAL | Age: 75
End: 2020-06-02
Attending: PHYSICIAN ASSISTANT
Payer: MEDICARE

## 2020-06-02 ENCOUNTER — OFFICE VISIT (OUTPATIENT)
Dept: ALLERGY | Facility: CLINIC | Age: 75
End: 2020-06-02
Payer: MEDICARE

## 2020-06-02 VITALS
HEART RATE: 77 BPM | BODY MASS INDEX: 32.8 KG/M2 | OXYGEN SATURATION: 99 % | WEIGHT: 197.06 LBS | SYSTOLIC BLOOD PRESSURE: 140 MMHG | DIASTOLIC BLOOD PRESSURE: 70 MMHG

## 2020-06-02 DIAGNOSIS — J30.81 ALLERGIC RHINITIS DUE TO ANIMAL (CAT) (DOG) HAIR AND DANDER: ICD-10-CM

## 2020-06-02 DIAGNOSIS — D72.10 EOSINOPHILIA: Primary | ICD-10-CM

## 2020-06-02 DIAGNOSIS — J45.50 SEVERE PERSISTENT ASTHMA WITHOUT COMPLICATION: ICD-10-CM

## 2020-06-02 DIAGNOSIS — Z00.00 ENCOUNTER FOR PREVENTIVE HEALTH EXAMINATION: Primary | ICD-10-CM

## 2020-06-02 DIAGNOSIS — D72.10 EOSINOPHILIA: ICD-10-CM

## 2020-06-02 DIAGNOSIS — J32.4 CHRONIC PANSINUSITIS: ICD-10-CM

## 2020-06-02 DIAGNOSIS — E55.9 VITAMIN D DEFICIENCY: ICD-10-CM

## 2020-06-02 DIAGNOSIS — J45.40 MODERATE PERSISTENT ASTHMA WITHOUT COMPLICATION: ICD-10-CM

## 2020-06-02 DIAGNOSIS — D35.02 ADRENAL ADENOMA, LEFT: ICD-10-CM

## 2020-06-02 DIAGNOSIS — Z20.822 EXPOSURE TO COVID-19 VIRUS: ICD-10-CM

## 2020-06-02 DIAGNOSIS — G57.11 MERALGIA PARESTHETICA OF RIGHT SIDE: ICD-10-CM

## 2020-06-02 DIAGNOSIS — I50.32 CHRONIC DIASTOLIC HEART FAILURE: ICD-10-CM

## 2020-06-02 DIAGNOSIS — I70.0 AORTIC ATHEROSCLEROSIS: ICD-10-CM

## 2020-06-02 DIAGNOSIS — J84.10 CALCIFIED GRANULOMA OF LUNG: ICD-10-CM

## 2020-06-02 DIAGNOSIS — J30.9 ALLERGIC RHINITIS, UNSPECIFIED SEASONALITY, UNSPECIFIED TRIGGER: ICD-10-CM

## 2020-06-02 DIAGNOSIS — K21.9 GASTROESOPHAGEAL REFLUX DISEASE, ESOPHAGITIS PRESENCE NOT SPECIFIED: ICD-10-CM

## 2020-06-02 DIAGNOSIS — M81.0 OSTEOPOROSIS, UNSPECIFIED OSTEOPOROSIS TYPE, UNSPECIFIED PATHOLOGICAL FRACTURE PRESENCE: ICD-10-CM

## 2020-06-02 DIAGNOSIS — I10 ESSENTIAL HYPERTENSION: ICD-10-CM

## 2020-06-02 LAB
BASOPHILS # BLD AUTO: 0.09 K/UL (ref 0–0.2)
BASOPHILS NFR BLD: 1.4 % (ref 0–1.9)
DIFFERENTIAL METHOD: ABNORMAL
EOSINOPHIL # BLD AUTO: 0.7 K/UL (ref 0–0.5)
EOSINOPHIL NFR BLD: 11.6 % (ref 0–8)
ERYTHROCYTE [DISTWIDTH] IN BLOOD BY AUTOMATED COUNT: 17.2 % (ref 11.5–14.5)
HCT VFR BLD AUTO: 44.2 % (ref 37–48.5)
HGB BLD-MCNC: 13.4 G/DL (ref 12–16)
IMM GRANULOCYTES # BLD AUTO: 0.01 K/UL (ref 0–0.04)
IMM GRANULOCYTES NFR BLD AUTO: 0.2 % (ref 0–0.5)
LYMPHOCYTES # BLD AUTO: 1.8 K/UL (ref 1–4.8)
LYMPHOCYTES NFR BLD: 27.9 % (ref 18–48)
MCH RBC QN AUTO: 27.4 PG (ref 27–31)
MCHC RBC AUTO-ENTMCNC: 30.3 G/DL (ref 32–36)
MCV RBC AUTO: 90 FL (ref 82–98)
MONOCYTES # BLD AUTO: 0.6 K/UL (ref 0.3–1)
MONOCYTES NFR BLD: 9.2 % (ref 4–15)
NEUTROPHILS # BLD AUTO: 3.1 K/UL (ref 1.8–7.7)
NEUTROPHILS NFR BLD: 49.7 % (ref 38–73)
NRBC BLD-RTO: 0 /100 WBC
PLATELET # BLD AUTO: 295 K/UL (ref 150–350)
PMV BLD AUTO: 11.5 FL (ref 9.2–12.9)
RBC # BLD AUTO: 4.89 M/UL (ref 4–5.4)
WBC # BLD AUTO: 6.28 K/UL (ref 3.9–12.7)

## 2020-06-02 PROCEDURE — 3078F DIAST BP <80 MM HG: CPT | Mod: HCNC,CPTII,95,GC | Performed by: PEDIATRICS

## 2020-06-02 PROCEDURE — 36415 COLL VENOUS BLD VENIPUNCTURE: CPT | Mod: HCNC

## 2020-06-02 PROCEDURE — 99499 UNLISTED E&M SERVICE: CPT | Mod: HCNC,S$GLB,, | Performed by: PHYSICIAN ASSISTANT

## 2020-06-02 PROCEDURE — 99999 PR PBB SHADOW E&M-EST. PATIENT-LVL V: CPT | Mod: PBBFAC,HCNC,, | Performed by: PHYSICIAN ASSISTANT

## 2020-06-02 PROCEDURE — 1159F MED LIST DOCD IN RCRD: CPT | Mod: HCNC,95,GC, | Performed by: PEDIATRICS

## 2020-06-02 PROCEDURE — 3077F PR MOST RECENT SYSTOLIC BLOOD PRESSURE >= 140 MM HG: ICD-10-PCS | Mod: HCNC,CPTII,95,GC | Performed by: PEDIATRICS

## 2020-06-02 PROCEDURE — 1101F PR PT FALLS ASSESS DOC 0-1 FALLS W/OUT INJ PAST YR: ICD-10-PCS | Mod: HCNC,CPTII,95,GC | Performed by: PEDIATRICS

## 2020-06-02 PROCEDURE — 3078F DIAST BP <80 MM HG: CPT | Mod: HCNC,CPTII,S$GLB, | Performed by: PHYSICIAN ASSISTANT

## 2020-06-02 PROCEDURE — 1101F PT FALLS ASSESS-DOCD LE1/YR: CPT | Mod: HCNC,CPTII,95,GC | Performed by: PEDIATRICS

## 2020-06-02 PROCEDURE — 3078F PR MOST RECENT DIASTOLIC BLOOD PRESSURE < 80 MM HG: ICD-10-PCS | Mod: HCNC,CPTII,95,GC | Performed by: PEDIATRICS

## 2020-06-02 PROCEDURE — G0439 PR MEDICARE ANNUAL WELLNESS SUBSEQUENT VISIT: ICD-10-PCS | Mod: HCNC,S$GLB,, | Performed by: PHYSICIAN ASSISTANT

## 2020-06-02 PROCEDURE — 3077F PR MOST RECENT SYSTOLIC BLOOD PRESSURE >= 140 MM HG: ICD-10-PCS | Mod: HCNC,CPTII,S$GLB, | Performed by: PHYSICIAN ASSISTANT

## 2020-06-02 PROCEDURE — 86769 SARS-COV-2 COVID-19 ANTIBODY: CPT | Mod: HCNC

## 2020-06-02 PROCEDURE — 1159F PR MEDICATION LIST DOCUMENTED IN MEDICAL RECORD: ICD-10-PCS | Mod: HCNC,95,GC, | Performed by: PEDIATRICS

## 2020-06-02 PROCEDURE — 99214 PR OFFICE/OUTPT VISIT, EST, LEVL IV, 30-39 MIN: ICD-10-PCS | Mod: HCNC,95,GC, | Performed by: PEDIATRICS

## 2020-06-02 PROCEDURE — 99214 OFFICE O/P EST MOD 30 MIN: CPT | Mod: HCNC,95,GC, | Performed by: PEDIATRICS

## 2020-06-02 PROCEDURE — 85025 COMPLETE CBC W/AUTO DIFF WBC: CPT | Mod: HCNC

## 2020-06-02 PROCEDURE — 3077F SYST BP >= 140 MM HG: CPT | Mod: HCNC,CPTII,95,GC | Performed by: PEDIATRICS

## 2020-06-02 PROCEDURE — G0439 PPPS, SUBSEQ VISIT: HCPCS | Mod: HCNC,S$GLB,, | Performed by: PHYSICIAN ASSISTANT

## 2020-06-02 PROCEDURE — 99999 PR PBB SHADOW E&M-EST. PATIENT-LVL V: ICD-10-PCS | Mod: PBBFAC,HCNC,, | Performed by: PHYSICIAN ASSISTANT

## 2020-06-02 PROCEDURE — 3078F PR MOST RECENT DIASTOLIC BLOOD PRESSURE < 80 MM HG: ICD-10-PCS | Mod: HCNC,CPTII,S$GLB, | Performed by: PHYSICIAN ASSISTANT

## 2020-06-02 PROCEDURE — 3077F SYST BP >= 140 MM HG: CPT | Mod: HCNC,CPTII,S$GLB, | Performed by: PHYSICIAN ASSISTANT

## 2020-06-02 PROCEDURE — 99499 RISK ADDL DX/OHS AUDIT: ICD-10-PCS | Mod: HCNC,S$GLB,, | Performed by: PHYSICIAN ASSISTANT

## 2020-06-02 RX ORDER — DOXYCYCLINE HYCLATE 100 MG
100 TABLET ORAL 2 TIMES DAILY
Qty: 20 TABLET | Refills: 0 | Status: SHIPPED | OUTPATIENT
Start: 2020-06-02 | End: 2020-06-12

## 2020-06-02 RX ORDER — ALBUTEROL SULFATE 90 UG/1
2 AEROSOL, METERED RESPIRATORY (INHALATION) EVERY 6 HOURS PRN
Qty: 8 G | Refills: 3 | Status: SHIPPED | OUTPATIENT
Start: 2020-06-02 | End: 2020-07-16 | Stop reason: SDUPTHER

## 2020-06-02 NOTE — PROGRESS NOTES
The patient location is: Clinic  The chief complaint leading to consultation is: Follow Up    Visit type: audiovisual    Face to Face time with patient: 35 minutes  60 minutes of total time spent on the encounter, which includes face to face time and non-face to face time preparing to see the patient (eg, review of tests), Obtaining and/or reviewing separately obtained history, Documenting clinical information in the electronic or other health record, Independently interpreting results (not separately reported) and communicating results to the patient/family/caregiver, or Care coordination (not separately reported).     Each patient to whom he or she provides medical services by telemedicine is:  (1) informed of the relationship between the physician and patient and the respective role of any other health care provider with respect to management of the patient; and (2) notified that he or she may decline to receive medical services by telemedicine and may withdraw from such care at any time.    Notes:   ALLERGY & IMMUNOLOGY CLINIC - FOLLOW UP     HISTORY OF PRESENT ILLNESS     Patient ID: Michela Franco is a 74 y.o. female    CC:     HPI:   Severe persistent asthma without complication: Has been having some difficulty getting Dupixent filled due to insurance issues. Reporting improved compliance with ICS/LABA/LAMA therapy. Symptoms under ok control. Reports that she is back at work and can do work activities without becoming SOB. No recent exacerbations. Not requiring any PRN albuterol at this time.    Allergic rhinitis due to animal (cat) (dog) hair and dander: Reports worsening of symptoms over the past weeks as she has been outdoors more since going back to work. Significant amount of clear mucous draining from the nose and coughing up 2/2 PND. Color starting to change some but still mainly clear.Also reports some sinus pressure that always seems to be present. Currently using BID budesonide spray and BID  azelastine. Has CT scan of sinus scheduled for June 30th after which she will discuss with ENT whether repeat surgical intervention is needed.    Eosinophilia: Persisntently high with most recent value down to 700 after being greater than 1500 before then. Multi co-morbidities likely contributing to eosinophilia.   REVIEW OF SYSTEMS   CONST: no F/C/NS, no unintentional weight changes  NEURO: no H/A, no weakness, no paresthesias  EYES: no discharge, no pruritus, no erythema  EARS: no hearing loss, no sensation of fullness  NOSE: Positive for congestion and rhinorrhea.  PULM: no SOB, no wheezing, no cough, no snoring  CV: no CP, no palpitations, no leg swelling  GI: no dysphagia, no heartburn, no pain, no N/V/D, no BRBPR/melena  URO: no dysuria, no hematuria, no nocturia  MSK: no joint pain, no muscle pain  DERM: no rashes, no skin breaks   PHYSICAL EXAM   VS: There were no vitals taken for this visit.  GENERAL: AAOx4, NAD, well-appearing, cooperative  EYES: EOMI, no conjunctival injection, no discharge, no infraorbital shiners  LUNGS:no increased WOB  DERM: no rashes, no skin breaks, no dystrophic fingernails  NEURO: normal gait, no facial asymmetry   LABORATORY STUDIES     Component      Latest Ref Rng & Units 3/3/2020 12/18/2019   WBC      3.90 - 12.70 K/uL 11.75 8.93   RBC      4.00 - 5.40 M/uL 4.17 4.82   Hemoglobin      12.0 - 16.0 g/dL 11.2 (L) 13.0   Hematocrit      37.0 - 48.5 % 37.0 39.8   MCV      82 - 98 fL 89 83   MCH      27.0 - 31.0 pg 26.9 (L) 27.0   MCHC      32.0 - 36.0 g/dL 30.3 (L) 32.7   RDW      11.5 - 14.5 % 16.9 (H) 17.0 (H)   Platelets      150 - 350 K/uL 273 312   MPV      9.2 - 12.9 fL 11.3 9.6   Immature Granulocytes      0.0 - 0.5 % 0.3    Gran # (ANC)      1.8 - 7.7 K/uL 8.0 (H) 4.4   Immature Grans (Abs)      0.00 - 0.04 K/uL 0.04    Lymph #      1.0 - 4.8 K/uL 1.9 2.0   Mono #      0.3 - 1.0 K/uL 1.0 0.8   Eos #      0.0 - 0.5 K/uL 0.7 (H) 1.6 (H)   Baso #      0.00 - 0.20 K/uL 0.08  0.08   nRBC      0 /100 WBC 0    Gran%      38.0 - 73.0 % 68.4 49.2   Lymph%      18.0 - 48.0 % 16.4 (L) 22.8   Mono%      4.0 - 15.0 % 8.3 9.4   Eosinophil%      0.0 - 8.0 % 5.9 17.7 (H)   Basophil%      0.0 - 1.9 % 0.7 0.9   Differential Method       Automated Automated      ALLERGEN TESTING   Immunocaps: Immunocaps:   Component      Latest Ref Rng & Units 12/18/2019   Allergen Acremonium (Cephalosporium) IgE      <0.10 kU/L <0.10   Allergen Acremonium (Cephalosporium) Class       CLASS 0   Botrytis Cinerea      <0.10 kU/L <0.10   Botrytis Cinerea Class       CLASS 0   Allergen Candida albicans IgE      <0.10 kU/L 0.68 (H)   Allergen Candida albicans Class       CLASS 1   Chaetomium      <0.10 kU/L <0.10   Chaetomium Glob. Class       CLASS 0   Cladosporium, IgE      <0.10 kU/L <0.10   Cladosporium Class       CLASS 0   Curvularia lunata      <0.10 kU/L <0.10   Curvularia Lunata Class       CLASS 0   Epicoccum purpurascens, IgE      <0.10 kU/L <0.10   Epicoccum pupurascens Class       CLASS 0   Helminthosporium Halodes IgE      <0.10 kU/L <0.10   Helminthosporium Class       CLASS 0   Allergen Trichoderma Viride IgE      <0.10 kU/L <0.10   Allergen Trichoderma Viride Class       CLASS 0   Stemphyllium, IgE      <0.10 kU/L <0.10   Stemphylium Herbarum Class       CLASS 0   Allergen Rhodotorula IgE      <0.35 kU/L <0.35   Rhodotorula , IgE Class       0   Rhizopus Nigrican, IgE      <0.10 kU/L 0.31 (H)   Rhizopus Nigricans Class       CLASS 0/1   Phoma betae      <0.10 kU/L <0.10   Phoma Betae Class       CLASS 0   Penicillium, IgE      <0.10 kU/L 0.17 (H)   Penicillium Class       CLASS 0/1   Mucor racemosus, IgE      <0.10 kU/L 0.34 (H)   Mucor racemosus Class       CLASS 0/1   RAST Allergen for Trichophyton rubrum      kU/L <0.35      Component      Latest Ref Rng & Units 12/18/2019 12/18/2019 12/18/2019           3:07 PM  3:07 PM  3:07 PM   D. farinae      <0.10 kU/L     1.94 (H)   D. farinae Class            CLASS 2   Mite Dust Pteronyssinus IgE      <0.10 kU/L     0.19 (H)   D. pteronyssinus Class           CLASS 0/1   BERMUDA GRASS      <0.10 kU/L     0.71 (H)   [place] Grass Class           CLASS 2   [place] Grass      <0.10 kU/L     0.72 (H)   Joshua Grass Class           CLASS 2   [place] IgE      <0.10 kU/L     0.17 (H)   [place] Class           CLASS 0/1   Plantain      <0.10 kU/L     0.63 (H)   English Plantain Class           CLASS 1   White Oak(Quercus alba) IgE      <0.10 kU/L     0.74 (H)   [place], Class           CLASS 2   Pecan [place] Tree      <0.10 kU/L     0.47 (H)   Pecan, Class           CLASS 1   Marshelder IgE      <0.10 kU/L     0.59 (H)   Marshelder Class           CLASS 1   Ragweed, Western IgE      <0.10 kU/L     0.54 (H)   Ragweed, Western, Class           CLASS 1   Alternaria alternata      <0.10 kU/L     <0.10   Altern. alternata Class           CLASS 0   Aspergillus Fumigatus IgE      <0.10 kU/L     0.22 (H)   A. fumigatus Class           CLASS 0/1   Cat Dander      <0.10 kU/L     <0.10   Cat Epithelium Class           CLASS 0   Cockroach, IgE      <0.10 kU/L CLASS 0/1 0.28 (H)     Dog Dander, IgE      <0.10 kU/L     0.51 (H)   Dog Dander Class           CLASS 1      Component      Latest Ref Rng & Units 12/18/2019 12/29/2017 10/30/2017 4/8/2014   Aspergillus Fumigatus IgE      <0.10 kU/L 0.22 (H)   <0.35 <0.35   A. fumigatus Class       CLASS 0/1   CLASS 0 CLASS 0   Aspergillus Antigen      <0.5 index   <0.500 0.676 (A)        Component      Latest Ref Rng & Units 2/20/2014   Aspergillus Fumigatus IgE      <0.10 kU/L 0.36 (H)   A. fumigatus Class       CLASS I   Aspergillus Antigen      <0.5 index           PULMONARY FUNCTION   PFTs:     5/28/2018  FVC: 2.30 (78%)  FEV1: 1.62 (70%)  Ratio: 70  No reversibility post bronchodilator.    Also performed 12/2019 in pulmonary clinic   CHART REVIEW   Reviewed previous notes, labs, imaging   ASSESSMENT & PLAN     Michela Franco is a 74 y.o. female with      Eosinophilia: Given elevated levels in the past will recheck today.  -     CBC auto differential; Future; Expected date: 06/02/2020    Severe persistent asthma without complication: Better controlled but still with symptoms on ICS/LABA/LAMA. On Dupixent but having difficulty due to payment issues with insurance, is scheduled to talk with specialty pharmacy. No recent exacerbations.  - Continue Dupixent 300 mg subcutanous q2 weeks  - Continue Advair 500/50 1 puff BID  - Continue Umeclidinium 1 puff daily  - Refilled Albuterol for PRN use sent to [facility] pharmacy    Allergic rhinitis due to animal (cat) (dog) hair and dander: Worsening of symptoms now that she is back at work and exposed to triggers more frequently using BID INCS and azelastine. Now with symptoms concerning for possible sinusitis.  - Continue BID use of budesonide spray every day  - Azelastine use when symptomatic.    Chronic pansinusitis: CT scan scheduled for the 30th. Will write for doxycycline to use if symptoms continue to worsen with further thickening and darkening of mucous or worsening sinus pain and pressure.    Follow up: 3 months     Ryne Gould DO  Allergy Immunology Fellow

## 2020-06-02 NOTE — PATIENT INSTRUCTIONS
Counseling and Referral of Other Preventative  (Italic type indicates deductible and co-insurance are waived)    Patient Name: Michela Franco  Today's Date: 6/2/2020    Health Maintenance       Date Due Completion Date    High Dose Statin 03/12/2021 3/12/2020    Mammogram 06/07/2021 6/7/2019    DEXA SCAN 06/07/2022 6/7/2019    Colonoscopy 05/18/2023 5/18/2018    TETANUS VACCINE 12/03/2023 12/3/2013    Lipid Panel 05/15/2024 5/15/2019        No orders of the defined types were placed in this encounter.    The following information is provided to all patients.  This information is to help you find resources for any of the problems found today that may be affecting your health:                Living healthy guide: www.[facility].[place].gov      Understanding Diabetes: www.diabetes.org      Eating healthy: www.cdc.gov/healthyweight      [facility] home safety checklist: www.cdc.gov/steadi/patient.html      Agency on Aging: www.goea.[place].[facility]      Alcoholics anonymous (AA): www.aa.org      Physical Activity: www.leatha.nih.gov/il7vaaw      Tobacco use: www.quitwithusla.org

## 2020-06-02 NOTE — PROGRESS NOTES
I offered to discuss end of life issues, including information on how to make advance directives that the patient could use to name someone who would make medical decisions on their behalf if they became too ill to make themselves.    _X_Patient declined (Pt already has paperwork at home, plans to return, provided with an additional copy)  ___Patient is interested, I provided paper work and offered to discuss.

## 2020-06-02 NOTE — PROGRESS NOTES
Michela Franco presented for a  Medicare AWV and comprehensive Health Risk Assessment today. The following components were reviewed and updated:    · Medical history  · Family History  · Social history  · Allergies and Current Medications  · Health Risk Assessment  · Health Maintenance  · Care Team     ** See Completed Assessments for Annual Wellness Visit within the encounter summary.**       The following assessments were completed:  · Living Situation  · CAGE  · Depression Screening  · Timed Get Up and Go  · Whisper Test  · Cognitive Function Screening    · Nutrition Screening  · ADL Screening  · PAQ Screening    Vitals:    06/02/20 0757   BP: (!) 140/70   Pulse: 77   SpO2: 99%   Weight: 89.4 kg (197 lb 1.5 oz)     Body mass index is 32.8 kg/m².  Physical Exam   Constitutional: She appears well-developed and well-nourished. No distress.   HENT:   Head: Normocephalic and atraumatic.   Mouth/Throat: Oropharynx is clear and moist.   Eyes: Pupils are equal, round, and reactive to light.   Neck: No thyromegaly present.   Cardiovascular: Normal rate and regular rhythm. Exam reveals no friction rub.   No murmur heard.  Pulmonary/Chest: Effort normal and breath sounds normal. She has no wheezes. She has no rales.   Abdominal: Soft. Bowel sounds are normal. There is no tenderness.   Musculoskeletal: She exhibits no edema.   Lymphadenopathy:     She has no cervical adenopathy.   Neurological: She is alert.   Skin: Skin is warm and dry. No rash noted.   Psychiatric: She has a normal mood and affect.   Vitals reviewed.        Diagnoses and health risks identified today and associated recommendations/orders:    1. Encounter for preventive health examination  Exam and Assessments performed  Chart Review Complete  Health Maintenance Reviewed and updated      2. Moderate persistent asthma without complication  Stable on current inhalers  Followed by Allergy and Pulm    3. Chronic diastolic heart failure  Stable  Last Echo in  2018  Followed by PCP    4. Essential hypertension  Stable  Followed by PCP    5. Aortic atherosclerosis  Stable  On statin therapy  Noted on prior imaging  Followed by PCP    6. Vitamin D deficiency  Stable  On Vit D supplements  Followed by PCP    7. Chronic pansinusitis  Stable  S/p sinus surgery  Followed by ENT and Allergy    8. Meralgia paresthetica of right side  Stable  Followed by PCP  Chris'vernon by Neurology in the past    9. Calcified granuloma of lung  Stable  Noted on prior imaging  Followed by PCP    10. Gastroesophageal reflux disease, esophagitis presence not specified  Stable  Followed by PCP    11. Allergic rhinitis, unspecified seasonality, unspecified trigger  Stable with current nasal sprays  Followed by Allergy and ENT    12. Exposure to Covid-19 Virus (pt requested Ab testing, pt is asymptomatic)  - COVID-19 (SARS CoV-2) IgG Antibody; Future    13. Osteoporosis, unspecified osteoporosis type, unspecified pathological fracture presence  Stable  On Fosamax  DEXA UTD  Followed by PCP    14. Adrenal adenoma, left  Stable >5 years  Last imaging CT 2017  Followed by PCP      Provided Evandrewa with a 5-10 year written screening schedule and personal prevention plan. Recommendations were developed using the USPSTF age appropriate recommendations. Education, counseling, and referrals were provided as needed. After Visit Summary printed and given to patient which includes a list of additional screenings\tests needed.    Follow up in about 6 weeks (around 7/16/2020) for PCP follow up and 1 year for next Medicare AWV.    Ember Dan PA-C

## 2020-06-03 LAB — SARS-COV-2 IGG SERPLBLD QL IA.RAPID: NEGATIVE

## 2020-06-15 ENCOUNTER — TELEPHONE (OUTPATIENT)
Dept: PHARMACY | Facility: CLINIC | Age: 75
End: 2020-06-15

## 2020-06-15 NOTE — TELEPHONE ENCOUNTER
Patient returned call. She states that she received message from her provider to take the first package sent from [employer] as her loading dose. She has done this, so she will now be waiting for her maintenance dose from the . Will follow up next week to make sure that PAP was able to do this.      Osiel Brian, PharmD  Clinical Pharmacist  [facility]  P: [phone]

## 2020-06-15 NOTE — TELEPHONE ENCOUNTER
Call to inform patient that she will need to restart the Dupixent loading dose. Prescription instructions have been sent to provider, as patient will be getting Dupixent from the patient assistance program through the . No answer, LVM.    Osiel Brian, PharmD  Clinical Pharmacist  [facility]  P: [phone]

## 2020-06-16 DIAGNOSIS — J45.50 SEVERE PERSISTENT ASTHMA WITHOUT COMPLICATION: Primary | ICD-10-CM

## 2020-06-16 DIAGNOSIS — J45.50 SEVERE PERSISTENT ASTHMA WITHOUT COMPLICATION: ICD-10-CM

## 2020-06-16 NOTE — TELEPHONE ENCOUNTER
FYI:     Patient has been enrolled in [employer]  Assistance Program for their DUPIXENT prescription. Patient has been approved from 6/5/2020  to 12/31/2020. Patient will be receiving medication directly from the assistance programs pharmacy. All future refill authorizations should be forwarded to them directly.

## 2020-06-30 ENCOUNTER — HOSPITAL ENCOUNTER (OUTPATIENT)
Dept: RADIOLOGY | Facility: HOSPITAL | Age: 75
Discharge: HOME | End: 2020-06-30
Attending: OTOLARYNGOLOGY
Payer: MEDICARE

## 2020-06-30 DIAGNOSIS — J32.4 CHRONIC PANSINUSITIS: ICD-10-CM

## 2020-06-30 PROCEDURE — 70486 CT MAXILLOFACIAL W/O DYE: CPT | Mod: 26,HCNC,, | Performed by: RADIOLOGY

## 2020-06-30 PROCEDURE — 70486 CT MEDTRONIC SINUSES WITHOUT: ICD-10-PCS | Mod: 26,HCNC,, | Performed by: RADIOLOGY

## 2020-06-30 PROCEDURE — 70486 CT MAXILLOFACIAL W/O DYE: CPT | Mod: TC,HCNC

## 2020-07-01 ENCOUNTER — PATIENT MESSAGE (OUTPATIENT)
Dept: OTOLARYNGOLOGY | Facility: CLINIC | Age: 75
End: 2020-07-01

## 2020-07-07 ENCOUNTER — OFFICE VISIT (OUTPATIENT)
Dept: OTOLARYNGOLOGY | Facility: CLINIC | Age: 75
End: 2020-07-07
Payer: MEDICARE

## 2020-07-07 DIAGNOSIS — J30.89 PERENNIAL ALLERGIC RHINITIS WITH SEASONAL VARIATION: Primary | ICD-10-CM

## 2020-07-07 DIAGNOSIS — J30.2 PERENNIAL ALLERGIC RHINITIS WITH SEASONAL VARIATION: Primary | ICD-10-CM

## 2020-07-07 DIAGNOSIS — J45.40 MODERATE PERSISTENT ASTHMA WITHOUT COMPLICATION: ICD-10-CM

## 2020-07-07 DIAGNOSIS — J32.4 CHRONIC PANSINUSITIS: ICD-10-CM

## 2020-07-07 PROCEDURE — 1101F PR PT FALLS ASSESS DOC 0-1 FALLS W/OUT INJ PAST YR: ICD-10-PCS | Mod: HCNC,CPTII,95, | Performed by: OTOLARYNGOLOGY

## 2020-07-07 PROCEDURE — 1101F PT FALLS ASSESS-DOCD LE1/YR: CPT | Mod: HCNC,CPTII,95, | Performed by: OTOLARYNGOLOGY

## 2020-07-07 PROCEDURE — 1159F PR MEDICATION LIST DOCUMENTED IN MEDICAL RECORD: ICD-10-PCS | Mod: HCNC,95,, | Performed by: OTOLARYNGOLOGY

## 2020-07-07 PROCEDURE — 99212 PR OFFICE/OUTPT VISIT, EST, LEVL II, 10-19 MIN: ICD-10-PCS | Mod: HCNC,95,, | Performed by: OTOLARYNGOLOGY

## 2020-07-07 PROCEDURE — 1159F MED LIST DOCD IN RCRD: CPT | Mod: HCNC,95,, | Performed by: OTOLARYNGOLOGY

## 2020-07-07 PROCEDURE — 99212 OFFICE O/P EST SF 10 MIN: CPT | Mod: HCNC,95,, | Performed by: OTOLARYNGOLOGY

## 2020-07-07 NOTE — PROGRESS NOTES
Subjective:      Michela is a 75 y.o. female who comes for follow-up of sinusitis.  Her last visit with me was on 3/10/2020.  Now nearly 3 years status-post endoscopic sinus surgery.   Overall stabilized, some ongoing facial pressure, postnasal drip and cough.  Managed with Xhance, ipratropium and Advair.  Had 2 doses of Dupixent early this year, now newly re-authorized and set to begin again next week.    SNOT-22 score: : (P) 27  NOSE score:: (P) 55%  ETDQ-7 score:: (P) 3.1    The patient's medications, allergies, past medical, surgical, social and family histories were reviewed and updated as appropriate.    A detailed review of systems was obtained with pertinent positives as per the above HPI, and otherwise negative.        Objective:     There were no vitals taken for this visit.       Constitutional:   She appears well-developed. She is cooperative. Normal speech.  No hoarse voice.      Head:  Normocephalic. Facial strength is normal.      Ears:    Right Ear: No drainage.   Left Ear: No drainage.     Nose:  No epistaxis.     Neck:  Phonation normal. Normal range of motion and no stridor present.     Pulmonary/Chest:   Effort normal. No stridor. No respiratory distress.     Psychiatric:   She has a normal mood and affect. Her speech is normal and behavior is normal.     Neurological:   No cranial nerve deficit.     Skin:   No rash noted.       Procedure    None        Data Reviewed    WBC (K/uL)   Date Value   06/02/2020 6.28     Eosinophil% (%)   Date Value   06/02/2020 11.6 (H)     Eos # (K/uL)   Date Value   06/02/2020 0.7 (H)     Platelets (K/uL)   Date Value   06/02/2020 295     Glucose (mg/dL)   Date Value   05/15/2019 90     IgE (IU/mL)   Date Value   12/18/2019 2460 (H)       Pathology report indicated chronic inflammation with eosinophilia.    Cultures showed Pseudomonas and H flu BLP.      Assessment:     1. Perennial allergic rhinitis with seasonal variation    2. Chronic pansinusitis    3.  Moderate persistent asthma without complication         Plan:     Discussed option of revision ESS vs Dupixent therapy, she elects for the latter.  Will continue present regimen.  Follow up in about 3 months (around 10/7/2020).

## 2020-07-11 ENCOUNTER — LAB VISIT (OUTPATIENT)
Dept: LAB | Facility: HOSPITAL | Age: 75
End: 2020-07-11
Attending: FAMILY MEDICINE
Payer: MEDICARE

## 2020-07-11 DIAGNOSIS — Z00.00 ROUTINE GENERAL MEDICAL EXAMINATION AT A HEALTH CARE FACILITY: ICD-10-CM

## 2020-07-11 DIAGNOSIS — I10 ESSENTIAL HYPERTENSION: ICD-10-CM

## 2020-07-11 LAB
ALBUMIN SERPL BCP-MCNC: 3.5 G/DL (ref 3.5–5.2)
ALP SERPL-CCNC: 116 U/L (ref 55–135)
ALT SERPL W/O P-5'-P-CCNC: 20 U/L (ref 10–44)
ANION GAP SERPL CALC-SCNC: 8 MMOL/L (ref 8–16)
AST SERPL-CCNC: 34 U/L (ref 10–40)
BASOPHILS # BLD AUTO: 0.07 K/UL (ref 0–0.2)
BASOPHILS NFR BLD: 1 % (ref 0–1.9)
BILIRUB SERPL-MCNC: 0.4 MG/DL (ref 0.1–1)
BUN SERPL-MCNC: 16 MG/DL (ref 8–23)
CALCIUM SERPL-MCNC: 9.5 MG/DL (ref 8.7–10.5)
CHLORIDE SERPL-SCNC: 105 MMOL/L (ref 95–110)
CHOLEST SERPL-MCNC: 186 MG/DL (ref 120–199)
CHOLEST/HDLC SERPL: 2.1 {RATIO} (ref 2–5)
CO2 SERPL-SCNC: 26 MMOL/L (ref 23–29)
CREAT SERPL-MCNC: 0.8 MG/DL (ref 0.5–1.4)
DIFFERENTIAL METHOD: ABNORMAL
EOSINOPHIL # BLD AUTO: 0.6 K/UL (ref 0–0.5)
EOSINOPHIL NFR BLD: 7.8 % (ref 0–8)
ERYTHROCYTE [DISTWIDTH] IN BLOOD BY AUTOMATED COUNT: 16.9 % (ref 11.5–14.5)
EST. GFR  (AFRICAN AMERICAN): >60 ML/MIN/1.73 M^2
EST. GFR  (NON AFRICAN AMERICAN): >60 ML/MIN/1.73 M^2
GLUCOSE SERPL-MCNC: 90 MG/DL (ref 70–110)
HCT VFR BLD AUTO: 41.6 % (ref 37–48.5)
HDLC SERPL-MCNC: 87 MG/DL (ref 40–75)
HDLC SERPL: 46.8 % (ref 20–50)
HGB BLD-MCNC: 13.1 G/DL (ref 12–16)
IMM GRANULOCYTES # BLD AUTO: 0.02 K/UL (ref 0–0.04)
IMM GRANULOCYTES NFR BLD AUTO: 0.3 % (ref 0–0.5)
LDLC SERPL CALC-MCNC: 91.6 MG/DL (ref 63–159)
LYMPHOCYTES # BLD AUTO: 2.2 K/UL (ref 1–4.8)
LYMPHOCYTES NFR BLD: 30.4 % (ref 18–48)
MCH RBC QN AUTO: 27.5 PG (ref 27–31)
MCHC RBC AUTO-ENTMCNC: 31.5 G/DL (ref 32–36)
MCV RBC AUTO: 87 FL (ref 82–98)
MONOCYTES # BLD AUTO: 0.6 K/UL (ref 0.3–1)
MONOCYTES NFR BLD: 8.6 % (ref 4–15)
NEUTROPHILS # BLD AUTO: 3.7 K/UL (ref 1.8–7.7)
NEUTROPHILS NFR BLD: 51.9 % (ref 38–73)
NONHDLC SERPL-MCNC: 99 MG/DL
NRBC BLD-RTO: 0 /100 WBC
PLATELET # BLD AUTO: 287 K/UL (ref 150–350)
PMV BLD AUTO: 11.4 FL (ref 9.2–12.9)
POTASSIUM SERPL-SCNC: 4.1 MMOL/L (ref 3.5–5.1)
PROT SERPL-MCNC: 8.1 G/DL (ref 6–8.4)
RBC # BLD AUTO: 4.76 M/UL (ref 4–5.4)
SODIUM SERPL-SCNC: 139 MMOL/L (ref 136–145)
TRIGL SERPL-MCNC: 37 MG/DL (ref 30–150)
TSH SERPL DL<=0.005 MIU/L-ACNC: 1.48 UIU/ML (ref 0.4–4)
WBC # BLD AUTO: 7.2 K/UL (ref 3.9–12.7)

## 2020-07-11 PROCEDURE — 85025 COMPLETE CBC W/AUTO DIFF WBC: CPT | Mod: HCNC

## 2020-07-11 PROCEDURE — 80053 COMPREHEN METABOLIC PANEL: CPT | Mod: HCNC

## 2020-07-11 PROCEDURE — 80061 LIPID PANEL: CPT | Mod: HCNC

## 2020-07-11 PROCEDURE — 36415 COLL VENOUS BLD VENIPUNCTURE: CPT | Mod: HCNC

## 2020-07-11 PROCEDURE — 84443 ASSAY THYROID STIM HORMONE: CPT | Mod: HCNC

## 2020-07-16 ENCOUNTER — OFFICE VISIT (OUTPATIENT)
Dept: PRIMARY CARE CLINIC | Facility: CLINIC | Age: 75
End: 2020-07-16
Payer: MEDICARE

## 2020-07-16 VITALS
DIASTOLIC BLOOD PRESSURE: 80 MMHG | TEMPERATURE: 97 F | SYSTOLIC BLOOD PRESSURE: 146 MMHG | BODY MASS INDEX: 34.6 KG/M2 | OXYGEN SATURATION: 99 % | HEART RATE: 95 BPM | WEIGHT: 207.69 LBS | HEIGHT: 65 IN

## 2020-07-16 DIAGNOSIS — M54.9 BACK PAIN, UNSPECIFIED BACK LOCATION, UNSPECIFIED BACK PAIN LATERALITY, UNSPECIFIED CHRONICITY: ICD-10-CM

## 2020-07-16 DIAGNOSIS — E87.8 ELECTROLYTE ABNORMALITY: ICD-10-CM

## 2020-07-16 DIAGNOSIS — R16.0 HEPATOMEGALY: ICD-10-CM

## 2020-07-16 DIAGNOSIS — J45.40 MODERATE PERSISTENT ASTHMA WITHOUT COMPLICATION: ICD-10-CM

## 2020-07-16 DIAGNOSIS — J45.909 ASTHMA, UNSPECIFIED ASTHMA SEVERITY, UNSPECIFIED WHETHER COMPLICATED, UNSPECIFIED WHETHER PERSISTENT: ICD-10-CM

## 2020-07-16 DIAGNOSIS — I50.32 CHRONIC DIASTOLIC HEART FAILURE: ICD-10-CM

## 2020-07-16 DIAGNOSIS — I10 HYPERTENSION, UNSPECIFIED TYPE: Primary | ICD-10-CM

## 2020-07-16 DIAGNOSIS — Z12.31 OTHER SCREENING MAMMOGRAM: ICD-10-CM

## 2020-07-16 DIAGNOSIS — E78.5 HYPERLIPIDEMIA, UNSPECIFIED HYPERLIPIDEMIA TYPE: ICD-10-CM

## 2020-07-16 DIAGNOSIS — D64.9 ANEMIA, UNSPECIFIED TYPE: ICD-10-CM

## 2020-07-16 PROCEDURE — 99999 PR PBB SHADOW E&M-EST. PATIENT-LVL IV: CPT | Mod: PBBFAC,HCNC,, | Performed by: FAMILY MEDICINE

## 2020-07-16 PROCEDURE — 99499 UNLISTED E&M SERVICE: CPT | Mod: HCNC,S$GLB,, | Performed by: FAMILY MEDICINE

## 2020-07-16 PROCEDURE — 3077F PR MOST RECENT SYSTOLIC BLOOD PRESSURE >= 140 MM HG: ICD-10-PCS | Mod: HCNC,CPTII,S$GLB, | Performed by: FAMILY MEDICINE

## 2020-07-16 PROCEDURE — 99999 PR PBB SHADOW E&M-EST. PATIENT-LVL IV: ICD-10-PCS | Mod: PBBFAC,HCNC,, | Performed by: FAMILY MEDICINE

## 2020-07-16 PROCEDURE — 3079F DIAST BP 80-89 MM HG: CPT | Mod: HCNC,CPTII,S$GLB, | Performed by: FAMILY MEDICINE

## 2020-07-16 PROCEDURE — 3077F SYST BP >= 140 MM HG: CPT | Mod: HCNC,CPTII,S$GLB, | Performed by: FAMILY MEDICINE

## 2020-07-16 PROCEDURE — 3079F PR MOST RECENT DIASTOLIC BLOOD PRESSURE 80-89 MM HG: ICD-10-PCS | Mod: HCNC,CPTII,S$GLB, | Performed by: FAMILY MEDICINE

## 2020-07-16 PROCEDURE — 99397 PR PREVENTIVE VISIT,EST,65 & OVER: ICD-10-PCS | Mod: HCNC,S$GLB,, | Performed by: FAMILY MEDICINE

## 2020-07-16 PROCEDURE — 99499 RISK ADDL DX/OHS AUDIT: ICD-10-PCS | Mod: HCNC,S$GLB,, | Performed by: FAMILY MEDICINE

## 2020-07-16 PROCEDURE — 99397 PER PM REEVAL EST PAT 65+ YR: CPT | Mod: HCNC,S$GLB,, | Performed by: FAMILY MEDICINE

## 2020-07-16 RX ORDER — UMECLIDINIUM 62.5 UG/1
62.5 AEROSOL, POWDER ORAL DAILY
Qty: 60 EACH | Refills: 11 | Status: SHIPPED | OUTPATIENT
Start: 2020-07-16 | End: 2020-11-19 | Stop reason: SDUPTHER

## 2020-07-16 RX ORDER — BACITRACIN 500 UNIT/G
1 OINTMENT (GRAM) TOPICAL DAILY
Qty: 90 EACH | Refills: 3 | Status: SHIPPED | OUTPATIENT
Start: 2020-07-16

## 2020-07-16 RX ORDER — ROSUVASTATIN CALCIUM 5 MG/1
5 TABLET, COATED ORAL DAILY
Qty: 90 TABLET | Refills: 3 | Status: SHIPPED | OUTPATIENT
Start: 2020-07-16 | End: 2020-11-19 | Stop reason: SDUPTHER

## 2020-07-16 RX ORDER — ALBUTEROL SULFATE 90 UG/1
2 AEROSOL, METERED RESPIRATORY (INHALATION) EVERY 6 HOURS PRN
Qty: 8 G | Refills: 3 | Status: SHIPPED | OUTPATIENT
Start: 2020-07-16 | End: 2021-11-10 | Stop reason: SDUPTHER

## 2020-07-16 RX ORDER — POTASSIUM CHLORIDE 20 MEQ/1
40 TABLET, EXTENDED RELEASE ORAL DAILY
Qty: 180 TABLET | Refills: 3 | Status: SHIPPED | OUTPATIENT
Start: 2020-07-16 | End: 2021-07-19

## 2020-07-16 RX ORDER — IBUPROFEN 800 MG/1
800 TABLET ORAL EVERY 8 HOURS PRN
Qty: 90 TABLET | Refills: 3 | Status: SHIPPED | OUTPATIENT
Start: 2020-07-16 | End: 2021-10-29

## 2020-07-16 RX ORDER — FUROSEMIDE 20 MG/1
20 TABLET ORAL 2 TIMES DAILY PRN
Qty: 60 TABLET | Refills: 3 | Status: SHIPPED | OUTPATIENT
Start: 2020-07-16 | End: 2020-10-15

## 2020-07-16 RX ORDER — FLUTICASONE PROPIONATE AND SALMETEROL 50; 500 UG/1; UG/1
POWDER RESPIRATORY (INHALATION)
Qty: 1 EACH | Refills: 3 | Status: SHIPPED | OUTPATIENT
Start: 2020-07-16 | End: 2021-11-10 | Stop reason: SDUPTHER

## 2020-07-16 RX ORDER — FOLIC ACID 0.8 MG
800 TABLET ORAL DAILY
Qty: 100 TABLET | Refills: 3 | COMMUNITY
Start: 2020-07-16 | End: 2020-11-19 | Stop reason: SDUPTHER

## 2020-07-16 RX ORDER — VALSARTAN 320 MG/1
320 TABLET ORAL DAILY
Qty: 90 TABLET | Refills: 3 | Status: SHIPPED | OUTPATIENT
Start: 2020-07-16 | End: 2020-11-19 | Stop reason: SDUPTHER

## 2020-07-16 NOTE — PROGRESS NOTES
Michela Franco is a 75 y.o. female   Routine physical  Source of history: Patient  Past Medical History:   Diagnosis Date    Allergy     Chronic diastolic heart failure 4/5/2018    Hyperlipidemia     Hypertension     Neuromuscular disorder     JOHN on CPAP     Osteoporosis     Recurrent sinusitis 3/25/2014    Recurrent upper respiratory infection (URI)     Urticaria      Patient  reports that she quit smoking about 15 years ago. Her smoking use included cigarettes. She has a 10.00 pack-year smoking history. She has never used smokeless tobacco. She reports current alcohol use. She reports that she does not use drugs.  Family History   Problem Relation Age of Onset    No Known Problems Mother         healthy in 90s    No Known Problems Father         accident-related death in 50s    Kidney cancer Sister     Cancer Sister         renal    Hypertension Daughter     Ovarian cancer Sister     Hypertension Daughter      ROS:  GENERAL: No fever, chills, fatigability or weight loss.  SKIN:  Petechial  rash, itching or changes in color or texture of skin.  HEAD: No headaches or recent head trauma.  EYES: Visual acuity fine. No photophobia, ocular pain or diplopia.  EARS: Denies ear pain, discharge or vertigo.  NOSE: No loss of smell, no epistaxis or postnasal drip.  MOUTH & THROAT: No hoarseness or change in voice. No excessive gum bleeding.  NODES: Denies swollen glands.  CHEST: Denies SHAH, cyanosis, wheezing, cough and sputum production.  CARDIOVASCULAR: Denies chest pain, PND, orthopnea or reduced exercise tolerance.  ABDOMEN: Appetite fine. No weight loss. Denies diarrhea, abdominal pain, hematemesis or blood in stool.  URINARY: No flank pain, dysuria or hematuria.  PERIPHERAL VASCULAR: No claudication or cyanosis.  Edema  MUSCULOSKELETAL: No joint stiffness or swelling. Denies back pain.  NEUROLOGIC: No history of seizures, paralysis, alteration of gait or coordination.    OBJECTIVE:  APPEARANCE:   Overweight no acute distress   Vitals:    07/16/20 1535   BP: (!) 146/80   Pulse: 95   Temp: 97.3 °F (36.3 °C)     SKIN: Normal skin turgor, petechial rash on the extremities nonpalpable.    HEENT: Both external auditory canals clear. Both tympanic membranes intact. PERRL. EOMI. Disk margins sharp. No tonsillar enlargement. No pharyngeal erythema or exudate. No stridor.  NECK: No bruits. No cervical spine tenderness. No cervical lymphadenopathy. No thyromegaly.  NODES: No cervical, axillary or inguinal lymph node enlargement.  CHEST: Breath sounds clear bilaterally. Lungs clear to auscultation & percussion. Good air movement. No rales. No retractions. No rhonchi. No stridor. No wheezes.  CARDIOVASCULAR: Normal S1, S2. No murmurs. No edema.  BREASTS: no masses palpated in either breast or axillary area, symmetry noted.  ABDOMEN: Bowel sounds normal. No palpable aortic enlargement. No CVA tenderness. No pulsatile mass. No rebound tenderness.  PERIPHERAL VASCULAR: Femoral pulses present and symmetrical. No edema.  MUSCULOSKELETAL: Degenerative changes of both ankles, foot, knee, wrist and hand.  BACK: No CVA tenderness. There is no spasm, tenderness or radiculopathy noted with palpation and there is full range of motion.   NEUROLOGIC:   Cranial Nerves: II-XII grossly intact.  Motor: 5/5 strength major flexors/extensors. No tremor.  DTR's: Knees, Ankles 2+ and equal bilaterally; downgoing toes.  Sensory: Intact to light touch distally.  Gait & Posture: Normal gait and fine motion. No cerebellar signs.  MENTAL STATUS: Alert. Oriented x 3. Language skills normal.   Memory intact. No suicidal ideation. Normal affect. Normal cognitive functions.Well kept appearance.    ASSESSMENT/PLAN:   Michela was seen today for annual exam.    Diagnoses and all orders for this visit:    Hypertension, unspecified type  -     valsartan (DIOVAN) 320 MG tablet; Take 1 tablet (320 mg total) by mouth once daily.    Hyperlipidemia, unspecified  hyperlipidemia type  -     rosuvastatin (CRESTOR) 5 MG tablet; Take 1 tablet (5 mg total) by mouth once daily.    Moderate persistent asthma without complication  -     ADVAIR DISKUS 500-50 mcg/dose DsDv diskus inhaler; INHALE 1 PUFF TWICE DAILY    Chronic diastolic heart failure  -     furosemide (LASIX) 20 MG tablet; Take 1 tablet (20 mg total) by mouth 2 (two) times daily as needed (leg swelling).    Electrolyte abnormality  -     potassium chloride SA (K-DUR,KLOR-CON) 20 MEQ tablet; Take 2 tablets (40 mEq total) by mouth once daily.    Anemia, unspecified type  -     folic acid (FOLVITE) 800 MCG Tab; Take 1 tablet (800 mcg total) by mouth once daily.    Back pain, unspecified back location, unspecified back pain laterality, unspecified chronicity  -     ibuprofen (ADVIL,MOTRIN) 800 MG tablet; Take 1 tablet (800 mg total) by mouth every 8 (eight) hours as needed.    Asthma, unspecified asthma severity, unspecified whether complicated, unspecified whether persistent  -     umeclidinium (INCRUSE ELLIPTA) 62.5 mcg/actuation inhalation capsule; Inhale 1 capsule (63 mcg total) into the lungs once daily. Controller  -     albuterol (VENTOLIN HFA) 90 mcg/actuation inhaler; Inhale 2 puffs into the lungs every 6 (six) hours as needed for Wheezing. Rescue  -     ADVAIR DISKUS 500-50 mcg/dose DsDv diskus inhaler; INHALE 1 PUFF TWICE DAILY    Other screening mammogram  -     Mammo Digital Screening Bilateral With CAD; Future    Hepatomegaly  -     milk thistle 150 mg Cap; Take 1 capsule by mouth once daily.     Petechial drug rash     D/c norvas probable culprit  Will contact patient results of pending test when available . pre visit labs were discussed no areas of concern noted

## 2020-07-17 NOTE — PROGRESS NOTES
Patient Michela Franco, MRN [id number], was prescribed Advair during her visit on 7/16/20 in order to treat chronic obstructive pulmonary disease (ICD 10 J44.9). This addendum is made to the medical record on 07/16/2020.

## 2020-07-21 ENCOUNTER — TELEPHONE (OUTPATIENT)
Dept: ORTHOPEDICS | Facility: CLINIC | Age: 75
End: 2020-07-21

## 2020-07-21 NOTE — TELEPHONE ENCOUNTER
----- Message from Alberta Adler sent at 7/21/2020  3:11 PM CDT -----  Type: Needs Medical Advice  Who Called:  patient  Best Call Back Number: [phone]  Additional Information: patient states that she is having the same issue again with her hip. Feels like bone to bone rubbing together. Please give call back

## 2020-07-30 ENCOUNTER — OFFICE VISIT (OUTPATIENT)
Dept: ORTHOPEDICS | Facility: CLINIC | Age: 75
End: 2020-07-30
Payer: MEDICARE

## 2020-07-30 VITALS — BODY MASS INDEX: 34.49 KG/M2 | WEIGHT: 207 LBS | HEIGHT: 65 IN

## 2020-07-30 DIAGNOSIS — M87.052 AVASCULAR NECROSIS OF BONE OF LEFT HIP: Primary | ICD-10-CM

## 2020-07-30 PROCEDURE — 99213 OFFICE O/P EST LOW 20 MIN: CPT | Mod: HCNC,S$GLB,, | Performed by: ORTHOPAEDIC SURGERY

## 2020-07-30 PROCEDURE — 3008F PR BODY MASS INDEX (BMI) DOCUMENTED: ICD-10-PCS | Mod: HCNC,CPTII,S$GLB, | Performed by: ORTHOPAEDIC SURGERY

## 2020-07-30 PROCEDURE — 1125F PR PAIN SEVERITY QUANTIFIED, PAIN PRESENT: ICD-10-PCS | Mod: HCNC,S$GLB,, | Performed by: ORTHOPAEDIC SURGERY

## 2020-07-30 PROCEDURE — 1125F AMNT PAIN NOTED PAIN PRSNT: CPT | Mod: HCNC,S$GLB,, | Performed by: ORTHOPAEDIC SURGERY

## 2020-07-30 PROCEDURE — 1101F PT FALLS ASSESS-DOCD LE1/YR: CPT | Mod: HCNC,CPTII,S$GLB, | Performed by: ORTHOPAEDIC SURGERY

## 2020-07-30 PROCEDURE — 1159F MED LIST DOCD IN RCRD: CPT | Mod: HCNC,S$GLB,, | Performed by: ORTHOPAEDIC SURGERY

## 2020-07-30 PROCEDURE — 99213 PR OFFICE/OUTPT VISIT, EST, LEVL III, 20-29 MIN: ICD-10-PCS | Mod: HCNC,S$GLB,, | Performed by: ORTHOPAEDIC SURGERY

## 2020-07-30 PROCEDURE — 99999 PR PBB SHADOW E&M-EST. PATIENT-LVL III: CPT | Mod: PBBFAC,HCNC,, | Performed by: ORTHOPAEDIC SURGERY

## 2020-07-30 PROCEDURE — 1159F PR MEDICATION LIST DOCUMENTED IN MEDICAL RECORD: ICD-10-PCS | Mod: HCNC,S$GLB,, | Performed by: ORTHOPAEDIC SURGERY

## 2020-07-30 PROCEDURE — 3008F BODY MASS INDEX DOCD: CPT | Mod: HCNC,CPTII,S$GLB, | Performed by: ORTHOPAEDIC SURGERY

## 2020-07-30 PROCEDURE — 99999 PR PBB SHADOW E&M-EST. PATIENT-LVL III: ICD-10-PCS | Mod: PBBFAC,HCNC,, | Performed by: ORTHOPAEDIC SURGERY

## 2020-07-30 PROCEDURE — 1101F PR PT FALLS ASSESS DOC 0-1 FALLS W/OUT INJ PAST YR: ICD-10-PCS | Mod: HCNC,CPTII,S$GLB, | Performed by: ORTHOPAEDIC SURGERY

## 2020-07-30 RX ORDER — ALENDRONATE SODIUM 70 MG/1
70 TABLET ORAL
Qty: 12 TABLET | Refills: 1 | Status: SHIPPED | OUTPATIENT
Start: 2020-07-30 | End: 2020-11-19 | Stop reason: SDUPTHER

## 2020-07-30 NOTE — PROGRESS NOTES
Subjective:      Patient ID: Michela Franco is a 75 y.o. female.    Chief Complaint: Pain of the Right Hip and Pain of the Left Hip    HPI  Follow-up for osteonecrosis.  The patient feels like she has had some recurrence pain that is especially bothersome at start-up.  Thumbs are in both hips.  She also acknowledges a long history of back problems which may contribute.          Review of Systems   Musculoskeletal: Positive for back pain and joint pain.         Objective:      Ortho/SPM Exam      Right hip  The patient is not in acute distress.   Body habitus is:normal.   Sclerae normal  The patient walks without a limp.   Respiratory distress:  none  The skin over the hip is:intact.   There is no local tenderness.   Range of motion- Flexion full, External rotation full, internal rotation full.  Resisted SLR negative.  Pain with rotation negative  Sciatic tension findings negative.  Shortening/lengthening compared to the contralateral side absent.  Pulses DP present, PT present.  Motor normal 5/5 strength in all tested muscle groups.   Sensory normal.    The left is identical              Assessment:       No diagnosis found.   Given the patient's physical examination it is not likely that she has had any major anatomic progression.          Plan:       There are no diagnoses linked to this encounter.      I explained my diagnostic impression and the reasoning behind it in detail, using layman's terms.  Models and/or pictures were used to help in the explanation.    Although arthroplasty is an option, I would recommend that we try the Fosamax again because of the good early response.    Patient gave informed consent for this    I explained the potential role of surgery in the treatment of this condition to the patient.  They understand that if nonsurgical measures do not adequately control symptoms, surgery will be considered in the future.    X-ray follow-up

## 2020-08-15 ENCOUNTER — HOSPITAL ENCOUNTER (OUTPATIENT)
Dept: RADIOLOGY | Facility: HOSPITAL | Age: 75
Discharge: HOME | End: 2020-08-15
Attending: FAMILY MEDICINE
Payer: MEDICARE

## 2020-08-15 DIAGNOSIS — Z12.31 OTHER SCREENING MAMMOGRAM: ICD-10-CM

## 2020-08-15 PROCEDURE — 77063 MAMMO DIGITAL SCREENING BILAT WITH TOMOSYNTHESIS_CAD: ICD-10-PCS | Mod: 26,HCNC,, | Performed by: RADIOLOGY

## 2020-08-15 PROCEDURE — 77067 MAMMO DIGITAL SCREENING BILAT WITH TOMOSYNTHESIS_CAD: ICD-10-PCS | Mod: 26,HCNC,, | Performed by: RADIOLOGY

## 2020-08-15 PROCEDURE — 77067 SCR MAMMO BI INCL CAD: CPT | Mod: 26,HCNC,, | Performed by: RADIOLOGY

## 2020-08-15 PROCEDURE — 77067 SCR MAMMO BI INCL CAD: CPT | Mod: TC,HCNC

## 2020-08-15 PROCEDURE — 77063 BREAST TOMOSYNTHESIS BI: CPT | Mod: 26,HCNC,, | Performed by: RADIOLOGY

## 2020-09-04 ENCOUNTER — PATIENT OUTREACH (OUTPATIENT)
Dept: ADMINISTRATIVE | Facility: HOSPITAL | Age: 75
End: 2020-09-04

## 2020-09-04 NOTE — PROGRESS NOTES
Pre-visit chart review completed.  Immunizations reviewed and updated.    Patient due for the following    Influenza Vaccine (1)

## 2020-09-18 ENCOUNTER — OFFICE VISIT (OUTPATIENT)
Dept: PRIMARY CARE CLINIC | Facility: CLINIC | Age: 75
End: 2020-09-18
Payer: MEDICARE

## 2020-09-18 ENCOUNTER — IMMUNIZATION (OUTPATIENT)
Dept: PHARMACY | Facility: CLINIC | Age: 75
End: 2020-09-18
Payer: MEDICARE

## 2020-09-18 VITALS
BODY MASS INDEX: 33.65 KG/M2 | HEIGHT: 65 IN | WEIGHT: 201.94 LBS | SYSTOLIC BLOOD PRESSURE: 140 MMHG | HEART RATE: 88 BPM | DIASTOLIC BLOOD PRESSURE: 90 MMHG | TEMPERATURE: 96 F

## 2020-09-18 DIAGNOSIS — I10 HYPERTENSION, UNSPECIFIED TYPE: Primary | ICD-10-CM

## 2020-09-18 PROCEDURE — 3077F SYST BP >= 140 MM HG: CPT | Mod: HCNC,CPTII,S$GLB, | Performed by: FAMILY MEDICINE

## 2020-09-18 PROCEDURE — 1101F PR PT FALLS ASSESS DOC 0-1 FALLS W/OUT INJ PAST YR: ICD-10-PCS | Mod: HCNC,CPTII,S$GLB, | Performed by: FAMILY MEDICINE

## 2020-09-18 PROCEDURE — 99214 OFFICE O/P EST MOD 30 MIN: CPT | Mod: HCNC,S$GLB,, | Performed by: FAMILY MEDICINE

## 2020-09-18 PROCEDURE — 3008F PR BODY MASS INDEX (BMI) DOCUMENTED: ICD-10-PCS | Mod: HCNC,CPTII,S$GLB, | Performed by: FAMILY MEDICINE

## 2020-09-18 PROCEDURE — 3080F DIAST BP >= 90 MM HG: CPT | Mod: HCNC,CPTII,S$GLB, | Performed by: FAMILY MEDICINE

## 2020-09-18 PROCEDURE — 99999 PR PBB SHADOW E&M-EST. PATIENT-LVL V: CPT | Mod: PBBFAC,HCNC,, | Performed by: FAMILY MEDICINE

## 2020-09-18 PROCEDURE — 1126F AMNT PAIN NOTED NONE PRSNT: CPT | Mod: HCNC,S$GLB,, | Performed by: FAMILY MEDICINE

## 2020-09-18 PROCEDURE — 3077F PR MOST RECENT SYSTOLIC BLOOD PRESSURE >= 140 MM HG: ICD-10-PCS | Mod: HCNC,CPTII,S$GLB, | Performed by: FAMILY MEDICINE

## 2020-09-18 PROCEDURE — 1159F PR MEDICATION LIST DOCUMENTED IN MEDICAL RECORD: ICD-10-PCS | Mod: HCNC,S$GLB,, | Performed by: FAMILY MEDICINE

## 2020-09-18 PROCEDURE — 99214 PR OFFICE/OUTPT VISIT, EST, LEVL IV, 30-39 MIN: ICD-10-PCS | Mod: HCNC,S$GLB,, | Performed by: FAMILY MEDICINE

## 2020-09-18 PROCEDURE — 1126F PR PAIN SEVERITY QUANTIFIED, NO PAIN PRESENT: ICD-10-PCS | Mod: HCNC,S$GLB,, | Performed by: FAMILY MEDICINE

## 2020-09-18 PROCEDURE — 3080F PR MOST RECENT DIASTOLIC BLOOD PRESSURE >= 90 MM HG: ICD-10-PCS | Mod: HCNC,CPTII,S$GLB, | Performed by: FAMILY MEDICINE

## 2020-09-18 PROCEDURE — 1101F PT FALLS ASSESS-DOCD LE1/YR: CPT | Mod: HCNC,CPTII,S$GLB, | Performed by: FAMILY MEDICINE

## 2020-09-18 PROCEDURE — 1159F MED LIST DOCD IN RCRD: CPT | Mod: HCNC,S$GLB,, | Performed by: FAMILY MEDICINE

## 2020-09-18 PROCEDURE — 99999 PR PBB SHADOW E&M-EST. PATIENT-LVL V: ICD-10-PCS | Mod: PBBFAC,HCNC,, | Performed by: FAMILY MEDICINE

## 2020-09-18 PROCEDURE — 3008F BODY MASS INDEX DOCD: CPT | Mod: HCNC,CPTII,S$GLB, | Performed by: FAMILY MEDICINE

## 2020-09-18 RX ORDER — ISRADIPINE 2.5 MG/1
2.5 CAPSULE ORAL 2 TIMES DAILY
Qty: 60 CAPSULE | Refills: 11 | Status: SHIPPED | OUTPATIENT
Start: 2020-09-18 | End: 2020-09-18 | Stop reason: SDUPTHER

## 2020-09-18 RX ORDER — ISRADIPINE 2.5 MG/1
2.5 CAPSULE ORAL 2 TIMES DAILY
Qty: 60 CAPSULE | Refills: 11 | Status: SHIPPED | OUTPATIENT
Start: 2020-09-18 | End: 2020-10-28 | Stop reason: SDUPTHER

## 2020-09-21 NOTE — PROGRESS NOTES
Subjective:       Patient ID: Michela Franco is a 75 y.o. female.    Chief Complaint: Hypertension  pt's blood pressures have been elevated inspite of taking her medications  HPIsee above  Review of Systems   Cardiovascular:        Elevated blood pressures   All other systems reviewed and are negative.        Objective:      Physical Exam  Constitutional:       General: She is not in acute distress.     Appearance: She is obese. She is not ill-appearing, toxic-appearing or diaphoretic.   HENT:      Head: Normocephalic and atraumatic.      Right Ear: Tympanic membrane, ear canal and external ear normal.      Left Ear: Tympanic membrane, ear canal and external ear normal.      Nose: Nose normal.      Mouth/Throat:      Mouth: Mucous membranes are moist.   Eyes:      Extraocular Movements: Extraocular movements intact.      Conjunctiva/sclera: Conjunctivae normal.      Pupils: Pupils are equal, round, and reactive to light.   Cardiovascular:      Rate and Rhythm: Normal rate and regular rhythm.      Pulses: Normal pulses.      Heart sounds: No murmur.   Pulmonary:      Effort: Pulmonary effort is normal.      Breath sounds: Normal breath sounds. No rhonchi.   Abdominal:      General: Abdomen is flat. Bowel sounds are normal.      Palpations: Abdomen is soft.   Musculoskeletal: Normal range of motion.   Skin:     General: Skin is warm and dry.      Capillary Refill: Capillary refill takes less than 2 seconds.   Neurological:      General: No focal deficit present.      Mental Status: She is alert. Mental status is at baseline. She is disoriented.      Cranial Nerves: No cranial nerve deficit.      Sensory: No sensory deficit.      Motor: No weakness.      Coordination: Coordination normal.      Gait: Gait normal.      Deep Tendon Reflexes: Reflexes normal.   Psychiatric:         Mood and Affect: Mood normal.         Behavior: Behavior normal.         Thought Content: Thought content normal.         Judgment: Judgment  normal.         Assessment:       1. Hypertension, unspecified type     2.     Asthma  Plan:     Michela was seen today for hypertension.    Diagnoses and all orders for this visit:    Hypertension, unspecified type  -     Discontinue: isradipine (DYNACIRC) 2.5 mg Cap; Take 1 capsule (2.5 mg total) by mouth 2 (two) times daily.  -     isradipine (DYNACIRC) 2.5 mg Cap; Take 1 capsule (2.5 mg total) by mouth 2 (two) times daily.     f/u in one to two months to reevaluate pressure

## 2020-09-25 ENCOUNTER — PATIENT OUTREACH (OUTPATIENT)
Dept: OTHER | Facility: OTHER | Age: 75
End: 2020-09-25

## 2020-09-25 PROCEDURE — 99453 PR REMOTE MONITR, PHYSIOL PARAM, INITIAL: ICD-10-PCS | Mod: S$GLB,,, | Performed by: FAMILY MEDICINE

## 2020-09-25 PROCEDURE — 99453 REM MNTR PHYSIOL PARAM SETUP: CPT | Mod: S$GLB,,, | Performed by: FAMILY MEDICINE

## 2020-09-25 NOTE — PROGRESS NOTES
Digital Medicine: Health  Introduction    Introduced Michela Franco to Digital Medicine. Discussed health  role and recommended lifestyle modifications.    The history is provided by the patient.           Additional Enrollment Details: Introduced patient to program and myself as new HC. Patient and I reviewed program goals/requirements, proper BP testing protocol, and contact information. Will review physical activity levels, daily diet, etc at next encounter. Patient reports that she was just prescribed a new BP medication, Isradipine. Patient reports that she takes this 2x per day and then also takes valsartan at night. Patient and I found a few things with BP readings that she can do differently. Will f/u in 3-4 weeks to check in.    HYPERTENSION  Explained hypertension digital medicine goals including BP goal less than or equal to 130/80mmHg, improved convenience of BP management and reduced risk of heart attack, kidney failure, stroke, eye disease, dementia, and death.      Explained that we expect patient to submit several blood pressure readings per week at random times of the day, but at least 30 minutes after taking blood pressure medications. Instructed patient not to allow anyone else to use their blood pressure monitor and phone as data submitted is directly entered into medical record. Reviewed and confirmed appropriate blood pressure monitoring technique.         Patient's BP goal is less than or equal to 130/80.Patient's BP average is 158/79 mmHg, which is above goal, per 2017 ACC/AHA Hypertension Guidelines.            Diet-Not assessed          Physical Activity-Not assessed    Medication Adherence-Medication Adherence not addressed.      Substance, Sleep, Stress-Not assessed      Continue current diet/physical activity routine.       Addressed patient questions and patient has my contact information if needed prior to next outreach. Patient verbalizes understanding.          There are  no preventive care reminders to display for this patient.    Last 5 Patient Entered Readings                                      Current 30 Day Average: 158/79     Recent Readings 9/24/2020 9/23/2020 9/22/2020 9/22/2020 9/21/2020    SBP (mmHg) 157 153 147 147 176    DBP (mmHg) 86 81 78 78 74    Pulse 69 72 80 80 74

## 2020-09-25 NOTE — LETTER
September 25, 2020     Michela Franco  [address]  [place] [address]       Dear Michela,    Welcome to [facility]! Our goal is to make care effective, proactive and convenient by using data you send us from home to better treat your chronic conditions.                My name is Tkeyah Bazin, and I am your dedicated Digital Medicine clinician. As an expert in medication management, I will help ensure that the medications you are taking continue to provide the intended benefits and help you reach your goals. You can reach me directly at [phone] or by sending me a message directly through your [facility] account.      I am Dedrick Adams and I will be your health . My job is to help you identify lifestyle changes to improve your disease control. We will talk about nutrition, exercise, and other ways you may be able to adjust your current habits to better your health. Additionally, we will help ensure you are completing the tests and screenings that are necessary to help manage your conditions. You can reach me directly at [phone] or by sending me a message directly through your [facility] account.    Most importantly, YOU are at the center of this team. Together, we will work to improve your overall health and encourage you to meet your goals for a healthier lifestyle.     What we expect from YOU:  · Please take frequent home blood pressure measurements. We ask that you take at least 1 blood pressure reading per week, but more information will better help us get you know you. Be sure you rest for a few minutes before taking the reading in a quiet, comfortable place.     Be available to receive phone calls or [facility] messages, when appropriate, from your care team. Please let us know if there are any specific days or times that work best for us to reach you via phone.     Complete routine tests and screenings. Dont worry, we will help keep you on track!           What you  should expect from your Digital Medicine Care Team:   We will work with you to create a personalized plan of care and provide you with encouragement and education, including regarding lifestyle changes, that could help you manage your disease states.     We will adjust your current medications, if needed, and continue to monitor your long-term progress.     We will provide you and your physician with monthly progress reports after you have been in the program for more than 30 days.     We will send you reminders through [facility] and text messages to help ensure you do not miss any testing deadlines to help manage your disease states.    You will be able to reach us by phone or through your [facility] account by clicking our names under Care Team on the right side of the home screen.    We look forward to working with you to help manage your health,    Sincerely,    Your Digital Medicine Team    Please visit our websites to learn more:   · Hypertension: [email or web address]      Remember, we are not available for emergencies. If you have an emergency, please contact your doctors office directly or call [facility] on-call ([phone] or [phone]) or 911.

## 2020-10-05 ENCOUNTER — TELEPHONE (OUTPATIENT)
Dept: PRIMARY CARE CLINIC | Facility: CLINIC | Age: 75
End: 2020-10-05

## 2020-10-05 NOTE — TELEPHONE ENCOUNTER
----- Message from Robina Barros sent at 10/5/2020  1:20 PM CDT -----  Contact: Pt- [phone]  Type:  Needs Medical Advice    Who Called:  Pt    Symptoms (please be specific): most recent radiology report    Would the patient rather a call back or a response via [facility]? FAX: [phone]    Best Call Back Number: [phone]    Additional Information: Pt called to request a copy most recent radiology report. She is requesting a call back.

## 2020-10-09 ENCOUNTER — PATIENT OUTREACH (OUTPATIENT)
Dept: OTHER | Facility: OTHER | Age: 75
End: 2020-10-09

## 2020-10-09 NOTE — PROGRESS NOTES
"Digital Medicine: Clinician Introduction    Michela Franco is a 75 y.o. female who is newly enrolled in the Digital Medicine Clinic.    Spoke with patient regarding introduction to HDM. Patient confirms that she recently started new medication isradipine as prescribed by her PCP on 9/18/2020. She has a follow-up appointment on 11/19/2020. Patient expressed some concern regarding valsartan being "hard on her kidneys". Assured patient that her care team monitors her labs closely for changes in renal function.    The history is provided by the patient.      Review of patient's allergies indicates:   -- Prednisone -- Other (See Comments)    --  Bone loss   -- Adhesive     --  PAPER TAPE   -- Diazepam -- Other (See Comments)    --  Nervous, jittery   -- Gabapentin     --  Nervous   -- Keppra (levetiracetam)     --  nervous   -- Mold     --  sneezing  Completed Medication Reconciliation  Verified pharmacy information.    HYPERTENSION  Explained hypertension digital medicine goals including BP goal less than or equal to 130/80mmHg, improved convenience of BP management and reduced risk of heart attack, kidney failure, stroke, eye disease, dementia, and death.     Explained non-pharmacologic therapies like low salt diet and physical activity can reduce blood pressure.       Explained that we expect patient to submit several blood pressure readings per week at random times of the day, but at least 30 minutes after taking blood pressure medications. Instructed patient not to allow anyone else to use their blood pressure monitor and phone as data submitted is directly entered into medical record. Reviewed and confirmed appropriate blood pressure monitoring technique.         Reviewed signs/symptoms of hypertension (headache, changes in vision, chest pain, shortness of breath)   Reviewed signs/symptoms of hypotension (lightheaded, dizziness, weakness)     Patient's BP goal is less than or equal to 130/80. Patients BP average " is 149/82 mmHg, which is above goal, per 2017 ACC/AHA Hypertension Guidelines.     Last 5 Patient Entered Readings                                      Current 30 Day Average: 149/82     Recent Readings 10/6/2020 10/2/2020 10/1/2020 9/30/2020 9/29/2020    SBP (mmHg) 144 137 148 154 147    DBP (mmHg) 88 86 87 84 80    Pulse 68 78 66 74 77          Depression Screening  Michela Franco did not answer the depression screening.     Sleep Apnea Screening  Patient not previously diagnosed with JOHN       Medication Affordability Screening  Patient did not answer the medication affordability questionnaires. Patient is currently not having problems affording medications    Medication Adherence-Medication adherence was assessed.  Patient continue taking medication as prescribed.            ASSESSMENT(S)  Patients BP average is 149/82 mmHg, which is above goal. Patient's BP goal is less than or equal to 130/80.     Hypertension Plan  Additional monitoring needed.  Continue current therapy.  Provided patient education.       Addressed patient questions and patient has my contact information if needed prior to next outreach. Patient verbalizes understanding.      Explained the importance of self-monitoring and medication adherence. Encouraged the patient to communicate with their health  for lifestyle modifications to help improve or maintain a healthy lifestyle.        Sent link to [facility] Digital [facility] webpages and my contact information via [employer] for future questions.        Explained to the patient that the Digital Medicine team is not available for emergencies. Advised patient call [facility] On Call ([phone] or [phone]) or 911 if needed.            There are no preventive care reminders to display for this patient.       Current Medication Regimen:  Hypertension Medications             furosemide (LASIX) 20 MG tablet Take 1 tablet (20 mg total) by mouth 2 (two) times daily as needed (leg swelling).     isradipine (DYNACIRC) 2.5 mg Cap Take 1 capsule (2.5 mg total) by mouth 2 (two) times daily.    valsartan (DIOVAN) 320 MG tablet Take 1 tablet (320 mg total) by mouth once daily.

## 2020-10-28 DIAGNOSIS — I10 HYPERTENSION, UNSPECIFIED TYPE: ICD-10-CM

## 2020-10-28 RX ORDER — ISRADIPINE 2.5 MG/1
2.5 CAPSULE ORAL 2 TIMES DAILY
Qty: 60 CAPSULE | Refills: 11 | Status: SHIPPED | OUTPATIENT
Start: 2020-10-28 | End: 2020-11-19 | Stop reason: SDUPTHER

## 2020-10-30 ENCOUNTER — PATIENT OUTREACH (OUTPATIENT)
Dept: OTHER | Facility: OTHER | Age: 75
End: 2020-10-30

## 2020-11-04 NOTE — PROGRESS NOTES
Digital Medicine: Clinician Follow-Up    Called patient to review HDMP readings.     The history is provided by the patient.      Review of patient's allergies indicates:   -- Prednisone -- Other (See Comments)    --  Bone loss   -- Adhesive     --  PAPER TAPE   -- Diazepam -- Other (See Comments)    --  Nervous, jittery   -- Gabapentin     --  Nervous   -- Keppra (levetiracetam)     --  nervous   -- Mold     --  sneezing  Follow-up reason(s): routine follow up.     Hypertension    Readings are trending up due to lifestyle change.    Additional Follow-up details: Patient states she consumed more alcohol than she ever had before while on vacation. She was gone for ~ 1 week and states she didn't dink as much water as she normally would and her diet was much worse while out of town. She attributes all of these things to elevated readings. She would like some time to work on making improvements. She has a follow-up appointment with Dr. Nascimento on 11/19/2020.      Last 5 Patient Entered Readings                                      Current 30 Day Average: 143/83     Recent Readings 11/4/2020 11/4/2020 11/3/2020 11/3/2020 10/28/2020    SBP (mmHg) 151 152 147 167 142    DBP (mmHg) 80 89 88 86 84    Pulse 70 70 77 81 68          Depression Screening  Did not address depression screening.    Sleep Apnea Screening    Did not address sleep apnea screening.     Medication Affordability Screening  Did not address medication affordability screening.     Medication Adherence-Medication adherence was assessed.          ASSESSMENT(S)  Patients BP average is 147/85 mmHg, which is above goal. Patient's BP goal is less than or equal to 130/80.     Readings likely much more uncontrolled due to lifestyle changes while on vacation.       Hypertension Plan  Additional monitoring needed.  Continue current therapy.  Provided patient education.  Consider addition of another agent if needed for additional BP lowering at follow-up.      Addressed patient questions and patient has my contact information if needed prior to next outreach. Patient verbalizes understanding.             There are no preventive care reminders to display for this patient.  There are no preventive care reminders to display for this patient.      Hypertension Medications             furosemide (LASIX) 20 MG tablet TAKE 1 TABLET TWICE DAILY AS NEEDED (LEG SWELLING).    isradipine (DYNACIRC) 2.5 mg Cap Take 1 capsule (2.5 mg total) by mouth 2 (two) times daily.    valsartan (DIOVAN) 320 MG tablet Take 1 tablet (320 mg total) by mouth once daily.

## 2020-11-18 ENCOUNTER — PATIENT OUTREACH (OUTPATIENT)
Dept: OTHER | Facility: OTHER | Age: 75
End: 2020-11-18

## 2020-11-18 NOTE — PROGRESS NOTES
Digital Medicine: Health  Follow-Up    The history is provided by the patient.             Reason for review: Blood pressure not at goal        Topics Covered on Call: physical activity, Diet and sleep quality, medication adherence,     Additional Follow-up details: Patient reports that she is doing pretty well. Patient AVG BP is above goal and trending up. Patient reports that she recently was on vacation in [place] for a few weeks and her diet was drastically changes as was her activity levels and attributes elevated readings to this. Patient and I discussed factors that affect BP, typical activity levels, and daily diet. Patient reports elevated stress levels from taking care of her 95 y/o mother. Patient reports no chronic pain. Will f/u in 4-6 weeks to check in.             Diet-Change  No 24 hour dietary recall  Patient reports eating or drinking the following: Patient and I discussed how sodium can impact BP, ways to reduce sodium in diet, checking nutrition labels to create awareness and track sodium intake, common food items with a high sodium content, etc. Patient reports that she does not eat out at all when she is home. Patient cooks from scratch, uses fresh fruits and vegetables in her diet, has almost eliminated almost all sugar from her diet, does not cook with sodium and instead uses fresh herbs and no sodium seasonings such as Mrs. Henderson. Patient reports that she has lost about 13-14 lbs in the last few months. Encouraged patient to keep up the good work.   Dietary Improvements:Cooks from scratch, does not cook with salt, eliminated most sugar from diet, does not eat out, etc.         Barriers to a Healthy Diet: Diet changes when patient is out of town.       Physical Activity-Change      She exercises for 30 minutes per day 7 day(s) a week.     She added walking to Her physical activity routine.        Additional physical activity details: Patient and I discussed how activity can impact BP in  the short and long term. Patient reports that she strives for 10,000 steps per day and will get that or close to it sometimes. Patient also uses a fitbit as a reminder. Patient aims for 9 hours of activity per day as well as has a reminder go off every 30 minutes for patient to be active. Encouraged patient to keep up the good work.       Medication Adherence-Medication adherence was assessed.        Substance, Sleep, Stress-change  stress-assessed  Details:Patient reports some stress from taking care of her 95 y/o mother  Intervention(s):    Sleep-assessed  Details:PAtient uses a CPAP to sleep with, feels rested, and gets 8-10 hours of sleep per night  Intervention(s):    Alcohol -assessed  Details:PAtient may have 1-2 glasses of wine per week  Intervention(s):    Tobacco-Assessed  Details:Patient hasn't smoked in over 25 years  Intervention(s):          Continue current diet/physical activity routine. Encouraged patient to keep up the good work.       Addressed patient questions and patient has my contact information if needed prior to next outreach. Patient verbalizes understanding.      Explained the importance of self-monitoring and medication adherence. Encouraged the patient to communicate with their health  for lifestyle modifications to help improve or maintain a healthy lifestyle.               There are no preventive care reminders to display for this patient.      Last 5 Patient Entered Readings                                      Current 30 Day Average: 146/85     Recent Readings 11/17/2020 11/16/2020 11/16/2020 11/13/2020 11/9/2020    SBP (mmHg) 147 154 163 140 149    DBP (mmHg) 82 83 94 85 85    Pulse 78 74 70 79 73

## 2020-11-19 ENCOUNTER — TELEPHONE (OUTPATIENT)
Dept: ORTHOPEDICS | Facility: CLINIC | Age: 75
End: 2020-11-19

## 2020-11-19 ENCOUNTER — OFFICE VISIT (OUTPATIENT)
Dept: PRIMARY CARE CLINIC | Facility: CLINIC | Age: 75
End: 2020-11-19
Payer: MEDICARE

## 2020-11-19 VITALS
HEART RATE: 75 BPM | OXYGEN SATURATION: 99 % | HEIGHT: 64 IN | TEMPERATURE: 98 F | DIASTOLIC BLOOD PRESSURE: 70 MMHG | BODY MASS INDEX: 31.24 KG/M2 | SYSTOLIC BLOOD PRESSURE: 140 MMHG | WEIGHT: 183 LBS

## 2020-11-19 DIAGNOSIS — J45.40 MODERATE PERSISTENT ASTHMA WITHOUT COMPLICATION: ICD-10-CM

## 2020-11-19 DIAGNOSIS — I50.32 CHRONIC DIASTOLIC HEART FAILURE: ICD-10-CM

## 2020-11-19 DIAGNOSIS — J45.909 ASTHMA, UNSPECIFIED ASTHMA SEVERITY, UNSPECIFIED WHETHER COMPLICATED, UNSPECIFIED WHETHER PERSISTENT: ICD-10-CM

## 2020-11-19 DIAGNOSIS — H26.9 CATARACT, UNSPECIFIED CATARACT TYPE, UNSPECIFIED LATERALITY: ICD-10-CM

## 2020-11-19 DIAGNOSIS — R22.1 MASS IN NECK: ICD-10-CM

## 2020-11-19 DIAGNOSIS — M54.9 BACK PAIN, UNSPECIFIED BACK LOCATION, UNSPECIFIED BACK PAIN LATERALITY, UNSPECIFIED CHRONICITY: ICD-10-CM

## 2020-11-19 DIAGNOSIS — M19.90 OSTEOARTHRITIS, UNSPECIFIED OSTEOARTHRITIS TYPE, UNSPECIFIED SITE: Primary | ICD-10-CM

## 2020-11-19 DIAGNOSIS — M87.052 AVASCULAR NECROSIS OF BONE OF LEFT HIP: ICD-10-CM

## 2020-11-19 DIAGNOSIS — I10 HYPERTENSION, UNSPECIFIED TYPE: ICD-10-CM

## 2020-11-19 DIAGNOSIS — E78.5 HYPERLIPIDEMIA, UNSPECIFIED HYPERLIPIDEMIA TYPE: ICD-10-CM

## 2020-11-19 DIAGNOSIS — D64.9 ANEMIA, UNSPECIFIED TYPE: ICD-10-CM

## 2020-11-19 PROCEDURE — 1126F PR PAIN SEVERITY QUANTIFIED, NO PAIN PRESENT: ICD-10-PCS | Mod: HCNC,S$GLB,, | Performed by: FAMILY MEDICINE

## 2020-11-19 PROCEDURE — 99499 RISK ADDL DX/OHS AUDIT: ICD-10-PCS | Mod: S$GLB,,, | Performed by: FAMILY MEDICINE

## 2020-11-19 PROCEDURE — 3077F PR MOST RECENT SYSTOLIC BLOOD PRESSURE >= 140 MM HG: ICD-10-PCS | Mod: HCNC,CPTII,S$GLB, | Performed by: FAMILY MEDICINE

## 2020-11-19 PROCEDURE — 3288F PR FALLS RISK ASSESSMENT DOCUMENTED: ICD-10-PCS | Mod: HCNC,CPTII,S$GLB, | Performed by: FAMILY MEDICINE

## 2020-11-19 PROCEDURE — 99999 PR PBB SHADOW E&M-EST. PATIENT-LVL V: ICD-10-PCS | Mod: PBBFAC,HCNC,, | Performed by: FAMILY MEDICINE

## 2020-11-19 PROCEDURE — 3288F FALL RISK ASSESSMENT DOCD: CPT | Mod: HCNC,CPTII,S$GLB, | Performed by: FAMILY MEDICINE

## 2020-11-19 PROCEDURE — 99999 PR PBB SHADOW E&M-EST. PATIENT-LVL V: CPT | Mod: PBBFAC,HCNC,, | Performed by: FAMILY MEDICINE

## 2020-11-19 PROCEDURE — 1101F PR PT FALLS ASSESS DOC 0-1 FALLS W/OUT INJ PAST YR: ICD-10-PCS | Mod: HCNC,CPTII,S$GLB, | Performed by: FAMILY MEDICINE

## 2020-11-19 PROCEDURE — 3078F DIAST BP <80 MM HG: CPT | Mod: HCNC,CPTII,S$GLB, | Performed by: FAMILY MEDICINE

## 2020-11-19 PROCEDURE — 1101F PT FALLS ASSESS-DOCD LE1/YR: CPT | Mod: HCNC,CPTII,S$GLB, | Performed by: FAMILY MEDICINE

## 2020-11-19 PROCEDURE — 99214 OFFICE O/P EST MOD 30 MIN: CPT | Mod: HCNC,S$GLB,, | Performed by: FAMILY MEDICINE

## 2020-11-19 PROCEDURE — 3077F SYST BP >= 140 MM HG: CPT | Mod: HCNC,CPTII,S$GLB, | Performed by: FAMILY MEDICINE

## 2020-11-19 PROCEDURE — 99214 PR OFFICE/OUTPT VISIT, EST, LEVL IV, 30-39 MIN: ICD-10-PCS | Mod: HCNC,S$GLB,, | Performed by: FAMILY MEDICINE

## 2020-11-19 PROCEDURE — 3078F PR MOST RECENT DIASTOLIC BLOOD PRESSURE < 80 MM HG: ICD-10-PCS | Mod: HCNC,CPTII,S$GLB, | Performed by: FAMILY MEDICINE

## 2020-11-19 PROCEDURE — 3008F BODY MASS INDEX DOCD: CPT | Mod: HCNC,CPTII,S$GLB, | Performed by: FAMILY MEDICINE

## 2020-11-19 PROCEDURE — 1126F AMNT PAIN NOTED NONE PRSNT: CPT | Mod: HCNC,S$GLB,, | Performed by: FAMILY MEDICINE

## 2020-11-19 PROCEDURE — 3008F PR BODY MASS INDEX (BMI) DOCUMENTED: ICD-10-PCS | Mod: HCNC,CPTII,S$GLB, | Performed by: FAMILY MEDICINE

## 2020-11-19 PROCEDURE — 99499 UNLISTED E&M SERVICE: CPT | Mod: S$GLB,,, | Performed by: FAMILY MEDICINE

## 2020-11-19 RX ORDER — MELOXICAM 7.5 MG/1
7.5 TABLET ORAL DAILY
Qty: 90 TABLET | Refills: 3 | Status: SHIPPED | OUTPATIENT
Start: 2020-11-19 | End: 2021-11-10

## 2020-11-19 RX ORDER — ISRADIPINE 2.5 MG/1
2.5 CAPSULE ORAL 2 TIMES DAILY
Qty: 180 CAPSULE | Refills: 3 | Status: SHIPPED | OUTPATIENT
Start: 2020-11-19 | End: 2021-06-03

## 2020-11-19 RX ORDER — FOLIC ACID 0.8 MG
800 TABLET ORAL DAILY
Qty: 100 TABLET | Refills: 3 | COMMUNITY
Start: 2020-11-19 | End: 2021-11-10

## 2020-11-19 RX ORDER — FUROSEMIDE 20 MG/1
TABLET ORAL
Qty: 180 TABLET | Refills: 0 | Status: SHIPPED | OUTPATIENT
Start: 2020-11-19 | End: 2021-02-24

## 2020-11-19 RX ORDER — ROSUVASTATIN CALCIUM 5 MG/1
5 TABLET, COATED ORAL DAILY
Qty: 90 TABLET | Refills: 3 | Status: SHIPPED | OUTPATIENT
Start: 2020-11-19 | End: 2021-11-10

## 2020-11-19 RX ORDER — VALSARTAN 320 MG/1
320 TABLET ORAL DAILY
Qty: 90 TABLET | Refills: 3 | Status: SHIPPED | OUTPATIENT
Start: 2020-11-19 | End: 2021-11-10 | Stop reason: SDUPTHER

## 2020-11-19 RX ORDER — UMECLIDINIUM 62.5 UG/1
62.5 AEROSOL, POWDER ORAL DAILY
Qty: 180 EACH | Refills: 3 | Status: SHIPPED | OUTPATIENT
Start: 2020-11-19 | End: 2021-02-10

## 2020-11-19 RX ORDER — ALENDRONATE SODIUM 70 MG/1
70 TABLET ORAL
Qty: 12 TABLET | Refills: 1 | Status: SHIPPED | OUTPATIENT
Start: 2020-11-19 | End: 2021-10-29

## 2020-11-19 RX ORDER — BUDESONIDE 0.5 MG/2ML
0.5 INHALANT ORAL DAILY
Qty: 180 ML | Refills: 1 | Status: SHIPPED | OUTPATIENT
Start: 2020-11-19 | End: 2021-11-10 | Stop reason: SDUPTHER

## 2020-11-19 NOTE — TELEPHONE ENCOUNTER
----- Message from Mariia Morales sent at 11/19/2020 11:05 AM CST -----  Contact: self [phone]  Chet is calling to speak with you about when she need to see the doctor again. Please call

## 2020-11-19 NOTE — PROGRESS NOTES
Michela Franco is a 75 y.o. female   Routine physical  Source of history: Patient  Past Medical History:   Diagnosis Date    Allergy     Chronic diastolic heart failure 4/5/2018    Hyperlipidemia     Hypertension     Neuromuscular disorder     JOHN on CPAP     Osteoporosis     Recurrent sinusitis 3/25/2014    Recurrent upper respiratory infection (URI)     Urticaria      Patient  reports that she quit smoking about 15 years ago. Her smoking use included cigarettes. She has a 10.00 pack-year smoking history. She has never used smokeless tobacco. She reports current alcohol use. She reports that she does not use drugs.  Family History   Problem Relation Age of Onset    No Known Problems Mother         healthy in 90s    No Known Problems Father         accident-related death in 50s    Kidney cancer Sister     Cancer Sister         renal    Hypertension Daughter     Ovarian cancer Sister     Hypertension Daughter      ROS:  GENERAL: No fever, chills, fatigability or weight loss.  SKIN: No rashes, itching or changes in color or texture of skin.  HEAD: No headaches or recent head trauma.  EYES: Visual acuity fine. No photophobia, ocular pain or diplopia.  EARS: Denies ear pain, discharge or vertigo.  NOSE: No loss of smell, no epistaxis or postnasal drip.  MOUTH & THROAT: No hoarseness or change in voice. No excessive gum bleeding.  NODES: Denies swollen glands.  CHEST: Denies SHAH, cyanosis, wheezing, cough and sputum production.  CARDIOVASCULAR: Denies chest pain, PND, orthopnea or reduced exercise tolerance.  ABDOMEN: Appetite fine. No weight loss. Denies diarrhea, abdominal pain, hematemesis or blood in stool.  URINARY: No flank pain, dysuria or hematuria.  PERIPHERAL VASCULAR: No claudication or cyanosis.  MUSCULOSKELETAL: No joint stiffness or swelling. Denies back pain.  NEUROLOGIC: No history of seizures, paralysis, alteration of gait or coordination.    OBJECTIVE:  APPEARANCE:  overweight  Vitals:     11/19/20 0747   BP: (!) 140/70   Pulse: 75   Temp: 98 °F (36.7 °C)     SKIN: Normal skin turgor, no lesions.  HEENT: Both external auditory canals clear. Both tympanic membranes intact.   PERRL. EOMI. Disk margins sharp. No tonsillar enlargement. No pharyngeal erythema or exudate. No stridor.  NECK: No bruits. No cervical spine tenderness. No cervical lymphadenopathy. No thyromegaly.  NODES: No cervical, axillary or inguinal lymph node enlargement.  CHEST: Breath sounds clear bilaterally. Lungs clear to auscultation & percussion.   Good air movement. No rales. No retractions. No rhonchi. No stridor. No wheezes.  CARDIOVASCULAR: Normal S1, S2. No murmurs. No edema.  BREASTS: no masses palpated in either breast or axillary area, symmetry noted.  ABDOMEN: Bowel sounds normal. No palpable aortic enlargement. No CVA tenderness.   No pulsatile mass. No rebound tenderness.  PERIPHERAL VASCULAR: Femoral pulses present and symmetrical. No edema.  MUSCULOSKELETAL: Degenerative changes of both ankles, foot, knee, wrist and hand.  BACK: No CVA tenderness. There is no spasm, tenderness or radiculopathy noted with   palpation and there is full range of motion.   NEUROLOGIC:   Cranial Nerves: II-XII grossly intact.  Motor: 5/5 strength major flexors/extensors. No tremor.  DTR's: Knees, Ankles 2+ and equal bilaterally; downgoing toes.  Sensory: Intact to light touch distally.  Gait & Posture: Normal gait and fine motion. No cerebellar signs.  MENTAL STATUS: Alert. Oriented x 3. Language skills normal. Memory intact.   No suicidal ideation. Normal affect. Normal cognitive functions.  Well kept appearance.    ASSESSMENT/PLAN:   Michela was seen today for hypertension.    Diagnoses and all orders for this visit:    Osteoarthritis, unspecified osteoarthritis type, unspecified site  -     meloxicam (MOBIC) 7.5 MG tablet; Take 1 tablet (7.5 mg total) by mouth once daily.    Hypertension, unspecified type  -     valsartan (DIOVAN) 320  MG tablet; Take 1 tablet (320 mg total) by mouth once daily.  -     isradipine (DYNACIRC) 2.5 mg Cap; Take 1 capsule (2.5 mg total) by mouth 2 (two) times daily.    Hyperlipidemia, unspecified hyperlipidemia type  -     rosuvastatin (CRESTOR) 5 MG tablet; Take 1 tablet (5 mg total) by mouth once daily.    Moderate persistent asthma without complication  -     budesonide (PULMICORT) 0.5 mg/2 mL nebulizer solution; Take 2 mLs (0.5 mg total) by nebulization once daily. For use in saline irrigation as directed.    Asthma, unspecified asthma severity, unspecified whether complicated, unspecified whether persistent  -     umeclidinium (INCRUSE ELLIPTA) 62.5 mcg/actuation inhalation capsule; Inhale 1 capsule (63 mcg total) into the lungs once daily. Controller    Avascular necrosis of bone of left hip  -     alendronate (FOSAMAX) 70 MG tablet; Take 1 tablet (70 mg total) by mouth every 7 days.    Chronic diastolic heart failure  -     furosemide (LASIX) 20 MG tablet; TAKE 1 TABLET TWICE DAILY AS NEEDED (LEG SWELLING).    Back pain, unspecified back location, unspecified back pain laterality, unspecified chronicity    Anemia, unspecified type  -     folic acid (FOLVITE) 800 MCG Tab; Take 1 tablet (800 mcg total) by mouth once daily.    Cataract, unspecified cataract type, unspecified laterality  -     Ambulatory referral/consult to Ophthalmology; Future    Mass in neck  -     US Soft Tissue Head Neck Thyroid; Future

## 2020-11-20 NOTE — PROGRESS NOTES
Michela Franco is a 75 y.o. female   Routine physical  Source of history: Patient  Past Medical History:   Diagnosis Date    Allergy     Chronic diastolic heart failure 4/5/2018    Hyperlipidemia     Hypertension     Neuromuscular disorder     JOHN on CPAP     Osteoporosis     Recurrent sinusitis 3/25/2014    Recurrent upper respiratory infection (URI)     Urticaria      Patient  reports that she quit smoking about 15 years ago. Her smoking use included cigarettes. She has a 10.00 pack-year smoking history. She has never used smokeless tobacco. She reports current alcohol use. She reports that she does not use drugs.  Family History   Problem Relation Age of Onset    No Known Problems Mother         healthy in 90s    No Known Problems Father         accident-related death in 50s    Kidney cancer Sister     Cancer Sister         renal    Hypertension Daughter     Ovarian cancer Sister     Hypertension Daughter      ROS:  GENERAL: No fever, chills, fatigability or weight loss.  SKIN: No rashes, itching or changes in color or texture of skin.  HEAD: No headaches or recent head trauma.  EYES: Visual acuity fine. No photophobia, ocular pain or diplopia.  EARS: Denies ear pain, discharge or vertigo.  NOSE: No loss of smell, no epistaxis or postnasal drip.  MOUTH & THROAT: No hoarseness or change in voice. No excessive gum bleeding.  NODES: Denies swollen glands.  CHEST: Denies SHAH, cyanosis, wheezing, cough and sputum production.  CARDIOVASCULAR: Denies chest pain, PND, orthopnea or reduced exercise tolerance.  ABDOMEN: Appetite fine. No weight loss. Denies diarrhea, abdominal pain, hematemesis or blood in stool.  URINARY: No flank pain, dysuria or hematuria.  PERIPHERAL VASCULAR: No claudication or cyanosis.  MUSCULOSKELETAL: No joint stiffness or swelling. Denies back pain.  NEUROLOGIC: No history of seizures, paralysis, alteration of gait or coordination.    OBJECTIVE:  APPEARANCE:  overweight  Vitals:     11/19/20 0747   BP: (!) 140/70   Pulse: 75   Temp: 98 °F (36.7 °C)     SKIN: Normal skin turgor, no lesions.  HEENT: Both external auditory canals clear. Both tympanic membranes intact.   PERRL. EOMI. Disk margins sharp. No tonsillar enlargement. No pharyngeal erythema or exudate. No stridor.  NECK: No bruits. No cervical spine tenderness. No cervical lymphadenopathy. No thyromegaly.  NODES: No cervical, axillary or inguinal lymph node enlargement.  CHEST: Breath sounds clear bilaterally. Lungs clear to auscultation & percussion.   Good air movement. No rales. No retractions. No rhonchi. No stridor. No wheezes.  CARDIOVASCULAR: Normal S1, S2. No murmurs. No edema.  BREASTS: no masses palpated in either breast or axillary area, symmetry noted.  ABDOMEN: Bowel sounds normal. No palpable aortic enlargement. No CVA tenderness.   No pulsatile mass. No rebound tenderness.  PERIPHERAL VASCULAR: Femoral pulses present and symmetrical. No edema.  MUSCULOSKELETAL: Degenerative changes of both ankles, foot, knee, wrist and hand.  BACK: No CVA tenderness. There is no spasm, tenderness or radiculopathy noted with   palpation and there is full range of motion.   NEUROLOGIC:   Cranial Nerves: II-XII grossly intact.  Motor: 5/5 strength major flexors/extensors. No tremor.  DTR's: Knees, Ankles 2+ and equal bilaterally; downgoing toes.  Sensory: Intact to light touch distally.  Gait & Posture: Normal gait and fine motion. No cerebellar signs.  MENTAL STATUS: Alert. Oriented x 3. Language skills normal. Memory intact.   No suicidal ideation. Normal affect. Normal cognitive functions.  Well kept appearance.    ASSESSMENT/PLAN:   Michela was seen today for hypertension.    Diagnoses and all orders for this visit:    Osteoarthritis, unspecified osteoarthritis type, unspecified site  -     meloxicam (MOBIC) 7.5 MG tablet; Take 1 tablet (7.5 mg total) by mouth once daily.    Hypertension, unspecified type  -     valsartan (DIOVAN) 320  MG tablet; Take 1 tablet (320 mg total) by mouth once daily.  -     isradipine (DYNACIRC) 2.5 mg Cap; Take 1 capsule (2.5 mg total) by mouth 2 (two) times daily.    Hyperlipidemia, unspecified hyperlipidemia type  -     rosuvastatin (CRESTOR) 5 MG tablet; Take 1 tablet (5 mg total) by mouth once daily.    Moderate persistent asthma without complication  -     budesonide (PULMICORT) 0.5 mg/2 mL nebulizer solution; Take 2 mLs (0.5 mg total) by nebulization once daily. For use in saline irrigation as directed.    Asthma, unspecified asthma severity, unspecified whether complicated, unspecified whether persistent  -     umeclidinium (INCRUSE ELLIPTA) 62.5 mcg/actuation inhalation capsule; Inhale 1 capsule (63 mcg total) into the lungs once daily. Controller    Avascular necrosis of bone of left hip  -     alendronate (FOSAMAX) 70 MG tablet; Take 1 tablet (70 mg total) by mouth every 7 days.    Chronic diastolic heart failure  -     furosemide (LASIX) 20 MG tablet; TAKE 1 TABLET TWICE DAILY AS NEEDED (LEG SWELLING).    Back pain, unspecified back location, unspecified back pain laterality, unspecified chronicity    Anemia, unspecified type  -     folic acid (FOLVITE) 800 MCG Tab; Take 1 tablet (800 mcg total) by mouth once daily.    Cataract, unspecified cataract type, unspecified laterality  -     Ambulatory referral/consult to Ophthalmology; Future    Mass in neck  -     US Soft Tissue Head Neck Thyroid; Future      Labs reviewed no areas of concern will contact pt with results of   Pending test when results are available

## 2020-11-28 ENCOUNTER — HOSPITAL ENCOUNTER (OUTPATIENT)
Dept: RADIOLOGY | Facility: OTHER | Age: 75
Discharge: HOME | End: 2020-11-28
Attending: FAMILY MEDICINE
Payer: MEDICARE

## 2020-11-28 DIAGNOSIS — R22.1 MASS IN NECK: ICD-10-CM

## 2020-11-28 PROCEDURE — 76536 US EXAM OF HEAD AND NECK: CPT | Mod: 26,HCNC,, | Performed by: RADIOLOGY

## 2020-11-28 PROCEDURE — 76536 US EXAM OF HEAD AND NECK: CPT | Mod: TC,HCNC

## 2020-11-28 PROCEDURE — 76536 US SOFT TISSUE HEAD NECK THYROID: ICD-10-PCS | Mod: 26,HCNC,, | Performed by: RADIOLOGY

## 2020-11-30 PROCEDURE — 99457 RPM TX MGMT 1ST 20 MIN: CPT | Mod: S$GLB,,, | Performed by: FAMILY MEDICINE

## 2020-11-30 PROCEDURE — 99457 PR MONITORING, PHYSIOL PARAM, REMOTE, 1ST 20 MINS, PER MONTH: ICD-10-PCS | Mod: S$GLB,,, | Performed by: FAMILY MEDICINE

## 2020-12-01 ENCOUNTER — PATIENT OUTREACH (OUTPATIENT)
Dept: OTHER | Facility: OTHER | Age: 75
End: 2020-12-01

## 2020-12-01 NOTE — PROGRESS NOTES
Digital Medicine: Clinician Follow-Up    Called patient to review HDMP readings.    The history is provided by the patient.   Follow-up reason(s): routine follow up.     Hypertension    Patient's blood pressure readings are labile.   Additional Follow-up details: Patient could not identify any factors that could be causing elevated readings. She confirms resting before checking readings, however she admits that she has been checking readings before taking her blood pressure medications. She takes morning doses of medications ~6am and evening doses ~6pm. She agrees to check readings at least 1 hour after evening doses of blood pressure medications.      Last 5 Patient Entered Readings                                      Current 30 Day Average: 147/85     Recent Readings 11/27/2020 11/22/2020 11/22/2020 11/17/2020 11/16/2020    SBP (mmHg) 146 158 159 147 154    DBP (mmHg) 84 81 87 82 83    Pulse 72 73 75 78 74          Depression Screening  Did not address depression screening.    Sleep Apnea Screening    Did not address sleep apnea screening.     Medication Affordability Screening  Did not address medication affordability screening.     Medication Adherence-Medication adherence was assessed.          ASSESSMENT(S)  Patients BP average is 147/85 mmHg, which is above goal. Patient's BP goal is less than or equal to 130/80.     Readings likely elevated due to patient checking readings first thing in the morning prior to medications.      Hypertension Plan  Additional monitoring needed. Begin checking readings at least 1 hour after evening meds, ensuring proper monitoring technique.   Continue current therapy.  Provided patient education.       Addressed patient questions and patient has my contact information if needed prior to next outreach. Patient verbalizes understanding.             There are no preventive care reminders to display for this patient.  There are no preventive care reminders to display for this  patient.      Hypertension Medications             furosemide (LASIX) 20 MG tablet TAKE 1 TABLET TWICE DAILY AS NEEDED (LEG SWELLING).    isradipine (DYNACIRC) 2.5 mg Cap Take 1 capsule (2.5 mg total) by mouth 2 (two) times daily.    valsartan (DIOVAN) 320 MG tablet Take 1 tablet (320 mg total) by mouth once daily.

## 2020-12-09 ENCOUNTER — TELEPHONE (OUTPATIENT)
Dept: PRIMARY CARE CLINIC | Facility: CLINIC | Age: 75
End: 2020-12-09

## 2020-12-09 NOTE — TELEPHONE ENCOUNTER
----- Message from Janet Sandy sent at 12/9/2020  9:19 AM CST -----  Regarding: Frances Judithyuniel from [facility] called today and said that she faxed the CMN form to Dr. Arthur so the patient can get new C Pap supplies and she needs it signed and dated and faxed back  Contact: [phone] [facility]  Frances Butterfields from [facility] called today and said that she faxed the CMN form to Dr. Arthur so the patient can get new C Pap supplies and she needs it signed and dated and faxed back. [phone] is the fax number to [facility].

## 2020-12-15 ENCOUNTER — PATIENT OUTREACH (OUTPATIENT)
Dept: OTHER | Facility: OTHER | Age: 75
End: 2020-12-15

## 2020-12-16 ENCOUNTER — TELEPHONE (OUTPATIENT)
Dept: PRIMARY CARE CLINIC | Facility: CLINIC | Age: 75
End: 2020-12-16

## 2020-12-16 ENCOUNTER — TELEPHONE (OUTPATIENT)
Dept: ADMINISTRATIVE | Facility: HOSPITAL | Age: 75
End: 2020-12-16

## 2020-12-16 NOTE — TELEPHONE ENCOUNTER
----- Message from Robina Barros sent at 12/16/2020 11:46 AM CST -----  [phone]  Would like to get medical advice.    Symptoms (please be specific):  forms for a Cpap supplies    Pharmacy name and phone # (copy from chart):  [phone]    Comments:  Katy called to speak with . She is requesting a call back.

## 2020-12-22 ENCOUNTER — TELEPHONE (OUTPATIENT)
Dept: ORTHOPEDICS | Facility: CLINIC | Age: 75
End: 2020-12-22

## 2020-12-22 DIAGNOSIS — M87.00 AVASCULAR NECROSIS: Primary | ICD-10-CM

## 2020-12-24 ENCOUNTER — HOSPITAL ENCOUNTER (OUTPATIENT)
Dept: RADIOLOGY | Facility: HOSPITAL | Age: 75
Discharge: HOME | End: 2020-12-24
Attending: ORTHOPAEDIC SURGERY
Payer: MEDICARE

## 2020-12-24 ENCOUNTER — OFFICE VISIT (OUTPATIENT)
Dept: ORTHOPEDICS | Facility: CLINIC | Age: 75
End: 2020-12-24
Payer: MEDICARE

## 2020-12-24 VITALS — HEIGHT: 64 IN | WEIGHT: 183 LBS | BODY MASS INDEX: 31.24 KG/M2

## 2020-12-24 DIAGNOSIS — M87.00 AVASCULAR NECROSIS: Primary | ICD-10-CM

## 2020-12-24 DIAGNOSIS — M87.00 AVASCULAR NECROSIS: ICD-10-CM

## 2020-12-24 PROCEDURE — 1125F PR PAIN SEVERITY QUANTIFIED, PAIN PRESENT: ICD-10-PCS | Mod: HCNC,S$GLB,, | Performed by: ORTHOPAEDIC SURGERY

## 2020-12-24 PROCEDURE — 3288F PR FALLS RISK ASSESSMENT DOCUMENTED: ICD-10-PCS | Mod: HCNC,CPTII,S$GLB, | Performed by: ORTHOPAEDIC SURGERY

## 2020-12-24 PROCEDURE — 99213 OFFICE O/P EST LOW 20 MIN: CPT | Mod: HCNC,S$GLB,, | Performed by: ORTHOPAEDIC SURGERY

## 2020-12-24 PROCEDURE — 99999 PR PBB SHADOW E&M-EST. PATIENT-LVL IV: ICD-10-PCS | Mod: PBBFAC,HCNC,, | Performed by: ORTHOPAEDIC SURGERY

## 2020-12-24 PROCEDURE — 1101F PR PT FALLS ASSESS DOC 0-1 FALLS W/OUT INJ PAST YR: ICD-10-PCS | Mod: HCNC,CPTII,S$GLB, | Performed by: ORTHOPAEDIC SURGERY

## 2020-12-24 PROCEDURE — 73521 X-RAY EXAM HIPS BI 2 VIEWS: CPT | Mod: TC,HCNC,PN

## 2020-12-24 PROCEDURE — 1159F MED LIST DOCD IN RCRD: CPT | Mod: HCNC,S$GLB,, | Performed by: ORTHOPAEDIC SURGERY

## 2020-12-24 PROCEDURE — 99213 PR OFFICE/OUTPT VISIT, EST, LEVL III, 20-29 MIN: ICD-10-PCS | Mod: HCNC,S$GLB,, | Performed by: ORTHOPAEDIC SURGERY

## 2020-12-24 PROCEDURE — 1125F AMNT PAIN NOTED PAIN PRSNT: CPT | Mod: HCNC,S$GLB,, | Performed by: ORTHOPAEDIC SURGERY

## 2020-12-24 PROCEDURE — 1157F ADVNC CARE PLAN IN RCRD: CPT | Mod: HCNC,S$GLB,, | Performed by: ORTHOPAEDIC SURGERY

## 2020-12-24 PROCEDURE — 73521 XR HIPS BILATERAL 2 VIEW INCL AP PELVIS: ICD-10-PCS | Mod: 26,HCNC,, | Performed by: RADIOLOGY

## 2020-12-24 PROCEDURE — 3008F BODY MASS INDEX DOCD: CPT | Mod: HCNC,CPTII,S$GLB, | Performed by: ORTHOPAEDIC SURGERY

## 2020-12-24 PROCEDURE — 3008F PR BODY MASS INDEX (BMI) DOCUMENTED: ICD-10-PCS | Mod: HCNC,CPTII,S$GLB, | Performed by: ORTHOPAEDIC SURGERY

## 2020-12-24 PROCEDURE — 1157F PR ADVANCE CARE PLAN OR EQUIV PRESENT IN MEDICAL RECORD: ICD-10-PCS | Mod: HCNC,S$GLB,, | Performed by: ORTHOPAEDIC SURGERY

## 2020-12-24 PROCEDURE — 1101F PT FALLS ASSESS-DOCD LE1/YR: CPT | Mod: HCNC,CPTII,S$GLB, | Performed by: ORTHOPAEDIC SURGERY

## 2020-12-24 PROCEDURE — 73521 X-RAY EXAM HIPS BI 2 VIEWS: CPT | Mod: 26,HCNC,, | Performed by: RADIOLOGY

## 2020-12-24 PROCEDURE — 3288F FALL RISK ASSESSMENT DOCD: CPT | Mod: HCNC,CPTII,S$GLB, | Performed by: ORTHOPAEDIC SURGERY

## 2020-12-24 PROCEDURE — 1159F PR MEDICATION LIST DOCUMENTED IN MEDICAL RECORD: ICD-10-PCS | Mod: HCNC,S$GLB,, | Performed by: ORTHOPAEDIC SURGERY

## 2020-12-24 PROCEDURE — 99999 PR PBB SHADOW E&M-EST. PATIENT-LVL IV: CPT | Mod: PBBFAC,HCNC,, | Performed by: ORTHOPAEDIC SURGERY

## 2020-12-24 NOTE — PROGRESS NOTES
Subjective:      Patient ID: Michela Franco is a 75 y.o. female.    Chief Complaint: Hip Pain (left) and Follow-up    HPI  Follow-up for avascular necrosis.  The patient reports that she has occasional pain that does not limit her mobility.  Most of it is in the anterior thighs.  She also has occasional low back pain.  Overall she feels like her condition is well controlled at this point.            Review of Systems   Constitution: Negative for fever and weight loss.   HENT: Negative for congestion.    Eyes: Negative for visual disturbance.   Cardiovascular: Negative for chest pain.   Respiratory: Negative for shortness of breath.    Hematologic/Lymphatic: Negative for bleeding problem. Does not bruise/bleed easily.   Skin: Negative for poor wound healing.   Musculoskeletal: Positive for back pain.   Gastrointestinal: Negative for abdominal pain.   Genitourinary: Negative for dysuria.   Neurological: Negative for seizures.   Psychiatric/Behavioral: Negative for altered mental status.   Allergic/Immunologic: Negative for persistent infections.         Objective:      Ortho/SPM Exam      Left hip    The patient is not in acute distress.   Body habitus is:normal.   Sclerae normal  The patient walks without a limp.   Respiratory distress:  none  The skin over the hip is:intact.   There is no local tenderness.   Range of motion- Flexion full, External rotation full, internal rotation full.  Resisted SLR negative.  Pain with rotation negative  Sciatic tension findings negative.  Shortening/lengthening compared to the contralateral side absent.  Pulses DP present, PT present.  Motor normal 5/5 strength in all tested muscle groups.   Sensory normal.    The right hip is identical    I reviewed the relevant imaging for the patient's condition:  Radiographs of both hips show spherical femoral heads with preservation of joint space.          Assessment:       Encounter Diagnosis   Name Primary?    Avascular necrosis Yes         The condition appears to have a rested at this point.  Considering the time elapsed since treatment began, rapid progression is very unlikely.        Plan:       Michela was seen today for hip pain and follow-up.    Diagnoses and all orders for this visit:    Avascular necrosis          I explained my diagnostic impression and the reasoning behind it in detail, using layman's terms.  Models and/or pictures were used to help in the explanation.    Discontinue Fosamax    Continue supplementation calcium and vitamin-D.    Annual follow-up with x-ray

## 2020-12-28 ENCOUNTER — PES CALL (OUTPATIENT)
Dept: ADMINISTRATIVE | Facility: CLINIC | Age: 75
End: 2020-12-28

## 2021-01-14 ENCOUNTER — OFFICE VISIT (OUTPATIENT)
Dept: OPTOMETRY | Facility: CLINIC | Age: 76
End: 2021-01-14
Payer: COMMERCIAL

## 2021-01-14 DIAGNOSIS — H52.203 MYOPIA WITH ASTIGMATISM AND PRESBYOPIA, BILATERAL: ICD-10-CM

## 2021-01-14 DIAGNOSIS — H25.13 NUCLEAR SCLEROSIS OF BOTH EYES: ICD-10-CM

## 2021-01-14 DIAGNOSIS — H40.013 OPEN ANGLE WITH BORDERLINE FINDINGS OF BOTH EYES: ICD-10-CM

## 2021-01-14 DIAGNOSIS — H52.13 MYOPIA WITH ASTIGMATISM AND PRESBYOPIA, BILATERAL: ICD-10-CM

## 2021-01-14 DIAGNOSIS — H52.4 MYOPIA WITH ASTIGMATISM AND PRESBYOPIA, BILATERAL: ICD-10-CM

## 2021-01-14 DIAGNOSIS — Z01.00 ROUTINE EYE EXAM: Primary | ICD-10-CM

## 2021-01-14 PROCEDURE — 92015 PR REFRACTION: ICD-10-PCS | Mod: HCNC,S$GLB,, | Performed by: OPTOMETRIST

## 2021-01-14 PROCEDURE — 99999 PR PBB SHADOW E&M-EST. PATIENT-LVL III: ICD-10-PCS | Mod: PBBFAC,HCNC,, | Performed by: OPTOMETRIST

## 2021-01-14 PROCEDURE — 92015 DETERMINE REFRACTIVE STATE: CPT | Mod: HCNC,S$GLB,, | Performed by: OPTOMETRIST

## 2021-01-14 PROCEDURE — 92004 PR EYE EXAM, NEW PATIENT,COMPREHESV: ICD-10-PCS | Mod: HCNC,S$GLB,, | Performed by: OPTOMETRIST

## 2021-01-14 PROCEDURE — 92004 COMPRE OPH EXAM NEW PT 1/>: CPT | Mod: HCNC,S$GLB,, | Performed by: OPTOMETRIST

## 2021-01-14 PROCEDURE — 99999 PR PBB SHADOW E&M-EST. PATIENT-LVL III: CPT | Mod: PBBFAC,HCNC,, | Performed by: OPTOMETRIST

## 2021-01-16 ENCOUNTER — IMMUNIZATION (OUTPATIENT)
Dept: INTERNAL MEDICINE | Facility: CLINIC | Age: 76
End: 2021-01-16
Payer: MEDICARE

## 2021-01-16 DIAGNOSIS — Z23 NEED FOR VACCINATION: Primary | ICD-10-CM

## 2021-01-16 PROCEDURE — 91300 COVID-19, MRNA, LNP-S, PF, 30 MCG/0.3 ML DOSE VACCINE: CPT | Mod: PBBFAC | Performed by: INTERNAL MEDICINE

## 2021-01-19 ENCOUNTER — OFFICE VISIT (OUTPATIENT)
Dept: OPHTHALMOLOGY | Facility: CLINIC | Age: 76
End: 2021-01-19
Attending: OPHTHALMOLOGY
Payer: MEDICARE

## 2021-01-19 DIAGNOSIS — Z01.818 PRE-OP TESTING: ICD-10-CM

## 2021-01-19 DIAGNOSIS — H25.13 NUCLEAR SCLEROSIS, BILATERAL: Primary | ICD-10-CM

## 2021-01-19 PROCEDURE — 92004 PR EYE EXAM, NEW PATIENT,COMPREHESV: ICD-10-PCS | Mod: HCNC,S$GLB,, | Performed by: OPHTHALMOLOGY

## 2021-01-19 PROCEDURE — 99999 PR PBB SHADOW E&M-EST. PATIENT-LVL III: ICD-10-PCS | Mod: PBBFAC,HCNC,, | Performed by: OPHTHALMOLOGY

## 2021-01-19 PROCEDURE — 92136 IOL MASTER - OU - BOTH EYES: ICD-10-PCS | Mod: HCNC,RT,S$GLB, | Performed by: OPHTHALMOLOGY

## 2021-01-19 PROCEDURE — 1157F PR ADVANCE CARE PLAN OR EQUIV PRESENT IN MEDICAL RECORD: ICD-10-PCS | Mod: HCNC,S$GLB,, | Performed by: OPHTHALMOLOGY

## 2021-01-19 PROCEDURE — 1126F PR PAIN SEVERITY QUANTIFIED, NO PAIN PRESENT: ICD-10-PCS | Mod: HCNC,S$GLB,, | Performed by: OPHTHALMOLOGY

## 2021-01-19 PROCEDURE — 99999 PR PBB SHADOW E&M-EST. PATIENT-LVL III: CPT | Mod: PBBFAC,HCNC,, | Performed by: OPHTHALMOLOGY

## 2021-01-19 PROCEDURE — 1101F PT FALLS ASSESS-DOCD LE1/YR: CPT | Mod: HCNC,CPTII,S$GLB, | Performed by: OPHTHALMOLOGY

## 2021-01-19 PROCEDURE — 92004 COMPRE OPH EXAM NEW PT 1/>: CPT | Mod: HCNC,S$GLB,, | Performed by: OPHTHALMOLOGY

## 2021-01-19 PROCEDURE — 3288F FALL RISK ASSESSMENT DOCD: CPT | Mod: HCNC,CPTII,S$GLB, | Performed by: OPHTHALMOLOGY

## 2021-01-19 PROCEDURE — 1157F ADVNC CARE PLAN IN RCRD: CPT | Mod: HCNC,S$GLB,, | Performed by: OPHTHALMOLOGY

## 2021-01-19 PROCEDURE — 3288F PR FALLS RISK ASSESSMENT DOCUMENTED: ICD-10-PCS | Mod: HCNC,CPTII,S$GLB, | Performed by: OPHTHALMOLOGY

## 2021-01-19 PROCEDURE — 1126F AMNT PAIN NOTED NONE PRSNT: CPT | Mod: HCNC,S$GLB,, | Performed by: OPHTHALMOLOGY

## 2021-01-19 PROCEDURE — 1101F PR PT FALLS ASSESS DOC 0-1 FALLS W/OUT INJ PAST YR: ICD-10-PCS | Mod: HCNC,CPTII,S$GLB, | Performed by: OPHTHALMOLOGY

## 2021-01-19 PROCEDURE — 92136 OPHTHALMIC BIOMETRY: CPT | Mod: HCNC,RT,S$GLB, | Performed by: OPHTHALMOLOGY

## 2021-01-19 RX ORDER — PHENYLEPHRINE HYDROCHLORIDE 25 MG/ML
1 SOLUTION/ DROPS OPHTHALMIC
Status: CANCELLED | OUTPATIENT
Start: 2021-01-19

## 2021-01-19 RX ORDER — TETRACAINE HYDROCHLORIDE 5 MG/ML
1 SOLUTION OPHTHALMIC
Status: CANCELLED | OUTPATIENT
Start: 2021-01-19

## 2021-01-19 RX ORDER — MOXIFLOXACIN 5 MG/ML
1 SOLUTION/ DROPS OPHTHALMIC
Status: CANCELLED | OUTPATIENT
Start: 2021-01-19

## 2021-01-19 RX ORDER — TROPICAMIDE 10 MG/ML
1 SOLUTION/ DROPS OPHTHALMIC
Status: CANCELLED | OUTPATIENT
Start: 2021-01-19

## 2021-01-19 RX ORDER — SODIUM CHLORIDE 0.9 % (FLUSH) 0.9 %
10 SYRINGE (ML) INJECTION
Status: DISCONTINUED | OUTPATIENT
Start: 2021-01-19 | End: 2022-10-18

## 2021-01-19 RX ORDER — PREDNISOLONE ACETATE-GATIFLOXACIN-BROMFENAC .75; 5; 1 MG/ML; MG/ML; MG/ML
1 SUSPENSION/ DROPS OPHTHALMIC 3 TIMES DAILY
Qty: 5 ML | Refills: 3 | Status: SHIPPED | OUTPATIENT
Start: 2021-01-19 | End: 2021-10-29

## 2021-01-21 ENCOUNTER — TELEPHONE (OUTPATIENT)
Dept: ALLERGY | Facility: CLINIC | Age: 76
End: 2021-01-21

## 2021-01-27 DIAGNOSIS — J45.50 SEVERE PERSISTENT ASTHMA WITHOUT COMPLICATION: Primary | ICD-10-CM

## 2021-01-29 ENCOUNTER — TELEPHONE (OUTPATIENT)
Dept: OPHTHALMOLOGY | Facility: CLINIC | Age: 76
End: 2021-01-29

## 2021-02-02 ENCOUNTER — SPECIALTY PHARMACY (OUTPATIENT)
Dept: PHARMACY | Facility: CLINIC | Age: 76
End: 2021-02-02

## 2021-02-02 DIAGNOSIS — H25.11 NUCLEAR SCLEROTIC CATARACT OF RIGHT EYE: Primary | ICD-10-CM

## 2021-02-03 ENCOUNTER — TELEPHONE (OUTPATIENT)
Dept: ALLERGY | Facility: CLINIC | Age: 76
End: 2021-02-03

## 2021-02-06 ENCOUNTER — IMMUNIZATION (OUTPATIENT)
Dept: INTERNAL MEDICINE | Facility: CLINIC | Age: 76
End: 2021-02-06
Payer: MEDICARE

## 2021-02-06 DIAGNOSIS — Z23 NEED FOR VACCINATION: Primary | ICD-10-CM

## 2021-02-06 PROCEDURE — 0002A COVID-19, MRNA, LNP-S, PF, 30 MCG/0.3 ML DOSE VACCINE: CPT | Mod: PBBFAC | Performed by: INTERNAL MEDICINE

## 2021-02-06 PROCEDURE — 91300 COVID-19, MRNA, LNP-S, PF, 30 MCG/0.3 ML DOSE VACCINE: CPT | Mod: PBBFAC | Performed by: INTERNAL MEDICINE

## 2021-02-08 ENCOUNTER — LAB VISIT (OUTPATIENT)
Dept: INTERNAL MEDICINE | Facility: CLINIC | Age: 76
End: 2021-02-08
Payer: MEDICARE

## 2021-02-08 DIAGNOSIS — Z01.818 PRE-OP TESTING: ICD-10-CM

## 2021-02-08 PROCEDURE — U0003 INFECTIOUS AGENT DETECTION BY NUCLEIC ACID (DNA OR RNA); SEVERE ACUTE RESPIRATORY SYNDROME CORONAVIRUS 2 (SARS-COV-2) (CORONAVIRUS DISEASE [COVID-19]), AMPLIFIED PROBE TECHNIQUE, MAKING USE OF HIGH THROUGHPUT TECHNOLOGIES AS DESCRIBED BY CMS-2020-01-R: HCPCS

## 2021-02-08 PROCEDURE — U0005 INFEC AGEN DETEC AMPLI PROBE: HCPCS

## 2021-02-09 ENCOUNTER — TELEPHONE (OUTPATIENT)
Dept: OPHTHALMOLOGY | Facility: CLINIC | Age: 76
End: 2021-02-09

## 2021-02-09 LAB — SARS-COV-2 RNA RESP QL NAA+PROBE: NOT DETECTED

## 2021-02-11 ENCOUNTER — HOSPITAL ENCOUNTER (OUTPATIENT)
Facility: OTHER | Age: 76
Discharge: HOME | End: 2021-02-11
Attending: OPHTHALMOLOGY | Admitting: OPHTHALMOLOGY
Payer: MEDICARE

## 2021-02-11 ENCOUNTER — ANESTHESIA (OUTPATIENT)
Dept: SURGERY | Facility: OTHER | Age: 76
End: 2021-02-11
Payer: MEDICARE

## 2021-02-11 ENCOUNTER — ANESTHESIA EVENT (OUTPATIENT)
Dept: SURGERY | Facility: OTHER | Age: 76
End: 2021-02-11
Payer: MEDICARE

## 2021-02-11 VITALS
OXYGEN SATURATION: 98 % | HEIGHT: 64 IN | DIASTOLIC BLOOD PRESSURE: 70 MMHG | HEART RATE: 68 BPM | SYSTOLIC BLOOD PRESSURE: 151 MMHG | TEMPERATURE: 98 F | BODY MASS INDEX: 29.37 KG/M2 | RESPIRATION RATE: 16 BRPM | WEIGHT: 172 LBS

## 2021-02-11 DIAGNOSIS — H25.13 NUCLEAR SCLEROSIS, BILATERAL: ICD-10-CM

## 2021-02-11 PROCEDURE — 37000009 HC ANESTHESIA EA ADD 15 MINS: Performed by: OPHTHALMOLOGY

## 2021-02-11 PROCEDURE — 25000003 PHARM REV CODE 250: Performed by: OPHTHALMOLOGY

## 2021-02-11 PROCEDURE — 36000707: Performed by: OPHTHALMOLOGY

## 2021-02-11 PROCEDURE — 66984 PR REMOVAL, CATARACT, W/INSRT INTRAOC LENS, W/O ENDO CYCLO: ICD-10-PCS | Mod: RT,,, | Performed by: OPHTHALMOLOGY

## 2021-02-11 PROCEDURE — 36000706: Performed by: OPHTHALMOLOGY

## 2021-02-11 PROCEDURE — 66984 XCAPSL CTRC RMVL W/O ECP: CPT | Mod: RT,,, | Performed by: OPHTHALMOLOGY

## 2021-02-11 PROCEDURE — 71000015 HC POSTOP RECOV 1ST HR: Performed by: OPHTHALMOLOGY

## 2021-02-11 PROCEDURE — V2632 POST CHMBR INTRAOCULAR LENS: HCPCS | Performed by: OPHTHALMOLOGY

## 2021-02-11 PROCEDURE — 37000008 HC ANESTHESIA 1ST 15 MINUTES: Performed by: OPHTHALMOLOGY

## 2021-02-11 PROCEDURE — 63600175 PHARM REV CODE 636 W HCPCS: Performed by: NURSE ANESTHETIST, CERTIFIED REGISTERED

## 2021-02-11 DEVICE — LENS IOL ITEC PRELOAD 18.0D: Type: IMPLANTABLE DEVICE | Site: EYE | Status: FUNCTIONAL

## 2021-02-11 RX ORDER — MOXIFLOXACIN 5 MG/ML
1 SOLUTION/ DROPS OPHTHALMIC
Status: COMPLETED | OUTPATIENT
Start: 2021-02-11 | End: 2021-02-11

## 2021-02-11 RX ORDER — PROPARACAINE HYDROCHLORIDE 5 MG/ML
1 SOLUTION/ DROPS OPHTHALMIC
Status: DISCONTINUED | OUTPATIENT
Start: 2021-02-11 | End: 2021-02-11 | Stop reason: HOSPADM

## 2021-02-11 RX ORDER — MOXIFLOXACIN 5 MG/ML
SOLUTION/ DROPS OPHTHALMIC
Status: DISCONTINUED | OUTPATIENT
Start: 2021-02-11 | End: 2021-02-11 | Stop reason: HOSPADM

## 2021-02-11 RX ORDER — PHENYLEPHRINE HYDROCHLORIDE 25 MG/ML
1 SOLUTION/ DROPS OPHTHALMIC
Status: COMPLETED | OUTPATIENT
Start: 2021-02-11 | End: 2021-02-11

## 2021-02-11 RX ORDER — TROPICAMIDE 10 MG/ML
1 SOLUTION/ DROPS OPHTHALMIC
Status: COMPLETED | OUTPATIENT
Start: 2021-02-11 | End: 2021-02-11

## 2021-02-11 RX ORDER — ACETAMINOPHEN 325 MG/1
650 TABLET ORAL EVERY 4 HOURS PRN
Status: DISCONTINUED | OUTPATIENT
Start: 2021-02-11 | End: 2021-02-11 | Stop reason: HOSPADM

## 2021-02-11 RX ORDER — FENTANYL CITRATE 50 UG/ML
INJECTION, SOLUTION INTRAMUSCULAR; INTRAVENOUS
Status: DISCONTINUED | OUTPATIENT
Start: 2021-02-11 | End: 2021-02-11

## 2021-02-11 RX ORDER — TETRACAINE HYDROCHLORIDE 5 MG/ML
1 SOLUTION OPHTHALMIC
Status: COMPLETED | OUTPATIENT
Start: 2021-02-11 | End: 2021-02-11

## 2021-02-11 RX ORDER — LIDOCAINE HYDROCHLORIDE 40 MG/ML
INJECTION, SOLUTION RETROBULBAR
Status: DISCONTINUED | OUTPATIENT
Start: 2021-02-11 | End: 2021-02-11 | Stop reason: HOSPADM

## 2021-02-11 RX ADMIN — TROPICAMIDE 1 DROP: 10 SOLUTION/ DROPS OPHTHALMIC at 08:02

## 2021-02-11 RX ADMIN — FENTANYL CITRATE 50 MCG: 50 INJECTION, SOLUTION INTRAMUSCULAR; INTRAVENOUS at 09:02

## 2021-02-11 RX ADMIN — MOXIFLOXACIN 1 DROP: 5 SOLUTION/ DROPS OPHTHALMIC at 10:02

## 2021-02-11 RX ADMIN — MOXIFLOXACIN 1 DROP: 5 SOLUTION/ DROPS OPHTHALMIC at 08:02

## 2021-02-11 RX ADMIN — PHENYLEPHRINE HYDROCHLORIDE 1 DROP: 25 SOLUTION/ DROPS OPHTHALMIC at 08:02

## 2021-02-11 RX ADMIN — TETRACAINE HYDROCHLORIDE 1 DROP: 5 SOLUTION OPHTHALMIC at 08:02

## 2021-02-12 ENCOUNTER — OFFICE VISIT (OUTPATIENT)
Dept: OPHTHALMOLOGY | Facility: CLINIC | Age: 76
End: 2021-02-12
Attending: OPHTHALMOLOGY
Payer: MEDICARE

## 2021-02-12 DIAGNOSIS — H25.13 NUCLEAR SCLEROSIS, BILATERAL: ICD-10-CM

## 2021-02-12 DIAGNOSIS — H25.12 NUCLEAR SCLEROTIC CATARACT OF LEFT EYE: Primary | ICD-10-CM

## 2021-02-12 DIAGNOSIS — Z98.890 POST-OPERATIVE STATE: Primary | ICD-10-CM

## 2021-02-12 PROCEDURE — 99024 PR POST-OP FOLLOW-UP VISIT: ICD-10-PCS | Mod: S$GLB,,, | Performed by: OPHTHALMOLOGY

## 2021-02-12 PROCEDURE — 1126F PR PAIN SEVERITY QUANTIFIED, NO PAIN PRESENT: ICD-10-PCS | Mod: S$GLB,,, | Performed by: OPHTHALMOLOGY

## 2021-02-12 PROCEDURE — 1157F ADVNC CARE PLAN IN RCRD: CPT | Mod: S$GLB,,, | Performed by: OPHTHALMOLOGY

## 2021-02-12 PROCEDURE — 1157F PR ADVANCE CARE PLAN OR EQUIV PRESENT IN MEDICAL RECORD: ICD-10-PCS | Mod: S$GLB,,, | Performed by: OPHTHALMOLOGY

## 2021-02-12 PROCEDURE — 99999 PR PBB SHADOW E&M-EST. PATIENT-LVL III: ICD-10-PCS | Mod: PBBFAC,,, | Performed by: OPHTHALMOLOGY

## 2021-02-12 PROCEDURE — 1126F AMNT PAIN NOTED NONE PRSNT: CPT | Mod: S$GLB,,, | Performed by: OPHTHALMOLOGY

## 2021-02-12 PROCEDURE — 99999 PR PBB SHADOW E&M-EST. PATIENT-LVL III: CPT | Mod: PBBFAC,,, | Performed by: OPHTHALMOLOGY

## 2021-02-12 PROCEDURE — 99024 POSTOP FOLLOW-UP VISIT: CPT | Mod: S$GLB,,, | Performed by: OPHTHALMOLOGY

## 2021-02-19 ENCOUNTER — OFFICE VISIT (OUTPATIENT)
Dept: OPHTHALMOLOGY | Facility: CLINIC | Age: 76
End: 2021-02-19
Attending: OPHTHALMOLOGY
Payer: MEDICARE

## 2021-02-19 DIAGNOSIS — H25.12 NUCLEAR SCLEROSIS OF LEFT EYE: ICD-10-CM

## 2021-02-19 DIAGNOSIS — Z98.890 POST-OPERATIVE STATE: Primary | ICD-10-CM

## 2021-02-19 DIAGNOSIS — H25.11 NUCLEAR SCLEROTIC CATARACT OF RIGHT EYE: ICD-10-CM

## 2021-02-19 PROCEDURE — 1101F PR PT FALLS ASSESS DOC 0-1 FALLS W/OUT INJ PAST YR: ICD-10-PCS | Mod: CPTII,S$GLB,, | Performed by: OPHTHALMOLOGY

## 2021-02-19 PROCEDURE — 99024 PR POST-OP FOLLOW-UP VISIT: ICD-10-PCS | Mod: S$GLB,,, | Performed by: OPHTHALMOLOGY

## 2021-02-19 PROCEDURE — 3288F FALL RISK ASSESSMENT DOCD: CPT | Mod: CPTII,S$GLB,, | Performed by: OPHTHALMOLOGY

## 2021-02-19 PROCEDURE — 99999 PR PBB SHADOW E&M-EST. PATIENT-LVL III: CPT | Mod: PBBFAC,,, | Performed by: OPHTHALMOLOGY

## 2021-02-19 PROCEDURE — 1126F AMNT PAIN NOTED NONE PRSNT: CPT | Mod: S$GLB,,, | Performed by: OPHTHALMOLOGY

## 2021-02-19 PROCEDURE — 1157F PR ADVANCE CARE PLAN OR EQUIV PRESENT IN MEDICAL RECORD: ICD-10-PCS | Mod: S$GLB,,, | Performed by: OPHTHALMOLOGY

## 2021-02-19 PROCEDURE — 99999 PR PBB SHADOW E&M-EST. PATIENT-LVL III: ICD-10-PCS | Mod: PBBFAC,,, | Performed by: OPHTHALMOLOGY

## 2021-02-19 PROCEDURE — 1126F PR PAIN SEVERITY QUANTIFIED, NO PAIN PRESENT: ICD-10-PCS | Mod: S$GLB,,, | Performed by: OPHTHALMOLOGY

## 2021-02-19 PROCEDURE — 1157F ADVNC CARE PLAN IN RCRD: CPT | Mod: S$GLB,,, | Performed by: OPHTHALMOLOGY

## 2021-02-19 PROCEDURE — 3288F PR FALLS RISK ASSESSMENT DOCUMENTED: ICD-10-PCS | Mod: CPTII,S$GLB,, | Performed by: OPHTHALMOLOGY

## 2021-02-19 PROCEDURE — 1101F PT FALLS ASSESS-DOCD LE1/YR: CPT | Mod: CPTII,S$GLB,, | Performed by: OPHTHALMOLOGY

## 2021-02-19 PROCEDURE — 99024 POSTOP FOLLOW-UP VISIT: CPT | Mod: S$GLB,,, | Performed by: OPHTHALMOLOGY

## 2021-02-19 RX ORDER — MOXIFLOXACIN 5 MG/ML
1 SOLUTION/ DROPS OPHTHALMIC
Status: CANCELLED | OUTPATIENT
Start: 2021-02-19

## 2021-02-19 RX ORDER — SODIUM CHLORIDE 0.9 % (FLUSH) 0.9 %
10 SYRINGE (ML) INJECTION
Status: DISCONTINUED | OUTPATIENT
Start: 2021-02-19 | End: 2022-10-18

## 2021-02-19 RX ORDER — PHENYLEPHRINE HYDROCHLORIDE 25 MG/ML
1 SOLUTION/ DROPS OPHTHALMIC
Status: CANCELLED | OUTPATIENT
Start: 2021-02-19

## 2021-02-19 RX ORDER — TROPICAMIDE 10 MG/ML
1 SOLUTION/ DROPS OPHTHALMIC
Status: CANCELLED | OUTPATIENT
Start: 2021-02-19

## 2021-02-19 RX ORDER — TETRACAINE HYDROCHLORIDE 5 MG/ML
1 SOLUTION OPHTHALMIC
Status: CANCELLED | OUTPATIENT
Start: 2021-02-19

## 2021-02-24 ENCOUNTER — TELEPHONE (OUTPATIENT)
Dept: PHARMACY | Facility: CLINIC | Age: 76
End: 2021-02-24

## 2021-03-01 ENCOUNTER — LAB VISIT (OUTPATIENT)
Dept: INTERNAL MEDICINE | Facility: CLINIC | Age: 76
End: 2021-03-01
Payer: MEDICARE

## 2021-03-01 ENCOUNTER — TELEPHONE (OUTPATIENT)
Dept: OPHTHALMOLOGY | Facility: CLINIC | Age: 76
End: 2021-03-01

## 2021-03-01 DIAGNOSIS — Z01.818 PRE-OP TESTING: ICD-10-CM

## 2021-03-01 PROCEDURE — U0003 INFECTIOUS AGENT DETECTION BY NUCLEIC ACID (DNA OR RNA); SEVERE ACUTE RESPIRATORY SYNDROME CORONAVIRUS 2 (SARS-COV-2) (CORONAVIRUS DISEASE [COVID-19]), AMPLIFIED PROBE TECHNIQUE, MAKING USE OF HIGH THROUGHPUT TECHNOLOGIES AS DESCRIBED BY CMS-2020-01-R: HCPCS

## 2021-03-01 PROCEDURE — U0005 INFEC AGEN DETEC AMPLI PROBE: HCPCS

## 2021-03-02 LAB — SARS-COV-2 RNA RESP QL NAA+PROBE: NOT DETECTED

## 2021-03-04 ENCOUNTER — ANESTHESIA (OUTPATIENT)
Dept: SURGERY | Facility: OTHER | Age: 76
End: 2021-03-04
Payer: MEDICARE

## 2021-03-04 ENCOUNTER — HOSPITAL ENCOUNTER (OUTPATIENT)
Facility: OTHER | Age: 76
Discharge: HOME | End: 2021-03-04
Attending: OPHTHALMOLOGY | Admitting: OPHTHALMOLOGY
Payer: MEDICARE

## 2021-03-04 ENCOUNTER — ANESTHESIA EVENT (OUTPATIENT)
Dept: SURGERY | Facility: OTHER | Age: 76
End: 2021-03-04
Payer: MEDICARE

## 2021-03-04 VITALS
HEART RATE: 67 BPM | RESPIRATION RATE: 16 BRPM | WEIGHT: 171.81 LBS | DIASTOLIC BLOOD PRESSURE: 71 MMHG | TEMPERATURE: 98 F | OXYGEN SATURATION: 100 % | SYSTOLIC BLOOD PRESSURE: 151 MMHG | BODY MASS INDEX: 29.33 KG/M2 | HEIGHT: 64 IN

## 2021-03-04 DIAGNOSIS — H25.12 NUCLEAR SCLEROTIC CATARACT OF LEFT EYE: Primary | ICD-10-CM

## 2021-03-04 DIAGNOSIS — Z98.890 POST-OPERATIVE STATE: ICD-10-CM

## 2021-03-04 DIAGNOSIS — H25.12 NUCLEAR SCLEROSIS OF LEFT EYE: ICD-10-CM

## 2021-03-04 PROCEDURE — 36000707: Performed by: OPHTHALMOLOGY

## 2021-03-04 PROCEDURE — 66984 XCAPSL CTRC RMVL W/O ECP: CPT | Mod: 79,LT,, | Performed by: OPHTHALMOLOGY

## 2021-03-04 PROCEDURE — 25000003 PHARM REV CODE 250: Performed by: OPHTHALMOLOGY

## 2021-03-04 PROCEDURE — 66984 PR REMOVAL, CATARACT, W/INSRT INTRAOC LENS, W/O ENDO CYCLO: ICD-10-PCS | Mod: 79,LT,, | Performed by: OPHTHALMOLOGY

## 2021-03-04 PROCEDURE — 37000009 HC ANESTHESIA EA ADD 15 MINS: Performed by: OPHTHALMOLOGY

## 2021-03-04 PROCEDURE — V2632 POST CHMBR INTRAOCULAR LENS: HCPCS | Performed by: OPHTHALMOLOGY

## 2021-03-04 PROCEDURE — 71000015 HC POSTOP RECOV 1ST HR: Performed by: OPHTHALMOLOGY

## 2021-03-04 PROCEDURE — 36000706: Performed by: OPHTHALMOLOGY

## 2021-03-04 PROCEDURE — 37000008 HC ANESTHESIA 1ST 15 MINUTES: Performed by: OPHTHALMOLOGY

## 2021-03-04 PROCEDURE — 63600175 PHARM REV CODE 636 W HCPCS: Performed by: NURSE ANESTHETIST, CERTIFIED REGISTERED

## 2021-03-04 RX ORDER — ACETAMINOPHEN 325 MG/1
650 TABLET ORAL EVERY 4 HOURS PRN
Status: DISCONTINUED | OUTPATIENT
Start: 2021-03-04 | End: 2021-03-04 | Stop reason: HOSPADM

## 2021-03-04 RX ORDER — LIDOCAINE HYDROCHLORIDE 40 MG/ML
INJECTION, SOLUTION RETROBULBAR
Status: DISCONTINUED | OUTPATIENT
Start: 2021-03-04 | End: 2021-03-04 | Stop reason: HOSPADM

## 2021-03-04 RX ORDER — MOXIFLOXACIN 5 MG/ML
1 SOLUTION/ DROPS OPHTHALMIC
Status: COMPLETED | OUTPATIENT
Start: 2021-03-04 | End: 2021-03-04

## 2021-03-04 RX ORDER — PROPARACAINE HYDROCHLORIDE 5 MG/ML
1 SOLUTION/ DROPS OPHTHALMIC
Status: DISCONTINUED | OUTPATIENT
Start: 2021-03-04 | End: 2021-03-04 | Stop reason: HOSPADM

## 2021-03-04 RX ORDER — MOXIFLOXACIN 5 MG/ML
SOLUTION/ DROPS OPHTHALMIC
Status: DISCONTINUED | OUTPATIENT
Start: 2021-03-04 | End: 2021-03-04 | Stop reason: HOSPADM

## 2021-03-04 RX ORDER — TETRACAINE HYDROCHLORIDE 5 MG/ML
SOLUTION OPHTHALMIC
Status: DISCONTINUED | OUTPATIENT
Start: 2021-03-04 | End: 2021-03-04 | Stop reason: HOSPADM

## 2021-03-04 RX ORDER — TETRACAINE HYDROCHLORIDE 5 MG/ML
1 SOLUTION OPHTHALMIC
Status: COMPLETED | OUTPATIENT
Start: 2021-03-04 | End: 2021-03-04

## 2021-03-04 RX ORDER — TROPICAMIDE 10 MG/ML
1 SOLUTION/ DROPS OPHTHALMIC
Status: COMPLETED | OUTPATIENT
Start: 2021-03-04 | End: 2021-03-04

## 2021-03-04 RX ORDER — PHENYLEPHRINE HYDROCHLORIDE 25 MG/ML
1 SOLUTION/ DROPS OPHTHALMIC
Status: COMPLETED | OUTPATIENT
Start: 2021-03-04 | End: 2021-03-04

## 2021-03-04 RX ORDER — FENTANYL CITRATE 50 UG/ML
INJECTION, SOLUTION INTRAMUSCULAR; INTRAVENOUS
Status: DISCONTINUED | OUTPATIENT
Start: 2021-03-04 | End: 2021-03-04

## 2021-03-04 RX ADMIN — MOXIFLOXACIN 1 DROP: 5 SOLUTION/ DROPS OPHTHALMIC at 08:03

## 2021-03-04 RX ADMIN — TROPICAMIDE 1 DROP: 10 SOLUTION/ DROPS OPHTHALMIC at 08:03

## 2021-03-04 RX ADMIN — PHENYLEPHRINE HYDROCHLORIDE 1 DROP: 25 SOLUTION/ DROPS OPHTHALMIC at 08:03

## 2021-03-04 RX ADMIN — MOXIFLOXACIN 1 DROP: 5 SOLUTION/ DROPS OPHTHALMIC at 09:03

## 2021-03-04 RX ADMIN — FENTANYL CITRATE 50 MCG: 50 INJECTION, SOLUTION INTRAMUSCULAR; INTRAVENOUS at 08:03

## 2021-03-04 RX ADMIN — TETRACAINE HYDROCHLORIDE 1 DROP: 5 SOLUTION OPHTHALMIC at 08:03

## 2021-03-05 ENCOUNTER — OFFICE VISIT (OUTPATIENT)
Dept: OPHTHALMOLOGY | Facility: CLINIC | Age: 76
End: 2021-03-05
Attending: OPHTHALMOLOGY
Payer: MEDICARE

## 2021-03-05 DIAGNOSIS — Z98.890 POST-OPERATIVE STATE: ICD-10-CM

## 2021-03-05 DIAGNOSIS — H25.13 NUCLEAR SCLEROSIS, BILATERAL: Primary | ICD-10-CM

## 2021-03-05 PROCEDURE — 1126F AMNT PAIN NOTED NONE PRSNT: CPT | Mod: S$GLB,,, | Performed by: OPHTHALMOLOGY

## 2021-03-05 PROCEDURE — 3288F FALL RISK ASSESSMENT DOCD: CPT | Mod: CPTII,S$GLB,, | Performed by: OPHTHALMOLOGY

## 2021-03-05 PROCEDURE — 99999 PR PBB SHADOW E&M-EST. PATIENT-LVL II: ICD-10-PCS | Mod: PBBFAC,,, | Performed by: OPHTHALMOLOGY

## 2021-03-05 PROCEDURE — 1157F PR ADVANCE CARE PLAN OR EQUIV PRESENT IN MEDICAL RECORD: ICD-10-PCS | Mod: S$GLB,,, | Performed by: OPHTHALMOLOGY

## 2021-03-05 PROCEDURE — 1101F PT FALLS ASSESS-DOCD LE1/YR: CPT | Mod: CPTII,S$GLB,, | Performed by: OPHTHALMOLOGY

## 2021-03-05 PROCEDURE — 99024 PR POST-OP FOLLOW-UP VISIT: ICD-10-PCS | Mod: S$GLB,,, | Performed by: OPHTHALMOLOGY

## 2021-03-05 PROCEDURE — 1126F PR PAIN SEVERITY QUANTIFIED, NO PAIN PRESENT: ICD-10-PCS | Mod: S$GLB,,, | Performed by: OPHTHALMOLOGY

## 2021-03-05 PROCEDURE — 1157F ADVNC CARE PLAN IN RCRD: CPT | Mod: S$GLB,,, | Performed by: OPHTHALMOLOGY

## 2021-03-05 PROCEDURE — 99999 PR PBB SHADOW E&M-EST. PATIENT-LVL II: CPT | Mod: PBBFAC,,, | Performed by: OPHTHALMOLOGY

## 2021-03-05 PROCEDURE — 3288F PR FALLS RISK ASSESSMENT DOCUMENTED: ICD-10-PCS | Mod: CPTII,S$GLB,, | Performed by: OPHTHALMOLOGY

## 2021-03-05 PROCEDURE — 1101F PR PT FALLS ASSESS DOC 0-1 FALLS W/OUT INJ PAST YR: ICD-10-PCS | Mod: CPTII,S$GLB,, | Performed by: OPHTHALMOLOGY

## 2021-03-05 PROCEDURE — 99024 POSTOP FOLLOW-UP VISIT: CPT | Mod: S$GLB,,, | Performed by: OPHTHALMOLOGY

## 2021-03-16 ENCOUNTER — PES CALL (OUTPATIENT)
Dept: ADMINISTRATIVE | Facility: CLINIC | Age: 76
End: 2021-03-16

## 2021-03-18 ENCOUNTER — PATIENT MESSAGE (OUTPATIENT)
Dept: RESEARCH | Facility: HOSPITAL | Age: 76
End: 2021-03-18

## 2021-03-26 ENCOUNTER — PATIENT MESSAGE (OUTPATIENT)
Dept: RESEARCH | Facility: HOSPITAL | Age: 76
End: 2021-03-26

## 2021-04-09 ENCOUNTER — OFFICE VISIT (OUTPATIENT)
Dept: OPTOMETRY | Facility: CLINIC | Age: 76
End: 2021-04-09
Payer: MEDICARE

## 2021-04-09 DIAGNOSIS — H40.013 OPEN ANGLE WITH BORDERLINE FINDINGS OF BOTH EYES: ICD-10-CM

## 2021-04-09 DIAGNOSIS — Z98.41 S/P BILATERAL CATARACT EXTRACTION: Primary | ICD-10-CM

## 2021-04-09 DIAGNOSIS — Z98.42 S/P BILATERAL CATARACT EXTRACTION: Primary | ICD-10-CM

## 2021-04-09 PROCEDURE — 3288F PR FALLS RISK ASSESSMENT DOCUMENTED: ICD-10-PCS | Mod: CPTII,S$GLB,, | Performed by: OPTOMETRIST

## 2021-04-09 PROCEDURE — 1157F PR ADVANCE CARE PLAN OR EQUIV PRESENT IN MEDICAL RECORD: ICD-10-PCS | Mod: S$GLB,,, | Performed by: OPTOMETRIST

## 2021-04-09 PROCEDURE — 1101F PR PT FALLS ASSESS DOC 0-1 FALLS W/OUT INJ PAST YR: ICD-10-PCS | Mod: CPTII,S$GLB,, | Performed by: OPTOMETRIST

## 2021-04-09 PROCEDURE — 99024 PR POST-OP FOLLOW-UP VISIT: ICD-10-PCS | Mod: S$GLB,,, | Performed by: OPTOMETRIST

## 2021-04-09 PROCEDURE — 3288F FALL RISK ASSESSMENT DOCD: CPT | Mod: CPTII,S$GLB,, | Performed by: OPTOMETRIST

## 2021-04-09 PROCEDURE — 99999 PR PBB SHADOW E&M-EST. PATIENT-LVL III: ICD-10-PCS | Mod: PBBFAC,,, | Performed by: OPTOMETRIST

## 2021-04-09 PROCEDURE — 99024 POSTOP FOLLOW-UP VISIT: CPT | Mod: S$GLB,,, | Performed by: OPTOMETRIST

## 2021-04-09 PROCEDURE — 1126F AMNT PAIN NOTED NONE PRSNT: CPT | Mod: S$GLB,,, | Performed by: OPTOMETRIST

## 2021-04-09 PROCEDURE — 1126F PR PAIN SEVERITY QUANTIFIED, NO PAIN PRESENT: ICD-10-PCS | Mod: S$GLB,,, | Performed by: OPTOMETRIST

## 2021-04-09 PROCEDURE — 1101F PT FALLS ASSESS-DOCD LE1/YR: CPT | Mod: CPTII,S$GLB,, | Performed by: OPTOMETRIST

## 2021-04-09 PROCEDURE — 99999 PR PBB SHADOW E&M-EST. PATIENT-LVL III: CPT | Mod: PBBFAC,,, | Performed by: OPTOMETRIST

## 2021-04-09 PROCEDURE — 1157F ADVNC CARE PLAN IN RCRD: CPT | Mod: S$GLB,,, | Performed by: OPTOMETRIST

## 2021-04-15 ENCOUNTER — TELEPHONE (OUTPATIENT)
Dept: ALLERGY | Facility: CLINIC | Age: 76
End: 2021-04-15

## 2021-05-13 ENCOUNTER — OFFICE VISIT (OUTPATIENT)
Dept: DERMATOLOGY | Facility: CLINIC | Age: 76
End: 2021-05-13
Payer: MEDICARE

## 2021-05-13 DIAGNOSIS — L21.9 SEBORRHEIC DERMATITIS: Primary | ICD-10-CM

## 2021-05-13 DIAGNOSIS — R23.4 OILY SKIN: ICD-10-CM

## 2021-05-13 DIAGNOSIS — D22.9 BENIGN MOLE: ICD-10-CM

## 2021-05-13 DIAGNOSIS — Z76.89 ENCOUNTER FOR SKIN CARE: ICD-10-CM

## 2021-05-13 DIAGNOSIS — L70.9 ACNE, UNSPECIFIED ACNE TYPE: ICD-10-CM

## 2021-05-13 PROCEDURE — 3288F FALL RISK ASSESSMENT DOCD: CPT | Mod: CPTII,S$GLB,, | Performed by: DERMATOLOGY

## 2021-05-13 PROCEDURE — 1101F PT FALLS ASSESS-DOCD LE1/YR: CPT | Mod: CPTII,S$GLB,, | Performed by: DERMATOLOGY

## 2021-05-13 PROCEDURE — 1157F ADVNC CARE PLAN IN RCRD: CPT | Mod: S$GLB,,, | Performed by: DERMATOLOGY

## 2021-05-13 PROCEDURE — 99203 OFFICE O/P NEW LOW 30 MIN: CPT | Mod: S$GLB,,, | Performed by: DERMATOLOGY

## 2021-05-13 PROCEDURE — 1126F AMNT PAIN NOTED NONE PRSNT: CPT | Mod: S$GLB,,, | Performed by: DERMATOLOGY

## 2021-05-13 PROCEDURE — 1126F PR PAIN SEVERITY QUANTIFIED, NO PAIN PRESENT: ICD-10-PCS | Mod: S$GLB,,, | Performed by: DERMATOLOGY

## 2021-05-13 PROCEDURE — 1157F PR ADVANCE CARE PLAN OR EQUIV PRESENT IN MEDICAL RECORD: ICD-10-PCS | Mod: S$GLB,,, | Performed by: DERMATOLOGY

## 2021-05-13 PROCEDURE — 1159F MED LIST DOCD IN RCRD: CPT | Mod: S$GLB,,, | Performed by: DERMATOLOGY

## 2021-05-13 PROCEDURE — 1101F PR PT FALLS ASSESS DOC 0-1 FALLS W/OUT INJ PAST YR: ICD-10-PCS | Mod: CPTII,S$GLB,, | Performed by: DERMATOLOGY

## 2021-05-13 PROCEDURE — 99203 PR OFFICE/OUTPT VISIT, NEW, LEVL III, 30-44 MIN: ICD-10-PCS | Mod: S$GLB,,, | Performed by: DERMATOLOGY

## 2021-05-13 PROCEDURE — 3288F PR FALLS RISK ASSESSMENT DOCUMENTED: ICD-10-PCS | Mod: CPTII,S$GLB,, | Performed by: DERMATOLOGY

## 2021-05-13 PROCEDURE — 99999 PR PBB SHADOW E&M-EST. PATIENT-LVL III: CPT | Mod: PBBFAC,,, | Performed by: DERMATOLOGY

## 2021-05-13 PROCEDURE — 1159F PR MEDICATION LIST DOCUMENTED IN MEDICAL RECORD: ICD-10-PCS | Mod: S$GLB,,, | Performed by: DERMATOLOGY

## 2021-05-13 PROCEDURE — 99999 PR PBB SHADOW E&M-EST. PATIENT-LVL III: ICD-10-PCS | Mod: PBBFAC,,, | Performed by: DERMATOLOGY

## 2021-06-29 ENCOUNTER — TELEPHONE (OUTPATIENT)
Dept: PRIMARY CARE CLINIC | Facility: CLINIC | Age: 76
End: 2021-06-29

## 2021-06-29 DIAGNOSIS — I10 HYPERTENSION, UNSPECIFIED TYPE: ICD-10-CM

## 2021-06-29 DIAGNOSIS — Z00.00 ROUTINE GENERAL MEDICAL EXAMINATION AT A HEALTH CARE FACILITY: Primary | ICD-10-CM

## 2021-06-30 PROCEDURE — 99457 RPM TX MGMT 1ST 20 MIN: CPT | Mod: S$GLB,,, | Performed by: FAMILY MEDICINE

## 2021-06-30 PROCEDURE — 99457 PR MONITORING, PHYSIOL PARAM, REMOTE, 1ST 20 MINS, PER MONTH: ICD-10-PCS | Mod: S$GLB,,, | Performed by: FAMILY MEDICINE

## 2021-07-13 ENCOUNTER — PES CALL (OUTPATIENT)
Dept: ADMINISTRATIVE | Facility: CLINIC | Age: 76
End: 2021-07-13

## 2021-08-26 ENCOUNTER — LAB VISIT (OUTPATIENT)
Dept: LAB | Facility: HOSPITAL | Age: 76
End: 2021-08-26
Attending: FAMILY MEDICINE
Payer: MEDICARE

## 2021-08-26 DIAGNOSIS — Z00.00 ROUTINE GENERAL MEDICAL EXAMINATION AT A HEALTH CARE FACILITY: ICD-10-CM

## 2021-08-26 DIAGNOSIS — I10 HYPERTENSION, UNSPECIFIED TYPE: ICD-10-CM

## 2021-08-26 LAB
ALBUMIN SERPL BCP-MCNC: 3.3 G/DL (ref 3.5–5.2)
ALP SERPL-CCNC: 103 U/L (ref 55–135)
ALT SERPL W/O P-5'-P-CCNC: 16 U/L (ref 10–44)
ANION GAP SERPL CALC-SCNC: 11 MMOL/L (ref 8–16)
AST SERPL-CCNC: 26 U/L (ref 10–40)
BASOPHILS # BLD AUTO: 0.06 K/UL (ref 0–0.2)
BASOPHILS NFR BLD: 1.1 % (ref 0–1.9)
BILIRUB SERPL-MCNC: 0.5 MG/DL (ref 0.1–1)
BUN SERPL-MCNC: 12 MG/DL (ref 8–23)
CALCIUM SERPL-MCNC: 9.9 MG/DL (ref 8.7–10.5)
CHLORIDE SERPL-SCNC: 104 MMOL/L (ref 95–110)
CHOLEST SERPL-MCNC: 199 MG/DL (ref 120–199)
CHOLEST/HDLC SERPL: 2.3 {RATIO} (ref 2–5)
CO2 SERPL-SCNC: 24 MMOL/L (ref 23–29)
CREAT SERPL-MCNC: 0.7 MG/DL (ref 0.5–1.4)
DIFFERENTIAL METHOD: ABNORMAL
EOSINOPHIL # BLD AUTO: 0.4 K/UL (ref 0–0.5)
EOSINOPHIL NFR BLD: 6.7 % (ref 0–8)
ERYTHROCYTE [DISTWIDTH] IN BLOOD BY AUTOMATED COUNT: 16.4 % (ref 11.5–14.5)
EST. GFR  (AFRICAN AMERICAN): >60 ML/MIN/1.73 M^2
EST. GFR  (NON AFRICAN AMERICAN): >60 ML/MIN/1.73 M^2
GLUCOSE SERPL-MCNC: 82 MG/DL (ref 70–110)
HCT VFR BLD AUTO: 38.2 % (ref 37–48.5)
HDLC SERPL-MCNC: 86 MG/DL (ref 40–75)
HDLC SERPL: 43.2 % (ref 20–50)
HGB BLD-MCNC: 12.6 G/DL (ref 12–16)
IMM GRANULOCYTES # BLD AUTO: 0.01 K/UL (ref 0–0.04)
IMM GRANULOCYTES NFR BLD AUTO: 0.2 % (ref 0–0.5)
LDLC SERPL CALC-MCNC: 103.6 MG/DL (ref 63–159)
LYMPHOCYTES # BLD AUTO: 1.9 K/UL (ref 1–4.8)
LYMPHOCYTES NFR BLD: 35 % (ref 18–48)
MCH RBC QN AUTO: 28.6 PG (ref 27–31)
MCHC RBC AUTO-ENTMCNC: 33 G/DL (ref 32–36)
MCV RBC AUTO: 87 FL (ref 82–98)
MONOCYTES # BLD AUTO: 0.6 K/UL (ref 0.3–1)
MONOCYTES NFR BLD: 11.6 % (ref 4–15)
NEUTROPHILS # BLD AUTO: 2.4 K/UL (ref 1.8–7.7)
NEUTROPHILS NFR BLD: 45.4 % (ref 38–73)
NONHDLC SERPL-MCNC: 113 MG/DL
NRBC BLD-RTO: 0 /100 WBC
PLATELET # BLD AUTO: 298 K/UL (ref 150–450)
PMV BLD AUTO: 10.6 FL (ref 9.2–12.9)
POTASSIUM SERPL-SCNC: 4.2 MMOL/L (ref 3.5–5.1)
PROT SERPL-MCNC: 7.2 G/DL (ref 6–8.4)
RBC # BLD AUTO: 4.41 M/UL (ref 4–5.4)
SODIUM SERPL-SCNC: 139 MMOL/L (ref 136–145)
TRIGL SERPL-MCNC: 47 MG/DL (ref 30–150)
TSH SERPL DL<=0.005 MIU/L-ACNC: 2.15 UIU/ML (ref 0.4–4)
WBC # BLD AUTO: 5.35 K/UL (ref 3.9–12.7)

## 2021-08-26 PROCEDURE — 84443 ASSAY THYROID STIM HORMONE: CPT | Performed by: FAMILY MEDICINE

## 2021-08-26 PROCEDURE — 80053 COMPREHEN METABOLIC PANEL: CPT | Performed by: FAMILY MEDICINE

## 2021-08-26 PROCEDURE — 85025 COMPLETE CBC W/AUTO DIFF WBC: CPT | Performed by: FAMILY MEDICINE

## 2021-08-26 PROCEDURE — 80061 LIPID PANEL: CPT | Performed by: FAMILY MEDICINE

## 2021-08-26 PROCEDURE — 36415 COLL VENOUS BLD VENIPUNCTURE: CPT | Mod: PN | Performed by: FAMILY MEDICINE

## 2021-10-09 ENCOUNTER — IMMUNIZATION (OUTPATIENT)
Dept: INTERNAL MEDICINE | Facility: CLINIC | Age: 76
End: 2021-10-09
Payer: MEDICARE

## 2021-10-09 DIAGNOSIS — Z23 NEED FOR VACCINATION: Primary | ICD-10-CM

## 2021-10-09 PROCEDURE — 91300 COVID-19, MRNA, LNP-S, PF, 30 MCG/0.3 ML DOSE VACCINE: CPT | Mod: HCNC,PBBFAC | Performed by: INTERNAL MEDICINE

## 2021-10-09 PROCEDURE — 90694 FLU VACCINE - QUADRIVALENT - ADJUVANTED: ICD-10-PCS | Mod: HCNC,S$GLB,, | Performed by: FAMILY MEDICINE

## 2021-10-09 PROCEDURE — G0008 ADMIN INFLUENZA VIRUS VAC: HCPCS | Mod: HCNC,S$GLB,, | Performed by: FAMILY MEDICINE

## 2021-10-09 PROCEDURE — 0003A COVID-19, MRNA, LNP-S, PF, 30 MCG/0.3 ML DOSE VACCINE: CPT | Mod: HCNC,CV19,PBBFAC | Performed by: INTERNAL MEDICINE

## 2021-10-09 PROCEDURE — 90694 VACC AIIV4 NO PRSRV 0.5ML IM: CPT | Mod: HCNC,S$GLB,, | Performed by: FAMILY MEDICINE

## 2021-10-09 PROCEDURE — G0008 FLU VACCINE - QUADRIVALENT - ADJUVANTED: ICD-10-PCS | Mod: HCNC,S$GLB,, | Performed by: FAMILY MEDICINE

## 2021-10-21 ENCOUNTER — TELEPHONE (OUTPATIENT)
Dept: ALLERGY | Facility: CLINIC | Age: 76
End: 2021-10-21

## 2021-10-29 ENCOUNTER — OFFICE VISIT (OUTPATIENT)
Dept: OPTOMETRY | Facility: CLINIC | Age: 76
End: 2021-10-29
Payer: MEDICARE

## 2021-10-29 DIAGNOSIS — H40.013 OPEN ANGLE WITH BORDERLINE FINDINGS OF BOTH EYES: Primary | ICD-10-CM

## 2021-10-29 DIAGNOSIS — H52.203 MYOPIA WITH ASTIGMATISM AND PRESBYOPIA, BILATERAL: ICD-10-CM

## 2021-10-29 DIAGNOSIS — H52.13 MYOPIA WITH ASTIGMATISM AND PRESBYOPIA, BILATERAL: ICD-10-CM

## 2021-10-29 DIAGNOSIS — H52.4 MYOPIA WITH ASTIGMATISM AND PRESBYOPIA, BILATERAL: ICD-10-CM

## 2021-10-29 PROCEDURE — 76514 ECHO EXAM OF EYE THICKNESS: CPT | Mod: HCNC,S$GLB,, | Performed by: OPTOMETRIST

## 2021-10-29 PROCEDURE — 92083 HUMPHREY VISUAL FIELD - OU - BOTH EYES: ICD-10-PCS | Mod: HCNC,S$GLB,, | Performed by: OPTOMETRIST

## 2021-10-29 PROCEDURE — 92020 PR SPECIAL EYE EVAL,GONIOSCOPY: ICD-10-PCS | Mod: HCNC,S$GLB,, | Performed by: OPTOMETRIST

## 2021-10-29 PROCEDURE — 1159F PR MEDICATION LIST DOCUMENTED IN MEDICAL RECORD: ICD-10-PCS | Mod: HCNC,CPTII,S$GLB, | Performed by: OPTOMETRIST

## 2021-10-29 PROCEDURE — 1101F PT FALLS ASSESS-DOCD LE1/YR: CPT | Mod: HCNC,CPTII,S$GLB, | Performed by: OPTOMETRIST

## 2021-10-29 PROCEDURE — 4010F ACE/ARB THERAPY RXD/TAKEN: CPT | Mod: HCNC,CPTII,S$GLB, | Performed by: OPTOMETRIST

## 2021-10-29 PROCEDURE — 1157F PR ADVANCE CARE PLAN OR EQUIV PRESENT IN MEDICAL RECORD: ICD-10-PCS | Mod: HCNC,CPTII,S$GLB, | Performed by: OPTOMETRIST

## 2021-10-29 PROCEDURE — 99999 PR PBB SHADOW E&M-EST. PATIENT-LVL III: ICD-10-PCS | Mod: PBBFAC,HCNC,, | Performed by: OPTOMETRIST

## 2021-10-29 PROCEDURE — 1101F PR PT FALLS ASSESS DOC 0-1 FALLS W/OUT INJ PAST YR: ICD-10-PCS | Mod: HCNC,CPTII,S$GLB, | Performed by: OPTOMETRIST

## 2021-10-29 PROCEDURE — 1126F PR PAIN SEVERITY QUANTIFIED, NO PAIN PRESENT: ICD-10-PCS | Mod: HCNC,CPTII,S$GLB, | Performed by: OPTOMETRIST

## 2021-10-29 PROCEDURE — 92083 EXTENDED VISUAL FIELD XM: CPT | Mod: HCNC,S$GLB,, | Performed by: OPTOMETRIST

## 2021-10-29 PROCEDURE — 92012 PR EYE EXAM, EST PATIENT,INTERMED: ICD-10-PCS | Mod: HCNC,S$GLB,, | Performed by: OPTOMETRIST

## 2021-10-29 PROCEDURE — 1159F MED LIST DOCD IN RCRD: CPT | Mod: HCNC,CPTII,S$GLB, | Performed by: OPTOMETRIST

## 2021-10-29 PROCEDURE — 3288F PR FALLS RISK ASSESSMENT DOCUMENTED: ICD-10-PCS | Mod: HCNC,CPTII,S$GLB, | Performed by: OPTOMETRIST

## 2021-10-29 PROCEDURE — 1126F AMNT PAIN NOTED NONE PRSNT: CPT | Mod: HCNC,CPTII,S$GLB, | Performed by: OPTOMETRIST

## 2021-10-29 PROCEDURE — 92133 CPTRZD OPH DX IMG PST SGM ON: CPT | Mod: HCNC,S$GLB,, | Performed by: OPTOMETRIST

## 2021-10-29 PROCEDURE — 76514 PR  US, EYE, FOR CORNEAL THICKNESS: ICD-10-PCS | Mod: HCNC,S$GLB,, | Performed by: OPTOMETRIST

## 2021-10-29 PROCEDURE — 92012 INTRM OPH EXAM EST PATIENT: CPT | Mod: HCNC,S$GLB,, | Performed by: OPTOMETRIST

## 2021-10-29 PROCEDURE — 1157F ADVNC CARE PLAN IN RCRD: CPT | Mod: HCNC,CPTII,S$GLB, | Performed by: OPTOMETRIST

## 2021-10-29 PROCEDURE — 3288F FALL RISK ASSESSMENT DOCD: CPT | Mod: HCNC,CPTII,S$GLB, | Performed by: OPTOMETRIST

## 2021-10-29 PROCEDURE — 4010F PR ACE/ARB THEARPY RXD/TAKEN: ICD-10-PCS | Mod: HCNC,CPTII,S$GLB, | Performed by: OPTOMETRIST

## 2021-10-29 PROCEDURE — 92133 POSTERIOR SEGMENT OCT OPTIC NERVE(OCULAR COHERENCE TOMOGRAPHY) - OU - BOTH EYES: ICD-10-PCS | Mod: HCNC,S$GLB,, | Performed by: OPTOMETRIST

## 2021-10-29 PROCEDURE — 99999 PR PBB SHADOW E&M-EST. PATIENT-LVL III: CPT | Mod: PBBFAC,HCNC,, | Performed by: OPTOMETRIST

## 2021-10-29 PROCEDURE — 92020 GONIOSCOPY: CPT | Mod: HCNC,S$GLB,, | Performed by: OPTOMETRIST

## 2021-10-29 RX ORDER — ISRADIPINE 5 MG/1
CAPSULE ORAL
COMMUNITY
Start: 2021-06-08 | End: 2021-11-10 | Stop reason: SDUPTHER

## 2021-11-10 ENCOUNTER — SPECIALTY PHARMACY (OUTPATIENT)
Dept: PHARMACY | Facility: CLINIC | Age: 76
End: 2021-11-10
Payer: MEDICARE

## 2021-11-10 ENCOUNTER — OFFICE VISIT (OUTPATIENT)
Dept: PRIMARY CARE CLINIC | Facility: CLINIC | Age: 76
End: 2021-11-10
Payer: MEDICARE

## 2021-11-10 VITALS
SYSTOLIC BLOOD PRESSURE: 134 MMHG | BODY MASS INDEX: 30.19 KG/M2 | TEMPERATURE: 98 F | DIASTOLIC BLOOD PRESSURE: 78 MMHG | OXYGEN SATURATION: 99 % | HEIGHT: 64 IN | HEART RATE: 76 BPM | WEIGHT: 176.81 LBS

## 2021-11-10 DIAGNOSIS — Z78.0 MENOPAUSE: ICD-10-CM

## 2021-11-10 DIAGNOSIS — Z12.31 OTHER SCREENING MAMMOGRAM: ICD-10-CM

## 2021-11-10 DIAGNOSIS — J45.50 SEVERE PERSISTENT ASTHMA WITHOUT COMPLICATION: ICD-10-CM

## 2021-11-10 DIAGNOSIS — J45.909 ASTHMA, UNSPECIFIED ASTHMA SEVERITY, UNSPECIFIED WHETHER COMPLICATED, UNSPECIFIED WHETHER PERSISTENT: ICD-10-CM

## 2021-11-10 DIAGNOSIS — D64.9 ANEMIA, UNSPECIFIED TYPE: Primary | ICD-10-CM

## 2021-11-10 DIAGNOSIS — E87.8 ELECTROLYTE ABNORMALITY: ICD-10-CM

## 2021-11-10 DIAGNOSIS — J45.40 MODERATE PERSISTENT ASTHMA WITHOUT COMPLICATION: ICD-10-CM

## 2021-11-10 DIAGNOSIS — I10 HYPERTENSION, UNSPECIFIED TYPE: ICD-10-CM

## 2021-11-10 PROCEDURE — 1126F PR PAIN SEVERITY QUANTIFIED, NO PAIN PRESENT: ICD-10-PCS | Mod: CPTII,S$GLB,, | Performed by: FAMILY MEDICINE

## 2021-11-10 PROCEDURE — 99499 UNLISTED E&M SERVICE: CPT | Mod: HCNC,S$GLB,, | Performed by: FAMILY MEDICINE

## 2021-11-10 PROCEDURE — 4010F ACE/ARB THERAPY RXD/TAKEN: CPT | Mod: CPTII,S$GLB,, | Performed by: FAMILY MEDICINE

## 2021-11-10 PROCEDURE — 99999 PR PBB SHADOW E&M-EST. PATIENT-LVL V: CPT | Mod: PBBFAC,,, | Performed by: FAMILY MEDICINE

## 2021-11-10 PROCEDURE — 1157F ADVNC CARE PLAN IN RCRD: CPT | Mod: CPTII,S$GLB,, | Performed by: FAMILY MEDICINE

## 2021-11-10 PROCEDURE — 3078F PR MOST RECENT DIASTOLIC BLOOD PRESSURE < 80 MM HG: ICD-10-PCS | Mod: CPTII,S$GLB,, | Performed by: FAMILY MEDICINE

## 2021-11-10 PROCEDURE — 3008F PR BODY MASS INDEX (BMI) DOCUMENTED: ICD-10-PCS | Mod: CPTII,S$GLB,, | Performed by: FAMILY MEDICINE

## 2021-11-10 PROCEDURE — 99397 PER PM REEVAL EST PAT 65+ YR: CPT | Mod: S$GLB,,, | Performed by: FAMILY MEDICINE

## 2021-11-10 PROCEDURE — 1126F AMNT PAIN NOTED NONE PRSNT: CPT | Mod: CPTII,S$GLB,, | Performed by: FAMILY MEDICINE

## 2021-11-10 PROCEDURE — 1157F PR ADVANCE CARE PLAN OR EQUIV PRESENT IN MEDICAL RECORD: ICD-10-PCS | Mod: CPTII,S$GLB,, | Performed by: FAMILY MEDICINE

## 2021-11-10 PROCEDURE — 1101F PR PT FALLS ASSESS DOC 0-1 FALLS W/OUT INJ PAST YR: ICD-10-PCS | Mod: CPTII,S$GLB,, | Performed by: FAMILY MEDICINE

## 2021-11-10 PROCEDURE — 99499 RISK ADDL DX/OHS AUDIT: ICD-10-PCS | Mod: S$GLB,,, | Performed by: FAMILY MEDICINE

## 2021-11-10 PROCEDURE — 99999 PR PBB SHADOW E&M-EST. PATIENT-LVL V: ICD-10-PCS | Mod: PBBFAC,,, | Performed by: FAMILY MEDICINE

## 2021-11-10 PROCEDURE — 4010F PR ACE/ARB THEARPY RXD/TAKEN: ICD-10-PCS | Mod: CPTII,S$GLB,, | Performed by: FAMILY MEDICINE

## 2021-11-10 PROCEDURE — 1159F MED LIST DOCD IN RCRD: CPT | Mod: CPTII,S$GLB,, | Performed by: FAMILY MEDICINE

## 2021-11-10 PROCEDURE — 3078F DIAST BP <80 MM HG: CPT | Mod: CPTII,S$GLB,, | Performed by: FAMILY MEDICINE

## 2021-11-10 PROCEDURE — 3075F PR MOST RECENT SYSTOLIC BLOOD PRESS GE 130-139MM HG: ICD-10-PCS | Mod: CPTII,S$GLB,, | Performed by: FAMILY MEDICINE

## 2021-11-10 PROCEDURE — 1101F PT FALLS ASSESS-DOCD LE1/YR: CPT | Mod: CPTII,S$GLB,, | Performed by: FAMILY MEDICINE

## 2021-11-10 PROCEDURE — 3075F SYST BP GE 130 - 139MM HG: CPT | Mod: CPTII,S$GLB,, | Performed by: FAMILY MEDICINE

## 2021-11-10 PROCEDURE — 1159F PR MEDICATION LIST DOCUMENTED IN MEDICAL RECORD: ICD-10-PCS | Mod: CPTII,S$GLB,, | Performed by: FAMILY MEDICINE

## 2021-11-10 PROCEDURE — 99397 PR PREVENTIVE VISIT,EST,65 & OVER: ICD-10-PCS | Mod: S$GLB,,, | Performed by: FAMILY MEDICINE

## 2021-11-10 PROCEDURE — 3288F FALL RISK ASSESSMENT DOCD: CPT | Mod: CPTII,S$GLB,, | Performed by: FAMILY MEDICINE

## 2021-11-10 PROCEDURE — 3288F PR FALLS RISK ASSESSMENT DOCUMENTED: ICD-10-PCS | Mod: CPTII,S$GLB,, | Performed by: FAMILY MEDICINE

## 2021-11-10 PROCEDURE — 3008F BODY MASS INDEX DOCD: CPT | Mod: CPTII,S$GLB,, | Performed by: FAMILY MEDICINE

## 2021-11-10 RX ORDER — FERROUS SULFATE 325(65) MG
325 TABLET, DELAYED RELEASE (ENTERIC COATED) ORAL
Qty: 270 TABLET | Refills: 3 | COMMUNITY
Start: 2021-11-10

## 2021-11-10 RX ORDER — BUDESONIDE 0.5 MG/2ML
0.5 INHALANT ORAL DAILY
Qty: 180 ML | Refills: 1 | Status: SHIPPED | OUTPATIENT
Start: 2021-11-10 | End: 2022-10-18

## 2021-11-10 RX ORDER — ALBUTEROL SULFATE 90 UG/1
2 AEROSOL, METERED RESPIRATORY (INHALATION) EVERY 6 HOURS PRN
Qty: 8 G | Refills: 3 | Status: SHIPPED | OUTPATIENT
Start: 2021-11-10 | End: 2021-11-24 | Stop reason: SDUPTHER

## 2021-11-10 RX ORDER — POTASSIUM CHLORIDE 20 MEQ/1
20 TABLET, EXTENDED RELEASE ORAL 2 TIMES DAILY
Qty: 180 TABLET | Refills: 2 | Status: SHIPPED | OUTPATIENT
Start: 2021-11-10 | End: 2023-03-28 | Stop reason: SDUPTHER

## 2021-11-10 RX ORDER — VALSARTAN 320 MG/1
320 TABLET ORAL DAILY
Qty: 90 TABLET | Refills: 3 | Status: SHIPPED | OUTPATIENT
Start: 2021-11-10 | End: 2021-12-03 | Stop reason: SDUPTHER

## 2021-11-10 RX ORDER — ISRADIPINE 5 MG/1
5 CAPSULE ORAL ONCE
Qty: 90 CAPSULE | Refills: 3 | Status: SHIPPED | OUTPATIENT
Start: 2021-11-10 | End: 2021-11-11

## 2021-11-10 RX ORDER — FLUTICASONE PROPIONATE AND SALMETEROL 50; 500 UG/1; UG/1
POWDER RESPIRATORY (INHALATION)
Qty: 1 EACH | Refills: 3 | Status: SHIPPED | OUTPATIENT
Start: 2021-11-10 | End: 2021-11-24 | Stop reason: SDUPTHER

## 2021-11-11 ENCOUNTER — TELEPHONE (OUTPATIENT)
Dept: PRIMARY CARE CLINIC | Facility: CLINIC | Age: 76
End: 2021-11-11
Payer: MEDICARE

## 2021-11-11 DIAGNOSIS — I10 HYPERTENSION, UNSPECIFIED TYPE: ICD-10-CM

## 2021-11-11 RX ORDER — NIFEDIPINE 60 MG/1
60 TABLET, EXTENDED RELEASE ORAL DAILY
Qty: 90 TABLET | Refills: 1 | Status: SHIPPED | OUTPATIENT
Start: 2021-11-11 | End: 2021-11-12 | Stop reason: SDUPTHER

## 2021-11-11 NOTE — TELEPHONE ENCOUNTER
Dupixent PA approved through 12/31/22  PA Case: [id number]    Test claim shows a $1,109.97 copay  Forwarding to BI/FA for PAP renewal for 2022

## 2021-11-11 NOTE — TELEPHONE ENCOUNTER
BENEFIT INVESTIGATION:  DUPIXENT    [employer] Medicare plan.   [facility] in network  Patient is in inital stage of coverage.   Will pay  $554.99in initial, 25% coinsurance in GAP, & 5% coinsurance in catastrophic.   Estimated co pay: $554.99  Co pay assistance required.   Forwarded to FA team for review.           MCP

## 2021-11-12 DIAGNOSIS — I10 HYPERTENSION, UNSPECIFIED TYPE: ICD-10-CM

## 2021-11-12 RX ORDER — NIFEDIPINE 60 MG/1
60 TABLET, EXTENDED RELEASE ORAL DAILY
Qty: 90 TABLET | Refills: 1 | Status: SHIPPED | OUTPATIENT
Start: 2021-11-12 | End: 2022-04-05

## 2021-11-12 NOTE — TELEPHONE ENCOUNTER
Pt agrees for Dupixent financial assistance.   Mail pt portion 11/12  Faxed MD portion 11/12    PTL

## 2021-11-18 NOTE — TELEPHONE ENCOUNTER
Provider (Family Medicine) states she cannot prescribe Dupixent and it will need to be prescribed by the patient's allergist. Previous allergist was a fellow and no longer with [facility]. Patient seeing a new allergist on 11/24 ( Dr Lidya Bobby), who states she will prescribe after the visit.

## 2021-11-24 ENCOUNTER — OFFICE VISIT (OUTPATIENT)
Dept: ALLERGY | Facility: CLINIC | Age: 76
End: 2021-11-24
Payer: MEDICARE

## 2021-11-24 ENCOUNTER — HOSPITAL ENCOUNTER (OUTPATIENT)
Dept: RADIOLOGY | Facility: OTHER | Age: 76
Discharge: HOME | End: 2021-11-24
Attending: FAMILY MEDICINE
Payer: MEDICARE

## 2021-11-24 ENCOUNTER — TELEPHONE (OUTPATIENT)
Dept: ALLERGY | Facility: CLINIC | Age: 76
End: 2021-11-24
Payer: MEDICARE

## 2021-11-24 VITALS — HEIGHT: 66 IN | BODY MASS INDEX: 28.6 KG/M2 | WEIGHT: 177.94 LBS

## 2021-11-24 DIAGNOSIS — Z78.0 MENOPAUSE: ICD-10-CM

## 2021-11-24 DIAGNOSIS — D72.10 EOSINOPHILIA, UNSPECIFIED TYPE: ICD-10-CM

## 2021-11-24 DIAGNOSIS — J30.9 ALLERGIC RHINITIS, UNSPECIFIED SEASONALITY, UNSPECIFIED TRIGGER: ICD-10-CM

## 2021-11-24 DIAGNOSIS — J45.40 MODERATE PERSISTENT ASTHMA WITHOUT COMPLICATION: Primary | ICD-10-CM

## 2021-11-24 DIAGNOSIS — J32.4 CHRONIC PANSINUSITIS: ICD-10-CM

## 2021-11-24 PROCEDURE — 77080 DEXA BONE DENSITY SPINE HIP: ICD-10-PCS | Mod: 26,HCNC,, | Performed by: RADIOLOGY

## 2021-11-24 PROCEDURE — 77080 DXA BONE DENSITY AXIAL: CPT | Mod: 26,HCNC,, | Performed by: RADIOLOGY

## 2021-11-24 PROCEDURE — 77080 DXA BONE DENSITY AXIAL: CPT | Mod: TC,HCNC

## 2021-11-24 PROCEDURE — 99999 PR PBB SHADOW E&M-EST. PATIENT-LVL III: CPT | Mod: PBBFAC,HCNC,GC, | Performed by: STUDENT IN AN ORGANIZED HEALTH CARE EDUCATION/TRAINING PROGRAM

## 2021-11-24 PROCEDURE — 99214 PR OFFICE/OUTPT VISIT, EST, LEVL IV, 30-39 MIN: ICD-10-PCS | Mod: HCNC,GC,S$GLB, | Performed by: STUDENT IN AN ORGANIZED HEALTH CARE EDUCATION/TRAINING PROGRAM

## 2021-11-24 PROCEDURE — 99999 PR PBB SHADOW E&M-EST. PATIENT-LVL III: ICD-10-PCS | Mod: PBBFAC,HCNC,GC, | Performed by: STUDENT IN AN ORGANIZED HEALTH CARE EDUCATION/TRAINING PROGRAM

## 2021-11-24 PROCEDURE — 99214 OFFICE O/P EST MOD 30 MIN: CPT | Mod: HCNC,GC,S$GLB, | Performed by: STUDENT IN AN ORGANIZED HEALTH CARE EDUCATION/TRAINING PROGRAM

## 2021-11-24 RX ORDER — GUAIFENESIN 600 MG/1
600 TABLET, EXTENDED RELEASE ORAL 2 TIMES DAILY
Qty: 60 TABLET | Refills: 5 | Status: SHIPPED | OUTPATIENT
Start: 2021-11-24

## 2021-11-24 RX ORDER — AZELASTINE 1 MG/ML
2 SPRAY, METERED NASAL 2 TIMES DAILY
Qty: 30 ML | Refills: 11 | Status: SHIPPED | OUTPATIENT
Start: 2021-11-24 | End: 2023-01-31 | Stop reason: SDUPTHER

## 2021-11-24 RX ORDER — ALBUTEROL SULFATE 90 UG/1
2 AEROSOL, METERED RESPIRATORY (INHALATION) EVERY 6 HOURS PRN
Qty: 8 G | Refills: 11 | Status: SHIPPED | OUTPATIENT
Start: 2021-11-24 | End: 2022-01-14 | Stop reason: SDUPTHER

## 2021-11-24 RX ORDER — FLUTICASONE PROPIONATE AND SALMETEROL 50; 500 UG/1; UG/1
POWDER RESPIRATORY (INHALATION)
Qty: 1 EACH | Refills: 11 | Status: SHIPPED | OUTPATIENT
Start: 2021-11-24 | End: 2021-12-03 | Stop reason: SDUPTHER

## 2021-11-24 RX ORDER — CARBOXYMETHYLCELLULOSE SODIUM 5 MG/ML
1 SOLUTION/ DROPS OPHTHALMIC 3 TIMES DAILY PRN
Qty: 30 ML | Refills: 11 | Status: SHIPPED | OUTPATIENT
Start: 2021-11-24 | End: 2023-01-31 | Stop reason: SDUPTHER

## 2021-11-24 RX ORDER — FLUTICASONE PROPIONATE 50 MCG
2 SPRAY, SUSPENSION (ML) NASAL 2 TIMES DAILY
Qty: 16 G | Refills: 11 | Status: SHIPPED | OUTPATIENT
Start: 2021-11-24 | End: 2023-01-31 | Stop reason: SDUPTHER

## 2021-11-26 ENCOUNTER — HOSPITAL ENCOUNTER (OUTPATIENT)
Dept: PULMONOLOGY | Facility: CLINIC | Age: 76
Discharge: HOME | End: 2021-11-26
Payer: MEDICARE

## 2021-11-26 ENCOUNTER — HOSPITAL ENCOUNTER (OUTPATIENT)
Dept: RADIOLOGY | Facility: HOSPITAL | Age: 76
Discharge: HOME | End: 2021-11-26
Attending: ORTHOPAEDIC SURGERY
Payer: MEDICARE

## 2021-11-26 DIAGNOSIS — M87.00 AVASCULAR NECROSIS: ICD-10-CM

## 2021-11-26 DIAGNOSIS — J45.40 MODERATE PERSISTENT ASTHMA WITHOUT COMPLICATION: ICD-10-CM

## 2021-11-26 LAB
FEF 25 75 LLN: 0.55
FEF 25 75 PRE REF: 54.8 %
FEF 25 75 REF: 1.53
FET100 CHG: 6.6 %
FEV05 LLN: 0.8
FEV05 REF: 1.66
FEV1 CHG: 4.1 %
FEV1 FVC LLN: 65
FEV1 FVC PRE REF: 86.4 %
FEV1 FVC REF: 78
FEV1 LLN: 1.24
FEV1 PRE REF: 82.8 %
FEV1 REF: 1.8
FEV1 VOL CHG: 0.06
FVC CHG: 3.6 %
FVC LLN: 1.62
FVC PRE REF: 95 %
FVC REF: 2.33
FVC VOL CHG: 0.08
PEF LLN: 2.51
PEF PRE REF: 97.7 %
PEF REF: 4.53
PHYSICIAN COMMENT: NORMAL
POST FEF 25 75: 0.83 L/S (ref 0.55–2.5)
POST FET 100: 6.73 SEC
POST FEV1 FVC: 67.77 % (ref 64.52–91.5)
POST FEV1: 1.56 L (ref 1.24–2.37)
POST FEV5: 1.27 L (ref 0.8–2.51)
POST FVC: 2.3 L (ref 1.62–3.05)
POST PEF: 4.23 L/S (ref 2.51–6.54)
PRE FEF 25 75: 0.83 L/S (ref 0.55–2.5)
PRE FET 100: 6.31 SEC
PRE FEV05 REF: 72.9 %
PRE FEV1 FVC: 67.39 % (ref 64.52–91.5)
PRE FEV1: 1.49 L (ref 1.24–2.37)
PRE FEV5: 1.21 L (ref 0.8–2.51)
PRE FVC: 2.22 L (ref 1.62–3.05)
PRE PEF: 4.42 L/S (ref 2.51–6.54)

## 2021-11-26 PROCEDURE — 94060 PR EVAL OF BRONCHOSPASM: ICD-10-PCS | Mod: HCNC,S$GLB,, | Performed by: INTERNAL MEDICINE

## 2021-11-26 PROCEDURE — 73521 X-RAY EXAM HIPS BI 2 VIEWS: CPT | Mod: TC,HCNC,FY

## 2021-11-26 PROCEDURE — 73521 XR HIPS BILATERAL 2 VIEW INCL AP PELVIS: ICD-10-PCS | Mod: 26,HCNC,, | Performed by: RADIOLOGY

## 2021-11-26 PROCEDURE — 73521 X-RAY EXAM HIPS BI 2 VIEWS: CPT | Mod: 26,HCNC,, | Performed by: RADIOLOGY

## 2021-11-26 PROCEDURE — 94060 EVALUATION OF WHEEZING: CPT | Mod: HCNC,S$GLB,, | Performed by: INTERNAL MEDICINE

## 2021-11-29 DIAGNOSIS — J45.40 MODERATE PERSISTENT ASTHMA WITHOUT COMPLICATION: ICD-10-CM

## 2021-11-30 ENCOUNTER — TELEPHONE (OUTPATIENT)
Dept: PRIMARY CARE CLINIC | Facility: CLINIC | Age: 76
End: 2021-11-30
Payer: MEDICARE

## 2021-11-30 DIAGNOSIS — G47.33 OSA (OBSTRUCTIVE SLEEP APNEA): Primary | ICD-10-CM

## 2021-11-30 NOTE — TELEPHONE ENCOUNTER
Provider portion received. Waiting for patient portion     Dupixent Rx received from Dr. Devine. Prescriber PAP portion faxed.

## 2021-12-01 ENCOUNTER — OFFICE VISIT (OUTPATIENT)
Dept: DERMATOLOGY | Facility: CLINIC | Age: 76
End: 2021-12-01
Payer: MEDICARE

## 2021-12-01 ENCOUNTER — TELEPHONE (OUTPATIENT)
Dept: ALLERGY | Facility: CLINIC | Age: 76
End: 2021-12-01
Payer: MEDICARE

## 2021-12-01 DIAGNOSIS — Z76.89 ENCOUNTER FOR SKIN CARE: ICD-10-CM

## 2021-12-01 DIAGNOSIS — L21.9 SEBORRHEA: Primary | ICD-10-CM

## 2021-12-01 DIAGNOSIS — L30.9 ECZEMA OF FACE: ICD-10-CM

## 2021-12-01 PROCEDURE — 4010F ACE/ARB THERAPY RXD/TAKEN: CPT | Mod: HCNC,CPTII,S$GLB, | Performed by: DERMATOLOGY

## 2021-12-01 PROCEDURE — 99999 PR PBB SHADOW E&M-EST. PATIENT-LVL III: ICD-10-PCS | Mod: PBBFAC,HCNC,, | Performed by: DERMATOLOGY

## 2021-12-01 PROCEDURE — 1157F ADVNC CARE PLAN IN RCRD: CPT | Mod: HCNC,CPTII,S$GLB, | Performed by: DERMATOLOGY

## 2021-12-01 PROCEDURE — 4010F PR ACE/ARB THEARPY RXD/TAKEN: ICD-10-PCS | Mod: HCNC,CPTII,S$GLB, | Performed by: DERMATOLOGY

## 2021-12-01 PROCEDURE — 99999 PR PBB SHADOW E&M-EST. PATIENT-LVL III: CPT | Mod: PBBFAC,HCNC,, | Performed by: DERMATOLOGY

## 2021-12-01 PROCEDURE — 1157F PR ADVANCE CARE PLAN OR EQUIV PRESENT IN MEDICAL RECORD: ICD-10-PCS | Mod: HCNC,CPTII,S$GLB, | Performed by: DERMATOLOGY

## 2021-12-01 PROCEDURE — 99214 OFFICE O/P EST MOD 30 MIN: CPT | Mod: HCNC,S$GLB,, | Performed by: DERMATOLOGY

## 2021-12-01 PROCEDURE — 99214 PR OFFICE/OUTPT VISIT, EST, LEVL IV, 30-39 MIN: ICD-10-PCS | Mod: HCNC,S$GLB,, | Performed by: DERMATOLOGY

## 2021-12-01 RX ORDER — HYDROCORTISONE 25 MG/G
CREAM TOPICAL 2 TIMES DAILY
Qty: 45 G | Refills: 0 | Status: ON HOLD | OUTPATIENT
Start: 2021-12-01 | End: 2024-01-08 | Stop reason: HOSPADM

## 2021-12-03 DIAGNOSIS — I10 HYPERTENSION, UNSPECIFIED TYPE: ICD-10-CM

## 2021-12-03 DIAGNOSIS — J45.40 MODERATE PERSISTENT ASTHMA WITHOUT COMPLICATION: ICD-10-CM

## 2021-12-03 RX ORDER — FLUTICASONE PROPIONATE AND SALMETEROL 50; 500 UG/1; UG/1
POWDER RESPIRATORY (INHALATION)
Qty: 1 EACH | Refills: 0 | Status: SHIPPED | OUTPATIENT
Start: 2021-12-03 | End: 2022-01-14 | Stop reason: SDUPTHER

## 2021-12-03 RX ORDER — VALSARTAN 320 MG/1
320 TABLET ORAL DAILY
Qty: 14 TABLET | Refills: 0 | Status: SHIPPED | OUTPATIENT
Start: 2021-12-03 | End: 2023-02-02 | Stop reason: SDUPTHER

## 2021-12-29 DIAGNOSIS — M19.90 OSTEOARTHRITIS, UNSPECIFIED OSTEOARTHRITIS TYPE, UNSPECIFIED SITE: ICD-10-CM

## 2021-12-29 RX ORDER — MELOXICAM 7.5 MG/1
TABLET ORAL
Qty: 90 TABLET | Refills: 3 | Status: SHIPPED | OUTPATIENT
Start: 2021-12-29 | End: 2022-10-18

## 2021-12-31 NOTE — TELEPHONE ENCOUNTER
12/31 - Outgoing call to pt. Pt stated she filled out complete form and mailed back in the envelope provided. She then stated she received one from us and one from program and sent in to program. Also saw doctor and MD verified he sent his portion.  Will follow up with Marni(Supriya) and Dupixent [employer] on Monday 1/3    PTL

## 2022-01-03 NOTE — TELEPHONE ENCOUNTER
1/3 - Outgoing call to [facility], spoke to Doron PARKS (rep). Rep stated that pt does not qualify for PAP due to income being over the federal poverty guideline. Pt's application was in reviewed on 12/6 and was denied. Nurse spoke to pt on 12/20 regarding income verification, required an updated proof of income, which was received. Pt's application was placed back to peer review and was still over the federal poverty guideline. Rep stated she will get her team to relook at application if she missed something.    PTL

## 2022-01-05 NOTE — TELEPHONE ENCOUNTER
1/5 - [facility] - outgoing call to pt regarding denial from [facility]. Pt was previously called on 1/3 and 1/4 regarding denial however, pt was unable to be reached at all 3 call attempts. [facility]    PTL

## 2022-01-06 NOTE — TELEPHONE ENCOUNTER
Patient informed of Dupixent PAP denial. Advised patient that [facility] would notify her provider to assess alternate therapy options. Patient acknowledged. No other questions or concerns.     Osiel Brian, PharmD  Clinical Pharmacist  [facility]  P: [phone]

## 2022-01-06 NOTE — TELEPHONE ENCOUNTER
[facility] sent to inform provider of Dupixent PAP denial and to reach out to patient to discuss alternate treatment options. Closing referral at [facility].

## 2022-01-10 NOTE — TELEPHONE ENCOUNTER
01/10/22    Patient ID: Fanny is a 89 year old female.    Chief Complaint   Patient presents with   • Office Visit   • Hospital F/U           HPI    Chart reviewed    Fanny is a nice patient who is here today to follow-up from her hospitalization and rehab, she was admitted to [facility] with multitude of problems including heart failure, acute DVT, atrial fibrillation with RVR, fluid retention, discharged to rehab and she went out of rehab in January 1, patient was admitted to the hospital in December for and discharged on December 13.  At that time she went with multitude of issues as mentioned.  Reviewed patient extensive records including labs and diagnostics and cardiac work-up and notes    She had shortness of breath and she was found to have heart failure along with atrial fibrillation with RVR, she had an echocardiogram that showed ejection fraction of 48% with normal wall thickness.  Patient was started on furosemide and digoxin    She also was diagnosed with DVT after Doppler showed bilateral posterior tibial vein thrombosis, CTA done that showed no PE.    She has history of hyponatremia, hypothyroidism, she had work-up also in rehab that showed high TSH and high T4 as well, she has frequency of urination and family stated that she had a urine culture for that showed some bacteria but it was not treated because there was no indication.  The rehab doctor.    She has fluid retention and she has gained significant weight since I saw her last time, hand and lower extremities are swollen.    Reviewed medication and patient needs refill on several medication, she is currently taking pravastatin and there has been a question about taking atorvastatin, advised to continue pravastatin as is.    She has B12 deficiency and she needs her B12 shot.  Daughter has brought blood pressure readings which has been on the higher side.  She needs refill on Keppra for her seizures  Patient herself looks good  Please see message and advise     ----- Message from Oliva Matos sent at 9/3/2019 11:54 AM CDT -----  Contact: Frances from [employer] [phone]  [facility] is calling to request additional information for the Cpap machine, she received the order but they need the settings for the machine, face to face notes (discussing the need for the machine)O and her last sleep study. Please fax it to [phone].    Thank you     and comfortable    .  ALLERGIES:   Allergen Reactions   • Actonel Other (See Comments)     Stomach Ache          • Amlodipine Other (See Comments)     tingling   • Calcitonin ([place]) Other (See Comments)     epistaxis     • Losartan Potassium-Hctz HEADACHES   • Raloxifene Other (See Comments)     leg pain     • Risedronate Other (See Comments)     Stomach Ache              Current Outpatient Medications   Medication Sig Dispense Refill   • furosemide (LASIX) 20 MG tablet May use 2 tablets daily until better then 1 tablet daily 60 tablet 3   • famotidine (PEPCID) 20 MG tablet Take 1 tablet by mouth 2 times daily as needed (acid). 60 tablet 5   • apixaBAN (ELIQUIS) 5 MG Tab Take 1 tablet by mouth every 12 hours. For dvt/PE 60 tablet 5   • digoxin (LANOXIN) 0.125 MG tablet Take 1 tablet by mouth every other day. Or as directed 20 tablet 5   • dilTIAZem (Cardizem CD) 120 MG 24 hr capsule Take 1 capsule by mouth 2 times daily. 60 capsule 5   • levETIRAcetam (KepPRA) 500 MG tablet Take 1 tablet by mouth 2 times daily. 60 tablet 5   • pravastatin (PRAVACHOL) 40 MG tablet Take 1 tablet by mouth daily. 90 tablet 1   • ipratropium (ATROVENT HFA) 17 MCG/ACT inhaler Inhale 2 puffs into the lungs 3 times daily as needed for Wheezing. 12.9 g 12   • levothyroxine 75 MCG tablet One Tablet on an empty stomach Monday-Friday, then take take two tablets on an empty stomach on Saturday and Sunday 120 tablet 0   • hydrALAZINE (APRESOLINE) 25 MG tablet Take 1 tablet by mouth 2 times daily. 180 tablet 0   • Multiple Vitamins-Minerals (Centrum Silver) tablet Take 1 tablet by mouth.     • calcium carbonate-vitamin D (CALTRATE+D) 600-400 MG-UNIT per tablet Take 2 tablets by mouth daily. 60 tablet 5   • ferrous sulfate 325 (65 FE) MG tablet Take 1 tablet by mouth daily.     • DENOSumab (PROLIA) 60 MG/ML Solution Inject into the skin every 6 months.      • cyanocobalamin 1000 MCG/ML injection Inject 1,000 mcg as directed every 30 days.     •  acetaminophen (TYLENOL) 500 MG tablet Take 1 tablet by mouth daily as needed.     • vitamin D, Cholecalciferol, 1000 units capsule Take 1 capsule by mouth daily. 60 capsule    • potassium chloride (KLOR-CON) 10 MEQ ER tablet As needed for leg swelling with  2 tablet of furosemide 30 tablet 3     Current Facility-Administered Medications   Medication Dose Route Frequency Provider Last Rate Last Admin   • cyanocobalamin injection 1,000 mcg  1,000 mcg Intramuscular Once Rhea Zheng MD             Patient Active Problem List   Diagnosis   • Anemia of chronic disease   • Arthritis of knee   • Essential hypertension   • Goiter   • Hypercholesterolemia   • Hyponatremia   • Mass of left submandibular region   • Osteoarthritis of multiple joints   • Osteoporosis   • Other allergic rhinitis   • Primary hypothyroidism   • TIA (transient ischemic attack)   • Vitamin B 12 deficiency   • White coat syndrome with hypertension   • Other proteinuria   • Bilateral leg edema   • New onset atrial fibrillation (CMS/HCC)   • Seizure disorder (CMS/HCC)   • Elevated SGOT   • Chronic deep vein thrombosis (DVT) of tibial vein of both lower extremities (CMS/HCC)          Past Surgical History:   Procedure Laterality Date   • Bunionectomy     •  section, classic     •  section, low transverse      2   • Colonoscopy      2015, Dr. Salmon, small hyperplastic polyp   • Esophagogastroduodenoscopy      2015, Dr. Vargas   • Fracture surgery      ORIF left hip fracture 2011   • Joint replacement     • Total knee replacement      Left, 2014, Bere         Family History   Problem Relation Age of Onset   • Thyroid Mother    • Alcohol Abuse Father    • Cancer, Throat Brother         Smoker and drinker         Social History     Tobacco Use   • Smoking status: Former Smoker   • Smokeless tobacco: Never Used   • Tobacco comment: On and off more than 50 years up to half pack per day, quit  around 2008, smoking years count to about 20 years   Substance Use Topics   • Alcohol use: Yes     Comment: Few times a months   • Drug use: No        Sexually Active: Not Asked           Comment: , 2 children         Immunization History   Administered Date(s) Administered   • COVID-19 12Y+ Pfizer-[employer] - Requires Dilution 04/03/2021, 04/24/2021   • Influenza, high dose quadrivalent, preservative-free 10/07/2020, 09/27/2021   • Influenza, high dose seasonal, preservative-free 11/30/2015, 10/09/2017, 10/22/2018, 09/16/2019   • Influenza, injectable, quadrivalent 11/30/2015, 11/30/2015, 11/21/2016, 11/21/2016   • Influenza, injectable, quadrivalent, preservative-free 11/21/2016   • Pneumococcal Conjugate 13 valent 02/29/2016   • Pneumococcal polysaccharide, adult, 23 valent 12/15/2014   • TD Adult, Adsorbed 07/31/2015, 08/24/2015   • Tdap 08/24/2015        Review of Systems   Constitutional: Negative for activity change and fever.   HENT: Negative for congestion, ear pain and sore throat.    Eyes: Negative for redness.   Respiratory: Negative for cough, chest tightness, shortness of breath and wheezing.    Cardiovascular: Negative for chest pain and palpitations.        No orthopnea, No SOB.  Swollen hand and lower extremities   Gastrointestinal: Negative for abdominal pain, diarrhea, nausea and vomiting.   Endocrine: Negative for polydipsia.   Genitourinary: Negative for dysuria and hematuria.        Frequency of urination could be secondary to furosemide   Skin: Negative for rash.   Allergic/Immunologic:        See allergies   Neurological: Negative for seizures, syncope and headaches.   Hematological: Does not bruise/bleed easily.   Psychiatric/Behavioral: Negative for confusion and hallucinations.       Physical Exam:  Visit Vitals  BP (!) 148/78   Pulse (!) 126   Temp 95 °F (35 °C) (Temporal)   Resp 16   Ht 5' 1\" (1.549 m)   Wt 75 kg (165 lb 5.5 oz)   LMP  (LMP Unknown)   SpO2 91%   BMI 31.24 kg/m²        Physical Exam  Vitals reviewed.   Constitutional:       General: She is not in acute distress.     Appearance: She is not diaphoretic.   HENT:      Head: Normocephalic and atraumatic.   Eyes:      General: No scleral icterus.     Conjunctiva/sclera: Conjunctivae normal.   Cardiovascular:      Heart sounds: Normal heart sounds, S1 normal and S2 normal. No friction rub.      Comments: Atrial fibrillation with rapid ventricular rate  Pulmonary:      Effort: Pulmonary effort is normal. No respiratory distress.      Breath sounds: Normal breath sounds. No wheezing or rales.   Abdominal:      General: Bowel sounds are normal. There is no distension.      Palpations: Abdomen is soft. There is no mass.      Tenderness: There is no abdominal tenderness. There is no guarding or rebound.   Musculoskeletal:      Cervical back: Neck supple.      Comments: Noticeable edema of both lower extremities   Skin:     Findings: No rash.      Comments: Some redness in the perianal area but there is no ulcers   Neurological:      Mental Status: She is alert.      Coordination: Coordination normal.   Psychiatric:         Speech: Speech normal.         Behavior: Behavior normal.         Last Results:    Lab Services on 01/10/2022   Component Date Value Ref Range Status   • WBC 01/10/2022 8.4  4.2 - 11.0 K/mcL Final   • RBC 01/10/2022 4.11  4.00 - 5.20 mil/mcL Final   • HGB 01/10/2022 11.2* 12.0 - 15.5 g/dL Final   • HCT 01/10/2022 36.9  36.0 - 46.5 % Final   • MCV 01/10/2022 89.8  78.0 - 100.0 fl Final   • MCH 01/10/2022 27.3  26.0 - 34.0 pg Final   • MCHC 01/10/2022 30.4* 32.0 - 36.5 g/dL Final   • PLT 01/10/2022 283  140 - 450 K/mcL Final   • RDW-CV 01/10/2022 16.2* 11.0 - 15.0 % Final   • RDW-SD 01/10/2022 53.0* 39.0 - 50.0 fL Final   • NRBC 01/10/2022 0  <=0 /100 WBC Final   No results displayed because visit has over 200 results.                ASSESSMENT/PLAN:  Fanny was seen today for office visit and hospital  f/u.    Diagnoses and all orders for this visit:    Atrial fibrillation with RVR (CMS/HCC)  Comments:  We will get digoxin level and if there is a room to increase the dose we will do that, f/up with cardiology, it is a tough situation with other comorbid conditi  Orders:  -     DIGOXIN; Future  -     apixaBAN (ELIQUIS) 5 MG Tab; Take 1 tablet by mouth every 12 hours. For dvt/PE  -     digoxin (LANOXIN) 0.125 MG tablet; Take 1 tablet by mouth every other day. Or as directed  -     dilTIAZem (Cardizem CD) 120 MG 24 hr capsule; Take 1 capsule by mouth 2 times daily.    Anemia, unspecified type  Comments:  Patient discharged CBC from the hospital was good and she have anemia in the rehab center, will follow up on that  Orders:  -     CBC NO DIFFERENTIAL; Future    Essential hypertension  Comments:  High probably secondary to her cardiac issues, increasing furosemide hopefully will help blood pressure  Orders:  -     BASIC METABOLIC PANEL; Future    Vitamin B 12 deficiency  Comments:  B12 injection today  Orders:  -     cyanocobalamin injection 1,000 mcg    Primary hypothyroidism  Comments:  Patient had high TSH and high T4 in the rehab center, will get full thyroid panel and reevaluate  Orders:  -     FREE T4; Future  -     FREE T3; Future  -     THYROID STIMULATING HORMONE; Future    Hyponatremia  Comments:  Big problem since it is limiting the use of diuretics for patient, will continue to monitor closely  Orders:  -     BASIC METABOLIC PANEL; Future    Fluid retention  Comments:  Increase furosemide and whenever she takes 2 tablets of furosemide she is to take potassium, monitor basic panel closely  Orders:  -     BASIC METABOLIC PANEL; Future  -     furosemide (LASIX) 20 MG tablet; May use 2 tablets daily until better then 1 tablet daily  -     potassium chloride (KLOR-CON) 10 MEQ ER tablet; As needed for leg swelling with  2 tablet of furosemide    Systolic heart failure, unspecified HF chronicity  (CMS/[facility])  Comments:  Patient is in failure likely because of atrial fibrillation and RVR and low ejection fraction, follow-up with cardiology and she has an appointment coming soon  Orders:  -     BASIC METABOLIC PANEL; Future  -     DIGOXIN; Future  -     furosemide (LASIX) 20 MG tablet; May use 2 tablets daily until better then 1 tablet daily  -     potassium chloride (KLOR-CON) 10 MEQ ER tablet; As needed for leg swelling with  2 tablet of furosemide    Gastroesophageal reflux disease without esophagitis  Comments:  Refill famotidine for patient, currently stable  Orders:  -     famotidine (PEPCID) 20 MG tablet; Take 1 tablet by mouth 2 times daily as needed (acid).    Encounter for monitoring digoxin therapy  Comments:  We will get digoxin level and may increase or give extra dose if needed  Orders:  -     DIGOXIN; Future  -     dilTIAZem (Cardizem CD) 120 MG 24 hr capsule; Take 1 capsule by mouth 2 times daily.    Seizure disorder (CMS/[facility])  Comments:  Refill Keppra for patient as requested  Orders:  -     levETIRAcetam (KepPRA) 500 MG tablet; Take 1 tablet by mouth 2 times daily.    Frequency of urination  -     URINE, BACTERIAL CULTURE; Future    Hypercholesterolemia    Chronic deep vein thrombosis (DVT) of tibial vein of both lower extremities (CMS/[facility])  Comments:  Diagnosed during admission in December, continue Eliquis        Spent at least 60 minutes including review of her extensive records, at least half of which for education and coordination of care  Medical compliance with plan discussed.    Patient education completed on disease process    Reviewed and discussed medications  Patient expresses understanding of the plan.            Rhea Zheng MD

## 2022-01-14 DIAGNOSIS — J45.40 MODERATE PERSISTENT ASTHMA WITHOUT COMPLICATION: ICD-10-CM

## 2022-01-14 NOTE — TELEPHONE ENCOUNTER
----- Message from Rich Crystal sent at 1/14/2022  9:34 AM CST -----  Regarding: Refill Request  Contact: patient  Pt. Requesting a call in regards to medication authorization.     Pt. Also requesting a RX refill on, albuterol (VENTOLIN HFA) 90 mcg/actuation inhaler, and ADVAIR DISKUS 500-50 mcg/dose DsDv diskus inhaler.          Pt.@[phone]

## 2022-01-16 RX ORDER — FLUTICASONE PROPIONATE AND SALMETEROL 50; 500 UG/1; UG/1
POWDER RESPIRATORY (INHALATION)
Qty: 1 EACH | Refills: 5 | Status: SHIPPED | OUTPATIENT
Start: 2022-01-16 | End: 2022-04-13 | Stop reason: SDUPTHER

## 2022-01-16 RX ORDER — ALBUTEROL SULFATE 90 UG/1
2 AEROSOL, METERED RESPIRATORY (INHALATION) EVERY 6 HOURS PRN
Qty: 8 G | Refills: 11 | Status: SHIPPED | OUTPATIENT
Start: 2022-01-16 | End: 2023-01-31 | Stop reason: SDUPTHER

## 2022-02-08 ENCOUNTER — TELEPHONE (OUTPATIENT)
Dept: ALLERGY | Facility: CLINIC | Age: 77
End: 2022-02-08
Payer: MEDICARE

## 2022-02-08 NOTE — TELEPHONE ENCOUNTER
----- Message from Janette Jackson sent at 2/8/2022  9:13 AM CST -----  Regarding: New Prescrption Request  Dupixent  is request  new prescription for medication for pt says she was re approved for another year     [phone]  Record ID [id number]

## 2022-02-14 ENCOUNTER — TELEPHONE (OUTPATIENT)
Dept: ALLERGY | Facility: CLINIC | Age: 77
End: 2022-02-14
Payer: MEDICARE

## 2022-02-14 NOTE — TELEPHONE ENCOUNTER
----- Message from Ciro Jauregui sent at 2/14/2022 10:55 AM CST -----  Regarding: Pt advice  Contact: pt @[phone]  Rx Refill/Request    Is this a Refill or New Rx:Refill    Rx Name and Strength:dupilumab 300 mg/2 mL Manjula    Preferred Pharmacy with phone number:  [facility]  ph 6  or fax 1 [phone]      Communication Preference:  Additional Information:  Pt has missed two doses

## 2022-02-18 ENCOUNTER — TELEPHONE (OUTPATIENT)
Dept: ALLERGY | Facility: CLINIC | Age: 77
End: 2022-02-18
Payer: MEDICARE

## 2022-02-18 NOTE — TELEPHONE ENCOUNTER
----- Message from Stephenie Eckert sent at 2/18/2022  2:43 PM CST -----  Regarding: REFILL REQUEST  Contact: [facility]ity Basurto stated they have sent over a refill request on 02/10/2022 and 02/15/2022 for the following medication:    Dupixent 300mg    Have not received a response back as of yet Please send over prescription as soon as possible - last shipment of medication was 12/20/2021       Contact info  [phone] Phone    [phone] Fax

## 2022-02-22 ENCOUNTER — DOCUMENTATION ONLY (OUTPATIENT)
Dept: ALLERGY | Facility: CLINIC | Age: 77
End: 2022-02-22
Payer: MEDICARE

## 2022-02-22 ENCOUNTER — TELEPHONE (OUTPATIENT)
Dept: ALLERGY | Facility: CLINIC | Age: 77
End: 2022-02-22
Payer: MEDICARE

## 2022-02-22 NOTE — TELEPHONE ENCOUNTER
Called [facility] pharmacy and spoke with Fransisca Monae D.  Gave verbal order for 300mg Dupixent, q2 weeks.  Dispense 90 days supply with 3 refills.  Dupixent [facility] also approved for 1/28/22 through 12/31/22

## 2022-02-22 NOTE — TELEPHONE ENCOUNTER
----- Message from Bre Rodriguez sent at 2/22/2022 10:41 AM CST -----  Contact: self @ [phone]  Pt is calling for the status of her request for a new prescription for Dupixent 300mg.  Pt says she has already missed 4 doses.  Pls call with the status.        [facility] [place] [facility] [address]    Phone:  [phone]  Fax:  [phone]

## 2022-04-04 DIAGNOSIS — I10 HYPERTENSION, UNSPECIFIED TYPE: ICD-10-CM

## 2022-04-05 RX ORDER — NIFEDIPINE 60 MG/1
60 TABLET, EXTENDED RELEASE ORAL DAILY
Qty: 90 TABLET | Refills: 2 | Status: SHIPPED | OUTPATIENT
Start: 2022-04-05 | End: 2023-03-27 | Stop reason: SDUPTHER

## 2022-04-05 NOTE — TELEPHONE ENCOUNTER
Refill Authorization Note   Michela Franco  is requesting a refill authorization.  Brief Assessment and Rationale for Refill:  Approve     Medication Therapy Plan:       Medication Reconciliation Completed: No   Comments:     No Care Gaps recommended.     Note composed:2:07 PM 04/05/2022

## 2022-04-05 NOTE — TELEPHONE ENCOUNTER
No new care gaps identified.  Powered by [employer]. Reference number: [id number].   4/04/2022 9:41:19 PM CDT

## 2022-04-13 DIAGNOSIS — J45.40 MODERATE PERSISTENT ASTHMA WITHOUT COMPLICATION: ICD-10-CM

## 2022-04-13 NOTE — TELEPHONE ENCOUNTER
----- Message from Dionne Lau sent at 4/13/2022  9:55 AM CDT -----  Regarding: Prescription Inquiry  [facility] called about pt requesting new prescription ADVAIR DISKUS 500-50 mcg/dose DsDv diskus inhaler       [facility] - [place], [place] - [address]  [address] [address]  Phone: [phone] Fax: [phone]

## 2022-04-18 RX ORDER — FLUTICASONE PROPIONATE AND SALMETEROL 50; 500 UG/1; UG/1
POWDER RESPIRATORY (INHALATION)
Qty: 1 EACH | Refills: 5 | Status: SHIPPED | OUTPATIENT
Start: 2022-04-18 | End: 2023-01-31 | Stop reason: SDUPTHER

## 2022-04-25 ENCOUNTER — OFFICE VISIT (OUTPATIENT)
Dept: DERMATOLOGY | Facility: CLINIC | Age: 77
End: 2022-04-25
Payer: MEDICARE

## 2022-04-25 ENCOUNTER — LAB VISIT (OUTPATIENT)
Dept: LAB | Facility: HOSPITAL | Age: 77
End: 2022-04-25
Attending: DERMATOLOGY
Payer: MEDICARE

## 2022-04-25 DIAGNOSIS — L30.9 ECZEMA OF FACE: ICD-10-CM

## 2022-04-25 DIAGNOSIS — L21.9 SEBORRHEIC DERMATITIS: ICD-10-CM

## 2022-04-25 DIAGNOSIS — Z76.89 ENCOUNTER FOR SKIN CARE: ICD-10-CM

## 2022-04-25 DIAGNOSIS — L65.9 ALOPECIA: ICD-10-CM

## 2022-04-25 DIAGNOSIS — R20.8 OTHER DISTURBANCES OF SKIN SENSATION: ICD-10-CM

## 2022-04-25 DIAGNOSIS — L21.9 SEBORRHEA: Primary | ICD-10-CM

## 2022-04-25 DIAGNOSIS — L29.9 SCALP ITCH: ICD-10-CM

## 2022-04-25 PROCEDURE — 82607 VITAMIN B-12: CPT | Performed by: DERMATOLOGY

## 2022-04-25 PROCEDURE — 1160F RVW MEDS BY RX/DR IN RCRD: CPT | Mod: CPTII,S$GLB,, | Performed by: DERMATOLOGY

## 2022-04-25 PROCEDURE — 99214 PR OFFICE/OUTPT VISIT, EST, LEVL IV, 30-39 MIN: ICD-10-PCS | Mod: S$GLB,,, | Performed by: DERMATOLOGY

## 2022-04-25 PROCEDURE — 4010F PR ACE/ARB THEARPY RXD/TAKEN: ICD-10-PCS | Mod: CPTII,S$GLB,, | Performed by: DERMATOLOGY

## 2022-04-25 PROCEDURE — 4010F ACE/ARB THERAPY RXD/TAKEN: CPT | Mod: CPTII,S$GLB,, | Performed by: DERMATOLOGY

## 2022-04-25 PROCEDURE — 36415 COLL VENOUS BLD VENIPUNCTURE: CPT | Mod: PN | Performed by: DERMATOLOGY

## 2022-04-25 PROCEDURE — 1160F PR REVIEW ALL MEDS BY PRESCRIBER/CLIN PHARMACIST DOCUMENTED: ICD-10-PCS | Mod: CPTII,S$GLB,, | Performed by: DERMATOLOGY

## 2022-04-25 PROCEDURE — 84443 ASSAY THYROID STIM HORMONE: CPT | Performed by: DERMATOLOGY

## 2022-04-25 PROCEDURE — 1101F PR PT FALLS ASSESS DOC 0-1 FALLS W/OUT INJ PAST YR: ICD-10-PCS | Mod: CPTII,S$GLB,, | Performed by: DERMATOLOGY

## 2022-04-25 PROCEDURE — 99999 PR PBB SHADOW E&M-EST. PATIENT-LVL IV: ICD-10-PCS | Mod: PBBFAC,,, | Performed by: DERMATOLOGY

## 2022-04-25 PROCEDURE — 1159F PR MEDICATION LIST DOCUMENTED IN MEDICAL RECORD: ICD-10-PCS | Mod: CPTII,S$GLB,, | Performed by: DERMATOLOGY

## 2022-04-25 PROCEDURE — 99999 PR PBB SHADOW E&M-EST. PATIENT-LVL IV: CPT | Mod: PBBFAC,,, | Performed by: DERMATOLOGY

## 2022-04-25 PROCEDURE — 84466 ASSAY OF TRANSFERRIN: CPT | Performed by: DERMATOLOGY

## 2022-04-25 PROCEDURE — 1101F PT FALLS ASSESS-DOCD LE1/YR: CPT | Mod: CPTII,S$GLB,, | Performed by: DERMATOLOGY

## 2022-04-25 PROCEDURE — 1157F PR ADVANCE CARE PLAN OR EQUIV PRESENT IN MEDICAL RECORD: ICD-10-PCS | Mod: CPTII,S$GLB,, | Performed by: DERMATOLOGY

## 2022-04-25 PROCEDURE — 99214 OFFICE O/P EST MOD 30 MIN: CPT | Mod: S$GLB,,, | Performed by: DERMATOLOGY

## 2022-04-25 PROCEDURE — 1126F AMNT PAIN NOTED NONE PRSNT: CPT | Mod: CPTII,S$GLB,, | Performed by: DERMATOLOGY

## 2022-04-25 PROCEDURE — 82728 ASSAY OF FERRITIN: CPT | Performed by: DERMATOLOGY

## 2022-04-25 PROCEDURE — 1157F ADVNC CARE PLAN IN RCRD: CPT | Mod: CPTII,S$GLB,, | Performed by: DERMATOLOGY

## 2022-04-25 PROCEDURE — 3288F PR FALLS RISK ASSESSMENT DOCUMENTED: ICD-10-PCS | Mod: CPTII,S$GLB,, | Performed by: DERMATOLOGY

## 2022-04-25 PROCEDURE — 3288F FALL RISK ASSESSMENT DOCD: CPT | Mod: CPTII,S$GLB,, | Performed by: DERMATOLOGY

## 2022-04-25 PROCEDURE — 1159F MED LIST DOCD IN RCRD: CPT | Mod: CPTII,S$GLB,, | Performed by: DERMATOLOGY

## 2022-04-25 PROCEDURE — 1126F PR PAIN SEVERITY QUANTIFIED, NO PAIN PRESENT: ICD-10-PCS | Mod: CPTII,S$GLB,, | Performed by: DERMATOLOGY

## 2022-04-25 PROCEDURE — 84630 ASSAY OF ZINC: CPT | Performed by: DERMATOLOGY

## 2022-04-25 RX ORDER — FLUOCINONIDE TOPICAL SOLUTION USP, 0.05% 0.5 MG/ML
SOLUTION TOPICAL 2 TIMES DAILY
Qty: 60 ML | Refills: 0 | Status: SHIPPED | OUTPATIENT
Start: 2022-04-25 | End: 2023-07-25

## 2022-04-25 NOTE — PATIENT INSTRUCTIONS
Reviewed with patient to eat 40 grams protein daily, get labs done, wash with recommended shampoo or soap for the scalp, avoid hair coloring and hot treatments, and potentially consider topical 5% minoxidil.     Avoid using hot water    Patient to start 3% crude coal tar shampoo to be used as scalp soaks for at least 15-30 minutes, longer if possible, per their regular shampooing schedule.  Can also be applied as directed to other parts of the body.

## 2022-04-25 NOTE — PROGRESS NOTES
Subjective:       Patient ID:  Michela Franco is a 76 y.o. female who presents for   Chief Complaint   Patient presents with    Dry Skin     Follow up     Dry Skin - Follow-up  Symptom course: unchanged  Affected locations: scalp  Signs / symptoms: itching, dryness and scabs  Severity: mild        Review of Systems   Constitutional: Negative for fever and chills.   HENT: Negative for sore throat.    Respiratory: Negative for cough.    Skin: Positive for dry skin.        Objective:    Physical Exam   Constitutional: She appears well-developed and well-nourished.   Eyes: No conjunctival no injection.   Neurological: She is alert and oriented to person, place, and time.   Psychiatric: She has a normal mood and affect.   Skin:   Areas Examined (abnormalities noted in diagram):   Scalp / Hair Palpated and Inspected              Diagram Legend     Erythematous scaling macule/papule c/w actinic keratosis       Vascular papule c/w angioma      Pigmented verrucoid papule/plaque c/w seborrheic keratosis      Yellow umbilicated papule c/w sebaceous hyperplasia      Irregularly shaped tan macule c/w lentigo     1-2 mm smooth white papules consistent with Milia      Movable subcutaneous cyst with punctum c/w epidermal inclusion cyst      Subcutaneous movable cyst c/w pilar cyst      Firm pink to brown papule c/w dermatofibroma      Pedunculated fleshy papule(s) c/w skin tag(s)      Evenly pigmented macule c/w junctional nevus     Mildly variegated pigmented, slightly irregular-bordered macule c/w mildly atypical nevus      Flesh colored to evenly pigmented papule c/w intradermal nevus       Pink pearly papule/plaque c/w basal cell carcinoma      Erythematous hyperkeratotic cursted plaque c/w SCC      Surgical scar with no sign of skin cancer recurrence      Open and closed comedones      Inflammatory papules and pustules      Verrucoid papule consistent consistent with wart     Erythematous eczematous patches and plaques      Dystrophic onycholytic nail with subungual debris c/w onychomycosis     Umbilicated papule    Erythematous-base heme-crusted tan verrucoid plaque consistent with inflamed seborrheic keratosis     Erythematous Silvery Scaling Plaque c/w Psoriasis     See annotation              Assessment / Plan:        Seborrhea  Face doing well.  Cont tgel soaks.  Extra strength TGel regularly as soaks recommended for the scalp or other affected areas.    Seborrheic dermatitis  -     fluocinonide (LIDEX) 0.05 % external solution; Apply topically 2 (two) times daily. Prn scalp itch or pain  Dispense: 60 mL; Refill: 0  Scalp.  Reviewed with patient different treatment options and associated risks.  Discussed with patient the etiology and pathogenesis of the disease or skin lesion(s) and possible treatments and aggravators.    No hot water bathing reviewed.  Patient to start 2-5% crude coal tar shampoo to be used as scalp soaks for at least 3 minutes, longer if possible, per their regular shampooing schedule.  Can also be applied as directed to other parts of the body.  Proper application of medications and or care for affected area(s) and condition(s) reviewed.    Eczema of face  Condition is stable.  We will continue present management.  Patient to watch for recurrence or flares or worsening and to call the clinic for a follow up appointment for such.    Encounter for skin care  No hot water bathing reviewed.    Alopecia  -     Zinc; Future; Expected date: 04/25/2022  -     Vitamin B12; Future; Expected date: 04/25/2022  -     Ferritin; Future; Expected date: 04/25/2022  -     Iron and TIBC; Future; Expected date: 04/25/2022  -     TSH; Future; Expected date: 04/25/2022  Reviewed with patient to eat protein daily, get labs done, wash with recommended shampoo or soap for the scalp, avoid hair coloring and hot treatments, and potentially consider topical 5% minoxidil.  Discussed with patient the need for laboratory work up for further  evaluation.  Discussed that such investigation may not be helpful.    Scalp itch  -     fluocinonide (LIDEX) 0.05 % external solution; Apply topically 2 (two) times daily. Prn scalp itch or pain  Dispense: 60 mL; Refill: 0  Reviewed with patient different treatment options and associated risks.  Proper application of medications and or care for affected area(s) and condition(s) reviewed.    Other disturbances of skin sensation   -     Vitamin B12; Future; Expected date: 04/25/2022  This is suspected.  Discussed with patient the need for laboratory work up for further evaluation.  Discussed that such investigation may not be helpful.             Follow up if symptoms worsen or fail to improve, for Post labs.

## 2022-04-26 LAB
FERRITIN SERPL-MCNC: 143 NG/ML (ref 20–300)
IRON SERPL-MCNC: 65 UG/DL (ref 30–160)
SATURATED IRON: 17 % (ref 20–50)
TOTAL IRON BINDING CAPACITY: 386 UG/DL (ref 250–450)
TRANSFERRIN SERPL-MCNC: 261 MG/DL (ref 200–375)
TSH SERPL DL<=0.005 MIU/L-ACNC: 2.4 UIU/ML (ref 0.4–4)
VIT B12 SERPL-MCNC: >2000 PG/ML (ref 210–950)

## 2022-04-28 LAB — ZINC SERPL-MCNC: 77 UG/DL (ref 60–130)

## 2022-08-11 ENCOUNTER — LAB VISIT (OUTPATIENT)
Dept: LAB | Facility: HOSPITAL | Age: 77
End: 2022-08-11
Attending: DERMATOLOGY
Payer: MEDICARE

## 2022-08-11 ENCOUNTER — OFFICE VISIT (OUTPATIENT)
Dept: DERMATOLOGY | Facility: CLINIC | Age: 77
End: 2022-08-11
Payer: MEDICARE

## 2022-08-11 DIAGNOSIS — L21.9 SEBORRHEIC DERMATITIS: ICD-10-CM

## 2022-08-11 DIAGNOSIS — E61.1 LOW IRON: ICD-10-CM

## 2022-08-11 DIAGNOSIS — L65.9 ALOPECIA: ICD-10-CM

## 2022-08-11 DIAGNOSIS — L70.0 NODULAR ELASTOSIS WITH CYSTS AND COMEDONES OF FAVRE AND RACOUCHOT: ICD-10-CM

## 2022-08-11 DIAGNOSIS — L21.9 SEBORRHEA: ICD-10-CM

## 2022-08-11 DIAGNOSIS — L57.8 NODULAR ELASTOSIS WITH CYSTS AND COMEDONES OF FAVRE AND RACOUCHOT: ICD-10-CM

## 2022-08-11 DIAGNOSIS — L29.9 SCALP ITCH: ICD-10-CM

## 2022-08-11 DIAGNOSIS — L65.9 ALOPECIA: Primary | ICD-10-CM

## 2022-08-11 LAB
FERRITIN SERPL-MCNC: 200 NG/ML (ref 20–300)
IRON SERPL-MCNC: 64 UG/DL (ref 30–160)
SATURATED IRON: 17 % (ref 20–50)
TOTAL IRON BINDING CAPACITY: 370 UG/DL (ref 250–450)
TRANSFERRIN SERPL-MCNC: 250 MG/DL (ref 200–375)

## 2022-08-11 PROCEDURE — 1101F PT FALLS ASSESS-DOCD LE1/YR: CPT | Mod: CPTII,S$GLB,, | Performed by: DERMATOLOGY

## 2022-08-11 PROCEDURE — 1157F PR ADVANCE CARE PLAN OR EQUIV PRESENT IN MEDICAL RECORD: ICD-10-PCS | Mod: CPTII,S$GLB,, | Performed by: DERMATOLOGY

## 2022-08-11 PROCEDURE — 84466 ASSAY OF TRANSFERRIN: CPT | Performed by: DERMATOLOGY

## 2022-08-11 PROCEDURE — 4010F ACE/ARB THERAPY RXD/TAKEN: CPT | Mod: CPTII,S$GLB,, | Performed by: DERMATOLOGY

## 2022-08-11 PROCEDURE — 3288F PR FALLS RISK ASSESSMENT DOCUMENTED: ICD-10-PCS | Mod: CPTII,S$GLB,, | Performed by: DERMATOLOGY

## 2022-08-11 PROCEDURE — 99214 PR OFFICE/OUTPT VISIT, EST, LEVL IV, 30-39 MIN: ICD-10-PCS | Mod: S$GLB,,, | Performed by: DERMATOLOGY

## 2022-08-11 PROCEDURE — 1126F AMNT PAIN NOTED NONE PRSNT: CPT | Mod: CPTII,S$GLB,, | Performed by: DERMATOLOGY

## 2022-08-11 PROCEDURE — 3288F FALL RISK ASSESSMENT DOCD: CPT | Mod: CPTII,S$GLB,, | Performed by: DERMATOLOGY

## 2022-08-11 PROCEDURE — 99214 OFFICE O/P EST MOD 30 MIN: CPT | Mod: S$GLB,,, | Performed by: DERMATOLOGY

## 2022-08-11 PROCEDURE — 1126F PR PAIN SEVERITY QUANTIFIED, NO PAIN PRESENT: ICD-10-PCS | Mod: CPTII,S$GLB,, | Performed by: DERMATOLOGY

## 2022-08-11 PROCEDURE — 4010F PR ACE/ARB THEARPY RXD/TAKEN: ICD-10-PCS | Mod: CPTII,S$GLB,, | Performed by: DERMATOLOGY

## 2022-08-11 PROCEDURE — 36415 COLL VENOUS BLD VENIPUNCTURE: CPT | Mod: PN | Performed by: DERMATOLOGY

## 2022-08-11 PROCEDURE — 82728 ASSAY OF FERRITIN: CPT | Performed by: DERMATOLOGY

## 2022-08-11 PROCEDURE — 99999 PR PBB SHADOW E&M-EST. PATIENT-LVL IV: CPT | Mod: PBBFAC,,, | Performed by: DERMATOLOGY

## 2022-08-11 PROCEDURE — 1159F PR MEDICATION LIST DOCUMENTED IN MEDICAL RECORD: ICD-10-PCS | Mod: CPTII,S$GLB,, | Performed by: DERMATOLOGY

## 2022-08-11 PROCEDURE — 1159F MED LIST DOCD IN RCRD: CPT | Mod: CPTII,S$GLB,, | Performed by: DERMATOLOGY

## 2022-08-11 PROCEDURE — 99999 PR PBB SHADOW E&M-EST. PATIENT-LVL IV: ICD-10-PCS | Mod: PBBFAC,,, | Performed by: DERMATOLOGY

## 2022-08-11 PROCEDURE — 1101F PR PT FALLS ASSESS DOC 0-1 FALLS W/OUT INJ PAST YR: ICD-10-PCS | Mod: CPTII,S$GLB,, | Performed by: DERMATOLOGY

## 2022-08-11 PROCEDURE — 1157F ADVNC CARE PLAN IN RCRD: CPT | Mod: CPTII,S$GLB,, | Performed by: DERMATOLOGY

## 2022-08-11 RX ORDER — TRETINOIN 0.5 MG/G
CREAM TOPICAL NIGHTLY
Qty: 20 G | Refills: 2 | Status: SHIPPED | OUTPATIENT
Start: 2022-08-11

## 2022-08-11 NOTE — PATIENT INSTRUCTIONS
Also recommend saw palmetto, preferably organic, 1000 mg per day.    Start with applying Retin-A every other night and move up to nightly after 2 weeks if not too dry.      Patient instructed in importance in daily sun protection. Sun avoidance and topical protection discussed.     Patient encouraged to wear hat for all outdoor exposure.     Also discussed sun protective clothing.

## 2022-08-11 NOTE — PROGRESS NOTES
Subjective:       Patient ID:  Michela Franco is a 77 y.o. female who presents for   Chief Complaint   Patient presents with    Eczema     Face     Hair/Scalp Problem     Follow-up      Eczema - Follow-up  Symptom course: stable  Affected locations: face, left cheek and right cheek  Signs / symptoms: dryness    Hair/Scalp Problem - Follow-up  Affected locations: scalp  Signs / symptoms: itching        Review of Systems   Constitutional: Negative.    HENT: Negative.    Respiratory: Negative.    Musculoskeletal: Negative.    Skin: Positive for itching and dry skin.        Objective:    Physical Exam   Constitutional: She appears well-developed and well-nourished.   Neurological: She is alert and oriented to person, place, and time.   Psychiatric: She has a normal mood and affect.   Skin:   Areas Examined (abnormalities noted in diagram):   Scalp / Hair Palpated and Inspected  Head / Face Inspection Performed              Diagram Legend     Erythematous scaling macule/papule c/w actinic keratosis       Vascular papule c/w angioma      Pigmented verrucoid papule/plaque c/w seborrheic keratosis      Yellow umbilicated papule c/w sebaceous hyperplasia      Irregularly shaped tan macule c/w lentigo     1-2 mm smooth white papules consistent with Milia      Movable subcutaneous cyst with punctum c/w epidermal inclusion cyst      Subcutaneous movable cyst c/w pilar cyst      Firm pink to brown papule c/w dermatofibroma      Pedunculated fleshy papule(s) c/w skin tag(s)      Evenly pigmented macule c/w junctional nevus     Mildly variegated pigmented, slightly irregular-bordered macule c/w mildly atypical nevus      Flesh colored to evenly pigmented papule c/w intradermal nevus       Pink pearly papule/plaque c/w basal cell carcinoma      Erythematous hyperkeratotic cursted plaque c/w SCC      Surgical scar with no sign of skin cancer recurrence      Open and closed comedones      Inflammatory papules and pustules       Verrucoid papule consistent consistent with wart     Erythematous eczematous patches and plaques     Dystrophic onycholytic nail with subungual debris c/w onychomycosis     Umbilicated papule    Erythematous-base heme-crusted tan verrucoid plaque consistent with inflamed seborrheic keratosis     Erythematous Silvery Scaling Plaque c/w Psoriasis     See annotation              Assessment / Plan:        Alopecia  -     Ferritin; Future; Expected date: 08/11/2022  -     Iron and TIBC; Future; Expected date: 08/11/2022  Reviewed with patient to eat protein daily, get labs done, wash with recommended shampoo or soap for the scalp, avoid hair coloring and chemicals and hot treatments, and potentially consider topical 5% minoxidil.  Avoid hot water.  Discussed with patient the need for laboratory work up for further evaluation.  Discussed that such investigation may not be helpful.  Doing better with some regrowth.  Warned pt that this is a slow process.  Discussed with patient today the need for compliance with prescribed and recommended therapies and medications in order for improvement of their condition(s).  Chronic nature of this condition discussed with patient.    Seborrhea  Condition is stable.  We will continue present management.    Seborrheic dermatitis  Condition is stable.  We will continue present management.    Scalp itch  Condition is stable.  We will continue present management.  Stop all applicable skin care and hair products, chemicals, perfumes, and treatments to affected areas.  No deodorants and antiperspirants, if applicable.  No store bought lotions or potions.       Low iron  -     Ferritin; Future; Expected date: 08/11/2022  -     Iron and TIBC; Future; Expected date: 08/11/2022  Pt states she is taking iron.  Discussed with patient the need for laboratory work up for further evaluation.  Discussed that such investigation may not be helpful.    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 Start with every other night and move up to nightly after 2 weeks if not too dry.  Dispense: 20 g; Refill: 2  Discussed with patient the etiology and pathogenesis of the disease or skin lesion(s) and possible treatments and aggravators.    Discussed with patient the benign nature of these lesions and that no treatment is indicated.  Pt wants to try RA.  Reviewed with patient different treatment options and associated risks.             Follow up in about 6 months (around 2/11/2023), or if symptoms worsen or fail to improve.

## 2022-08-23 ENCOUNTER — TELEPHONE (OUTPATIENT)
Dept: DERMATOLOGY | Facility: CLINIC | Age: 77
End: 2022-08-23
Payer: MEDICARE

## 2022-08-23 NOTE — TELEPHONE ENCOUNTER
Pt will give us a call back tomorrow with the name of the brand of iron she's taking. She siad that its a brand that [facility] suggested.      Iona        ----- Message from Gokul Francois MD sent at 8/11/2022  2:50 PM CDT -----  Iron not improving.  Verify where pt got her iron pills.  Patient to consider better supplement brands like Jarrows, Nature's Way, Solaray, TERE, Pure Encapsulations, and Now.  Top of the line companies are Entigo and frintit.  Can also go to [employer] and look at different brands there.  For iron and zinc, consider bases of glycinate, acetate, citrate, picolinate.

## 2022-08-25 ENCOUNTER — TELEPHONE (OUTPATIENT)
Dept: DERMATOLOGY | Facility: CLINIC | Age: 77
End: 2022-08-25
Payer: MEDICARE

## 2022-08-25 NOTE — TELEPHONE ENCOUNTER
LVM for pt to change brand of Iron. Better brands were listed in AVS.     Patient to consider better supplement brands like Jarrows, Nature's Way, Solaray, TERE, Pure Encapsulations, and Now.  Top of the line companies are [employer] and DailyWorth.  Can also go to [employer] and look at different brands there.  For iron and zinc, consider bases of glycinate, acetate, citrate, picolinate.      ----- Message from Gokul Francois MD sent at 8/25/2022 11:06 AM CDT -----  Contact: @ [phone]  Needs to get a better brand.  Terrible brand.  ----- Message -----  From: Iona Rodriguez MA  Sent: 8/25/2022  10:17 AM CDT  To: Gokul Francois MD    Brand of iron   ----- Message -----  From: Cassandra Hassan  Sent: 8/24/2022  10:06 AM CDT  To: Iona Rodriguez MA    Pt is calling to let you know the name of the product she is using Nature Made Iron please call and adv @ [phone]

## 2022-09-01 ENCOUNTER — TELEPHONE (OUTPATIENT)
Dept: DERMATOLOGY | Facility: CLINIC | Age: 77
End: 2022-09-01
Payer: MEDICARE

## 2022-09-01 NOTE — TELEPHONE ENCOUNTER
Per  its okay to use both. It may cause facial dryness. If so give a break and moisturize with coconut oil.      Tory      ----- Message from Alba Pak LPN sent at 8/29/2022  4:29 PM CDT -----  Regarding: FW: Missed call  Contact: [phone]    ----- Message -----  From: Gokul Francois MD  Sent: 8/29/2022   4:14 PM CDT  To: Priscila Head  Subject: FW: Missed call                                  Yes, separate areas  ----- Message -----  From: Sina Shanks LPN  Sent: 8/29/2022   4:05 PM CDT  To: Gokul Francois MD  Subject: FW: Missed call                                  Pt would like to know if she should continue using Tar shampoo while using Retin A?      ----- Message -----  From: Sally Miller MA  Sent: 8/26/2022   5:05 PM CDT  To: Sina Shanks LPN  Subject: FW: Missed call                                    ----- Message -----  From: Roxana Perez  Sent: 8/26/2022  10:38 AM CDT  To: Priscila Head  Subject: Missed call                                      Calling in regards to speaking with provider or nurse regarding medication review. Please call and discuss.

## 2022-09-13 ENCOUNTER — IMMUNIZATION (OUTPATIENT)
Dept: INTERNAL MEDICINE | Facility: CLINIC | Age: 77
End: 2022-09-13
Payer: MEDICARE

## 2022-09-13 PROCEDURE — 90694 FLU VACCINE - QUADRIVALENT - ADJUVANTED: ICD-10-PCS | Mod: S$GLB,,, | Performed by: INTERNAL MEDICINE

## 2022-09-13 PROCEDURE — G0008 FLU VACCINE - QUADRIVALENT - ADJUVANTED: ICD-10-PCS | Mod: S$GLB,,, | Performed by: INTERNAL MEDICINE

## 2022-09-13 PROCEDURE — G0008 ADMIN INFLUENZA VIRUS VAC: HCPCS | Mod: S$GLB,,, | Performed by: INTERNAL MEDICINE

## 2022-09-13 PROCEDURE — 90694 VACC AIIV4 NO PRSRV 0.5ML IM: CPT | Mod: S$GLB,,, | Performed by: INTERNAL MEDICINE

## 2022-09-16 ENCOUNTER — IMMUNIZATION (OUTPATIENT)
Dept: PRIMARY CARE CLINIC | Facility: CLINIC | Age: 77
End: 2022-09-16
Payer: MEDICARE

## 2022-09-16 DIAGNOSIS — Z23 NEED FOR VACCINATION: Primary | ICD-10-CM

## 2022-09-16 PROCEDURE — 0124A COVID-19, MRNA, LNP-S, BIVALENT BOOSTER, PF, 30 MCG/0.3 ML DOSE: CPT | Mod: CV19,PBBFAC | Performed by: INTERNAL MEDICINE

## 2022-09-16 PROCEDURE — 91312 COVID-19, MRNA, LNP-S, BIVALENT BOOSTER, PF, 30 MCG/0.3 ML DOSE: CPT | Mod: PBBFAC | Performed by: INTERNAL MEDICINE

## 2022-10-13 ENCOUNTER — TELEPHONE (OUTPATIENT)
Dept: ORTHOPEDICS | Facility: CLINIC | Age: 77
End: 2022-10-13
Payer: MEDICARE

## 2022-10-13 NOTE — TELEPHONE ENCOUNTER
----- Message from Lucila Babcocke sent at 10/13/2022  9:06 AM CDT -----  Type:  Sooner Apoointment Request    Caller is requesting a sooner appointment.  Caller declined first available appointment listed below.  Caller will not accept being placed on the waitlist and is requesting a message be sent to doctor.  Name of Caller:pt  When is the first available appointment? 2 today and then 11/18  Symptoms: broken femur  Would the patient rather a call back or a response via [facility]? call  Best Call Back Number:[phone]  Additional Information: pt is out of town was treated and confirm of broken femur and wants to f/u

## 2022-10-17 DIAGNOSIS — T14.8XXA: Primary | ICD-10-CM

## 2022-10-17 NOTE — PROGRESS NOTES
Subjective:      Patient ID: Michela Franco is a 77 y.o. female.    Chief Complaint: Pain and Injury of the Left Shoulder (Patient presents today as a new patient complaining she injured her left shoulder 1 week ago. )      KIMBERLY Ervin)    Last seen by Dr. Davis on 12/24/20 for bilateral hip osteonecrosis. She was doing well at last visit. She was to stop fosmax and continue with calcium and vitamin D.     Here for a follow up of left proximal humerus fracture after fall on 10/12/22 when she was in [place]. Was seen at ED and placed in sling.     She has pain in left shoulder that is improving. No numbness or tingling. She is right hand dominant. She rates her pain as a 4 on a scale of 1-10. Pain is aching.     She is taking motrin and prn oxycodone from the ED in [place].     She is a  and is able to work without using her left arm.       Past Medical History:   Diagnosis Date    Allergy     Asthma     Chronic diastolic heart failure 4/5/2018    Glaucoma suspect     Hyperlipidemia     Hypertension     Neuromuscular disorder     JOHN on CPAP     Osteoporosis     Recurrent sinusitis 3/25/2014    Recurrent upper respiratory infection (URI)     Urticaria          Current Outpatient Medications:     ADVAIR DISKUS 500-50 mcg/dose DsDv diskus inhaler, INHALE 1 PUFF TWICE DAILY, Disp: 1 each, Rfl: 5    albuterol (VENTOLIN HFA) 90 mcg/actuation inhaler, Inhale 2 puffs into the lungs every 6 (six) hours as needed for Wheezing. Rescue, Disp: 8 g, Rfl: 11    azelastine (ASTELIN) 137 mcg (0.1 %) nasal spray, 2 sprays (274 mcg total) by Nasal route 2 (two) times daily., Disp: 30 mL, Rfl: 11    carboxymethylcellulose sodium (REFRESH TEARS) 0.5 % Drop, Apply 1 drop to eye 3 (three) times daily as needed (dry eyes)., Disp: 30 mL, Rfl: 11    cholecalciferol, vitamin D3, 10,000 unit Cap, Take 360 mg by mouth once daily. Take 6 capsules (6,000 units total) by mouth once daily, Disp: , Rfl:     COD  LIVER OIL ORAL, Take 1 tablet by mouth once daily., Disp: , Rfl:     dupilumab 300 mg/2 mL PnIj, Inject 300 mg into the skin every 14 (fourteen) days., Disp: 4 mL, Rfl: 12    ferrous sulfate 325 (65 FE) MG EC tablet, Take 1 tablet (325 mg total) by mouth 3 (three) times daily with meals., Disp: 270 tablet, Rfl: 3    fluocinonide (LIDEX) 0.05 % external solution, Apply topically 2 (two) times daily. Prn scalp itch or pain, Disp: 60 mL, Rfl: 0    fluticasone propionate (FLONASE) 50 mcg/actuation nasal spray, 2 sprays (100 mcg total) by Each Nostril route 2 (two) times a day., Disp: 16 g, Rfl: 11    guaiFENesin (MUCINEX) 600 mg 12 hr tablet, Take 1 tablet (600 mg total) by mouth 2 (two) times daily., Disp: 60 tablet, Rfl: 5    hydrocortisone 2.5 % cream, Apply topically 2 (two) times daily. Prn face rash.Stop using steroid topical when skin is smooth and non itchy.  Do not treat dark or red coloring., Disp: 45 g, Rfl: 0    milk thistle 150 mg Cap, Take 1 capsule by mouth once daily., Disp: 90 each, Rfl: 3    multivitamin (THERAGRAN) per tablet, Take 1 tablet by mouth once daily., Disp: , Rfl:     NIFEdipine (PROCARDIA-XL) 60 MG (OSM) 24 hr tablet, TAKE 1 TABLET (60 MG TOTAL) BY MOUTH ONCE DAILY. REPLACES ISRADIPINE., Disp: 90 tablet, Rfl: 2    potassium chloride SA (K-DUR,KLOR-CON) 20 MEQ tablet, Take 1 tablet (20 mEq total) by mouth 2 (two) times daily., Disp: 180 tablet, Rfl: 2    tretinoin (RETIN-A) 0.05 % cream, Apply topically every evening. Start with every other night and move up to nightly after 2 weeks if not too dry., Disp: 20 g, Rfl: 2    valsartan (DIOVAN) 320 MG tablet, Take 1 tablet (320 mg total) by mouth once daily., Disp: 14 tablet, Rfl: 0    SENNA 8.6 mg tablet, TAKE 2 TAB(S) BY MOUTH NIGHTLY AS NEEDED FOR CONSTIPATION, Disp: , Rfl:     traMADoL (ULTRAM) 50 mg tablet, Take 1 tablet (50 mg total) by mouth every 12 (twelve) hours as needed for Pain., Disp: 14 tablet, Rfl: 0    Current  "Facility-Administered Medications:     sodium chloride 0.9% flush 10 mL, 10 mL, Intravenous, PRN, John Merino MD    sodium chloride 0.9% flush 10 mL, 10 mL, Intravenous, PRN, John Merino MD    Review of patient's allergies indicates:   Allergen Reactions    Prednisone Other (See Comments)     Bone loss    Adhesive      PAPER TAPE    Diazepam Other (See Comments)     Nervous, jittery    Gabapentin      Nervous      Keppra [levetiracetam]      nervous    Mold      sneezing       Review of Systems   Constitutional: Negative for chills, fever, night sweats and weight gain.   Gastrointestinal:  Negative for bowel incontinence, nausea and vomiting.   Genitourinary:  Negative for bladder incontinence.   Neurological:  Negative for disturbances in coordination and loss of balance.         Objective:        Ht 5' 5" (1.651 m)   Wt 82 kg (180 lb 12.8 oz)   BMI 30.09 kg/m²     Ortho/SPM Exam    LEFT SHOULDER EXAM:  Mild tenderness over fracture. Minimal pain with gentle IR/ER. She has bruising in shoulder and biceps region.     Good ROM of her elbow/wrist/hand.     She is NVI distally with good capillary refill. Compartments are soft.       XRAY INTERPRETATION:   X-rays of left shoulder dated 10/18/22 are personally reviewed and show mildly displaced fracture of the greater tuberosity of the left humerus.         Assessment:       Encounter Diagnosis   Name Primary?    Closed displaced fracture of greater tuberosity of left humerus, initial encounter Yes          Plan:       Michela was seen today for pain and injury.    Diagnoses and all orders for this visit:    Closed displaced fracture of greater tuberosity of left humerus, initial encounter  -     traMADoL (ULTRAM) 50 mg tablet; Take 1 tablet (50 mg total) by mouth every 12 (twelve) hours as needed for Pain.  -     X-Ray Shoulder 2 or More Views Left; Future    Greater tuberosity fracture of left humerus DOI 10/12/22 while travelling in [place]. She is " right hand dominant. She has mild pain in left shoulder.     XRs show mildly displaced fracture of the greater tuberosity of the left humerus.     Treatment options reviewed with patient along with above left shoulder xrays. Following plan made:     - Given new sling to wear for next week then can wean out of. Work on ROM of elbow.   - No lifting with left arm. Can do work out in front of her like typing.   - Continue motrin prn. Reviewed dosing and side effects. Take with food.   - Stop oxycodone. New prescription for ultram.  reviewed and is appropriate.   - Will review with Dr. Esparza and email her his recommendations.   - She requests follow up in [place]. Will see Suhas in 2 weeks with repeat XRs.     Above plan reviewed with Dr. Esparza after her visit and he reviewed her XRs. He agrees. Will plan to start PT at 4 weeks out from injury. Patient sent message.     Follow up in about 2 weeks (around 11/1/2022).

## 2022-10-18 ENCOUNTER — OFFICE VISIT (OUTPATIENT)
Dept: ORTHOPEDICS | Facility: CLINIC | Age: 77
End: 2022-10-18
Payer: MEDICARE

## 2022-10-18 ENCOUNTER — PATIENT MESSAGE (OUTPATIENT)
Dept: ORTHOPEDICS | Facility: CLINIC | Age: 77
End: 2022-10-18

## 2022-10-18 ENCOUNTER — LAB VISIT (OUTPATIENT)
Dept: LAB | Facility: HOSPITAL | Age: 77
End: 2022-10-18
Attending: STUDENT IN AN ORGANIZED HEALTH CARE EDUCATION/TRAINING PROGRAM
Payer: MEDICARE

## 2022-10-18 ENCOUNTER — OFFICE VISIT (OUTPATIENT)
Dept: PRIMARY CARE CLINIC | Facility: CLINIC | Age: 77
End: 2022-10-18
Payer: MEDICARE

## 2022-10-18 VITALS
TEMPERATURE: 98 F | HEART RATE: 88 BPM | SYSTOLIC BLOOD PRESSURE: 122 MMHG | HEIGHT: 65 IN | DIASTOLIC BLOOD PRESSURE: 66 MMHG | BODY MASS INDEX: 30.34 KG/M2 | OXYGEN SATURATION: 99 % | WEIGHT: 182.13 LBS

## 2022-10-18 VITALS — WEIGHT: 180.81 LBS | HEIGHT: 65 IN | BODY MASS INDEX: 30.12 KG/M2

## 2022-10-18 DIAGNOSIS — E87.8 ELECTROLYTE ABNORMALITY: ICD-10-CM

## 2022-10-18 DIAGNOSIS — S42.252A CLOSED DISPLACED FRACTURE OF GREATER TUBEROSITY OF LEFT HUMERUS, INITIAL ENCOUNTER: Primary | ICD-10-CM

## 2022-10-18 DIAGNOSIS — I10 HYPERTENSION, UNSPECIFIED TYPE: ICD-10-CM

## 2022-10-18 DIAGNOSIS — Z00.00 ANNUAL PHYSICAL EXAM: Primary | ICD-10-CM

## 2022-10-18 DIAGNOSIS — Z00.00 ANNUAL PHYSICAL EXAM: ICD-10-CM

## 2022-10-18 DIAGNOSIS — D64.9 ANEMIA, UNSPECIFIED TYPE: ICD-10-CM

## 2022-10-18 DIAGNOSIS — J45.40 MODERATE PERSISTENT ASTHMA WITHOUT COMPLICATION: ICD-10-CM

## 2022-10-18 PROBLEM — G89.29 CHRONIC PAIN OF LEFT KNEE: Status: RESOLVED | Noted: 2018-11-07 | Resolved: 2022-10-18

## 2022-10-18 PROBLEM — Z98.890 POST-OPERATIVE STATE: Status: RESOLVED | Noted: 2021-03-04 | Resolved: 2022-10-18

## 2022-10-18 PROBLEM — M25.562 CHRONIC PAIN OF LEFT KNEE: Status: RESOLVED | Noted: 2018-11-07 | Resolved: 2022-10-18

## 2022-10-18 LAB
ESTIMATED AVG GLUCOSE: 105 MG/DL (ref 68–131)
HBA1C MFR BLD: 5.3 % (ref 4–5.6)

## 2022-10-18 PROCEDURE — 99214 PR OFFICE/OUTPT VISIT, EST, LEVL IV, 30-39 MIN: ICD-10-PCS | Mod: S$GLB,,, | Performed by: STUDENT IN AN ORGANIZED HEALTH CARE EDUCATION/TRAINING PROGRAM

## 2022-10-18 PROCEDURE — 99999 PR PBB SHADOW E&M-EST. PATIENT-LVL V: CPT | Mod: PBBFAC,,, | Performed by: STUDENT IN AN ORGANIZED HEALTH CARE EDUCATION/TRAINING PROGRAM

## 2022-10-18 PROCEDURE — 99999 PR PBB SHADOW E&M-EST. PATIENT-LVL IV: CPT | Mod: PBBFAC,,, | Performed by: PHYSICIAN ASSISTANT

## 2022-10-18 PROCEDURE — 3288F FALL RISK ASSESSMENT DOCD: CPT | Mod: CPTII,S$GLB,, | Performed by: STUDENT IN AN ORGANIZED HEALTH CARE EDUCATION/TRAINING PROGRAM

## 2022-10-18 PROCEDURE — 3074F PR MOST RECENT SYSTOLIC BLOOD PRESSURE < 130 MM HG: ICD-10-PCS | Mod: CPTII,S$GLB,, | Performed by: STUDENT IN AN ORGANIZED HEALTH CARE EDUCATION/TRAINING PROGRAM

## 2022-10-18 PROCEDURE — 1157F ADVNC CARE PLAN IN RCRD: CPT | Mod: CPTII,S$GLB,, | Performed by: STUDENT IN AN ORGANIZED HEALTH CARE EDUCATION/TRAINING PROGRAM

## 2022-10-18 PROCEDURE — 99499 UNLISTED E&M SERVICE: CPT | Mod: HCNC,S$GLB,, | Performed by: PHYSICIAN ASSISTANT

## 2022-10-18 PROCEDURE — 1160F PR REVIEW ALL MEDS BY PRESCRIBER/CLIN PHARMACIST DOCUMENTED: ICD-10-PCS | Mod: CPTII,S$GLB,, | Performed by: STUDENT IN AN ORGANIZED HEALTH CARE EDUCATION/TRAINING PROGRAM

## 2022-10-18 PROCEDURE — 99214 OFFICE O/P EST MOD 30 MIN: CPT | Mod: S$GLB,,, | Performed by: PHYSICIAN ASSISTANT

## 2022-10-18 PROCEDURE — 1125F AMNT PAIN NOTED PAIN PRSNT: CPT | Mod: CPTII,S$GLB,, | Performed by: STUDENT IN AN ORGANIZED HEALTH CARE EDUCATION/TRAINING PROGRAM

## 2022-10-18 PROCEDURE — 3288F PR FALLS RISK ASSESSMENT DOCUMENTED: ICD-10-PCS | Mod: CPTII,S$GLB,, | Performed by: PHYSICIAN ASSISTANT

## 2022-10-18 PROCEDURE — 3288F FALL RISK ASSESSMENT DOCD: CPT | Mod: CPTII,S$GLB,, | Performed by: PHYSICIAN ASSISTANT

## 2022-10-18 PROCEDURE — 1159F MED LIST DOCD IN RCRD: CPT | Mod: CPTII,S$GLB,, | Performed by: STUDENT IN AN ORGANIZED HEALTH CARE EDUCATION/TRAINING PROGRAM

## 2022-10-18 PROCEDURE — 3074F SYST BP LT 130 MM HG: CPT | Mod: CPTII,S$GLB,, | Performed by: STUDENT IN AN ORGANIZED HEALTH CARE EDUCATION/TRAINING PROGRAM

## 2022-10-18 PROCEDURE — 99999 PR PBB SHADOW E&M-EST. PATIENT-LVL V: ICD-10-PCS | Mod: PBBFAC,,, | Performed by: STUDENT IN AN ORGANIZED HEALTH CARE EDUCATION/TRAINING PROGRAM

## 2022-10-18 PROCEDURE — 1159F PR MEDICATION LIST DOCUMENTED IN MEDICAL RECORD: ICD-10-PCS | Mod: CPTII,S$GLB,, | Performed by: STUDENT IN AN ORGANIZED HEALTH CARE EDUCATION/TRAINING PROGRAM

## 2022-10-18 PROCEDURE — 99999 PR PBB SHADOW E&M-EST. PATIENT-LVL IV: ICD-10-PCS | Mod: PBBFAC,,, | Performed by: PHYSICIAN ASSISTANT

## 2022-10-18 PROCEDURE — 3078F PR MOST RECENT DIASTOLIC BLOOD PRESSURE < 80 MM HG: ICD-10-PCS | Mod: CPTII,S$GLB,, | Performed by: STUDENT IN AN ORGANIZED HEALTH CARE EDUCATION/TRAINING PROGRAM

## 2022-10-18 PROCEDURE — 4010F ACE/ARB THERAPY RXD/TAKEN: CPT | Mod: CPTII,S$GLB,, | Performed by: STUDENT IN AN ORGANIZED HEALTH CARE EDUCATION/TRAINING PROGRAM

## 2022-10-18 PROCEDURE — 3078F DIAST BP <80 MM HG: CPT | Mod: CPTII,S$GLB,, | Performed by: STUDENT IN AN ORGANIZED HEALTH CARE EDUCATION/TRAINING PROGRAM

## 2022-10-18 PROCEDURE — 4010F ACE/ARB THERAPY RXD/TAKEN: CPT | Mod: CPTII,S$GLB,, | Performed by: PHYSICIAN ASSISTANT

## 2022-10-18 PROCEDURE — 36415 COLL VENOUS BLD VENIPUNCTURE: CPT | Mod: PN | Performed by: STUDENT IN AN ORGANIZED HEALTH CARE EDUCATION/TRAINING PROGRAM

## 2022-10-18 PROCEDURE — 99214 PR OFFICE/OUTPT VISIT, EST, LEVL IV, 30-39 MIN: ICD-10-PCS | Mod: S$GLB,,, | Performed by: PHYSICIAN ASSISTANT

## 2022-10-18 PROCEDURE — 1125F AMNT PAIN NOTED PAIN PRSNT: CPT | Mod: CPTII,S$GLB,, | Performed by: PHYSICIAN ASSISTANT

## 2022-10-18 PROCEDURE — 1101F PR PT FALLS ASSESS DOC 0-1 FALLS W/OUT INJ PAST YR: ICD-10-PCS | Mod: CPTII,S$GLB,, | Performed by: PHYSICIAN ASSISTANT

## 2022-10-18 PROCEDURE — 1101F PT FALLS ASSESS-DOCD LE1/YR: CPT | Mod: CPTII,S$GLB,, | Performed by: PHYSICIAN ASSISTANT

## 2022-10-18 PROCEDURE — 1157F PR ADVANCE CARE PLAN OR EQUIV PRESENT IN MEDICAL RECORD: ICD-10-PCS | Mod: CPTII,S$GLB,, | Performed by: STUDENT IN AN ORGANIZED HEALTH CARE EDUCATION/TRAINING PROGRAM

## 2022-10-18 PROCEDURE — 4010F PR ACE/ARB THEARPY RXD/TAKEN: ICD-10-PCS | Mod: CPTII,S$GLB,, | Performed by: STUDENT IN AN ORGANIZED HEALTH CARE EDUCATION/TRAINING PROGRAM

## 2022-10-18 PROCEDURE — 99499 RISK ADDL DX/OHS AUDIT: ICD-10-PCS | Mod: HCNC,S$GLB,, | Performed by: PHYSICIAN ASSISTANT

## 2022-10-18 PROCEDURE — 1157F ADVNC CARE PLAN IN RCRD: CPT | Mod: CPTII,S$GLB,, | Performed by: PHYSICIAN ASSISTANT

## 2022-10-18 PROCEDURE — 1100F PTFALLS ASSESS-DOCD GE2>/YR: CPT | Mod: CPTII,S$GLB,, | Performed by: STUDENT IN AN ORGANIZED HEALTH CARE EDUCATION/TRAINING PROGRAM

## 2022-10-18 PROCEDURE — 1157F PR ADVANCE CARE PLAN OR EQUIV PRESENT IN MEDICAL RECORD: ICD-10-PCS | Mod: CPTII,S$GLB,, | Performed by: PHYSICIAN ASSISTANT

## 2022-10-18 PROCEDURE — 1125F PR PAIN SEVERITY QUANTIFIED, PAIN PRESENT: ICD-10-PCS | Mod: CPTII,S$GLB,, | Performed by: STUDENT IN AN ORGANIZED HEALTH CARE EDUCATION/TRAINING PROGRAM

## 2022-10-18 PROCEDURE — 99214 OFFICE O/P EST MOD 30 MIN: CPT | Mod: S$GLB,,, | Performed by: STUDENT IN AN ORGANIZED HEALTH CARE EDUCATION/TRAINING PROGRAM

## 2022-10-18 PROCEDURE — 3288F PR FALLS RISK ASSESSMENT DOCUMENTED: ICD-10-PCS | Mod: CPTII,S$GLB,, | Performed by: STUDENT IN AN ORGANIZED HEALTH CARE EDUCATION/TRAINING PROGRAM

## 2022-10-18 PROCEDURE — 1160F RVW MEDS BY RX/DR IN RCRD: CPT | Mod: CPTII,S$GLB,, | Performed by: STUDENT IN AN ORGANIZED HEALTH CARE EDUCATION/TRAINING PROGRAM

## 2022-10-18 PROCEDURE — 83036 HEMOGLOBIN GLYCOSYLATED A1C: CPT | Performed by: STUDENT IN AN ORGANIZED HEALTH CARE EDUCATION/TRAINING PROGRAM

## 2022-10-18 PROCEDURE — 1125F PR PAIN SEVERITY QUANTIFIED, PAIN PRESENT: ICD-10-PCS | Mod: CPTII,S$GLB,, | Performed by: PHYSICIAN ASSISTANT

## 2022-10-18 PROCEDURE — 1100F PR PT FALLS ASSESS DOC 2+ FALLS/FALL W/INJURY/YR: ICD-10-PCS | Mod: CPTII,S$GLB,, | Performed by: STUDENT IN AN ORGANIZED HEALTH CARE EDUCATION/TRAINING PROGRAM

## 2022-10-18 PROCEDURE — 4010F PR ACE/ARB THEARPY RXD/TAKEN: ICD-10-PCS | Mod: CPTII,S$GLB,, | Performed by: PHYSICIAN ASSISTANT

## 2022-10-18 RX ORDER — TRAMADOL HYDROCHLORIDE 50 MG/1
50 TABLET ORAL EVERY 12 HOURS PRN
Qty: 14 TABLET | Refills: 0 | Status: SHIPPED | OUTPATIENT
Start: 2022-10-18 | End: 2022-10-25

## 2022-10-18 RX ORDER — SENNOSIDES 8.6 MG
TABLET ORAL
COMMUNITY
Start: 2022-10-13

## 2022-10-18 NOTE — PATIENT INSTRUCTIONS
It was nice to meet you today! I am sorry that you are hurting so much.     You broke a bone in your shoulder. It should heal without surgery, but I will review with Dr. Esparza and let you know.     Stay in sling for comfort for next week and then you can start to wean out of it. No lifting with left arm. Okay to do things out in front of you like typing.     I sent tramadol to your pharmacy to help with severe pain. Take only as needed. This can make you sleepy/constipated. Stop the oxycodone.     Okay to continue on motrin for pain/inflammation. Take as directed with food.     We will see you back in 2 weeks with repeat xrays, but please stay in touch and call me if you need anything. You can also send me a message in [facility].     Leah   [phone]

## 2022-10-18 NOTE — PROGRESS NOTES
Primary Care  Annual Office Visit - In Person  10/18/2022  Mayela Ndhlovu        Patient is a 77 y.o.   Michela Franco  has a past medical history of Allergy, Asthma, Chronic diastolic heart failure (4/5/2018), Glaucoma suspect, Hyperlipidemia, Hypertension, Neuromuscular disorder, JOHN on CPAP, Osteoporosis, Recurrent sinusitis (3/25/2014), Recurrent upper respiratory infection (URI), and Urticaria.    Patient has no acute complaints today     She expressed concern about skin discoloration in lower extremities  No pain or swelling      Social History     Social History Narrative         Michela Franco family history includes Cancer in her sister; Hypertension in her daughter and daughter; Kidney cancer in her sister; No Known Problems in her father and mother; Ovarian cancer in her sister.    Active Medications  Review of patient's allergies indicates:   Allergen Reactions    Prednisone Other (See Comments)     Bone loss    Adhesive      PAPER TAPE    Diazepam Other (See Comments)     Nervous, jittery    Gabapentin      Nervous      Keppra [levetiracetam]      nervous    Mold      sneezing     Current Outpatient Medications   Medication Instructions    ADVAIR DISKUS 500-50 mcg/dose DsDv diskus inhaler INHALE 1 PUFF TWICE DAILY    albuterol (VENTOLIN HFA) 90 mcg/actuation inhaler 2 puffs, Inhalation, Every 6 hours PRN, Rescue    azelastine (ASTELIN) 274 mcg, Nasal, 2 times daily    carboxymethylcellulose sodium (REFRESH TEARS) 0.5 % Drop 1 drop, Ophthalmic, 3 times daily PRN    cholecalciferol (vitamin D3) (VITAMIN D3) 360 mg, Oral, Daily, Take 6 capsules (6,000 units total) by mouth once daily     COD LIVER OIL ORAL 1 tablet, Oral, Daily    dupilumab 300 mg, Subcutaneous, Every 14 days    ferrous sulfate 325 mg, Oral, 3 times daily with meals    fluocinonide (LIDEX) 0.05 % external solution Topical (Top), 2 times daily, Prn scalp itch or pain    fluticasone propionate (FLONASE) 100 mcg, Each  Nostril, 2 times daily    guaiFENesin (MUCINEX) 600 mg, Oral, 2 times daily    hydrocortisone 2.5 % cream Topical (Top), 2 times daily, Prn face rash.Stop using steroid topical when skin is smooth and non itchy.  Do not treat dark or red coloring.    milk thistle 150 mg Cap 1 capsule, Oral, Daily    multivitamin (THERAGRAN) per tablet 1 tablet, Oral, Daily,      NIFEdipine (PROCARDIA-XL) 60 mg, Oral, Daily, Replaces isradipine.    potassium chloride SA (K-DUR,KLOR-CON) 20 MEQ tablet 20 mEq, Oral, 2 times daily    SENNA 8.6 mg tablet TAKE 2 TAB(S) BY MOUTH NIGHTLY AS NEEDED FOR CONSTIPATION    traMADoL (ULTRAM) 50 mg, Oral, Every 12 hours PRN    tretinoin (RETIN-A) 0.05 % cream Topical (Top), Nightly, Start with every other night and move up to nightly after 2 weeks if not too dry.    valsartan (DIOVAN) 320 mg, Oral, Daily       Annual Review of Preventative Care    There are no preventive care reminders to display for this patient.  Diabetes Screening:    Lab Results   Component Value Date    HGBA1C 6.0 11/01/2012    HGBA1C 6.2 10/26/2011    HGBA1C 5.9 02/25/2011     BP Readings from Last 3 Encounters:   10/18/22 122/66   11/10/21 134/78   03/04/21 (!) 151/71     Wt Readings from Last 3 Encounters:   10/18/22 0937 82.6 kg (182 lb 1.6 oz)   10/18/22 0818 82 kg (180 lb 12.8 oz)   11/24/21 1455 80.7 kg (177 lb 14.6 oz)     Dexa: Next Due in 2023     Review of Systems   All other systems reviewed and are negative.    Physical Exam  Vitals reviewed.   Cardiovascular:      Rate and Rhythm: Normal rate and regular rhythm.   Pulmonary:      Effort: Pulmonary effort is normal.      Breath sounds: Normal breath sounds.   Abdominal:      General: Abdomen is flat. Bowel sounds are normal.      Palpations: Abdomen is soft.   Musculoskeletal:      Right lower leg: No edema.      Left lower leg: No edema.   Skin:     Comments: Hyperpigmentation lower extremities b/l          Assessment and Plan     HTN   Continue valsartan 320 mg  daily and nifedipine 50 mg daily   BP well controlled   Repeat BMP     Hx hypokalemia   F/u BMP   Patient taking 40 meq of potassium daily while on ARB?     Hx iron def   Resolved   Instructed patient to dc iron supplementation     Upper airway cough syndrome   Asthma   Continue albuterol and advair inhalers     Other orders   HbA1c     Closed displaced fracture left humerus   Following trauma   Patient following with orthopedic surgery   Repeat DEXA 2023  Likely benefit from adequate vit D and calcium supplementation     Screening and vaccinations   Up to date     Stasis dermatitis   Recommended leg elevation and more frequent ambulation                       Orders Placed This Visit  Orders Placed This Encounter   Procedures    HEMOGLOBIN A1C     Standing Status:   Future     Number of Occurrences:   1     Standing Expiration Date:   12/17/2023    BASIC METABOLIC PANEL     Standing Status:   Future     Standing Expiration Date:   12/17/2023                             Upcoming Scheduled Appointments and Follow Up:    Future Appointments   Date Time Provider Department Center   10/18/2022 11:15 AM LAB, [facility] LAB [facility]   11/4/2022  8:15 AM Rasheed Goetz PA-C [facility]TH[facility]   12/14/2022  8:15 AM Nelly Milian OD [facility]       Follow Up [facility] (with who? when?): Follow up in about 1 year (around 10/18/2023).        Extended Emergency Contact Information  Primary Emergency Contact: Charley Ahmadi  Address: [address] [place] [address] [place]  Mobile Phone: [phone]  Relation: Sister      Mayela Broussard MD   Attending Physician   Primary Care  10/18/2022 - 10:28 AM    I spent a total of 32 minutes on the day of the visit.This includes face to face time and non-face to face time preparing to see the patient (eg, review of tests), obtaining and/or reviewing separately obtained history, documenting clinical  information in the electronic or other health record, independently interpreting results and communicating results to the patient/family/caregiver, or care coordinator.

## 2022-11-04 ENCOUNTER — PATIENT MESSAGE (OUTPATIENT)
Dept: ORTHOPEDICS | Facility: CLINIC | Age: 77
End: 2022-11-04
Payer: MEDICARE

## 2022-11-08 ENCOUNTER — TELEPHONE (OUTPATIENT)
Dept: ORTHOPEDICS | Facility: CLINIC | Age: 77
End: 2022-11-08
Payer: MEDICARE

## 2022-11-08 NOTE — TELEPHONE ENCOUNTER
Left message to contact clinic re: appointment with MD on 11/09. MD doesn't treat shoulder, however appointment has been r/s with PA on 11/08 at 2:30.

## 2022-11-09 ENCOUNTER — HOSPITAL ENCOUNTER (OUTPATIENT)
Dept: RADIOLOGY | Facility: HOSPITAL | Age: 77
Discharge: HOME | End: 2022-11-09
Attending: PHYSICIAN ASSISTANT
Payer: MEDICARE

## 2022-11-09 ENCOUNTER — OFFICE VISIT (OUTPATIENT)
Dept: ORTHOPEDICS | Facility: CLINIC | Age: 77
End: 2022-11-09
Payer: MEDICARE

## 2022-11-09 VITALS — WEIGHT: 182 LBS | HEIGHT: 65 IN | BODY MASS INDEX: 30.32 KG/M2

## 2022-11-09 DIAGNOSIS — S42.252D CLOSED DISPLACED FRACTURE OF GREATER TUBEROSITY OF LEFT HUMERUS WITH ROUTINE HEALING, SUBSEQUENT ENCOUNTER: Primary | ICD-10-CM

## 2022-11-09 DIAGNOSIS — S42.252A CLOSED DISPLACED FRACTURE OF GREATER TUBEROSITY OF LEFT HUMERUS, INITIAL ENCOUNTER: ICD-10-CM

## 2022-11-09 PROCEDURE — 3288F PR FALLS RISK ASSESSMENT DOCUMENTED: ICD-10-PCS | Mod: CPTII,S$GLB,, | Performed by: PHYSICIAN ASSISTANT

## 2022-11-09 PROCEDURE — 99999 PR PBB SHADOW E&M-EST. PATIENT-LVL IV: CPT | Mod: PBBFAC,,, | Performed by: PHYSICIAN ASSISTANT

## 2022-11-09 PROCEDURE — 73030 X-RAY EXAM OF SHOULDER: CPT | Mod: TC,PN,LT

## 2022-11-09 PROCEDURE — 99999 PR PBB SHADOW E&M-EST. PATIENT-LVL IV: ICD-10-PCS | Mod: PBBFAC,,, | Performed by: PHYSICIAN ASSISTANT

## 2022-11-09 PROCEDURE — 73030 XR SHOULDER COMPLETE 2 OR MORE VIEWS LEFT: ICD-10-PCS | Mod: 26,LT,, | Performed by: RADIOLOGY

## 2022-11-09 PROCEDURE — 73030 X-RAY EXAM OF SHOULDER: CPT | Mod: 26,LT,, | Performed by: RADIOLOGY

## 2022-11-09 PROCEDURE — 4010F ACE/ARB THERAPY RXD/TAKEN: CPT | Mod: CPTII,S$GLB,, | Performed by: PHYSICIAN ASSISTANT

## 2022-11-09 PROCEDURE — 1160F RVW MEDS BY RX/DR IN RCRD: CPT | Mod: CPTII,S$GLB,, | Performed by: PHYSICIAN ASSISTANT

## 2022-11-09 PROCEDURE — 1125F AMNT PAIN NOTED PAIN PRSNT: CPT | Mod: CPTII,S$GLB,, | Performed by: PHYSICIAN ASSISTANT

## 2022-11-09 PROCEDURE — 3288F FALL RISK ASSESSMENT DOCD: CPT | Mod: CPTII,S$GLB,, | Performed by: PHYSICIAN ASSISTANT

## 2022-11-09 PROCEDURE — 1159F PR MEDICATION LIST DOCUMENTED IN MEDICAL RECORD: ICD-10-PCS | Mod: CPTII,S$GLB,, | Performed by: PHYSICIAN ASSISTANT

## 2022-11-09 PROCEDURE — 3044F PR MOST RECENT HEMOGLOBIN A1C LEVEL <7.0%: ICD-10-PCS | Mod: CPTII,S$GLB,, | Performed by: PHYSICIAN ASSISTANT

## 2022-11-09 PROCEDURE — 1159F MED LIST DOCD IN RCRD: CPT | Mod: CPTII,S$GLB,, | Performed by: PHYSICIAN ASSISTANT

## 2022-11-09 PROCEDURE — 1157F PR ADVANCE CARE PLAN OR EQUIV PRESENT IN MEDICAL RECORD: ICD-10-PCS | Mod: CPTII,S$GLB,, | Performed by: PHYSICIAN ASSISTANT

## 2022-11-09 PROCEDURE — 1160F PR REVIEW ALL MEDS BY PRESCRIBER/CLIN PHARMACIST DOCUMENTED: ICD-10-PCS | Mod: CPTII,S$GLB,, | Performed by: PHYSICIAN ASSISTANT

## 2022-11-09 PROCEDURE — 1101F PT FALLS ASSESS-DOCD LE1/YR: CPT | Mod: CPTII,S$GLB,, | Performed by: PHYSICIAN ASSISTANT

## 2022-11-09 PROCEDURE — 4010F PR ACE/ARB THEARPY RXD/TAKEN: ICD-10-PCS | Mod: CPTII,S$GLB,, | Performed by: PHYSICIAN ASSISTANT

## 2022-11-09 PROCEDURE — 99213 OFFICE O/P EST LOW 20 MIN: CPT | Mod: S$GLB,,, | Performed by: PHYSICIAN ASSISTANT

## 2022-11-09 PROCEDURE — 1101F PR PT FALLS ASSESS DOC 0-1 FALLS W/OUT INJ PAST YR: ICD-10-PCS | Mod: CPTII,S$GLB,, | Performed by: PHYSICIAN ASSISTANT

## 2022-11-09 PROCEDURE — 99213 PR OFFICE/OUTPT VISIT, EST, LEVL III, 20-29 MIN: ICD-10-PCS | Mod: S$GLB,,, | Performed by: PHYSICIAN ASSISTANT

## 2022-11-09 PROCEDURE — 1125F PR PAIN SEVERITY QUANTIFIED, PAIN PRESENT: ICD-10-PCS | Mod: CPTII,S$GLB,, | Performed by: PHYSICIAN ASSISTANT

## 2022-11-09 PROCEDURE — 3044F HG A1C LEVEL LT 7.0%: CPT | Mod: CPTII,S$GLB,, | Performed by: PHYSICIAN ASSISTANT

## 2022-11-09 PROCEDURE — 1157F ADVNC CARE PLAN IN RCRD: CPT | Mod: CPTII,S$GLB,, | Performed by: PHYSICIAN ASSISTANT

## 2022-11-09 NOTE — PROGRESS NOTES
Subjective:      Patient ID: Michela Franco is a 77 y.o. female.    Chief Complaint: Consult of the Left Shoulder  DOI: 10-12-22  Injury: L greater tuberosity fx  Tx: conservative/nonop    HPI     Ms. Franco returns approximately 1mos s/p the abovementioned injury. She has been NWB to the E performing gentle ROM intermittently only. Pt is no longer utilizing her sling, and has not yet begun PT. She notes that pain has been well-managed, and she denies new injuries or complaints. Pt does note difficulties reaching out to grab objects in front of her.    She is a  and is able to work without using her left arm.     PSHx: L shoulder arthroscopic surgery in the 90's (pt informed of rotator cuff tear)    Past Medical History:   Diagnosis Date    Allergy     Asthma     Chronic diastolic heart failure 4/5/2018    Glaucoma suspect     Hyperlipidemia     Hypertension     Neuromuscular disorder     JOHN on CPAP     Osteoporosis     Recurrent sinusitis 3/25/2014    Recurrent upper respiratory infection (URI)     Urticaria          Current Outpatient Medications:     ADVAIR DISKUS 500-50 mcg/dose DsDv diskus inhaler, INHALE 1 PUFF TWICE DAILY, Disp: 1 each, Rfl: 5    albuterol (VENTOLIN HFA) 90 mcg/actuation inhaler, Inhale 2 puffs into the lungs every 6 (six) hours as needed for Wheezing. Rescue, Disp: 8 g, Rfl: 11    azelastine (ASTELIN) 137 mcg (0.1 %) nasal spray, 2 sprays (274 mcg total) by Nasal route 2 (two) times daily., Disp: 30 mL, Rfl: 11    carboxymethylcellulose sodium (REFRESH TEARS) 0.5 % Drop, Apply 1 drop to eye 3 (three) times daily as needed (dry eyes)., Disp: 30 mL, Rfl: 11    cholecalciferol, vitamin D3, 10,000 unit Cap, Take 360 mg by mouth once daily. Take 6 capsules (6,000 units total) by mouth once daily, Disp: , Rfl:     COD LIVER OIL ORAL, Take 1 tablet by mouth once daily., Disp: , Rfl:     dupilumab 300 mg/2 mL PnIj, Inject 300 mg into the skin every 14 (fourteen) days., Disp: 4 mL,  Rfl: 12    ferrous sulfate 325 (65 FE) MG EC tablet, Take 1 tablet (325 mg total) by mouth 3 (three) times daily with meals., Disp: 270 tablet, Rfl: 3    fluocinonide (LIDEX) 0.05 % external solution, Apply topically 2 (two) times daily. Prn scalp itch or pain, Disp: 60 mL, Rfl: 0    fluticasone propionate (FLONASE) 50 mcg/actuation nasal spray, 2 sprays (100 mcg total) by Each Nostril route 2 (two) times a day., Disp: 16 g, Rfl: 11    guaiFENesin (MUCINEX) 600 mg 12 hr tablet, Take 1 tablet (600 mg total) by mouth 2 (two) times daily., Disp: 60 tablet, Rfl: 5    hydrocortisone 2.5 % cream, Apply topically 2 (two) times daily. Prn face rash.Stop using steroid topical when skin is smooth and non itchy.  Do not treat dark or red coloring., Disp: 45 g, Rfl: 0    milk thistle 150 mg Cap, Take 1 capsule by mouth once daily., Disp: 90 each, Rfl: 3    multivitamin (THERAGRAN) per tablet, Take 1 tablet by mouth once daily., Disp: , Rfl:     NIFEdipine (PROCARDIA-XL) 60 MG (OSM) 24 hr tablet, TAKE 1 TABLET (60 MG TOTAL) BY MOUTH ONCE DAILY. REPLACES ISRADIPINE., Disp: 90 tablet, Rfl: 2    potassium chloride SA (K-DUR,KLOR-CON) 20 MEQ tablet, Take 1 tablet (20 mEq total) by mouth 2 (two) times daily., Disp: 180 tablet, Rfl: 2    SENNA 8.6 mg tablet, TAKE 2 TAB(S) BY MOUTH NIGHTLY AS NEEDED FOR CONSTIPATION, Disp: , Rfl:     tretinoin (RETIN-A) 0.05 % cream, Apply topically every evening. Start with every other night and move up to nightly after 2 weeks if not too dry., Disp: 20 g, Rfl: 2    valsartan (DIOVAN) 320 MG tablet, Take 1 tablet (320 mg total) by mouth once daily., Disp: 14 tablet, Rfl: 0    Review of patient's allergies indicates:   Allergen Reactions    Prednisone Other (See Comments)     Bone loss    Adhesive      PAPER TAPE    Diazepam Other (See Comments)     Nervous, jittery    Gabapentin      Nervous      Keppra [levetiracetam]      nervous    Mold      sneezing       Review of Systems   Constitutional:  "Negative for chills, fever, night sweats and weight gain.   Gastrointestinal:  Negative for bowel incontinence, nausea and vomiting.   Genitourinary:  Negative for bladder incontinence.   Neurological:  Negative for disturbances in coordination and loss of balance.       Objective:        Ht 5' 5" (1.651 m)   Wt 82.6 kg (182 lb)   BMI 30.29 kg/m²     Ortho/SPM Exam    LEFT SHOULDER EXAM:  No tenderness overlying fracture site. Previous ecchymosis resolved.  Motor:   PROM  (pain at terminal point), ER 45  Complete ROM of her elbow/wrist/hand.   She is NVI distally with good capillary refill.     XRAY INTERPRETATION:   X-rays of left shoulder obtained today and personally reviewed show: mildly displaced fracture of the greater tuberosity of the left humerus without interval displacement. Presence of bone callus formation laterally is noted      Assessment:       Encounter Diagnosis   Name Primary?    Closed displaced fracture of greater tuberosity of left humerus with routine healing, subsequent encounter Yes         Plan:       Michela was seen today for consult.    Diagnoses and all orders for this visit:    Closed displaced fracture of greater tuberosity of left humerus with routine healing, subsequent encounter  -     Ambulatory referral/consult to Physical/Occupational Therapy; Future  Fracture Care: Interval bone healing is appreciated on Xray and pt is no longer tender to fx site. Recommend continued conservative tx with advancement of shoulder ROM  Activity: no heavy lifting, pushing, pulling  PT: formal referral provided for gentle AROM/PROM L shoulder to prevent complications 2/2 stiffness    Follow Up: 4-6wks w/ repeat XR L shoulder      "

## 2022-11-11 ENCOUNTER — CLINICAL SUPPORT (OUTPATIENT)
Dept: REHABILITATION | Facility: HOSPITAL | Age: 77
End: 2022-11-11
Payer: MEDICARE

## 2022-11-11 DIAGNOSIS — M25.612 DECREASED RANGE OF MOTION OF LEFT SHOULDER: ICD-10-CM

## 2022-11-11 DIAGNOSIS — S42.252D CLOSED DISPLACED FRACTURE OF GREATER TUBEROSITY OF LEFT HUMERUS WITH ROUTINE HEALING, SUBSEQUENT ENCOUNTER: ICD-10-CM

## 2022-11-11 DIAGNOSIS — M25.512 LEFT SHOULDER PAIN, UNSPECIFIED CHRONICITY: ICD-10-CM

## 2022-11-11 PROCEDURE — 97161 PT EVAL LOW COMPLEX 20 MIN: CPT

## 2022-11-11 PROCEDURE — 97110 THERAPEUTIC EXERCISES: CPT

## 2022-11-11 NOTE — PLAN OF CARE
[facility]   Physical Therapy Initial Evaluation     Date: 11/11/2022   Name: Michela Franco  Clinic Number: [id number]    Therapy Diagnosis:   Encounter Diagnoses   Name Primary?    Closed displaced fracture of greater tuberosity of left humerus with routine healing, subsequent encounter     Left shoulder pain, unspecified chronicity     Decreased range of motion of left shoulder      Physician: Lulu Franks PA-C    Physician Orders: PT Eval and Treat: AROM and PROM shoulder  No strengthening until 10-12wk post-injury  Wean out of UE sling  Medical Diagnosis from Referral: S42.252D (ICD-10-CM) - Closed displaced fracture of greater tuberosity of left humerus with routine healing, subsequent encounter  Evaluation Date: 11/11/2022  Authorization Period Expiration: 11/9/23  Plan of Care Expiration: 2/3/23  Progress Note Due: 12/11/22  Visit # / Visits authorized: 1/ 1   FOTO: 1/10    Precautions: Standard and HTN, osteoporosis  , protocol (see above)     Time In: 7:53 am   Time Out: 8:22 am   Total Appointment Time (timed & untimed codes): 29 minutes      SUBJECTIVE     Date of onset: 10/12/22    History of current condition - Michela reports: that she tripped on stairs and fell and fractured (L) shoulder about a month ago. Pt stated that she wore a sling for two weeks after injury, has not been wearing sling for a couple of weeks. Pt stated that she is (R) hand dominant.     Falls: see subjective above     Imaging, : see imaging section in pt chart review     Prior Therapy: yes for her hip, and (L) rotator cuff surgery in the 90s  Social History: Pt stated that she lives alone most of the time, mom stays with her every couple of weeks.  Occupation: Pt stated that she is a . Pt stated that she is primarily able to do job with (R) UE, can't lift anything, has to use (R) UE to lift (L) arm onto desk  Prior Level of Function: independent   Current Level of Function: decreased  (L) shoulder ROM, decreased functional use of (L) UE, difficulty dressing/bathing/performing ADLs     Pain:  Current 0/10, worst 10/10, best 0/10   Location: left shoulder    Description: Aching and Sharp  Aggravating Factors: moving (L) shoulder, reaching, trying to lift (has stopped lifting with (L) UE), dressing, bathing, driving  Easing Factors: not using/moving (L) UE    Patients goals: be able to use (L) UE, lift/ things with (L) UE, be able to reach with (L) UE     Medical History:   Past Medical History:   Diagnosis Date    Allergy     Asthma     Chronic diastolic heart failure 4/5/2018    Glaucoma suspect     Hyperlipidemia     Hypertension     Neuromuscular disorder     JOHN on CPAP     Osteoporosis     Recurrent sinusitis 3/25/2014    Recurrent upper respiratory infection (URI)     Urticaria        Surgical History:   Michela Franco  has a past surgical history that includes Sinus surgery; Rotator cuff repair (Left); Hysterectomy; Colonoscopy (N/A, 5/18/2018); Oophorectomy; Cataract extraction w/  intraocular lens implant (Right, 2/11/2021); and Cataract extraction w/  intraocular lens implant (Left, 3/4/2021).    Medications:   Michela has a current medication list which includes the following prescription(s): advair diskus, albuterol, azelastine, carboxymethylcellulose sodium, cholecalciferol (vitamin d3), cod liver oil, dupilumab, ferrous sulfate, fluocinonide, fluticasone propionate, guaifenesin, hydrocortisone, milk thistle, multivitamin, nifedipine, potassium chloride sa, senna, tretinoin, and valsartan.    Allergies:   Review of patient's allergies indicates:   Allergen Reactions    Prednisone Other (See Comments)     Bone loss    Adhesive      PAPER TAPE    Diazepam Other (See Comments)     Nervous, jittery    Gabapentin      Nervous      Keppra [levetiracetam]      nervous    Mold      sneezing          OBJECTIVE     Shoulder Right  Left Pain/Dysfunction with Movement    AROM MMT AROM NT=  "not tested    flexion 150  4+/5 40    abduction 160  4+/5 60    Internal rotation Inferior border of scapula 4+/5 NT    ER at 0° abd 65 4/5 20           Limitation/Restriction for FOTO Shoulder Survey    Therapist reviewed FOTO scores for Michela Franco on 11/11/2022.   FOTO documents entered into [facility] - see Media section.    Limitation Score: 74%         TREATMENT     Total Treatment time (time-based codes) separate from Evaluation: 10 minutes      Michela received the treatments listed below:      therapeutic exercises to develop ROM for 10 minutes including:  Table slides flexion 1x10 3"   Table slides scaption 1x10 3"         PATIENT EDUCATION AND HOME EXERCISES     Education provided:   - HEP   - Role of PT - pt verbalized understanding    Written Home Exercises Provided: yes. Exercises were reviewed and Michela was able to demonstrate them prior to the end of the session.  Michela demonstrated good  understanding of the education provided. See EMR under Patient Instructions for exercises provided during therapy sessions.    ASSESSMENT     Michela is a 77 y.o. female referred to outpatient Physical Therapy with a medical diagnosis of Closed displaced fracture of greater tuberosity of left humerus with routine healing, subsequent encounter. Patient presents with c/o (L) shoulder pain, decreased (L) shoulder AROM in all planes measured above and decreased functional use of (L) UE. Pt will benefit from skilled PT in order to return pt to her highest level of function with decreased pain and limitation.     Patient prognosis is Good.   Patient will benefit from skilled outpatient Physical Therapy to address the deficits stated above and in the chart below, provide patient /family education, and to maximize patientt's level of independence.     Plan of care discussed with patient: Yes  Patient's spiritual, cultural and educational needs considered and patient is agreeable to the plan of care and goals as stated " below:     Anticipated Barriers for therapy: pain    Medical Necessity is demonstrated by the following  History  Co-morbidities and personal factors that may impact the plan of care Co-morbidities:   HTN    Personal Factors:   no deficits     moderate   Examination  Body Structures and Functions, activity limitations and participation restrictions that may impact the plan of care Body Regions:   upper extremities    Body Systems:    ROM    Participation Restrictions:   None mentioned    Activity limitations:   Learning and applying knowledge  no deficits    General Tasks and Commands  no deficits    Communication  no deficits    Mobility  lifting and carrying objects  fine hand use (grasping/picking up)  driving (bike, car, motorcycle)    Self care  washing oneself (bathing, drying, washing hands)  caring for body parts (brushing teeth, shaving, grooming)  toileting  dressing    Domestic Life  shopping  cooking  doing house work (cleaning house, washing dishes, laundry)    Interactions/Relationships  no deficits    Life Areas  no deficits    Community and Social Life  no deficits         high   Clinical Presentation stable and uncomplicated low   Decision Making/ Complexity Score: low     Goals:  Short Term Goals: 6 weeks   Pt will be independent with HEP to supplement PT with decreasing pain and improving functional mobility  Pt will demo (L) shoulder AROM in all planes measured above = (R) shoulder shoulder AROM in order to be able to perform reaching tasks and ADLs with less difficulty    Long Term Goals: 12 weeks   Pt will improve FOTO score to </= 44% limited in order to demo improved functional mobility  Pt will demo (R) shoulder MMT scores at least 4+/5 grossly in order to demo good strength for functional tasks  Pt will perform (L) shoulder AROM in all planes measured above without c/o pain in order to be able to perform ADLs with less difficulty     PLAN   Plan of care Certification: 11/11/2022 to  2/3/23.    Outpatient Physical Therapy 2 times weekly for 12 weeks to include the following interventions: Manual Therapy, Moist Heat/ Ice, Neuromuscular Re-ed, Patient Education, Self Care, Therapeutic Activities, Therapeutic Exercise, and IASTM, dry needling, and modalities prn .     Maryan Hernandez, PT      I CERTIFY THE NEED FOR THESE SERVICES FURNISHED UNDER THIS PLAN OF TREATMENT AND WHILE UNDER MY CARE   Physician's comments:     Physician's Signature: ___________________________________________________

## 2022-11-14 NOTE — PROGRESS NOTES
"[facility]   Physical Therapy Treatment Note     Name: Michela Franco  Clinic Number: [id number]    Therapy Diagnosis:   Encounter Diagnoses   Name Primary?    Left shoulder pain, unspecified chronicity Yes    Decreased range of motion of left shoulder      Physician: Lulu Franks PA-C    Visit Date: 11/15/2022       Physician Orders: PT Eval and Treat: AROM and PROM shoulder  No strengthening until 10-12wk post-injury  Wean out of UE sling  Medical Diagnosis from Referral: S42.252D (ICD-10-CM) - Closed displaced fracture of greater tuberosity of left humerus with routine healing, subsequent encounter  Evaluation Date: 11/11/2022  Authorization Period Expiration: 11/9/23  Plan of Care Expiration: 2/3/23  Progress Note Due: 12/11/22  Visit # / Visits authorized: 1/ 1   FOTO: 1/10  PTA Visit #: 1/5     Precautions: Standard and HTN, osteoporosis, protocol (see above)     Time In: 7:02 am   Time Out: 7:36  Total Billable Time: 34 minutes    Subjective     Pt reports: that she is having some pain in her shoulder, but feels like it is more stiff than anything.  She was compliant with home exercise program.  Response to previous treatment: last session was initial eval   Functional change: none at this time    Pain: 3/10  Location: left shoulder      Objective     Michela received therapeutic exercises to develop strength, endurance, ROM, and flexibility for 22 minutes including:    Table slides flexion 1x10 3"   Table slides scaption 1x10 3"  Scap retractions 2x10 3"  Bicep curls c/ wand 2x10   Wand flexion 10x   Wand ER @ neutral 10x     Michela received the following manual therapy techniques:  were applied to the: left shoulder for 12 minutes, including:  PROM all planes within pain free range       Home Exercises Provided and Patient Education Provided     Education provided:   - HEP    Written Home Exercises Provided: Patient instructed to cont prior HEP.  Exercises were reviewed and " Michela was able to demonstrate them prior to the end of the session.  Michela demonstrated good  understanding of the education provided.     See EMR under Patient Instructions for exercises provided prior visit.    Assessment     Initiated therex program following pt's initial evaluation with no adverse effects. Pt exhibited tolerance to all exercises performed this session. Minimal verbal cues provided for form, as well as assistance with positioning left shoulder. Performed gentle PROM in all planes, during IR and ER at neutral abduction. Pt with no indications of pain following session and plan to progress per her tolerance during upcoming sessions.     Michela Is progressing well towards her goals.   Pt prognosis is Good.     Pt will continue to benefit from skilled outpatient physical therapy to address the deficits listed in the problem list box on initial evaluation, provide pt/family education and to maximize pt's level of independence in the home and community environment.     Pt's spiritual, cultural and educational needs considered and pt agreeable to plan of care and goals.     Anticipated barriers to physical therapy: pain     Goals:   Short Term Goals: 6 weeks   Pt will be independent with HEP to supplement PT with decreasing pain and improving functional mobility  Pt will demo (L) shoulder AROM in all planes measured above = (R) shoulder shoulder AROM in order to be able to perform reaching tasks and ADLs with less difficulty     Long Term Goals: 12 weeks   Pt will improve FOTO score to </= 44% limited in order to demo improved functional mobility  Pt will demo (R) shoulder MMT scores at least 4+/5 grossly in order to demo good strength for functional tasks  Pt will perform (L) shoulder AROM in all planes measured above without c/o pain in order to be able to perform ADLs with less difficulty        Plan   Plan of care Certification: 11/11/2022 to 2/3/23.     Continue with current POC.     Jeanne  Kai, MIRANDA

## 2022-11-15 ENCOUNTER — CLINICAL SUPPORT (OUTPATIENT)
Dept: REHABILITATION | Facility: HOSPITAL | Age: 77
End: 2022-11-15
Payer: MEDICARE

## 2022-11-15 DIAGNOSIS — M25.512 LEFT SHOULDER PAIN, UNSPECIFIED CHRONICITY: Primary | ICD-10-CM

## 2022-11-15 DIAGNOSIS — M25.612 DECREASED RANGE OF MOTION OF LEFT SHOULDER: ICD-10-CM

## 2022-11-15 PROCEDURE — 97110 THERAPEUTIC EXERCISES: CPT | Mod: CQ

## 2022-11-15 PROCEDURE — 97140 MANUAL THERAPY 1/> REGIONS: CPT | Mod: CQ

## 2022-11-18 ENCOUNTER — CLINICAL SUPPORT (OUTPATIENT)
Dept: REHABILITATION | Facility: HOSPITAL | Age: 77
End: 2022-11-18
Payer: MEDICARE

## 2022-11-18 DIAGNOSIS — M25.512 LEFT SHOULDER PAIN, UNSPECIFIED CHRONICITY: Primary | ICD-10-CM

## 2022-11-18 DIAGNOSIS — M25.612 DECREASED RANGE OF MOTION OF LEFT SHOULDER: ICD-10-CM

## 2022-11-18 PROCEDURE — 97140 MANUAL THERAPY 1/> REGIONS: CPT

## 2022-11-18 PROCEDURE — 97110 THERAPEUTIC EXERCISES: CPT

## 2022-11-18 NOTE — PROGRESS NOTES
"  Physical Therapy Daily Treatment Note     Name: Michela Franco  Clinic Number: [id number]    Therapy Diagnosis:   Encounter Diagnoses   Name Primary?    Left shoulder pain, unspecified chronicity Yes    Decreased range of motion of left shoulder      Physician: Lulu Franks PA-C    Visit Date: 11/18/2022    Physician Orders: PT Eval and Treat: AROM and PROM shoulder  No strengthening until 10-12wk post-injury  Wean out of UE sling  Medical Diagnosis from Referral: S42.252D (ICD-10-CM) - Closed displaced fracture of greater tuberosity of left humerus with routine healing, subsequent encounter  Evaluation Date: 11/11/2022  Authorization Period Expiration: 11/9/23  Plan of Care Expiration: 2/3/23  Progress Note Due: 12/11/22  Visit # / Visits authorized: 1/ 1, 2/12  FOTO: 2/10    Time In: 7:01 am   Time Out: 7:43 am   Total Billable Time: 42 minutes    Precautions: Standard and HTN, osteoporosis, protocol (see above)     Subjective     Pt reports: that overall (L) shoulder is feeling better. Pt stated that she still has difficulty reaching with (L) shoulder. Pt stated that she does have more range than she had prior to PT.   She was compliant with home exercise program.  Response to previous treatment: no adverse effects  Functional change: pt reports improved range of motion in (L) shoulder     Pain: 5/10 (L) shoulder       Objective       Michela received therapeutic exercises to develop posture, endurance, ROM, and flexibility for 29 minutes including:  Pendulums x20 - circles cw/ccw, lateral,fwd  Table slides flexion 2x10 3"   Table slides scaption 2x10 3"  Scap retractions 2x10 3"  Bicep curls c/ wand 2x10 not performed  Wand flexion 2x10   Wand ER @ neutral 10x not performed    Michela received the following manual therapy techniques:  were applied to the: left shoulder for 13 minutes, including:  PROM  (L) shoulder flexion, abduction, ER at 0 deg abduction to pt's tolerance       Home Exercises Provided and " Patient Education Provided     Education provided:   - HEP    Written Home Exercises Provided: Patient instructed to cont prior HEP.   Michela demonstrated good  understanding of the education provided.     See EMR under Patient Instructions for exercises provided prior visit.      Assessment      Pt reported experiencing pain in (L) shoulder with lowering when performing wand flexion, therefore instructed pt to decrease range when lowering and pt was able to perform with decreased pain. Manual PROM was performed in pain free range to pt's tolerance.   Michela Is progressing well towards her goals.   Pt prognosis is Good.     Pt will continue to benefit from skilled outpatient physical therapy to address the deficits listed in the problem list box on initial evaluation, provide pt/family education and to maximize pt's level of independence in the home and community environment.     Pt's spiritual, cultural and educational needs considered and pt agreeable to plan of care and goals.    Anticipated barriers to physical therapy: pain     Goals:   Short Term Goals: 6 weeks   Pt will be independent with HEP to supplement PT with decreasing pain and improving functional mobility  Pt will demo (L) shoulder AROM in all planes measured above = (R) shoulder shoulder AROM in order to be able to perform reaching tasks and ADLs with less difficulty     Long Term Goals: 12 weeks   Pt will improve FOTO score to </= 44% limited in order to demo improved functional mobility  Pt will demo (R) shoulder MMT scores at least 4+/5 grossly in order to demo good strength for functional tasks  Pt will perform (L) shoulder AROM in all planes measured above without c/o pain in order to be able to perform ADLs with less difficulty     Plan     Continue per POC, progress as tolerated    Maryan Hernandez, PT

## 2022-11-22 ENCOUNTER — CLINICAL SUPPORT (OUTPATIENT)
Dept: REHABILITATION | Facility: HOSPITAL | Age: 77
End: 2022-11-22
Payer: MEDICARE

## 2022-11-22 DIAGNOSIS — M25.512 LEFT SHOULDER PAIN, UNSPECIFIED CHRONICITY: Primary | ICD-10-CM

## 2022-11-22 DIAGNOSIS — M25.612 DECREASED RANGE OF MOTION OF LEFT SHOULDER: ICD-10-CM

## 2022-11-22 PROCEDURE — 97110 THERAPEUTIC EXERCISES: CPT

## 2022-11-22 PROCEDURE — 97140 MANUAL THERAPY 1/> REGIONS: CPT

## 2022-11-22 NOTE — PROGRESS NOTES
"  Physical Therapy Daily Treatment Note     Name: Michela Franco  Clinic Number: [id number]    Therapy Diagnosis:   Encounter Diagnoses   Name Primary?    Left shoulder pain, unspecified chronicity Yes    Decreased range of motion of left shoulder      Physician: Lulu Franks PA-C    Visit Date: 11/22/2022    Physician Orders: PT Eval and Treat: AROM and PROM shoulder  No strengthening until 10-12wk post-injury  Wean out of UE sling  Medical Diagnosis from Referral: S42.252D (ICD-10-CM) - Closed displaced fracture of greater tuberosity of left humerus with routine healing, subsequent encounter  Evaluation Date: 11/11/2022  Authorization Period Expiration: 11/9/23  Plan of Care Expiration: 2/3/23  Progress Note Due: 12/11/22  Visit # / Visits authorized: 1/ 1, 3/12  FOTO: 3/10    Time In: 7:04 am   Time Out: 7:44 am   Total Billable Time: 40 minutes    Precautions:  Standard and HTN, osteoporosis, protocol (see above)     Subjective     Pt reports: that her (L) shoulder is feeling better and feels like range in (L) shoulder is better. Pt stated that (L) shoulder is feeling achy currently.   She was compliant with home exercise program.  Response to previous treatment: no adverse effects  Functional change: pt reports that range of motion in (L) shoulder is better    Pain: 5/10 (L) shoulder       Objective     Michela received therapeutic exercises to develop posture, endurance, ROM, and flexibility for 30 minutes including:  Pendulums x20 - circles cw/ccw, lateral,fwd  Table slides flexion 2x10 3"   Table slides scaption 2x10 3"  Table slides abduction 2x10 3"  Scap retractions 2x10 3"  SL (L) shoulder ER 2x10   Supine wand flexion 2x10 3"      Michela received the following manual therapy techniques:  were applied to the: left shoulder for 10 minutes, including:  PROM  (L) shoulder flexion, abduction, ER at 0 deg abduction to pt's tolerance     Home Exercises Provided and Patient Education Provided     Education " provided:   - HEP    Written Home Exercises Provided: Patient instructed to cont prior HEP.   Michela demonstrated good  understanding of the education provided.     See EMR under Patient Instructions for exercises provided 11/22/22        Assessment     Pt was able to tolerate addition of table slides abduction and SL ER today for improved (L) shoulder ROM. Again decreased range when lowering when performing supine wand flexion, and pt was able to perform without c/o increased pain when performed in decreased range. Manual PROM performed to pt's tolerance.   Michela Is progressing well towards her goals.   Pt prognosis is Good.     Pt will continue to benefit from skilled outpatient physical therapy to address the deficits listed in the problem list box on initial evaluation, provide pt/family education and to maximize pt's level of independence in the home and community environment.     Pt's spiritual, cultural and educational needs considered and pt agreeable to plan of care and goals.    Anticipated barriers to physical therapy: pain     Goals:   Short Term Goals: 6 weeks   Pt will be independent with HEP to supplement PT with decreasing pain and improving functional mobility  Pt will demo (L) shoulder AROM in all planes measured above = (R) shoulder shoulder AROM in order to be able to perform reaching tasks and ADLs with less difficulty     Long Term Goals: 12 weeks   Pt will improve FOTO score to </= 44% limited in order to demo improved functional mobility  Pt will demo (R) shoulder MMT scores at least 4+/5 grossly in order to demo good strength for functional tasks  Pt will perform (L) shoulder AROM in all planes measured above without c/o pain in order to be able to perform ADLs with less difficulty     Plan     Continue per POC, progress as tolerated    Maryan Hernandez, PT

## 2022-11-25 ENCOUNTER — CLINICAL SUPPORT (OUTPATIENT)
Dept: REHABILITATION | Facility: HOSPITAL | Age: 77
End: 2022-11-25
Payer: MEDICARE

## 2022-11-25 DIAGNOSIS — M25.612 DECREASED RANGE OF MOTION OF LEFT SHOULDER: ICD-10-CM

## 2022-11-25 DIAGNOSIS — M25.512 LEFT SHOULDER PAIN, UNSPECIFIED CHRONICITY: Primary | ICD-10-CM

## 2022-11-25 PROCEDURE — 97110 THERAPEUTIC EXERCISES: CPT

## 2022-11-25 PROCEDURE — 97140 MANUAL THERAPY 1/> REGIONS: CPT

## 2022-11-25 NOTE — PROGRESS NOTES
"  Physical Therapy Daily Treatment Note     Name: Michela Franco  Clinic Number: [id number]    Therapy Diagnosis:   Encounter Diagnoses   Name Primary?    Left shoulder pain, unspecified chronicity Yes    Decreased range of motion of left shoulder      Physician: Lulu Franks PA-C    Visit Date: 11/25/2022    Physician Orders: PT Eval and Treat: AROM and PROM shoulder  No strengthening until 10-12wk post-injury  Wean out of UE sling  Medical Diagnosis from Referral: S42.252D (ICD-10-CM) - Closed displaced fracture of greater tuberosity of left humerus with routine healing, subsequent encounter  Evaluation Date: 11/11/2022  Authorization Period Expiration: 11/9/23  Plan of Care Expiration: 2/3/23  Progress Note Due: 12/11/22  Visit # / Visits authorized: 1/ 1, 4/12  FOTO: 5/10    Time In: 7:46 am   Time Out: 8:22 am   Total Billable Time: 36 minutes    Precautions: Standard and HTN, osteoporosis, protocol (see above)     Subjective     Pt reports: (L) shoulder is feeling better. Pt state that with certain motions of her (L) shoulder she feels a rubbing in (L) shoulder.   She was compliant with home exercise program.  Response to previous treatment: no adverse effects  Functional change:  pt reports that range of motion in (L) shoulder is better    Pain: 5/10 (L) shoulder       Objective       Michela received therapeutic exercises to develop posture, endurance, ROM, and flexibility for 26 minutes including:  Pendulums x20 - circles cw/ccw, lateral,fwd - not performed  Table slides flexion 2x10 3"   Table slides scaption 2x10 3"  Table slides abduction 2x10 3"  Scap retractions 2x10 3"  SL (L) shoulder ER 2x10   Supine wand flexion 2x10 3" not performed      Michela received the following manual therapy techniques:  were applied to the: left shoulder for 10 minutes, including:  PROM  (L) shoulder flexion, abduction, ER at 0 deg abduction to pt's tolerance     Home Exercises Provided and Patient Education Provided "     Education provided:   - HEP    Written Home Exercises Provided: Patient instructed to cont prior HEP.   Michela demonstrated good  understanding of the education provided.     See EMR under Patient Instructions for exercises provided prior visit.      Assessment     Slightly shortened session today due to pt needing to leave early. Pt tolerated all therex without c/o increased pain. Manual PROM performed to pt's tolerance, improved range noted during manual PROM.   Michela Is progressing well towards her goals.   Pt prognosis is Good.     Pt will continue to benefit from skilled outpatient physical therapy to address the deficits listed in the problem list box on initial evaluation, provide pt/family education and to maximize pt's level of independence in the home and community environment.     Pt's spiritual, cultural and educational needs considered and pt agreeable to plan of care and goals.    Anticipated barriers to physical therapy: pain     Goals:   Short Term Goals: 6 weeks   Pt will be independent with HEP to supplement PT with decreasing pain and improving functional mobility  Pt will demo (L) shoulder AROM in all planes measured above = (R) shoulder shoulder AROM in order to be able to perform reaching tasks and ADLs with less difficulty     Long Term Goals: 12 weeks   Pt will improve FOTO score to </= 44% limited in order to demo improved functional mobility  Pt will demo (R) shoulder MMT scores at least 4+/5 grossly in order to demo good strength for functional tasks  Pt will perform (L) shoulder AROM in all planes measured above without c/o pain in order to be able to perform ADLs with less difficulty     Plan     Continue per POC, progress as tolerated    Maryan Hernandez, PT

## 2022-11-28 ENCOUNTER — DOCUMENTATION ONLY (OUTPATIENT)
Dept: ALLERGY | Facility: CLINIC | Age: 77
End: 2022-11-28
Payer: MEDICARE

## 2022-11-28 NOTE — PROGRESS NOTES
"Rec'd fax from [facility] showing status of where pt is in the re-enrollment process.  As of 11/18/22, Pt's status shows, "currently no action required from provider or patient."  Paperwork sent to be scanned.      "

## 2022-11-29 ENCOUNTER — CLINICAL SUPPORT (OUTPATIENT)
Dept: REHABILITATION | Facility: HOSPITAL | Age: 77
End: 2022-11-29
Payer: MEDICARE

## 2022-11-29 DIAGNOSIS — M25.512 LEFT SHOULDER PAIN, UNSPECIFIED CHRONICITY: Primary | ICD-10-CM

## 2022-11-29 DIAGNOSIS — M25.612 DECREASED RANGE OF MOTION OF LEFT SHOULDER: ICD-10-CM

## 2022-11-29 PROCEDURE — 97140 MANUAL THERAPY 1/> REGIONS: CPT

## 2022-11-29 PROCEDURE — 97110 THERAPEUTIC EXERCISES: CPT

## 2022-11-29 NOTE — PROGRESS NOTES
"  Physical Therapy Daily Treatment Note     Name: Michela Franco  Clinic Number: [id number]    Therapy Diagnosis:   Encounter Diagnoses   Name Primary?    Left shoulder pain, unspecified chronicity Yes    Decreased range of motion of left shoulder      Physician: Lulu Franks PA-C    Visit Date: 11/29/2022    Physician Orders: PT Eval and Treat: AROM and PROM shoulder  No strengthening until 10-12wk post-injury  Wean out of UE sling  Medical Diagnosis from Referral: S42.252D (ICD-10-CM) - Closed displaced fracture of greater tuberosity of left humerus with routine healing, subsequent encounter  Evaluation Date: 11/11/2022  Authorization Period Expiration: 11/9/23  Plan of Care Expiration: 2/3/23  Progress Note Due: 12/11/22  Visit # / Visits authorized: 1/ 1, 5/12  FOTO: 6/10    Time In: 7:04 am   Time Out: 7:43 am   Total Billable Time: 39 minutes    Precautions: Standard and HTN, osteoporosis, protocol (see above)     Subjective     Pt reports: that (L) shoulder is feeling better. Pt stated that it depends on which way she moves (L) shoulder.   She was compliant with home exercise program.  Response to previous treatment: no adverse effects  Functional change: pt reports that range of motion in (L) shoulder is better    Pain: 4/10 (L) shoulder      Objective     Michela received therapeutic exercises to develop posture, endurance, ROM, and flexibility for 27 minutes including:  Pendulums x20 - circles cw/ccw,fwd -  Table slides flexion 2x10 3"   Table slides scaption 2x10 3"  Table slides abduction 2x10 3" not performed  Scap retractions 2x10 3"  SL (L) shoulder ER 2x10   Supine wand flexion 2x10 3" not performed  Wall walks flexion 2x10 3"      Michela received the following manual therapy techniques:  were applied to the: left shoulder for 12 minutes, including:  PROM  (L) shoulder flexion, abduction, ER at 0 deg abduction, IR to pt's tolerance       Home Exercises Provided and Patient Education Provided "     Education provided:   - HEP    Written Home Exercises Provided: yes.  Exercises were reviewed and Michela was able to demonstrate them prior to the end of the session.  Michela demonstrated good  understanding of the education provided.     See EMR under Patient Instructions for exercises provided 11/29/2022.      Assessment     Pt was able to tolerate addition of wall walks today with VC to perform in range that did not increase pain. Manual PROM again performed to pt's tolerance, in range that did not increase pain. Pt tolerated therapy session without any adverse reactions.   Michela Is progressing well towards her goals.   Pt prognosis is Good.     Pt will continue to benefit from skilled outpatient physical therapy to address the deficits listed in the problem list box on initial evaluation, provide pt/family education and to maximize pt's level of independence in the home and community environment.     Pt's spiritual, cultural and educational needs considered and pt agreeable to plan of care and goals.    Anticipated barriers to physical therapy: pain     Goals:   Short Term Goals: 6 weeks   Pt will be independent with HEP to supplement PT with decreasing pain and improving functional mobility  Pt will demo (L) shoulder AROM in all planes measured above = (R) shoulder shoulder AROM in order to be able to perform reaching tasks and ADLs with less difficulty     Long Term Goals: 12 weeks   Pt will improve FOTO score to </= 44% limited in order to demo improved functional mobility  Pt will demo (R) shoulder MMT scores at least 4+/5 grossly in order to demo good strength for functional tasks  Pt will perform (L) shoulder AROM in all planes measured above without c/o pain in order to be able to perform ADLs with less difficulty    Plan     Continue per POC, progress as tolerated    Maryan Hernandez, PT

## 2022-12-02 ENCOUNTER — CLINICAL SUPPORT (OUTPATIENT)
Dept: REHABILITATION | Facility: HOSPITAL | Age: 77
End: 2022-12-02
Payer: MEDICARE

## 2022-12-02 DIAGNOSIS — M25.512 LEFT SHOULDER PAIN, UNSPECIFIED CHRONICITY: Primary | ICD-10-CM

## 2022-12-02 DIAGNOSIS — M25.612 DECREASED RANGE OF MOTION OF LEFT SHOULDER: ICD-10-CM

## 2022-12-02 PROCEDURE — 97110 THERAPEUTIC EXERCISES: CPT

## 2022-12-02 PROCEDURE — 97140 MANUAL THERAPY 1/> REGIONS: CPT

## 2022-12-02 NOTE — PROGRESS NOTES
"  Physical Therapy Daily Treatment Note     Name: Michela Franco  Clinic Number: [id number]    Therapy Diagnosis:   Encounter Diagnoses   Name Primary?    Left shoulder pain, unspecified chronicity Yes    Decreased range of motion of left shoulder      Physician: Lulu Franks PA-C    Visit Date: 12/2/2022    Physician Orders: PT Eval and Treat: AROM and PROM shoulder  No strengthening until 10-12wk post-injury  Wean out of UE sling  Medical Diagnosis from Referral: S42.252D (ICD-10-CM) - Closed displaced fracture of greater tuberosity of left humerus with routine healing, subsequent encounter  Evaluation Date: 11/11/2022  Authorization Period Expiration: 11/9/23  Plan of Care Expiration: 2/3/23  Progress Note Due: 12/11/22  Visit # / Visits authorized: 1/ 1, 5/12  FOTO: 6/10      Time In: 7:02 am   Time Out: 7:45 am   Total Billable Time: 43 minutes    Precautions: Standard and HTN, osteoporosis, protocol (see above)     Subjective     Pt reports: that she is not experiencing pain in (L) shoulder, just stiffness/aching.  She was compliant with home exercise program.  Response to previous treatment: no adverse effects  Functional change: improvements in (L) shoulder AROM    Pain: (L) shoulder see subjective     Objective     Michela received the following manual therapy techniques:  were applied to the: left shoulder for 8 minutes, including:  PROM  (L) shoulder flexion, ER at 0 deg and 45 deg abduction to pt's tolerance     Michela received therapeutic exercises to develop posture, endurance, ROM, and flexibility for 35 minutes including:  Pendulums x20 - circles cw/ccw,fwd -  Table slides flexion 2x15 3"   Table slides scaption 2x10 3"  Scap retractions 2x10 3" not performed  SL (L) shoulder ER 2x10   Supine wand flexion 2x10 3" not performed  Wall walks flexion 2x10 3"  Wand ER 2x10 3"    (L) shoulder AROM:   Flexion = 120 degrees  Abduction = 115 degrees  ER at 0 deg abd = 45 degrees    Home Exercises Provided " and Patient Education Provided     Education provided:   - HEP    Written Home Exercises Provided: yes.  Exercises were reviewed and Michela was able to demonstrate them prior to the end of the session.  Michela demonstrated good  understanding of the education provided.     See EMR under Patient Instructions for exercises provided 12/2/2022.      Assessment     Manual PROM again performed to pt's tolerance in pain free range. Pt demo good improvement in (L) shoulder AROM in all planes measured above compared to initial evaluation.   Michela Is progressing well towards her goals.   Pt prognosis is Good.     Pt will continue to benefit from skilled outpatient physical therapy to address the deficits listed in the problem list box on initial evaluation, provide pt/family education and to maximize pt's level of independence in the home and community environment.     Pt's spiritual, cultural and educational needs considered and pt agreeable to plan of care and goals.    Anticipated barriers to physical therapy: pain     Goals:   Short Term Goals: 6 weeks   Pt will be independent with HEP to supplement PT with decreasing pain and improving functional mobility  Pt will demo (L) shoulder AROM in all planes measured above = (R) shoulder shoulder AROM in order to be able to perform reaching tasks and ADLs with less difficulty     Long Term Goals: 12 weeks   Pt will improve FOTO score to </= 44% limited in order to demo improved functional mobility  Pt will demo (R) shoulder MMT scores at least 4+/5 grossly in order to demo good strength for functional tasks  Pt will perform (L) shoulder AROM in all planes measured above without c/o pain in order to be able to perform ADLs with less difficulty    Plan     Continue per POC, progress as tolerated, plan for pt to attend 1x/week and continue with HEP until pt's follow up with MD and is cleared to begin strengthening selin Hernandez, PT

## 2022-12-06 ENCOUNTER — CLINICAL SUPPORT (OUTPATIENT)
Dept: REHABILITATION | Facility: HOSPITAL | Age: 77
End: 2022-12-06
Payer: MEDICARE

## 2022-12-06 DIAGNOSIS — M25.512 LEFT SHOULDER PAIN, UNSPECIFIED CHRONICITY: Primary | ICD-10-CM

## 2022-12-06 DIAGNOSIS — M25.612 DECREASED RANGE OF MOTION OF LEFT SHOULDER: ICD-10-CM

## 2022-12-06 PROCEDURE — 97140 MANUAL THERAPY 1/> REGIONS: CPT

## 2022-12-06 PROCEDURE — 97110 THERAPEUTIC EXERCISES: CPT

## 2022-12-06 NOTE — PROGRESS NOTES
"  Physical Therapy Daily Treatment Note     Name: Michela Franco  Clinic Number: [id number]    Therapy Diagnosis:   Encounter Diagnoses   Name Primary?    Left shoulder pain, unspecified chronicity Yes    Decreased range of motion of left shoulder      Physician: Lulu Franks PA-C    Visit Date: 12/6/2022    Physician Orders: PT Eval and Treat: AROM and PROM shoulder  No strengthening until 10-12wk post-injury  Wean out of UE sling  Medical Diagnosis from Referral: S42.252D (ICD-10-CM) - Closed displaced fracture of greater tuberosity of left humerus with routine healing, subsequent encounter  Evaluation Date: 11/11/2022  Authorization Period Expiration: 11/9/23  Plan of Care Expiration: 2/3/23  Progress Note Due: 12/11/22  Visit # / Visits authorized: 1/ 1, 6/12  FOTO: 7/10    Time In: 7:00 am   Time Out: 7:49 am   Total Billable Time: 49 minutes    Precautions: Standard and HTN, osteoporosis, protocol (see above)     Subjective     Pt reports: that she no longer experiences "ouch" pain in (L) shoulder, now experiences more of a soreness/stiffness in (L) shoulder.  She was compliant with home exercise program.  Response to previous treatment: no adverse effects  Functional change: improved (L) shoulder AROM    Pain: 5/10 soreness/stiffness in (L) shoulder      Objective       Michela received the following manual therapy techniques:  were applied to the: left shoulder for 12 minutes, including:  PROM  (L) shoulder flexion, scaption,  ER at 0 deg, 45 deg and 90 deg abduction, IR  to pt's tolerance     Michela received therapeutic exercises to develop posture, endurance, ROM, and flexibility for 37 minutes including:  Pendulums x20 - circles cw/ccw,fwd -  Table slides flexion 2x15 3"   Table slides scaption 2x15 3"  Scap retractions 2x10 3" not performed  SL (L) shoulder ER 2x15  Supine wand flexion 2x10 3" not performed  Wall walks flexion and scaption 2x10 3" ea  Wand ER 2x10 3" ea at 45 deg and 90 deg " abduction        Home Exercises Provided and Patient Education Provided     Education provided:   - HEP - add wand ER at 90 deg abduction      Michela demonstrated good  understanding of the education provided.     See EMR under Patient Instructions for exercises provided prior visit.      Assessment     Pt was able to tolerate progression of (L) shoulder ROM therex noted above. Pt tolerated manual therapy without c/o increased pain.   Michela Is progressing well towards her goals.   Pt prognosis is Good.     Pt will continue to benefit from skilled outpatient physical therapy to address the deficits listed in the problem list box on initial evaluation, provide pt/family education and to maximize pt's level of independence in the home and community environment.     Pt's spiritual, cultural and educational needs considered and pt agreeable to plan of care and goals.    Anticipated barriers to physical therapy: pain     Goals:   Short Term Goals: 6 weeks   Pt will be independent with HEP to supplement PT with decreasing pain and improving functional mobility  Pt will demo (L) shoulder AROM in all planes measured above = (R) shoulder shoulder AROM in order to be able to perform reaching tasks and ADLs with less difficulty     Long Term Goals: 12 weeks   Pt will improve FOTO score to </= 44% limited in order to demo improved functional mobility  Pt will demo (R) shoulder MMT scores at least 4+/5 grossly in order to demo good strength for functional tasks  Pt will perform (L) shoulder AROM in all planes measured above without c/o pain in order to be able to perform ADLs with less difficulty      Plan     Continue per POC, progress as tolerated     Maryan Hernandez, PT

## 2022-12-07 ENCOUNTER — TELEPHONE (OUTPATIENT)
Dept: ALLERGY | Facility: CLINIC | Age: 77
End: 2022-12-07
Payer: MEDICARE

## 2022-12-07 NOTE — TELEPHONE ENCOUNTER
Pt. Was notified that appointment was scheduled incorrectly and on the wrong schedule. Pt. Was rescheduled on the correct schedule and added to the wait list.

## 2022-12-14 ENCOUNTER — OFFICE VISIT (OUTPATIENT)
Dept: OPTOMETRY | Facility: CLINIC | Age: 77
End: 2022-12-14
Payer: MEDICARE

## 2022-12-14 DIAGNOSIS — Z01.00 ROUTINE EYE EXAM: Primary | ICD-10-CM

## 2022-12-14 DIAGNOSIS — H40.013 OPEN ANGLE WITH BORDERLINE FINDINGS OF BOTH EYES: ICD-10-CM

## 2022-12-14 DIAGNOSIS — Z96.1 PSEUDOPHAKIA OF BOTH EYES: ICD-10-CM

## 2022-12-14 DIAGNOSIS — H52.4 PRESBYOPIA: ICD-10-CM

## 2022-12-14 PROCEDURE — 92015 PR REFRACTION: ICD-10-PCS | Mod: S$GLB,,, | Performed by: OPTOMETRIST

## 2022-12-14 PROCEDURE — 92014 COMPRE OPH EXAM EST PT 1/>: CPT | Mod: S$GLB,,, | Performed by: OPTOMETRIST

## 2022-12-14 PROCEDURE — 92014 PR EYE EXAM, EST PATIENT,COMPREHESV: ICD-10-PCS | Mod: S$GLB,,, | Performed by: OPTOMETRIST

## 2022-12-14 PROCEDURE — 99999 PR PBB SHADOW E&M-EST. PATIENT-LVL III: CPT | Mod: PBBFAC,,, | Performed by: OPTOMETRIST

## 2022-12-14 PROCEDURE — 92015 DETERMINE REFRACTIVE STATE: CPT | Mod: S$GLB,,, | Performed by: OPTOMETRIST

## 2022-12-14 PROCEDURE — 92133 CPTRZD OPH DX IMG PST SGM ON: CPT | Mod: S$GLB,,, | Performed by: OPTOMETRIST

## 2022-12-14 PROCEDURE — 92083 HUMPHREY VISUAL FIELD - OU - BOTH EYES: ICD-10-PCS | Mod: S$GLB,,, | Performed by: OPTOMETRIST

## 2022-12-14 PROCEDURE — 92133 POSTERIOR SEGMENT OCT OPTIC NERVE(OCULAR COHERENCE TOMOGRAPHY) - OU - BOTH EYES: ICD-10-PCS | Mod: S$GLB,,, | Performed by: OPTOMETRIST

## 2022-12-14 PROCEDURE — 92083 EXTENDED VISUAL FIELD XM: CPT | Mod: S$GLB,,, | Performed by: OPTOMETRIST

## 2022-12-14 PROCEDURE — 99999 PR PBB SHADOW E&M-EST. PATIENT-LVL III: ICD-10-PCS | Mod: PBBFAC,,, | Performed by: OPTOMETRIST

## 2022-12-14 NOTE — PROGRESS NOTES
HPI    Last eye exam was 10/29/21 with Dr. Milian.  Patient states OU feel tired a lot and sometimes are itchy. Vision seems   the same.  Patient denies diplopia, headaches, flashes/floaters, and pain.    Last edited by Belinda Zacarias MA on 12/14/2022  8:51 AM.            Assessment /Plan     For exam results, see Encounter Report.    Routine eye exam    Presbyopia    Pseudophakia of both eyes    Open angle with borderline findings of both eyes  -     OCT - Optic Nerve  -     Craig Visual Field - OU - Extended - Both Eyes          1-3.  Bifocal rx given.  Recommend Pataday QD OU and artificial tears as needed.  Retina flat and intact OU--no holes, tears, breaks, or RDs.    4.  All testing normal OU.  No signs of glaucoma today.  Eye health normal OU.  Monitor yearly.

## 2022-12-16 ENCOUNTER — CLINICAL SUPPORT (OUTPATIENT)
Dept: REHABILITATION | Facility: HOSPITAL | Age: 77
End: 2022-12-16
Payer: MEDICARE

## 2022-12-16 DIAGNOSIS — M25.612 DECREASED RANGE OF MOTION OF LEFT SHOULDER: ICD-10-CM

## 2022-12-16 DIAGNOSIS — M25.512 LEFT SHOULDER PAIN, UNSPECIFIED CHRONICITY: Primary | ICD-10-CM

## 2022-12-16 PROCEDURE — 97110 THERAPEUTIC EXERCISES: CPT

## 2022-12-16 PROCEDURE — 97140 MANUAL THERAPY 1/> REGIONS: CPT

## 2022-12-16 NOTE — PROGRESS NOTES
"  Physical Therapy Daily Treatment Note     Name: Michela Farnco  Clinic Number: [id number]    Therapy Diagnosis:   Encounter Diagnoses   Name Primary?    Left shoulder pain, unspecified chronicity Yes    Decreased range of motion of left shoulder      Physician: Lulu Franks PA-C    Visit Date: 12/16/2022    Physician Orders: PT Eval and Treat: AROM and PROM shoulder  No strengthening until 10-12wk post-injury  Wean out of UE sling  Medical Diagnosis from Referral: S42.252D (ICD-10-CM) - Closed displaced fracture of greater tuberosity of left humerus with routine healing, subsequent encounter  Evaluation Date: 11/11/2022  Authorization Period Expiration: 11/9/23  Plan of Care Expiration: 2/3/23  Progress Note Due: 12/11/22  Visit # / Visits authorized: 1/ 1, 7/12  FOTO: 8/10    Time In: 7:01 am   Time Out: 7:48 am   Total Billable Time: 47 minutes    Precautions: Standard and HTN, osteoporosis, protocol (see above)     Subjective     Pt reports: that (L) shoulder is feeling better. Pt stated that she experiences more of a stiffness/discomfort if she moves (L) shoulder certain ways than a pain.   She was compliant with home exercise program.  Response to previous treatment: no adverse effects  Functional change: improvements in (L) shoulder AROM     Pain: 0/10 (L) shoulder       Objective     Michela received the following manual therapy techniques:  were applied to the: left shoulder for 10 minutes, including:  PROM  (L) shoulder flexion, scaption,  ER at 45 deg and 90 deg abduction, IR  to pt's tolerance     Michela received objective measurements and therapeutic exercises to develop posture, endurance, ROM, and flexibility for 37 minutes including:  Pendulums x20 - circles cw/ccw,fwd -  Table slides flexion 2x15 3"   Table slides scaption 2x15 3"  Scap retractions 2x10 3" not performed  SL (L) shoulder ER 2x15 not performed   Supine wand flexion 2x10 3" not performed  Wall walks flexion and scaption 2x10 3" " "ea  Wand ER 2x10 3" ea at 45 deg and 90 deg abduction      (L) shoulder AROM:   Flexion = 130 degrees  scaption = 110 degrees  ER at 0 deg abd = 50 degrees    Home Exercises Provided and Patient Education Provided     Education provided:   -  HEP    Written Home Exercises Provided: Patient instructed to cont prior HEP.   Michela demonstrated good  understanding of the education provided.     See EMR under Patient Instructions for exercises provided prior visit.      Assessment     Manual PROM performed in pain free range to pt's tolerance. Updated measurements taken today and pt demo improved (L) shoulder flexion and (L) shoulder ER at 0 degrees abduction AROM compared to measurements taken on 12/2.   Michela Is progressing well towards her goals.   Pt prognosis is Good.     Pt will continue to benefit from skilled outpatient physical therapy to address the deficits listed in the problem list box on initial evaluation, provide pt/family education and to maximize pt's level of independence in the home and community environment.     Pt's spiritual, cultural and educational needs considered and pt agreeable to plan of care and goals.    Anticipated barriers to physical therapy: pain     Goals:   Short Term Goals: 6 weeks   Pt will be independent with HEP to supplement PT with decreasing pain and improving functional mobility  Pt will demo (L) shoulder AROM in all planes measured above = (R) shoulder shoulder AROM in order to be able to perform reaching tasks and ADLs with less difficulty     Long Term Goals: 12 weeks   Pt will improve FOTO score to </= 44% limited in order to demo improved functional mobility  Pt will demo (R) shoulder MMT scores at least 4+/5 grossly in order to demo good strength for functional tasks  Pt will perform (L) shoulder AROM in all planes measured above without c/o pain in order to be able to perform ADLs with less difficulty    Plan     Continue per POC, progress as tolerated     Maryan " David, PT

## 2022-12-21 ENCOUNTER — OFFICE VISIT (OUTPATIENT)
Dept: ORTHOPEDICS | Facility: CLINIC | Age: 77
End: 2022-12-21
Payer: MEDICARE

## 2022-12-21 ENCOUNTER — TELEPHONE (OUTPATIENT)
Dept: ORTHOPEDICS | Facility: CLINIC | Age: 77
End: 2022-12-21
Payer: MEDICARE

## 2022-12-21 VITALS — BODY MASS INDEX: 29.66 KG/M2 | WEIGHT: 178 LBS | HEIGHT: 65 IN

## 2022-12-21 DIAGNOSIS — M25.512 LEFT SHOULDER PAIN, UNSPECIFIED CHRONICITY: Primary | ICD-10-CM

## 2022-12-21 DIAGNOSIS — S42.92XD CLOSED FRACTURE OF LEFT SHOULDER WITH ROUTINE HEALING, SUBSEQUENT ENCOUNTER: Primary | ICD-10-CM

## 2022-12-21 DIAGNOSIS — S42.92XA CLOSED FRACTURE OF LEFT SHOULDER, INITIAL ENCOUNTER: ICD-10-CM

## 2022-12-21 PROCEDURE — 1157F ADVNC CARE PLAN IN RCRD: CPT | Mod: CPTII,S$GLB,, | Performed by: ORTHOPAEDIC SURGERY

## 2022-12-21 PROCEDURE — 3044F PR MOST RECENT HEMOGLOBIN A1C LEVEL <7.0%: ICD-10-PCS | Mod: CPTII,S$GLB,, | Performed by: ORTHOPAEDIC SURGERY

## 2022-12-21 PROCEDURE — 1125F AMNT PAIN NOTED PAIN PRSNT: CPT | Mod: CPTII,S$GLB,, | Performed by: ORTHOPAEDIC SURGERY

## 2022-12-21 PROCEDURE — 4010F PR ACE/ARB THEARPY RXD/TAKEN: ICD-10-PCS | Mod: CPTII,S$GLB,, | Performed by: ORTHOPAEDIC SURGERY

## 2022-12-21 PROCEDURE — 3288F FALL RISK ASSESSMENT DOCD: CPT | Mod: CPTII,S$GLB,, | Performed by: ORTHOPAEDIC SURGERY

## 2022-12-21 PROCEDURE — 1100F PR PT FALLS ASSESS DOC 2+ FALLS/FALL W/INJURY/YR: ICD-10-PCS | Mod: CPTII,S$GLB,, | Performed by: ORTHOPAEDIC SURGERY

## 2022-12-21 PROCEDURE — 99999 PR PBB SHADOW E&M-EST. PATIENT-LVL III: CPT | Mod: PBBFAC,,, | Performed by: ORTHOPAEDIC SURGERY

## 2022-12-21 PROCEDURE — 3044F HG A1C LEVEL LT 7.0%: CPT | Mod: CPTII,S$GLB,, | Performed by: ORTHOPAEDIC SURGERY

## 2022-12-21 PROCEDURE — 1125F PR PAIN SEVERITY QUANTIFIED, PAIN PRESENT: ICD-10-PCS | Mod: CPTII,S$GLB,, | Performed by: ORTHOPAEDIC SURGERY

## 2022-12-21 PROCEDURE — 99213 PR OFFICE/OUTPT VISIT, EST, LEVL III, 20-29 MIN: ICD-10-PCS | Mod: S$GLB,,, | Performed by: ORTHOPAEDIC SURGERY

## 2022-12-21 PROCEDURE — 1159F MED LIST DOCD IN RCRD: CPT | Mod: CPTII,S$GLB,, | Performed by: ORTHOPAEDIC SURGERY

## 2022-12-21 PROCEDURE — 99999 PR PBB SHADOW E&M-EST. PATIENT-LVL III: ICD-10-PCS | Mod: PBBFAC,,, | Performed by: ORTHOPAEDIC SURGERY

## 2022-12-21 PROCEDURE — 99213 OFFICE O/P EST LOW 20 MIN: CPT | Mod: S$GLB,,, | Performed by: ORTHOPAEDIC SURGERY

## 2022-12-21 PROCEDURE — 3288F PR FALLS RISK ASSESSMENT DOCUMENTED: ICD-10-PCS | Mod: CPTII,S$GLB,, | Performed by: ORTHOPAEDIC SURGERY

## 2022-12-21 PROCEDURE — 1157F PR ADVANCE CARE PLAN OR EQUIV PRESENT IN MEDICAL RECORD: ICD-10-PCS | Mod: CPTII,S$GLB,, | Performed by: ORTHOPAEDIC SURGERY

## 2022-12-21 PROCEDURE — 4010F ACE/ARB THERAPY RXD/TAKEN: CPT | Mod: CPTII,S$GLB,, | Performed by: ORTHOPAEDIC SURGERY

## 2022-12-21 PROCEDURE — 1159F PR MEDICATION LIST DOCUMENTED IN MEDICAL RECORD: ICD-10-PCS | Mod: CPTII,S$GLB,, | Performed by: ORTHOPAEDIC SURGERY

## 2022-12-21 PROCEDURE — 1100F PTFALLS ASSESS-DOCD GE2>/YR: CPT | Mod: CPTII,S$GLB,, | Performed by: ORTHOPAEDIC SURGERY

## 2022-12-21 RX ORDER — IBUPROFEN 600 MG/1
600 TABLET ORAL 2 TIMES DAILY WITH MEALS
Qty: 60 TABLET | Refills: 1 | Status: SHIPPED | OUTPATIENT
Start: 2022-12-21 | End: 2023-03-21

## 2022-12-21 NOTE — PROGRESS NOTES
Subjective:      Patient ID: Michela Franco is a 77 y.o. female.  Chief Complaint: Follow-up of the Left Shoulder      HPI  Michela Franco is a  77 y.o. female presenting today for follow up of left proximal humerus fracture.  She reports that she is now 2 months out from injury   She has been in therapy mainly for motion and has not started weights yet   Pain is minimal she does have stiffness however.    Review of patient's allergies indicates:   Allergen Reactions    Prednisone Other (See Comments)     Bone loss    Adhesive      PAPER TAPE    Diazepam Other (See Comments)     Nervous, jittery    Gabapentin      Nervous      Keppra [levetiracetam]      nervous    Mold      sneezing         Current Outpatient Medications   Medication Sig Dispense Refill    ADVAIR DISKUS 500-50 mcg/dose DsDv diskus inhaler INHALE 1 PUFF TWICE DAILY 1 each 5    albuterol (VENTOLIN HFA) 90 mcg/actuation inhaler Inhale 2 puffs into the lungs every 6 (six) hours as needed for Wheezing. Rescue 8 g 11    carboxymethylcellulose sodium (REFRESH TEARS) 0.5 % Drop Apply 1 drop to eye 3 (three) times daily as needed (dry eyes). 30 mL 11    cholecalciferol, vitamin D3, 10,000 unit Cap Take 360 mg by mouth once daily. Take 6 capsules (6,000 units total) by mouth once daily      COD LIVER OIL ORAL Take 1 tablet by mouth once daily.      dupilumab 300 mg/2 mL PnIj Inject 300 mg into the skin every 14 (fourteen) days. 4 mL 12    ferrous sulfate 325 (65 FE) MG EC tablet Take 1 tablet (325 mg total) by mouth 3 (three) times daily with meals. 270 tablet 3    fluocinonide (LIDEX) 0.05 % external solution Apply topically 2 (two) times daily. Prn scalp itch or pain 60 mL 0    fluticasone propionate (FLONASE) 50 mcg/actuation nasal spray 2 sprays (100 mcg total) by Each Nostril route 2 (two) times a day. 16 g 11    guaiFENesin (MUCINEX) 600 mg 12 hr tablet Take 1 tablet (600 mg total) by mouth 2 (two) times daily. 60 tablet 5    hydrocortisone 2.5 %  cream Apply topically 2 (two) times daily. Prn face rash.Stop using steroid topical when skin is smooth and non itchy.  Do not treat dark or red coloring. 45 g 0    milk thistle 150 mg Cap Take 1 capsule by mouth once daily. 90 each 3    multivitamin (THERAGRAN) per tablet Take 1 tablet by mouth once daily.      NIFEdipine (PROCARDIA-XL) 60 MG (OSM) 24 hr tablet TAKE 1 TABLET (60 MG TOTAL) BY MOUTH ONCE DAILY. REPLACES ISRADIPINE. 90 tablet 2    potassium chloride SA (K-DUR,KLOR-CON) 20 MEQ tablet Take 1 tablet (20 mEq total) by mouth 2 (two) times daily. 180 tablet 2    SENNA 8.6 mg tablet TAKE 2 TAB(S) BY MOUTH NIGHTLY AS NEEDED FOR CONSTIPATION      tretinoin (RETIN-A) 0.05 % cream Apply topically every evening. Start with every other night and move up to nightly after 2 weeks if not too dry. 20 g 2    valsartan (DIOVAN) 320 MG tablet Take 1 tablet (320 mg total) by mouth once daily. 14 tablet 0    azelastine (ASTELIN) 137 mcg (0.1 %) nasal spray 2 sprays (274 mcg total) by Nasal route 2 (two) times daily. 30 mL 11     No current facility-administered medications for this visit.       Past Medical History:   Diagnosis Date    Allergy     Asthma     Chronic diastolic heart failure 4/5/2018    Glaucoma suspect     Hyperlipidemia     Hypertension     Neuromuscular disorder     JOHN on CPAP     Osteoporosis     Recurrent sinusitis 3/25/2014    Recurrent upper respiratory infection (URI)     Urticaria        Past Surgical History:   Procedure Laterality Date    CATARACT EXTRACTION W/  INTRAOCULAR LENS IMPLANT Right 2/11/2021    Procedure: EXTRACTION, CATARACT, WITH IOL INSERTION;  Surgeon: John Merino MD;  Location: [facility] OR;  Service: Ophthalmology;  Laterality: Right;    CATARACT EXTRACTION W/  INTRAOCULAR LENS IMPLANT Left 3/4/2021    Procedure: EXTRACTION, CATARACT, WITH IOL INSERTION;  Surgeon: John Merino MD;  Location: [facility] OR;  Service: Ophthalmology;  Laterality: Left;    COLONOSCOPY N/A 5/18/2018     "Procedure: COLONOSCOPY;  Surgeon: Calixto Brian MD;  Location: [facility] ([address] [place] [address]);  Service: Endoscopy;  Laterality: N/A;    HYSTERECTOMY      total    OOPHORECTOMY      ROTATOR CUFF REPAIR Left     SINUS SURGERY         OBJECTIVE:   PHYSICAL EXAM:  Height: 5' 5" (165.1 cm) Weight: 80.7 kg (178 lb)  Vitals:    12/21/22 1339   Weight: 80.7 kg (178 lb)   Height: 5' 5" (1.651 m)   PainSc:   5     Ortho/SPM Exam  Examination left shoulder no tenderness no swelling  Range of motion about 80% of normal some limitation with elevation and abduction   There is some weakness of the rotator cuff   Neurologic exam intact    RADIOGRAPHS:  Neck alignment is acceptable  AP lateral x-ray left shoulder showed healed fracture of the proximal humerus involving the greater tuberosity but probably also across the surgical  Comments: I have personally reviewed the imaging and I agree with the above radiologist's report.    ASSESSMENT/PLAN:     IMPRESSION:  Left proximal humerus fracture healed    PLAN:  Recommended we continue therapy to work on a little bit more range of motion and then progress to strengthening with weights   She can also increase activities at home Advil or Motrin for pain    FOLLOW UP:  4-6 weeks    Disclaimer: This note has been generated using voice-recognition software. There may be typographical errors that have been missed during proof-reading.     "

## 2022-12-22 ENCOUNTER — TELEPHONE (OUTPATIENT)
Dept: DERMATOLOGY | Facility: CLINIC | Age: 77
End: 2022-12-22
Payer: MEDICARE

## 2022-12-22 NOTE — TELEPHONE ENCOUNTER
Scheduled patient appointment per message received in the Dermatology department. Patient acknowledges appointment made and confirms.        RD      ----- Message from Sally Miller MA sent at 12/20/2022  5:12 PM CST -----  Regarding: FW: sooner appt  Contact: pt  [phone]    ----- Message -----  From: Jc Wray MA  Sent: 12/20/2022   9:35 AM CST  To: Priscila Head  Subject: sooner appt                                      Patient Requesting Sooner Appointment.      Reason for sooner appt.: follow up and skin tags      When is the first available appointment? April 2023 patient is  requesting  something  sooner than April   Communication Preference   Additional Information:

## 2022-12-30 NOTE — PROGRESS NOTES
"  Physical Therapy Daily Treatment Note     Name: Michela Franco  Clinic Number: [id number]    Therapy Diagnosis:   Encounter Diagnoses   Name Primary?    Closed fracture of left shoulder with routine healing, subsequent encounter     Left shoulder pain, unspecified chronicity Yes    Decreased range of motion of left shoulder        Physician: Lulu Franks PA-C    Visit Date: 1/3/2023    Physician Orders: PT Eval and Treat: AROM and PROM shoulder  No strengthening until 10-12wk post-injury  Wean out of UE sling  Medical Diagnosis from Referral: S42.252D (ICD-10-CM) - Closed displaced fracture of greater tuberosity of left humerus with routine healing, subsequent encounter  Evaluation Date: 11/11/2022  Authorization Period Expiration: 11/9/23  Plan of Care Expiration: 2/3/23  Progress Note Due: 12/11/22  Visit # / Visits authorized: 1/ 1, 7/12, 1/20  FOTO: 9/10    Time In: 8:25 am   Time Out: 9:05 am   Total Billable Time: 40 minutes    Precautions: Standard and HTN, osteoporosis, protocol (see above)     Subjective     Pt reports: that she is feeling pretty well and would to continue to come once per week.   She was compliant with home exercise program.  Response to previous treatment: no adverse effects  Functional change: improvements in (L) shoulder AROM     Pain: 0/10 (L) shoulder       Objective     Michela received the following manual therapy techniques:  were applied to the: left shoulder for 8 minutes, including:  PROM  (L) shoulder flexion, scaption,  ER at 45 deg and 90 deg abduction, IR  to pt's tolerance     Michela received objective measurements and therapeutic exercises to develop posture, endurance, ROM, and flexibility for 32 minutes including:  Table slides flexion 2x15 3"   Table slides scaption 2x15 3"  SL (L) shoulder ER 2x10 1lb  Supine wand flexion 2x10 3"   Wand ER 2x10 3" ea at 45 deg and 90 deg abduction  Rows c/ RTB 2x10   IR/ER RTB 2x10    Bicep curls 3lb 2x10   Serratus punches c/ " "wand 2lb 2x10       Pendulums x20 - circles cw/ccw,fwd - Not performed   Wall walks flexion and scaption 2x10 3" ea Not performed     (L) shoulder AROM:   Flexion = 130 degrees  scaption = 110 degrees  ER at 0 deg abd = 50 degrees    Home Exercises Provided and Patient Education Provided     Education provided:   -  HEP    Written Home Exercises Provided: Patient instructed to cont prior HEP.   Michela demonstrated good  understanding of the education provided.     See EMR under Patient Instructions for exercises provided prior visit.      Assessment     Progressed pt's program this session with the addition of theraband resistive exercises, bicep curls and serratus punches. Also added weight to sidelying ER, during which patient noted fatigue, however no pain. Pt with no indications of pain during new or established therex this session. Continued with PROM in all planes within pt's tolerance.     Michela Is progressing well towards her goals.   Pt prognosis is Good.     Pt will continue to benefit from skilled outpatient physical therapy to address the deficits listed in the problem list box on initial evaluation, provide pt/family education and to maximize pt's level of independence in the home and community environment.     Pt's spiritual, cultural and educational needs considered and pt agreeable to plan of care and goals.    Anticipated barriers to physical therapy: pain     Goals:   Short Term Goals: 6 weeks   Pt will be independent with HEP to supplement PT with decreasing pain and improving functional mobility  Pt will demo (L) shoulder AROM in all planes measured above = (R) shoulder shoulder AROM in order to be able to perform reaching tasks and ADLs with less difficulty     Long Term Goals: 12 weeks   Pt will improve FOTO score to </= 44% limited in order to demo improved functional mobility  Pt will demo (R) shoulder MMT scores at least 4+/5 grossly in order to demo good strength for functional tasks  Pt " will perform (L) shoulder AROM in all planes measured above without c/o pain in order to be able to perform ADLs with less difficulty    Plan     Continue per POC, progress as tolerated     Jeanne Quinn PTA

## 2023-01-03 ENCOUNTER — CLINICAL SUPPORT (OUTPATIENT)
Dept: REHABILITATION | Facility: HOSPITAL | Age: 78
End: 2023-01-03
Payer: MEDICARE

## 2023-01-03 DIAGNOSIS — M25.512 LEFT SHOULDER PAIN, UNSPECIFIED CHRONICITY: Primary | ICD-10-CM

## 2023-01-03 DIAGNOSIS — S42.92XD CLOSED FRACTURE OF LEFT SHOULDER WITH ROUTINE HEALING, SUBSEQUENT ENCOUNTER: ICD-10-CM

## 2023-01-03 DIAGNOSIS — M25.612 DECREASED RANGE OF MOTION OF LEFT SHOULDER: ICD-10-CM

## 2023-01-03 PROCEDURE — 97140 MANUAL THERAPY 1/> REGIONS: CPT | Mod: HCNC,CQ

## 2023-01-03 PROCEDURE — 97110 THERAPEUTIC EXERCISES: CPT | Mod: HCNC,CQ

## 2023-01-10 ENCOUNTER — DOCUMENTATION ONLY (OUTPATIENT)
Dept: REHABILITATION | Facility: HOSPITAL | Age: 78
End: 2023-01-10
Payer: MEDICARE

## 2023-01-10 NOTE — PROGRESS NOTES
PT/PTA met face to face to discuss pt's treatment plan and progress towards established goals. Pt will be seen by a physical therapist minimally every 6th visit or every 30 days.    Jeanne Quinn PTA    Face to Face PTA Conference performed with Jeanne Quinn PTA regarding patient's current status, overall progress, and plan of care.    Maryan Hernandez, PT

## 2023-01-17 ENCOUNTER — DOCUMENTATION ONLY (OUTPATIENT)
Dept: ALLERGY | Facility: CLINIC | Age: 78
End: 2023-01-17
Payer: MEDICARE

## 2023-01-24 ENCOUNTER — TELEPHONE (OUTPATIENT)
Dept: ALLERGY | Facility: CLINIC | Age: 78
End: 2023-01-24
Payer: MEDICARE

## 2023-01-24 NOTE — TELEPHONE ENCOUNTER
----- Message from Shelley Dewitt MA sent at 1/24/2023 10:52 AM CST -----  Contact: [phone]  Pt is calling in regards to [employer]. She provided phone number for [employer] which is [phone]. She states to call her back [phone]

## 2023-01-26 ENCOUNTER — TELEPHONE (OUTPATIENT)
Dept: ALLERGY | Facility: CLINIC | Age: 78
End: 2023-01-26
Payer: MEDICARE

## 2023-01-26 NOTE — TELEPHONE ENCOUNTER
----- Message from Damaris Porras sent at 1/26/2023 11:18 AM CST -----  Contact: Pt  Pt is requesting a callback in regards to a new script for Medication:  Injection  Pt reached out a few days ago but pharmacy still don't have the request. Pt stated Rx need to be shipped out by next week.  Please adv pt.      Pharmacy:   [phone]      Confirmed contact below:   Contact Name:Michela Carmenrly  Phone Number: [phone]

## 2023-01-31 ENCOUNTER — OFFICE VISIT (OUTPATIENT)
Dept: ALLERGY | Facility: CLINIC | Age: 78
End: 2023-01-31
Payer: MEDICARE

## 2023-01-31 ENCOUNTER — SPECIALTY PHARMACY (OUTPATIENT)
Dept: PHARMACY | Facility: CLINIC | Age: 78
End: 2023-01-31
Payer: MEDICARE

## 2023-01-31 VITALS — OXYGEN SATURATION: 99 % | BODY MASS INDEX: 29.24 KG/M2 | HEIGHT: 65 IN | WEIGHT: 175.5 LBS | HEART RATE: 90 BPM

## 2023-01-31 DIAGNOSIS — J30.81 ALLERGIC RHINITIS DUE TO ANIMAL (CAT) (DOG) HAIR AND DANDER: Primary | ICD-10-CM

## 2023-01-31 DIAGNOSIS — J45.40 MODERATE PERSISTENT ASTHMA WITHOUT COMPLICATION: ICD-10-CM

## 2023-01-31 PROCEDURE — 1159F MED LIST DOCD IN RCRD: CPT | Mod: HCNC,CPTII,GC,S$GLB | Performed by: STUDENT IN AN ORGANIZED HEALTH CARE EDUCATION/TRAINING PROGRAM

## 2023-01-31 PROCEDURE — 99999 PR PBB SHADOW E&M-EST. PATIENT-LVL III: ICD-10-PCS | Mod: PBBFAC,HCNC,GC, | Performed by: STUDENT IN AN ORGANIZED HEALTH CARE EDUCATION/TRAINING PROGRAM

## 2023-01-31 PROCEDURE — 99499 RISK ADDL DX/OHS AUDIT: ICD-10-PCS | Mod: HCNC,S$GLB,, | Performed by: STUDENT IN AN ORGANIZED HEALTH CARE EDUCATION/TRAINING PROGRAM

## 2023-01-31 PROCEDURE — 1157F PR ADVANCE CARE PLAN OR EQUIV PRESENT IN MEDICAL RECORD: ICD-10-PCS | Mod: HCNC,CPTII,GC,S$GLB | Performed by: STUDENT IN AN ORGANIZED HEALTH CARE EDUCATION/TRAINING PROGRAM

## 2023-01-31 PROCEDURE — 99214 PR OFFICE/OUTPT VISIT, EST, LEVL IV, 30-39 MIN: ICD-10-PCS | Mod: HCNC,GC,S$GLB, | Performed by: STUDENT IN AN ORGANIZED HEALTH CARE EDUCATION/TRAINING PROGRAM

## 2023-01-31 PROCEDURE — 1159F PR MEDICATION LIST DOCUMENTED IN MEDICAL RECORD: ICD-10-PCS | Mod: HCNC,CPTII,GC,S$GLB | Performed by: STUDENT IN AN ORGANIZED HEALTH CARE EDUCATION/TRAINING PROGRAM

## 2023-01-31 PROCEDURE — 99214 OFFICE O/P EST MOD 30 MIN: CPT | Mod: HCNC,GC,S$GLB, | Performed by: STUDENT IN AN ORGANIZED HEALTH CARE EDUCATION/TRAINING PROGRAM

## 2023-01-31 PROCEDURE — 1157F ADVNC CARE PLAN IN RCRD: CPT | Mod: HCNC,CPTII,GC,S$GLB | Performed by: STUDENT IN AN ORGANIZED HEALTH CARE EDUCATION/TRAINING PROGRAM

## 2023-01-31 PROCEDURE — 1126F AMNT PAIN NOTED NONE PRSNT: CPT | Mod: HCNC,CPTII,GC,S$GLB | Performed by: STUDENT IN AN ORGANIZED HEALTH CARE EDUCATION/TRAINING PROGRAM

## 2023-01-31 PROCEDURE — 1126F PR PAIN SEVERITY QUANTIFIED, NO PAIN PRESENT: ICD-10-PCS | Mod: HCNC,CPTII,GC,S$GLB | Performed by: STUDENT IN AN ORGANIZED HEALTH CARE EDUCATION/TRAINING PROGRAM

## 2023-01-31 PROCEDURE — 99999 PR PBB SHADOW E&M-EST. PATIENT-LVL III: CPT | Mod: PBBFAC,HCNC,GC, | Performed by: STUDENT IN AN ORGANIZED HEALTH CARE EDUCATION/TRAINING PROGRAM

## 2023-01-31 PROCEDURE — 99499 UNLISTED E&M SERVICE: CPT | Mod: HCNC,S$GLB,, | Performed by: STUDENT IN AN ORGANIZED HEALTH CARE EDUCATION/TRAINING PROGRAM

## 2023-01-31 RX ORDER — IBUPROFEN 400 MG/1
400 TABLET ORAL EVERY 6 HOURS PRN
Status: ON HOLD | COMMUNITY
Start: 2022-10-13 | End: 2023-07-21 | Stop reason: HOSPADM

## 2023-01-31 RX ORDER — FLUTICASONE PROPIONATE 50 MCG
2 SPRAY, SUSPENSION (ML) NASAL 2 TIMES DAILY
Qty: 16 G | Refills: 11 | Status: SHIPPED | OUTPATIENT
Start: 2023-01-31 | End: 2023-03-13 | Stop reason: SDUPTHER

## 2023-01-31 RX ORDER — AZELASTINE 1 MG/ML
2 SPRAY, METERED NASAL 2 TIMES DAILY
Qty: 30 ML | Refills: 11 | Status: SHIPPED | OUTPATIENT
Start: 2023-01-31 | End: 2023-06-27 | Stop reason: SDUPTHER

## 2023-01-31 RX ORDER — FLUTICASONE PROPIONATE AND SALMETEROL 50; 500 UG/1; UG/1
POWDER RESPIRATORY (INHALATION)
Qty: 1 EACH | Refills: 11 | Status: SHIPPED | OUTPATIENT
Start: 2023-01-31 | End: 2023-06-27 | Stop reason: SDUPTHER

## 2023-01-31 RX ORDER — CARBOXYMETHYLCELLULOSE SODIUM 5 MG/ML
1 SOLUTION/ DROPS OPHTHALMIC 3 TIMES DAILY PRN
Qty: 30 ML | Refills: 11 | Status: SHIPPED | OUTPATIENT
Start: 2023-01-31

## 2023-01-31 RX ORDER — ALBUTEROL SULFATE 90 UG/1
2 AEROSOL, METERED RESPIRATORY (INHALATION) EVERY 6 HOURS PRN
Qty: 8 G | Refills: 11 | Status: SHIPPED | OUTPATIENT
Start: 2023-01-31 | End: 2023-06-27 | Stop reason: SDUPTHER

## 2023-01-31 NOTE — PROGRESS NOTES
"ALLERGY & IMMUNOLOGY CLINIC - Follow up     HISTORY OF PRESENT ILLNESS     Patient ID: Michela Franco is a 77 y.o. female    CC: "follow up for asthma"    HPI: Ms. Michela Franco is a 76 year old female with asthma, allergic rhinitis, and chronic pansinusitis who presents to clinic today for follow up.     Severe persistant asthma: She notes dramatic improvement in symptoms since starting Dupixent. Prior to starting dupixent, she was on Xolair however she continued to have symptoms so she was switched to Dupixent. Her last dose of dupixent was on 1/22 and she is doing self injections every two weeks at home. She denies issues with injections. She hasn't had an asthma exacerbation since starting dupixent. She denies waking up at night short of breath. She continues to use her CPAP.  She is prescribed advair daily (uses as needed, maybe once per week) and albuterol which she is using as needed -- approximately once per week as well. Before starting dupixent, she was having asthma exacerbations about every two weeks and was chronically requiring prednisone. ACT Score 23 today. Requesting refills on all meds. Needs a prescription for Dupixent (next dose Sunday). [facility] pharmacy [phone] ext [phone].        Allergic rhinitis: Feels like she is often congested but improved from prior. Notices flares when the weather changes, especially when it gets colder. No chest congestion like she used to have. 1 spray each nostril BID Flonase but feels like it clogs her nose, she then blows her nose and uses the nasal saline wash. Uses azalastine occasionally. Has air purifier, dust mite covers on bed, no pets in the house. Works outside in the garden often which can cause more flares of her rhinitis. She notes occasional post nasal drip.     Chronic pansinusitis: Has not required antibiotics since prior to last visit.      REVIEW OF SYSTEMS     Review of Systems   Constitutional:  Negative for chills and fever.   HENT:  Positive " for congestion. Negative for ear pain and sore throat.    Eyes:  Negative for pain and redness.   Respiratory:  Negative for cough, hemoptysis, sputum production, shortness of breath and wheezing.    Gastrointestinal:  Negative for abdominal pain, constipation, diarrhea, heartburn, nausea and vomiting.   Skin:  Negative for itching and rash.      MEDICAL HISTORY     MedHx: active problems reviewed  SurgHx:   Past Surgical History:   Procedure Laterality Date    CATARACT EXTRACTION W/  INTRAOCULAR LENS IMPLANT Right 2/11/2021    Procedure: EXTRACTION, CATARACT, WITH IOL INSERTION;  Surgeon: John Merino MD;  Location: [facility];  Service: Ophthalmology;  Laterality: Right;    CATARACT EXTRACTION W/  INTRAOCULAR LENS IMPLANT Left 3/4/2021    Procedure: EXTRACTION, CATARACT, WITH IOL INSERTION;  Surgeon: John Merino MD;  Location: [facility];  Service: Ophthalmology;  Laterality: Left;    COLONOSCOPY N/A 5/18/2018    Procedure: COLONOSCOPY;  Surgeon: Calixto Brian MD;  Location: [address];  Service: Endoscopy;  Laterality: N/A;    HYSTERECTOMY      total    OOPHORECTOMY      ROTATOR CUFF REPAIR Left     SINUS SURGERY         Allergies: see below  Medications: MAR reviewed     PHYSICAL EXAM     Physical Exam  Constitutional:       Appearance: Normal appearance.   HENT:      Head: Normocephalic and atraumatic.      Right Ear: Tympanic membrane, ear canal and external ear normal.      Left Ear: Tympanic membrane, ear canal and external ear normal.      Nose:      Comments: 3+ pale turbinates     Mouth/Throat:      Mouth: Mucous membranes are moist.   Eyes:      Conjunctiva/sclera: Conjunctivae normal.      Pupils: Pupils are equal, round, and reactive to light.   Pulmonary:      Effort: Pulmonary effort is normal. No respiratory distress.      Breath sounds: Normal breath sounds. No wheezing or rales.   Skin:     General: Skin is warm and dry.      Findings: No rash.   Neurological:      Mental Status: She  is alert.      LABORATORY STUDIES     Component      Latest Ref Rng & Units 8/26/2021   WBC      3.90 - 12.70 K/uL 5.35   RBC      4.00 - 5.40 M/uL 4.41   Hemoglobin      12.0 - 16.0 g/dL 12.6   Hematocrit      37.0 - 48.5 % 38.2   MCV      82 - 98 fL 87   MCH      27.0 - 31.0 pg 28.6   MCHC      32.0 - 36.0 g/dL 33.0   RDW      11.5 - 14.5 % 16.4 (H)   Platelets      150 - 450 K/uL 298   MPV      9.2 - 12.9 fL 10.6   Immature Granulocytes      0.0 - 0.5 % 0.2   Gran # (ANC)      1.8 - 7.7 K/uL 2.4   Immature Grans (Abs)      0.00 - 0.04 K/uL 0.01   Lymph #      1.0 - 4.8 K/uL 1.9   Mono #      0.3 - 1.0 K/uL 0.6   Eos #      0.0 - 0.5 K/uL 0.4   Baso #      0.00 - 0.20 K/uL 0.06   nRBC      0 /100 WBC 0   Gran %      38.0 - 73.0 % 45.4   Lymph %      18.0 - 48.0 % 35.0   Mono %      4.0 - 15.0 % 11.6   Eosinophil %      0.0 - 8.0 % 6.7   Basophil %      0.0 - 1.9 % 1.1   Differential Method       Automated        ALLERGEN TESTING     Skin Prick: none    Immunocaps:   Component      Latest Ref Rng & Units 12/18/2019   Allergen Acremonium (Cephalosporium) IgE      <0.10 kU/L <0.10   Allergen Acremonium (Cephalosporium) Class       CLASS 0   Botrytis Cinerea      <0.10 kU/L <0.10   Botrytis Cinerea Class       CLASS 0   Allergen Candida albicans IgE      <0.10 kU/L 0.68 (H)   Allergen Candida albicans Class       CLASS 1   Chaetomium      <0.10 kU/L <0.10   Chaetomium Glob. Class       CLASS 0   Cladosporium, IgE      <0.10 kU/L <0.10   Cladosporium Class       CLASS 0   Curvularia lunata      <0.10 kU/L <0.10   Curvularia Lunata Class       CLASS 0   Epicoccum purpurascens, IgE      <0.10 kU/L <0.10   Epicoccum pupurascens Class       CLASS 0   Helminthosporium Halodes IgE      <0.10 kU/L <0.10   Helminthosporium Class       CLASS 0   Allergen Trichoderma Viride IgE      <0.10 kU/L <0.10   Allergen Trichoderma Viride Class       CLASS 0   Stemphyllium, IgE      <0.10 kU/L <0.10   Stemphylium Herbarum Class        CLASS 0   Allergen Rhodotorula IgE      <0.35 kU/L <0.35   Rhodotorula , IgE Class       0   Rhizopus Nigrican, IgE      <0.10 kU/L 0.31 (H)   Rhizopus Nigricans Class       CLASS 0/1   Phoma betae      <0.10 kU/L <0.10   Phoma Betae Class       CLASS 0   Penicillium, IgE      <0.10 kU/L 0.17 (H)   Penicillium Class       CLASS 0/1   Mucor racemosus, IgE      <0.10 kU/L 0.34 (H)   Mucor racemosus Class       CLASS 0/1   RAST Allergen for Trichophyton rubrum      kU/L <0.35      Component      Latest Ref Rng & Units 12/18/2019 12/18/2019 12/18/2019           3:07 PM  3:07 PM  3:07 PM   D. farinae      <0.10 kU/L     1.94 (H)   D. farinae Class           CLASS 2   Mite Dust Pteronyssinus IgE      <0.10 kU/L     0.19 (H)   D. pteronyssinus Class           CLASS 0/1   BERMUDA GRASS      <0.10 kU/L     0.71 (H)   Bermuda Grass Class           CLASS 2   Joshua Grass      <0.10 kU/L     0.72 (H)   Joshua Grass Class           CLASS 2   [place] IgE      <0.10 kU/L     0.17 (H)   [place] Class           CLASS 0/1   Plantain      <0.10 kU/L     0.63 (H)   English Plantain Class           CLASS 1   White Oak(Quercus alba) IgE      <0.10 kU/L     0.74 (H)   [place], Class           CLASS 2   Pecan Bristow Tree      <0.10 kU/L     0.47 (H)   Pecan, Class           CLASS 1   Marshelder IgE      <0.10 kU/L     0.59 (H)   Marshelder Class           CLASS 1   Ragweed, Western IgE      <0.10 kU/L     0.54 (H)   Ragweed, Western, Class           CLASS 1   Alternaria alternata      <0.10 kU/L     <0.10   Altern. alternata Class           CLASS 0   Aspergillus Fumigatus IgE      <0.10 kU/L     0.22 (H)   A. fumigatus Class           CLASS 0/1   Cat Dander      <0.10 kU/L     <0.10   Cat Epithelium Class           CLASS 0   Cockroach, IgE      <0.10 kU/L CLASS 0/1 0.28 (H)     Dog Dander, IgE      <0.10 kU/L     0.51 (H)   Dog Dander Class           CLASS 1      Component      Latest Ref Rng & Units 12/18/2019 12/29/2017 10/30/2017 4/8/2014    Aspergillus Fumigatus IgE      <0.10 kU/L 0.22 (H)   <0.35 <0.35   A. fumigatus Class       CLASS 0/1   CLASS 0 CLASS 0   Aspergillus Antigen      <0.5 index   <0.500 0.676 (A)        Component      Latest Ref Rng & Units 2/20/2014   Aspergillus Fumigatus IgE      <0.10 kU/L 0.36 (H)   A. fumigatus Class       CLASS I   Aspergillus Antigen      <0.5 index            PULMONARY FUNCTION   PFTs:     11/26/2021:   Pre:   Post:  FEV1: 1.49 (82.8%)--> 1.56 (+4.1%)  FVC:   2.22  (95%)----> 2.30 (+3.6%)  Ratio:  67.37%      ----> 67.77%         5/28/2018  FVC: 2.30 (78%)  FEV1: 1.62 (70%)  Ratio: 70  No reversibility post bronchodilator.     Also performed 12/2019 in pulmonary clinic     IMAGING & OTHER DIAGNOSTICS     No new chest imaging     CHART REVIEW     Reviewed past allergy/immunology notes     ASSESSMENT & PLAN     Michela Franco is a 77 y.o. female with     Moderate persistent asthma without complication: Well controlled and without exacerbations since starting dupixant. Patient failed Xoliar in the past. Prior to control with dupixant, she was requiring multiple courses of PO prednisone leading to mulitple side effects.    -Continue dupilumab 300 mg subq every 14 days  -Continue advair 500-50 mcg/dose 1 puff BID  -Continue albuterol as needed for SOB    Allergic rhinitis: Continues to have congestion but overall improved  -Increase fluticasone to 2 SEN BID  -Continue azelastine up to SEN BID  -Use saline prior to fluticasone use, not after    Eosinophilia, unspecified type: Last eosinophil count wnl  - Continue to monitor  - Repeat CBC at next visit    Chronic pansinusitis: Symptoms controlled.  -Continue to monitor    Patient seen and discussed with Dr. Geller      Follow up: 6 months or sooner if needed    Note written with help from Sherri Alvarenga MD.    Lidya Bobby MD  Allergy/Immunology Fellow

## 2023-01-31 NOTE — TELEPHONE ENCOUNTER
Dupixent PA submitted 1/31/23  CMM Key: [id number]    Hello, this is Greg Malik with [facility].  We are working on your prescription that your doctor has sent us. We will be working with your insurance to get this approved for you. We will be calling you along the way with updates on your medication.  If you have any questions, you can reach us at [phone].    Welcome call outcome: Patient/caregiver reached    Patient states she is currently receiving Dupixent through PAP and is already re-approved for 2023. [facility] is just waiting on a new Rx. Will route once PA is approved.

## 2023-02-01 DIAGNOSIS — J45.40 MODERATE PERSISTENT ASTHMA WITHOUT COMPLICATION: ICD-10-CM

## 2023-02-01 NOTE — TELEPHONE ENCOUNTER
Dupixent PA approved 1/1/23 to 12/31/23  Case ID: [id number]    Will follow up with PAP program to make sure patient is approved.

## 2023-02-02 DIAGNOSIS — I10 HYPERTENSION, UNSPECIFIED TYPE: ICD-10-CM

## 2023-02-02 RX ORDER — VALSARTAN 320 MG/1
320 TABLET ORAL DAILY
Qty: 90 TABLET | Refills: 3 | Status: SHIPPED | OUTPATIENT
Start: 2023-02-02 | End: 2023-03-27 | Stop reason: SDUPTHER

## 2023-02-02 NOTE — TELEPHONE ENCOUNTER
----- Message from Yuko Arroyo sent at 2/2/2023  8:28 AM CST -----  Contact: [phone]  Requesting an RX refill or new RX.  Is this a refill or new RX: Refill 1  RX name and strength (copy/paste from chart):  valsartan (DIOVAN) 320 MG tablet  Is this a 30 day or 90 day RX:   Pharmacy name and phone # (copy/paste from chart):  [facility] - [facility] [address] Phone:  [phone]  Fax:  [phone]        Requesting an RX refill or new RX.  Is this a refill or new RX: Refill 2  RX name and strength (copy/paste from chart): NIFEdipine (PROCARDIA-XL) 60 MG (OSM) 24 hr tablet  Is this a 30 day or 90 day RX:   Pharmacy name and phone # (copy/paste from chart):  [facility] - [facility] [address] [facility] Phone:  [phone]  Fax:  [phone]            The doctors have asked that we provide their patients with the following 2 reminders -- prescription refills can take up to 72 hours, and a friendly reminder that in the future you can use your [facility] account to request refills:       Patient stated that her phamracy has been requesting refill but has not been refill. thank you

## 2023-02-03 NOTE — TELEPHONE ENCOUNTER
Called Dupixent [employer] and spoke with Cassandra keyes, who confirmed patient is renewed for the PAP program effective 2/3/23 to 12/31/23. She will fax over approval letter. Patient informed. Closing referral at [facility].

## 2023-02-06 ENCOUNTER — OFFICE VISIT (OUTPATIENT)
Dept: DERMATOLOGY | Facility: CLINIC | Age: 78
End: 2023-02-06
Payer: MEDICARE

## 2023-02-06 DIAGNOSIS — L29.9 SCALP ITCH: ICD-10-CM

## 2023-02-06 DIAGNOSIS — E61.1 LOW IRON: Primary | ICD-10-CM

## 2023-02-06 DIAGNOSIS — L82.1 SEBORRHEIC KERATOSIS: ICD-10-CM

## 2023-02-06 DIAGNOSIS — L21.9 SEBORRHEA: ICD-10-CM

## 2023-02-06 DIAGNOSIS — L65.9 ALOPECIA: ICD-10-CM

## 2023-02-06 DIAGNOSIS — L21.9 SEBORRHEIC DERMATITIS: ICD-10-CM

## 2023-02-06 PROCEDURE — 3288F FALL RISK ASSESSMENT DOCD: CPT | Mod: HCNC,CPTII,S$GLB, | Performed by: DERMATOLOGY

## 2023-02-06 PROCEDURE — 99999 PR PBB SHADOW E&M-EST. PATIENT-LVL III: ICD-10-PCS | Mod: PBBFAC,HCNC,, | Performed by: DERMATOLOGY

## 2023-02-06 PROCEDURE — 99214 PR OFFICE/OUTPT VISIT, EST, LEVL IV, 30-39 MIN: ICD-10-PCS | Mod: HCNC,S$GLB,, | Performed by: DERMATOLOGY

## 2023-02-06 PROCEDURE — 1159F MED LIST DOCD IN RCRD: CPT | Mod: HCNC,CPTII,S$GLB, | Performed by: DERMATOLOGY

## 2023-02-06 PROCEDURE — 1160F RVW MEDS BY RX/DR IN RCRD: CPT | Mod: HCNC,CPTII,S$GLB, | Performed by: DERMATOLOGY

## 2023-02-06 PROCEDURE — 1157F ADVNC CARE PLAN IN RCRD: CPT | Mod: HCNC,CPTII,S$GLB, | Performed by: DERMATOLOGY

## 2023-02-06 PROCEDURE — 1126F PR PAIN SEVERITY QUANTIFIED, NO PAIN PRESENT: ICD-10-PCS | Mod: HCNC,CPTII,S$GLB, | Performed by: DERMATOLOGY

## 2023-02-06 PROCEDURE — 1101F PT FALLS ASSESS-DOCD LE1/YR: CPT | Mod: HCNC,CPTII,S$GLB, | Performed by: DERMATOLOGY

## 2023-02-06 PROCEDURE — 1160F PR REVIEW ALL MEDS BY PRESCRIBER/CLIN PHARMACIST DOCUMENTED: ICD-10-PCS | Mod: HCNC,CPTII,S$GLB, | Performed by: DERMATOLOGY

## 2023-02-06 PROCEDURE — 99999 PR PBB SHADOW E&M-EST. PATIENT-LVL III: CPT | Mod: PBBFAC,HCNC,, | Performed by: DERMATOLOGY

## 2023-02-06 PROCEDURE — 1157F PR ADVANCE CARE PLAN OR EQUIV PRESENT IN MEDICAL RECORD: ICD-10-PCS | Mod: HCNC,CPTII,S$GLB, | Performed by: DERMATOLOGY

## 2023-02-06 PROCEDURE — 1101F PR PT FALLS ASSESS DOC 0-1 FALLS W/OUT INJ PAST YR: ICD-10-PCS | Mod: HCNC,CPTII,S$GLB, | Performed by: DERMATOLOGY

## 2023-02-06 PROCEDURE — 99214 OFFICE O/P EST MOD 30 MIN: CPT | Mod: HCNC,S$GLB,, | Performed by: DERMATOLOGY

## 2023-02-06 PROCEDURE — 3288F PR FALLS RISK ASSESSMENT DOCUMENTED: ICD-10-PCS | Mod: HCNC,CPTII,S$GLB, | Performed by: DERMATOLOGY

## 2023-02-06 PROCEDURE — 1159F PR MEDICATION LIST DOCUMENTED IN MEDICAL RECORD: ICD-10-PCS | Mod: HCNC,CPTII,S$GLB, | Performed by: DERMATOLOGY

## 2023-02-06 PROCEDURE — 1126F AMNT PAIN NOTED NONE PRSNT: CPT | Mod: HCNC,CPTII,S$GLB, | Performed by: DERMATOLOGY

## 2023-02-06 RX ORDER — FLUOCINONIDE TOPICAL SOLUTION USP, 0.05% 0.5 MG/ML
SOLUTION TOPICAL 2 TIMES DAILY
Qty: 60 ML | Refills: 2 | Status: ON HOLD | OUTPATIENT
Start: 2023-02-06 | End: 2023-07-21 | Stop reason: HOSPADM

## 2023-02-06 NOTE — PROGRESS NOTES
Subjective:       Patient ID:  Michela Franco is a 77 y.o. female who presents for   Chief Complaint   Patient presents with    Hair Loss     Follow up, improving    Skin Tags       Hair Loss    Review of Systems   Constitutional:  Negative for fever, chills and fatigue.   HENT:  Negative for sore throat.    Respiratory:  Negative for cough.    Skin:  Positive for daily sunscreen use and activity-related sunscreen use. Negative for recent sunburn.   Hematologic/Lymphatic: Bruises/bleeds easily.      Objective:    Physical Exam   Constitutional: She appears well-developed and well-nourished.   Eyes: No conjunctival no injection.   Neurological: She is alert and oriented to person, place, and time.   Psychiatric: She has a normal mood and affect.   Skin:   Areas Examined (abnormalities noted in diagram):   Scalp / Hair Palpated and Inspected  Head / Face Inspection Performed            Diagram Legend     Erythematous scaling macule/papule c/w actinic keratosis       Vascular papule c/w angioma      Pigmented verrucoid papule/plaque c/w seborrheic keratosis      Yellow umbilicated papule c/w sebaceous hyperplasia      Irregularly shaped tan macule c/w lentigo     1-2 mm smooth white papules consistent with Milia      Movable subcutaneous cyst with punctum c/w epidermal inclusion cyst      Subcutaneous movable cyst c/w pilar cyst      Firm pink to brown papule c/w dermatofibroma      Pedunculated fleshy papule(s) c/w skin tag(s)      Evenly pigmented macule c/w junctional nevus     Mildly variegated pigmented, slightly irregular-bordered macule c/w mildly atypical nevus      Flesh colored to evenly pigmented papule c/w intradermal nevus       Pink pearly papule/plaque c/w basal cell carcinoma      Erythematous hyperkeratotic cursted plaque c/w SCC      Surgical scar with no sign of skin cancer recurrence      Open and closed comedones      Inflammatory papules and pustules      Verrucoid papule consistent consistent  with wart     Erythematous eczematous patches and plaques     Dystrophic onycholytic nail with subungual debris c/w onychomycosis     Umbilicated papule    Erythematous-base heme-crusted tan verrucoid plaque consistent with inflamed seborrheic keratosis     Erythematous Silvery Scaling Plaque c/w Psoriasis     See annotation        Assessment / Plan:        Low iron  -     Ferritin; Future; Expected date: 02/06/2023  -     Iron and TIBC; Future; Expected date: 02/06/2023  Discussed with patient the need for laboratory work up for further evaluation.  Discussed that such investigation may not be helpful.    Alopecia  -     Ferritin; Future; Expected date: 02/06/2023  -     Iron and TIBC; Future; Expected date: 02/06/2023  Better.  Condition is stable.  We will continue present management.  Reviewed with patient to eat protein daily, get labs done, wash with recommended shampoo or soap for the scalp, avoid hair coloring and chemicals and hot treatments, and potentially consider topical 5% minoxidil.  Avoid hot water.    Seborrheic keratosis  Dpns.  Discussed with patient the etiology and pathogenesis of the disease or skin lesion(s) and possible treatments and aggravators.    Discussed with patient the benign nature of these lesions and that no treatment is indicated.  Chronic nature of this condition discussed with patient.  Prn cosmetic hyfrecation of dpsn on the cheeks.  Costs $400.  Scar and recurrence reviewed.  Patient to premedicate spots and growths with over the counter lidocaine.    Seborrheic dermatitis  Condition is stable.  We will continue present management.    Scalp itch  -     fluocinonide (LIDEX) 0.05 % external solution; Apply topically 2 (two) times daily. Prn scalp irritation  Dispense: 60 mL; Refill: 2  Reviewed with patient different treatment options and associated risks.  Condition is stable.  We will continue present management.  Cont pt's lidex sol prn.    Seborrhea  Condition is stable.  We  will continue present management.                 Follow up in about 1 year (around 2/6/2024).

## 2023-02-07 ENCOUNTER — LAB VISIT (OUTPATIENT)
Dept: LAB | Facility: HOSPITAL | Age: 78
End: 2023-02-07
Attending: DERMATOLOGY
Payer: MEDICARE

## 2023-02-07 DIAGNOSIS — E61.1 LOW IRON: ICD-10-CM

## 2023-02-07 DIAGNOSIS — L65.9 ALOPECIA: ICD-10-CM

## 2023-02-07 LAB
FERRITIN SERPL-MCNC: 220 NG/ML (ref 20–300)
IRON SERPL-MCNC: 111 UG/DL (ref 30–160)
SATURATED IRON: 32 % (ref 20–50)
TOTAL IRON BINDING CAPACITY: 346 UG/DL (ref 250–450)
TRANSFERRIN SERPL-MCNC: 234 MG/DL (ref 200–375)

## 2023-02-07 PROCEDURE — 82728 ASSAY OF FERRITIN: CPT | Mod: HCNC | Performed by: DERMATOLOGY

## 2023-02-07 PROCEDURE — 84466 ASSAY OF TRANSFERRIN: CPT | Mod: HCNC | Performed by: DERMATOLOGY

## 2023-02-07 PROCEDURE — 36415 COLL VENOUS BLD VENIPUNCTURE: CPT | Mod: HCNC,PN | Performed by: DERMATOLOGY

## 2023-02-08 ENCOUNTER — PATIENT MESSAGE (OUTPATIENT)
Dept: DERMATOLOGY | Facility: CLINIC | Age: 78
End: 2023-02-08
Payer: MEDICARE

## 2023-02-09 DIAGNOSIS — Z00.00 ENCOUNTER FOR MEDICARE ANNUAL WELLNESS EXAM: ICD-10-CM

## 2023-02-13 ENCOUNTER — TELEPHONE (OUTPATIENT)
Dept: ALLERGY | Facility: CLINIC | Age: 78
End: 2023-02-13
Payer: MEDICARE

## 2023-02-13 NOTE — TELEPHONE ENCOUNTER
----- Message from Alexandr Torres sent at 2/9/2023  3:16 PM CST -----  Contact: @[phone]  Becky Gongora is calling in for clarification on does and directions on prescription fluticasone propionate (FLONASE) 50 mcg/actuation nasal spray) please call to discuss further.

## 2023-02-13 NOTE — TELEPHONE ENCOUNTER
FYI:        Returned call to [facility]. Javi, a pharmacist with [facility], calling to confirm prescription for :      Sig - Route: 2 sprays (100 mcg total) by Each Nostril route 2 (two) times a day. - Each Nostril   Sent to pharmacy as: fluticasone propionate (FLONASE) 50 mcg/actuation nasal spray     Javi would like for provider to know that this is considered a high dose.      Fabi

## 2023-03-13 DIAGNOSIS — J30.81 ALLERGIC RHINITIS DUE TO ANIMAL (CAT) (DOG) HAIR AND DANDER: ICD-10-CM

## 2023-03-14 RX ORDER — FLUTICASONE PROPIONATE 50 MCG
2 SPRAY, SUSPENSION (ML) NASAL 2 TIMES DAILY
Qty: 16 G | Refills: 11 | Status: SHIPPED | OUTPATIENT
Start: 2023-03-14 | End: 2023-03-27 | Stop reason: SDUPTHER

## 2023-03-27 ENCOUNTER — LAB VISIT (OUTPATIENT)
Dept: LAB | Facility: HOSPITAL | Age: 78
End: 2023-03-27
Attending: STUDENT IN AN ORGANIZED HEALTH CARE EDUCATION/TRAINING PROGRAM
Payer: MEDICARE

## 2023-03-27 DIAGNOSIS — I10 HYPERTENSION, UNSPECIFIED TYPE: ICD-10-CM

## 2023-03-27 DIAGNOSIS — J30.81 ALLERGIC RHINITIS DUE TO ANIMAL (CAT) (DOG) HAIR AND DANDER: ICD-10-CM

## 2023-03-27 DIAGNOSIS — E87.8 ELECTROLYTE ABNORMALITY: ICD-10-CM

## 2023-03-27 DIAGNOSIS — Z00.00 ANNUAL PHYSICAL EXAM: ICD-10-CM

## 2023-03-27 LAB
ANION GAP SERPL CALC-SCNC: 7 MMOL/L (ref 8–16)
BUN SERPL-MCNC: 22 MG/DL (ref 8–23)
CALCIUM SERPL-MCNC: 9.3 MG/DL (ref 8.7–10.5)
CHLORIDE SERPL-SCNC: 100 MMOL/L (ref 95–110)
CO2 SERPL-SCNC: 27 MMOL/L (ref 23–29)
CREAT SERPL-MCNC: 0.8 MG/DL (ref 0.5–1.4)
EST. GFR  (NO RACE VARIABLE): >60 ML/MIN/1.73 M^2
GLUCOSE SERPL-MCNC: 135 MG/DL (ref 70–110)
POTASSIUM SERPL-SCNC: 4.5 MMOL/L (ref 3.5–5.1)
SODIUM SERPL-SCNC: 134 MMOL/L (ref 136–145)

## 2023-03-27 PROCEDURE — 80048 BASIC METABOLIC PNL TOTAL CA: CPT | Mod: HCNC | Performed by: STUDENT IN AN ORGANIZED HEALTH CARE EDUCATION/TRAINING PROGRAM

## 2023-03-27 PROCEDURE — 36415 COLL VENOUS BLD VENIPUNCTURE: CPT | Mod: HCNC,PN | Performed by: STUDENT IN AN ORGANIZED HEALTH CARE EDUCATION/TRAINING PROGRAM

## 2023-03-27 RX ORDER — NIFEDIPINE 60 MG/1
60 TABLET, EXTENDED RELEASE ORAL DAILY
Qty: 90 TABLET | Refills: 2 | Status: SHIPPED | OUTPATIENT
Start: 2023-03-27 | End: 2023-08-31 | Stop reason: ALTCHOICE

## 2023-03-27 RX ORDER — FLUTICASONE PROPIONATE 50 MCG
2 SPRAY, SUSPENSION (ML) NASAL 2 TIMES DAILY
Qty: 16 G | Refills: 11 | Status: SHIPPED | OUTPATIENT
Start: 2023-03-27 | End: 2023-06-27 | Stop reason: SDUPTHER

## 2023-03-27 RX ORDER — POTASSIUM CHLORIDE 20 MEQ/1
20 TABLET, EXTENDED RELEASE ORAL 2 TIMES DAILY
Qty: 180 TABLET | Refills: 2 | OUTPATIENT
Start: 2023-03-27

## 2023-03-27 RX ORDER — VALSARTAN 320 MG/1
320 TABLET ORAL DAILY
Qty: 90 TABLET | Refills: 3 | Status: ON HOLD | OUTPATIENT
Start: 2023-03-27 | End: 2024-01-08 | Stop reason: HOSPADM

## 2023-03-27 NOTE — TELEPHONE ENCOUNTER
----- Message from Charity Ramírez sent at 3/27/2023  1:03 PM CDT -----  Contact: [phone]  Requesting an RX refill or new RX.  Is this a refill or new RX: refill  RX name and strength (copy/paste from chart):  NIFEdipine (PROCARDIA-XL) 60 MG (OSM) 24 hr tablet  Is this a 30 day or 90 day RX: 90  Pharmacy name and phone # (copy/paste from chart):    [facility] - [place] [id number]  [address] [place] [address]  Phone: [phone] Fax: [phone]  The doctors have asked that we provide their patients with the following 2 reminders -- prescription refills can take up to 72 hours, and a friendly reminder that in the future you can use your [facility] account to request refills: yes    Requesting an RX refill or new RX.  Is this a refill or new RX: refill  RX name and strength (copy/paste from chart):  potassium chloride SA (K-DUR,KLOR-CON) 20 MEQ tablet  Is this a 30 day or 90 day RX: 90  Pharmacy name and phone # (copy/paste from chart):    [facility] [place] [address]  Phone: [phone] Fax: [phone]  The doctors have asked that we provide their patients with the following 2 reminders -- prescription refills can take up to 72 hours, and a friendly reminder that in the future you can use your [facility] account to request refills: yes    Requesting an RX refill or new RX.  Is this a refill or new RX: refill  RX name and strength (copy/paste from chart):  valsartan (DIOVAN) 320 MG tablet  Is this a 30 day or 90 day RX: 90  Pharmacy name and phone # (copy/paste from chart):    [facility] [address] [facility]  [address] [place] [id number]  Phone: [phone] Fax: [phone]  The doctors have asked that we provide their patients with the following 2 reminders -- prescription refills can take up to 72 hours, and a friendly  reminder that in the future you can use your [facility] account to request refills: yes      Requesting an RX refill or new RX.  Is this a refill or new RX: refill  RX name and strength (copy/paste from chart):  fluticasone propionate (FLONASE) 50 mcg/actuation nasal spray  Is this a 30 day or 90 day RX: 90  Pharmacy name and phone # (copy/paste from chart):    [facility] - [facility] [address] [facility]  [address] [facility] [address]  Phone: [phone] Fax: [phone]  The doctors have asked that we provide their patients with the following 2 reminders -- prescription refills can take up to 72 hours, and a friendly reminder that in the future you can use your [facility] account to request refills: yes

## 2023-03-28 DIAGNOSIS — E87.8 ELECTROLYTE ABNORMALITY: ICD-10-CM

## 2023-03-28 RX ORDER — POTASSIUM CHLORIDE 20 MEQ/1
20 TABLET, EXTENDED RELEASE ORAL DAILY
Qty: 90 TABLET | Refills: 1 | Status: SHIPPED | OUTPATIENT
Start: 2023-03-28 | End: 2023-09-25

## 2023-03-29 DIAGNOSIS — J30.81 ALLERGIC RHINITIS DUE TO ANIMAL (CAT) (DOG) HAIR AND DANDER: ICD-10-CM

## 2023-03-31 ENCOUNTER — TELEPHONE (OUTPATIENT)
Dept: PRIMARY CARE CLINIC | Facility: CLINIC | Age: 78
End: 2023-03-31
Payer: MEDICARE

## 2023-03-31 NOTE — TELEPHONE ENCOUNTER
----- Message from Angi Tariq sent at 3/31/2023 11:39 AM CDT -----  Contact: Joao @ [phone]  Pharmacy is calling to clarify an RX.  RX name:  fluticasone propionate (FLONASE) 50 mcg/actuation nasal spray      What do they need to clarify:  Clinical questions   Comments:  REF#[id number]

## 2023-05-09 ENCOUNTER — DOCUMENTATION ONLY (OUTPATIENT)
Dept: REHABILITATION | Facility: HOSPITAL | Age: 78
End: 2023-05-09
Payer: MEDICARE

## 2023-05-09 NOTE — PROGRESS NOTES
[facility]   Discharge Note    Name: Michela Franco  Clinic Number: [id number]    Therapy Diagnosis: No diagnosis found.  Physician: No ref. provider found    Physician Orders: PT Eval and Treat: AROM and PROM shoulder  No strengthening until 10-12wk post-injury  Wean out of UE sling  Medical Diagnosis from Referral: S42.252D (ICD-10-CM) - Closed displaced fracture of greater tuberosity of left humerus with routine healing, subsequent encounter  Evaluation Date: 11/11/2022      Date of Last visit: 1/3/23  Total Visits Received: 10    ASSESSMENT        Discharge reason: Patient has not attended therapy since 1/3/23      Goals: not able to determine goal status     PLAN   This patient is discharged from Physical Therapy      Maryan Hernandez, PT

## 2023-06-16 ENCOUNTER — PATIENT MESSAGE (OUTPATIENT)
Dept: PODIATRY | Facility: CLINIC | Age: 78
End: 2023-06-16
Payer: MEDICARE

## 2023-06-27 ENCOUNTER — OFFICE VISIT (OUTPATIENT)
Dept: ALLERGY | Facility: CLINIC | Age: 78
End: 2023-06-27
Payer: MEDICARE

## 2023-06-27 DIAGNOSIS — J45.40 MODERATE PERSISTENT ASTHMA WITHOUT COMPLICATION: Primary | ICD-10-CM

## 2023-06-27 DIAGNOSIS — J30.81 ALLERGIC RHINITIS DUE TO ANIMAL (CAT) (DOG) HAIR AND DANDER: ICD-10-CM

## 2023-06-27 PROCEDURE — 99999 PR PBB SHADOW E&M-EST. PATIENT-LVL I: CPT | Mod: PBBFAC,GC,, | Performed by: STUDENT IN AN ORGANIZED HEALTH CARE EDUCATION/TRAINING PROGRAM

## 2023-06-27 PROCEDURE — 1157F PR ADVANCE CARE PLAN OR EQUIV PRESENT IN MEDICAL RECORD: ICD-10-PCS | Mod: CPTII,GC,S$GLB, | Performed by: STUDENT IN AN ORGANIZED HEALTH CARE EDUCATION/TRAINING PROGRAM

## 2023-06-27 PROCEDURE — 99214 PR OFFICE/OUTPT VISIT, EST, LEVL IV, 30-39 MIN: ICD-10-PCS | Mod: GC,S$GLB,, | Performed by: STUDENT IN AN ORGANIZED HEALTH CARE EDUCATION/TRAINING PROGRAM

## 2023-06-27 PROCEDURE — 99499 UNLISTED E&M SERVICE: CPT | Mod: HCNC,S$GLB,, | Performed by: STUDENT IN AN ORGANIZED HEALTH CARE EDUCATION/TRAINING PROGRAM

## 2023-06-27 PROCEDURE — 1157F ADVNC CARE PLAN IN RCRD: CPT | Mod: CPTII,GC,S$GLB, | Performed by: STUDENT IN AN ORGANIZED HEALTH CARE EDUCATION/TRAINING PROGRAM

## 2023-06-27 PROCEDURE — 99214 OFFICE O/P EST MOD 30 MIN: CPT | Mod: GC,S$GLB,, | Performed by: STUDENT IN AN ORGANIZED HEALTH CARE EDUCATION/TRAINING PROGRAM

## 2023-06-27 PROCEDURE — 99999 PR PBB SHADOW E&M-EST. PATIENT-LVL I: ICD-10-PCS | Mod: PBBFAC,GC,, | Performed by: STUDENT IN AN ORGANIZED HEALTH CARE EDUCATION/TRAINING PROGRAM

## 2023-06-27 PROCEDURE — 99499 RISK ADDL DX/OHS AUDIT: ICD-10-PCS | Mod: HCNC,S$GLB,, | Performed by: STUDENT IN AN ORGANIZED HEALTH CARE EDUCATION/TRAINING PROGRAM

## 2023-06-27 RX ORDER — AZELASTINE 1 MG/ML
2 SPRAY, METERED NASAL 2 TIMES DAILY
Qty: 30 ML | Refills: 11 | Status: SHIPPED | OUTPATIENT
Start: 2023-06-27 | End: 2024-06-26

## 2023-06-27 RX ORDER — FLUTICASONE PROPIONATE AND SALMETEROL 50; 500 UG/1; UG/1
POWDER RESPIRATORY (INHALATION)
Qty: 1 EACH | Refills: 11 | Status: SHIPPED | OUTPATIENT
Start: 2023-06-27

## 2023-06-27 RX ORDER — FLUTICASONE PROPIONATE 50 MCG
2 SPRAY, SUSPENSION (ML) NASAL 2 TIMES DAILY
Qty: 16 G | Refills: 11 | Status: SHIPPED | OUTPATIENT
Start: 2023-06-27

## 2023-06-27 RX ORDER — ALBUTEROL SULFATE 90 UG/1
2 AEROSOL, METERED RESPIRATORY (INHALATION) EVERY 6 HOURS PRN
Qty: 8 G | Refills: 11 | Status: SHIPPED | OUTPATIENT
Start: 2023-06-27

## 2023-06-27 NOTE — PROGRESS NOTES
"ALLERGY & IMMUNOLOGY CLINIC - Follow up     HISTORY OF PRESENT ILLNESS     Patient ID: Michela Franco is a 78 y.o. female    CC: "follow up for asthma"    HPI: Ms. Michela Franco is a 78 year old female with asthma, allergic rhinitis, and chronic pansinusitis who presents to clinic today for follow up.     Severe persistant asthma: She notes that she is having difficulty breathing in the heat of the summer. She is using her rescue inhaler about two times per day. It usually takes about 20 minutes for symptoms to improve. She is compliant with dupilumab injections every two weeks and does not have issues with injections. She denies ED/UC visit for breathing. She denies waking up at night with shortness of breath. She is using her CPAP nightly. Denies recent prednisone use for asthma exacerbations.     Allergic rhinitis: Continues to have nasal congestion. She is using fluticasone 2 SEN BID and azelastine 2 SEN BID. She is unsure if these medications are helping. She uses saline every morning.     Chronic pansinusitis: Has not required antibiotics since prior to last visit.      REVIEW OF SYSTEMS     Review of systems is negative other than stated above in HPI.     MEDICAL HISTORY     MedHx: active problems reviewed  SurgHx:   Past Surgical History:   Procedure Laterality Date    CATARACT EXTRACTION W/  INTRAOCULAR LENS IMPLANT Right 2/11/2021    Procedure: EXTRACTION, CATARACT, WITH IOL INSERTION;  Surgeon: John Merino MD;  Location: [facility];  Service: Ophthalmology;  Laterality: Right;    CATARACT EXTRACTION W/  INTRAOCULAR LENS IMPLANT Left 3/4/2021    Procedure: EXTRACTION, CATARACT, WITH IOL INSERTION;  Surgeon: John Merino MD;  Location: [facility];  Service: Ophthalmology;  Laterality: Left;    COLONOSCOPY N/A 5/18/2018    Procedure: COLONOSCOPY;  Surgeon: Calixto Brian MD;  Location: [facility] ([address] [place] [address]);  Service: Endoscopy;  Laterality: N/A;    HYSTERECTOMY      total    OOPHORECTOMY      ROTATOR " CUFF REPAIR Left     SINUS SURGERY       Allergies: see below  Medications: MAR reviewed     PHYSICAL EXAM     Physical Exam  Constitutional:       Appearance: Normal appearance.   HENT:      Head: Normocephalic and atraumatic.      Right Ear: Tympanic membrane, ear canal and external ear normal.      Left Ear: Tympanic membrane, ear canal and external ear normal.      Nose:      Comments: 3+ pale turbinates     Mouth/Throat:      Mouth: Mucous membranes are moist.   Eyes:      Conjunctiva/sclera: Conjunctivae normal.      Pupils: Pupils are equal, round, and reactive to light.   Pulmonary:      Effort: Pulmonary effort is normal. No respiratory distress.      Breath sounds: Wheezing (MARIELA wheeze which cleared with coughing) present. No rales.   Skin:     General: Skin is warm and dry.      Findings: No rash.   Neurological:      Mental Status: She is alert.      LABORATORY STUDIES     No new labs.     ALLERGEN TESTING     Skin Prick: none    Immunocaps:   Component      Latest Ref Rng & Units 12/18/2019   Allergen Acremonium (Cephalosporium) IgE      <0.10 kU/L <0.10   Allergen Acremonium (Cephalosporium) Class       CLASS 0   Botrytis Cinerea      <0.10 kU/L <0.10   Botrytis Cinerea Class       CLASS 0   Allergen Candida albicans IgE      <0.10 kU/L 0.68 (H)   Allergen Candida albicans Class       CLASS 1   Chaetomium      <0.10 kU/L <0.10   Chaetomium Glob. Class       CLASS 0   Cladosporium, IgE      <0.10 kU/L <0.10   Cladosporium Class       CLASS 0   Curvularia lunata      <0.10 kU/L <0.10   Curvularia Lunata Class       CLASS 0   Epicoccum purpurascens, IgE      <0.10 kU/L <0.10   Epicoccum pupurascens Class       CLASS 0   Helminthosporium Halodes IgE      <0.10 kU/L <0.10   Helminthosporium Class       CLASS 0   Allergen Trichoderma Viride IgE      <0.10 kU/L <0.10   Allergen Trichoderma Viride Class       CLASS 0   Stemphyllium, IgE      <0.10 kU/L <0.10   Stemphylium Herbarum Class       CLASS 0    Allergen Rhodotorula IgE      <0.35 kU/L <0.35   Rhodotorula , IgE Class       0   Rhizopus Nigrican, IgE      <0.10 kU/L 0.31 (H)   Rhizopus Nigricans Class       CLASS 0/1   Phoma betae      <0.10 kU/L <0.10   Phoma Betae Class       CLASS 0   Penicillium, IgE      <0.10 kU/L 0.17 (H)   Penicillium Class       CLASS 0/1   Mucor racemosus, IgE      <0.10 kU/L 0.34 (H)   Mucor racemosus Class       CLASS 0/1   RAST Allergen for Trichophyton rubrum      kU/L <0.35      Component      Latest Ref Rng & Units 12/18/2019 12/18/2019 12/18/2019           3:07 PM  3:07 PM  3:07 PM   D. farinae      <0.10 kU/L     1.94 (H)   D. farinae Class           CLASS 2   Mite Dust Pteronyssinus IgE      <0.10 kU/L     0.19 (H)   D. pteronyssinus Class           CLASS 0/1   BERMUDA GRASS      <0.10 kU/L     0.71 (H)   Bermuda Grass Class           CLASS 2   [place] Grass      <0.10 kU/L     0.72 (H)   [place]ua Grass Class           CLASS 2   [place] IgE      <0.10 kU/L     0.17 (H)   [place] Class           CLASS 0/1   Plantain      <0.10 kU/L     0.63 (H)   English Plantain Class           CLASS 1   White Oak(Quercus alba) IgE      <0.10 kU/L     0.74 (H)   [place], Class           CLASS 2   Pecan [place] Tree      <0.10 kU/L     0.47 (H)   Pecan, Class           CLASS 1   Marshelder IgE      <0.10 kU/L     0.59 (H)   Marshelder Class           CLASS 1   Ragweed, Western IgE      <0.10 kU/L     0.54 (H)   Ragweed, Western, Class           CLASS 1   Alternaria alternata      <0.10 kU/L     <0.10   Altern. alternata Class           CLASS 0   Aspergillus Fumigatus IgE      <0.10 kU/L     0.22 (H)   A. fumigatus Class           CLASS 0/1   Cat Dander      <0.10 kU/L     <0.10   Cat Epithelium Class           CLASS 0   Cockroach, IgE      <0.10 kU/L CLASS 0/1 0.28 (H)     Dog Dander, IgE      <0.10 kU/L     0.51 (H)   Dog Dander Class           CLASS 1      Component      Latest Ref Rng & Units 12/18/2019 12/29/2017 10/30/2017 4/8/2014    Aspergillus Fumigatus IgE      <0.10 kU/L 0.22 (H)   <0.35 <0.35   A. fumigatus Class       CLASS 0/1   CLASS 0 CLASS 0   Aspergillus Antigen      <0.5 index   <0.500 0.676 (A)        Component      Latest Ref Rng & Units 2/20/2014   Aspergillus Fumigatus IgE      <0.10 kU/L 0.36 (H)   A. fumigatus Class       CLASS I   Aspergillus Antigen      <0.5 index            PULMONARY FUNCTION   PFTs:     11/26/2021:   Pre:   Post:  FEV1: 1.49 (82.8%)--> 1.56 (+4.1%)  FVC:   2.22  (95%)----> 2.30 (+3.6%)  Ratio:  67.37%      ----> 67.77%      5/28/2018  FVC: 2.30 (78%)  FEV1: 1.62 (70%)  Ratio: 70  No reversibility post bronchodilator.     Also performed 12/2019 in pulmonary clinic     IMAGING & OTHER DIAGNOSTICS     No new chest imaging     CHART REVIEW     Reviewed past allergy/immunology notes     ASSESSMENT & PLAN     Michela Franco is a 78 y.o. female with     Moderate persistent asthma without complication:  Prior to control with dupixant, she was requiring multiple courses of PO prednisone leading to mulitple side effects.  Patient failed Xoliar in the past. Remains with improved control on dupilumab without exacerbations, however, recently with more albuterol use during the summer. This could be due to current grass pollen season.   -Continue dupilumab 300 mg subq every 14 days; 6 months of refills ordered today. For future reference, patient uses [facility]. For refills, call [phone] ext [phone]. All other medications to be filled by [facility]   -Continue advair 500-50 mcg/dose 1 puff BID  -Continue albuterol as needed for SOB  -Start spiriva 2 puffs daily; RTC in 2 weeks to determine if improved on this medication   -PFTs ordered today    Allergic rhinitis: Continues to have congestion during current grass pollen season but overall improved.   -Continue fluticasone to 2 SEN BID  -Continue azelastine up to SEN BID  -Use saline prior to fluticasone use  -Discussed risks and benefits of AIT, used  shared decision making. In this patient who is 78 will hold off on AIT at this time due to risk of inability to compensate in setting of possible anaphylaxis to AIT    Eosinophilia, unspecified type: Last eosinophil count wnl  - Continue to monitor    Chronic pansinusitis: Symptoms controlled w/o antibiotic requirements.  -Continue to monitor    Patient discussed with Dr. Geller.      Follow up: 2 weeks    Lidya Bobby MD  Allergy/Immunology Fellow

## 2023-07-06 ENCOUNTER — TELEPHONE (OUTPATIENT)
Dept: ALLERGY | Facility: CLINIC | Age: 78
End: 2023-07-06
Payer: MEDICARE

## 2023-07-06 NOTE — TELEPHONE ENCOUNTER
----- Message from Thelma Landon sent at 7/6/2023 10:48 AM CDT -----  Contact: [phone]  [facility] calling to get someone to give her a call bk please advise [phone] ext [phone]

## 2023-07-11 ENCOUNTER — OFFICE VISIT (OUTPATIENT)
Dept: ALLERGY | Facility: CLINIC | Age: 78
End: 2023-07-11
Payer: MEDICARE

## 2023-07-11 DIAGNOSIS — J45.909 ASTHMA, UNSPECIFIED ASTHMA SEVERITY, UNSPECIFIED WHETHER COMPLICATED, UNSPECIFIED WHETHER PERSISTENT: Primary | ICD-10-CM

## 2023-07-11 DIAGNOSIS — J31.0 RHINITIS, UNSPECIFIED TYPE: ICD-10-CM

## 2023-07-11 PROCEDURE — 1157F ADVNC CARE PLAN IN RCRD: CPT | Mod: CPTII,GC,S$GLB, | Performed by: INTERNAL MEDICINE

## 2023-07-11 PROCEDURE — 1157F PR ADVANCE CARE PLAN OR EQUIV PRESENT IN MEDICAL RECORD: ICD-10-PCS | Mod: CPTII,GC,S$GLB, | Performed by: INTERNAL MEDICINE

## 2023-07-11 PROCEDURE — 99214 PR OFFICE/OUTPT VISIT, EST, LEVL IV, 30-39 MIN: ICD-10-PCS | Mod: GC,S$GLB,, | Performed by: INTERNAL MEDICINE

## 2023-07-11 PROCEDURE — 99214 OFFICE O/P EST MOD 30 MIN: CPT | Mod: GC,S$GLB,, | Performed by: INTERNAL MEDICINE

## 2023-07-11 NOTE — PROGRESS NOTES
ALLERGY & IMMUNOLOGY CLINIC - FOLLOW UP     HISTORY OF PRESENT ILLNESS     Patient ID: Michela Franco is a 78 y.o. female    CC: Increased cough, congestion    HPI: Ms. Michela Franco is a 78 year old female with asthma, allergic rhinitis, and chronic pansinusitis who presents to clinic today for follow up.     Started Spiriva approximately 2 weeks ago, unsure if this has been helping. Unfortunately this past week, she developed symptoms concerning for upper respiratory infection vs viral pneumonia. Increased cough, possible fevers, sputum production, increased nasal congestion and post nasal drip. She believes that her symptoms are improving over the past few days. Coming in today to discuss continuation of Spiriva. Additionally she has PFT's scheduled for today.        REVIEW OF SYSTEMS     Review of Systems   Constitutional: Negative.    HENT:  Positive for congestion.    Eyes: Negative.    Respiratory:  Positive for cough, sputum production and wheezing.    Cardiovascular: Negative.    Gastrointestinal: Negative.    Musculoskeletal: Negative.    Skin: Negative.    Neurological: Negative.    Endo/Heme/Allergies: Negative.    Psychiatric/Behavioral: Negative.         Balance of review of systems negative except as mentioned above     MEDICAL HISTORY     MedHx: active problems reviewed  SurgHx:   Past Surgical History:   Procedure Laterality Date    CATARACT EXTRACTION W/  INTRAOCULAR LENS IMPLANT Right 2/11/2021    Procedure: EXTRACTION, CATARACT, WITH IOL INSERTION;  Surgeon: John Merino MD;  Location: [facility];  Service: Ophthalmology;  Laterality: Right;    CATARACT EXTRACTION W/  INTRAOCULAR LENS IMPLANT Left 3/4/2021    Procedure: EXTRACTION, CATARACT, WITH IOL INSERTION;  Surgeon: John Merino MD;  Location: [facility];  Service: Ophthalmology;  Laterality: Left;    COLONOSCOPY N/A 5/18/2018    Procedure: COLONOSCOPY;  Surgeon: Calixto Brian MD;  Location: [address]);  Service: Endoscopy;   Laterality: N/A;    HYSTERECTOMY      total    OOPHORECTOMY      ROTATOR CUFF REPAIR Left     SINUS SURGERY           Otherwise no Family History of asthma, allergic rhinitis, atopic dermatitis, drug allergy, food allergy, venom allergy or immune deficiency.     Allergies: see below  Medications: MAR reviewed       PHYSICAL EXAM     Physical Exam  Constitutional:       Appearance: Normal appearance.   HENT:      Head: Normocephalic and atraumatic.      Nose: Congestion present.   Eyes:      Conjunctiva/sclera: Conjunctivae normal.   Cardiovascular:      Rate and Rhythm: Normal rate and regular rhythm.   Pulmonary:      Comments: Trace wheeze with some bilateral rhonchi in lower lung fields.  Skin:     General: Skin is warm and dry.   Neurological:      General: No focal deficit present.      Mental Status: She is alert.   Psychiatric:         Mood and Affect: Mood normal.         Behavior: Behavior normal.          ALLERGEN TESTING        Skin Prick: none     Immunocaps:   Component      Latest Ref Rng & Units 12/18/2019   Allergen Acremonium (Cephalosporium) IgE      <0.10 kU/L <0.10   Allergen Acremonium (Cephalosporium) Class       CLASS 0   Botrytis Cinerea      <0.10 kU/L <0.10   Botrytis Cinerea Class       CLASS 0   Allergen Candida albicans IgE      <0.10 kU/L 0.68 (H)   Allergen Candida albicans Class       CLASS 1   Chaetomium      <0.10 kU/L <0.10   Chaetomium Glob. Class       CLASS 0   Cladosporium, IgE      <0.10 kU/L <0.10   Cladosporium Class       CLASS 0   Curvularia lunata      <0.10 kU/L <0.10   Curvularia Lunata Class       CLASS 0   Epicoccum purpurascens, IgE      <0.10 kU/L <0.10   Epicoccum pupurascens Class       CLASS 0   Helminthosporium Halodes IgE      <0.10 kU/L <0.10   Helminthosporium Class       CLASS 0   Allergen Trichoderma Viride IgE      <0.10 kU/L <0.10   Allergen Trichoderma Viride Class       CLASS 0   Stemphyllium, IgE      <0.10 kU/L <0.10   Stemphylium Herbarum Class        CLASS 0   Allergen Rhodotorula IgE      <0.35 kU/L <0.35   Rhodotorula , IgE Class       0   Rhizopus Nigrican, IgE      <0.10 kU/L 0.31 (H)   Rhizopus Nigricans Class       CLASS 0/1   Phoma betae      <0.10 kU/L <0.10   Phoma Betae Class       CLASS 0   Penicillium, IgE      <0.10 kU/L 0.17 (H)   Penicillium Class       CLASS 0/1   Mucor racemosus, IgE      <0.10 kU/L 0.34 (H)   Mucor racemosus Class       CLASS 0/1   RAST Allergen for Trichophyton rubrum      kU/L <0.35      Component      Latest Ref Rng & Units 12/18/2019 12/18/2019 12/18/2019           3:07 PM  3:07 PM  3:07 PM   D. farinae      <0.10 kU/L     1.94 (H)   D. farinae Class           CLASS 2   Mite Dust Pteronyssinus IgE      <0.10 kU/L     0.19 (H)   D. pteronyssinus Class           CLASS 0/1   BERMUDA GRASS      <0.10 kU/L     0.71 (H)   [place] Grass Class           CLASS 2   Joshua Grass      <0.10 kU/L     0.72 (H)   [place] Grass Class           CLASS 2   [place] IgE      <0.10 kU/L     0.17 (H)   [place] Class           CLASS 0/1   Plantain      <0.10 kU/L     0.63 (H)   English Plantain Class           CLASS 1   White Oak(Quercus alba) IgE      <0.10 kU/L     0.74 (H)   [place], Class           CLASS 2   Pecan [place] Tree      <0.10 kU/L     0.47 (H)   Pecan, Class           CLASS 1   Marshelder IgE      <0.10 kU/L     0.59 (H)   Marshelder Class           CLASS 1   Ragweed, Western IgE      <0.10 kU/L     0.54 (H)   Ragweed, Western, Class           CLASS 1   Alternaria alternata      <0.10 kU/L     <0.10   Altern. alternata Class           CLASS 0   Aspergillus Fumigatus IgE      <0.10 kU/L     0.22 (H)   A. fumigatus Class           CLASS 0/1   Cat Dander      <0.10 kU/L     <0.10   Cat Epithelium Class           CLASS 0   Cockroach, IgE      <0.10 kU/L CLASS 0/1 0.28 (H)     Dog Dander, IgE      <0.10 kU/L     0.51 (H)   Dog Dander Class           CLASS 1      Component      Latest Ref Rng & Units 12/18/2019 12/29/2017 10/30/2017 4/8/2014    Aspergillus Fumigatus IgE      <0.10 kU/L 0.22 (H)   <0.35 <0.35   A. fumigatus Class       CLASS 0/1   CLASS 0 CLASS 0   Aspergillus Antigen      <0.5 index   <0.500 0.676 (A)        Component      Latest Ref Rng & Units 2/20/2014   Aspergillus Fumigatus IgE      <0.10 kU/L 0.36 (H)   A. fumigatus Class       CLASS I   Aspergillus Antigen      <0.5 index            PULMONARY FUNCTION   PFTs:      11/26/2021:              Pre:                   Post:  FEV1: 1.49 (82.8%)--> 1.56 (+4.1%)  FVC:   2.22  (95%)----> 2.30 (+3.6%)  Ratio:  67.37%      ----> 67.77%        5/28/2018  FVC: 2.30 (78%)  FEV1: 1.62 (70%)  Ratio: 70  No reversibility post bronchodilator.     Also performed 12/2019 in pulmonary clinic     ASSESSMENT & PLAN     Michela Franco is a 78 y.o. female with      Acute URI  -Symptoms consistent with likely URI vs viral Pneumonia. Symptoms overall improving over the last few days, no current indication for abx therapy. Patient with mild wheeze on exam today, offered prednisone but she declined.  -Unable to assess response to spiriva. RTC in 2 weeks to see if she continues to need this medication  -reschedule PFT's due to illness      Moderate persistent asthma without complication:  Prior to control with dupixant, she was requiring multiple courses of PO prednisone leading to mulitple side effects.  Patient failed Xoliar in the past. Remains with improved control on dupilumab without exacerbations, however, recently with more albuterol use during the summer. This could be due to current grass pollen season.   -Continue dupilumab 300 mg subq every 14 days. For future reference, patient uses [facility]. For refills, call [phone]. All other medications to be filled by [facility]   -Continue advair 500-50 mcg/dose 1 puff BID  -Continue albuterol as needed for SOB  -Continue spiriva 2 puffs daily; due to suspected recent viral infection unable to assess response to spiriva. RTC in 2  weeks to see if she continues to need this medication  -will reschedule PFT's due to acute illness       Allergic rhinitis: Continues to have congestion during current grass pollen season.  -Continue fluticasone to 2 SEN BID  -Continue azelastine up to SEN BID  -Using nasal irrigation BID  -AIT previously discussed. In this patient who is 78 will hold off on AIT at this time due to risk of inability to compensate in setting of possible anaphylaxis to AIT     Eosinophilia, unspecified type: Last eosinophil count wnl  - Continue to monitor     Chronic pansinusitis: Symptoms controlled w/o antibiotic requirements.  -Continue to monitor     Patient discussed with Dr. Geller.  RTC 2-3 weeks    Joe King DO  Allergy/Immunology Fellow

## 2023-07-14 ENCOUNTER — OFFICE VISIT (OUTPATIENT)
Dept: URGENT CARE | Facility: CLINIC | Age: 78
End: 2023-07-14
Payer: MEDICARE

## 2023-07-14 VITALS
BODY MASS INDEX: 29.16 KG/M2 | RESPIRATION RATE: 24 BRPM | SYSTOLIC BLOOD PRESSURE: 128 MMHG | HEIGHT: 65 IN | TEMPERATURE: 100 F | DIASTOLIC BLOOD PRESSURE: 67 MMHG | WEIGHT: 175 LBS | HEART RATE: 101 BPM | OXYGEN SATURATION: 91 %

## 2023-07-14 DIAGNOSIS — R06.02 SOB (SHORTNESS OF BREATH): ICD-10-CM

## 2023-07-14 DIAGNOSIS — R50.9 FEVER, UNSPECIFIED FEVER CAUSE: ICD-10-CM

## 2023-07-14 DIAGNOSIS — J18.9 PNEUMONIA OF BOTH LUNGS DUE TO INFECTIOUS ORGANISM, UNSPECIFIED PART OF LUNG: Primary | ICD-10-CM

## 2023-07-14 DIAGNOSIS — R05.9 COUGH, UNSPECIFIED TYPE: ICD-10-CM

## 2023-07-14 LAB
CTP QC/QA: YES
CTP QC/QA: YES
POC MOLECULAR INFLUENZA A AGN: NEGATIVE
POC MOLECULAR INFLUENZA B AGN: NEGATIVE
SARS-COV-2 AG RESP QL IA.RAPID: NEGATIVE

## 2023-07-14 PROCEDURE — 87811 SARS CORONAVIRUS 2 ANTIGEN POCT, MANUAL READ: ICD-10-PCS | Mod: QW,S$GLB,, | Performed by: NURSE PRACTITIONER

## 2023-07-14 PROCEDURE — 94640 AIRWAY INHALATION TREATMENT: CPT | Mod: S$GLB,,, | Performed by: NURSE PRACTITIONER

## 2023-07-14 PROCEDURE — 87811 SARS-COV-2 COVID19 W/OPTIC: CPT | Mod: QW,S$GLB,, | Performed by: NURSE PRACTITIONER

## 2023-07-14 PROCEDURE — 99214 OFFICE O/P EST MOD 30 MIN: CPT | Mod: 25,S$GLB,, | Performed by: NURSE PRACTITIONER

## 2023-07-14 PROCEDURE — 94640 PR INHAL RX, AIRWAY OBST/DX SPUTUM INDUCT: ICD-10-PCS | Mod: S$GLB,,, | Performed by: NURSE PRACTITIONER

## 2023-07-14 PROCEDURE — 71046 X-RAY EXAM CHEST 2 VIEWS: CPT | Mod: S$GLB,,, | Performed by: RADIOLOGY

## 2023-07-14 PROCEDURE — 99214 PR OFFICE/OUTPT VISIT, EST, LEVL IV, 30-39 MIN: ICD-10-PCS | Mod: 25,S$GLB,, | Performed by: NURSE PRACTITIONER

## 2023-07-14 PROCEDURE — 87502 INFLUENZA DNA AMP PROBE: CPT | Mod: QW,S$GLB,, | Performed by: NURSE PRACTITIONER

## 2023-07-14 PROCEDURE — 71046 XR CHEST PA AND LATERAL: ICD-10-PCS | Mod: S$GLB,,, | Performed by: RADIOLOGY

## 2023-07-14 PROCEDURE — 87502 POCT INFLUENZA A/B MOLECULAR: ICD-10-PCS | Mod: QW,S$GLB,, | Performed by: NURSE PRACTITIONER

## 2023-07-14 RX ORDER — AMOXICILLIN 875 MG/1
875 TABLET, FILM COATED ORAL 2 TIMES DAILY
Qty: 10 TABLET | Refills: 0 | Status: ON HOLD | OUTPATIENT
Start: 2023-07-14 | End: 2023-07-21 | Stop reason: HOSPADM

## 2023-07-14 RX ORDER — LEVALBUTEROL 1.25 MG/.5ML
1.25 SOLUTION, CONCENTRATE RESPIRATORY (INHALATION)
Status: DISCONTINUED | OUTPATIENT
Start: 2023-07-14 | End: 2023-07-14

## 2023-07-14 RX ORDER — ACETAMINOPHEN 500 MG
1000 TABLET ORAL
Status: COMPLETED | OUTPATIENT
Start: 2023-07-14 | End: 2023-07-14

## 2023-07-14 RX ORDER — ALBUTEROL SULFATE 0.63 MG/3ML
0.63 SOLUTION RESPIRATORY (INHALATION)
Status: DISCONTINUED | OUTPATIENT
Start: 2023-07-14 | End: 2023-07-14

## 2023-07-14 RX ORDER — AZITHROMYCIN 250 MG/1
TABLET, FILM COATED ORAL
Qty: 6 TABLET | Refills: 0 | Status: ON HOLD | OUTPATIENT
Start: 2023-07-14 | End: 2023-07-21 | Stop reason: HOSPADM

## 2023-07-14 RX ORDER — LEVALBUTEROL INHALATION SOLUTION 1.25 MG/3ML
1.25 SOLUTION RESPIRATORY (INHALATION)
Status: COMPLETED | OUTPATIENT
Start: 2023-07-14 | End: 2023-07-14

## 2023-07-14 RX ADMIN — LEVALBUTEROL INHALATION SOLUTION 1.25 MG: 1.25 SOLUTION RESPIRATORY (INHALATION) at 12:07

## 2023-07-14 RX ADMIN — Medication 1000 MG: at 01:07

## 2023-07-14 NOTE — PATIENT INSTRUCTIONS
Discussed Short-term follow-up chest radiography after therapy is recommended to ensure resolution.  Continue all current medications.  Follow up as scheduled with PCP.  Discussed emergent precautions for ER; SOB, chest pain, dizziness, fever, or chills.  Maintain hydration.    You must understand that you've received an Urgent Care treatment only and that you may be released before all your medical problems are known or treated. You, the patient, will arrange for follow up care as instructed.  Follow up with your PCP or specialty clinic as directed in the next 1-2 weeks if not improved or as needed.  You can call [phone] to schedule an appointment with the appropriate provider.  If your condition worsens we recommend that you receive another evaluation at the emergency room immediately or contact your primary medical clinics after hours call service to discuss your concerns.  Please return here or go to the Emergency Department for any concerns or worsening of condition.    If you were prescribed a narcotic or controlled medication, do not drive or operate heavy equipment or machinery while taking these medications.    Thank you for choosing [facility]!    Our goal in the Urgent Care is to always provide outstanding medical care. You may receive a survey by mail or e-mail in the next week regarding your experience today. We would greatly appreciate you completing and returning the survey. Your feedback provides us with a way to recognize our staff who provide very good care, and it helps us learn how to improve when your experience was below our aspiration of excellence.      We appreciate you trusting us with your medical care. We hope you feel better soon. We will be happy to take care of you for all of your future medical needs.    Sincerely,    Moisés Decker DNP, FNP-C

## 2023-07-14 NOTE — PROGRESS NOTES
"Subjective:      Patient ID: Michela Franco is a 78 y.o. female.    Vitals:  height is 5' 5" (1.651 m) and weight is 79.4 kg (175 lb). Her temperature is 100.4 °F (38 °C) (abnormal). Her blood pressure is 128/67 and her pulse is 101. Her respiration is 24 (abnormal) and oxygen saturation is 91% (abnormal).     Chief Complaint: Cough    78-year-old female presents with a complaint of a postnasal drip, SOB, and productive cough.  Patient states symptoms started approximately 5 days ago and gradually worsened.  She presents afebrile, 100.4.  Recent visit with Dr. Anderson, secondary to current complaints of postnasal drip, sputum production, and cough.  Patient was advised to continue Spiriva.  She endorses a history of asthma, allergic rhinitis, and chronic pansinusitis.   Initially patient presented with pulse oxygenation at 88%.  Administered levalbuterol in clinic, tolerated without adverse effects; post pulse oxygenation 92%.  Discussed findings of chest x-ray with recommendations.  Advised patient to follow-up with her PCP for follow-up care secondary to abnormal chest x-ray; recommendation given for short-term follow-up chest x-ray; she verbalized understanding.  She is accompanied by a family member during her visit and assessment.      Cough  This is a new problem. The current episode started in the past 7 days (Saturday). The problem has been gradually worsening. The problem occurs every few minutes. The cough is Productive of sputum. Associated symptoms include a fever, nasal congestion, postnasal drip and shortness of breath. Pertinent negatives include no chest pain, chills, ear pain, headaches, hemoptysis, myalgias, rash, sore throat or wheezing. Associated symptoms comments: Chest congestion. Nothing aggravates the symptoms. She has tried steroid inhaler for the symptoms. The treatment provided no relief. Her past medical history is significant for asthma and bronchitis. There is no history of COPD. "     Constitution: Positive for fever. Negative for appetite change, chills, generalized weakness and international travel in last 60 days.   HENT:  Positive for postnasal drip. Negative for ear pain, sinus pain, sinus pressure and sore throat.    Cardiovascular:  Negative for chest pain.   Respiratory:  Positive for cough, shortness of breath and asthma. Negative for bloody sputum, stridor and wheezing.    Musculoskeletal:  Negative for muscle ache.   Skin:  Negative for rash.   Allergic/Immunologic: Positive for asthma.   Neurological:  Negative for dizziness and headaches.    Objective:     Physical Exam   Constitutional: She is oriented to person, place, and time. She appears well-developed. She is cooperative.  Non-toxic appearance. She does not appear ill. No distress.   HENT:   Head: Normocephalic and atraumatic.   Ears:   Right Ear: Hearing, tympanic membrane, external ear and ear canal normal.   Left Ear: Hearing, tympanic membrane, external ear and ear canal normal.   Nose: Nose normal. No mucosal edema, rhinorrhea or nasal deformity. No epistaxis. Right sinus exhibits no maxillary sinus tenderness and no frontal sinus tenderness. Left sinus exhibits no maxillary sinus tenderness and no frontal sinus tenderness.   Mouth/Throat: Uvula is midline, oropharynx is clear and moist and mucous membranes are normal. No trismus in the jaw. Normal dentition. No uvula swelling. No oropharyngeal exudate, posterior oropharyngeal edema or posterior oropharyngeal erythema.   Eyes: Conjunctivae and lids are normal. No scleral icterus.   Neck: Trachea normal and phonation normal. Neck supple. No edema present. No erythema present. No neck rigidity present.   Cardiovascular: Normal rate, regular rhythm, normal heart sounds and normal pulses.   Pulmonary/Chest: Effort normal. No respiratory distress. She has no decreased breath sounds. She has rhonchi in the right upper field and the right lower field.   Abdominal: Normal  appearance.   Musculoskeletal: Normal range of motion.         General: No deformity. Normal range of motion.   Neurological: She is alert and oriented to person, place, and time. She exhibits normal muscle tone. Coordination normal.   Skin: Skin is warm, dry, intact, not diaphoretic and not pale.   Psychiatric: Her speech is normal and behavior is normal. Judgment and thought content normal.   Nursing note and vitals reviewed.    Assessment:     1. Pneumonia of both lungs due to infectious organism, unspecified part of lung    2. Cough, unspecified type    3. SOB (shortness of breath)    4. Fever, unspecified fever cause      EXAMINATION:  XR CHEST PA AND LATERAL     CLINICAL HISTORY:  Shortness of breath     TECHNIQUE:  PA and lateral views of the chest were performed.     COMPARISON:  Chest radiograph 07/19/2019 and CT thorax 12/26/2019     FINDINGS:  Mediastinal structures are midline.  Grossly similar calcification and tortuosity of the aorta.  Heart is mildly enlarged.  Pulmonary vasculature and hilar contours are within normal limits.     Minimal biapical pleuroparenchymal scarring.  Interval bilateral diffuse nonspecific interstitial coarsening with additional patchy subsegmental opacities in a perihilar and lower lung zone predominance more so on the right.  New blunting of the right costophrenic angle with increased opacification of the left obscuring majority of the diaphragm and left costophrenic angle suggesting trace right and small left pleural effusions and probable left basilar atelectasis/consolidation.  No pneumothorax.     No acute osseous process seen.     Impression:     Interval increased bilateral nonspecific pulmonary opacities which may reflect worsening pulmonary edema/CHF pattern with patchy areas of subsegmental atelectasis; however, sequela of multifocal pneumonia and/or aspiration could present similarly.  Short-term follow-up chest radiography after therapy is recommended to ensure  resolution.     Suspected new trace right and small left pleural effusions with probable left basilar atelectasis/consolidation.        Electronically signed by: Jonathan Anguiano MD  Date:                                            07/14/2023    Results for orders placed or performed in visit on 07/14/23   SARS Coronavirus 2 Antigen, POCT Manual Read   Result Value Ref Range    SARS Coronavirus 2 Antigen Negative Negative     Acceptable Yes    POCT Influenza A/B MOLECULAR   Result Value Ref Range    POC Molecular Influenza A Ag Negative Negative, Not Reported    POC Molecular Influenza B Ag Negative Negative, Not Reported     Acceptable Yes       Plan:       Pneumonia of both lungs due to infectious organism, unspecified part of lung  -     amoxicillin (AMOXIL) 875 MG tablet; Take 1 tablet (875 mg total) by mouth 2 (two) times daily. for 5 days  Dispense: 10 tablet; Refill: 0  -     azithromycin (Z-SANDY) 250 MG tablet; Take 2 tablets by mouth on day 1; Take 1 tablet by mouth on days 2-5  Dispense: 6 tablet; Refill: 0    Cough, unspecified type  -     SARS Coronavirus 2 Antigen, POCT Manual Read  -     POCT Influenza A/B MOLECULAR  -     Discontinue: albuterol nebulizer solution 0.63 mg  -     Discontinue: levalbuterol nebulizer solution 1.25 mg  -     levalbuterol nebulizer solution 1.25 mg    SOB (shortness of breath)  -     XR CHEST PA AND LATERAL; Future; Expected date: 07/14/2023  -     Discontinue: albuterol nebulizer solution 0.63 mg  -     Discontinue: levalbuterol nebulizer solution 1.25 mg  -     levalbuterol nebulizer solution 1.25 mg    Fever, unspecified fever cause  -     acetaminophen tablet 1,000 mg      Discussed Short-term follow-up chest radiography after therapy is recommended to ensure resolution.  Continue all current medications.  Follow up as scheduled with PCP.  Discussed emergent precautions for ER; SOB, chest pain, dizziness, fever, or chills.  Maintain  hydration.  Tylenol as needed for fever.

## 2023-07-16 ENCOUNTER — HOSPITAL ENCOUNTER (INPATIENT)
Facility: HOSPITAL | Age: 78
LOS: 5 days | Discharge: HOME | DRG: 871 | End: 2023-07-21
Attending: STUDENT IN AN ORGANIZED HEALTH CARE EDUCATION/TRAINING PROGRAM | Admitting: FAMILY MEDICINE
Payer: MEDICARE

## 2023-07-16 DIAGNOSIS — J18.9 COMMUNITY ACQUIRED PNEUMONIA, UNSPECIFIED LATERALITY: Primary | ICD-10-CM

## 2023-07-16 DIAGNOSIS — R06.02 SOB (SHORTNESS OF BREATH): ICD-10-CM

## 2023-07-16 DIAGNOSIS — R07.9 CHEST PAIN: ICD-10-CM

## 2023-07-16 DIAGNOSIS — J18.9 MULTIFOCAL PNEUMONIA: ICD-10-CM

## 2023-07-16 PROBLEM — E87.1 HYPONATREMIA: Status: ACTIVE | Noted: 2023-07-16

## 2023-07-16 PROBLEM — R65.20 SEVERE SEPSIS: Status: ACTIVE | Noted: 2023-07-16

## 2023-07-16 PROBLEM — A41.9 SEVERE SEPSIS: Status: ACTIVE | Noted: 2023-07-16

## 2023-07-16 LAB
ALBUMIN SERPL BCP-MCNC: 2.3 G/DL (ref 3.5–5.2)
ALP SERPL-CCNC: 157 U/L (ref 55–135)
ALT SERPL W/O P-5'-P-CCNC: 29 U/L (ref 10–44)
ANION GAP SERPL CALC-SCNC: 15 MMOL/L (ref 8–16)
ANION GAP SERPL CALC-SCNC: 17 MMOL/L (ref 8–16)
AST SERPL-CCNC: 60 U/L (ref 10–40)
BASOPHILS # BLD AUTO: 0.13 K/UL (ref 0–0.2)
BASOPHILS NFR BLD: 0.6 % (ref 0–1.9)
BILIRUB SERPL-MCNC: 0.3 MG/DL (ref 0.1–1)
BNP SERPL-MCNC: 58 PG/ML (ref 0–99)
BUN SERPL-MCNC: 25 MG/DL (ref 8–23)
BUN SERPL-MCNC: 27 MG/DL (ref 8–23)
CALCIUM SERPL-MCNC: 10.3 MG/DL (ref 8.7–10.5)
CALCIUM SERPL-MCNC: 9 MG/DL (ref 8.7–10.5)
CHLORIDE SERPL-SCNC: 89 MMOL/L (ref 95–110)
CHLORIDE SERPL-SCNC: 91 MMOL/L (ref 95–110)
CO2 SERPL-SCNC: 16 MMOL/L (ref 23–29)
CO2 SERPL-SCNC: 16 MMOL/L (ref 23–29)
CREAT SERPL-MCNC: 0.7 MG/DL (ref 0.5–1.4)
CREAT SERPL-MCNC: 0.8 MG/DL (ref 0.5–1.4)
D DIMER PPP IA.FEU-MCNC: 6.51 MG/L FEU
DIFFERENTIAL METHOD: ABNORMAL
EOSINOPHIL # BLD AUTO: 0 K/UL (ref 0–0.5)
EOSINOPHIL NFR BLD: 0.1 % (ref 0–8)
ERYTHROCYTE [DISTWIDTH] IN BLOOD BY AUTOMATED COUNT: 18.6 % (ref 11.5–14.5)
EST. GFR  (NO RACE VARIABLE): >60 ML/MIN/1.73 M^2
EST. GFR  (NO RACE VARIABLE): >60 ML/MIN/1.73 M^2
GLUCOSE SERPL-MCNC: 144 MG/DL (ref 70–110)
GLUCOSE SERPL-MCNC: 95 MG/DL (ref 70–110)
HCT VFR BLD AUTO: 38 % (ref 37–48.5)
HCV AB SERPL QL IA: NORMAL
HGB BLD-MCNC: 12.8 G/DL (ref 12–16)
HIV 1+2 AB+HIV1 P24 AG SERPL QL IA: NORMAL
IMM GRANULOCYTES # BLD AUTO: 1.08 K/UL (ref 0–0.04)
IMM GRANULOCYTES NFR BLD AUTO: 5 % (ref 0–0.5)
LYMPHOCYTES # BLD AUTO: 1.7 K/UL (ref 1–4.8)
LYMPHOCYTES NFR BLD: 7.9 % (ref 18–48)
MCH RBC QN AUTO: 27.4 PG (ref 27–31)
MCHC RBC AUTO-ENTMCNC: 33.7 G/DL (ref 32–36)
MCV RBC AUTO: 81 FL (ref 82–98)
MONOCYTES # BLD AUTO: 1.1 K/UL (ref 0.3–1)
MONOCYTES NFR BLD: 5.1 % (ref 4–15)
NEUTROPHILS # BLD AUTO: 17.6 K/UL (ref 1.8–7.7)
NEUTROPHILS NFR BLD: 81.3 % (ref 38–73)
NRBC BLD-RTO: 0 /100 WBC
PLATELET # BLD AUTO: 442 K/UL (ref 150–450)
PMV BLD AUTO: 12.4 FL (ref 9.2–12.9)
POC CARDIAC TROPONIN I: 0 NG/ML (ref 0–0.08)
POTASSIUM SERPL-SCNC: 4.3 MMOL/L (ref 3.5–5.1)
POTASSIUM SERPL-SCNC: 4.5 MMOL/L (ref 3.5–5.1)
PROT SERPL-MCNC: 8.4 G/DL (ref 6–8.4)
RBC # BLD AUTO: 4.68 M/UL (ref 4–5.4)
SAMPLE: NORMAL
SODIUM SERPL-SCNC: 122 MMOL/L (ref 136–145)
SODIUM SERPL-SCNC: 122 MMOL/L (ref 136–145)
TROPONIN I SERPL DL<=0.01 NG/ML-MCNC: <0.006 NG/ML (ref 0–0.03)
WBC # BLD AUTO: 21.66 K/UL (ref 3.9–12.7)

## 2023-07-16 PROCEDURE — 83880 ASSAY OF NATRIURETIC PEPTIDE: CPT | Mod: HCNC

## 2023-07-16 PROCEDURE — 93010 EKG 12-LEAD: ICD-10-PCS | Mod: HCNC,,, | Performed by: INTERNAL MEDICINE

## 2023-07-16 PROCEDURE — 99285 EMERGENCY DEPT VISIT HI MDM: CPT | Mod: 25,HCNC

## 2023-07-16 PROCEDURE — 25000242 PHARM REV CODE 250 ALT 637 W/ HCPCS: Mod: HCNC

## 2023-07-16 PROCEDURE — 87081 CULTURE SCREEN ONLY: CPT | Mod: HCNC | Performed by: STUDENT IN AN ORGANIZED HEALTH CARE EDUCATION/TRAINING PROGRAM

## 2023-07-16 PROCEDURE — 94640 AIRWAY INHALATION TREATMENT: CPT | Mod: HCNC,XB

## 2023-07-16 PROCEDURE — 85379 FIBRIN DEGRADATION QUANT: CPT | Mod: HCNC | Performed by: STUDENT IN AN ORGANIZED HEALTH CARE EDUCATION/TRAINING PROGRAM

## 2023-07-16 PROCEDURE — 25000242 PHARM REV CODE 250 ALT 637 W/ HCPCS: Mod: HCNC | Performed by: STUDENT IN AN ORGANIZED HEALTH CARE EDUCATION/TRAINING PROGRAM

## 2023-07-16 PROCEDURE — 94640 AIRWAY INHALATION TREATMENT: CPT | Mod: HCNC

## 2023-07-16 PROCEDURE — 84484 ASSAY OF TROPONIN QUANT: CPT | Mod: HCNC

## 2023-07-16 PROCEDURE — 87040 BLOOD CULTURE FOR BACTERIA: CPT | Mod: HCNC | Performed by: STUDENT IN AN ORGANIZED HEALTH CARE EDUCATION/TRAINING PROGRAM

## 2023-07-16 PROCEDURE — 21400001 HC TELEMETRY ROOM: Mod: HCNC

## 2023-07-16 PROCEDURE — 99223 1ST HOSP IP/OBS HIGH 75: CPT | Mod: HCNC,,, | Performed by: STUDENT IN AN ORGANIZED HEALTH CARE EDUCATION/TRAINING PROGRAM

## 2023-07-16 PROCEDURE — 85007 BL SMEAR W/DIFF WBC COUNT: CPT | Mod: HCNC

## 2023-07-16 PROCEDURE — 80048 BASIC METABOLIC PNL TOTAL CA: CPT | Mod: HCNC,XB | Performed by: STUDENT IN AN ORGANIZED HEALTH CARE EDUCATION/TRAINING PROGRAM

## 2023-07-16 PROCEDURE — 63600175 PHARM REV CODE 636 W HCPCS: Mod: HCNC | Performed by: STUDENT IN AN ORGANIZED HEALTH CARE EDUCATION/TRAINING PROGRAM

## 2023-07-16 PROCEDURE — 94761 N-INVAS EAR/PLS OXIMETRY MLT: CPT | Mod: HCNC

## 2023-07-16 PROCEDURE — 93005 ELECTROCARDIOGRAM TRACING: CPT | Mod: HCNC

## 2023-07-16 PROCEDURE — 93010 ELECTROCARDIOGRAM REPORT: CPT | Mod: HCNC,,, | Performed by: INTERNAL MEDICINE

## 2023-07-16 PROCEDURE — 87389 HIV-1 AG W/HIV-1&-2 AB AG IA: CPT | Mod: HCNC | Performed by: EMERGENCY MEDICINE

## 2023-07-16 PROCEDURE — 86803 HEPATITIS C AB TEST: CPT | Mod: HCNC | Performed by: EMERGENCY MEDICINE

## 2023-07-16 PROCEDURE — 99900035 HC TECH TIME PER 15 MIN (STAT): Mod: HCNC

## 2023-07-16 PROCEDURE — 12000002 HC ACUTE/MED SURGE SEMI-PRIVATE ROOM: Mod: HCNC

## 2023-07-16 PROCEDURE — 80053 COMPREHEN METABOLIC PANEL: CPT | Mod: HCNC | Performed by: STUDENT IN AN ORGANIZED HEALTH CARE EDUCATION/TRAINING PROGRAM

## 2023-07-16 PROCEDURE — 96374 THER/PROPH/DIAG INJ IV PUSH: CPT | Mod: HCNC

## 2023-07-16 PROCEDURE — 87449 NOS EACH ORGANISM AG IA: CPT | Mod: HCNC | Performed by: STUDENT IN AN ORGANIZED HEALTH CARE EDUCATION/TRAINING PROGRAM

## 2023-07-16 PROCEDURE — 99223 PR INITIAL HOSPITAL CARE,LEVL III: ICD-10-PCS | Mod: HCNC,,, | Performed by: STUDENT IN AN ORGANIZED HEALTH CARE EDUCATION/TRAINING PROGRAM

## 2023-07-16 PROCEDURE — 63600175 PHARM REV CODE 636 W HCPCS: Mod: HCNC

## 2023-07-16 PROCEDURE — 85027 COMPLETE CBC AUTOMATED: CPT | Mod: HCNC

## 2023-07-16 RX ORDER — IBUPROFEN 200 MG
24 TABLET ORAL
Status: DISCONTINUED | OUTPATIENT
Start: 2023-07-16 | End: 2023-07-21 | Stop reason: HOSPADM

## 2023-07-16 RX ORDER — AZITHROMYCIN 250 MG/1
500 TABLET, FILM COATED ORAL DAILY
Status: DISCONTINUED | OUTPATIENT
Start: 2023-07-16 | End: 2023-07-16

## 2023-07-16 RX ORDER — CALCIUM CARBONATE 200(500)MG
500 TABLET,CHEWABLE ORAL ONCE
Status: COMPLETED | OUTPATIENT
Start: 2023-07-17 | End: 2023-07-17

## 2023-07-16 RX ORDER — SODIUM CHLORIDE 0.9 % (FLUSH) 0.9 %
10 SYRINGE (ML) INJECTION EVERY 12 HOURS PRN
Status: DISCONTINUED | OUTPATIENT
Start: 2023-07-16 | End: 2023-07-21 | Stop reason: HOSPADM

## 2023-07-16 RX ORDER — VALSARTAN 160 MG/1
320 TABLET ORAL DAILY
Status: DISCONTINUED | OUTPATIENT
Start: 2023-07-17 | End: 2023-07-18

## 2023-07-16 RX ORDER — HEPARIN SODIUM 5000 [USP'U]/ML
5000 INJECTION, SOLUTION INTRAVENOUS; SUBCUTANEOUS EVERY 8 HOURS
Status: DISCONTINUED | OUTPATIENT
Start: 2023-07-16 | End: 2023-07-18

## 2023-07-16 RX ORDER — IPRATROPIUM BROMIDE AND ALBUTEROL SULFATE 2.5; .5 MG/3ML; MG/3ML
3 SOLUTION RESPIRATORY (INHALATION)
Status: DISPENSED | OUTPATIENT
Start: 2023-07-16 | End: 2023-07-16

## 2023-07-16 RX ORDER — SODIUM CHLORIDE 9 MG/ML
INJECTION, SOLUTION INTRAVENOUS CONTINUOUS
Status: DISCONTINUED | OUTPATIENT
Start: 2023-07-16 | End: 2023-07-16

## 2023-07-16 RX ORDER — METHYLPREDNISOLONE SOD SUCC 125 MG
125 VIAL (EA) INJECTION
Status: COMPLETED | OUTPATIENT
Start: 2023-07-16 | End: 2023-07-16

## 2023-07-16 RX ORDER — BUDESONIDE 0.5 MG/2ML
0.5 INHALANT ORAL EVERY 12 HOURS
Status: DISCONTINUED | OUTPATIENT
Start: 2023-07-16 | End: 2023-07-21 | Stop reason: HOSPADM

## 2023-07-16 RX ORDER — IPRATROPIUM BROMIDE AND ALBUTEROL SULFATE 2.5; .5 MG/3ML; MG/3ML
3 SOLUTION RESPIRATORY (INHALATION)
Status: COMPLETED | OUTPATIENT
Start: 2023-07-16 | End: 2023-07-16

## 2023-07-16 RX ORDER — AZITHROMYCIN 250 MG/1
500 TABLET, FILM COATED ORAL DAILY
Status: DISCONTINUED | OUTPATIENT
Start: 2023-07-17 | End: 2023-07-16

## 2023-07-16 RX ORDER — GLUCAGON 1 MG
1 KIT INJECTION
Status: DISCONTINUED | OUTPATIENT
Start: 2023-07-16 | End: 2023-07-21 | Stop reason: HOSPADM

## 2023-07-16 RX ORDER — NALOXONE HCL 0.4 MG/ML
0.02 VIAL (ML) INJECTION
Status: DISCONTINUED | OUTPATIENT
Start: 2023-07-16 | End: 2023-07-21 | Stop reason: HOSPADM

## 2023-07-16 RX ORDER — LEVOFLOXACIN 5 MG/ML
750 INJECTION, SOLUTION INTRAVENOUS
Status: DISCONTINUED | OUTPATIENT
Start: 2023-07-16 | End: 2023-07-18

## 2023-07-16 RX ORDER — IBUPROFEN 200 MG
16 TABLET ORAL
Status: DISCONTINUED | OUTPATIENT
Start: 2023-07-16 | End: 2023-07-21 | Stop reason: HOSPADM

## 2023-07-16 RX ORDER — NIFEDIPINE 30 MG/1
60 TABLET, EXTENDED RELEASE ORAL DAILY
Status: DISCONTINUED | OUTPATIENT
Start: 2023-07-17 | End: 2023-07-18

## 2023-07-16 RX ADMIN — LEVOFLOXACIN 750 MG: 750 INJECTION, SOLUTION INTRAVENOUS at 05:07

## 2023-07-16 RX ADMIN — IPRATROPIUM BROMIDE AND ALBUTEROL SULFATE 3 ML: .5; 3 SOLUTION RESPIRATORY (INHALATION) at 02:07

## 2023-07-16 RX ADMIN — METHYLPREDNISOLONE SODIUM SUCCINATE 125 MG: 125 INJECTION, POWDER, FOR SOLUTION INTRAMUSCULAR; INTRAVENOUS at 02:07

## 2023-07-16 RX ADMIN — HEPARIN SODIUM 5000 UNITS: 5000 INJECTION INTRAVENOUS; SUBCUTANEOUS at 10:07

## 2023-07-16 RX ADMIN — BUDESONIDE 0.5 MG: 0.5 INHALANT ORAL at 08:07

## 2023-07-16 RX ADMIN — SODIUM CHLORIDE: 9 INJECTION, SOLUTION INTRAVENOUS at 04:07

## 2023-07-16 RX ADMIN — IPRATROPIUM BROMIDE AND ALBUTEROL SULFATE 3 ML: .5; 3 SOLUTION RESPIRATORY (INHALATION) at 01:07

## 2023-07-16 RX ADMIN — IPRATROPIUM BROMIDE AND ALBUTEROL SULFATE 3 ML: .5; 3 SOLUTION RESPIRATORY (INHALATION) at 12:07

## 2023-07-16 NOTE — ASSESSMENT & PLAN NOTE
With hyponatremia and multifocal infiltrate, concern for legionella pneumonia  BNP normal, doubtful pulmonary edema  D-dimer, if age adjusted is elevated will obtain CTA  Start Levaquin

## 2023-07-16 NOTE — SUBJECTIVE & OBJECTIVE
Past Medical History:   Diagnosis Date    Allergy     Asthma     Chronic diastolic heart failure 4/5/2018    Glaucoma suspect     Hyperlipidemia     Hypertension     Neuromuscular disorder     JOHN on CPAP     Osteoporosis     Recurrent sinusitis 3/25/2014    Recurrent upper respiratory infection (URI)     Urticaria        Past Surgical History:   Procedure Laterality Date    CATARACT EXTRACTION W/  INTRAOCULAR LENS IMPLANT Right 2/11/2021    Procedure: EXTRACTION, CATARACT, WITH IOL INSERTION;  Surgeon: John Merino MD;  Location: [facility] OR;  Service: Ophthalmology;  Laterality: Right;    CATARACT EXTRACTION W/  INTRAOCULAR LENS IMPLANT Left 3/4/2021    Procedure: EXTRACTION, CATARACT, WITH IOL INSERTION;  Surgeon: John Merino MD;  Location: [facility] OR;  Service: Ophthalmology;  Laterality: Left;    COLONOSCOPY N/A 5/18/2018    Procedure: COLONOSCOPY;  Surgeon: Calixto Brian MD;  Location: [address]);  Service: Endoscopy;  Laterality: N/A;    HYSTERECTOMY      total    OOPHORECTOMY      ROTATOR CUFF REPAIR Left     SINUS SURGERY         Review of patient's allergies indicates:   Allergen Reactions    Prednisone Other (See Comments)     Bone loss    Adhesive      PAPER TAPE    Diazepam Other (See Comments)     Nervous, jittery    Gabapentin      Nervous      Keppra [levetiracetam]      nervous    Mold      sneezing       No current facility-administered medications on file prior to encounter.     Current Outpatient Medications on File Prior to Encounter   Medication Sig    ADVAIR DISKUS 500-50 mcg/dose DsDv diskus inhaler INHALE 1 PUFF TWICE DAILY    albuterol (VENTOLIN HFA) 90 mcg/actuation inhaler Inhale 2 puffs into the lungs every 6 (six) hours as needed for Wheezing. Rescue    amoxicillin (AMOXIL) 875 MG tablet Take 1 tablet (875 mg total) by mouth 2 (two) times daily. for 5 days    azelastine (ASTELIN) 137 mcg (0.1 %) nasal spray 2 sprays (274 mcg total) by Nasal route 2 (two) times daily.     azithromycin (Z-SANDY) 250 MG tablet Take 2 tablets by mouth on day 1; Take 1 tablet by mouth on days 2-5    carboxymethylcellulose sodium (REFRESH TEARS) 0.5 % Drop Apply 1 drop to eye 3 (three) times daily as needed (dry eyes).    cholecalciferol, vitamin D3, 10,000 unit Cap Take 360 mg by mouth once daily. Take 6 capsules (6,000 units total) by mouth once daily    COD LIVER OIL ORAL Take 1 tablet by mouth once daily.    dupilumab 300 mg/2 mL PnIj Inject 300 mg into the skin every 14 (fourteen) days.    ferrous sulfate 325 (65 FE) MG EC tablet Take 1 tablet (325 mg total) by mouth 3 (three) times daily with meals.    fluocinonide (LIDEX) 0.05 % external solution Apply topically 2 (two) times daily. Prn scalp itch or pain    fluocinonide (LIDEX) 0.05 % external solution Apply topically 2 (two) times daily. Prn scalp irritation    fluticasone propionate (FLONASE) 50 mcg/actuation nasal spray 2 sprays (100 mcg total) by Each Nostril route 2 (two) times a day.    guaiFENesin (MUCINEX) 600 mg 12 hr tablet Take 1 tablet (600 mg total) by mouth 2 (two) times daily.    hydrocortisone 2.5 % cream Apply topically 2 (two) times daily. Prn face rash.Stop using steroid topical when skin is smooth and non itchy.  Do not treat dark or red coloring.    ibuprofen (ADVIL,MOTRIN) 400 MG tablet Take 400 mg by mouth every 6 (six) hours as needed.    ibuprofen (ADVIL,MOTRIN) 600 MG tablet TAKE 1 TABLET (600 MG TOTAL) BY MOUTH 2 (TWO) TIMES DAILY WITH MEALS.    milk thistle 150 mg Cap Take 1 capsule by mouth once daily.    multivitamin (THERAGRAN) per tablet Take 1 tablet by mouth once daily.    NIFEdipine (PROCARDIA-XL) 60 MG (OSM) 24 hr tablet Take 1 tablet (60 mg total) by mouth once daily. Replaces isradipine.    potassium chloride SA (K-DUR,KLOR-CON) 20 MEQ tablet Take 1 tablet (20 mEq total) by mouth once daily.    SENNA 8.6 mg tablet TAKE 2 TAB(S) BY MOUTH NIGHTLY AS NEEDED FOR CONSTIPATION    tiotropium bromide (SPIRIVA RESPIMAT)  2.5 mcg/actuation inhaler Inhale 2 puffs into the lungs Daily. Controller    tretinoin (RETIN-A) 0.05 % cream Apply topically every evening. Start with every other night and move up to nightly after 2 weeks if not too dry.    valsartan (DIOVAN) 320 MG tablet Take 1 tablet (320 mg total) by mouth once daily.     Family History       Problem Relation (Age of Onset)    Cancer Sister    Hypertension Daughter, Daughter    Kidney cancer Sister    No Known Problems Mother, Father    Ovarian cancer Sister          Tobacco Use    Smoking status: Former     Packs/day: 0.25     Years: 40.00     Pack years: 10.00     Types: Cigarettes     Quit date: 2005     Years since quittin.5    Smokeless tobacco: Never   Substance and Sexual Activity    Alcohol use: Yes     Comment: rare beer    Drug use: No    Sexual activity: Not Currently     Review of Systems   Constitutional:  Positive for fatigue.   HENT: Negative.     Eyes: Negative.    Respiratory:  Positive for cough, shortness of breath and wheezing.    Cardiovascular: Negative.    Gastrointestinal: Negative.    Endocrine: Negative.    Genitourinary: Negative.    Musculoskeletal: Negative.    Skin: Negative.    Allergic/Immunologic: Negative.    Neurological: Negative.    Hematological: Negative.    Psychiatric/Behavioral: Negative.     Objective:     Vital Signs (Most Recent):  Temp: 98.4 °F (36.9 °C) (23 1209)  Pulse: 105 (23 1449)  Resp: 20 (23 1449)  BP: (!) 120/56 (23 1449)  SpO2: 98 % (23 1428) Vital Signs (24h Range):  Temp:  [98.4 °F (36.9 °C)] 98.4 °F (36.9 °C)  Pulse:  [] 105  Resp:  [20-30] 20  SpO2:  [93 %-100 %] 98 %  BP: (120-131)/(56-61) 120/56     Weight: 77.1 kg (170 lb)  Body mass index is 28.29 kg/m².     Physical Exam  Constitutional:       General: She is not in acute distress.     Appearance: Normal appearance. She is not diaphoretic.   HENT:      Head: Atraumatic.      Right Ear: External ear normal.      Left  Ear: External ear normal.      Nose: Nose normal.      Mouth/Throat:      Mouth: Mucous membranes are moist.      Pharynx: Oropharynx is clear. No oropharyngeal exudate or posterior oropharyngeal erythema.   Eyes:      Extraocular Movements: Extraocular movements intact.      Conjunctiva/sclera: Conjunctivae normal.      Pupils: Pupils are equal, round, and reactive to light.   Cardiovascular:      Rate and Rhythm: Regular rhythm. Tachycardia present.      Heart sounds: Normal heart sounds.     No gallop.   Pulmonary:      Effort: Pulmonary effort is normal.      Breath sounds: Wheezing, rhonchi and rales present.   Abdominal:      General: There is distension.      Palpations: Abdomen is soft.      Tenderness: There is no left CVA tenderness or rebound.   Musculoskeletal:         General: Normal range of motion.      Cervical back: Normal range of motion and neck supple.      Right lower leg: No edema.      Left lower leg: No edema.   Skin:     General: Skin is warm and dry.      Capillary Refill: Capillary refill takes less than 2 seconds.      Findings: No lesion or rash.   Neurological:      General: No focal deficit present.      Mental Status: She is alert and oriented to person, place, and time. Mental status is at baseline.   Psychiatric:         Mood and Affect: Mood normal.         Behavior: Behavior normal.            CRANIAL NERVES     CN III, IV, VI   Pupils are equal, round, and reactive to light.     Significant Labs: All pertinent labs within the past 24 hours have been reviewed.  CBC:   Recent Labs   Lab 07/16/23  1255   WBC 21.66*   HGB 12.8   HCT 38.0        CMP:   Recent Labs   Lab 07/16/23  1434   *   K 4.3   CL 89*   CO2 16*   GLU 95   BUN 27*   CREATININE 0.7   CALCIUM 10.3   PROT 8.4   ALBUMIN 2.3*   BILITOT 0.3   ALKPHOS 157*   AST 60*   ALT 29   ANIONGAP 17*       Significant Imaging: I have reviewed all pertinent imaging results/findings within the past 24 hours.

## 2023-07-16 NOTE — HPI
Patient is a pleasant 77 yo old female with a PMHx of diastolic HF, asthma, HLD, HTN, JOHN, recurrent pulmonary infections presents to the ED with son at bedside c/o SOB and chest wall pain that worsened  since yesterday. Patient was diagnosed with PNA on Friday and was discharged with amoxicillin and azithromycin -- however she notes her symptoms have not improved and also reports of a new onset CP with deep inspiration. She denies abdominal pain, HA, fever, chills, N, V, diarrhea, dysuria and hematuria. Patient reports she started to have coughing spills on Thursday with associated sputum production and fatigue. After PO abx she does not appear to be getting better.  She noted wheezing at home which have not happened for 3 years now.      In the ED, Xray showed Multifocal bilateral interstitial and alveolar opacities, similar to prior study.  Possible small bilateral pleural effusions.  No pneumothorax.  Cardiac silhouette is prominent, stable. Appears slightly worse than previous.

## 2023-07-16 NOTE — ASSESSMENT & PLAN NOTE
Likely mixed, from cough and hypovolemia   Start NS  Check BMP [id number]  Obtain legionella antigen

## 2023-07-16 NOTE — ED PROVIDER NOTES
Encounter Date: 7/16/2023       History     Chief Complaint   Patient presents with    Pneumonia     Dx on Friday with pneumonia,on antibiotics, chest hurts with breathig      79 y/o F with a PMHx of diastolic HF, asthma, HLD, HTN, JOHN presents to the ED with son at bedside c/o SOB and CP that worsened yesterday. Patient was diagnosed with PNA on Friday and was discharged with amoxicillin and azithromycin -- however she notes her symptoms have not improved and also reports of a new onset CP with deep inspiration. She denies abdominal pain, HA, fever, chills, N, V, diarrhea, dysuria and hematuria.     The history is provided by the patient, medical records and a relative.   Review of patient's allergies indicates:   Allergen Reactions    Prednisone Other (See Comments)     Bone loss    Adhesive      PAPER TAPE    Diazepam Other (See Comments)     Nervous, jittery    Gabapentin      Nervous      Keppra [levetiracetam]      nervous    Mold      sneezing     Past Medical History:   Diagnosis Date    Allergy     Asthma     Chronic diastolic heart failure 4/5/2018    Glaucoma suspect     Hyperlipidemia     Hypertension     Neuromuscular disorder     JOHN on CPAP     Osteoporosis     Recurrent sinusitis 3/25/2014    Recurrent upper respiratory infection (URI)     Urticaria      Past Surgical History:   Procedure Laterality Date    CATARACT EXTRACTION W/  INTRAOCULAR LENS IMPLANT Right 2/11/2021    Procedure: EXTRACTION, CATARACT, WITH IOL INSERTION;  Surgeon: John Merino MD;  Location: [facility];  Service: Ophthalmology;  Laterality: Right;    CATARACT EXTRACTION W/  INTRAOCULAR LENS IMPLANT Left 3/4/2021    Procedure: EXTRACTION, CATARACT, WITH IOL INSERTION;  Surgeon: John Merino MD;  Location: [facility] OR;  Service: Ophthalmology;  Laterality: Left;    COLONOSCOPY N/A 5/18/2018    Procedure: COLONOSCOPY;  Surgeon: Calixto Brian MD;  Location: [facility] ENDO [address]);  Service: Endoscopy;  Laterality: N/A;    HYSTERECTOMY       total    OOPHORECTOMY      ROTATOR CUFF REPAIR Left     SINUS SURGERY       Family History   Problem Relation Age of Onset    No Known Problems Mother         healthy in 90s    No Known Problems Father         accident-related death in 50s    Kidney cancer Sister     Cancer Sister         renal    Hypertension Daughter     Ovarian cancer Sister     Hypertension Daughter      Social History     Tobacco Use    Smoking status: Former     Packs/day: 0.25     Years: 40.00     Pack years: 10.00     Types: Cigarettes     Quit date: 2005     Years since quittin.5    Smokeless tobacco: Never   Substance Use Topics    Alcohol use: Yes     Comment: rare beer    Drug use: No     Review of Systems    Physical Exam     Initial Vitals [23 1209]   BP Pulse Resp Temp SpO2   131/61 92 (!) 22 98.4 °F (36.9 °C) (!) 93 %      MAP       --         Physical Exam    Nursing note and vitals reviewed.  Constitutional: Vital signs are normal. She appears well-developed and well-nourished. She is not diaphoretic. No distress.   Appears uncomfortable due to symptoms.    HENT:   Head: Normocephalic and atraumatic.   Right Ear: External ear normal.   Left Ear: External ear normal.   Eyes: EOM are normal. Right eye exhibits no discharge. Left eye exhibits no discharge.   Neck: Trachea normal. Neck supple. No thyroid mass present.   Cardiovascular:  Normal rate, regular rhythm, normal heart sounds and intact distal pulses.     Exam reveals no gallop and no friction rub.       No murmur heard.  Pulmonary/Chest: No respiratory distress. She has wheezes. She has no rhonchi. She has rales.   Abdominal: Abdomen is soft. Bowel sounds are normal. She exhibits no distension. There is no abdominal tenderness. There is no rebound and no guarding.   Musculoskeletal:         General: No tenderness or edema. Normal range of motion.      Cervical back: Neck supple.     Neurological: She is alert and oriented to person, place, and time. She  has normal strength. No cranial nerve deficit or sensory deficit. GCS score is 15. GCS eye subscore is 4. GCS verbal subscore is 5. GCS motor subscore is 6.   Skin: Skin is warm and dry. Capillary refill takes less than 2 seconds. No rash noted.   Psychiatric: She has a normal mood and affect.       ED Course   Procedures  Labs Reviewed   CBC W/ AUTO DIFFERENTIAL - Abnormal; Notable for the following components:       Result Value    WBC 21.66 (*)     MCV 81 (*)     RDW 18.6 (*)     Immature Granulocytes 5.0 (*)     Gran # (ANC) 17.6 (*)     Immature Grans (Abs) 1.08 (*)     Mono # 1.1 (*)     Gran % 81.3 (*)     Lymph % 7.9 (*)     All other components within normal limits   COMPREHENSIVE METABOLIC PANEL - Abnormal; Notable for the following components:    Sodium 122 (*)     Chloride 89 (*)     CO2 16 (*)     BUN 27 (*)     Albumin 2.3 (*)     Alkaline Phosphatase 157 (*)     AST 60 (*)     Anion Gap 17 (*)     All other components within normal limits   HIV 1 / 2 ANTIBODY    Narrative:     Release to patient->Immediate   HEPATITIS C ANTIBODY    Narrative:     Release to patient->Immediate   TROPONIN I   B-TYPE NATRIURETIC PEPTIDE   TROPONIN ISTAT   POCT TROPONIN     EKG Readings: (Independently Interpreted)   91 bpm. NSR. L axis deviation. Isoelectric T wave in lead I and aVL. No obvious ST-segment ischemic changes.    ECG Results              EKG 12-lead (Final result)  Result time 07/16/23 14:09:24      Final result by Interface, Lab In [facility] (07/16/23 14:09:24)                   Narrative:    Test Reason : R07.9,    Vent. Rate : 091 BPM     Atrial Rate : 091 BPM     P-R Int : 174 ms          QRS Dur : 100 ms      QT Int : 356 ms       P-R-T Axes : 020 -34 101 degrees     QTc Int : 437 ms    Normal sinus rhythm  Left axis deviation  Low anterior forces and R wave progression  Moderate voltage criteria for LVH, may be normal variant ( R in aVL ,   [place] product )  T wave abnormality, consider lateral  ischemia  Abnormal ECG  When compared with ECG of 19-SEP-2017 09:24,  T wave inversion now evident in Lateral leads  Confirmed by Aleks FROST MD ([id number]) on 7/16/2023 2:09:13 PM    Referred By: NICHOLAS   SELF           Confirmed By:Aleks FROST MD                                  Imaging Results              X-Ray Chest AP Portable (Final result)  Result time 07/16/23 14:10:28      Final result by Min Norton MD (07/16/23 14:10:28)                   Impression:      Overall aeration similar to prior study.      Electronically signed by: Min Norton MD  Date:    07/16/2023  Time:    14:10               Narrative:    EXAMINATION:  XR CHEST AP PORTABLE    CLINICAL HISTORY:  Chest Pain;    TECHNIQUE:  Single frontal view of the chest was performed.    COMPARISON:  07/14/2023    FINDINGS:  Multifocal bilateral interstitial and alveolar opacities, similar to prior study.  Possible small bilateral pleural effusions.  No pneumothorax.  Cardiac silhouette is prominent, stable.                                       Medications   albuterol-ipratropium 2.5 mg-0.5 mg/3 mL nebulizer solution 3 mL (3 mLs Nebulization Given 7/16/23 1314)   albuterol-ipratropium 2.5 mg-0.5 mg/3 mL nebulizer solution 3 mL (3 mLs Nebulization Given 7/16/23 1422)   methylPREDNISolone sodium succinate injection 125 mg (125 mg Intravenous Given 7/16/23 1434)     Medical Decision Making:   Initial Assessment:   79 y/o F who appears to be uncomfortable due to symptoms presents to the ED c/o SOB and pleuritic CP. ABCs intact. Initial triage vitals reveal tachypnea and hypoxia.   Differential Diagnosis:   Pneumonia  ACS  Electrolyte abnormality  Anemia  COPD exacerbation  CHF exacerbation  Clinical Tests:   Lab Tests: Ordered and Reviewed  Radiological Study: Ordered and Reviewed  Medical Tests: Ordered and Reviewed  ED Management:  See ED course.    ADDENDUM: Patient also has increasing oxygen requirements -- correlates in the setting of a worsening PNA  on CXR.  Additional MDM:     Well's Criteria Score:  -Clinical symptoms of DVT (leg swelling, pain with palpation) = 0.0  -Other diagnosis less likely than pulmonary embolism =            0.0  -Heart Rate >100 =   0.0  -Immobilization (= or > than 3 days) or surgery in the previous 4 weeks = 0.0  -Previous DVT/PE = 0.0  -Hemoptysis =          0.0  -Malignancy =           0.0  Well's Probability Score =    0            ED Course as of 07/16/23 1522   Sun Jul 16, 2023   1300 Following initial evaluation I will obtain labs and imaging were obtained with the patient's complaint of shortness of breath and chest pain.  I will re-evaluate patient following initial results. [BG]   1342 WBC(!): 21.66  New leukocytosis. [BG]   1342 POC Cardiac Troponin I: 0.00  Within normal limits. [BG]   1507 After the initial dose of duonebs the patient still had persistent wheezing so the second round was ordered. After the 2nd round she no longer has appreciable wheezing. [BG]   1508 CXR reveals worsening PNA with consolidations increasing in the R lung field. I will admit the patient to the hospital for asthma exacerbation and worsening PNA. [BG]   1510 Troponin I: <0.006  Decreases likelihood of ACS. [BG]   1510 BNP: 58  Decreased likelihood of CHF exacerbation. [BG]      ED Course User Index  [BG] Mary Melendez MD                 Clinical Impression:   Final diagnoses:  [R07.9] Chest pain        ED Disposition Condition    Admit                 Mary Melendez MD  Resident  07/16/23 1522

## 2023-07-16 NOTE — H&P
[facility] Emergency Dept  [facility]  History & Physical    Patient Name: Michela Franco  MRN: [id number]  Patient Class: IP- Inpatient  Admission Date: 7/16/2023  Attending Physician: Paresh Kidd III,*   Primary Care Provider: Mayela Broussard MD         Patient information was obtained from patient and ER records.     Subjective:     Principal Problem:Severe sepsis    Chief Complaint: cough sob  Chief Complaint   Patient presents with    Pneumonia     Dx on Friday with pneumonia,on antibiotics, chest hurts with breathig         HPI: Patient is a pleasant 79 yo old female with a PMHx of diastolic HF, asthma, HLD, HTN, JOHN, recurrent pulmonary infections presents to the ED with son at bedside c/o SOB and chest wall pain that worsened  since yesterday. Patient was diagnosed with PNA on Friday and was discharged with amoxicillin and azithromycin -- however she notes her symptoms have not improved and also reports of a new onset CP with deep inspiration. She denies abdominal pain, HA, fever, chills, N, V, diarrhea, dysuria and hematuria. Patient reports she started to have coughing spills on Thursday with associated sputum production and fatigue. After PO abx she does not appear to be getting better.  She noted wheezing at home which have not happened for 3 years now.      In the ED, Xray showed Multifocal bilateral interstitial and alveolar opacities, similar to prior study.  Possible small bilateral pleural effusions.  No pneumothorax.  Cardiac silhouette is prominent, stable. Appears slightly worse than previous.            Past Medical History:   Diagnosis Date    Allergy     Asthma     Chronic diastolic heart failure 4/5/2018    Glaucoma suspect     Hyperlipidemia     Hypertension     Neuromuscular disorder     JOHN on CPAP     Osteoporosis     Recurrent sinusitis 3/25/2014    Recurrent upper respiratory infection (URI)     Urticaria        Past Surgical History:   Procedure Laterality  Date    CATARACT EXTRACTION W/  INTRAOCULAR LENS IMPLANT Right 2/11/2021    Procedure: EXTRACTION, CATARACT, WITH IOL INSERTION;  Surgeon: John Merino MD;  Location: [facility] OR;  Service: Ophthalmology;  Laterality: Right;    CATARACT EXTRACTION W/  INTRAOCULAR LENS IMPLANT Left 3/4/2021    Procedure: EXTRACTION, CATARACT, WITH IOL INSERTION;  Surgeon: John Merino MD;  Location: [facility] OR;  Service: Ophthalmology;  Laterality: Left;    COLONOSCOPY N/A 5/18/2018    Procedure: COLONOSCOPY;  Surgeon: Calixto Brian MD;  Location: [address]);  Service: Endoscopy;  Laterality: N/A;    HYSTERECTOMY      total    OOPHORECTOMY      ROTATOR CUFF REPAIR Left     SINUS SURGERY         Review of patient's allergies indicates:   Allergen Reactions    Prednisone Other (See Comments)     Bone loss    Adhesive      PAPER TAPE    Diazepam Other (See Comments)     Nervous, jittery    Gabapentin      Nervous      Keppra [levetiracetam]      nervous    Mold      sneezing       No current facility-administered medications on file prior to encounter.     Current Outpatient Medications on File Prior to Encounter   Medication Sig    ADVAIR DISKUS 500-50 mcg/dose DsDv diskus inhaler INHALE 1 PUFF TWICE DAILY    albuterol (VENTOLIN HFA) 90 mcg/actuation inhaler Inhale 2 puffs into the lungs every 6 (six) hours as needed for Wheezing. Rescue    amoxicillin (AMOXIL) 875 MG tablet Take 1 tablet (875 mg total) by mouth 2 (two) times daily. for 5 days    azelastine (ASTELIN) 137 mcg (0.1 %) nasal spray 2 sprays (274 mcg total) by Nasal route 2 (two) times daily.    azithromycin (Z-SANDY) 250 MG tablet Take 2 tablets by mouth on day 1; Take 1 tablet by mouth on days 2-5    carboxymethylcellulose sodium (REFRESH TEARS) 0.5 % Drop Apply 1 drop to eye 3 (three) times daily as needed (dry eyes).    cholecalciferol, vitamin D3, 10,000 unit Cap Take 360 mg by mouth once daily. Take 6 capsules (6,000 units total) by mouth  once daily    COD LIVER OIL ORAL Take 1 tablet by mouth once daily.    dupilumab 300 mg/2 mL PnIj Inject 300 mg into the skin every 14 (fourteen) days.    ferrous sulfate 325 (65 FE) MG EC tablet Take 1 tablet (325 mg total) by mouth 3 (three) times daily with meals.    fluocinonide (LIDEX) 0.05 % external solution Apply topically 2 (two) times daily. Prn scalp itch or pain    fluocinonide (LIDEX) 0.05 % external solution Apply topically 2 (two) times daily. Prn scalp irritation    fluticasone propionate (FLONASE) 50 mcg/actuation nasal spray 2 sprays (100 mcg total) by Each Nostril route 2 (two) times a day.    guaiFENesin (MUCINEX) 600 mg 12 hr tablet Take 1 tablet (600 mg total) by mouth 2 (two) times daily.    hydrocortisone 2.5 % cream Apply topically 2 (two) times daily. Prn face rash.Stop using steroid topical when skin is smooth and non itchy.  Do not treat dark or red coloring.    ibuprofen (ADVIL,MOTRIN) 400 MG tablet Take 400 mg by mouth every 6 (six) hours as needed.    ibuprofen (ADVIL,MOTRIN) 600 MG tablet TAKE 1 TABLET (600 MG TOTAL) BY MOUTH 2 (TWO) TIMES DAILY WITH MEALS.    milk thistle 150 mg Cap Take 1 capsule by mouth once daily.    multivitamin (THERAGRAN) per tablet Take 1 tablet by mouth once daily.    NIFEdipine (PROCARDIA-XL) 60 MG (OSM) 24 hr tablet Take 1 tablet (60 mg total) by mouth once daily. Replaces isradipine.    potassium chloride SA (K-DUR,KLOR-CON) 20 MEQ tablet Take 1 tablet (20 mEq total) by mouth once daily.    SENNA 8.6 mg tablet TAKE 2 TAB(S) BY MOUTH NIGHTLY AS NEEDED FOR CONSTIPATION    tiotropium bromide (SPIRIVA RESPIMAT) 2.5 mcg/actuation inhaler Inhale 2 puffs into the lungs Daily. Controller    tretinoin (RETIN-A) 0.05 % cream Apply topically every evening. Start with every other night and move up to nightly after 2 weeks if not too dry.    valsartan (DIOVAN) 320 MG tablet Take 1 tablet (320 mg total) by mouth once daily.     Family History        Problem Relation (Age of Onset)    Cancer Sister    Hypertension Daughter, Daughter    Kidney cancer Sister    No Known Problems Mother, Father    Ovarian cancer Sister          Tobacco Use    Smoking status: Former     Packs/day: 0.25     Years: 40.00     Pack years: 10.00     Types: Cigarettes     Quit date: 2005     Years since quittin.5    Smokeless tobacco: Never   Substance and Sexual Activity    Alcohol use: Yes     Comment: rare beer    Drug use: No    Sexual activity: Not Currently     Review of Systems   Constitutional:  Positive for fatigue.   HENT: Negative.     Eyes: Negative.    Respiratory:  Positive for cough, shortness of breath and wheezing.    Cardiovascular: Negative.    Gastrointestinal: Negative.    Endocrine: Negative.    Genitourinary: Negative.    Musculoskeletal: Negative.    Skin: Negative.    Allergic/Immunologic: Negative.    Neurological: Negative.    Hematological: Negative.    Psychiatric/Behavioral: Negative.     Objective:     Vital Signs (Most Recent):  Temp: 98.4 °F (36.9 °C) (23 1209)  Pulse: 105 (23 1449)  Resp: 20 (23 1449)  BP: (!) 120/56 (23 1449)  SpO2: 98 % (23 1428) Vital Signs (24h Range):  Temp:  [98.4 °F (36.9 °C)] 98.4 °F (36.9 °C)  Pulse:  [] 105  Resp:  [20-30] 20  SpO2:  [93 %-100 %] 98 %  BP: (120-131)/(56-61) 120/56     Weight: 77.1 kg (170 lb)  Body mass index is 28.29 kg/m².     Physical Exam  Constitutional:       General: She is not in acute distress.     Appearance: Normal appearance. She is not diaphoretic.   HENT:      Head: Atraumatic.      Right Ear: External ear normal.      Left Ear: External ear normal.      Nose: Nose normal.      Mouth/Throat:      Mouth: Mucous membranes are moist.      Pharynx: Oropharynx is clear. No oropharyngeal exudate or posterior oropharyngeal erythema.   Eyes:      Extraocular Movements: Extraocular movements intact.      Conjunctiva/sclera: Conjunctivae normal.      Pupils:  Pupils are equal, round, and reactive to light.   Cardiovascular:      Rate and Rhythm: Regular rhythm. Tachycardia present.      Heart sounds: Normal heart sounds.     No gallop.   Pulmonary:      Effort: Pulmonary effort is normal.      Breath sounds: Wheezing, rhonchi and rales present.   Abdominal:      General: There is distension.      Palpations: Abdomen is soft.      Tenderness: There is no left CVA tenderness or rebound.   Musculoskeletal:         General: Normal range of motion.      Cervical back: Normal range of motion and neck supple.      Right lower leg: No edema.      Left lower leg: No edema.   Skin:     General: Skin is warm and dry.      Capillary Refill: Capillary refill takes less than 2 seconds.      Findings: No lesion or rash.   Neurological:      General: No focal deficit present.      Mental Status: She is alert and oriented to person, place, and time. Mental status is at baseline.   Psychiatric:         Mood and Affect: Mood normal.         Behavior: Behavior normal.            CRANIAL NERVES     CN III, IV, VI   Pupils are equal, round, and reactive to light.     Significant Labs: All pertinent labs within the past 24 hours have been reviewed.  CBC:   Recent Labs   Lab 07/16/23  1255   WBC 21.66*   HGB 12.8   HCT 38.0        CMP:   Recent Labs   Lab 07/16/23  1434   *   K 4.3   CL 89*   CO2 16*   GLU 95   BUN 27*   CREATININE 0.7   CALCIUM 10.3   PROT 8.4   ALBUMIN 2.3*   BILITOT 0.3   ALKPHOS 157*   AST 60*   ALT 29   ANIONGAP 17*       Significant Imaging: I have reviewed all pertinent imaging results/findings within the past 24 hours.    Assessment/Plan:     * Severe sepsis  Secondary to pneumonia  Blood cultures      Multifocal pneumonia  With hyponatremia and multifocal infiltrate, concern for legionella pneumonia  BNP normal, doubtful pulmonary edema  D-dimer, if age adjusted is elevated will obtain CTA  Start Levaquin        Hyponatremia  Likely mixed, from cough and  hypovolemia   Start NS  Check BMP [id number]  Obtain legionella antigen       Recurrent upper respiratory infection (URI)  CT chest or CTA      Moderate persistent asthma without complication  In acute excerebration secondary to pneumonia  Solumedrol 40 mg BID   duonebs      GERD (gastroesophageal reflux disease)  Resume home medications      HTN (hypertension)  Resume home medications        VTE Risk Mitigation (From admission, onward)         Ordered     heparin (porcine) injection 5,000 Units  Every 8 hours         07/16/23 1549     IP VTE HIGH RISK PATIENT  Once         07/16/23 1549     Place sequential compression device  Until discontinued         07/16/23 1549                           Ariana Ma DO  Department of Hospital Medicine  [facility] - Emergency Dept

## 2023-07-17 PROBLEM — R14.0 ABDOMINAL DISTENSION: Status: ACTIVE | Noted: 2023-07-17

## 2023-07-17 PROBLEM — R74.01 ELEVATED TRANSAMINASE LEVEL: Status: ACTIVE | Noted: 2023-07-17

## 2023-07-17 PROBLEM — F17.201 NICOTINE DEPENDENCE IN REMISSION: Status: ACTIVE | Noted: 2023-07-17

## 2023-07-17 PROBLEM — J18.9 MULTIFOCAL PNEUMONIA: Status: RESOLVED | Noted: 2023-07-16 | Resolved: 2023-07-17

## 2023-07-17 LAB
ADENOVIRUS: NOT DETECTED
ALBUMIN SERPL BCP-MCNC: 1.9 G/DL (ref 3.5–5.2)
ALP SERPL-CCNC: 132 U/L (ref 55–135)
ALT SERPL W/O P-5'-P-CCNC: 40 U/L (ref 10–44)
ANION GAP SERPL CALC-SCNC: 11 MMOL/L (ref 8–16)
ANION GAP SERPL CALC-SCNC: 14 MMOL/L (ref 8–16)
ANISOCYTOSIS BLD QL SMEAR: SLIGHT
AST SERPL-CCNC: 77 U/L (ref 10–40)
BASOPHILS # BLD AUTO: ABNORMAL K/UL (ref 0–0.2)
BASOPHILS NFR BLD: 0 % (ref 0–1.9)
BILIRUB SERPL-MCNC: 0.2 MG/DL (ref 0.1–1)
BORDETELLA PARAPERTUSSIS (IS1001): NOT DETECTED
BORDETELLA PERTUSSIS (PTXP): NOT DETECTED
BUN SERPL-MCNC: 24 MG/DL (ref 8–23)
BUN SERPL-MCNC: 27 MG/DL (ref 8–23)
BURR CELLS BLD QL SMEAR: ABNORMAL
CALCIUM SERPL-MCNC: 8.9 MG/DL (ref 8.7–10.5)
CALCIUM SERPL-MCNC: 9.4 MG/DL (ref 8.7–10.5)
CHLAMYDIA PNEUMONIAE: NOT DETECTED
CHLORIDE SERPL-SCNC: 90 MMOL/L (ref 95–110)
CHLORIDE SERPL-SCNC: 91 MMOL/L (ref 95–110)
CO2 SERPL-SCNC: 19 MMOL/L (ref 23–29)
CO2 SERPL-SCNC: 19 MMOL/L (ref 23–29)
CORONAVIRUS 229E, COMMON COLD VIRUS: NOT DETECTED
CORONAVIRUS HKU1, COMMON COLD VIRUS: NOT DETECTED
CORONAVIRUS NL63, COMMON COLD VIRUS: NOT DETECTED
CORONAVIRUS OC43, COMMON COLD VIRUS: NOT DETECTED
CREAT SERPL-MCNC: 0.7 MG/DL (ref 0.5–1.4)
CREAT SERPL-MCNC: 0.7 MG/DL (ref 0.5–1.4)
CRP SERPL-MCNC: 251.8 MG/L (ref 0–8.2)
DIFFERENTIAL METHOD: ABNORMAL
EOSINOPHIL # BLD AUTO: ABNORMAL K/UL (ref 0–0.5)
EOSINOPHIL NFR BLD: 0 % (ref 0–8)
ERYTHROCYTE [DISTWIDTH] IN BLOOD BY AUTOMATED COUNT: 16.9 % (ref 11.5–14.5)
EST. GFR  (NO RACE VARIABLE): >60 ML/MIN/1.73 M^2
EST. GFR  (NO RACE VARIABLE): >60 ML/MIN/1.73 M^2
FLUBV RNA NPH QL NAA+NON-PROBE: NOT DETECTED
GLUCOSE SERPL-MCNC: 132 MG/DL (ref 70–110)
GLUCOSE SERPL-MCNC: 148 MG/DL (ref 70–110)
HCT VFR BLD AUTO: 33.6 % (ref 37–48.5)
HGB BLD-MCNC: 11.1 G/DL (ref 12–16)
HPIV1 RNA NPH QL NAA+NON-PROBE: NOT DETECTED
HPIV2 RNA NPH QL NAA+NON-PROBE: NOT DETECTED
HPIV3 RNA NPH QL NAA+NON-PROBE: NOT DETECTED
HPIV4 RNA NPH QL NAA+NON-PROBE: NOT DETECTED
HUMAN METAPNEUMOVIRUS: NOT DETECTED
IMM GRANULOCYTES # BLD AUTO: ABNORMAL K/UL (ref 0–0.04)
IMM GRANULOCYTES NFR BLD AUTO: ABNORMAL % (ref 0–0.5)
INFLUENZA A (SUBTYPES H1,H1-2009,H3): NOT DETECTED
LACTATE SERPL-SCNC: 0.7 MMOL/L (ref 0.5–2.2)
LYMPHOCYTES # BLD AUTO: ABNORMAL K/UL (ref 1–4.8)
LYMPHOCYTES NFR BLD: 3 % (ref 18–48)
MCH RBC QN AUTO: 27 PG (ref 27–31)
MCHC RBC AUTO-ENTMCNC: 33 G/DL (ref 32–36)
MCV RBC AUTO: 82 FL (ref 82–98)
METAMYELOCYTES NFR BLD MANUAL: 2 %
MONOCYTES # BLD AUTO: ABNORMAL K/UL (ref 0.3–1)
MONOCYTES NFR BLD: 1 % (ref 4–15)
MYCOPLASMA PNEUMONIAE: NOT DETECTED
MYELOCYTES NFR BLD MANUAL: 3 %
NEUTROPHILS NFR BLD: 91 % (ref 38–73)
NRBC BLD-RTO: 0 /100 WBC
OSMOLALITY SERPL: 268 MOSM/KG (ref 275–295)
OSMOLALITY UR: 317 MOSM/KG (ref 50–1200)
PLATELET # BLD AUTO: 472 K/UL (ref 150–450)
PLATELET BLD QL SMEAR: ABNORMAL
PMV BLD AUTO: 9.6 FL (ref 9.2–12.9)
POIKILOCYTOSIS BLD QL SMEAR: SLIGHT
POTASSIUM SERPL-SCNC: 4.8 MMOL/L (ref 3.5–5.1)
POTASSIUM SERPL-SCNC: 4.8 MMOL/L (ref 3.5–5.1)
PROCALCITONIN SERPL IA-MCNC: 0.37 NG/ML
PROT SERPL-MCNC: 7 G/DL (ref 6–8.4)
RBC # BLD AUTO: 4.11 M/UL (ref 4–5.4)
RESPIRATORY INFECTION PANEL SOURCE: NORMAL
RSV RNA NPH QL NAA+NON-PROBE: NOT DETECTED
RV+EV RNA NPH QL NAA+NON-PROBE: NOT DETECTED
SARS-COV-2 RNA RESP QL NAA+PROBE: NOT DETECTED
SODIUM SERPL-SCNC: 121 MMOL/L (ref 136–145)
SODIUM SERPL-SCNC: 123 MMOL/L (ref 136–145)
SODIUM UR-SCNC: <10 MMOL/L (ref 20–250)
WBC # BLD AUTO: 17.12 K/UL (ref 3.9–12.7)

## 2023-07-17 PROCEDURE — 99233 SBSQ HOSP IP/OBS HIGH 50: CPT | Mod: HCNC,,, | Performed by: FAMILY MEDICINE

## 2023-07-17 PROCEDURE — 25500020 PHARM REV CODE 255: Mod: HCNC | Performed by: FAMILY MEDICINE

## 2023-07-17 PROCEDURE — 25000242 PHARM REV CODE 250 ALT 637 W/ HCPCS: Mod: HCNC | Performed by: STUDENT IN AN ORGANIZED HEALTH CARE EDUCATION/TRAINING PROGRAM

## 2023-07-17 PROCEDURE — 83930 ASSAY OF BLOOD OSMOLALITY: CPT | Mod: HCNC | Performed by: STUDENT IN AN ORGANIZED HEALTH CARE EDUCATION/TRAINING PROGRAM

## 2023-07-17 PROCEDURE — 80048 BASIC METABOLIC PNL TOTAL CA: CPT | Mod: HCNC,XB | Performed by: STUDENT IN AN ORGANIZED HEALTH CARE EDUCATION/TRAINING PROGRAM

## 2023-07-17 PROCEDURE — 80053 COMPREHEN METABOLIC PANEL: CPT | Mod: HCNC | Performed by: STUDENT IN AN ORGANIZED HEALTH CARE EDUCATION/TRAINING PROGRAM

## 2023-07-17 PROCEDURE — 63600175 PHARM REV CODE 636 W HCPCS: Mod: HCNC | Performed by: STUDENT IN AN ORGANIZED HEALTH CARE EDUCATION/TRAINING PROGRAM

## 2023-07-17 PROCEDURE — 25000003 PHARM REV CODE 250: Mod: HCNC | Performed by: STUDENT IN AN ORGANIZED HEALTH CARE EDUCATION/TRAINING PROGRAM

## 2023-07-17 PROCEDURE — 94640 AIRWAY INHALATION TREATMENT: CPT | Mod: HCNC

## 2023-07-17 PROCEDURE — 27000190 HC CPAP FULL FACE MASK W/VALVE: Mod: HCNC

## 2023-07-17 PROCEDURE — 21400001 HC TELEMETRY ROOM: Mod: HCNC

## 2023-07-17 PROCEDURE — 27000221 HC OXYGEN, UP TO 24 HOURS: Mod: HCNC

## 2023-07-17 PROCEDURE — 99233 PR SUBSEQUENT HOSPITAL CARE,LEVL III: ICD-10-PCS | Mod: HCNC,,, | Performed by: FAMILY MEDICINE

## 2023-07-17 PROCEDURE — 63600175 PHARM REV CODE 636 W HCPCS: Mod: HCNC | Performed by: FAMILY MEDICINE

## 2023-07-17 PROCEDURE — 87798 DETECT AGENT NOS DNA AMP: CPT | Mod: HCNC | Performed by: FAMILY MEDICINE

## 2023-07-17 PROCEDURE — 84145 PROCALCITONIN (PCT): CPT | Mod: HCNC | Performed by: FAMILY MEDICINE

## 2023-07-17 PROCEDURE — 83605 ASSAY OF LACTIC ACID: CPT | Mod: HCNC | Performed by: FAMILY MEDICINE

## 2023-07-17 PROCEDURE — 36415 COLL VENOUS BLD VENIPUNCTURE: CPT | Mod: HCNC | Performed by: FAMILY MEDICINE

## 2023-07-17 PROCEDURE — 83935 ASSAY OF URINE OSMOLALITY: CPT | Mod: HCNC | Performed by: FAMILY MEDICINE

## 2023-07-17 PROCEDURE — 36415 COLL VENOUS BLD VENIPUNCTURE: CPT | Mod: HCNC | Performed by: STUDENT IN AN ORGANIZED HEALTH CARE EDUCATION/TRAINING PROGRAM

## 2023-07-17 PROCEDURE — 25000242 PHARM REV CODE 250 ALT 637 W/ HCPCS: Mod: HCNC | Performed by: FAMILY MEDICINE

## 2023-07-17 PROCEDURE — 94761 N-INVAS EAR/PLS OXIMETRY MLT: CPT | Mod: HCNC

## 2023-07-17 PROCEDURE — 86140 C-REACTIVE PROTEIN: CPT | Mod: HCNC | Performed by: FAMILY MEDICINE

## 2023-07-17 PROCEDURE — 85007 BL SMEAR W/DIFF WBC COUNT: CPT | Mod: HCNC | Performed by: STUDENT IN AN ORGANIZED HEALTH CARE EDUCATION/TRAINING PROGRAM

## 2023-07-17 PROCEDURE — 85027 COMPLETE CBC AUTOMATED: CPT | Mod: HCNC | Performed by: STUDENT IN AN ORGANIZED HEALTH CARE EDUCATION/TRAINING PROGRAM

## 2023-07-17 PROCEDURE — 84300 ASSAY OF URINE SODIUM: CPT | Mod: HCNC | Performed by: FAMILY MEDICINE

## 2023-07-17 PROCEDURE — 87070 CULTURE OTHR SPECIMN AEROBIC: CPT | Mod: HCNC | Performed by: FAMILY MEDICINE

## 2023-07-17 PROCEDURE — 99900035 HC TECH TIME PER 15 MIN (STAT): Mod: HCNC

## 2023-07-17 PROCEDURE — 25000003 PHARM REV CODE 250: Mod: HCNC | Performed by: FAMILY MEDICINE

## 2023-07-17 PROCEDURE — 94660 CPAP INITIATION&MGMT: CPT | Mod: HCNC

## 2023-07-17 PROCEDURE — 87205 SMEAR GRAM STAIN: CPT | Mod: HCNC | Performed by: FAMILY MEDICINE

## 2023-07-17 RX ORDER — IPRATROPIUM BROMIDE AND ALBUTEROL SULFATE 2.5; .5 MG/3ML; MG/3ML
3 SOLUTION RESPIRATORY (INHALATION) EVERY 6 HOURS
Status: DISCONTINUED | OUTPATIENT
Start: 2023-07-17 | End: 2023-07-21 | Stop reason: HOSPADM

## 2023-07-17 RX ORDER — SODIUM CHLORIDE 9 MG/ML
INJECTION, SOLUTION INTRAVENOUS CONTINUOUS
Status: DISCONTINUED | OUTPATIENT
Start: 2023-07-17 | End: 2023-07-17

## 2023-07-17 RX ORDER — CALCIUM CARBONATE 200(500)MG
1000 TABLET,CHEWABLE ORAL
Status: DISCONTINUED | OUTPATIENT
Start: 2023-07-17 | End: 2023-07-21 | Stop reason: HOSPADM

## 2023-07-17 RX ORDER — PROMETHAZINE HYDROCHLORIDE AND CODEINE PHOSPHATE 6.25; 1 MG/5ML; MG/5ML
5 SOLUTION ORAL NIGHTLY
Status: DISCONTINUED | OUTPATIENT
Start: 2023-07-17 | End: 2023-07-21 | Stop reason: HOSPADM

## 2023-07-17 RX ORDER — BENZONATATE 100 MG/1
100 CAPSULE ORAL 3 TIMES DAILY PRN
Status: DISCONTINUED | OUTPATIENT
Start: 2023-07-17 | End: 2023-07-21 | Stop reason: HOSPADM

## 2023-07-17 RX ADMIN — BUDESONIDE 0.5 MG: 0.5 INHALANT ORAL at 09:07

## 2023-07-17 RX ADMIN — GUAIFENESIN AND DEXTROMETHORPHAN HYDROBROMIDE 1 TABLET: 600; 30 TABLET, EXTENDED RELEASE ORAL at 09:07

## 2023-07-17 RX ADMIN — HEPARIN SODIUM 5000 UNITS: 5000 INJECTION INTRAVENOUS; SUBCUTANEOUS at 03:07

## 2023-07-17 RX ADMIN — METHYLPREDNISOLONE SODIUM SUCCINATE 40 MG: 40 INJECTION, POWDER, FOR SOLUTION INTRAMUSCULAR; INTRAVENOUS at 09:07

## 2023-07-17 RX ADMIN — PROMETHAZINE HYDROCHLORIDE AND CODEINE PHOSPHATE 5 ML: 6.25; 1 SOLUTION ORAL at 09:07

## 2023-07-17 RX ADMIN — BENZONATATE 100 MG: 100 CAPSULE ORAL at 03:07

## 2023-07-17 RX ADMIN — HEPARIN SODIUM 5000 UNITS: 5000 INJECTION INTRAVENOUS; SUBCUTANEOUS at 09:07

## 2023-07-17 RX ADMIN — IOHEXOL 75 ML: 350 INJECTION, SOLUTION INTRAVENOUS at 10:07

## 2023-07-17 RX ADMIN — IPRATROPIUM BROMIDE AND ALBUTEROL SULFATE 3 ML: .5; 3 SOLUTION RESPIRATORY (INHALATION) at 02:07

## 2023-07-17 RX ADMIN — IOHEXOL 15 ML: 350 INJECTION, SOLUTION INTRAVENOUS at 05:07

## 2023-07-17 RX ADMIN — GUAIFENESIN AND DEXTROMETHORPHAN HYDROBROMIDE 1 TABLET: 600; 30 TABLET, EXTENDED RELEASE ORAL at 10:07

## 2023-07-17 RX ADMIN — VALSARTAN 320 MG: 160 TABLET, FILM COATED ORAL at 08:07

## 2023-07-17 RX ADMIN — IPRATROPIUM BROMIDE AND ALBUTEROL SULFATE 3 ML: .5; 3 SOLUTION RESPIRATORY (INHALATION) at 07:07

## 2023-07-17 RX ADMIN — HEPARIN SODIUM 5000 UNITS: 5000 INJECTION INTRAVENOUS; SUBCUTANEOUS at 05:07

## 2023-07-17 RX ADMIN — CALCIUM CARBONATE (ANTACID) CHEW TAB 500 MG 1000 MG: 500 CHEW TAB at 04:07

## 2023-07-17 RX ADMIN — IOHEXOL 15 ML: 350 INJECTION, SOLUTION INTRAVENOUS at 04:07

## 2023-07-17 RX ADMIN — NIFEDIPINE 60 MG: 30 TABLET, FILM COATED, EXTENDED RELEASE ORAL at 08:07

## 2023-07-17 RX ADMIN — LEVOFLOXACIN 750 MG: 750 INJECTION, SOLUTION INTRAVENOUS at 06:07

## 2023-07-17 RX ADMIN — CALCIUM CARBONATE (ANTACID) CHEW TAB 500 MG 500 MG: 500 CHEW TAB at 12:07

## 2023-07-17 RX ADMIN — BUDESONIDE 0.5 MG: 0.5 INHALANT ORAL at 07:07

## 2023-07-17 NOTE — ASSESSMENT & PLAN NOTE
Community-acquired multifocal pneumonia  · Lactic acid negative, blood cultures pending  · D-dimer greater than 6 no obvious PE on CTA but not a great image.  CTA with bilateral multifocal consolidations and possible edema with small pleural effusions.   · Failed outpatient Amoxicillin and azithromycin, continue Levaquin.  · Follow-up Legionella, respiratory infectious panel, sputum culture.  · D-dimer 6.51, .8, procalcitonin 0.37 on admission  · Continue mucolytics, antitussives, and bronchodilators

## 2023-07-17 NOTE — NURSING
Pt arrived to unit via stretcher with x1 transporter and family at bedside.  A/O x4.  Respirations unlabored.  Skin w/d.  VSS.  See flowsheet for full assessment.  Able to verbalize wants/needs.  No c/o pain or discomfort at this time.  Fall/safety precautions initiated.

## 2023-07-17 NOTE — PLAN OF CARE
Problem: Adult Inpatient Plan of Care  Goal: Plan of Care Review  Outcome: Ongoing, Progressing     POC reviewed with patient this shift.  A/O x4.  Respirations unlabored.  Skin w/d.  Pt c/o indigestion x1 this shift.  Call placed to team to notify and order received for Tums.  Moderate relief noted.  Otherwise quiet uneventful shift.  Not long ago pt went down for CTA and during transfer to equipment downstairs newly placed IV was dislodged.  As a result scan was unable to be completed as ordered since alternate IV not large enough for usage per CT tech.  Scan will be re-attempted on day shift once appropriate access obtained. VSS.  See flowsheet for full assessment.  Able to verbalize wants/needs.  No c/o pain or discomfort at this time.  Fall/safety precautions maintained.

## 2023-07-17 NOTE — SUBJECTIVE & OBJECTIVE
Interval History:  Patient reports several days of shortness of breath, cough with sputum production, and generally feeling unwell.  Patient also reports chest pain with inspiration.  Denies contacts, denies exposure to new water sources, recent travel any abdominal pain, nausea, vomiting.  Does report loose to watery stools.    Overall, symptoms have improved since admission.  Patient remains on room air in no acute distress.  Patient reports that she drinks approximately 3 per day.  Additionally, patient says she drinks at least 1-2 beers every day.  On the weekends she says she may drink 3-4 beers per day.  Patient denies previous alcohol withdrawals.    Patient says that over the last few days, she has had decreased p.o. intake but no nausea or vomiting.  Patient says she is urinating less.  Patient reports continued bowel movements but they have been very loose and almost watery.  Denies any abdominal pain or urge to defecate.  Patient reports that she was diagnosed with an abdominal hernia.  Patient says that since this illness began, the cough has progressed and that her stomach has become more distended her breathing.    Patient reports intermittent lower extremity swelling as well as orthopnea.  No known heart disease.  No known liver disease.  Denies any diuretic use.  Long-time former smoker with history of asthma.    Labs reviewed:  WBC 17.12 from 21.6, sodium 121, chloride 91, CO2 19, creatinine 0.7, D-dimer 6.51, lactate 0.7, procalcitonin 0.37, troponin negative and BNP normal    CTA chest personally reviewed with bilateral multifocal airspace disease which may represent pneumonia versus edema with small bilateral pleural effusion    Review of Systems: As above  Objective:     Vital Signs (Most Recent):  Temp: 98 °F (36.7 °C) (07/17/23 1620)  Pulse: 92 (07/17/23 1620)  Resp: (!) 21 (07/17/23 1620)  BP: 139/66 (07/17/23 1620)  SpO2: 100 % (07/17/23 1620) Vital Signs (24h Range):  Temp:  [98 °F (36.7  °C)-98.8 °F (37.1 °C)] 98 °F (36.7 °C)  Pulse:  [] 92  Resp:  [16-22] 21  SpO2:  [93 %-100 %] 100 %  BP: (117-151)/(58-71) 139/66     Weight: 87.6 kg (193 lb 2 oz)  Body mass index is 32.14 kg/m².    Intake/Output Summary (Last 24 hours) at 7/17/2023 1836  Last data filed at 7/16/2023 2243  Gross per 24 hour   Intake 387.88 ml   Output --   Net 387.88 ml      Physical Exam  Constitutional:       General: She is not in acute distress.     Appearance: Normal appearance. She is not ill-appearing or diaphoretic.   HENT:      Mouth/Throat:      Mouth: Mucous membranes are moist.      Pharynx: No oropharyngeal exudate or posterior oropharyngeal erythema.   Cardiovascular:      Rate and Rhythm: Normal rate and regular rhythm.      Pulses: Normal pulses.      Heart sounds: Normal heart sounds.     No gallop.   Pulmonary:      Effort: Pulmonary effort is normal.      Breath sounds: Rales (bilaterally) present. No wheezing or rhonchi.   Abdominal:      General: There is distension (Tympanic to percussion).      Palpations: Abdomen is soft.      Tenderness: There is no left CVA tenderness or rebound.   Musculoskeletal:      Cervical back: Normal range of motion and neck supple.      Right lower leg: No edema.      Left lower leg: No edema.   Skin:     General: Skin is warm and dry.      Capillary Refill: Capillary refill takes less than 2 seconds.   Neurological:      Mental Status: She is alert. Mental status is at baseline.   Psychiatric:         Behavior: Behavior normal.

## 2023-07-17 NOTE — PLAN OF CARE
"[facility] - [facility] Care ([facility]-)  Initial Discharge Assessment       Primary Care Provider: Mayela Broussard MD    Admission Diagnosis: Chest pain [R07.9]    Admission Date: 7/16/2023  Expected Discharge Date:     Transition of Care Barriers: (P) None    Payor: HU[employer] MANAGED MEDICARE / Plan: [employer] MEDICARE HMO / Product Type: Capitation /     Extended Emergency Contact Information  Primary Emergency Contact: Charley Ahmadi  Address: [address] [place] [address] [place]  Mobile Phone: [phone]  Relation: Sister    Discharge Plan A: (P) Home with family  Discharge Plan B: (P) Home      [id number] - [place], [place] - [address]  [address]  [facility] [address]  Phone: [phone] Fax: [phone]    [facility] - [facility] [address]  [address] [facility] [address]  Phone: [phone] Fax: [phone]    [facility]  [address]  [facility] [address]  Phone: [phone] Fax: [phone]      Initial Assessment (most recent)       Adult Discharge Assessment - 07/17/23 1109          Discharge Assessment    Assessment Type Discharge Planning Assessment (P)      Confirmed/corrected address, phone number and insurance Yes (P)      Source of Information patient;family (P)      When was your last doctors appointment? 07/11/23 (P)      Reason For Admission "I couldn't breathe" (P)      People in Home alone (P)      Do you expect to return to your current living situation? Yes (P)      Do you have help at home or someone to help you manage your care at home? Yes (P)      Who are your caregiver(s) and their phone number(s)? Charley Ahmadi (Sister)   [phone] (Mobile (P)      Prior to hospitilization cognitive status: No Deficits (P)      Current cognitive status: No Deficits (P)      Home Layout Able to live on 1st floor (P)      Equipment Currently Used at Home " none (P)      Readmission within 30 days? No (P)      Patient currently being followed by outpatient case management? No (P)      Do you currently have service(s) that help you manage your care at home? No (P)      Do you have prescription coverage? Yes (P)      Coverage [employer] Managed Medicare (P)      Do you have any problems affording any of your prescribed medications? No (P)      Is the patient taking medications as prescribed? yes (P)      Who is going to help you get home at discharge? Charley Ahmadi (Sister)   [phone] (Mobile (P)      How do you get to doctors appointments? car, drives self;family or friend will provide (P)      Are you on dialysis? No (P)      Do you take coumadin? No (P)      Discharge Plan A Home with family (P)      Discharge Plan B Home (P)      DME Needed Upon Discharge  none (P)      Discharge Plan discussed with: Patient;Sibling (P)      Name(s) and Number(s) Charley Ahmadi (Sister)   [phone] (Mobile (P)      Transition of Care Barriers None (P)         Physical Activity    On average, how many days per week do you engage in moderate to strenuous exercise (like a brisk walk)? 0 days (P)      On average, how many minutes do you engage in exercise at this level? 0 min (P)         Financial Resource Strain    How hard is it for you to pay for the very basics like food, housing, medical care, and heating? Not hard at all (P)         Housing Stability    In the last 12 months, was there a time when you were not able to pay the mortgage or rent on time? No (P)      In the last 12 months, how many places have you lived? 1 (P)         Transportation Needs    In the past 12 months, has lack of transportation kept you from meetings, work, or from getting things needed for daily living? No (P)         Food Insecurity    Within the past 12 months, you worried that your food would run out before you got the money to buy more. Never true (P)      Within the past 12 months, the food you  bought just didn't last and you didn't have money to get more. Never true (P)         Stress    Do you feel stress - tense, restless, nervous, or anxious, or unable to sleep at night because your mind is troubled all the time - these days? Not at all (P)         Social Connections    In a typical week, how many times do you talk on the phone with family, friends, or neighbors? More than three times a week (P)      How often do you get together with friends or relatives? More than three times a week (P)      Are you , , , , never , or living with a partner? Never  (P)                         SW met with patient and family at bedside for DPA. Patient alert/oriented. Patient confirmed face sheet information as correct. Patient lives in a home alone, with 0 steps to enter. Patient receives support from family when needed. Patient states that she is hard of hearing, has hearing aid but does not like to use it. Per patient, she is not on HD, not on coumadin, not on home oxygen. Patient states that she drives and is totally independent w/o DME. Patient states that family will provide ride home at D/c. SW will continue to follow.          ANABELLA Frias, LMSW  [facility]  Case Management  Ext. [phone]

## 2023-07-17 NOTE — PLAN OF CARE
Problem: Adult Inpatient Plan of Care  Goal: Plan of Care Review  Outcome: Ongoing, Progressing  Goal: Patient-Specific Goal (Individualized)  Outcome: Ongoing, Progressing  Goal: Absence of Hospital-Acquired Illness or Injury  Outcome: Ongoing, Progressing  Goal: Optimal Comfort and Wellbeing  Outcome: Ongoing, Progressing  Goal: Readiness for Transition of Care  Outcome: Ongoing, Progressing     Problem: Adjustment to Illness (Sepsis/Septic Shock)  Goal: Optimal Coping  Outcome: Ongoing, Progressing     Problem: Bleeding (Sepsis/Septic Shock)  Goal: Absence of Bleeding  Outcome: Ongoing, Progressing     Problem: Glycemic Control Impaired (Sepsis/Septic Shock)  Goal: Blood Glucose Level Within Desired Range  Outcome: Ongoing, Progressing     Problem: Infection Progression (Sepsis/Septic Shock)  Goal: Absence of Infection Signs and Symptoms  Outcome: Ongoing, Progressing     Problem: Nutrition Impaired (Sepsis/Septic Shock)  Goal: Optimal Nutrition Intake  Outcome: Ongoing, Progressing     Problem: Fluid Imbalance (Pneumonia)  Goal: Fluid Balance  Outcome: Ongoing, Progressing     Problem: Infection (Pneumonia)  Goal: Resolution of Infection Signs and Symptoms  Outcome: Ongoing, Progressing     Problem: Respiratory Compromise (Pneumonia)  Goal: Effective Oxygenation and Ventilation  Outcome: Ongoing, Progressing    Patient alert and oriented. Cooperative with care. Dexter inserted this shift for retention. Patient has ad 1000 out so far. Patient is resting comfortably at this time

## 2023-07-17 NOTE — CARE UPDATE
"RAPID RESPONSE NURSE CHART REVIEW       Chart Reviewed: 07/17/2023, 7:27 AM    MRN: [id number]  Bed: 36863/67934 A    Dx: Severe sepsis    Michela Franco has a past medical history of Allergy, Asthma, Chronic diastolic heart failure, Glaucoma suspect, Hyperlipidemia, Hypertension, Neuromuscular disorder, JOHN on CPAP, Osteoporosis, Recurrent sinusitis, Recurrent upper respiratory infection (URI), and Urticaria.    Last VS: /64 (BP Location: Left arm, Patient Position: Lying)   Pulse 81   Temp 98.8 °F (37.1 °C) (Axillary)   Resp 18   Ht 5' 5" (1.651 m)   Wt 87.6 kg (193 lb 2 oz)   SpO2 99%   BMI 32.14 kg/m²     24H Vital Sign Range:  Temp:  [98.4 °F (36.9 °C)-98.8 °F (37.1 °C)]   Pulse:  []   Resp:  [16-30]   BP: (119-151)/(56-71)   SpO2:  [93 %-100 %]     Level of Consciousness (AVPU): alert    Recent Labs     07/16/23  1255   WBC 21.66*   HGB 12.8   HCT 38.0          Recent Labs     07/16/23  1434 07/16/23 2027 07/17/23  0016   * 122* 123*   K 4.3 4.5 4.8   CL 89* 91* 90*   CO2 16* 16* 19*   CREATININE 0.7 0.8 0.7   GLU 95 144* 148*        No results for input(s): PH, PCO2, PO2, HCO3, POCSATURATED, BE in the last 72 hours.     OXYGEN:     Oxygen Concentration (%): 28       MEWS score: 1    Charge RNAshleigh  contacted. No concerns verbalized at this time. Instructed to call [phone] for further concerns or assistance.    Radha Johns RN        "

## 2023-07-18 ENCOUNTER — RESEARCH ENCOUNTER (OUTPATIENT)
Dept: INFECTIOUS DISEASES | Facility: CLINIC | Age: 78
End: 2023-07-18
Payer: MEDICARE

## 2023-07-18 PROBLEM — E87.5 HYPERKALEMIA: Status: ACTIVE | Noted: 2023-07-18

## 2023-07-18 PROBLEM — R33.9 URINARY RETENTION: Status: ACTIVE | Noted: 2023-07-18

## 2023-07-18 LAB
ALBUMIN SERPL BCP-MCNC: 1.9 G/DL (ref 3.5–5.2)
ALP SERPL-CCNC: 115 U/L (ref 55–135)
ALT SERPL W/O P-5'-P-CCNC: 74 U/L (ref 10–44)
ANION GAP SERPL CALC-SCNC: 13 MMOL/L (ref 8–16)
ANION GAP SERPL CALC-SCNC: 9 MMOL/L (ref 8–16)
ASCENDING AORTA: 3.04 CM
AST SERPL-CCNC: 131 U/L (ref 10–40)
AV INDEX (PROSTH): 0.73
AV MEAN GRADIENT: 4 MMHG
AV PEAK GRADIENT: 6 MMHG
AV VALVE AREA: 2.26 CM2
AV VELOCITY RATIO: 0.74
BASOPHILS # BLD AUTO: 0.05 K/UL (ref 0–0.2)
BASOPHILS NFR BLD: 0.3 % (ref 0–1.9)
BILIRUB SERPL-MCNC: 0.2 MG/DL (ref 0.1–1)
BSA FOR ECHO PROCEDURE: 2 M2
BUN SERPL-MCNC: 29 MG/DL (ref 8–23)
BUN SERPL-MCNC: 30 MG/DL (ref 8–23)
CALCIUM SERPL-MCNC: 9 MG/DL (ref 8.7–10.5)
CALCIUM SERPL-MCNC: 9.5 MG/DL (ref 8.7–10.5)
CHLORIDE SERPL-SCNC: 95 MMOL/L (ref 95–110)
CHLORIDE SERPL-SCNC: 95 MMOL/L (ref 95–110)
CO2 SERPL-SCNC: 18 MMOL/L (ref 23–29)
CO2 SERPL-SCNC: 21 MMOL/L (ref 23–29)
CREAT SERPL-MCNC: 0.7 MG/DL (ref 0.5–1.4)
CREAT SERPL-MCNC: 0.8 MG/DL (ref 0.5–1.4)
CV ECHO LV RWT: 0.32 CM
DIFFERENTIAL METHOD: ABNORMAL
DOP CALC AO PEAK VEL: 1.2 M/S
DOP CALC AO VTI: 24.57 CM
DOP CALC LVOT AREA: 3.1 CM2
DOP CALC LVOT DIAMETER: 1.99 CM
DOP CALC LVOT PEAK VEL: 0.89 M/S
DOP CALC LVOT STROKE VOLUME: 55.49 CM3
DOP CALCLVOT PEAK VEL VTI: 17.85 CM
E WAVE DECELERATION TIME: 267.74 MSEC
E/A RATIO: 1
E/E' RATIO: 8.88 M/S
ECHO LV POSTERIOR WALL: 0.92 CM (ref 0.6–1.1)
EJECTION FRACTION: 50 %
EOSINOPHIL # BLD AUTO: 0 K/UL (ref 0–0.5)
EOSINOPHIL NFR BLD: 0 % (ref 0–8)
ERYTHROCYTE [DISTWIDTH] IN BLOOD BY AUTOMATED COUNT: 17.3 % (ref 11.5–14.5)
EST. GFR  (NO RACE VARIABLE): >60 ML/MIN/1.73 M^2
EST. GFR  (NO RACE VARIABLE): >60 ML/MIN/1.73 M^2
FRACTIONAL SHORTENING: 31 % (ref 28–44)
GLUCOSE SERPL-MCNC: 127 MG/DL (ref 70–110)
GLUCOSE SERPL-MCNC: 143 MG/DL (ref 70–110)
HCT VFR BLD AUTO: 31.6 % (ref 37–48.5)
HGB BLD-MCNC: 10.4 G/DL (ref 12–16)
IMM GRANULOCYTES # BLD AUTO: 0.68 K/UL (ref 0–0.04)
IMM GRANULOCYTES NFR BLD AUTO: 4.3 % (ref 0–0.5)
INTERVENTRICULAR SEPTUM: 0.87 CM (ref 0.6–1.1)
LA MAJOR: 5.5 CM
LA MINOR: 5.8 CM
LA WIDTH: 4.36 CM
LEFT ATRIUM SIZE: 3.85 CM
LEFT ATRIUM VOLUME INDEX: 41.3 ML/M2
LEFT ATRIUM VOLUME: 80.56 CM3
LEFT INTERNAL DIMENSION IN SYSTOLE: 4.01 CM (ref 2.1–4)
LEFT VENTRICLE DIASTOLIC VOLUME INDEX: 86.88 ML/M2
LEFT VENTRICLE DIASTOLIC VOLUME: 169.41 ML
LEFT VENTRICLE MASS INDEX: 105 G/M2
LEFT VENTRICLE SYSTOLIC VOLUME INDEX: 36.2 ML/M2
LEFT VENTRICLE SYSTOLIC VOLUME: 70.59 ML
LEFT VENTRICULAR INTERNAL DIMENSION IN DIASTOLE: 5.84 CM (ref 3.5–6)
LEFT VENTRICULAR MASS: 204.46 G
LV LATERAL E/E' RATIO: 7.89 M/S
LV SEPTAL E/E' RATIO: 10.14 M/S
LYMPHOCYTES # BLD AUTO: 1 K/UL (ref 1–4.8)
LYMPHOCYTES NFR BLD: 6.1 % (ref 18–48)
MAGNESIUM SERPL-MCNC: 2 MG/DL (ref 1.6–2.6)
MCH RBC QN AUTO: 27.4 PG (ref 27–31)
MCHC RBC AUTO-ENTMCNC: 32.9 G/DL (ref 32–36)
MCV RBC AUTO: 83 FL (ref 82–98)
MONOCYTES # BLD AUTO: 0.8 K/UL (ref 0.3–1)
MONOCYTES NFR BLD: 4.8 % (ref 4–15)
MV PEAK A VEL: 0.71 M/S
MV PEAK E VEL: 0.71 M/S
MV STENOSIS PRESSURE HALF TIME: 77.65 MS
MV VALVE AREA P 1/2 METHOD: 2.83 CM2
NEUTROPHILS # BLD AUTO: 13.4 K/UL (ref 1.8–7.7)
NEUTROPHILS NFR BLD: 84.5 % (ref 38–73)
NRBC BLD-RTO: 0 /100 WBC
OSMOLALITY UR: 341 MOSM/KG (ref 50–1200)
PHOSPHATE SERPL-MCNC: 4.5 MG/DL (ref 2.7–4.5)
PLATELET # BLD AUTO: 432 K/UL (ref 150–450)
PMV BLD AUTO: 10 FL (ref 9.2–12.9)
POTASSIUM SERPL-SCNC: 4.9 MMOL/L (ref 3.5–5.1)
POTASSIUM SERPL-SCNC: 5.4 MMOL/L (ref 3.5–5.1)
PROCALCITONIN SERPL IA-MCNC: 0.23 NG/ML
PROT SERPL-MCNC: 6.6 G/DL (ref 6–8.4)
RA MAJOR: 5.56 CM
RA PRESSURE: 3 MMHG
RA WIDTH: 3.45 CM
RBC # BLD AUTO: 3.8 M/UL (ref 4–5.4)
RIGHT VENTRICULAR END-DIASTOLIC DIMENSION: 3.33 CM
SINUS: 2.85 CM
SODIUM SERPL-SCNC: 125 MMOL/L (ref 136–145)
SODIUM SERPL-SCNC: 126 MMOL/L (ref 136–145)
SODIUM UR-SCNC: <10 MMOL/L (ref 20–250)
STJ: 2.65 CM
TDI LATERAL: 0.09 M/S
TDI SEPTAL: 0.07 M/S
TDI: 0.08 M/S
TRICUSPID ANNULAR PLANE SYSTOLIC EXCURSION: 2.67 CM
WBC # BLD AUTO: 15.84 K/UL (ref 3.9–12.7)

## 2023-07-18 PROCEDURE — 94660 CPAP INITIATION&MGMT: CPT | Mod: HCNC

## 2023-07-18 PROCEDURE — 94761 N-INVAS EAR/PLS OXIMETRY MLT: CPT | Mod: HCNC

## 2023-07-18 PROCEDURE — 25000003 PHARM REV CODE 250: Mod: HCNC | Performed by: FAMILY MEDICINE

## 2023-07-18 PROCEDURE — 85025 COMPLETE CBC W/AUTO DIFF WBC: CPT | Mod: HCNC | Performed by: FAMILY MEDICINE

## 2023-07-18 PROCEDURE — 84145 PROCALCITONIN (PCT): CPT | Mod: HCNC | Performed by: FAMILY MEDICINE

## 2023-07-18 PROCEDURE — 99900035 HC TECH TIME PER 15 MIN (STAT): Mod: HCNC

## 2023-07-18 PROCEDURE — 36415 COLL VENOUS BLD VENIPUNCTURE: CPT | Mod: HCNC | Performed by: FAMILY MEDICINE

## 2023-07-18 PROCEDURE — 25000242 PHARM REV CODE 250 ALT 637 W/ HCPCS: Mod: HCNC | Performed by: STUDENT IN AN ORGANIZED HEALTH CARE EDUCATION/TRAINING PROGRAM

## 2023-07-18 PROCEDURE — 27000221 HC OXYGEN, UP TO 24 HOURS: Mod: HCNC

## 2023-07-18 PROCEDURE — 99233 SBSQ HOSP IP/OBS HIGH 50: CPT | Mod: HCNC,,, | Performed by: FAMILY MEDICINE

## 2023-07-18 PROCEDURE — 84100 ASSAY OF PHOSPHORUS: CPT | Mod: HCNC | Performed by: FAMILY MEDICINE

## 2023-07-18 PROCEDURE — 94640 AIRWAY INHALATION TREATMENT: CPT | Mod: HCNC

## 2023-07-18 PROCEDURE — 80048 BASIC METABOLIC PNL TOTAL CA: CPT | Mod: HCNC,XB | Performed by: FAMILY MEDICINE

## 2023-07-18 PROCEDURE — 99233 PR SUBSEQUENT HOSPITAL CARE,LEVL III: ICD-10-PCS | Mod: HCNC,,, | Performed by: FAMILY MEDICINE

## 2023-07-18 PROCEDURE — 83735 ASSAY OF MAGNESIUM: CPT | Mod: HCNC | Performed by: FAMILY MEDICINE

## 2023-07-18 PROCEDURE — 25500020 PHARM REV CODE 255: Mod: HCNC | Performed by: FAMILY MEDICINE

## 2023-07-18 PROCEDURE — 84300 ASSAY OF URINE SODIUM: CPT | Mod: HCNC | Performed by: FAMILY MEDICINE

## 2023-07-18 PROCEDURE — 63600175 PHARM REV CODE 636 W HCPCS: Mod: HCNC | Performed by: STUDENT IN AN ORGANIZED HEALTH CARE EDUCATION/TRAINING PROGRAM

## 2023-07-18 PROCEDURE — 80053 COMPREHEN METABOLIC PANEL: CPT | Mod: HCNC | Performed by: FAMILY MEDICINE

## 2023-07-18 PROCEDURE — 21400001 HC TELEMETRY ROOM: Mod: HCNC

## 2023-07-18 PROCEDURE — 25000242 PHARM REV CODE 250 ALT 637 W/ HCPCS: Mod: HCNC | Performed by: FAMILY MEDICINE

## 2023-07-18 PROCEDURE — 63600175 PHARM REV CODE 636 W HCPCS: Mod: HCNC | Performed by: FAMILY MEDICINE

## 2023-07-18 PROCEDURE — 25000003 PHARM REV CODE 250: Mod: HCNC | Performed by: STUDENT IN AN ORGANIZED HEALTH CARE EDUCATION/TRAINING PROGRAM

## 2023-07-18 PROCEDURE — 83935 ASSAY OF URINE OSMOLALITY: CPT | Mod: HCNC | Performed by: FAMILY MEDICINE

## 2023-07-18 RX ORDER — NIFEDIPINE 30 MG/1
90 TABLET, EXTENDED RELEASE ORAL DAILY
Status: DISCONTINUED | OUTPATIENT
Start: 2023-07-19 | End: 2023-07-21 | Stop reason: HOSPADM

## 2023-07-18 RX ORDER — ENOXAPARIN SODIUM 100 MG/ML
40 INJECTION SUBCUTANEOUS EVERY 24 HOURS
Status: DISCONTINUED | OUTPATIENT
Start: 2023-07-18 | End: 2023-07-21 | Stop reason: HOSPADM

## 2023-07-18 RX ORDER — MUPIROCIN 20 MG/G
OINTMENT TOPICAL 2 TIMES DAILY
Status: DISCONTINUED | OUTPATIENT
Start: 2023-07-18 | End: 2023-07-21 | Stop reason: HOSPADM

## 2023-07-18 RX ORDER — SODIUM CHLORIDE 9 MG/ML
INJECTION, SOLUTION INTRAVENOUS CONTINUOUS
Status: DISCONTINUED | OUTPATIENT
Start: 2023-07-18 | End: 2023-07-21 | Stop reason: HOSPADM

## 2023-07-18 RX ORDER — PROMETHAZINE HYDROCHLORIDE AND CODEINE PHOSPHATE 6.25; 1 MG/5ML; MG/5ML
5 SOLUTION ORAL DAILY PRN
Status: DISCONTINUED | OUTPATIENT
Start: 2023-07-18 | End: 2023-07-21 | Stop reason: HOSPADM

## 2023-07-18 RX ORDER — SIMETHICONE 80 MG
1 TABLET,CHEWABLE ORAL 3 TIMES DAILY
Status: COMPLETED | OUTPATIENT
Start: 2023-07-18 | End: 2023-07-19

## 2023-07-18 RX ORDER — LEVOFLOXACIN 5 MG/ML
750 INJECTION, SOLUTION INTRAVENOUS
Status: DISCONTINUED | OUTPATIENT
Start: 2023-07-19 | End: 2023-07-19

## 2023-07-18 RX ADMIN — CALCIUM CARBONATE (ANTACID) CHEW TAB 500 MG 1000 MG: 500 CHEW TAB at 12:07

## 2023-07-18 RX ADMIN — SODIUM CHLORIDE: 9 INJECTION, SOLUTION INTRAVENOUS at 09:07

## 2023-07-18 RX ADMIN — BUDESONIDE 0.5 MG: 0.5 INHALANT ORAL at 07:07

## 2023-07-18 RX ADMIN — GUAIFENESIN AND DEXTROMETHORPHAN HYDROBROMIDE 1 TABLET: 600; 30 TABLET, EXTENDED RELEASE ORAL at 08:07

## 2023-07-18 RX ADMIN — HEPARIN SODIUM 5000 UNITS: 5000 INJECTION INTRAVENOUS; SUBCUTANEOUS at 06:07

## 2023-07-18 RX ADMIN — NIFEDIPINE 60 MG: 30 TABLET, FILM COATED, EXTENDED RELEASE ORAL at 08:07

## 2023-07-18 RX ADMIN — VALSARTAN 320 MG: 160 TABLET, FILM COATED ORAL at 08:07

## 2023-07-18 RX ADMIN — LEVOFLOXACIN 750 MG: 750 INJECTION, SOLUTION INTRAVENOUS at 05:07

## 2023-07-18 RX ADMIN — MUPIROCIN: 20 OINTMENT TOPICAL at 08:07

## 2023-07-18 RX ADMIN — SIMETHICONE 80 MG: 80 TABLET, CHEWABLE ORAL at 08:07

## 2023-07-18 RX ADMIN — HUMAN ALBUMIN MICROSPHERES AND PERFLUTREN 0.66 MG: 10; .22 INJECTION, SOLUTION INTRAVENOUS at 01:07

## 2023-07-18 RX ADMIN — ENOXAPARIN SODIUM 40 MG: 40 INJECTION SUBCUTANEOUS at 05:07

## 2023-07-18 RX ADMIN — IPRATROPIUM BROMIDE AND ALBUTEROL SULFATE 3 ML: .5; 3 SOLUTION RESPIRATORY (INHALATION) at 07:07

## 2023-07-18 RX ADMIN — MUPIROCIN: 20 OINTMENT TOPICAL at 09:07

## 2023-07-18 RX ADMIN — CALCIUM CARBONATE (ANTACID) CHEW TAB 500 MG 1000 MG: 500 CHEW TAB at 05:07

## 2023-07-18 RX ADMIN — IPRATROPIUM BROMIDE AND ALBUTEROL SULFATE 3 ML: .5; 3 SOLUTION RESPIRATORY (INHALATION) at 01:07

## 2023-07-18 RX ADMIN — CALCIUM CARBONATE (ANTACID) CHEW TAB 500 MG 1000 MG: 500 CHEW TAB at 06:07

## 2023-07-18 RX ADMIN — SODIUM ZIRCONIUM CYCLOSILICATE 5 G: 5 POWDER, FOR SUSPENSION ORAL at 09:07

## 2023-07-18 RX ADMIN — METHYLPREDNISOLONE SODIUM SUCCINATE 40 MG: 40 INJECTION, POWDER, FOR SOLUTION INTRAMUSCULAR; INTRAVENOUS at 09:07

## 2023-07-18 RX ADMIN — PROMETHAZINE HYDROCHLORIDE AND CODEINE PHOSPHATE 5 ML: 6.25; 1 SOLUTION ORAL at 08:07

## 2023-07-18 NOTE — ASSESSMENT & PLAN NOTE
· Unclear etiology, could be related to decreased p.o. intake versus heart failure (normal BNP and troponin), liver disease (mildly elevated AST  in setting of daily alcohol use) versus beer potomania versus excessive water intake  · Serum osmolality, urine osmolality, urine sodium ordered and pending.  · Discussed with Nephrology on-call, recommend Renteria catheter placement, strict I/O, daily urine osm and sodium, and Nephrology consult.  · Legionella antigen pending.

## 2023-07-18 NOTE — PLAN OF CARE
Problem: Adult Inpatient Plan of Care  Goal: Plan of Care Review  Outcome: Ongoing, Progressing  Goal: Absence of Hospital-Acquired Illness or Injury  Outcome: Ongoing, Progressing     Problem: Adult Inpatient Plan of Care  Goal: Plan of Care Review  Outcome: Ongoing, Progressing     Problem: Bleeding (Sepsis/Septic Shock)  Goal: Absence of Bleeding  Outcome: Ongoing, Progressing     Problem: Nutrition Impaired (Sepsis/Septic Shock)  Goal: Optimal Nutrition Intake  Outcome: Ongoing, Progressing     Problem: Fluid Imbalance (Pneumonia)  Goal: Fluid Balance  Outcome: Ongoing, Progressing   Pt AAO X 4; able to express needs.  No c/o pain.  Tolerated C PAP during the night.  Renteria placed yesterday for retention.   Safety maintained.  Bed in low position,  call  light in reach.

## 2023-07-18 NOTE — PROGRESS NOTES
Consent  Sponsor: [employer]  Study: Vaccine Effectiveness of 20-Valent Pneumococcal Conjugate Vaccine Against Vaccine-Type CAP Using a Test-Negative Design (RECAP-20)  IRB/Protocol #: 2022.215/[id number]  Principle Investigator: Dr. Solitario Kimbrough   Site Number: [id number]    Subject Number: [id number]    Date of Visit: 18July2023    Informed Consent:   Consenting was completed in patient's room with family member using the most current IRB approved version of the ICF by myself. Patient was given ample time to review consent prior to execution of ICF.   Optional Addendum consent for Tokenization: No    Time of Consent: 11:10    Prior to the Informed Consent (IC) being signed, or any study protocol required data collection, testing, procedure, or intervention being performed, the following was done and/or discussed:?   Patient was given a copy of the IC for review??   Purpose of the study and qualifications to participate??   Study design, follow up schedule, and tests or procedures done at each visit?   Confidentiality and HIPAA Authorization for Release of Medical Records for the research trial/ subject's rights/research related injury?   Risk, Benefits, Alternative Treatments, Compensation and Costs?   Participation in the research trial is voluntary and patient may withdraw at anytime?   Contact information for study related questions?     Patient verbalizes understanding of the above: Yes?   Contact information for CRC and PI given to patient: Yes?   Patient able to adequately summarize: the purpose of the study, the risks associated with the study, and all procedures, testing, and follow-ups associated with the study: Yes?     Michela Carmenrly signed the IRB approved version of informed consent form for the RECAP-20 research study.? Each page of the consent was reviewed with the patient and patient's family and all questions answered satisfactorily. The patient signed the paper consent form and received a copy of same  (copy of informed consent made and provided to patient). Patient was encouraged to reach out with any follow up questions. The original consent was scanned into electronic medical records (EPIC).

## 2023-07-18 NOTE — ASSESSMENT & PLAN NOTE
Nicotine dependence, in remission  Likely COPD as well  · In acute excerebration secondary to pneumonia  · Solumedrol 40 mg BID    · Scheduled bronchodilators

## 2023-07-18 NOTE — ASSESSMENT & PLAN NOTE
· Possibly related to daily alcohol use  · Continue to monitor  · If no improvement or worsens, consider hepatitis panel and RUQ U/S

## 2023-07-18 NOTE — PROGRESS NOTES
[facility] ([address] Medicine  Progress Note    Patient Name: Michela Franco  MRN: [id number]  Patient Class: IP- Inpatient   Admission Date: 7/16/2023  Length of Stay: 1 days  Attending Physician: Paresh Kidd III,*  Primary Care Provider: Mayela Broussard MD        Subjective:     Principal Problem:Sepsis, unspecified organism        HPI:  Patient is a pleasant 77 yo old female with a PMHx of diastolic HF, asthma, HLD, HTN, JOHN, recurrent pulmonary infections presents to the ED with son at bedside c/o SOB and chest wall pain that worsened  since yesterday. Patient was diagnosed with PNA on Friday and was discharged with amoxicillin and azithromycin -- however she notes her symptoms have not improved and also reports of a new onset CP with deep inspiration. She denies abdominal pain, HA, fever, chills, N, V, diarrhea, dysuria and hematuria. Patient reports she started to have coughing spills on Thursday with associated sputum production and fatigue. After PO abx she does not appear to be getting better.  She noted wheezing at home which have not happened for 3 years now.      In the ED, Xray showed Multifocal bilateral interstitial and alveolar opacities, similar to prior study.  Possible small bilateral pleural effusions.  No pneumothorax.  Cardiac silhouette is prominent, stable. Appears slightly worse than previous.            Overview/Hospital Course:  No notes on file    Interval History:  Patient reports several days of shortness of breath, cough with sputum production, and generally feeling unwell.  Patient also reports chest pain with inspiration.  Denies contacts, denies exposure to new water sources, recent travel any abdominal pain, nausea, vomiting.  Does report loose to watery stools.    Overall, symptoms have improved since admission.  Patient remains on room air in no acute distress.  Patient reports that she drinks approximately 3 per day.  Additionally, patient  says she drinks at least 1-2 beers every day.  On the weekends she says she may drink 3-4 beers per day.  Patient denies previous alcohol withdrawals.    Patient says that over the last few days, she has had decreased p.o. intake but no nausea or vomiting.  Patient says she is urinating less.  Patient reports continued bowel movements but they have been very loose and almost watery.  Denies any abdominal pain or urge to defecate.  Patient reports that she was diagnosed with an abdominal hernia.  Patient says that since this illness began, the cough has progressed and that her stomach has become more distended her breathing.    Patient reports intermittent lower extremity swelling as well as orthopnea.  No known heart disease.  No known liver disease.  Denies any diuretic use.  Long-time former smoker with history of asthma.    Labs reviewed:  WBC 17.12 from 21.6, sodium 121, chloride 91, CO2 19, creatinine 0.7, D-dimer 6.51, lactate 0.7, procalcitonin 0.37, troponin negative and BNP normal    CTA chest personally reviewed with bilateral multifocal airspace disease which may represent pneumonia versus edema with small bilateral pleural effusion    Review of Systems: As above  Objective:     Vital Signs (Most Recent):  Temp: 98 °F (36.7 °C) (07/17/23 1620)  Pulse: 92 (07/17/23 1620)  Resp: (!) 21 (07/17/23 1620)  BP: 139/66 (07/17/23 1620)  SpO2: 100 % (07/17/23 1620) Vital Signs (24h Range):  Temp:  [98 °F (36.7 °C)-98.8 °F (37.1 °C)] 98 °F (36.7 °C)  Pulse:  [] 92  Resp:  [16-22] 21  SpO2:  [93 %-100 %] 100 %  BP: (117-151)/(58-71) 139/66     Weight: 87.6 kg (193 lb 2 oz)  Body mass index is 32.14 kg/m².    Intake/Output Summary (Last 24 hours) at 7/17/2023 1836  Last data filed at 7/16/2023 2243  Gross per 24 hour   Intake 387.88 ml   Output --   Net 387.88 ml      Physical Exam  Constitutional:       General: She is not in acute distress.     Appearance: Normal appearance. She is not ill-appearing or  diaphoretic.   HENT:      Mouth/Throat:      Mouth: Mucous membranes are moist.      Pharynx: No oropharyngeal exudate or posterior oropharyngeal erythema.   Cardiovascular:      Rate and Rhythm: Normal rate and regular rhythm.      Pulses: Normal pulses.      Heart sounds: Normal heart sounds.     No gallop.   Pulmonary:      Effort: Pulmonary effort is normal.      Breath sounds: Rales (bilaterally) present. No wheezing or rhonchi.   Abdominal:      General: There is distension (Tympanic to percussion).      Palpations: Abdomen is soft.      Tenderness: There is no left CVA tenderness or rebound.   Musculoskeletal:      Cervical back: Normal range of motion and neck supple.      Right lower leg: No edema.      Left lower leg: No edema.   Skin:     General: Skin is warm and dry.      Capillary Refill: Capillary refill takes less than 2 seconds.   Neurological:      Mental Status: She is alert. Mental status is at baseline.   Psychiatric:         Behavior: Behavior normal.         Assessment/Plan:      * Sepsis, unspecified organism  Community-acquired multifocal pneumonia  · Lactic acid negative, blood cultures pending  · D-dimer greater than 6 no obvious PE on CTA but not a great image.  CTA with bilateral multifocal consolidations and possible edema with small pleural effusions.   · Failed outpatient Amoxicillin and azithromycin, continue Levaquin.  · Follow-up Legionella, respiratory infectious panel, sputum culture.  · D-dimer 6.51, .8, procalcitonin 0.37 on admission  · Continue mucolytics, antitussives, and bronchodilators        Hyponatremia  · Unclear etiology, could be related to decreased p.o. intake versus heart failure (normal BNP and troponin), liver disease (mildly elevated AST  in setting of daily alcohol use) versus beer potomania versus excessive water intake  · Serum osmolality, urine osmolality, urine sodium ordered and pending.  · Discussed with Nephrology on-call, recommend Dexter  catheter placement, strict I/O, daily urine osm and sodium, and Nephrology consult.  · Legionella antigen pending.      Moderate persistent asthma not dependent on systemic steroids with acute exacerbation  Nicotine dependence, in remission  Likely COPD as well  · In acute excerebration secondary to pneumonia  · Solumedrol 40 mg BID    · Scheduled bronchodilators      Abdominal distension  · Patient reports history of hernia but did do not see mention of it in previous records.  · Abdomen tympanic to percussion throughout  · CT abdomen pelvis with oral contrast ordered      Elevated transaminase level  · Possibly related to daily alcohol use  · Continue to monitor  · If no improvement or worsens, consider hepatitis panel and RUQ U/S      GERD (gastroesophageal reflux disease)  Patient requests scheduled Tums      HTN (hypertension)  Resume home medications        VTE Risk Mitigation (From admission, onward)         Ordered     heparin (porcine) injection 5,000 Units  Every 8 hours         07/16/23 1549     IP VTE HIGH RISK PATIENT  Once         07/16/23 1549     Place sequential compression device  Until discontinued         07/16/23 1549                Discharge Planning   CAMRYN: 7/19/2023     Code Status: Full Code   Is the patient medically ready for discharge?: No    Reason for patient still in hospital (select all that apply): Patient trending condition, Laboratory test, Treatment, Imaging, Consult recommendations and Pending disposition  Discharge Plan A: Home with family                  Paresh Kidd III, MD  Department of Hospital Medicine   [facility] - Intensive Care ([place])

## 2023-07-18 NOTE — ASSESSMENT & PLAN NOTE
· Patient reports history of hernia but did do not see mention of it in previous records.  · Abdomen tympanic to percussion throughout  · CT abdomen pelvis with oral contrast ordered

## 2023-07-18 NOTE — PROGRESS NOTES
V1/Screening/Enrollment Visit  Sponsor: [employer]  Study: Vaccine Effectiveness of 20-Valent Pneumococcal Conjugate Vaccine Against Vaccine-Type CAP Using a Test-Negative Design (RECAP-20)  IRB/Protocol #: 2022.215/[id number]  Principle Investigator: Dr. Solitario Kimbrough   Site Number: 1011     Subject Number: [id number]     Date of Visit: 18JUL2023        Ms. Michela Franco was consented on 18JUL2023 to participate in the RECAP-20 study. Patient assessed and meets all of the inclusion and none of the exclusion criteria for enrollment in study. Chest X-ray performed on 16JUL2023 and CTA of chest performed on 17JUL2023 assessed to have radiographic finding that is consistent with pneumonia with pleural effusions.

## 2023-07-19 LAB
ALBUMIN SERPL BCP-MCNC: 1.8 G/DL (ref 3.5–5.2)
ALP SERPL-CCNC: 99 U/L (ref 55–135)
ALT SERPL W/O P-5'-P-CCNC: 71 U/L (ref 10–44)
ANION GAP SERPL CALC-SCNC: 7 MMOL/L (ref 8–16)
AST SERPL-CCNC: 89 U/L (ref 10–40)
BACTERIA SPEC AEROBE CULT: NORMAL
BACTERIA SPEC AEROBE CULT: NORMAL
BASOPHILS # BLD AUTO: 0.02 K/UL (ref 0–0.2)
BASOPHILS NFR BLD: 0.2 % (ref 0–1.9)
BILIRUB SERPL-MCNC: 0.2 MG/DL (ref 0.1–1)
BUN SERPL-MCNC: 25 MG/DL (ref 8–23)
CALCIUM SERPL-MCNC: 8.9 MG/DL (ref 8.7–10.5)
CHLORIDE SERPL-SCNC: 100 MMOL/L (ref 95–110)
CO2 SERPL-SCNC: 22 MMOL/L (ref 23–29)
CREAT SERPL-MCNC: 0.7 MG/DL (ref 0.5–1.4)
CRP SERPL-MCNC: 68.6 MG/L (ref 0–8.2)
DIFFERENTIAL METHOD: ABNORMAL
EOSINOPHIL # BLD AUTO: 0 K/UL (ref 0–0.5)
EOSINOPHIL NFR BLD: 0.1 % (ref 0–8)
ERYTHROCYTE [DISTWIDTH] IN BLOOD BY AUTOMATED COUNT: 17.3 % (ref 11.5–14.5)
EST. GFR  (NO RACE VARIABLE): >60 ML/MIN/1.73 M^2
GLUCOSE SERPL-MCNC: 82 MG/DL (ref 70–110)
GRAM STN SPEC: NORMAL
HCT VFR BLD AUTO: 32.4 % (ref 37–48.5)
HGB BLD-MCNC: 10.6 G/DL (ref 12–16)
IMM GRANULOCYTES # BLD AUTO: 0.46 K/UL (ref 0–0.04)
IMM GRANULOCYTES NFR BLD AUTO: 4.2 % (ref 0–0.5)
L PNEUMO AG UR QL IA: NEGATIVE
LYMPHOCYTES # BLD AUTO: 1.9 K/UL (ref 1–4.8)
LYMPHOCYTES NFR BLD: 17.2 % (ref 18–48)
MAGNESIUM SERPL-MCNC: 1.7 MG/DL (ref 1.6–2.6)
MCH RBC QN AUTO: 27.5 PG (ref 27–31)
MCHC RBC AUTO-ENTMCNC: 32.7 G/DL (ref 32–36)
MCV RBC AUTO: 84 FL (ref 82–98)
MONOCYTES # BLD AUTO: 1.2 K/UL (ref 0.3–1)
MONOCYTES NFR BLD: 10.7 % (ref 4–15)
NEUTROPHILS # BLD AUTO: 7.3 K/UL (ref 1.8–7.7)
NEUTROPHILS NFR BLD: 67.6 % (ref 38–73)
NRBC BLD-RTO: 0 /100 WBC
OSMOLALITY UR: 276 MOSM/KG (ref 50–1200)
PHOSPHATE SERPL-MCNC: 4.1 MG/DL (ref 2.7–4.5)
PLATELET # BLD AUTO: 462 K/UL (ref 150–450)
PMV BLD AUTO: 9.7 FL (ref 9.2–12.9)
POTASSIUM SERPL-SCNC: 4.4 MMOL/L (ref 3.5–5.1)
PROT SERPL-MCNC: 6 G/DL (ref 6–8.4)
RBC # BLD AUTO: 3.85 M/UL (ref 4–5.4)
SODIUM SERPL-SCNC: 129 MMOL/L (ref 136–145)
SODIUM UR-SCNC: 18 MMOL/L (ref 20–250)
WBC # BLD AUTO: 10.84 K/UL (ref 3.9–12.7)

## 2023-07-19 PROCEDURE — 99232 PR SUBSEQUENT HOSPITAL CARE,LEVL II: ICD-10-PCS | Mod: HCNC,,, | Performed by: FAMILY MEDICINE

## 2023-07-19 PROCEDURE — 99232 SBSQ HOSP IP/OBS MODERATE 35: CPT | Mod: HCNC,,, | Performed by: FAMILY MEDICINE

## 2023-07-19 PROCEDURE — 36415 COLL VENOUS BLD VENIPUNCTURE: CPT | Mod: HCNC | Performed by: FAMILY MEDICINE

## 2023-07-19 PROCEDURE — 94640 AIRWAY INHALATION TREATMENT: CPT | Mod: HCNC

## 2023-07-19 PROCEDURE — 85025 COMPLETE CBC W/AUTO DIFF WBC: CPT | Mod: HCNC | Performed by: FAMILY MEDICINE

## 2023-07-19 PROCEDURE — 25500020 PHARM REV CODE 255: Mod: HCNC

## 2023-07-19 PROCEDURE — 84100 ASSAY OF PHOSPHORUS: CPT | Mod: HCNC | Performed by: FAMILY MEDICINE

## 2023-07-19 PROCEDURE — 21400001 HC TELEMETRY ROOM: Mod: HCNC

## 2023-07-19 PROCEDURE — 25000003 PHARM REV CODE 250: Mod: HCNC | Performed by: FAMILY MEDICINE

## 2023-07-19 PROCEDURE — 86140 C-REACTIVE PROTEIN: CPT | Mod: HCNC | Performed by: FAMILY MEDICINE

## 2023-07-19 PROCEDURE — 80053 COMPREHEN METABOLIC PANEL: CPT | Mod: HCNC | Performed by: FAMILY MEDICINE

## 2023-07-19 PROCEDURE — 83735 ASSAY OF MAGNESIUM: CPT | Mod: HCNC | Performed by: FAMILY MEDICINE

## 2023-07-19 PROCEDURE — 25000242 PHARM REV CODE 250 ALT 637 W/ HCPCS: Mod: HCNC | Performed by: STUDENT IN AN ORGANIZED HEALTH CARE EDUCATION/TRAINING PROGRAM

## 2023-07-19 PROCEDURE — 99900035 HC TECH TIME PER 15 MIN (STAT): Mod: HCNC

## 2023-07-19 PROCEDURE — 27000221 HC OXYGEN, UP TO 24 HOURS: Mod: HCNC

## 2023-07-19 PROCEDURE — 83935 ASSAY OF URINE OSMOLALITY: CPT | Mod: HCNC | Performed by: FAMILY MEDICINE

## 2023-07-19 PROCEDURE — 63600175 PHARM REV CODE 636 W HCPCS: Mod: HCNC | Performed by: FAMILY MEDICINE

## 2023-07-19 PROCEDURE — 25000242 PHARM REV CODE 250 ALT 637 W/ HCPCS: Mod: HCNC | Performed by: FAMILY MEDICINE

## 2023-07-19 PROCEDURE — 84300 ASSAY OF URINE SODIUM: CPT | Mod: HCNC | Performed by: FAMILY MEDICINE

## 2023-07-19 PROCEDURE — 94761 N-INVAS EAR/PLS OXIMETRY MLT: CPT | Mod: HCNC

## 2023-07-19 RX ORDER — LEVOFLOXACIN 5 MG/ML
750 INJECTION, SOLUTION INTRAVENOUS
Status: COMPLETED | OUTPATIENT
Start: 2023-07-19 | End: 2023-07-20

## 2023-07-19 RX ADMIN — METHYLPREDNISOLONE SODIUM SUCCINATE 40 MG: 40 INJECTION, POWDER, FOR SOLUTION INTRAMUSCULAR; INTRAVENOUS at 10:07

## 2023-07-19 RX ADMIN — SODIUM CHLORIDE: 9 INJECTION, SOLUTION INTRAVENOUS at 12:07

## 2023-07-19 RX ADMIN — BUDESONIDE 0.5 MG: 0.5 INHALANT ORAL at 07:07

## 2023-07-19 RX ADMIN — CALCIUM CARBONATE (ANTACID) CHEW TAB 500 MG 1000 MG: 500 CHEW TAB at 04:07

## 2023-07-19 RX ADMIN — CALCIUM CARBONATE (ANTACID) CHEW TAB 500 MG 1000 MG: 500 CHEW TAB at 06:07

## 2023-07-19 RX ADMIN — IPRATROPIUM BROMIDE AND ALBUTEROL SULFATE 3 ML: .5; 3 SOLUTION RESPIRATORY (INHALATION) at 08:07

## 2023-07-19 RX ADMIN — GUAIFENESIN AND DEXTROMETHORPHAN HYDROBROMIDE 1 TABLET: 600; 30 TABLET, EXTENDED RELEASE ORAL at 08:07

## 2023-07-19 RX ADMIN — IOHEXOL 15 ML: 350 INJECTION, SOLUTION INTRAVENOUS at 12:07

## 2023-07-19 RX ADMIN — PROMETHAZINE HYDROCHLORIDE AND CODEINE PHOSPHATE 5 ML: 6.25; 1 SOLUTION ORAL at 08:07

## 2023-07-19 RX ADMIN — NIFEDIPINE 90 MG: 30 TABLET, FILM COATED, EXTENDED RELEASE ORAL at 08:07

## 2023-07-19 RX ADMIN — IPRATROPIUM BROMIDE AND ALBUTEROL SULFATE 3 ML: .5; 3 SOLUTION RESPIRATORY (INHALATION) at 12:07

## 2023-07-19 RX ADMIN — MUPIROCIN: 20 OINTMENT TOPICAL at 08:07

## 2023-07-19 RX ADMIN — SIMETHICONE 80 MG: 80 TABLET, CHEWABLE ORAL at 08:07

## 2023-07-19 RX ADMIN — IPRATROPIUM BROMIDE AND ALBUTEROL SULFATE 3 ML: .5; 3 SOLUTION RESPIRATORY (INHALATION) at 07:07

## 2023-07-19 RX ADMIN — IOHEXOL 15 ML: 350 INJECTION, SOLUTION INTRAVENOUS at 01:07

## 2023-07-19 RX ADMIN — CALCIUM CARBONATE (ANTACID) CHEW TAB 500 MG 1000 MG: 500 CHEW TAB at 11:07

## 2023-07-19 RX ADMIN — ENOXAPARIN SODIUM 40 MG: 40 INJECTION SUBCUTANEOUS at 04:07

## 2023-07-19 RX ADMIN — BUDESONIDE 0.5 MG: 0.5 INHALANT ORAL at 08:07

## 2023-07-19 RX ADMIN — LEVOFLOXACIN 750 MG: 5 INJECTION, SOLUTION INTRAVENOUS at 04:07

## 2023-07-19 RX ADMIN — SIMETHICONE 80 MG: 80 TABLET, CHEWABLE ORAL at 04:07

## 2023-07-19 NOTE — ASSESSMENT & PLAN NOTE
· Unclear etiology, could be related to decreased p.o. intake versus urinary retention versus beer potomania versus excessive water intake  · Urine sodium less than 10, urine osmolarity 341  · Discussed with Nephrology on-call, recommend Renteria catheter placement, strict I/O, daily urine osm and sodium.  Sodium increasing once Renteria in place, we will continue to monitor.  Low threshold to consult Nephrology if any further issues  · Started IV fluids on 07/18 with unremarkable echo and CVP of 3 mm Hg  · Legionella antigen pending.

## 2023-07-19 NOTE — ASSESSMENT & PLAN NOTE
· Unclear etiology  · CT abdomen and pelvis with what appears to be bladder distention despite what patient thought was normal urination.  Following catheter placement significant urine output.  I believe what is charted is less than what is actually been put out.  Patient and daughter report at least 5 full Renteria bag changes in the 1st 24 hours  · Discussed with Urology, recommend voiding trial in 5-7 days whether inpatient or in clinic.

## 2023-07-19 NOTE — PROGRESS NOTES
[facility] ([address] Medicine  Progress Note    Patient Name: Michela Franco  MRN: [id number]  Patient Class: IP- Inpatient   Admission Date: 7/16/2023  Length of Stay: 2 days  Attending Physician: Paresh Kidd III,*  Primary Care Provider: Mayela Broussard MD        Subjective:     Principal Problem:Sepsis, unspecified organism        HPI:  Patient is a pleasant 79 yo old female with a PMHx of diastolic HF, asthma, HLD, HTN, JOHN, recurrent pulmonary infections presents to the ED with son at bedside c/o SOB and chest wall pain that worsened  since yesterday. Patient was diagnosed with PNA on Friday and was discharged with amoxicillin and azithromycin -- however she notes her symptoms have not improved and also reports of a new onset CP with deep inspiration. She denies abdominal pain, HA, fever, chills, N, V, diarrhea, dysuria and hematuria. Patient reports she started to have coughing spills on Thursday with associated sputum production and fatigue. After PO abx she does not appear to be getting better.  She noted wheezing at home which have not happened for 3 years now.      In the ED, Xray showed Multifocal bilateral interstitial and alveolar opacities, similar to prior study.  Possible small bilateral pleural effusions.  No pneumothorax.  Cardiac silhouette is prominent, stable. Appears slightly worse than previous.            Overview/Hospital Course:  No notes on file    Interval History:  Patient says she feels better overall today.  Shortness of breath has improved.  Denies any fevers or chills.  Abdominal distention seems to have slightly improved.  Denies any abdominal pain, nausea, vomiting.  Patient reports excellent urine output since insertion of Renteria catheter    I do not think I/O charting accurate.  Renteria inserted approximately 530 on 07/17.  Approximately 550 mL emptied immediately per nursing.  I came back to check on the patient approximately 2 hours later that  evening with another 400-500 mL in the bag.  Patient reported since then, bag has been full and emptied at least 3 more times so output likely approximate 2 L in 1st 12 hours    Labs reviewed:  WBC 15.8 from 17.1, sodium increased to 126, potassium 5.4, CO2 18, creatinine 0.8    Discussed with Urology who recommend maintaining Renteria.  Recommended voiding trial in 5-7 days    Review of Systems: As above  Objective:     Vital Signs (Most Recent):  Temp: 98.2 °F (36.8 °C) (07/18/23 2024)  Pulse: 80 (07/18/23 2024)  Resp: 17 (07/18/23 2054)  BP: 131/61 (07/18/23 2024)  SpO2: (!) 93 % (07/18/23 2024) Vital Signs (24h Range):  Temp:  [97.6 °F (36.4 °C)-98.2 °F (36.8 °C)] 98.2 °F (36.8 °C)  Pulse:  [68-92] 80  Resp:  [16-21] 17  SpO2:  [90 %-99 %] 93 %  BP: (111-131)/(55-61) 131/61     Weight: 87.5 kg (193 lb)  Body mass index is 32.12 kg/m².    Intake/Output Summary (Last 24 hours) at 7/18/2023 2318  Last data filed at 7/18/2023 1937  Gross per 24 hour   Intake 240 ml   Output 725 ml   Net -485 ml        Physical Exam  Constitutional:       General: She is not in acute distress.     Appearance: Normal appearance. She is not ill-appearing or diaphoretic.   HENT:      Mouth/Throat:      Mouth: Mucous membranes are moist.      Pharynx: No oropharyngeal exudate or posterior oropharyngeal erythema.   Cardiovascular:      Rate and Rhythm: Normal rate and regular rhythm.      Pulses: Normal pulses.      Heart sounds: Normal heart sounds.     No gallop.   Pulmonary:      Effort: Pulmonary effort is normal.      Breath sounds: Rales (bilaterally) present. No wheezing or rhonchi.   Abdominal:      General: There is distension (Tympanic to percussion, high-pitched bowel sounds).      Palpations: Abdomen is soft.      Tenderness: There is no left CVA tenderness or rebound.   Musculoskeletal:      Cervical back: Normal range of motion and neck supple.      Right lower leg: No edema.      Left lower leg: No edema.   Skin:     General:  Skin is warm and dry.      Capillary Refill: Capillary refill takes less than 2 seconds.   Neurological:      Mental Status: She is alert. Mental status is at baseline.   Psychiatric:         Behavior: Behavior normal.         Assessment/Plan:      * Sepsis, unspecified organism  Community-acquired multifocal pneumonia  · Lactic acid negative, blood cultures pending  · D-dimer greater than 6 no obvious PE on CTA but not a great image.  CTA with bilateral multifocal consolidations and possible edema with small pleural effusions.   · Failed outpatient Amoxicillin and azithromycin, continue Levaquin.  · Follow-up Legionella, respiratory infectious panel, sputum culture.  · D-dimer 6.51, .8, procalcitonin 0.37 on admission  · Continue mucolytics, antitussives, and bronchodilators        Hyponatremia  · Unclear etiology, could be related to decreased p.o. intake versus urinary retention versus beer potomania versus excessive water intake  · Urine sodium less than 10, urine osmolarity 341  · Discussed with Nephrology on-call, recommend Renteria catheter placement, strict I/O, daily urine osm and sodium.  Sodium increasing once Renteria in place, we will continue to monitor.  Low threshold to consult Nephrology if any further issues  · Started IV fluids on 07/18 with unremarkable echo and CVP of 3 mm Hg  · Legionella antigen pending.      Abdominal distension  · Patient reports history of hernia but did do not see mention of it in previous records.  · Abdomen tympanic to percussion throughout  · CT abdomen pelvis with oral contrast with  gaseous distention of the transverse colon of unknown etiology as well as what appears to be distended bladder despite reports normal urination.  Will repeat CT with oral contrast now that bladder is drained.      Moderate persistent asthma not dependent on systemic steroids with acute exacerbation  Nicotine dependence, in remission  Likely COPD as well  · In acute exacerbation secondary  to pneumonia  · Solumedrol 40 mg BID  decrease to 40 mg daily  · Scheduled bronchodilators      Urinary retention  · Unclear etiology  · CT abdomen and pelvis with what appears to be bladder distention despite what patient thought was normal urination.  Following catheter placement significant urine output.  I believe what is charted is less than what is actually been put out.  Patient and daughter report at least 5 full Renteria bag changes in the 1st 24 hours  · Discussed with Urology, recommend voiding trial in 5-7 days whether inpatient or in clinic.      Hyperkalemia  · Hold valsartan  · Gentle IV fluids  · 5 mg Lokelma x1  · Daily labs      Elevated transaminase level  · Possibly related to daily alcohol use  · Continue to monitor  · If no improvement or worsens, consider hepatitis panel and RUQ U/S      GERD (gastroesophageal reflux disease)  Patient requests scheduled Tums      HTN (hypertension)  Resume home medications        VTE Risk Mitigation (From admission, onward)         Ordered     enoxaparin injection 40 mg  Daily         07/18/23 0754     IP VTE HIGH RISK PATIENT  Once         07/16/23 1549     Place sequential compression device  Until discontinued         07/16/23 1549                Discharge Planning   CAMRYN: 7/21/2023     Code Status: Full Code   Is the patient medically ready for discharge?: No    Reason for patient still in hospital (select all that apply): Patient new problem, Patient trending condition, Laboratory test, Treatment, Imaging and Consult recommendations  Discharge Plan A: Home with family                  Paresh Kidd III, MD  Department of Hospital Medicine   [facility] - Intensive Care ([place])     Review of patient records, including history, laboratory data, and imaging. Patient assessment and evaluation. Coordination of care.

## 2023-07-19 NOTE — PLAN OF CARE
Patient not medically ready for for discharge at this time.  SW will continue to follow patient's progress to discharge. SW anticipates home with family. SW remains available for any family concerns or needs           Tessie Queen LMSW  PRN - [facility]  [phone]

## 2023-07-19 NOTE — ASSESSMENT & PLAN NOTE
Nicotine dependence, in remission  Likely COPD as well  · In acute exacerbation secondary to pneumonia  · Solumedrol 40 mg BID  decrease to 40 mg daily  · Scheduled bronchodilators

## 2023-07-19 NOTE — ASSESSMENT & PLAN NOTE
· Patient reports history of hernia but did do not see mention of it in previous records.  · Abdomen tympanic to percussion throughout  · CT abdomen pelvis with oral contrast with  gaseous distention of the transverse colon of unknown etiology as well as what appears to be distended bladder despite reports normal urination.  Will repeat CT with oral contrast now that bladder is drained.

## 2023-07-19 NOTE — SUBJECTIVE & OBJECTIVE
Interval History:  Patient says she feels better overall today.  Shortness of breath has improved.  Denies any fevers or chills.  Abdominal distention seems to have slightly improved.  Denies any abdominal pain, nausea, vomiting.  Patient reports excellent urine output since insertion of Renteria catheter    I do not think I/O charting accurate.  Renteria inserted approximately 530 on 07/17.  Approximately 550 mL emptied immediately per nursing.  I came back to check on the patient approximately 2 hours later that evening with another 400-500 mL in the bag.  Patient reported since then, bag has been full and emptied at least 3 more times so output likely approximate 2 L in 1st 12 hours    Labs reviewed:  WBC 15.8 from 17.1, sodium increased to 126, potassium 5.4, CO2 18, creatinine 0.8    Discussed with Urology who recommend maintaining Renteria.  Recommended voiding trial in 5-7 days    Review of Systems: As above  Objective:     Vital Signs (Most Recent):  Temp: 98.2 °F (36.8 °C) (07/18/23 2024)  Pulse: 80 (07/18/23 2024)  Resp: 17 (07/18/23 2054)  BP: 131/61 (07/18/23 2024)  SpO2: (!) 93 % (07/18/23 2024) Vital Signs (24h Range):  Temp:  [97.6 °F (36.4 °C)-98.2 °F (36.8 °C)] 98.2 °F (36.8 °C)  Pulse:  [68-92] 80  Resp:  [16-21] 17  SpO2:  [90 %-99 %] 93 %  BP: (111-131)/(55-61) 131/61     Weight: 87.5 kg (193 lb)  Body mass index is 32.12 kg/m².    Intake/Output Summary (Last 24 hours) at 7/18/2023 2318  Last data filed at 7/18/2023 1937  Gross per 24 hour   Intake 240 ml   Output 725 ml   Net -485 ml        Physical Exam  Constitutional:       General: She is not in acute distress.     Appearance: Normal appearance. She is not ill-appearing or diaphoretic.   HENT:      Mouth/Throat:      Mouth: Mucous membranes are moist.      Pharynx: No oropharyngeal exudate or posterior oropharyngeal erythema.   Cardiovascular:      Rate and Rhythm: Normal rate and regular rhythm.      Pulses: Normal pulses.      Heart sounds: Normal  heart sounds.     No gallop.   Pulmonary:      Effort: Pulmonary effort is normal.      Breath sounds: Rales (bilaterally) present. No wheezing or rhonchi.   Abdominal:      General: There is distension (Tympanic to percussion, high-pitched bowel sounds).      Palpations: Abdomen is soft.      Tenderness: There is no left CVA tenderness or rebound.   Musculoskeletal:      Cervical back: Normal range of motion and neck supple.      Right lower leg: No edema.      Left lower leg: No edema.   Skin:     General: Skin is warm and dry.      Capillary Refill: Capillary refill takes less than 2 seconds.   Neurological:      Mental Status: She is alert. Mental status is at baseline.   Psychiatric:         Behavior: Behavior normal.

## 2023-07-19 NOTE — PLAN OF CARE
Problem: Infection Progression (Sepsis/Septic Shock)  Goal: Absence of Infection Signs and Symptoms  Outcome: Ongoing, Progressing     Problem: Infection Progression (Sepsis/Septic Shock)  Goal: Absence of Infection Signs and Symptoms  Outcome: Ongoing, Progressing     Problem: Fluid Imbalance (Pneumonia)  Goal: Fluid Balance  Outcome: Ongoing, Progressing     Problem: Fluid Imbalance (Pneumonia)  Goal: Fluid Balance  Outcome: Ongoing, Progressing     Problem: Respiratory Compromise (Pneumonia)  Goal: Effective Oxygenation and Ventilation  Outcome: Ongoing, Progressing     Problem: Respiratory Compromise (Pneumonia)  Goal: Effective Oxygenation and Ventilation  Outcome: Ongoing, Progressing     Problem: Infection  Goal: Absence of Infection Signs and Symptoms  Outcome: Ongoing, Progressing     Problem: Infection  Goal: Absence of Infection Signs and Symptoms  Outcome: Ongoing, Progressing  Pt AAO X 4; able to express needs.  No c/o pain.  Tolerated CPAP during the night.  Ambulated to bathroom with SBA.  SCD's on.   NS infusing at  75cc/hr.   Safety maintained.  Bed in low position,  call  light in reach.

## 2023-07-20 LAB
ALBUMIN SERPL BCP-MCNC: 1.8 G/DL (ref 3.5–5.2)
ALP SERPL-CCNC: 91 U/L (ref 55–135)
ALT SERPL W/O P-5'-P-CCNC: 54 U/L (ref 10–44)
ANION GAP SERPL CALC-SCNC: 8 MMOL/L (ref 8–16)
ANISOCYTOSIS BLD QL SMEAR: SLIGHT
AST SERPL-CCNC: 50 U/L (ref 10–40)
BASOPHILS NFR BLD: 0 % (ref 0–1.9)
BILIRUB SERPL-MCNC: 0.2 MG/DL (ref 0.1–1)
BUN SERPL-MCNC: 18 MG/DL (ref 8–23)
CALCIUM SERPL-MCNC: 8.8 MG/DL (ref 8.7–10.5)
CHLORIDE SERPL-SCNC: 101 MMOL/L (ref 95–110)
CO2 SERPL-SCNC: 22 MMOL/L (ref 23–29)
CREAT SERPL-MCNC: 0.6 MG/DL (ref 0.5–1.4)
DIFFERENTIAL METHOD: ABNORMAL
EOSINOPHIL NFR BLD: 0 % (ref 0–8)
ERYTHROCYTE [DISTWIDTH] IN BLOOD BY AUTOMATED COUNT: 17.6 % (ref 11.5–14.5)
EST. GFR  (NO RACE VARIABLE): >60 ML/MIN/1.73 M^2
GLUCOSE SERPL-MCNC: 91 MG/DL (ref 70–110)
HCT VFR BLD AUTO: 31.7 % (ref 37–48.5)
HGB BLD-MCNC: 10.7 G/DL (ref 12–16)
HYPOCHROMIA BLD QL SMEAR: ABNORMAL
IMM GRANULOCYTES # BLD AUTO: ABNORMAL K/UL (ref 0–0.04)
IMM GRANULOCYTES NFR BLD AUTO: ABNORMAL % (ref 0–0.5)
LYMPHOCYTES NFR BLD: 15 % (ref 18–48)
MAGNESIUM SERPL-MCNC: 1.5 MG/DL (ref 1.6–2.6)
MCH RBC QN AUTO: 28.2 PG (ref 27–31)
MCHC RBC AUTO-ENTMCNC: 33.8 G/DL (ref 32–36)
MCV RBC AUTO: 83 FL (ref 82–98)
METAMYELOCYTES NFR BLD MANUAL: 2 %
MONOCYTES NFR BLD: 9 % (ref 4–15)
MYELOCYTES NFR BLD MANUAL: 1 %
NEUTROPHILS NFR BLD: 72 % (ref 38–73)
NEUTS BAND NFR BLD MANUAL: 1 %
NRBC BLD-RTO: 0 /100 WBC
OSMOLALITY UR: 499 MOSM/KG (ref 50–1200)
PHOSPHATE SERPL-MCNC: 3.7 MG/DL (ref 2.7–4.5)
PLATELET # BLD AUTO: 459 K/UL (ref 150–450)
PLATELET BLD QL SMEAR: ABNORMAL
PMV BLD AUTO: 9.3 FL (ref 9.2–12.9)
POTASSIUM SERPL-SCNC: 4.5 MMOL/L (ref 3.5–5.1)
PROT SERPL-MCNC: 5.7 G/DL (ref 6–8.4)
RBC # BLD AUTO: 3.8 M/UL (ref 4–5.4)
SODIUM SERPL-SCNC: 131 MMOL/L (ref 136–145)
SODIUM UR-SCNC: 35 MMOL/L (ref 20–250)
WBC # BLD AUTO: 9.67 K/UL (ref 3.9–12.7)

## 2023-07-20 PROCEDURE — 85007 BL SMEAR W/DIFF WBC COUNT: CPT | Mod: HCNC | Performed by: FAMILY MEDICINE

## 2023-07-20 PROCEDURE — 36415 COLL VENOUS BLD VENIPUNCTURE: CPT | Mod: HCNC | Performed by: FAMILY MEDICINE

## 2023-07-20 PROCEDURE — 85027 COMPLETE CBC AUTOMATED: CPT | Mod: HCNC | Performed by: FAMILY MEDICINE

## 2023-07-20 PROCEDURE — 25000242 PHARM REV CODE 250 ALT 637 W/ HCPCS: Mod: HCNC | Performed by: STUDENT IN AN ORGANIZED HEALTH CARE EDUCATION/TRAINING PROGRAM

## 2023-07-20 PROCEDURE — 94640 AIRWAY INHALATION TREATMENT: CPT | Mod: HCNC

## 2023-07-20 PROCEDURE — 94761 N-INVAS EAR/PLS OXIMETRY MLT: CPT | Mod: HCNC

## 2023-07-20 PROCEDURE — 84300 ASSAY OF URINE SODIUM: CPT | Mod: HCNC | Performed by: FAMILY MEDICINE

## 2023-07-20 PROCEDURE — 25000003 PHARM REV CODE 250: Mod: HCNC | Performed by: FAMILY MEDICINE

## 2023-07-20 PROCEDURE — 99232 PR SUBSEQUENT HOSPITAL CARE,LEVL II: ICD-10-PCS | Mod: HCNC,,, | Performed by: INTERNAL MEDICINE

## 2023-07-20 PROCEDURE — 83735 ASSAY OF MAGNESIUM: CPT | Mod: HCNC | Performed by: FAMILY MEDICINE

## 2023-07-20 PROCEDURE — 99232 SBSQ HOSP IP/OBS MODERATE 35: CPT | Mod: HCNC,,, | Performed by: INTERNAL MEDICINE

## 2023-07-20 PROCEDURE — 63600175 PHARM REV CODE 636 W HCPCS: Mod: HCNC | Performed by: FAMILY MEDICINE

## 2023-07-20 PROCEDURE — 21400001 HC TELEMETRY ROOM: Mod: HCNC

## 2023-07-20 PROCEDURE — 94660 CPAP INITIATION&MGMT: CPT | Mod: HCNC

## 2023-07-20 PROCEDURE — 99900035 HC TECH TIME PER 15 MIN (STAT): Mod: HCNC

## 2023-07-20 PROCEDURE — 84100 ASSAY OF PHOSPHORUS: CPT | Mod: HCNC | Performed by: FAMILY MEDICINE

## 2023-07-20 PROCEDURE — 27000221 HC OXYGEN, UP TO 24 HOURS: Mod: HCNC

## 2023-07-20 PROCEDURE — 80053 COMPREHEN METABOLIC PANEL: CPT | Mod: HCNC | Performed by: FAMILY MEDICINE

## 2023-07-20 PROCEDURE — 25000242 PHARM REV CODE 250 ALT 637 W/ HCPCS: Mod: HCNC | Performed by: FAMILY MEDICINE

## 2023-07-20 PROCEDURE — 83935 ASSAY OF URINE OSMOLALITY: CPT | Mod: HCNC | Performed by: FAMILY MEDICINE

## 2023-07-20 RX ADMIN — SODIUM CHLORIDE: 9 INJECTION, SOLUTION INTRAVENOUS at 02:07

## 2023-07-20 RX ADMIN — GUAIFENESIN AND DEXTROMETHORPHAN HYDROBROMIDE 1 TABLET: 600; 30 TABLET, EXTENDED RELEASE ORAL at 08:07

## 2023-07-20 RX ADMIN — CALCIUM CARBONATE (ANTACID) CHEW TAB 500 MG 1000 MG: 500 CHEW TAB at 05:07

## 2023-07-20 RX ADMIN — IPRATROPIUM BROMIDE AND ALBUTEROL SULFATE 3 ML: .5; 3 SOLUTION RESPIRATORY (INHALATION) at 12:07

## 2023-07-20 RX ADMIN — MUPIROCIN: 20 OINTMENT TOPICAL at 08:07

## 2023-07-20 RX ADMIN — IPRATROPIUM BROMIDE AND ALBUTEROL SULFATE 3 ML: .5; 3 SOLUTION RESPIRATORY (INHALATION) at 01:07

## 2023-07-20 RX ADMIN — IPRATROPIUM BROMIDE AND ALBUTEROL SULFATE 3 ML: .5; 3 SOLUTION RESPIRATORY (INHALATION) at 08:07

## 2023-07-20 RX ADMIN — BUDESONIDE 0.5 MG: 0.5 INHALANT ORAL at 08:07

## 2023-07-20 RX ADMIN — LEVOFLOXACIN 750 MG: 5 INJECTION, SOLUTION INTRAVENOUS at 06:07

## 2023-07-20 RX ADMIN — ENOXAPARIN SODIUM 40 MG: 40 INJECTION SUBCUTANEOUS at 05:07

## 2023-07-20 RX ADMIN — METHYLPREDNISOLONE SODIUM SUCCINATE 40 MG: 40 INJECTION, POWDER, FOR SOLUTION INTRAMUSCULAR; INTRAVENOUS at 08:07

## 2023-07-20 RX ADMIN — PROMETHAZINE HYDROCHLORIDE AND CODEINE PHOSPHATE 5 ML: 6.25; 1 SOLUTION ORAL at 08:07

## 2023-07-20 RX ADMIN — NIFEDIPINE 90 MG: 30 TABLET, FILM COATED, EXTENDED RELEASE ORAL at 08:07

## 2023-07-20 RX ADMIN — CALCIUM CARBONATE (ANTACID) CHEW TAB 500 MG 1000 MG: 500 CHEW TAB at 12:07

## 2023-07-20 RX ADMIN — IPRATROPIUM BROMIDE AND ALBUTEROL SULFATE 3 ML: .5; 3 SOLUTION RESPIRATORY (INHALATION) at 07:07

## 2023-07-20 RX ADMIN — BUDESONIDE 0.5 MG: 0.5 INHALANT ORAL at 07:07

## 2023-07-20 NOTE — ASSESSMENT & PLAN NOTE
Nicotine dependence, in remission  Likely COPD as well  · In acute exacerbation secondary to pneumonia  · Solumedrol 40 mg BID  decrease to 40 mg daily  · Scheduled bronchodilators  · Stable

## 2023-07-20 NOTE — ASSESSMENT & PLAN NOTE
· Patient reports history of hernia but did do not see mention of it in previous records.  · Abdomen tympanic to percussion throughout  · CT abdomen pelvis with oral contrast with  gaseous distention of the transverse colon of unknown etiology as well as what appears to be distended bladder despite reports normal urination.    · General surgery consulted, awaiting recommendations  · Repeat CT once bladder drained with no evidence of obstruction

## 2023-07-20 NOTE — ASSESSMENT & PLAN NOTE
Community-acquired multifocal pneumonia  · Lactic acid negative, blood cultures NGTD  · D-dimer greater than 6 no obvious PE on CTA but not a great image.  CTA with bilateral multifocal consolidations and possible edema with small pleural effusions.   · Failed outpatient Amoxicillin and azithromycin, continue Levaquin.  · Legionella negative, RIP negative, sputum culture normal mani  · Inflammatory markers improving  · Continue mucolytics, antitussives, and bronchodilators

## 2023-07-20 NOTE — SUBJECTIVE & OBJECTIVE
Interval History:  Patient says shortness of breath and cough have improved.  Abdomen is still distended but patient says is much more soft.  Patient says she is passing a lot of gas and having bowel movement.  Continues with great urinary output    Labs reviewed:  WBC 10.84, sodium 129 from 126, potassium 4.4 from 5.4, CO2 22 from 18, CRP 68.6 from 251.8    Discussed with Urology who recommend maintaining Renteria.  Recommended voiding trial in 5-7 days    Review of Systems: As above  Objective:     Vital Signs (Most Recent):  Temp: 98.4 °F (36.9 °C) (07/20/23 0001)  Pulse: 74 (07/20/23 0001)  Resp: 18 (07/20/23 0014)  BP: (!) 122/57 (07/20/23 0001)  SpO2: 95 % (07/20/23 0210) Vital Signs (24h Range):  Temp:  [98 °F (36.7 °C)-98.4 °F (36.9 °C)] 98.4 °F (36.9 °C)  Pulse:  [71-88] 74  Resp:  [16-21] 18  SpO2:  [94 %-100 %] 95 %  BP: (122-154)/(57-65) 122/57     Weight: 87.5 kg (193 lb)  Body mass index is 32.12 kg/m².    Intake/Output Summary (Last 24 hours) at 7/20/2023 0224  Last data filed at 7/19/2023 2000  Gross per 24 hour   Intake --   Output 800 ml   Net -800 ml        Physical Exam  Constitutional:       General: She is not in acute distress.     Appearance: Normal appearance. She is not ill-appearing or diaphoretic.   HENT:      Mouth/Throat:      Mouth: Mucous membranes are moist.      Pharynx: No oropharyngeal exudate or posterior oropharyngeal erythema.   Cardiovascular:      Rate and Rhythm: Normal rate and regular rhythm.      Pulses: Normal pulses.      Heart sounds: Normal heart sounds.     No gallop.   Pulmonary:      Effort: Pulmonary effort is normal.      Breath sounds: Rales (bilaterally) present. No wheezing or rhonchi.   Abdominal:      General: There is distension (Tympanic to percussion, high-pitched bowel sounds).      Palpations: Abdomen is soft.      Tenderness: There is no left CVA tenderness or rebound.   Musculoskeletal:      Cervical back: Normal range of motion and neck supple.       Right lower leg: No edema.      Left lower leg: No edema.   Skin:     General: Skin is warm and dry.      Capillary Refill: Capillary refill takes less than 2 seconds.   Neurological:      Mental Status: She is alert. Mental status is at baseline.   Psychiatric:         Behavior: Behavior normal.

## 2023-07-20 NOTE — HOSPITAL COURSE
78-year-old female with past medical history as mentioned above in addition to long smoking history and Severe Asthma, followed by Dr. Barrera.  Admitted to the hospitalist service for failed outpatient treatment of pneumonia, asthma/COPD exacerbation hyponatremia, abdominal distention, and urinary retention.  Pneumonia improving with Levaquin.  COPD / Asthma exacerbation improving with bronchodilators and steroids.  Pneumonia with hyponatremia, concern for Legionella but patient did not identify any risk factors. Nephrology contacted who recommended Renteria catheter, strict I/O, daily urine sodium and osmolality with formal nephrology consult. Legionella antigen came back negative.  CTA of the chest  negative for PE although compromised by respiratory motion and suboptimal contrast bolus timing.  Image demonstrated multifocal airspace disease which could represent pneumonia versus edema as well as small bilateral pleural effusions.  Patient with significant abdominal distention but no complaints of nausea, vomiting, or constipation.  CT AP ordered that time.  Patient reported orthopnea and intermittent lower extremity swelling so there was some concern for heart failure.  Patient hemodynamically stable and tolerating diet so IV fluids held pending further workup. CT AP resulted which revealed distended bladder and gaseous distention of the transverse colon.  Patient reported normal urination and bowel movements.    Rentreia placed with immediate urine output.  Urine studies indicative of hypovolemic hyponatremia.  With appropriate urine output, sodium improved and Nephrology consult was canceled.  Urology was consulted who recommended voiding trial in 5-7 days.  Echocardiogram resulted with EF 50%, normal RV size and function, indeterminate diastolic function, and normal CVP.  Patient started on slow IV fluids with continued improvement of electrolytes.  General surgery consulted regarding gaseous distention of the  transverse colon.  Repeat CT abdomen pelvis with oral contrast repeated after bladder drained, not concerning for obstruction.  The pt. Oxygenation improved and she is now showing sat 98% on RA. She still feels congested and has some pulmonary fleam, but no distress or breathlessness. She will continue Levofloxacin for another 7 days, inflammatory markers are normal though. Additionally Steroids will continue with Prednisone for another 5 day course. She remains on home inhalers Advair, Spiriva and will be sent home with galo MARQUESN. She is to follow up with her Allergist for PFT and management next week.   Her Renteria cath was removed prior to discharge and her voiding trial was satisfactory. She still has moderate gaseous distention of the abdomen, however benign exam and CT scans that ruled out acute intraabdominal pathology or SBO. No further intervention is needed.   She will follow up with her PCP within 7 days as well. The pt. Was discharged in stable condition to home.

## 2023-07-20 NOTE — ASSESSMENT & PLAN NOTE
· Hypovolemic hyponatremia  · Likely decreased p.o. intake and urinary retention  · Urine sodium less than 10, urine osmolarity 341 on admission  · Started IV fluids on 07/18 with unremarkable echo and CVP of 3 mm Hg.

## 2023-07-20 NOTE — ASSESSMENT & PLAN NOTE
Community-acquired multifocal pneumonia  · Lactic acid negative, blood cultures NGTD  · D-dimer greater than 6 no obvious PE on CTA but not a great image.  CTA with bilateral multifocal consolidations and possible edema with small pleural effusions.   · Failed outpatient Amoxicillin and azithromycin, continue Levaquin.  · Legionella negative, RIP negative, sputum culture normal mani  · Inflammatory markers improving  · Continue mucolytics, antitussives, and bronchodilators    Resolved.

## 2023-07-20 NOTE — SUBJECTIVE & OBJECTIVE
Interval History:  Feels better, less breathless. Still feels like her abdomen is distended but having flatus and BM.         Discussed with Urology who recommend maintaining Renteria.  Voiding trial in AM.     Review of Systems   Constitutional:  Negative for chills and diaphoresis.   HENT:  Negative for congestion and dental problem.    Respiratory:  Negative for apnea and chest tightness.    Cardiovascular:  Negative for chest pain.   Gastrointestinal:  Positive for abdominal distention. Negative for abdominal pain, blood in stool and constipation.   Endocrine: Negative for cold intolerance.   Genitourinary:  Positive for difficulty urinating. Negative for hematuria.   Musculoskeletal:  Negative for arthralgias and back pain.   Skin:  Negative for color change.   Neurological:  Negative for dizziness.   Psychiatric/Behavioral:  Negative for agitation and behavioral problems.  : As above  Objective:     Vital Signs (Most Recent):  Temp: 98.7 °F (37.1 °C) (07/20/23 1514)  Pulse: 81 (07/20/23 1514)  Resp: 18 (07/20/23 1514)  BP: (!) 120/57 (07/20/23 1514)  SpO2: (!) 91 % (07/20/23 1514) Vital Signs (24h Range):  Temp:  [97.6 °F (36.4 °C)-98.7 °F (37.1 °C)] 98.7 °F (37.1 °C)  Pulse:  [64-88] 81  Resp:  [16-21] 18  SpO2:  [90 %-100 %] 91 %  BP: (120-132)/(57-64) 120/57     Weight: 87.5 kg (193 lb)  Body mass index is 32.12 kg/m².    Intake/Output Summary (Last 24 hours) at 7/20/2023 1532  Last data filed at 7/20/2023 1108  Gross per 24 hour   Intake 2939.03 ml   Output 3450 ml   Net -510.97 ml        Physical Exam  Constitutional:       General: She is not in acute distress.     Appearance: Normal appearance. She is not ill-appearing or diaphoretic.   HENT:      Mouth/Throat:      Mouth: Mucous membranes are moist.      Pharynx: No oropharyngeal exudate or posterior oropharyngeal erythema.   Cardiovascular:      Rate and Rhythm: Normal rate and regular rhythm.      Pulses: Normal pulses.      Heart sounds: Normal heart  sounds.     No gallop.   Pulmonary:      Effort: Pulmonary effort is normal.      Breath sounds: Rales (bilaterally) present. No wheezing or rhonchi.   Abdominal:      General: There is distension (Tympanic to percussion, high-pitched bowel sounds).      Palpations: Abdomen is soft.      Tenderness: There is no left CVA tenderness or rebound.      Comments: Tympanic to percussion. Ventral wall hernia. Active BS.        Musculoskeletal:      Cervical back: Normal range of motion and neck supple.      Right lower leg: No edema.      Left lower leg: No edema.   Skin:     General: Skin is warm and dry.      Capillary Refill: Capillary refill takes less than 2 seconds.   Neurological:      Mental Status: She is alert. Mental status is at baseline.   Psychiatric:         Behavior: Behavior normal.     7/18    Impression:     Bowel: Scattered diverticula throughout the colon.  The transverse colon is moderately distended with bowel gas, however there is contrast distally within the sigmoid colon and rectum.  The visualized loops of small and large bowel show no evidence of obstruction or inflammation.    No evidence of bowel obstruction.     Stable trace bilateral pleural effusion and bibasilar opacification likely representing atelectasis versus underlying airway disease.     Stable left adrenal nodule.     Stable right renal exophytic density.     Degenerative changes of the bilateral femoral heads.

## 2023-07-20 NOTE — PLAN OF CARE
Problem: Adult Inpatient Plan of Care  Goal: Plan of Care Review  Outcome: Ongoing, Progressing  Goal: Patient-Specific Goal (Individualized)  Outcome: Ongoing, Progressing  Goal: Absence of Hospital-Acquired Illness or Injury  Outcome: Ongoing, Progressing  Goal: Optimal Comfort and Wellbeing  Outcome: Ongoing, Progressing  Goal: Readiness for Transition of Care  Outcome: Ongoing, Progressing     Problem: Adult Inpatient Plan of Care  Goal: Plan of Care Review  Outcome: Ongoing, Progressing     Problem: Adult Inpatient Plan of Care  Goal: Patient-Specific Goal (Individualized)  Outcome: Ongoing, Progressing     Problem: Adult Inpatient Plan of Care  Goal: Absence of Hospital-Acquired Illness or Injury  Outcome: Ongoing, Progressing     Problem: Adult Inpatient Plan of Care  Goal: Optimal Comfort and Wellbeing  Outcome: Ongoing, Progressing     Problem: Adult Inpatient Plan of Care  Goal: Readiness for Transition of Care  Outcome: Ongoing, Progressing     Problem: Adjustment to Illness (Sepsis/Septic Shock)  Goal: Optimal Coping  Outcome: Ongoing, Progressing     Problem: Adjustment to Illness (Sepsis/Septic Shock)  Goal: Optimal Coping  Outcome: Ongoing, Progressing  Pt AAO X 4; able to express needs.   No c/o pain.  Tolerated CPAP during the night.  IVF's infusing @75cc/hr.  Family at the bedside awaiting to see MD regarding CT results.  SCD's applied and changed 3 times on this shift.  Safety maintained.  Bed in low position,  call  light in reach.

## 2023-07-20 NOTE — PROGRESS NOTES
[facility] ([address] [facility]  Progress Note    Patient Name: Michela Franco  MRN: [id number]  Patient Class: IP- Inpatient   Admission Date: 7/16/2023  Length of Stay: 4 days  Attending Physician: Ishan De Oliveira MD  Primary Care Provider: Mayela Broussard MD        Subjective:     Principal Problem:Sepsis, unspecified organism        HPI:  Patient is a pleasant 77 yo old female with a PMHx of diastolic HF, asthma, HLD, HTN, JOHN, recurrent pulmonary infections presents to the ED with son at bedside c/o SOB and chest wall pain that worsened  since yesterday. Patient was diagnosed with PNA on Friday and was discharged with amoxicillin and azithromycin -- however she notes her symptoms have not improved and also reports of a new onset CP with deep inspiration. She denies abdominal pain, HA, fever, chills, N, V, diarrhea, dysuria and hematuria. Patient reports she started to have coughing spills on Thursday with associated sputum production and fatigue. After PO abx she does not appear to be getting better.  She noted wheezing at home which have not happened for 3 years now.      In the ED, Xray showed Multifocal bilateral interstitial and alveolar opacities, similar to prior study.  Possible small bilateral pleural effusions.  No pneumothorax.  Cardiac silhouette is prominent, stable. Appears slightly worse than previous.            Overview/Hospital Course:  78-year-old female with past medical history as mentioned above in addition to long smoking history and likely COPD.  Admitted to the hospitalist service for failed outpatient treatment of pneumonia, asthma/COPD exacerbation hyponatremia, abdominal distention, and urinary retention.  Pneumonia improving with Levaquin.  COPD exacerbation improving with bronchodilators and steroids.  Pneumonia with hyponatremia, concern for Legionella but patient did not identify any risk factors. Nephrology contacted who recommended Renteria catheter,  strict I/O, daily urine sodium and osmolality with formal nephrology consult. Legionella antigen came back negative.  CTA of the chest  negative for PE although compromised by respiratory motion and suboptimal contrast bolus timing.  Image demonstrated multifocal airspace disease which could represent pneumonia versus edema as well as small bilateral pleural effusions.  Patient with significant abdominal distention but no complaints of nausea, vomiting, or constipation.  CT AP ordered that time.  Patient reported orthopnea and intermittent lower extremity swelling so there was some concern for heart failure.  Patient hemodynamically stable and tolerating diet so IV fluids held pending further workup. CT AP resulted which revealed distended bladder and gaseous distention of the transverse colon.  Patient reported normal urination and bowel movements.    Renteria placed with immediate urine output.  Urine studies indicative of hypovolemic hyponatremia.  With appropriate urine output, sodium improved and Nephrology consult was canceled.  Urology was consulted who recommended voiding trial in 5-7 days.  Echocardiogram resulted with EF 50%, normal RV size and function, indeterminate diastolic function, and normal CVP.  Patient started on slow IV fluids with continued improvement of electrolytes.  General surgery consulted regarding gaseous distention of the transverse colon.  Repeat CT abdomen pelvis with oral contrast repeated after bladder drained, not concerning for obstruction.      Interval History:  Feels better, less breathless. Still feels like her abdomen is distended but having flatus and BM.         Discussed with Urology who recommend maintaining Renteria.  Voiding trial in AM.     Review of Systems   Constitutional:  Negative for chills and diaphoresis.   HENT:  Negative for congestion and dental problem.    Respiratory:  Negative for apnea and chest tightness.    Cardiovascular:  Negative for chest pain.    Gastrointestinal:  Positive for abdominal distention. Negative for abdominal pain, blood in stool and constipation.   Endocrine: Negative for cold intolerance.   Genitourinary:  Positive for difficulty urinating. Negative for hematuria.   Musculoskeletal:  Negative for arthralgias and back pain.   Skin:  Negative for color change.   Neurological:  Negative for dizziness.   Psychiatric/Behavioral:  Negative for agitation and behavioral problems.  : As above  Objective:     Vital Signs (Most Recent):  Temp: 98.7 °F (37.1 °C) (07/20/23 1514)  Pulse: 81 (07/20/23 1514)  Resp: 18 (07/20/23 1514)  BP: (!) 120/57 (07/20/23 1514)  SpO2: (!) 91 % (07/20/23 1514) Vital Signs (24h Range):  Temp:  [97.6 °F (36.4 °C)-98.7 °F (37.1 °C)] 98.7 °F (37.1 °C)  Pulse:  [64-88] 81  Resp:  [16-21] 18  SpO2:  [90 %-100 %] 91 %  BP: (120-132)/(57-64) 120/57     Weight: 87.5 kg (193 lb)  Body mass index is 32.12 kg/m².    Intake/Output Summary (Last 24 hours) at 7/20/2023 1532  Last data filed at 7/20/2023 1108  Gross per 24 hour   Intake 2939.03 ml   Output 3450 ml   Net -510.97 ml        Physical Exam  Constitutional:       General: She is not in acute distress.     Appearance: Normal appearance. She is not ill-appearing or diaphoretic.   HENT:      Mouth/Throat:      Mouth: Mucous membranes are moist.      Pharynx: No oropharyngeal exudate or posterior oropharyngeal erythema.   Cardiovascular:      Rate and Rhythm: Normal rate and regular rhythm.      Pulses: Normal pulses.      Heart sounds: Normal heart sounds.     No gallop.   Pulmonary:      Effort: Pulmonary effort is normal.      Breath sounds: Rales (bilaterally) present. No wheezing or rhonchi.   Abdominal:      General: There is distension (Tympanic to percussion, high-pitched bowel sounds).      Palpations: Abdomen is soft.      Tenderness: There is no left CVA tenderness or rebound.      Comments: Tympanic to percussion. Ventral wall hernia. Active BS.         Musculoskeletal:      Cervical back: Normal range of motion and neck supple.      Right lower leg: No edema.      Left lower leg: No edema.   Skin:     General: Skin is warm and dry.      Capillary Refill: Capillary refill takes less than 2 seconds.   Neurological:      Mental Status: She is alert. Mental status is at baseline.   Psychiatric:         Behavior: Behavior normal.     7/18    Impression:     Bowel: Scattered diverticula throughout the colon.  The transverse colon is moderately distended with bowel gas, however there is contrast distally within the sigmoid colon and rectum.  The visualized loops of small and large bowel show no evidence of obstruction or inflammation.    No evidence of bowel obstruction.     Stable trace bilateral pleural effusion and bibasilar opacification likely representing atelectasis versus underlying airway disease.     Stable left adrenal nodule.     Stable right renal exophytic density.     Degenerative changes of the bilateral femoral heads.        Assessment/Plan:      * Sepsis, unspecified organism  Community-acquired multifocal pneumonia  · Lactic acid negative, blood cultures NGTD  · D-dimer greater than 6 no obvious PE on CTA but not a great image.  CTA with bilateral multifocal consolidations and possible edema with small pleural effusions.   · Failed outpatient Amoxicillin and azithromycin, continue Levaquin.  · Legionella negative, RIP negative, sputum culture normal mani  · Inflammatory markers improving  · Continue mucolytics, antitussives, and bronchodilators    Resolved.         Urinary retention  · Unclear etiology  · CT abdomen and pelvis with what appears to be bladder distention despite what patient thought was normal urination.  Great urine output since Renteria placed.  Inaccurate charting 1st 24 hours  · Discussed with Urology, recommend voiding trial in 5-7 days whether inpatient or in clinic.  · - voiding trial in AM      Hyperkalemia  · Hold valsartan,  increased nifedipine  · Gentle IV fluids  · 5 mg Lokelma x1  · Daily labs      Elevated transaminase level  · Possibly related to daily alcohol use  · Continue to monitor  · Overall improved      Abdominal distension  · Patient reports history of hernia but did do not see mention of it in previous records.  · Abdomen tympanic to percussion throughout  · CT abdomen pelvis with oral contrast with  gaseous distention of the transverse colon of unknown etiology as well as what appears to be distended bladder despite reports normal urination.    · General surgery consulted, awaiting recommendations  · Repeat CT once bladder drained with no evidence of obstruction      Hyponatremia  · Hypovolemic hyponatremia  · Likely decreased p.o. intake and urinary retention  · Urine sodium less than 10, urine osmolarity 341 on admission  · Started IV fluids on 07/18 with unremarkable echo and CVP of 3 mm Hg.      Moderate persistent asthma not dependent on systemic steroids with acute exacerbation  Nicotine dependence, in remission  Likely COPD as well  · In acute exacerbation secondary to pneumonia  · Solumedrol 40 mg BID  decrease to 40 mg daily  · Scheduled bronchodilators  · Stable      GERD (gastroesophageal reflux disease)  Patient requests scheduled Tums      HTN (hypertension)  Resume home medications  stable        VTE Risk Mitigation (From admission, onward)         Ordered     enoxaparin injection 40 mg  Daily         07/18/23 0754     IP VTE HIGH RISK PATIENT  Once         07/16/23 1549     Place sequential compression device  Until discontinued         07/16/23 1549                Discharge Planning   CAMRYN: 7/21/2023     Code Status: Full Code   Is the patient medically ready for discharge?: No    Reason for patient still in hospital (select all that apply): Treatment  Discharge Plan A: Home with family                  Ishan De Oliveira MD  Department of Hospital Medicine   [facility] - Intensive Care ([address] [id number])

## 2023-07-20 NOTE — PROGRESS NOTES
V1/Screening/Enrollment Visit  Sponsor: [employer]  Study: Vaccine Effectiveness of 20-Valent Pneumococcal Conjugate Vaccine Against Vaccine-Type CAP Using a Test-Negative Design (RECAP-20)  IRB/Protocol #: 2022.215/[id number]  Principle Investigator: Dr. Solitario Kimbrough   Site Number: 1011    Subject Number: [id number]    Date of Visit: 18July2023      Master Protocol Inclusion/Exclusion Criteria at Screening:    Inclusion Criteria (all must be YES to qualify):    1. Male or female participants ?65 years of age  Yes  2. Hospitalized participant with physician clinical suspicion of CAP with the presence  of ?2 of the following 10 clinical signs or symptoms:  fever (oral temperature >38.0°C/100.4°F or tympanic temperature  >38.5°C/101.2°F),  hypothermia (<35.5°C/95.9°F measured by a healthcare provider)  chills or rigors,  pleuritic chest pain,  new or worsening cough,  sputum production,  dyspnea (shortness of breath),  tachypnea (respiratory rate >20/min),  malaise, or  abnormal auscultatory findings suggestive of pneumonia (rales or evidence of  pulmonary consolidation including dullness on percussion, bronchial breath  sounds, or egophony).  Yes  3. Has a radiographic finding that is consistent with pneumonia   Yes  4. Capable of giving signed informed consent as described in protocol, which includes compliance with the requirements and restrictions listed in protocol.   Yes    Exclusion Criteria at Screening (all must be NO to qualify):    1. Any participant who develops signs and symptoms of pneumonia after being hospitalized for ?48 hours (either at the study site, another transferring hospital or a combination of these).   No  2. Received any pneumococcal vaccine ?30 days prior to enrollment.   No  3. Unable to provide urine specimen (e.g. anuric)   No  4. Previous enrollment in the study within the past 30 days   No    Were all eligibility criteria met?: Yes   Prior to approaching patient for consent and enrollment,  eligibility was confirmed by Lucius Barnhart.  If no, enter information about main criteria not satisfied/met: N/A    Demographic:  Date of Birth:[id number]  Age: 78 y.o.  Sex: Female  Ethnicity: Not  or   Race: Black or     Has patient previously participated in this study? No  If yes, what was the previous site and subject identifier?    Significant Medical History:  Does the patient have a history of any of the following, holden all that apply?  NOTE: checking box means yes patient has history, blank box means no patient does not have  [] COPD  [x] Asthma  [] Emphysema  [] Other Chronic lung disease  [x] CHF  [] Cardiomyopathies  [] Coronary Artery Disease  [] Other Chronic heart disease  [] Diabetes mellitus with complications  [] Diabetes mellitus without complications  [] Diabetes mellitus that is diet controlled  [] Diabetes mellitus treated with medication  [] Other metabolic disease  [] Cirrhosis  [] Chronic liver disease without hepatic failure  [] Chronic liver disease with hepatic failure  [] Other chronic liver disease  [] Chronic renal failure  [] Nephrotic syndrome  [] Chronic kidney disease  [] Other Chronic kidney disease  [] Chronic neurologic disease  [] Cochlear implant  [] Cerebrospinal fluid leak  [] Functional or anatomic asplenia (including or and)  [] Sickle cell disease  [] Other hemoglobinopathy  [] Congenital or acquired asplenia  [] Congenital or acquired immunodeficiency  [] HIV  [] Hematologic cancer or malignancy  [] Cancer or malignancy manifesting as solid tumor  [] Organ transplantation  [] Immunosuppressant drug therapy  [] Cerebrovascular/neurologic disease  [] Rheumatoid disease  [] Celiac disease  [] Previous pneumonia episode(s) in the past year    NOTE Definitions:   Diabetes treated with medication  Diabetes mellitus treated with oral antidiabetic agents or insulin.    Chronic neurologic disease  Definition includes epilepsy, cerebral  palsies, or cerebro-vascular disease    Chronic Kidney Disease  Chronic Kidney Disease defined as glomerular filtration rate < 60 mL/min or End-Stage Renal Disease (ESRD) and nephrotic syndrome.    Immunosuppressant drug therapy  Immunosuppressive medication including systemic corticosteroid use (e.g. in the context of autoimmune disorders, organ transplantation). Individuals on or likely to be on systemic steroids for more than a month at a dose equivalent to prednisolone at 20 mg or more per day. Participants receiving topical steroids are not considered immunosuppressive.      Signs and Symptoms of Pneumonia (can be signs and symptoms present at time of Enrollment and from assessment on admission):  Note: checking box means yes patient has, blank box means no patient does not have    Date performed (expected to be from assessment on admission to hospital): [id number]    [] fever (oral temperature >38.0°C/100.4°F or tympanic temperature  >38.5°C/101.2°F), Only a subjective fever was reported but never recorded in ER.  [] hypothermia (<35.5°C/95.9°F measured by a healthcare provider)  [] chills   [] rigors  [x] pleuritic chest pain,  [x] new or worsening cough,  [] sputum production,  [x] dyspnea (shortness of breath),  [x] tachypnea (respiratory rate >20/min),  [x] malaise  [x] abnormal auscultatory findings suggestive of pneumonia (rales or evidence of  pulmonary consolidation including dullness on percussion, bronchial breath  sounds, or egophony). Patient was recorded in ER note to have wheezing and rales during assessment.      History of Smoking, alcohol use, illicit drug use:  Has the subject every used the following substances?    Cigarettes [] NEVER [] CURRENT [x] FORMER   E-Cigarettes [x] NEVER [] CURRENT [] FORMER   Tobacco [x] NEVER [] CURRENT [] FORMER   Alcohol [] NEVER [x] CURRENT [] FORMER   Recreational Marijuana [x] NEVER [] CURRENT [] FORMER   Illicit Drugs, not specified [x] NEVER [] CURRENT  [] FORMER     NOTE: Definitions per study    Smoking status  Lifetime cigarette use  Current smoking status    Current  ?100 cigarettes in lifetime  smokes every day    Former  ?100 cigarettes in lifetime  has quit smoking    Never  never smoked or <100 cigarettes in lifetime  not currently smoking    Tobacco use summarizes all forms of use not captured by cigarettes/E-cigarettes that are associated with the combustion of tobacco, e.g. cigars, cigarillos, tobacco pipes, Vaporized tobacco or nicotine.    Definition of Alcohol Use:   Never: Never drank alcohol or consumed fewer than 12 alcoholic drinks in a lifetime   Former: Consumed more than 12 alcoholic drinks in a lifetime, but has not consumed any alcoholic drinks in the last year   Current: Consumed alcoholic drinks within the last year    Definitions of Recreational Marijuana and Illicit Drug Use:   Never: Never used   Former: Has not used in the last year   Current: Used within the last year    Oral antibiotic use in last ?14 days: Yes      Parenteral antibiotic use in last ? 90 days: Yes  IV levofloxacin given in ER on 16Jun2023    Hospitalization ?24 hours in ?90 days prior to enrollment: No  If yes- what was date was the patient discharged:     Weekly exposure to children <5 years of age: Yes    Residential Setting:  What setting below best describes where patient currently resides?  [x] At home without home diamond care  [] At home with home health care provided by family or friends  [] At home with professional home health care  [] Skilled nursing facility/rehabilitation facility low level of care  [] Skilled nursing facility/rehabilitation facility high level of care  [] Other (Specify):    Zip code patient resides in: [facility]    Mental Status: [] Confused  [x] Not Confused    Concomitant Medications: Patient Reported Vaccination History  NOTE: At this time only vaccinations to be recorded are patient reported. At Day 30 will report vaccination history from  physician record, pharmacy records, LINKS, etc.    Date and Type(s) of pneumococcal vaccination in past 5 years: Yes, patient self-reports to having had a pneumococcal vaccine in the last 5 years to her knowledge but is unsure of which type and dates.    Has patient received an Investigational pneumococcal vaccine as part of a study in the last 5 years? No    23-Valent Polysaccharide: Unknown type  13-Valent Pneumococcal Conjugate Vaccine: Unknown type  15-Valent Pneumococcal Conjugate Vaccine: Unknown type  20-Valent Pneumococcal Conjugate Vaccine: Unknown type    Influenza Vaccine (in last 12 months): Yes    COVID Vaccines- Yes      Body Weight and Height:  Date of collection: 18July2023  Height: 165.1cm   Weight: 87.5kg    Vital Signs (from first taken at ED presentation/hospitalization):  Date of collection: 16July2023 at 12:09  Pulse:  92 beats/min  Systolic BP: 131 mmHg  Diastolic BP: 61 mmHg  Temperature: 98.4F  Temperature Anatomical Location: Oral  Respiratory Rate: 22 breaths/min  Oxygen Saturation: 93%  Oxygen therapy with flow rate/Room Air?: Room Air    Oxygen Saturation and Oxygen Rate:  Date of collection:16July2023  Time of collection: 12:09  Oxygen Saturation: 93%  Room Air:  Yes  If on oxygen therapy, note the flow at this time: N/A  Arterial Blood Gas PaO2 (if available): N/A  FiO2- value should be 0.21-1.00 (if on room air enter 0.21): 0.21    Oxygen Administration: Yes  Start Date and time (24-hour clock):     Low Flow Oxygen Delivery: Yes (only on 17July2023 from 19:21 and then switched to regular nightly use of CPAP at 23:35)   If yes, Flow Rate: 2.5 L/min  (if yes, check the one that applies)  [] Simple Face Mask [x] Nasal Prongs [] Partial Rebreather Mask [] Tracheostomy Mask [] Tracheostomy Home Connector [] Other (please specify):     High Flow Oxygen Delivery: No   If yes, Flow Rate:  (if yes, check the one that applies)  [] High Flow Nasal Cannula [] Transtracheal Catheters [] Venturi  Mask [] Aerosol Mask [] Non Rebreather Face Mask [] Other (please specify):    Please note: CPAP was administered to patient while hospitalized at night time for previous diagnosis of JOHN. Per CTM instruction, regular CPAP usage for prior diagnosis of JOHN is not to be recorded in EDC.    Chest Imaging   Note: Imaging obtained between 48 hours prior to hospital admission and 48 hours after hospital admission may be used. If multiple image types are available, document the image with the most conclusive evidence of pneumonia.    Date of Assessment: See dates below  Type of Imaging Exam:  [x] CT Scan (17July2023)  [x] X-Ray (16July2023)  [] MRI  [] Other (specify):     Assessment:  [x] Abnormal (See Sub-I Ly Barnhart's note)  [] Normal  [] Not Evaluable    If abnormal:  [] Pneumonia without Pleural Effusion  [x] Pneumonia with Pleural Effusion (See Sub-I Ly Barnhart's note)  [] Other (specify):    16July2023 Xray Report Findings: Multifocal bilateral interstitial and alveolar opacities, similar to prior study. Possible small bilateral pleural effusions.  No pneumothorax.     17July2023 CT Report Findings: Pleura: Small bilateral pleural effusions. Lungs: Assessment lungs limited due to respiratory motion. Bilateral solid and ground-glass airspace consolidation is noted.  Areas of smooth interlobular septal thickening are suggested. Atelectasis. Impression: 2. Airspace consolidation in both lungs could relate to multifocal infection, noninfectious inflammation, edema, and/or hemorrhage.    Microbiologic Testing  As standard medical care was microbiologic testing performed on any blood or respiratory samples: Yes   If yes, please complete below questions for each specimen collected    Date of Specimen Collection: (see dates below)  Specimen Type:   [x] Blood (Collected on 16July2023)  [x]Sputum (if sputum complete below) Collected on 17July2023   Number of PNMs:   [] <10    [] 10-25   [] >25   [x] UNKNOWN  Number of  Squamous Epithelial Cells:  [x] <10    [] 10-25   [] >25   [] UNKNOWN   Presence of Mucus:   [] NO    [] YES   [x] UNKNOWN    [] Pleural Fluid  [] Bronchoalveolar Lavage  [] Tracheal Aspirate  [] Other (specify):    Where isolates identified: No growth to date for blood sample as of 20July2023 but will continue to check results.    Sputum Results as followed:   Component 3 d ago   Respiratory Culture Normal respiratory mani    Respiratory Culture No S aureus or Pseudomonas isolated.    Gram Stain (Respiratory) <10 epithelial cells per low power field.    Gram Stain (Respiratory) Rare WBC's    Gram Stain (Respiratory) Few Gram positive cocci    Gram Stain (Respiratory) Rare Gram negative rods        NOTE: Do not document any sputum microbiology results for local mani (colonizing mani) or contaminants. Colonizing mani includes viridans Streptococci, Neisseria spp., Corynebacteria spp. und Staphylococcus epidermidis, other coagulase-negative Staphylococci, Enterococcus spp., or Candida spp.    If Yes Complete, ISOLATE IDENTIFICATION: N/A at this time.  Isolate Name Other Isolate Specify Method of Test Sent to [facility] Accession Number          (only complete #4 and #5 if Isolate identified is STREPTOCOCCUS PNEUMONIAE)    If  STREPTOCOCCUS PNEUMONIAE identified complete Susceptibility Testing below    Isolate Name Other Isolate Specify Drug Name Susceptibility for Drug             PSI Score : (refer to Protocol Appendix 5 for algorithm to calculate PSI score)  Date of Collection:18July2023  Calculated PSI Score: 106 Class IV derived from (age/female) (76-10) + 10 (CHF) + 20 (Sodium level) +10 (Pleural Effusion) = 106  Calculated by: Lucius Barnhart    Urine Specimen Collection:  (collect as soon as possible after consent signed, preferred within 24 hours of study entry)    Was urine specimen?  yes  Date of collection:18July2023  Time: 11:30  Sample ID (6-digit barcode#; 8 expected for each subject) for each  urine aliquot: Send Barcodes: C0VLYN, C0VLYP, C0VLR, C0VLYS and Retain Barcodes: QID022, [id number], AZB500, [id number]    AE and ANTOINETTE assessment  NOTE: Per study AEs and SAEs (active collection period) will begin when the first protocol-required procedure (urine sample) is performed at Visit 1 and will conclude 15 minutes after the procedure is performed.     Were any AE or ANTOINETTE assessed at this visit: No  Was patient asked about any possible AE or ANTOINETTE at this visit? Yes patient was asked. Patient had recent martinez catheter. No discomfort reported to CRC.      NOTE: Unless otherwise specified data above was collected on date of visit    Per study protocol subject will be follow, if hospitalized, until hospital Day#3. Additional data will be collected at Day 30 regarding hospital discharge. No other intervention or data collection will be performed for this study.

## 2023-07-20 NOTE — PROGRESS NOTES
[facility] ([address] Medicine  Progress Note    Patient Name: Michela Franco  MRN: [id number]  Patient Class: IP- Inpatient   Admission Date: 7/16/2023  Length of Stay: 4 days  Attending Physician: Paresh Kidd III,*  Primary Care Provider: Mayela Broussard MD        Subjective:     Principal Problem:Sepsis, unspecified organism        HPI:  Patient is a pleasant 79 yo old female with a PMHx of diastolic HF, asthma, HLD, HTN, JOHN, recurrent pulmonary infections presents to the ED with son at bedside c/o SOB and chest wall pain that worsened  since yesterday. Patient was diagnosed with PNA on Friday and was discharged with amoxicillin and azithromycin -- however she notes her symptoms have not improved and also reports of a new onset CP with deep inspiration. She denies abdominal pain, HA, fever, chills, N, V, diarrhea, dysuria and hematuria. Patient reports she started to have coughing spills on Thursday with associated sputum production and fatigue. After PO abx she does not appear to be getting better.  She noted wheezing at home which have not happened for 3 years now.      In the ED, Xray showed Multifocal bilateral interstitial and alveolar opacities, similar to prior study.  Possible small bilateral pleural effusions.  No pneumothorax.  Cardiac silhouette is prominent, stable. Appears slightly worse than previous.            Overview/Hospital Course:  78-year-old female with past medical history as mentioned above in addition to long smoking history and likely COPD.  Admitted to the hospitalist service for failed outpatient treatment of pneumonia, asthma/COPD exacerbation hyponatremia, abdominal distention, and urinary retention.  Pneumonia improving with Levaquin.  COPD exacerbation improving with bronchodilators and steroids.  Pneumonia with hyponatremia, concern for Legionella but patient did not identify any risk factors. Nephrology contacted who recommended Dexter  catheter, strict I/O, daily urine sodium and osmolality with formal nephrology consult. Legionella antigen came back negative.  CTA of the chest  negative for PE although compromised by respiratory motion and suboptimal contrast bolus timing.  Image demonstrated multifocal airspace disease which could represent pneumonia versus edema as well as small bilateral pleural effusions.  Patient with significant abdominal distention but no complaints of nausea, vomiting, or constipation.  CT AP ordered that time.  Patient reported orthopnea and intermittent lower extremity swelling so there was some concern for heart failure.  Patient hemodynamically stable and tolerating diet so IV fluids held pending further workup. CT AP resulted which revealed distended bladder and gaseous distention of the transverse colon.  Patient reported normal urination and bowel movements.    Renteria placed with immediate urine output.  Urine studies indicative of hypovolemic hyponatremia.  With appropriate urine output, sodium improved and Nephrology consult was canceled.  Urology was consulted who recommended voiding trial in 5-7 days.  Echocardiogram resulted with EF 50%, normal RV size and function, indeterminate diastolic function, and normal CVP.  Patient started on slow IV fluids with continued improvement of electrolytes.  General surgery consulted regarding gaseous distention of the transverse colon.  Repeat CT abdomen pelvis with oral contrast repeated after bladder drained, not concerning for obstruction.      Interval History:  Patient says shortness of breath and cough have improved.  Abdomen is still distended but patient says is much more soft.  Patient says she is passing a lot of gas and having bowel movement.  Continues with great urinary output    Labs reviewed:  WBC 10.84, sodium 129 from 126, potassium 4.4 from 5.4, CO2 22 from 18, CRP 68.6 from 251.8    Discussed with Urology who recommend maintaining Renteria.  Recommended  voiding trial in 5-7 days    Review of Systems: As above  Objective:     Vital Signs (Most Recent):  Temp: 98.4 °F (36.9 °C) (07/20/23 0001)  Pulse: 74 (07/20/23 0001)  Resp: 18 (07/20/23 0014)  BP: (!) 122/57 (07/20/23 0001)  SpO2: 95 % (07/20/23 0210) Vital Signs (24h Range):  Temp:  [98 °F (36.7 °C)-98.4 °F (36.9 °C)] 98.4 °F (36.9 °C)  Pulse:  [71-88] 74  Resp:  [16-21] 18  SpO2:  [94 %-100 %] 95 %  BP: (122-154)/(57-65) 122/57     Weight: 87.5 kg (193 lb)  Body mass index is 32.12 kg/m².    Intake/Output Summary (Last 24 hours) at 7/20/2023 0224  Last data filed at 7/19/2023 2000  Gross per 24 hour   Intake --   Output 800 ml   Net -800 ml        Physical Exam  Constitutional:       General: She is not in acute distress.     Appearance: Normal appearance. She is not ill-appearing or diaphoretic.   HENT:      Mouth/Throat:      Mouth: Mucous membranes are moist.      Pharynx: No oropharyngeal exudate or posterior oropharyngeal erythema.   Cardiovascular:      Rate and Rhythm: Normal rate and regular rhythm.      Pulses: Normal pulses.      Heart sounds: Normal heart sounds.     No gallop.   Pulmonary:      Effort: Pulmonary effort is normal.      Breath sounds: Rales (bilaterally) present. No wheezing or rhonchi.   Abdominal:      General: There is distension (Tympanic to percussion, high-pitched bowel sounds).      Palpations: Abdomen is soft.      Tenderness: There is no left CVA tenderness or rebound.   Musculoskeletal:      Cervical back: Normal range of motion and neck supple.      Right lower leg: No edema.      Left lower leg: No edema.   Skin:     General: Skin is warm and dry.      Capillary Refill: Capillary refill takes less than 2 seconds.   Neurological:      Mental Status: She is alert. Mental status is at baseline.   Psychiatric:         Behavior: Behavior normal.         Assessment/Plan:      * Sepsis, unspecified organism  Community-acquired multifocal pneumonia  · Lactic acid negative, blood  cultures NGTD  · D-dimer greater than 6 no obvious PE on CTA but not a great image.  CTA with bilateral multifocal consolidations and possible edema with small pleural effusions.   · Failed outpatient Amoxicillin and azithromycin, continue Levaquin.  · Legionella negative, RIP negative, sputum culture normal mani  · Inflammatory markers improving  · Continue mucolytics, antitussives, and bronchodilators        Hyponatremia  · Likely decreased p.o. intake and urinary retention  · Urine sodium less than 10, urine osmolarity 341 on admission  · Discussed with Nephrology on-call, recommend Renteria catheter placement, strict I/O, daily urine osm and sodium.  Sodium increasing once Renteria in place, Nephrology consult canceled  · Started IV fluids on 07/18 with unremarkable echo and CVP of 3 mm Hg.  · Legionella antigen negative.      Abdominal distension  · Patient reports history of hernia but did do not see mention of it in previous records.  · Abdomen tympanic to percussion throughout  · CT abdomen pelvis with oral contrast with  gaseous distention of the transverse colon of unknown etiology as well as what appears to be distended bladder despite reports normal urination.    · General surgery consulted, awaiting recommendations  · Repeat CT once bladder drained with no evidence of obstruction      Moderate persistent asthma not dependent on systemic steroids with acute exacerbation  Nicotine dependence, in remission  Likely COPD as well  · In acute exacerbation secondary to pneumonia  · Solumedrol 40 mg BID  decrease to 40 mg daily  · Scheduled bronchodilators      Urinary retention  · Unclear etiology  · CT abdomen and pelvis with what appears to be bladder distention despite what patient thought was normal urination.  Great urine output since Renteria placed.  Inaccurate charting 1st 24 hours  · Discussed with Urology, recommend voiding trial in 5-7 days whether inpatient or in clinic.      Hyperkalemia  · Hold valsartan,  increased nifedipine  · Gentle IV fluids  · 5 mg Lokelma x1  · Daily labs      Elevated transaminase level  · Possibly related to daily alcohol use  · Continue to monitor  · Overall improved      GERD (gastroesophageal reflux disease)  Patient requests scheduled Tums      HTN (hypertension)  Resume home medications        VTE Risk Mitigation (From admission, onward)         Ordered     enoxaparin injection 40 mg  Daily         07/18/23 0754     IP VTE HIGH RISK PATIENT  Once         07/16/23 1549     Place sequential compression device  Until discontinued         07/16/23 1549                Discharge Planning   CAMRYN: 7/21/2023     Code Status: Full Code   Is the patient medically ready for discharge?: No    Reason for patient still in hospital (select all that apply): Patient trending condition, Laboratory test, Treatment, Consult recommendations and Pending disposition  Discharge Plan A: Home with family                  Paresh Kidd III, MD  Department of Hospital Medicine   [facility] - Intensive Care ([place]-16)

## 2023-07-20 NOTE — ASSESSMENT & PLAN NOTE
· Likely decreased p.o. intake and urinary retention  · Urine sodium less than 10, urine osmolarity 341 on admission  · Discussed with Nephrology on-call, recommend Renteria catheter placement, strict I/O, daily urine osm and sodium.  Sodium increasing once Renteria in place, Nephrology consult canceled  · Started IV fluids on 07/18 with unremarkable echo and CVP of 3 mm Hg.  · Legionella antigen negative.

## 2023-07-20 NOTE — ASSESSMENT & PLAN NOTE
· Unclear etiology  · CT abdomen and pelvis with what appears to be bladder distention despite what patient thought was normal urination.  Great urine output since Renteria placed.  Inaccurate charting 1st 24 hours  · Discussed with Urology, recommend voiding trial in 5-7 days whether inpatient or in clinic.

## 2023-07-20 NOTE — ASSESSMENT & PLAN NOTE
· Unclear etiology  · CT abdomen and pelvis with what appears to be bladder distention despite what patient thought was normal urination.  Great urine output since Renteria placed.  Inaccurate charting 1st 24 hours  · Discussed with Urology, recommend voiding trial in 5-7 days whether inpatient or in clinic.  · - voiding trial in AM

## 2023-07-21 VITALS
HEIGHT: 65 IN | OXYGEN SATURATION: 97 % | DIASTOLIC BLOOD PRESSURE: 72 MMHG | SYSTOLIC BLOOD PRESSURE: 149 MMHG | TEMPERATURE: 97 F | WEIGHT: 193 LBS | BODY MASS INDEX: 32.15 KG/M2 | HEART RATE: 88 BPM | RESPIRATION RATE: 18 BRPM

## 2023-07-21 LAB
BACTERIA BLD CULT: NORMAL
BACTERIA BLD CULT: NORMAL
OSMOLALITY UR: 421 MOSM/KG (ref 50–1200)
SODIUM UR-SCNC: 64 MMOL/L (ref 20–250)

## 2023-07-21 PROCEDURE — 83935 ASSAY OF URINE OSMOLALITY: CPT | Mod: HCNC | Performed by: FAMILY MEDICINE

## 2023-07-21 PROCEDURE — 94761 N-INVAS EAR/PLS OXIMETRY MLT: CPT | Mod: HCNC

## 2023-07-21 PROCEDURE — 99900035 HC TECH TIME PER 15 MIN (STAT): Mod: HCNC

## 2023-07-21 PROCEDURE — 1111F DSCHRG MED/CURRENT MED MERGE: CPT | Mod: HCNC,CPTII,, | Performed by: INTERNAL MEDICINE

## 2023-07-21 PROCEDURE — 99238 PR HOSPITAL DISCHARGE DAY,<30 MIN: ICD-10-PCS | Mod: HCNC,,, | Performed by: INTERNAL MEDICINE

## 2023-07-21 PROCEDURE — 25000003 PHARM REV CODE 250: Mod: HCNC | Performed by: FAMILY MEDICINE

## 2023-07-21 PROCEDURE — 94660 CPAP INITIATION&MGMT: CPT | Mod: HCNC

## 2023-07-21 PROCEDURE — 25000242 PHARM REV CODE 250 ALT 637 W/ HCPCS: Mod: HCNC | Performed by: STUDENT IN AN ORGANIZED HEALTH CARE EDUCATION/TRAINING PROGRAM

## 2023-07-21 PROCEDURE — 99238 HOSP IP/OBS DSCHRG MGMT 30/<: CPT | Mod: HCNC,,, | Performed by: INTERNAL MEDICINE

## 2023-07-21 PROCEDURE — 94640 AIRWAY INHALATION TREATMENT: CPT | Mod: HCNC

## 2023-07-21 PROCEDURE — 63600175 PHARM REV CODE 636 W HCPCS: Mod: HCNC | Performed by: FAMILY MEDICINE

## 2023-07-21 PROCEDURE — 84300 ASSAY OF URINE SODIUM: CPT | Mod: HCNC | Performed by: FAMILY MEDICINE

## 2023-07-21 PROCEDURE — 1111F PR DISCHARGE MEDS RECONCILED W/ CURRENT OUTPATIENT MED LIST: ICD-10-PCS | Mod: HCNC,CPTII,, | Performed by: INTERNAL MEDICINE

## 2023-07-21 PROCEDURE — 25000242 PHARM REV CODE 250 ALT 637 W/ HCPCS: Mod: HCNC | Performed by: FAMILY MEDICINE

## 2023-07-21 PROCEDURE — 27000221 HC OXYGEN, UP TO 24 HOURS: Mod: HCNC

## 2023-07-21 RX ORDER — PREDNISONE 20 MG/1
40 TABLET ORAL DAILY
Qty: 10 TABLET | Refills: 0 | Status: SHIPPED | OUTPATIENT
Start: 2023-07-21 | End: 2023-07-26

## 2023-07-21 RX ORDER — LEVOFLOXACIN 500 MG/1
750 TABLET, FILM COATED ORAL DAILY
Qty: 11 TABLET | Refills: 0 | Status: SHIPPED | OUTPATIENT
Start: 2023-07-21 | End: 2023-07-28

## 2023-07-21 RX ORDER — IPRATROPIUM BROMIDE AND ALBUTEROL SULFATE 2.5; .5 MG/3ML; MG/3ML
3 SOLUTION RESPIRATORY (INHALATION) EVERY 6 HOURS PRN
Qty: 75 ML | Refills: 0 | Status: SHIPPED | OUTPATIENT
Start: 2023-07-21 | End: 2023-10-16 | Stop reason: SDUPTHER

## 2023-07-21 RX ADMIN — METHYLPREDNISOLONE SODIUM SUCCINATE 40 MG: 40 INJECTION, POWDER, FOR SOLUTION INTRAMUSCULAR; INTRAVENOUS at 08:07

## 2023-07-21 RX ADMIN — IPRATROPIUM BROMIDE AND ALBUTEROL SULFATE 3 ML: .5; 3 SOLUTION RESPIRATORY (INHALATION) at 01:07

## 2023-07-21 RX ADMIN — NIFEDIPINE 90 MG: 30 TABLET, FILM COATED, EXTENDED RELEASE ORAL at 08:07

## 2023-07-21 RX ADMIN — GUAIFENESIN AND DEXTROMETHORPHAN HYDROBROMIDE 1 TABLET: 600; 30 TABLET, EXTENDED RELEASE ORAL at 08:07

## 2023-07-21 RX ADMIN — BUDESONIDE 0.5 MG: 0.5 INHALANT ORAL at 07:07

## 2023-07-21 RX ADMIN — IPRATROPIUM BROMIDE AND ALBUTEROL SULFATE 3 ML: .5; 3 SOLUTION RESPIRATORY (INHALATION) at 07:07

## 2023-07-21 RX ADMIN — CALCIUM CARBONATE (ANTACID) CHEW TAB 500 MG 1000 MG: 500 CHEW TAB at 12:07

## 2023-07-21 RX ADMIN — CALCIUM CARBONATE (ANTACID) CHEW TAB 500 MG 1000 MG: 500 CHEW TAB at 06:07

## 2023-07-21 RX ADMIN — IPRATROPIUM BROMIDE AND ALBUTEROL SULFATE 3 ML: .5; 3 SOLUTION RESPIRATORY (INHALATION) at 12:07

## 2023-07-21 RX ADMIN — MUPIROCIN: 20 OINTMENT TOPICAL at 08:07

## 2023-07-21 RX ADMIN — SODIUM CHLORIDE: 9 INJECTION, SOLUTION INTRAVENOUS at 12:07

## 2023-07-21 RX ADMIN — SODIUM CHLORIDE: 9 INJECTION, SOLUTION INTRAVENOUS at 09:07

## 2023-07-21 NOTE — ASSESSMENT & PLAN NOTE
Nicotine dependence, in remission  Likely COPD as well  · In acute exacerbation secondary to pneumonia  · Solumedrol 40 mg BID  decrease to 40 mg daily  · Scheduled bronchodilators  Stable    PFTs:      11/26/2021:              Pre:                   Post:  FEV1: 1.49 (82.8%)--> 1.56 (+4.1%)  FVC:   2.22  (95%)----> 2.30 (+3.6%)  Ratio:  67.37%      ----> 67.77%        5/28/2018  FVC: 2.30 (78%)  FEV1: 1.62 (70%)  Ratio: 70  No reversibility post bronchodilator.

## 2023-07-21 NOTE — PLAN OF CARE
Problem: Adult Inpatient Plan of Care  Goal: Plan of Care Review  Outcome: Adequate for Care Transition  Goal: Patient-Specific Goal (Individualized)  Outcome: Adequate for Care Transition  Goal: Absence of Hospital-Acquired Illness or Injury  Outcome: Adequate for Care Transition  Goal: Optimal Comfort and Wellbeing  Outcome: Adequate for Care Transition  Goal: Readiness for Transition of Care  Outcome: Adequate for Care Transition     Problem: Adjustment to Illness (Sepsis/Septic Shock)  Goal: Optimal Coping  Outcome: Adequate for Care Transition     Problem: Bleeding (Sepsis/Septic Shock)  Goal: Absence of Bleeding  Outcome: Adequate for Care Transition     Problem: Glycemic Control Impaired (Sepsis/Septic Shock)  Goal: Blood Glucose Level Within Desired Range  Outcome: Adequate for Care Transition     Problem: Infection Progression (Sepsis/Septic Shock)  Goal: Absence of Infection Signs and Symptoms  Outcome: Adequate for Care Transition     Problem: Nutrition Impaired (Sepsis/Septic Shock)  Goal: Optimal Nutrition Intake  Outcome: Adequate for Care Transition     Problem: Fluid Imbalance (Pneumonia)  Goal: Fluid Balance  Outcome: Adequate for Care Transition     Problem: Infection (Pneumonia)  Goal: Resolution of Infection Signs and Symptoms  Outcome: Adequate for Care Transition     Problem: Respiratory Compromise (Pneumonia)  Goal: Effective Oxygenation and Ventilation  Outcome: Adequate for Care Transition     Problem: Infection  Goal: Absence of Infection Signs and Symptoms  Outcome: Adequate for Care Transition

## 2023-07-21 NOTE — PLAN OF CARE
ISAURO scheduled the [facility] hospital follow up for July 27 @ 10:30am

## 2023-07-21 NOTE — NURSING
Pt iv and continuous pulse ox was removed. Pt have all belongings with her. Pt is accompained by her daughter. Pt is now waiting on wheelchair transport.

## 2023-07-21 NOTE — PLAN OF CARE
Problem: Adult Inpatient Plan of Care  Goal: Plan of Care Review  Outcome: Ongoing, Progressing  Goal: Patient-Specific Goal (Individualized)  Outcome: Ongoing, Progressing     Problem: Adult Inpatient Plan of Care  Goal: Plan of Care Review  Outcome: Ongoing, Progressing     Problem: Adult Inpatient Plan of Care  Goal: Patient-Specific Goal (Individualized)  Outcome: Ongoing, Progressing     Problem: Fluid Imbalance (Pneumonia)  Goal: Fluid Balance  Outcome: Ongoing, Progressing     Problem: Fluid Imbalance (Pneumonia)  Goal: Fluid Balance  Outcome: Ongoing, Progressing     Problem: Infection (Pneumonia)  Goal: Resolution of Infection Signs and Symptoms  Outcome: Ongoing, Progressing     Problem: Infection (Pneumonia)  Pt AAO X 4; able to express needs.  No c/o pain.  Urine sent to lab this am as ordered.  CPAP tolerated during the night.  NS @75cc/hr.  Renteria care complete.   Safety maintained.  Bed in low position,  call  light in reach.

## 2023-07-21 NOTE — NURSING
Patient given d/c instructions; IV removed with catheter intact; patient transported via wheelchair to private vehicle.

## 2023-07-21 NOTE — ASSESSMENT & PLAN NOTE
· Unclear etiology  · CT abdomen and pelvis with what appears to be bladder distention despite what patient thought was normal urination.  Great urine output since Renteria placed.  Inaccurate charting 1st 24 hours  · Discussed with Urology, recommend voiding trial in 5-7 days whether inpatient or in clinic.  · - voiding trial succesful. Renteria D/C

## 2023-07-21 NOTE — DISCHARGE SUMMARY
[facility] ([place] [address])  [facility] Medicine  Discharge Summary      Patient Name: Michela Franco  MRN: [id number]  JACK: [id number]  Patient Class: IP- Inpatient  Admission Date: 7/16/2023  Hospital Length of Stay: 5 days  Discharge Date and Time:  07/21/2023 12:07 PM  Attending Physician: Ishan De Oliveira MD   Discharging Provider: Ishan De Oliveira MD  Primary Care Provider: Mayela Broussard MD  [facility] Medicine Team: [facility] MED A Ishan De Oliveira MD  Primary Care Team: [facility] MED A    HPI:   Patient is a pleasant 79 yo old female with a PMHx of diastolic HF, asthma, HLD, HTN, JOHN, recurrent pulmonary infections presents to the ED with son at bedside c/o SOB and chest wall pain that worsened  since yesterday. Patient was diagnosed with PNA on Friday and was discharged with amoxicillin and azithromycin -- however she notes her symptoms have not improved and also reports of a new onset CP with deep inspiration. She denies abdominal pain, HA, fever, chills, N, V, diarrhea, dysuria and hematuria. Patient reports she started to have coughing spills on Thursday with associated sputum production and fatigue. After PO abx she does not appear to be getting better.  She noted wheezing at home which have not happened for 3 years now.      In the ED, Xray showed Multifocal bilateral interstitial and alveolar opacities, similar to prior study.  Possible small bilateral pleural effusions.  No pneumothorax.  Cardiac silhouette is prominent, stable. Appears slightly worse than previous.            * No surgery found *      Hospital Course:   78-year-old female with past medical history as mentioned above in addition to long smoking history and Severe Asthma, followed by Dr. Barrera.  Admitted to the hospitalist service for failed outpatient treatment of pneumonia, asthma/COPD exacerbation hyponatremia, abdominal distention, and urinary retention.  Pneumonia improving with Levaquin.  COPD / Asthma exacerbation  improving with bronchodilators and steroids.  Pneumonia with hyponatremia, concern for Legionella but patient did not identify any risk factors. Nephrology contacted who recommended Renteria catheter, strict I/O, daily urine sodium and osmolality with formal nephrology consult. Legionella antigen came back negative.  CTA of the chest  negative for PE although compromised by respiratory motion and suboptimal contrast bolus timing.  Image demonstrated multifocal airspace disease which could represent pneumonia versus edema as well as small bilateral pleural effusions.  Patient with significant abdominal distention but no complaints of nausea, vomiting, or constipation.  CT AP ordered that time.  Patient reported orthopnea and intermittent lower extremity swelling so there was some concern for heart failure.  Patient hemodynamically stable and tolerating diet so IV fluids held pending further workup. CT AP resulted which revealed distended bladder and gaseous distention of the transverse colon.  Patient reported normal urination and bowel movements.    Renteria placed with immediate urine output.  Urine studies indicative of hypovolemic hyponatremia.  With appropriate urine output, sodium improved and Nephrology consult was canceled.  Urology was consulted who recommended voiding trial in 5-7 days.  Echocardiogram resulted with EF 50%, normal RV size and function, indeterminate diastolic function, and normal CVP.  Patient started on slow IV fluids with continued improvement of electrolytes.  General surgery consulted regarding gaseous distention of the transverse colon.  Repeat CT abdomen pelvis with oral contrast repeated after bladder drained, not concerning for obstruction.  The pt. Oxygenation improved and she is now showing sat 98% on RA. She still feels congested and has some pulmonary fleam, but no distress or breathlessness. She will continue Levofloxacin for another 7 days, inflammatory markers are normal though.  Additionally Steroids will continue with Prednisone for another 5 day course. She remains on home inhalers Advair, Spiriva and will be sent home with duonebs PRN. She is to follow up with her Allergist for PFT and management next week.   Her Renteria cath was removed prior to discharge and her voiding trial was satisfactory. She still has moderate gaseous distention of the abdomen, however benign exam and CT scans that ruled out acute intraabdominal pathology or SBO. No further intervention is needed.   She will follow up with her PCP within 7 days as well. The pt. Was discharged in stable condition to home.        Goals of Care Treatment Preferences:  Code Status: Full Code    Living Will: Yes              Consults:   Consults (From admission, onward)        Status Ordering Provider     Inpatient consult to PICC team ([facility])  Once        Provider:  (Not yet assigned)    Completed AMRIT WINTER     Inpatient consult to General Surgery  Once        Provider:  (Not yet assigned)    Acknowledged CHRISTY MEDRANO III          Pulmonary  Moderate persistent asthma not dependent on systemic steroids with acute exacerbation  Nicotine dependence, in remission  Likely COPD as well  · In acute exacerbation secondary to pneumonia  · Solumedrol 40 mg BID  decrease to 40 mg daily  · Scheduled bronchodilators  Stable    PFTs:      11/26/2021:              Pre:                   Post:  FEV1: 1.49 (82.8%)--> 1.56 (+4.1%)  FVC:   2.22  (95%)----> 2.30 (+3.6%)  Ratio:  67.37%      ----> 67.77%        5/28/2018  FVC: 2.30 (78%)  FEV1: 1.62 (70%)  Ratio: 70  No reversibility post bronchodilator.    Renal/  Urinary retention  · Unclear etiology  · CT abdomen and pelvis with what appears to be bladder distention despite what patient thought was normal urination.  Great urine output since Renteria placed.  Inaccurate charting 1st 24 hours  · Discussed with Urology, recommend voiding trial in 5-7 days whether inpatient or in  clinic.  · - voiding trial succesful. Renteria D/C      ID  * Sepsis, unspecified organism  Community-acquired multifocal pneumonia  · Lactic acid negative, blood cultures NGTD  · D-dimer greater than 6 no obvious PE on CTA but not a great image.  CTA with bilateral multifocal consolidations and possible edema with small pleural effusions.   · Failed outpatient Amoxicillin and azithromycin, continue Levaquin.  · Legionella negative, RIP negative, sputum culture normal mani  · Inflammatory markers improving  · Continue mucolytics, antitussives, and bronchodilators    Resolved.         GI  Abdominal distension  · Patient reports history of hernia but did do not see mention of it in previous records.  · Abdomen tympanic to percussion throughout  · CT abdomen pelvis with oral contrast with  gaseous distention of the transverse colon of unknown etiology as well as what appears to be distended bladder despite reports normal urination.    · General surgery consulted, awaiting recommendations  · Repeat CT once bladder drained with no evidence of obstruction        Final Active Diagnoses:    Diagnosis Date Noted POA    PRINCIPAL PROBLEM:  Sepsis, unspecified organism [A41.9] 07/16/2023 Yes    Hyperkalemia [E87.5] 07/18/2023 No    Urinary retention [R33.9] 07/18/2023 Yes    Abdominal distension [R14.0] 07/17/2023 Yes    Nicotine dependence in remission [F17.201] 07/17/2023 Not Applicable    Elevated transaminase level [R74.01] 07/17/2023 Yes    Hyponatremia [E87.1] 07/16/2023 Yes    Moderate persistent asthma not dependent on systemic steroids with acute exacerbation [J45.41] 03/25/2014 Yes    GERD (gastroesophageal reflux disease) [K21.9] 02/20/2014 Yes    HTN (hypertension) [I10] 11/06/2012 Yes      Problems Resolved During this Admission:    Diagnosis Date Noted Date Resolved POA    Community acquired pneumonia [J18.9] 07/16/2023 07/16/2023 Unknown    Multifocal pneumonia [J18.9] 07/16/2023 07/17/2023 Yes  "      Discharged Condition: stable    Disposition: Home or Self Care    Follow Up:   Follow-up Information     Mayela Broussard MD Follow up in 1 week(s).    Specialty: Internal Medicine  Contact information:  [address]  [facility] [address] [phone]             Joe King,  Follow up in 1 week(s).    Specialty: Allergy and Immunology  Contact information:  [address]  [facility] [address] [phone]                       Patient Instructions:      NEBULIZER FOR HOME USE     Order Specific Question Answer Comments   Height: 5' 5" (1.651 m)    Weight: 87.5 kg (193 lb)    Does patient have medical equipment at home? none    Length of need (1-99 months): 1      Diet Adult Regular     No dressing needed     Activity as tolerated       Significant Diagnostic Studies: Labs:   BMP:   Recent Labs   Lab 07/20/23  0648   GLU 91   *   K 4.5      CO2 22*   BUN 18   CREATININE 0.6   CALCIUM 8.8   MG 1.5*    and CBC   Recent Labs   Lab 07/20/23  0648   WBC 9.67   HGB 10.7*   HCT 31.7*   *     Microbiology:   Blood Culture   Lab Results   Component Value Date    LABBLOO No Growth to date 07/16/2023    LABBLOO No Growth to date 07/16/2023    LABBLOO No Growth to date 07/16/2023    LABBLOO No Growth to date 07/16/2023    LABBLOO No Growth to date 07/16/2023    LABBLOO No Growth to date 07/16/2023    LABBLOO No Growth to date 07/16/2023    LABBLOO No Growth to date 07/16/2023    LABBLOO No Growth to date 07/16/2023    LABBLOO No Growth to date 07/16/2023     Radiology: CT scan: CT ABDOMEN PELVIS WITHOUT CONTRAST:   Results for orders placed or performed during the hospital encounter of 07/16/23   CT Abdomen Pelvis  Without Contrast    Narrative    EXAMINATION:  CT ABDOMEN PELVIS WITHOUT CONTRAST    CLINICAL HISTORY:  gaseous distention, previous distended bladder. etiology of distention/urinary retention?;    TECHNIQUE:  The patient was surveyed from the lung bases through the " pelvis.  IV contrast was not administered.  Oral contrast was administered.  The data was reconstructed for coronal, sagittal, and axial images.    COMPARISON:  CT abdomen pelvis without contrast 07/17/2023    FINDINGS:  CHEST:    Lungs/Pleura: Scattered irregular opacities of the bilateral lung bases, improved from prior exam.  Nonspecific, but may represent underlying atelectasis versus airway disease.  Stable trace bilateral pleural effusions.    Heart: The visualized portions of the heart are normal. No pericardial effusion.    Thoracic soft tissues: Unremarkable    ABDOMEN:    Liver: The liver is normal in size and attenuation.  1.2 cm hepatic cyst in the left lobe, stable from prior.  (Series 2, image 34).  Punctate calcification in the right hepatic lobe, likely from prior granulomatous disease.    Gallbladder/Bile ducts: No radiopaque gallstones.  No intrahepatic or extrahepatic biliary ductal dilatation.    Spleen:Unremarkable.    Stomach: Unremarkable.    Pancreas: No mass or peripancreatic fat stranding.    Adrenals: Stable 0.9 cm left adrenal nodule.  Right adrenal gland is unremarkable.    Renal/Ureters: The kidneys are normal in size and location. No hydronephrosis. No radiopaque renal stones.  The previously identified exophytic density arising from the lateral interpolar aspect of the right kidney relatively stable in size, now measuring 1.3 cm (series 2, image 55).  The ureters are normal in course and caliber. The urinary bladder is decompressed with Renteria catheter in place.    Reproductive: Uterus surgically absent.    Bowel: Scattered diverticula throughout the colon.  The transverse colon is moderately distended with bowel gas, however there is contrast distally within the sigmoid colon and rectum.  The visualized loops of small and large bowel show no evidence of obstruction or inflammation.    Peritoneum: no ascites, free fluid, or intraperitoneal free air.    Lymph Nodes: No pathologic caitlin  enlargement in the abdomen or pelvis.    Vasculature: The abdominal aorta is normal in course and caliber.  Mild scattered abdominal aortic atherosclerotic plaque.  Moderate atherosclerotic plaque within the bilateral common iliac arteries.    Bones: Degenerative changes of the spine.  Degenerative changes of the bilateral femoral heads.    Soft Tissues: Small fat containing umbilical hernia.  Small amount of air in the right lower abdominal subcutaneous tissue, likely due to subcutaneous injections.      Impression    No evidence of bowel obstruction.    Stable trace bilateral pleural effusion and bibasilar opacification likely representing atelectasis versus underlying airway disease.    Stable left adrenal nodule.    Stable right renal exophytic density.    Degenerative changes of the bilateral femoral heads.    Additional findings as above.    Electronically signed by resident: Roque Mcdermott  Date:    07/19/2023  Time:    14:55    Electronically signed by: Karson Grossman MD  Date:    07/19/2023  Time:    17:11       Pending Diagnostic Studies:     Procedure Component Value Units Date/Time    Sodium, urine, random [[id number]] Collected: 07/21/23 0508    Order Status: Sent Lab Status: In process Updated: 07/21/23 0514    Specimen: Urine, Catheterized          Medications:  Reconciled Home Medications:      Medication List      START taking these medications    albuterol-ipratropium 2.5 mg-0.5 mg/3 mL nebulizer solution  Commonly known as: DUO-NEB  Take 3 mLs by nebulization every 6 (six) hours as needed for Wheezing. Rescue     levoFLOXacin 500 MG tablet  Commonly known as: LEVAQUIN  Take 1.5 tablets (750 mg total) by mouth once daily. for 7 days     predniSONE 20 MG tablet  Commonly known as: DELTASONE  Take 2 tablets (40 mg total) by mouth once daily. for 5 days        CHANGE how you take these medications    fluocinonide 0.05 % external solution  Commonly known as: LIDEX  Apply topically 2 (two) times daily. Prn  scalp itch or pain  What changed: Another medication with the same name was removed. Continue taking this medication, and follow the directions you see here.        CONTINUE taking these medications    ADVAIR DISKUS 500-50 mcg/dose Dsdv diskus inhaler  Generic drug: fluticasone-salmeterol 500-50 mcg/dose  INHALE 1 PUFF TWICE DAILY     albuterol 90 mcg/actuation inhaler  Commonly known as: VENTOLIN HFA  Inhale 2 puffs into the lungs every 6 (six) hours as needed for Wheezing. Rescue     azelastine 137 mcg (0.1 %) nasal spray  Commonly known as: ASTELIN  2 sprays (274 mcg total) by Nasal route 2 (two) times daily.     carboxymethylcellulose sodium 0.5 % Drop  Commonly known as: REFRESH TEARS  Apply 1 drop to eye 3 (three) times daily as needed (dry eyes).     cholecalciferol (vitamin D3) 250 mcg (10,000 unit) Cap capsule  Commonly known as: VITAMIN D3  Take 360 mg by mouth once daily. Take 6 capsules (6,000 units total) by mouth once daily     COD LIVER OIL ORAL  Take 1 tablet by mouth once daily.     dupilumab 300 mg/2 mL Pnij  Inject 300 mg into the skin every 14 (fourteen) days.     ferrous sulfate 325 (65 FE) MG EC tablet  Take 1 tablet (325 mg total) by mouth 3 (three) times daily with meals.     fluticasone propionate 50 mcg/actuation nasal spray  Commonly known as: FLONASE  2 sprays (100 mcg total) by Each Nostril route 2 (two) times a day.     guaiFENesin 600 mg 12 hr tablet  Commonly known as: MUCINEX  Take 1 tablet (600 mg total) by mouth 2 (two) times daily.     hydrocortisone 2.5 % cream  Apply topically 2 (two) times daily. Prn face rash.Stop using steroid topical when skin is smooth and non itchy.  Do not treat dark or red coloring.     milk thistle 150 mg Cap  Take 1 capsule by mouth once daily.     multivitamin per tablet  Commonly known as: THERAGRAN  Take 1 tablet by mouth once daily.     NIFEdipine 60 MG (OSM) 24 hr tablet  Commonly known as: PROCARDIA-XL  Take 1 tablet (60 mg total) by mouth once  daily. Replaces isradipine.     potassium chloride SA 20 MEQ tablet  Commonly known as: K-DUR,KLOR-CON  Take 1 tablet (20 mEq total) by mouth once daily.     SENNA 8.6 mg tablet  Generic drug: senna  TAKE 2 TAB(S) BY MOUTH NIGHTLY AS NEEDED FOR CONSTIPATION     tiotropium bromide 2.5 mcg/actuation inhaler  Commonly known as: SPIRIVA RESPIMAT  Inhale 2 puffs into the lungs Daily. Controller     tretinoin 0.05 % cream  Commonly known as: RETIN-A  Apply topically every evening. Start with every other night and move up to nightly after 2 weeks if not too dry.     valsartan 320 MG tablet  Commonly known as: DIOVAN  Take 1 tablet (320 mg total) by mouth once daily.        STOP taking these medications    amoxicillin 875 MG tablet  Commonly known as: AMOXIL     azithromycin 250 MG tablet  Commonly known as: Z-SANDY     ibuprofen 400 MG tablet  Commonly known as: ADVIL,MOTRIN     ibuprofen 600 MG tablet  Commonly known as: ADVIL,MOTRIN            Indwelling Lines/Drains at time of discharge:   Lines/Drains/Airways     Drain  Duration                Urethral Catheter 07/17/23 3139 Non-latex 16 Fr. 3 days                Time spent on the discharge of patient: 30 minutes         Ishan De Oliveira MD  Department of Hospital Medicine  [facility] - Intensive Care ([place]-[facility])

## 2023-07-24 ENCOUNTER — PATIENT OUTREACH (OUTPATIENT)
Dept: ADMINISTRATIVE | Facility: CLINIC | Age: 78
End: 2023-07-24
Payer: MEDICARE

## 2023-07-24 NOTE — PLAN OF CARE
[facility] ([facility])  Discharge Final Note    Primary Care Provider: Mayela Broussard MD    Expected Discharge Date: 7/21/2023    Final Discharge Note (most recent)       Final Note - 07/24/23 0906          Final Note    Assessment Type Final Discharge Note (P)      Anticipated Discharge Disposition Home or Self Care (P)      What phone number can be called within the next 1-3 days to see how you are doing after discharge? [phone] (P)      Hospital Resources/Appts/Education Provided Appointments scheduled by Navigator/Coordinator (P)         Post-Acute Status    Post-Acute Authorization Other (P)      Other Status No Post-Acute Service Needs (P)                      Important Message from Medicare  Important Message from Medicare regarding Discharge Appeal Rights: Explained to patient/caregiver, Signed/date by patient/caregiver     Date IMM was signed: 07/21/23  Time IMM was signed: 0844    Contact Info       Mayela Broussard MD   Specialty: Internal Medicine   Relationship: PCP - General    [address]  [facility] [address]   Phone: [phone]       Next Steps: Follow up in 1 week(s)    Joe King DO   Specialty: Allergy and Immunology    [address]  [facility] [address]   Phone: [phone]       Next Steps: Follow up in 1 week(s)            Patient D/c home w/ family.              Mike Swift MSW, LMSW  [facility]  Case Management  Ext. [phone]

## 2023-07-24 NOTE — PROGRESS NOTES
C3 nurse attempted to contact Michela Franco for a TCC post hospital discharge follow up call. No answer. Left voicemail with callback information. The patient has a scheduled HOSFU appointment with Mayela Broussard MD on 07/27/23 @ 1030.

## 2023-07-25 ENCOUNTER — OFFICE VISIT (OUTPATIENT)
Dept: ALLERGY | Facility: CLINIC | Age: 78
End: 2023-07-25
Payer: MEDICARE

## 2023-07-25 ENCOUNTER — PATIENT MESSAGE (OUTPATIENT)
Dept: ADMINISTRATIVE | Facility: CLINIC | Age: 78
End: 2023-07-25
Payer: MEDICARE

## 2023-07-25 VITALS — BODY MASS INDEX: 30.45 KG/M2 | DIASTOLIC BLOOD PRESSURE: 66 MMHG | WEIGHT: 183 LBS | SYSTOLIC BLOOD PRESSURE: 140 MMHG

## 2023-07-25 DIAGNOSIS — J45.909 ASTHMA, UNSPECIFIED ASTHMA SEVERITY, UNSPECIFIED WHETHER COMPLICATED, UNSPECIFIED WHETHER PERSISTENT: ICD-10-CM

## 2023-07-25 DIAGNOSIS — Z09 HOSPITAL DISCHARGE FOLLOW-UP: Primary | ICD-10-CM

## 2023-07-25 DIAGNOSIS — J30.9 ALLERGIC RHINITIS, UNSPECIFIED SEASONALITY, UNSPECIFIED TRIGGER: ICD-10-CM

## 2023-07-25 LAB
LEGIONELLA CULTURE STATUS: NORMAL
LEGIONELLA SPEC CULT: NORMAL
SPECIMEN SOURCE: NORMAL

## 2023-07-25 PROCEDURE — 1101F PR PT FALLS ASSESS DOC 0-1 FALLS W/OUT INJ PAST YR: ICD-10-PCS | Mod: HCNC,CPTII,GC,S$GLB | Performed by: INTERNAL MEDICINE

## 2023-07-25 PROCEDURE — 3077F SYST BP >= 140 MM HG: CPT | Mod: HCNC,CPTII,GC,S$GLB | Performed by: INTERNAL MEDICINE

## 2023-07-25 PROCEDURE — 99999 PR PBB SHADOW E&M-EST. PATIENT-LVL III: CPT | Mod: PBBFAC,HCNC,GC, | Performed by: INTERNAL MEDICINE

## 2023-07-25 PROCEDURE — 99214 OFFICE O/P EST MOD 30 MIN: CPT | Mod: HCNC,GC,S$GLB, | Performed by: INTERNAL MEDICINE

## 2023-07-25 PROCEDURE — 99999 PR PBB SHADOW E&M-EST. PATIENT-LVL III: ICD-10-PCS | Mod: PBBFAC,HCNC,GC, | Performed by: INTERNAL MEDICINE

## 2023-07-25 PROCEDURE — 1126F PR PAIN SEVERITY QUANTIFIED, NO PAIN PRESENT: ICD-10-PCS | Mod: HCNC,CPTII,GC,S$GLB | Performed by: INTERNAL MEDICINE

## 2023-07-25 PROCEDURE — 1157F PR ADVANCE CARE PLAN OR EQUIV PRESENT IN MEDICAL RECORD: ICD-10-PCS | Mod: HCNC,CPTII,GC,S$GLB | Performed by: INTERNAL MEDICINE

## 2023-07-25 PROCEDURE — 99214 PR OFFICE/OUTPT VISIT, EST, LEVL IV, 30-39 MIN: ICD-10-PCS | Mod: HCNC,GC,S$GLB, | Performed by: INTERNAL MEDICINE

## 2023-07-25 PROCEDURE — 3078F PR MOST RECENT DIASTOLIC BLOOD PRESSURE < 80 MM HG: ICD-10-PCS | Mod: HCNC,CPTII,GC,S$GLB | Performed by: INTERNAL MEDICINE

## 2023-07-25 PROCEDURE — 3077F PR MOST RECENT SYSTOLIC BLOOD PRESSURE >= 140 MM HG: ICD-10-PCS | Mod: HCNC,CPTII,GC,S$GLB | Performed by: INTERNAL MEDICINE

## 2023-07-25 PROCEDURE — 1126F AMNT PAIN NOTED NONE PRSNT: CPT | Mod: HCNC,CPTII,GC,S$GLB | Performed by: INTERNAL MEDICINE

## 2023-07-25 PROCEDURE — 1157F ADVNC CARE PLAN IN RCRD: CPT | Mod: HCNC,CPTII,GC,S$GLB | Performed by: INTERNAL MEDICINE

## 2023-07-25 PROCEDURE — 3078F DIAST BP <80 MM HG: CPT | Mod: HCNC,CPTII,GC,S$GLB | Performed by: INTERNAL MEDICINE

## 2023-07-25 PROCEDURE — 3288F FALL RISK ASSESSMENT DOCD: CPT | Mod: HCNC,CPTII,GC,S$GLB | Performed by: INTERNAL MEDICINE

## 2023-07-25 PROCEDURE — 3288F PR FALLS RISK ASSESSMENT DOCUMENTED: ICD-10-PCS | Mod: HCNC,CPTII,GC,S$GLB | Performed by: INTERNAL MEDICINE

## 2023-07-25 PROCEDURE — 1101F PT FALLS ASSESS-DOCD LE1/YR: CPT | Mod: HCNC,CPTII,GC,S$GLB | Performed by: INTERNAL MEDICINE

## 2023-07-25 NOTE — PROGRESS NOTES
C3 nurse spoke with Michela Franco and daughter, Jana for a TCC post hospital discharge follow up call. The patient has a scheduled HOSFU appointment with Mayela Broussard MD on 07/27/23 @ 1030.

## 2023-07-25 NOTE — PROGRESS NOTES
ALLERGY & IMMUNOLOGY CLINIC -FOLLOW UP     HISTORY OF PRESENT ILLNESS     Patient ID: Michela Franco is a 78 y.o. female    CC: 2 week follow up    HPI:  Ms. Michela Franco is a 78 year old female with asthma, allergic rhinitis, and chronic pansinusitis who presents to clinic today for follow up. Here approximately 2 weeks ago after starting Spiriva. Had symptoms of URI/ asthma exacerbation. Unfortunately symptoms worsened and she was admitted to the ICU for treatment of an asthma exacerbation, pneumonia, urinary retention, and colonic distension. She was treated with Levaquin and IV and oral steroids with clinical improvement.    Today states she is feeling better than when she was in the hospital. Feels like she is approximately at 60% of her normal self. She has one more day of prednisone and a few more days of levofloxacin. She states she has been fatigued this week after leaving the hospital. She has been using an incentive spirometer at home and feels that's helping. Otherwise she states she recently ran out of dupixent as is requesting help with refilling this.           REVIEW OF SYSTEMS     Review of Systems   Constitutional:  Positive for malaise/fatigue.   HENT:  Positive for congestion.    Eyes: Negative.    Respiratory:  Positive for cough.    Cardiovascular: Negative.    Gastrointestinal: Negative.    Genitourinary: Negative.    Musculoskeletal: Negative.    Skin: Negative.    Neurological: Negative.    Endo/Heme/Allergies: Negative.    Psychiatric/Behavioral: Negative.         Balance of review of systems negative except as mentioned above     MEDICAL HISTORY     MedHx: active problems reviewed  SurgHx:   Past Surgical History:   Procedure Laterality Date    CATARACT EXTRACTION W/  INTRAOCULAR LENS IMPLANT Right 2/11/2021    Procedure: EXTRACTION, CATARACT, WITH IOL INSERTION;  Surgeon: John Merino MD;  Location: [facility];  Service: Ophthalmology;  Laterality: Right;    CATARACT EXTRACTION W/   INTRAOCULAR LENS IMPLANT Left 3/4/2021    Procedure: EXTRACTION, CATARACT, WITH IOL INSERTION;  Surgeon: John Merino MD;  Location: [facility] OR;  Service: Ophthalmology;  Laterality: Left;    COLONOSCOPY N/A 5/18/2018    Procedure: COLONOSCOPY;  Surgeon: Calixto Brian MD;  Location: [facility] (4TH FLR);  Service: Endoscopy;  Laterality: N/A;    HYSTERECTOMY      total    OOPHORECTOMY      ROTATOR CUFF REPAIR Left     SINUS SURGERY         Otherwise no Family History of asthma, allergic rhinitis, atopic dermatitis, drug allergy, food allergy, venom allergy or immune deficiency.     Allergies: see below  Medications: MAR reviewed       PHYSICAL EXAM     Physical Exam  Constitutional:       Appearance: Normal appearance.   Eyes:      Conjunctiva/sclera: Conjunctivae normal.   Cardiovascular:      Rate and Rhythm: Normal rate.   Pulmonary:      Breath sounds: Rhonchi present.      Comments: Few scattered ronchi left lower/middle lobes  Skin:     General: Skin is warm and dry.   Neurological:      General: No focal deficit present.      Mental Status: She is alert.   Psychiatric:         Mood and Affect: Mood normal.         Behavior: Behavior normal.          ALLERGEN TESTING        Immunocaps:   Component      Latest Ref Rng & Units 12/18/2019   Allergen Acremonium (Cephalosporium) IgE      <0.10 kU/L <0.10   Allergen Acremonium (Cephalosporium) Class       CLASS 0   Botrytis Cinerea      <0.10 kU/L <0.10   Botrytis Cinerea Class       CLASS 0   Allergen Candida albicans IgE      <0.10 kU/L 0.68 (H)   Allergen Candida albicans Class       CLASS 1   Chaetomium      <0.10 kU/L <0.10   Chaetomium Glob. Class       CLASS 0   Cladosporium, IgE      <0.10 kU/L <0.10   Cladosporium Class       CLASS 0   Curvularia lunata      <0.10 kU/L <0.10   Curvularia Lunata Class       CLASS 0   Epicoccum purpurascens, IgE      <0.10 kU/L <0.10   Epicoccum pupurascens Class       CLASS 0   Helminthosporium Halodes IgE      <0.10 kU/L  <0.10   Helminthosporium Class       CLASS 0   Allergen Trichoderma Viride IgE      <0.10 kU/L <0.10   Allergen Trichoderma Viride Class       CLASS 0   Stemphyllium, IgE      <0.10 kU/L <0.10   Stemphylium Herbarum Class       CLASS 0   Allergen Rhodotorula IgE      <0.35 kU/L <0.35   Rhodotorula , IgE Class       0   Rhizopus Nigrican, IgE      <0.10 kU/L 0.31 (H)   Rhizopus Nigricans Class       CLASS 0/1   Phoma betae      <0.10 kU/L <0.10   Phoma Betae Class       CLASS 0   Penicillium, IgE      <0.10 kU/L 0.17 (H)   Penicillium Class       CLASS 0/1   Mucor racemosus, IgE      <0.10 kU/L 0.34 (H)   Mucor racemosus Class       CLASS 0/1   RAST Allergen for Trichophyton rubrum      kU/L <0.35      Component      Latest Ref Rng & Units 12/18/2019 12/18/2019 12/18/2019           3:07 PM  3:07 PM  3:07 PM   D. farinae      <0.10 kU/L     1.94 (H)   D. farinae Class           CLASS 2   Mite Dust Pteronyssinus IgE      <0.10 kU/L     0.19 (H)   D. pteronyssinus Class           CLASS 0/1   BERMUDA GRASS      <0.10 kU/L     0.71 (H)   Bermuda Grass Class           CLASS 2   Joshua Grass      <0.10 kU/L     0.72 (H)   Joshua Grass Class           CLASS 2   [place]iscot IgE      <0.10 kU/L     0.17 (H)   [place]ot Class           CLASS 0/1   Plantain      <0.10 kU/L     0.63 (H)   English Plantain Class           CLASS 1   White Oak(Quercus alba) IgE      <0.10 kU/L     0.74 (H)   Lucama, Class           CLASS 2   Pecan Miller Tree      <0.10 kU/L     0.47 (H)   Pecan, Class           CLASS 1   Marshelder IgE      <0.10 kU/L     0.59 (H)   Marshelder Class           CLASS 1   Ragweed, Western IgE      <0.10 kU/L     0.54 (H)   Ragweed, Western, Class           CLASS 1   Alternaria alternata      <0.10 kU/L     <0.10   Altern. alternata Class           CLASS 0   Aspergillus Fumigatus IgE      <0.10 kU/L     0.22 (H)   A. fumigatus Class           CLASS 0/1   Cat Dander      <0.10 kU/L     <0.10   Cat Epithelium Class           CLASS  0   Cockroach, IgE      <0.10 kU/L CLASS 0/1 0.28 (H)     Dog Dander, IgE      <0.10 kU/L     0.51 (H)   Dog Dander Class           CLASS 1      Component      Latest Ref Rng & Units 12/18/2019 12/29/2017 10/30/2017 4/8/2014   Aspergillus Fumigatus IgE      <0.10 kU/L 0.22 (H)   <0.35 <0.35   A. fumigatus Class       CLASS 0/1   CLASS 0 CLASS 0   Aspergillus Antigen      <0.5 index   <0.500 0.676 (A)        Component      Latest Ref Rng & Units 2/20/2014   Aspergillus Fumigatus IgE      <0.10 kU/L 0.36 (H)   A. fumigatus Class       CLASS I   Aspergillus Antigen      <0.5 index         PULMONARY FUNCTION     PFTs:     11/26/2021:              Pre:                   Post:  FEV1: 1.49 (82.8%)--> 1.56 (+4.1%)  FVC:   2.22  (95%)----> 2.30 (+3.6%)  Ratio:  67.37%      ----> 67.77%        5/28/2018  FVC: 2.30 (78%)  FEV1: 1.62 (70%)  Ratio: 70  No reversibility post bronchodilator.     Also performed 12/2019 in pulmonary clinic       ASSESSMENT & PLAN     Michela Franco is a 78 y.o. female with      Hx of pneumonia  Asthma Exacerbation  -Recently hospitalized in ICU for treatment of pneumonia and asthma exacerbation. She still has one more day of prednisone and a few days left of Levaquin. She feels like she is improving but continues to have lethargy, cough.  -Will DC tiotropium as it can be associated with URI, and in light of recent severe pneumonia and hospitalization, risk may outweigh benefit        Moderate-severe persistent asthma:  Prior to control with dupixant, she was requiring multiple courses of PO prednisone leading to mulitple side effects.  Patient failed Xoliar in the past. Remains with improved control on dupilumab without exacerbations, however, recently with more albuterol use during the summer. This could be due to current grass pollen season.   -Continue dupilumab 300 mg subq every 14 days. She has recently run out. Will call them today and have this filled. For future reference, patient uses  [facility] pharmacy. For refills, call [phone] ext [phone]. All other medications to be filled by [facility]   -Continue advair 500-50 mcg/dose 1 puff BID  -Continue albuterol as needed for SOB  -DC spiriva  -Will plan for PFT's in 3 weeks.        Allergic rhinitis: Continues to have congestion during current grass pollen season.  -Continue fluticasone to 2 SEN BID  -Continue azelastine up to SEN BID  -Using nasal irrigation BID  -AIT previously discussed. In this patient who is 78 will hold off on AIT at this time due to risk of inability to compensate in setting of possible anaphylaxis to AIT     Eosinophilia, unspecified type: Last eosinophil count wnl  - Continue to monitor     Chronic pansinusitis: Symptoms controlled w/o antibiotic requirements.  -Continue to monitor     Patient discussed with Dr. Geller.  RTC 4-5 weeks, PFT's in 3 weeks, assess response to discontinuation of Spiriva     Joe King DO  Allergy/Immunology Fellow

## 2023-07-27 ENCOUNTER — TELEPHONE (OUTPATIENT)
Dept: ALLERGY | Facility: CLINIC | Age: 78
End: 2023-07-27
Payer: MEDICARE

## 2023-07-27 ENCOUNTER — OFFICE VISIT (OUTPATIENT)
Dept: PRIMARY CARE CLINIC | Facility: CLINIC | Age: 78
End: 2023-07-27
Payer: MEDICARE

## 2023-07-27 VITALS
WEIGHT: 186.31 LBS | HEART RATE: 80 BPM | OXYGEN SATURATION: 95 % | BODY MASS INDEX: 31.04 KG/M2 | TEMPERATURE: 98 F | DIASTOLIC BLOOD PRESSURE: 68 MMHG | HEIGHT: 65 IN | SYSTOLIC BLOOD PRESSURE: 132 MMHG

## 2023-07-27 DIAGNOSIS — D64.9 ANEMIA, UNSPECIFIED TYPE: Primary | ICD-10-CM

## 2023-07-27 DIAGNOSIS — J18.9 COMMUNITY ACQUIRED PNEUMONIA, UNSPECIFIED LATERALITY: ICD-10-CM

## 2023-07-27 DIAGNOSIS — D75.839 THROMBOCYTOSIS: ICD-10-CM

## 2023-07-27 DIAGNOSIS — I10 HYPERTENSION, UNSPECIFIED TYPE: ICD-10-CM

## 2023-07-27 DIAGNOSIS — E87.6 HYPOKALEMIA: ICD-10-CM

## 2023-07-27 DIAGNOSIS — K57.30 DIVERTICULA OF COLON: ICD-10-CM

## 2023-07-27 DIAGNOSIS — R33.9 URINARY RETENTION: ICD-10-CM

## 2023-07-27 DIAGNOSIS — J45.901 EXACERBATION OF ASTHMA, UNSPECIFIED ASTHMA SEVERITY, UNSPECIFIED WHETHER PERSISTENT: ICD-10-CM

## 2023-07-27 DIAGNOSIS — E83.42 HYPOMAGNESEMIA: ICD-10-CM

## 2023-07-27 DIAGNOSIS — E87.1 HYPONATREMIA: ICD-10-CM

## 2023-07-27 DIAGNOSIS — A41.9 SEPSIS, DUE TO UNSPECIFIED ORGANISM, UNSPECIFIED WHETHER ACUTE ORGAN DYSFUNCTION PRESENT: ICD-10-CM

## 2023-07-27 DIAGNOSIS — R74.01 TRANSAMINITIS: ICD-10-CM

## 2023-07-27 PROCEDURE — 3288F PR FALLS RISK ASSESSMENT DOCUMENTED: ICD-10-PCS | Mod: HCNC,CPTII,S$GLB, | Performed by: STUDENT IN AN ORGANIZED HEALTH CARE EDUCATION/TRAINING PROGRAM

## 2023-07-27 PROCEDURE — 1126F PR PAIN SEVERITY QUANTIFIED, NO PAIN PRESENT: ICD-10-PCS | Mod: HCNC,CPTII,S$GLB, | Performed by: STUDENT IN AN ORGANIZED HEALTH CARE EDUCATION/TRAINING PROGRAM

## 2023-07-27 PROCEDURE — 3288F FALL RISK ASSESSMENT DOCD: CPT | Mod: HCNC,CPTII,S$GLB, | Performed by: STUDENT IN AN ORGANIZED HEALTH CARE EDUCATION/TRAINING PROGRAM

## 2023-07-27 PROCEDURE — 3078F DIAST BP <80 MM HG: CPT | Mod: HCNC,CPTII,S$GLB, | Performed by: STUDENT IN AN ORGANIZED HEALTH CARE EDUCATION/TRAINING PROGRAM

## 2023-07-27 PROCEDURE — 1111F PR DISCHARGE MEDS RECONCILED W/ CURRENT OUTPATIENT MED LIST: ICD-10-PCS | Mod: HCNC,CPTII,S$GLB, | Performed by: STUDENT IN AN ORGANIZED HEALTH CARE EDUCATION/TRAINING PROGRAM

## 2023-07-27 PROCEDURE — 3078F PR MOST RECENT DIASTOLIC BLOOD PRESSURE < 80 MM HG: ICD-10-PCS | Mod: HCNC,CPTII,S$GLB, | Performed by: STUDENT IN AN ORGANIZED HEALTH CARE EDUCATION/TRAINING PROGRAM

## 2023-07-27 PROCEDURE — 1157F ADVNC CARE PLAN IN RCRD: CPT | Mod: HCNC,CPTII,S$GLB, | Performed by: STUDENT IN AN ORGANIZED HEALTH CARE EDUCATION/TRAINING PROGRAM

## 2023-07-27 PROCEDURE — 99214 OFFICE O/P EST MOD 30 MIN: CPT | Mod: HCNC,S$GLB,, | Performed by: STUDENT IN AN ORGANIZED HEALTH CARE EDUCATION/TRAINING PROGRAM

## 2023-07-27 PROCEDURE — 1126F AMNT PAIN NOTED NONE PRSNT: CPT | Mod: HCNC,CPTII,S$GLB, | Performed by: STUDENT IN AN ORGANIZED HEALTH CARE EDUCATION/TRAINING PROGRAM

## 2023-07-27 PROCEDURE — 1101F PR PT FALLS ASSESS DOC 0-1 FALLS W/OUT INJ PAST YR: ICD-10-PCS | Mod: HCNC,CPTII,S$GLB, | Performed by: STUDENT IN AN ORGANIZED HEALTH CARE EDUCATION/TRAINING PROGRAM

## 2023-07-27 PROCEDURE — 1159F PR MEDICATION LIST DOCUMENTED IN MEDICAL RECORD: ICD-10-PCS | Mod: HCNC,CPTII,S$GLB, | Performed by: STUDENT IN AN ORGANIZED HEALTH CARE EDUCATION/TRAINING PROGRAM

## 2023-07-27 PROCEDURE — 3075F SYST BP GE 130 - 139MM HG: CPT | Mod: HCNC,CPTII,S$GLB, | Performed by: STUDENT IN AN ORGANIZED HEALTH CARE EDUCATION/TRAINING PROGRAM

## 2023-07-27 PROCEDURE — 99999 PR PBB SHADOW E&M-EST. PATIENT-LVL V: ICD-10-PCS | Mod: PBBFAC,HCNC,, | Performed by: STUDENT IN AN ORGANIZED HEALTH CARE EDUCATION/TRAINING PROGRAM

## 2023-07-27 PROCEDURE — 1101F PT FALLS ASSESS-DOCD LE1/YR: CPT | Mod: HCNC,CPTII,S$GLB, | Performed by: STUDENT IN AN ORGANIZED HEALTH CARE EDUCATION/TRAINING PROGRAM

## 2023-07-27 PROCEDURE — 1159F MED LIST DOCD IN RCRD: CPT | Mod: HCNC,CPTII,S$GLB, | Performed by: STUDENT IN AN ORGANIZED HEALTH CARE EDUCATION/TRAINING PROGRAM

## 2023-07-27 PROCEDURE — 1157F PR ADVANCE CARE PLAN OR EQUIV PRESENT IN MEDICAL RECORD: ICD-10-PCS | Mod: HCNC,CPTII,S$GLB, | Performed by: STUDENT IN AN ORGANIZED HEALTH CARE EDUCATION/TRAINING PROGRAM

## 2023-07-27 PROCEDURE — 3075F PR MOST RECENT SYSTOLIC BLOOD PRESS GE 130-139MM HG: ICD-10-PCS | Mod: HCNC,CPTII,S$GLB, | Performed by: STUDENT IN AN ORGANIZED HEALTH CARE EDUCATION/TRAINING PROGRAM

## 2023-07-27 PROCEDURE — 1111F DSCHRG MED/CURRENT MED MERGE: CPT | Mod: HCNC,CPTII,S$GLB, | Performed by: STUDENT IN AN ORGANIZED HEALTH CARE EDUCATION/TRAINING PROGRAM

## 2023-07-27 PROCEDURE — 99999 PR PBB SHADOW E&M-EST. PATIENT-LVL V: CPT | Mod: PBBFAC,HCNC,, | Performed by: STUDENT IN AN ORGANIZED HEALTH CARE EDUCATION/TRAINING PROGRAM

## 2023-07-27 PROCEDURE — 99214 PR OFFICE/OUTPT VISIT, EST, LEVL IV, 30-39 MIN: ICD-10-PCS | Mod: HCNC,S$GLB,, | Performed by: STUDENT IN AN ORGANIZED HEALTH CARE EDUCATION/TRAINING PROGRAM

## 2023-07-27 NOTE — PROGRESS NOTES
Primary Care  Transition of Care - In Person  7/27/2023  Mayela Broussard      Patient is a 78 y.o.     Michela Franco has   Past Medical History:   Diagnosis Date    Allergy     Asthma     Chronic diastolic heart failure 4/5/2018    Glaucoma suspect     Hyperlipidemia     Hypertension     Neuromuscular disorder     JOHN on CPAP     Osteoporosis     Recurrent sinusitis 3/25/2014    Recurrent upper respiratory infection (URI)     Urticaria      Social History     Social History Narrative           Medications  Review of patient's allergies indicates:   Allergen Reactions    Prednisone Other (See Comments)     Bone loss    Adhesive      PAPER TAPE    Diazepam Other (See Comments)     Nervous, jittery    Gabapentin      Nervous      Keppra [levetiracetam]      nervous    Mold      sneezing     Outpatient Medications Marked as Taking for the 7/27/23 encounter (Office Visit) with Mayela Broussard MD   Medication Sig Dispense Refill    ADVAIR DISKUS 500-50 mcg/dose DsDv diskus inhaler INHALE 1 PUFF TWICE DAILY 1 each 11    albuterol (VENTOLIN HFA) 90 mcg/actuation inhaler Inhale 2 puffs into the lungs every 6 (six) hours as needed for Wheezing. Rescue 8 g 11    albuterol-ipratropium (DUO-NEB) 2.5 mg-0.5 mg/3 mL nebulizer solution Take 3 mLs by nebulization every 6 (six) hours as needed for Wheezing. Rescue 75 mL 0    azelastine (ASTELIN) 137 mcg (0.1 %) nasal spray 2 sprays (274 mcg total) by Nasal route 2 (two) times daily. 30 mL 11    carboxymethylcellulose sodium (REFRESH TEARS) 0.5 % Drop Apply 1 drop to eye 3 (three) times daily as needed (dry eyes). 30 mL 11    cholecalciferol, vitamin D3, 10,000 unit Cap Take 360 mg by mouth once daily. Take 6 capsules (6,000 units total) by mouth once daily      COD LIVER OIL ORAL Take 1 tablet by mouth once daily.      dupilumab 300 mg/2 mL PnIj Inject 300 mg into the skin every 14 (fourteen) days. 4 mL 12    ferrous sulfate 325 (65 FE) MG EC tablet Take 1 tablet (325  mg total) by mouth 3 (three) times daily with meals. 270 tablet 3    fluticasone propionate (FLONASE) 50 mcg/actuation nasal spray 2 sprays (100 mcg total) by Each Nostril route 2 (two) times a day. 16 g 11    guaiFENesin (MUCINEX) 600 mg 12 hr tablet Take 1 tablet (600 mg total) by mouth 2 (two) times daily. 60 tablet 5    hydrocortisone 2.5 % cream Apply topically 2 (two) times daily. Prn face rash.Stop using steroid topical when skin is smooth and non itchy.  Do not treat dark or red coloring. 45 g 0    levoFLOXacin (LEVAQUIN) 500 MG tablet Take 1.5 tablets (750 mg total) by mouth once daily. for 7 days 11 tablet 0    milk thistle 150 mg Cap Take 1 capsule by mouth once daily. 90 each 3    multivitamin (THERAGRAN) per tablet Take 1 tablet by mouth once daily.      NIFEdipine (PROCARDIA-XL) 60 MG (OSM) 24 hr tablet Take 1 tablet (60 mg total) by mouth once daily. Replaces isradipine. 90 tablet 2    potassium chloride SA (K-DUR,KLOR-CON) 20 MEQ tablet Take 1 tablet (20 mEq total) by mouth once daily. 90 tablet 1    SENNA 8.6 mg tablet TAKE 2 TAB(S) BY MOUTH NIGHTLY AS NEEDED FOR CONSTIPATION      tiotropium bromide (SPIRIVA RESPIMAT) 2.5 mcg/actuation inhaler Inhale 2 puffs into the lungs Daily. Controller 4 g 5    tretinoin (RETIN-A) 0.05 % cream Apply topically every evening. Start with every other night and move up to nightly after 2 weeks if not too dry. 20 g 2    valsartan (DIOVAN) 320 MG tablet Take 1 tablet (320 mg total) by mouth once daily. 90 tablet 3       Summary of Hospitalization  Date of Admission - 7/16/23  Date of D/C- 7/21/23  D/C diagnoses -   COPD exacerbation   Sepsis 2/2 CAP   Hyponatremia   Urinary retention     I have reviewed the discharge summary    A brief summary of their hospitalization is as follows   Patient presented to the ED with shortness of breath. She was treated for sepsis 2/2 CAP and asthma exacerbation. Hospital course complicated by urinary retention and hyponatremia.  "    Since discharge patient reports that shortness of breath has improved     Review of Systems   All other systems reviewed and are negative.    Physical Exam   Vitals:    07/27/23 1036 07/27/23 1116   BP: (!) 142/66 132/68   BP Location: Left arm Left arm   Pulse: 80    Temp: 98.3 °F (36.8 °C)    SpO2: 95%    Weight: 84.5 kg (186 lb 4.6 oz)    Height: 5' 5" (1.651 m)          Physical Exam  Vitals reviewed.   Constitutional:       General: She is not in acute distress.     Appearance: Normal appearance. She is not ill-appearing.   Cardiovascular:      Rate and Rhythm: Normal rate and regular rhythm.      Pulses: Normal pulses.      Heart sounds: Normal heart sounds. No murmur heard.  Pulmonary:      Effort: Pulmonary effort is normal. No respiratory distress.      Breath sounds: Wheezing present. No rales.   Abdominal:      General: Abdomen is flat. Bowel sounds are normal.      Palpations: Abdomen is soft.   Musculoskeletal:         General: Swelling (ankles b/l) present.      Right lower leg: No edema.      Left lower leg: No edema.   Neurological:      Mental Status: She is alert.         Results  Lab Results   Component Value Date    CREATININE 0.6 07/20/2023     Lab Results   Component Value Date     (L) 07/20/2023    K 4.5 07/20/2023     07/20/2023    CO2 22 (L) 07/20/2023    ANIONGAP 8 07/20/2023     Lab Results   Component Value Date    WBC 9.67 07/20/2023    RBC 3.80 (L) 07/20/2023    HGB 10.7 (L) 07/20/2023    HCT 31.7 (L) 07/20/2023    MCV 83 07/20/2023    MCH 28.2 07/20/2023    MCHC 33.8 07/20/2023    RDW 17.6 (H) 07/20/2023     (H) 07/20/2023     Lab Results   Component Value Date    GLU 91 07/20/2023     Lab Results   Component Value Date    HGBA1C 5.3 10/18/2022    HGBA1C 6.0 11/01/2012    HGBA1C 6.2 10/26/2011       Imaging reviewed     Assessment and Plan    CAP   Asthma/COPD exacerbation   Patient oxygenating well on RA   She was discharged with Levaquin 750 mg daily but has " been taking 500 mg daily. Counseled on use   Completed 5 course of prednisone   Continue inhalers     Anemia   Thrombocytosis   Likely 2/2 sepsis   F/u CBC     HTN   Continue valsartan 320 mg daily and nifedipine 60 mg daily   May consider discontinuing ccb given ankle swelling     Urinary retention   Resolved   Patient reports adequate UOP at home     Hyponatremia   Improved   Ruled out legionella   Likely hypovolemic   F/u BMP     Diverticula colon   Incidental finding on CT   Referral placed for colonoscopy at patient request     Hx hypokalemia   Recommended holding supplementation K 4.5     Transaminitis   Likely 2/2 sepsis   F/u LFTs         Future Appointments   Date Time Provider Department Center   8/4/2023 12:00 PM LAB, [facility] LAB [facility]   8/15/2023  8:30 AM PULMONARY FUNCTION [facility]   8/29/2023 11:00 AM Joe King DO [facility]   8/31/2023  9:00 AM Mayela Broussard MD [facility]         Follow Up [facility] (with who? when?): Follow up in about 1 month (around 8/27/2023).        Extended Emergency Contact Information  Primary Emergency Contact: Charley Ahmadi  Address: [address] [place] [address] [place]  Mobile Phone: [phone]  Relation: Sister      Mayela Broussard MD   Attending Physician  Primary Care   7/27/2023 - 9:56 AM    This includes face to face time and non-face to face time preparing to see the patient (eg, review of tests), obtaining and/or reviewing separately obtained history, documenting clinical information in the electronic or other health record, independently interpreting results and communicating results to the patient/family/caregiver, or care coordinator.

## 2023-07-27 NOTE — TELEPHONE ENCOUNTER
----- Message from Lisa Davis sent at 7/19/2023  4:28 PM CDT -----  Regarding: Refill  Contact: Pt [phone]  Pt is calling saying she needs a refill on Rx: dupilumab 300 mg/2 mL PnIj 4 mL states provider needs to call [phone] Sofia please call   1

## 2023-07-27 NOTE — LETTER
July 27, 2023      [facility]  [address]  [facility] [address]  Phone: [phone]  Fax: [phone]       Patient: Michela Franco   YOB: 1945  Date of Visit: 07/27/2023    To Whom It May Concern:    Oni Franco  was at [facility] on 07/27/2023. The patient may return to work on 8/7/23. If you have any questions or concerns, or if I can be of further assistance, please do not hesitate to contact me.    Sincerely,      Mayela Broussard MD

## 2023-07-27 NOTE — PATIENT INSTRUCTIONS
Hold potassium supplements for now     Please contact Endo Dept for scheduling colonoscopy [phone]

## 2023-07-28 ENCOUNTER — TELEPHONE (OUTPATIENT)
Dept: PRIMARY CARE CLINIC | Facility: CLINIC | Age: 78
End: 2023-07-28
Payer: MEDICARE

## 2023-07-28 ENCOUNTER — TELEPHONE (OUTPATIENT)
Dept: OPHTHALMOLOGY | Facility: CLINIC | Age: 78
End: 2023-07-28
Payer: MEDICARE

## 2023-07-28 NOTE — TELEPHONE ENCOUNTER
----- Message from Bre Rodriguez sent at 7/28/2023  9:20 AM CDT -----  Regarding: same day access requested  Contact: self @ [phone]  Pt says she woke up this morning with eye pain, red eyes and light sensitivity.  She says she feels the need to be seen today. There is nothing available.  Pls call with an appt.

## 2023-07-28 NOTE — TELEPHONE ENCOUNTER
----- Message from Carolyn Donis sent at 7/28/2023  8:15 AM CDT -----  Contact: pt [phone]  Patient was seen yesterday. States that she woke up this morning and her eyes are red and sensitive to light.    Patient is concerned.    Please call and advise.    Thank You

## 2023-08-02 ENCOUNTER — TELEPHONE (OUTPATIENT)
Dept: ENDOSCOPY | Facility: HOSPITAL | Age: 78
End: 2023-08-02

## 2023-08-02 ENCOUNTER — CLINICAL SUPPORT (OUTPATIENT)
Dept: ENDOSCOPY | Facility: HOSPITAL | Age: 78
End: 2023-08-02
Attending: STUDENT IN AN ORGANIZED HEALTH CARE EDUCATION/TRAINING PROGRAM
Payer: MEDICARE

## 2023-08-02 DIAGNOSIS — Z12.11 COLON CANCER SCREENING: Primary | ICD-10-CM

## 2023-08-02 DIAGNOSIS — K57.30 DIVERTICULA OF COLON: ICD-10-CM

## 2023-08-02 RX ORDER — POLYETHYLENE GLYCOL 3350, SODIUM SULFATE ANHYDROUS, SODIUM BICARBONATE, SODIUM CHLORIDE, POTASSIUM CHLORIDE 236; 22.74; 6.74; 5.86; 2.97 G/4L; G/4L; G/4L; G/4L; G/4L
4 POWDER, FOR SOLUTION ORAL ONCE
Qty: 4000 ML | Refills: 0 | Status: SHIPPED | OUTPATIENT
Start: 2023-08-02 | End: 2023-08-02

## 2023-08-02 NOTE — PLAN OF CARE
Spoke to patient to schedule procedure(s) Colonoscopy       Physician to perform procedure(s) Dr. YUE Merion  Date of Procedure (s) 10/24/23  Arrival Time 8:00 AM  Time of Procedure(s) 9:00 AM   Location of Procedure(s) [address]  Type of Rx Prep sent to patient: PEG  Instructions provided to patient via Postal Mail/PORTAL    Patient was informed on the following information and verbalized understanding. Screening questionnaire reviewed with patient and complete. If procedure requires anesthesia, a responsible adult needs to be present to accompany the patient home, patient cannot drive after receiving anesthesia. Appointment details are tentative, especially check-in time. Patient will receive a prep-op call 4 days prior to confirm check-in time for procedure. If applicable the patient should contact their pharmacy to verify Rx for procedure prep is ready for pick-up. Patient was advised to call the scheduling department at [phone] if pharmacy states no Rx is available. Patient was advised to call the endoscopy scheduling department if any questions or concerns arise.       Endoscopy Scheduling Department              Colonoscopy Procedure Prep Instructions     Date of procedure: 10/24/23 Arrive at: 8:00 AM     Location of Department:   [facility] [address] [place] [address]  Take the Atrium Elevators to 4th Floor Endoscopy Lab     As soon as possible:   your prep from pharmacy and over the counter DULCOLAX LAXATIVE TABLETS             On the day before your procedure   What You CAN do:   You may have clear liquids ONLY-see below for list.      Liquids That Are OK to Drink:   Water  Sports drinks (Gatorade, Power-Aid)  Coffee or tea (no cream or nondairy creamer)  Clear juices without pulp (apple, white grape)  Gelatin desserts (no fruit or toppings)  Clear soda (sprite, coke, ginger ale)  Chicken broth (until 12 midnight the night before procedure)     What You  CANNOT do:   Do not EAT solid food, drink milk or anything   colored red.  Do not drink alcohol.  Do not take oral medications within 1 hour of starting   each dose of prep.  No gum chewing or candy morning of procedure                       Note:   (Please disregard the insert instructions from pharmacy).  PEG Bowel Prep is indicated for cleansing of the colon as a preparation for colonoscopy in adults.   Be sure to tell your doctor about all the medicines you take, including prescription and non-prescription medicines, vitamins, and herbal supplements. PEG Bowel Prep may affect how other medicines work.  Medication taken by mouth may not be absorbed properly when taken within 1 hour before the start of each dose of PEG Bowel Prep.     It is not uncommon to experience some abdominal cramping, nausea and/or vomiting when taking the prep. If you have nausea and/or vomiting while taking the prep, stop drinking for 20 to 30 minutes then continue.        How to take prep:     PEG Bowel Prep is a (2-day) prep.    One (1) bottle of prep are required for a complete preparation for colonoscopy. Dilute the solution concentrate as directed prior to use. You must drink water with each dose of prep, and additional water after each dose.     DOSE 1--Day Before Colonoscopy 10/23/23      Drink at least 6 to 8 glasses of clear liquids from time you wake up until you begin your prep and then continue until bedtime to avoid dehydration.      12:00 pm (NOON) Mix your entire container of prep with lukewarm water and refrigerate. Take four (4) Dulcolax (Bisacodyl) tablets with at least 8 ounces or more of clear liquids.        6:00 pm:     You must complete Steps 1 and 2 below before going to bed:     Step 1-Drink half the liquid in the container within one (1) hour.   Step 2-Refrigerate the remaining half of the liquid for dose 2. See below when to begin this step.                       IMPORTANT: If you experience preparation-related  symptoms (for example, nausea, bloating, or cramping), stop, or slow the rate of drinking the additional water until your symptoms decrease.     DOSE 2--Day of the Colonoscopy 10/24/23 at 2-3 AM.     For this dose, repeat Step 1 shown above using the remaining half of the liquid prep.   You may continue drinking water/clear liquids until   4 hours before your colonoscopy or as directed by the scheduling nurse  5:00 AM.     For more information about your procedure, please watch this informational video. It is important to watch this animated consent video prior to your arrival.   If you haven't watched the video prior to arriving, you are required to watch it during admission which can cause delays.      Options for viewing:  Using a keyboard:  press and hold the control tab (Ctrl) and left mouse click to follow link          Colonoscopy Instructional Video                                                        OR     Type link address into your web browser's address bar:  [email or web address]     Using a mobile phone: tap on web address/link.              IMPORTANT INFORMATION TO KNOW BEFORE YOUR PROCEDURE     [facility] [place] 4th Floor     If your procedure requires the administration of anesthesia, it is necessary for a responsible adult to drive you home. (Medical Transportation, Uber, Lyft, Taxi, etc. may ONLY be used if a responsible adult is present to accompany you home.  The responsible adult CAN'T be the  of the service).       person must be available to return to pick you up within 30 minutes of being notified of discharge.      Due to the limited socially distant seating in our waiting room, please limit your guest (1) who accompany you for this procedure. If someone accompanies you for this procedure into the facility:     Consider having them proceed to an area that is socially distant other than the lobby until a member of the medical team  contacts them to provide an update after the procedure.      Also, please consider being dropped off and picked up from the facility.        Please bring a picture ID, insurance card, & copayment     Take Medications as directed below:           If you begin taking any blood thinning medications, please contact the  listed below as soon as possible.     If you are diabetic see the attached instruction sheet regarding your medication.      If you take HEART, BLOOD PRESSURE, SEIZURE, PAIN, LUNG (including inhalers/nebulizers), ANTI-REJECTION (transplant patients), or PSYCHIATRIC medications, please take at your regular times with a sip of water or as directed by the scheduling nurse.      Important contact information:     Endoscopy [phone] Hours of operation Monday-Friday 8:00-4:30pm.     Questions about insurance or financial obligations call [phone] or [phone].     If you have questions regarding the prep or need to reschedule, please call [phone]. After hours questions requiring immediate assistance, contact [facility] nurse line at [phone] or [phone].   NOTE:      On occasion, unforeseen circumstances may cause a delay in your procedure start time. We respect your time and appreciate your patience during these circumstances.

## 2023-08-02 NOTE — TELEPHONE ENCOUNTER
Spoke to patient to schedule procedure(s) Colonoscopy       Physician to perform procedure(s) Dr. YUE Merino  Date of Procedure (s) 10/24/23  Arrival Time 8:00 AM  Time of Procedure(s) 9:00 AM   Location of Procedure(s) [address]  Type of Rx Prep sent to patient: PEG  Instructions provided to patient via Postal Mail/PORTAL    Patient was informed on the following information and verbalized understanding. Screening questionnaire reviewed with patient and complete. If procedure requires anesthesia, a responsible adult needs to be present to accompany the patient home, patient cannot drive after receiving anesthesia. Appointment details are tentative, especially check-in time. Patient will receive a prep-op call 4 days prior to confirm check-in time for procedure. If applicable the patient should contact their pharmacy to verify Rx for procedure prep is ready for pick-up. Patient was advised to call the scheduling department at [phone] if pharmacy states no Rx is available. Patient was advised to call the endoscopy scheduling department if any questions or concerns arise.       Endoscopy Scheduling Department              Colonoscopy Procedure Prep Instructions     Date of procedure: 10/24/23 Arrive at: 8:00 AM     Location of Department:   [facility] [address] [place] [address]  Take the [facility] Elevators to 4th Floor Endoscopy Lab     As soon as possible:   your prep from pharmacy and over the counter DULCOLAX LAXATIVE TABLETS             On the day before your procedure   What You CAN do:   You may have clear liquids ONLY-see below for list.      Liquids That Are OK to Drink:   Water  Sports drinks (Gatorade, Power-Aid)  Coffee or tea (no cream or nondairy creamer)  Clear juices without pulp (apple, white grape)  Gelatin desserts (no fruit or toppings)  Clear soda (sprite, coke, ginger ale)  Chicken broth (until 12 midnight the night before procedure)     What You  CANNOT do:   Do not EAT solid food, drink milk or anything   colored red.  Do not drink alcohol.  Do not take oral medications within 1 hour of starting   each dose of prep.  No gum chewing or candy morning of procedure                       Note:   (Please disregard the insert instructions from pharmacy).  PEG Bowel Prep is indicated for cleansing of the colon as a preparation for colonoscopy in adults.   Be sure to tell your doctor about all the medicines you take, including prescription and non-prescription medicines, vitamins, and herbal supplements. PEG Bowel Prep may affect how other medicines work.  Medication taken by mouth may not be absorbed properly when taken within 1 hour before the start of each dose of PEG Bowel Prep.     It is not uncommon to experience some abdominal cramping, nausea and/or vomiting when taking the prep. If you have nausea and/or vomiting while taking the prep, stop drinking for 20 to 30 minutes then continue.        How to take prep:     PEG Bowel Prep is a (2-day) prep.    One (1) bottle of prep are required for a complete preparation for colonoscopy. Dilute the solution concentrate as directed prior to use. You must drink water with each dose of prep, and additional water after each dose.     DOSE 1--Day Before Colonoscopy 10/23/23      Drink at least 6 to 8 glasses of clear liquids from time you wake up until you begin your prep and then continue until bedtime to avoid dehydration.      12:00 pm (NOON) Mix your entire container of prep with lukewarm water and refrigerate. Take four (4) Dulcolax (Bisacodyl) tablets with at least 8 ounces or more of clear liquids.        6:00 pm:     You must complete Steps 1 and 2 below before going to bed:     Step 1-Drink half the liquid in the container within one (1) hour.   Step 2-Refrigerate the remaining half of the liquid for dose 2. See below when to begin this step.                       IMPORTANT: If you experience preparation-related  symptoms (for example, nausea, bloating, or cramping), stop, or slow the rate of drinking the additional water until your symptoms decrease.     DOSE 2--Day of the Colonoscopy 10/24/23 at 2-3 AM.     For this dose, repeat Step 1 shown above using the remaining half of the liquid prep.   You may continue drinking water/clear liquids until   4 hours before your colonoscopy or as directed by the scheduling nurse  5:00 AM.     For more information about your procedure, please watch this informational video. It is important to watch this animated consent video prior to your arrival.   If you haven't watched the video prior to arriving, you are required to watch it during admission which can cause delays.      Options for viewing:  Using a keyboard:  press and hold the control tab (Ctrl) and left mouse click to follow link          Colonoscopy Instructional Video                                                        OR     Type link address into your web browser's address bar:  [email or web address]     Using a mobile phone: tap on web address/link.              IMPORTANT INFORMATION TO KNOW BEFORE YOUR PROCEDURE     [facility] [place] 4th Floor     If your procedure requires the administration of anesthesia, it is necessary for a responsible adult to drive you home. (Medical Transportation, Uber, Lyft, Taxi, etc. may ONLY be used if a responsible adult is present to accompany you home.  The responsible adult CAN'T be the  of the service).       person must be available to return to pick you up within 30 minutes of being notified of discharge.      Due to the limited socially distant seating in our waiting room, please limit your guest (1) who accompany you for this procedure. If someone accompanies you for this procedure into the facility:     Consider having them proceed to an area that is socially distant other than the lobby until a member of the medical team  contacts them to provide an update after the procedure.      Also, please consider being dropped off and picked up from the facility.        Please bring a picture ID, insurance card, & copayment     Take Medications as directed below:           If you begin taking any blood thinning medications, please contact the  listed below as soon as possible.     If you are diabetic see the attached instruction sheet regarding your medication.      If you take HEART, BLOOD PRESSURE, SEIZURE, PAIN, LUNG (including inhalers/nebulizers), ANTI-REJECTION (transplant patients), or PSYCHIATRIC medications, please take at your regular times with a sip of water or as directed by the scheduling nurse.      Important contact information:     Endoscopy [phone] Hours of operation Monday-Friday 8:00-4:30pm.     Questions about insurance or financial obligations call [phone] or [phone].     If you have questions regarding the prep or need to reschedule, please call [phone]. After hours questions requiring immediate assistance, contact [facility] nurse line at [phone] or [phone].   NOTE:      On occasion, unforeseen circumstances may cause a delay in your procedure start time. We respect your time and appreciate your patience during these circumstances.

## 2023-08-04 ENCOUNTER — LAB VISIT (OUTPATIENT)
Dept: LAB | Facility: HOSPITAL | Age: 78
End: 2023-08-04
Attending: STUDENT IN AN ORGANIZED HEALTH CARE EDUCATION/TRAINING PROGRAM
Payer: MEDICARE

## 2023-08-04 DIAGNOSIS — D64.9 ANEMIA, UNSPECIFIED TYPE: ICD-10-CM

## 2023-08-04 DIAGNOSIS — E83.42 HYPOMAGNESEMIA: ICD-10-CM

## 2023-08-04 DIAGNOSIS — D75.839 THROMBOCYTOSIS: ICD-10-CM

## 2023-08-04 DIAGNOSIS — R74.01 TRANSAMINITIS: ICD-10-CM

## 2023-08-04 LAB
ALBUMIN SERPL BCP-MCNC: 3.1 G/DL (ref 3.5–5.2)
ALP SERPL-CCNC: 96 U/L (ref 55–135)
ALT SERPL W/O P-5'-P-CCNC: 16 U/L (ref 10–44)
ANION GAP SERPL CALC-SCNC: 8 MMOL/L (ref 8–16)
AST SERPL-CCNC: 25 U/L (ref 10–40)
BASOPHILS # BLD AUTO: 0.04 K/UL (ref 0–0.2)
BASOPHILS NFR BLD: 1.1 % (ref 0–1.9)
BILIRUB SERPL-MCNC: 0.5 MG/DL (ref 0.1–1)
BUN SERPL-MCNC: 10 MG/DL (ref 8–23)
CALCIUM SERPL-MCNC: 8.9 MG/DL (ref 8.7–10.5)
CHLORIDE SERPL-SCNC: 105 MMOL/L (ref 95–110)
CO2 SERPL-SCNC: 24 MMOL/L (ref 23–29)
CREAT SERPL-MCNC: 0.7 MG/DL (ref 0.5–1.4)
DIFFERENTIAL METHOD: ABNORMAL
EOSINOPHIL # BLD AUTO: 0.1 K/UL (ref 0–0.5)
EOSINOPHIL NFR BLD: 3.8 % (ref 0–8)
ERYTHROCYTE [DISTWIDTH] IN BLOOD BY AUTOMATED COUNT: 18.5 % (ref 11.5–14.5)
EST. GFR  (NO RACE VARIABLE): >60 ML/MIN/1.73 M^2
GLUCOSE SERPL-MCNC: 101 MG/DL (ref 70–110)
HCT VFR BLD AUTO: 37.4 % (ref 37–48.5)
HGB BLD-MCNC: 11.9 G/DL (ref 12–16)
IMM GRANULOCYTES # BLD AUTO: 0.01 K/UL (ref 0–0.04)
IMM GRANULOCYTES NFR BLD AUTO: 0.3 % (ref 0–0.5)
LYMPHOCYTES # BLD AUTO: 1.5 K/UL (ref 1–4.8)
LYMPHOCYTES NFR BLD: 40.1 % (ref 18–48)
MAGNESIUM SERPL-MCNC: 1.8 MG/DL (ref 1.6–2.6)
MCH RBC QN AUTO: 27 PG (ref 27–31)
MCHC RBC AUTO-ENTMCNC: 31.8 G/DL (ref 32–36)
MCV RBC AUTO: 85 FL (ref 82–98)
MONOCYTES # BLD AUTO: 0.5 K/UL (ref 0.3–1)
MONOCYTES NFR BLD: 13.7 % (ref 4–15)
NEUTROPHILS # BLD AUTO: 1.5 K/UL (ref 1.8–7.7)
NEUTROPHILS NFR BLD: 41 % (ref 38–73)
NRBC BLD-RTO: 0 /100 WBC
PLATELET # BLD AUTO: 230 K/UL (ref 150–450)
PMV BLD AUTO: 10 FL (ref 9.2–12.9)
POTASSIUM SERPL-SCNC: 3.8 MMOL/L (ref 3.5–5.1)
PROT SERPL-MCNC: 6.9 G/DL (ref 6–8.4)
RBC # BLD AUTO: 4.41 M/UL (ref 4–5.4)
SODIUM SERPL-SCNC: 137 MMOL/L (ref 136–145)
WBC # BLD AUTO: 3.72 K/UL (ref 3.9–12.7)

## 2023-08-04 PROCEDURE — 80053 COMPREHEN METABOLIC PANEL: CPT | Mod: HCNC | Performed by: STUDENT IN AN ORGANIZED HEALTH CARE EDUCATION/TRAINING PROGRAM

## 2023-08-04 PROCEDURE — 85025 COMPLETE CBC W/AUTO DIFF WBC: CPT | Mod: HCNC | Performed by: STUDENT IN AN ORGANIZED HEALTH CARE EDUCATION/TRAINING PROGRAM

## 2023-08-04 PROCEDURE — 83735 ASSAY OF MAGNESIUM: CPT | Mod: HCNC | Performed by: STUDENT IN AN ORGANIZED HEALTH CARE EDUCATION/TRAINING PROGRAM

## 2023-08-04 PROCEDURE — 36415 COLL VENOUS BLD VENIPUNCTURE: CPT | Mod: HCNC,PN | Performed by: STUDENT IN AN ORGANIZED HEALTH CARE EDUCATION/TRAINING PROGRAM

## 2023-08-07 NOTE — PROGRESS NOTES
Dr Broussard is out of the office so I am reviewing your blood work:    Your blood count (CBC) is unremarkable.    Your sugar number (Glucose) is within normal limits.  Rest of your electrolytes are unremarkable.    Your kidney (BUN, Creatinine and GFT) function is unremarkable.   Your liver (AST, ALT) function is unremarkable.    Magnesium levels are within normal limits.

## 2023-08-09 ENCOUNTER — OFFICE VISIT (OUTPATIENT)
Dept: OPTOMETRY | Facility: CLINIC | Age: 78
End: 2023-08-09
Payer: MEDICARE

## 2023-08-09 DIAGNOSIS — H02.88B MEIBOMIAN GLAND DYSFUNCTION (MGD), BILATERAL, BOTH UPPER AND LOWER LIDS: ICD-10-CM

## 2023-08-09 DIAGNOSIS — H02.88A MEIBOMIAN GLAND DYSFUNCTION (MGD), BILATERAL, BOTH UPPER AND LOWER LIDS: ICD-10-CM

## 2023-08-09 DIAGNOSIS — H16.143 PUNCTATE EPITHELIAL KERATOPATHY OF BOTH EYES: Primary | ICD-10-CM

## 2023-08-09 PROCEDURE — 92014 PR EYE EXAM, EST PATIENT,COMPREHESV: ICD-10-PCS | Mod: HCNC,S$GLB,, | Performed by: OPTOMETRIST

## 2023-08-09 PROCEDURE — 1157F PR ADVANCE CARE PLAN OR EQUIV PRESENT IN MEDICAL RECORD: ICD-10-PCS | Mod: HCNC,CPTII,S$GLB, | Performed by: OPTOMETRIST

## 2023-08-09 PROCEDURE — 1157F ADVNC CARE PLAN IN RCRD: CPT | Mod: HCNC,CPTII,S$GLB, | Performed by: OPTOMETRIST

## 2023-08-09 PROCEDURE — 1125F PR PAIN SEVERITY QUANTIFIED, PAIN PRESENT: ICD-10-PCS | Mod: HCNC,CPTII,S$GLB, | Performed by: OPTOMETRIST

## 2023-08-09 PROCEDURE — 92014 COMPRE OPH EXAM EST PT 1/>: CPT | Mod: HCNC,S$GLB,, | Performed by: OPTOMETRIST

## 2023-08-09 PROCEDURE — 1125F AMNT PAIN NOTED PAIN PRSNT: CPT | Mod: HCNC,CPTII,S$GLB, | Performed by: OPTOMETRIST

## 2023-08-09 PROCEDURE — 1101F PT FALLS ASSESS-DOCD LE1/YR: CPT | Mod: HCNC,CPTII,S$GLB, | Performed by: OPTOMETRIST

## 2023-08-09 PROCEDURE — 3288F FALL RISK ASSESSMENT DOCD: CPT | Mod: HCNC,CPTII,S$GLB, | Performed by: OPTOMETRIST

## 2023-08-09 PROCEDURE — 1159F PR MEDICATION LIST DOCUMENTED IN MEDICAL RECORD: ICD-10-PCS | Mod: HCNC,CPTII,S$GLB, | Performed by: OPTOMETRIST

## 2023-08-09 PROCEDURE — 99999 PR PBB SHADOW E&M-EST. PATIENT-LVL III: CPT | Mod: PBBFAC,HCNC,, | Performed by: OPTOMETRIST

## 2023-08-09 PROCEDURE — 3288F PR FALLS RISK ASSESSMENT DOCUMENTED: ICD-10-PCS | Mod: HCNC,CPTII,S$GLB, | Performed by: OPTOMETRIST

## 2023-08-09 PROCEDURE — 1159F MED LIST DOCD IN RCRD: CPT | Mod: HCNC,CPTII,S$GLB, | Performed by: OPTOMETRIST

## 2023-08-09 PROCEDURE — 1101F PR PT FALLS ASSESS DOC 0-1 FALLS W/OUT INJ PAST YR: ICD-10-PCS | Mod: HCNC,CPTII,S$GLB, | Performed by: OPTOMETRIST

## 2023-08-09 PROCEDURE — 99999 PR PBB SHADOW E&M-EST. PATIENT-LVL III: ICD-10-PCS | Mod: PBBFAC,HCNC,, | Performed by: OPTOMETRIST

## 2023-08-09 NOTE — PROGRESS NOTES
HPI    Pt reports redness and sensitivity to light in both eyes while   hospitalized   OTC +2.00    Pt reports floaters  Pt denies flashes     Pt reports Dry/ Itchy/ Burning/ red eyes/ Blurred vision/ photophobia/   gritty   Gtt: Yes Systane BID   Pt reports minimal relief   Pt reports using Pataday once a day   Pt reports for a short period   Last edited by Carlos Angeles, OD on 8/9/2023 10:03 AM.            Assessment /Plan     For exam results, see Encounter Report.    Punctate epithelial keratopathy of both eyes  -cause of photophobia and resolved with ATs    Meibomian gland dysfunction (MGD), bilateral, both upper and lower lids  Nightly lid scrubs using Ocusoft. Systane Complete PF as needed.      RTC annual

## 2023-08-15 ENCOUNTER — HOSPITAL ENCOUNTER (OUTPATIENT)
Dept: PULMONOLOGY | Facility: CLINIC | Age: 78
Discharge: HOME | End: 2023-08-15
Payer: MEDICARE

## 2023-08-15 DIAGNOSIS — J45.40 MODERATE PERSISTENT ASTHMA WITHOUT COMPLICATION: ICD-10-CM

## 2023-08-15 PROCEDURE — 94060 EVALUATION OF WHEEZING: CPT | Mod: HCNC,S$GLB,, | Performed by: INTERNAL MEDICINE

## 2023-08-15 PROCEDURE — 94729 PR C02/MEMBANE DIFFUSE CAPACITY: ICD-10-PCS | Mod: HCNC,S$GLB,, | Performed by: INTERNAL MEDICINE

## 2023-08-15 PROCEDURE — 94727 PR PULM FUNCTION TEST BY GAS: ICD-10-PCS | Mod: HCNC,S$GLB,, | Performed by: INTERNAL MEDICINE

## 2023-08-15 PROCEDURE — 94060 PR EVAL OF BRONCHOSPASM: ICD-10-PCS | Mod: HCNC,S$GLB,, | Performed by: INTERNAL MEDICINE

## 2023-08-15 PROCEDURE — 94729 DIFFUSING CAPACITY: CPT | Mod: HCNC,S$GLB,, | Performed by: INTERNAL MEDICINE

## 2023-08-15 PROCEDURE — 94727 GAS DIL/WSHOT DETER LNG VOL: CPT | Mod: HCNC,S$GLB,, | Performed by: INTERNAL MEDICINE

## 2023-08-16 ENCOUNTER — RESEARCH ENCOUNTER (OUTPATIENT)
Dept: INFECTIOUS DISEASES | Facility: CLINIC | Age: 78
End: 2023-08-16
Payer: MEDICARE

## 2023-08-16 NOTE — PROGRESS NOTES
Visit 2 / Day 30 / Final Assessment *Data Collection Visit Only   Sponsor: [employer]  Study: Vaccine Effectiveness of 20-Valent Pneumococcal Conjugate Vaccine Against Vaccine-Type CAP Using a Test-Negative Design (RECAP-20)  IRB/Protocol #: 2022.215/[id number]  Principle Investigator: Dr. Solitario Kimbrough   Site Number: 1011    Subject Number: [id number]    Visit 2 Date: 16Aug2023      Hospitalization Details  Hospital Admission Date: 16July2023   Ongoing: No   Discharge Date: 21July2023    Discharge Disposition:  Participants will have discharge disposition recorded on the CRF if available. Discharge  disposition should be collected upon discharge up to Day 30. If a participant remains  hospitalized beyond the Day 30 visit, discharge disposition will be documented as Other,  'Not Discharged'.  [] In-hospital death   [] Patient still in study site hospital  [x] Discharged home with self care  [] Discharged home with health home care provided by family or friends  [] Discharged home with professional health home care  [] Transferred to another acute care hospital  [] Transferred to a skilled nursing facility/other rehab facility (low level care)  [] Transferred to a skilled nursing facility/other rehab facility (high level care)  [] Discharged against medical advice  [] Other, specify    Hospitalization Details - Mechanical Ventilation  Did Procedure Occur [] Yes      Type of mechanical ventilation:      [] Intubation      [] Non-invasive positive pressure ventilation (See CPAP notation in Visit 1 encounter note)      [x] No    Hospitalization Details - Intensive Care Unit: if Participant is admitted to and discharged multiple times from ICU, depending on admission, discharge and readmission dates, multiple records should be created.    ICU Admission Date: 16July2023  Ongoing [] Yes [x] No  ICU Discharge Date: 21July2023    Did Procedure Occur? [] Yes [x] No  If yes, Extracorporeal Membrane Oxygenation (ECMO) will  populate       Survival / Vital Status:  Record vital status at Visit 2 at Day 30 (regardless of whether the patient was discharged before Day 30). Vital status should be determined using hospital data or information received from the [facility] [place].    Survival Follow-Up Current Subject Status: []  or [x] Living [] Unknown    Day 30 Vaccination History   Report from physician record, pharmacy records, EMR, LINKS, etc. If known provide generic drug name (including salt form if applicable). If generic name unknown, provide trade name or propriety name. Include route, dose, lot number and date received if known. Record if received more than once. If vaccination history is available from different sources, the data from the most reliable source should be recorded.    Date and Type(s) of pneumococcal vaccination in past 5 years:  23-Valent Polysaccharide: Outside of Data Capture window; Last Administration was on 2017  Route: IM 0.5mL dose  :  official.fm   Lot #: [id number]    13-Valent Pneumococcal Conjugate Vaccine: Outside of Data Capture window; Last Administration was on 2014  Route: N/A  :   N/A    Lot #:[id number]    15-Valent Pneumococcal Conjugate Vaccine: No record found in vaccine search  Date Received: N/A    Route: N/A  :  N/A    Lot #: N/A    20-Valent Pneumococcal Conjugate Vaccine: No record found in vaccine search  Date Received: N/A    Route: N/A  :  N/A    Lot #: N/A    Influenza Vaccine (in last 12 months):   Date Received: 2022   Route: 0.5mL dose IM  :  Seqirus    Lot #: [id number]    COVID Vaccinations:   COVID-19, mRNA, LNP-S, PF, 30 mcg/0.3 mL dose  (Covid-19 0.3 (Pfizer)) for first three doses;  COVID-19, mRNA, LNP-S, bivalent, PF, 30 mcg/0.3 mL dose  (Pfizer (Bi-Valent)MDV & SDV) for last dose on 2022  Dose/Booster # Date  Lot # Route   1 [id number] Pfizer [id number] IM (0.3mL)   2 [id number] Pfizer [id number] IM  (0.3mL)   3 [id number] Pfizer [id number] IM (0.3mL)   4 [id number] Pfizer [id number] IM (0.3mL)     Completeness of Vaccine History Ascertainment  Select Sources of Vaccine History information searched (if applicable):  [x] Hospital Record (Paper/EHR/EMR)  [] Non-hospital Electronic Medical Record (not applicable)  [] Vaccine Card (not applicable)  [x] Vaccine [place]/State Registry  [x] Pharmacy Record (No other pharmacy stated during Visit 1 from patient; Only [facility])  [x] Primary Care or other medical provider ([facility])

## 2023-08-17 NOTE — PROGRESS NOTES
Visit 2 / Day 30 / Final Assessment *Data Collection Visit Only   Sponsor: Pfizer  Study: Vaccine Effectiveness of 20-Valent Pneumococcal Conjugate Vaccine Against Vaccine-Type CAP Using a Test-Negative Design (RECAP-20)  IRB/Protocol #: 2022.215/[id number]  Principle Investigator: Dr. Solitario Kimbrough   Site Number: 1011    Subject Number: [id number]    Visit 2 Date: 16AUG2023    Final Diagnosis:  Date of Diagnosis: 21JUL2023     Final Diagnosis:  [x] Community Acquired Pneumonia (CAP)  [] Other diagnosis (not pneumonia):  [] Acute Bronchitis  [] Exacerbation of COPD  [] Empyema/lung abscess  [] Other acute lower respiratory tract infection  [] Acute pulmonary exacerbation of CHF  [] Non-infectious process  [] Early-Onset HAP    If Final Diagnosis is CAP, please specify if radiologically confirmed:   [x] Confirmed  [] Not Confirmed  [] Not Done

## 2023-08-29 ENCOUNTER — OFFICE VISIT (OUTPATIENT)
Dept: ALLERGY | Facility: CLINIC | Age: 78
End: 2023-08-29
Payer: MEDICARE

## 2023-08-29 VITALS — WEIGHT: 177.94 LBS | BODY MASS INDEX: 29.61 KG/M2

## 2023-08-29 DIAGNOSIS — J45.909 ASTHMA, UNSPECIFIED ASTHMA SEVERITY, UNSPECIFIED WHETHER COMPLICATED, UNSPECIFIED WHETHER PERSISTENT: Primary | ICD-10-CM

## 2023-08-29 DIAGNOSIS — J30.9 ALLERGIC RHINITIS, UNSPECIFIED SEASONALITY, UNSPECIFIED TRIGGER: ICD-10-CM

## 2023-08-29 PROCEDURE — 1159F PR MEDICATION LIST DOCUMENTED IN MEDICAL RECORD: ICD-10-PCS | Mod: HCNC,CPTII,GC,S$GLB | Performed by: INTERNAL MEDICINE

## 2023-08-29 PROCEDURE — 99214 PR OFFICE/OUTPT VISIT, EST, LEVL IV, 30-39 MIN: ICD-10-PCS | Mod: HCNC,GC,S$GLB, | Performed by: INTERNAL MEDICINE

## 2023-08-29 PROCEDURE — 99999 PR PBB SHADOW E&M-EST. PATIENT-LVL III: CPT | Mod: PBBFAC,HCNC,GC, | Performed by: INTERNAL MEDICINE

## 2023-08-29 PROCEDURE — 99999 PR PBB SHADOW E&M-EST. PATIENT-LVL III: ICD-10-PCS | Mod: PBBFAC,HCNC,GC, | Performed by: INTERNAL MEDICINE

## 2023-08-29 PROCEDURE — 1157F PR ADVANCE CARE PLAN OR EQUIV PRESENT IN MEDICAL RECORD: ICD-10-PCS | Mod: HCNC,CPTII,GC,S$GLB | Performed by: INTERNAL MEDICINE

## 2023-08-29 PROCEDURE — 1126F AMNT PAIN NOTED NONE PRSNT: CPT | Mod: HCNC,CPTII,GC,S$GLB | Performed by: INTERNAL MEDICINE

## 2023-08-29 PROCEDURE — 99214 OFFICE O/P EST MOD 30 MIN: CPT | Mod: HCNC,GC,S$GLB, | Performed by: INTERNAL MEDICINE

## 2023-08-29 PROCEDURE — 1159F MED LIST DOCD IN RCRD: CPT | Mod: HCNC,CPTII,GC,S$GLB | Performed by: INTERNAL MEDICINE

## 2023-08-29 PROCEDURE — 1126F PR PAIN SEVERITY QUANTIFIED, NO PAIN PRESENT: ICD-10-PCS | Mod: HCNC,CPTII,GC,S$GLB | Performed by: INTERNAL MEDICINE

## 2023-08-29 PROCEDURE — 1157F ADVNC CARE PLAN IN RCRD: CPT | Mod: HCNC,CPTII,GC,S$GLB | Performed by: INTERNAL MEDICINE

## 2023-08-29 RX ORDER — MONTELUKAST SODIUM 10 MG/1
10 TABLET ORAL NIGHTLY
Qty: 30 TABLET | Refills: 3 | Status: SHIPPED | OUTPATIENT
Start: 2023-08-29 | End: 2023-09-28

## 2023-08-29 RX ORDER — POLYETHYLENE GLYCOL-3350 AND ELECTROLYTES 236; 6.74; 5.86; 2.97; 22.74 G/274.31G; G/274.31G; G/274.31G; G/274.31G; G/274.31G
4000 POWDER, FOR SOLUTION ORAL ONCE
Status: ON HOLD | COMMUNITY
Start: 2023-08-02 | End: 2024-01-08 | Stop reason: HOSPADM

## 2023-08-29 NOTE — PROGRESS NOTES
ALLERGY & IMMUNOLOGY CLINIC - FOLLOW UP     HISTORY OF PRESENT ILLNESS     Patient ID: Michela Franco is a 78 y.o. female    CC: follow up    HPI: Ms. Michela Franco is a 78 year old female with asthma, allergic rhinitis, and chronic pansinusitis who presents to clinic today for follow up. She states she is mostly feeling back to normal. Has had some continued nasal congestion, she feels like this leads to her clearing her throat and coughing from time to time. She has been using fluticasone 2 SEN BID, azelastine 2 SEN BID, sinus rinses BID. Continues to have nasal symptoms, tried antihistamines in the past which have not been helpful. Has never tried montelukast.            REVIEW OF SYSTEMS     Review of Systems   Constitutional: Negative.    HENT:  Positive for congestion.    Respiratory: Negative.     Cardiovascular: Negative.    Gastrointestinal: Negative.    Musculoskeletal: Negative.    Skin: Negative.    Neurological: Negative.    Psychiatric/Behavioral: Negative.         Balance of review of systems negative except as mentioned above     MEDICAL HISTORY     MedHx: active problems reviewed  SurgHx:   Past Surgical History:   Procedure Laterality Date    CATARACT EXTRACTION W/  INTRAOCULAR LENS IMPLANT Right 2/11/2021    Procedure: EXTRACTION, CATARACT, WITH IOL INSERTION;  Surgeon: John Merino MD;  Location: [facility];  Service: Ophthalmology;  Laterality: Right;    CATARACT EXTRACTION W/  INTRAOCULAR LENS IMPLANT Left 3/4/2021    Procedure: EXTRACTION, CATARACT, WITH IOL INSERTION;  Surgeon: John Merino MD;  Location: [facility];  Service: Ophthalmology;  Laterality: Left;    COLONOSCOPY N/A 5/18/2018    Procedure: COLONOSCOPY;  Surgeon: Calixto Brian MD;  Location: [address];  Service: Endoscopy;  Laterality: N/A;    HYSTERECTOMY      total    OOPHORECTOMY      ROTATOR CUFF REPAIR Left     SINUS SURGERY         Otherwise no Family History of asthma, allergic rhinitis, atopic dermatitis,  drug allergy, food allergy, venom allergy or immune deficiency.     Allergies: see below  Medications: MAR reviewed       PHYSICAL EXAM     Physical Exam  Constitutional:       Appearance: Normal appearance.   HENT:      Head: Normocephalic and atraumatic.      Nose: Congestion present.   Eyes:      Conjunctiva/sclera: Conjunctivae normal.   Cardiovascular:      Rate and Rhythm: Normal rate and regular rhythm.   Pulmonary:      Effort: Pulmonary effort is normal.      Comments: Trace wheeze  Skin:     General: Skin is warm and dry.   Neurological:      General: No focal deficit present.      Mental Status: She is alert.   Psychiatric:         Mood and Affect: Mood normal.         Behavior: Behavior normal.            ALLERGEN TESTING     Immunocaps:   Component      Latest Ref Rng & Units 12/18/2019   Allergen Acremonium (Cephalosporium) IgE      <0.10 kU/L <0.10   Allergen Acremonium (Cephalosporium) Class       CLASS 0   Botrytis Cinerea      <0.10 kU/L <0.10   Botrytis Cinerea Class       CLASS 0   Allergen Candida albicans IgE      <0.10 kU/L 0.68 (H)   Allergen Candida albicans Class       CLASS 1   Chaetomium      <0.10 kU/L <0.10   Chaetomium Glob. Class       CLASS 0   Cladosporium, IgE      <0.10 kU/L <0.10   Cladosporium Class       CLASS 0   Curvularia lunata      <0.10 kU/L <0.10   Curvularia Lunata Class       CLASS 0   Epicoccum purpurascens, IgE      <0.10 kU/L <0.10   Epicoccum pupurascens Class       CLASS 0   Helminthosporium Halodes IgE      <0.10 kU/L <0.10   Helminthosporium Class       CLASS 0   Allergen Trichoderma Viride IgE      <0.10 kU/L <0.10   Allergen Trichoderma Viride Class       CLASS 0   Stemphyllium, IgE      <0.10 kU/L <0.10   Stemphylium Herbarum Class       CLASS 0   Allergen Rhodotorula IgE      <0.35 kU/L <0.35   Rhodotorula , IgE Class       0   Rhizopus Nigrican, IgE      <0.10 kU/L 0.31 (H)   Rhizopus Nigricans Class       CLASS 0/1   Phoma betae      <0.10 kU/L <0.10    Phoma Betae Class       CLASS 0   Penicillium, IgE      <0.10 kU/L 0.17 (H)   Penicillium Class       CLASS 0/1   Mucor racemosus, IgE      <0.10 kU/L 0.34 (H)   Mucor racemosus Class       CLASS 0/1   RAST Allergen for Trichophyton rubrum      kU/L <0.35      Component      Latest Ref Rng & Units 12/18/2019 12/18/2019 12/18/2019           3:07 PM  3:07 PM  3:07 PM   D. farinae      <0.10 kU/L     1.94 (H)   D. farinae Class           CLASS 2   Mite Dust Pteronyssinus IgE      <0.10 kU/L     0.19 (H)   D. pteronyssinus Class           CLASS 0/1   BERMUDA GRASS      <0.10 kU/L     0.71 (H)   Bermuda Grass Class           CLASS 2   Joshua Grass      <0.10 kU/L     0.72 (H)   Joshua Grass Class           CLASS 2   Jack IgE      <0.10 kU/L     0.17 (H)   Jack Class           CLASS 0/1   Plantain      <0.10 kU/L     0.63 (H)   English Plantain Class           CLASS 1   White Oak(Quercus alba) IgE      <0.10 kU/L     0.74 (H)   Banner, Class           CLASS 2   Pecan Worthington Tree      <0.10 kU/L     0.47 (H)   Pecan, Class           CLASS 1   Marshelder IgE      <0.10 kU/L     0.59 (H)   Marshelder Class           CLASS 1   Ragweed, Western IgE      <0.10 kU/L     0.54 (H)   Ragweed, Western, Class           CLASS 1   Alternaria alternata      <0.10 kU/L     <0.10   Altern. alternata Class           CLASS 0   Aspergillus Fumigatus IgE      <0.10 kU/L     0.22 (H)   A. fumigatus Class           CLASS 0/1   Cat Dander      <0.10 kU/L     <0.10   Cat Epithelium Class           CLASS 0   Cockroach, IgE      <0.10 kU/L CLASS 0/1 0.28 (H)     Dog Dander, IgE      <0.10 kU/L     0.51 (H)   Dog Dander Class           CLASS 1      Component      Latest Ref Rng & Units 12/18/2019 12/29/2017 10/30/2017 4/8/2014   Aspergillus Fumigatus IgE      <0.10 kU/L 0.22 (H)   <0.35 <0.35   A. fumigatus Class       CLASS 0/1   CLASS 0 CLASS 0   Aspergillus Antigen      <0.5 index   <0.500 0.676 (A)        Component      Latest Ref Rng &  Units 2/20/2014   Aspergillus Fumigatus IgE      <0.10 kU/L 0.36 (H)   A. fumigatus Class       CLASS I   Aspergillus Antigen      <0.5 index            PULMONARY FUNCTION      PFTs:    Spirometry 8/23:   Spirometry shows mild obstruction. Lung volume determination is normal. Spirometry remains unimproved following bronchodilator. DLCO is moderately decreased. Notes: The failure to demonstrate improvement in spirometry does not preclude a clinical response to a trial of bronchodilators. DLCO interpretation based on the adjusted DLCO value due to a low hemoglobin. Discrepancy in TLC and VA makes DLCO interpretation potentially unreliable.     11/26/2021:              Pre:                   Post:  FEV1: 1.49 (82.8%)--> 1.56 (+4.1%)  FVC:   2.22  (95%)----> 2.30 (+3.6%)  Ratio:  67.37%      ----> 67.77%        5/28/2018  FVC: 2.30 (78%)  FEV1: 1.62 (70%)  Ratio: 70  No reversibility post bronchodilator.     Also performed 12/2019 in pulmonary clinic     ASSESSMENT & PLAN     Michela Franco is a 78 y.o. female with         Moderate-severe persistent asthma:  Recent PFT's showing mild obstruction but no reversibility  -Continue dupilumab 300 mg subq every 14 days. For future reference, patient uses [facility]. For refills, call [phone] ext [phone]. All other medications to be filled by [facility]   -Continue advair 500-50 mcg/dose 1 puff BID  -Continue albuterol as needed for SOB        Allergic rhinitis: Continues to have congestion during current grass pollen season.  -Continue fluticasone to 2 SEN BID  -Continue azelastine up to SEN BID  -Using nasal irrigation BID  -AIT previously discussed. In this patient who is 78 will hold off on AIT at this time due to risk of inability to compensate in setting of possible anaphylaxis to AIT  -will start montelukast 10mg daily        Patient discussed with Dr. Geller.  RTC 4-5 months     Joe King DO  Allergy/Immunology Fellow

## 2023-08-31 ENCOUNTER — OFFICE VISIT (OUTPATIENT)
Dept: PRIMARY CARE CLINIC | Facility: CLINIC | Age: 78
End: 2023-08-31
Payer: MEDICARE

## 2023-08-31 VITALS
BODY MASS INDEX: 29.27 KG/M2 | TEMPERATURE: 98 F | HEART RATE: 83 BPM | OXYGEN SATURATION: 99 % | HEIGHT: 65 IN | SYSTOLIC BLOOD PRESSURE: 136 MMHG | WEIGHT: 175.69 LBS | DIASTOLIC BLOOD PRESSURE: 68 MMHG

## 2023-08-31 DIAGNOSIS — J45.40 MODERATE PERSISTENT ASTHMA WITHOUT COMPLICATION: ICD-10-CM

## 2023-08-31 DIAGNOSIS — I70.0 AORTIC ATHEROSCLEROSIS: ICD-10-CM

## 2023-08-31 DIAGNOSIS — E87.1 HYPONATREMIA: ICD-10-CM

## 2023-08-31 DIAGNOSIS — J44.9 CHRONIC OBSTRUCTIVE PULMONARY DISEASE, UNSPECIFIED COPD TYPE: ICD-10-CM

## 2023-08-31 DIAGNOSIS — I10 HYPERTENSION, UNSPECIFIED TYPE: ICD-10-CM

## 2023-08-31 DIAGNOSIS — D75.839 THROMBOCYTOSIS: ICD-10-CM

## 2023-08-31 DIAGNOSIS — D64.9 ANEMIA, UNSPECIFIED TYPE: ICD-10-CM

## 2023-08-31 DIAGNOSIS — D70.8 OTHER NEUTROPENIA: Primary | ICD-10-CM

## 2023-08-31 DIAGNOSIS — J31.0 CHRONIC RHINITIS: ICD-10-CM

## 2023-08-31 PROBLEM — I50.32 CHRONIC DIASTOLIC HEART FAILURE: Status: RESOLVED | Noted: 2018-04-05 | Resolved: 2023-08-31

## 2023-08-31 PROCEDURE — 1125F PR PAIN SEVERITY QUANTIFIED, PAIN PRESENT: ICD-10-PCS | Mod: HCNC,CPTII,S$GLB, | Performed by: STUDENT IN AN ORGANIZED HEALTH CARE EDUCATION/TRAINING PROGRAM

## 2023-08-31 PROCEDURE — 1101F PT FALLS ASSESS-DOCD LE1/YR: CPT | Mod: HCNC,CPTII,S$GLB, | Performed by: STUDENT IN AN ORGANIZED HEALTH CARE EDUCATION/TRAINING PROGRAM

## 2023-08-31 PROCEDURE — 99999 PR PBB SHADOW E&M-EST. PATIENT-LVL V: ICD-10-PCS | Mod: PBBFAC,HCNC,, | Performed by: STUDENT IN AN ORGANIZED HEALTH CARE EDUCATION/TRAINING PROGRAM

## 2023-08-31 PROCEDURE — 1159F MED LIST DOCD IN RCRD: CPT | Mod: HCNC,CPTII,S$GLB, | Performed by: STUDENT IN AN ORGANIZED HEALTH CARE EDUCATION/TRAINING PROGRAM

## 2023-08-31 PROCEDURE — 3078F PR MOST RECENT DIASTOLIC BLOOD PRESSURE < 80 MM HG: ICD-10-PCS | Mod: HCNC,CPTII,S$GLB, | Performed by: STUDENT IN AN ORGANIZED HEALTH CARE EDUCATION/TRAINING PROGRAM

## 2023-08-31 PROCEDURE — 99999 PR PBB SHADOW E&M-EST. PATIENT-LVL V: CPT | Mod: PBBFAC,HCNC,, | Performed by: STUDENT IN AN ORGANIZED HEALTH CARE EDUCATION/TRAINING PROGRAM

## 2023-08-31 PROCEDURE — 99214 PR OFFICE/OUTPT VISIT, EST, LEVL IV, 30-39 MIN: ICD-10-PCS | Mod: HCNC,S$GLB,, | Performed by: STUDENT IN AN ORGANIZED HEALTH CARE EDUCATION/TRAINING PROGRAM

## 2023-08-31 PROCEDURE — 1101F PR PT FALLS ASSESS DOC 0-1 FALLS W/OUT INJ PAST YR: ICD-10-PCS | Mod: HCNC,CPTII,S$GLB, | Performed by: STUDENT IN AN ORGANIZED HEALTH CARE EDUCATION/TRAINING PROGRAM

## 2023-08-31 PROCEDURE — 3288F FALL RISK ASSESSMENT DOCD: CPT | Mod: HCNC,CPTII,S$GLB, | Performed by: STUDENT IN AN ORGANIZED HEALTH CARE EDUCATION/TRAINING PROGRAM

## 2023-08-31 PROCEDURE — 1159F PR MEDICATION LIST DOCUMENTED IN MEDICAL RECORD: ICD-10-PCS | Mod: HCNC,CPTII,S$GLB, | Performed by: STUDENT IN AN ORGANIZED HEALTH CARE EDUCATION/TRAINING PROGRAM

## 2023-08-31 PROCEDURE — 1125F AMNT PAIN NOTED PAIN PRSNT: CPT | Mod: HCNC,CPTII,S$GLB, | Performed by: STUDENT IN AN ORGANIZED HEALTH CARE EDUCATION/TRAINING PROGRAM

## 2023-08-31 PROCEDURE — 3075F PR MOST RECENT SYSTOLIC BLOOD PRESS GE 130-139MM HG: ICD-10-PCS | Mod: HCNC,CPTII,S$GLB, | Performed by: STUDENT IN AN ORGANIZED HEALTH CARE EDUCATION/TRAINING PROGRAM

## 2023-08-31 PROCEDURE — 99214 OFFICE O/P EST MOD 30 MIN: CPT | Mod: HCNC,S$GLB,, | Performed by: STUDENT IN AN ORGANIZED HEALTH CARE EDUCATION/TRAINING PROGRAM

## 2023-08-31 PROCEDURE — 1157F PR ADVANCE CARE PLAN OR EQUIV PRESENT IN MEDICAL RECORD: ICD-10-PCS | Mod: HCNC,CPTII,S$GLB, | Performed by: STUDENT IN AN ORGANIZED HEALTH CARE EDUCATION/TRAINING PROGRAM

## 2023-08-31 PROCEDURE — 1157F ADVNC CARE PLAN IN RCRD: CPT | Mod: HCNC,CPTII,S$GLB, | Performed by: STUDENT IN AN ORGANIZED HEALTH CARE EDUCATION/TRAINING PROGRAM

## 2023-08-31 PROCEDURE — 3288F PR FALLS RISK ASSESSMENT DOCUMENTED: ICD-10-PCS | Mod: HCNC,CPTII,S$GLB, | Performed by: STUDENT IN AN ORGANIZED HEALTH CARE EDUCATION/TRAINING PROGRAM

## 2023-08-31 PROCEDURE — 3078F DIAST BP <80 MM HG: CPT | Mod: HCNC,CPTII,S$GLB, | Performed by: STUDENT IN AN ORGANIZED HEALTH CARE EDUCATION/TRAINING PROGRAM

## 2023-08-31 PROCEDURE — 3075F SYST BP GE 130 - 139MM HG: CPT | Mod: HCNC,CPTII,S$GLB, | Performed by: STUDENT IN AN ORGANIZED HEALTH CARE EDUCATION/TRAINING PROGRAM

## 2023-08-31 NOTE — PATIENT INSTRUCTIONS
To schedule covid booster, please call [phone] for appointment dates and times      We are stopping nifedipine because it may be contributing to ankle swelling   Continue valsartan   Come back for a blood pressure check to see if we need to add a different medication for blood pressure

## 2023-08-31 NOTE — PROGRESS NOTES
Primary Care  Return/Acute Office Visit - In Person  8/31/2023  Mayela Ndhlovu      HPI    Patient is a 78 y.o.   Michela Franco  has a past medical history of Allergy, Asthma, Chronic diastolic heart failure (4/5/2018), Glaucoma suspect, Hyperlipidemia, Hypertension, Neuromuscular disorder, JOHN on CPAP, Osteoporosis, Other neutropenia (8/31/2023), Recurrent sinusitis (3/25/2014), Recurrent upper respiratory infection (URI), and Urticaria.    Patient presents with   Chief Complaint   Patient presents with    Follow-up     BP ck     Patient presenting for follow-up.  She reports that her primary complaint today is ankle swelling which has been ongoing          Social History     Social History Narrative         Michela Franco family history includes Cancer in her sister; Hypertension in her daughter and daughter; Kidney cancer in her sister; No Known Problems in her father and mother; Ovarian cancer in her sister.    Active Medications:  Review of patient's allergies indicates:   Allergen Reactions    Prednisone Other (See Comments)     Bone loss    Adhesive      PAPER TAPE    Diazepam Other (See Comments)     Nervous, jittery    Gabapentin      Nervous      Keppra [levetiracetam]      nervous    Mold      sneezing     Current Outpatient Medications   Medication Instructions    ADVAIR DISKUS 500-50 mcg/dose DsDv diskus inhaler INHALE 1 PUFF TWICE DAILY    albuterol (VENTOLIN HFA) 90 mcg/actuation inhaler 2 puffs, Inhalation, Every 6 hours PRN, Rescue    albuterol-ipratropium (DUO-NEB) 2.5 mg-0.5 mg/3 mL nebulizer solution 3 mLs, Nebulization, Every 6 hours PRN, Rescue    azelastine (ASTELIN) 274 mcg, Nasal, 2 times daily    carboxymethylcellulose sodium (REFRESH TEARS) 0.5 % Drop 1 drop, Ophthalmic, 3 times daily PRN    cholecalciferol (vitamin D3) (VITAMIN D3) 360 mg, Oral, Daily, Take 6 capsules (6,000 units total) by mouth once daily     COD LIVER OIL ORAL 1 tablet, Oral, Daily    dupilumab 300 mg,  "Subcutaneous, Every 14 days    ferrous sulfate 325 mg, Oral, 3 times daily with meals    fluticasone propionate (FLONASE) 100 mcg, Each Nostril, 2 times daily    GAVILYTE-G 236-22.74-6.74 -5.86 gram suspension 4,000 mLs, Oral, Once    guaiFENesin (MUCINEX) 600 mg, Oral, 2 times daily    hydrocortisone 2.5 % cream Topical (Top), 2 times daily, Prn face rash.Stop using steroid topical when skin is smooth and non itchy.  Do not treat dark or red coloring.    milk thistle 150 mg Cap 1 capsule, Oral, Daily    montelukast (SINGULAIR) 10 mg, Oral, Nightly    multivitamin (THERAGRAN) per tablet 1 tablet, Oral, Daily,      potassium chloride SA (K-DUR,KLOR-CON) 20 MEQ tablet 20 mEq, Oral, Daily    SENNA 8.6 mg tablet TAKE 2 TAB(S) BY MOUTH NIGHTLY AS NEEDED FOR CONSTIPATION    tiotropium bromide (SPIRIVA RESPIMAT) 2.5 mcg/actuation inhaler 2 puffs, Inhalation, Daily, Controller    tretinoin (RETIN-A) 0.05 % cream Topical (Top), Nightly, Start with every other night and move up to nightly after 2 weeks if not too dry.    valsartan (DIOVAN) 320 mg, Oral, Daily       Review of Systems   All other systems reviewed and are negative.      Vitals:    08/31/23 0838   BP: 136/68   BP Location: Left arm   Pulse: 83   Temp: 98 °F (36.7 °C)   SpO2: 99%   Weight: 79.7 kg (175 lb 11.3 oz)   Height: 5' 5" (1.651 m)       Physical Exam  Vitals reviewed.   Constitutional:       General: She is not in acute distress.  Cardiovascular:      Rate and Rhythm: Normal rate and regular rhythm.      Pulses: Normal pulses.      Heart sounds: Normal heart sounds.   Pulmonary:      Effort: Pulmonary effort is normal.      Breath sounds: Normal breath sounds.   Abdominal:      General: Abdomen is flat. Bowel sounds are normal.      Palpations: Abdomen is soft.   Musculoskeletal:      Right lower leg: No edema.      Left lower leg: No edema.   Neurological:      General: No focal deficit present.      Mental Status: She is oriented to person, place, and " time.          Assessment and Plan     Hypertension  Aortic arthrosclerosis   Patient reports taking valsartan nightly and nifedipine in the morning  Recommended discontinuing nifedipine her ankle swelling  Will avoid thiazide inpatient given history of hyponatremia  Will consider beta-blocker if needed at follow-up for blood pressure check    Moderate severe persistent Asthma  Allergic rhinitis  Chronic pansinusitis  Patient oxygenating well on RA   Followed by Dr. Barrera  Continue inhalers      Anemia   Thrombocytosis   Neutropenia  Thrombocytosis resolved  Continue to monitor                    Upcoming Scheduled Appointments and Follow Up:    Future Appointments   Date Time Provider Department Center   9/12/2023  8:30 AM NURSE, [facility] PRIMARY [facility]ARE [facility]   1/2/2024  8:00 AM Joe King[facility]       Follow Up [facility] (with who? when?): Follow up in about 2 weeks (around 9/14/2023) for nursing bp check .      Extended Emergency Contact Information  Primary Emergency Contact: Charley Ahmadi  Address: [address] [place] [address] of [place]  Mobile Phone: [phone]  Relation: Sister      Mayela Broussard MD   Attending Physician  Primary Care  8/31/2023 - 8:51 AM    I spent a total of 43 minutes on the day of the visit.This includes face to face time and non-face to face time preparing to see the patient (eg, review of tests), obtaining and/or reviewing separately obtained history, documenting clinical information in the electronic or other health record, independently interpreting results and communicating results to the patient/family/caregiver, or care coordinator.

## 2023-09-12 ENCOUNTER — CLINICAL SUPPORT (OUTPATIENT)
Dept: PRIMARY CARE CLINIC | Facility: CLINIC | Age: 78
End: 2023-09-12
Payer: MEDICARE

## 2023-09-12 ENCOUNTER — TELEPHONE (OUTPATIENT)
Dept: PRIMARY CARE CLINIC | Facility: CLINIC | Age: 78
End: 2023-09-12

## 2023-09-12 VITALS — HEART RATE: 93 BPM | SYSTOLIC BLOOD PRESSURE: 154 MMHG | DIASTOLIC BLOOD PRESSURE: 100 MMHG

## 2023-09-12 DIAGNOSIS — I10 HYPERTENSION, UNSPECIFIED TYPE: Primary | ICD-10-CM

## 2023-09-12 PROCEDURE — 99999 PR PBB SHADOW E&M-EST. PATIENT-LVL I: CPT | Mod: PBBFAC,HCNC,,

## 2023-09-12 PROCEDURE — 99999 PR PBB SHADOW E&M-EST. PATIENT-LVL I: ICD-10-PCS | Mod: PBBFAC,HCNC,,

## 2023-09-12 RX ORDER — METOPROLOL SUCCINATE 25 MG/1
25 TABLET, EXTENDED RELEASE ORAL DAILY
Qty: 30 TABLET | Refills: 1 | Status: SHIPPED | OUTPATIENT
Start: 2023-09-12 | End: 2023-09-18 | Stop reason: ALTCHOICE

## 2023-09-12 NOTE — PROGRESS NOTES
Patient came in for a blood pressure check. Her bp in the left arm was 154/100,pulse 93. Patients second reading was also 154/100 She is currently on Valsartan 320mg(takes in the afternoon). Was previously on Nifedipine but was d/c during last visit due to ankle swelling. Patient reports swelling is better since stopping. She does drink a lot of water and also watches her sodium intake.

## 2023-09-12 NOTE — TELEPHONE ENCOUNTER
----- Message from Mayela Broussard MD sent at 9/12/2023 11:09 AM CDT -----  Please schedule in person appt with me in 6 weeks

## 2023-09-18 ENCOUNTER — TELEPHONE (OUTPATIENT)
Dept: PRIMARY CARE CLINIC | Facility: CLINIC | Age: 78
End: 2023-09-18
Payer: MEDICARE

## 2023-09-18 ENCOUNTER — OFFICE VISIT (OUTPATIENT)
Dept: PRIMARY CARE CLINIC | Facility: CLINIC | Age: 78
End: 2023-09-18
Payer: MEDICARE

## 2023-09-18 VITALS
TEMPERATURE: 99 F | WEIGHT: 182.31 LBS | HEART RATE: 98 BPM | OXYGEN SATURATION: 97 % | HEIGHT: 65 IN | SYSTOLIC BLOOD PRESSURE: 160 MMHG | DIASTOLIC BLOOD PRESSURE: 90 MMHG | BODY MASS INDEX: 30.37 KG/M2

## 2023-09-18 DIAGNOSIS — J45.40 MODERATE PERSISTENT ASTHMA WITHOUT COMPLICATION: ICD-10-CM

## 2023-09-18 DIAGNOSIS — R06.00 DYSPNEA, UNSPECIFIED TYPE: ICD-10-CM

## 2023-09-18 DIAGNOSIS — I10 HYPERTENSION, UNSPECIFIED TYPE: Primary | ICD-10-CM

## 2023-09-18 DIAGNOSIS — J44.9 CHRONIC OBSTRUCTIVE PULMONARY DISEASE, UNSPECIFIED COPD TYPE: ICD-10-CM

## 2023-09-18 DIAGNOSIS — E87.1 HYPONATREMIA: ICD-10-CM

## 2023-09-18 PROCEDURE — 3080F DIAST BP >= 90 MM HG: CPT | Mod: HCNC,CPTII,S$GLB, | Performed by: STUDENT IN AN ORGANIZED HEALTH CARE EDUCATION/TRAINING PROGRAM

## 2023-09-18 PROCEDURE — 3077F SYST BP >= 140 MM HG: CPT | Mod: HCNC,CPTII,S$GLB, | Performed by: STUDENT IN AN ORGANIZED HEALTH CARE EDUCATION/TRAINING PROGRAM

## 2023-09-18 PROCEDURE — 3288F PR FALLS RISK ASSESSMENT DOCUMENTED: ICD-10-PCS | Mod: HCNC,CPTII,S$GLB, | Performed by: STUDENT IN AN ORGANIZED HEALTH CARE EDUCATION/TRAINING PROGRAM

## 2023-09-18 PROCEDURE — 1126F AMNT PAIN NOTED NONE PRSNT: CPT | Mod: HCNC,CPTII,S$GLB, | Performed by: STUDENT IN AN ORGANIZED HEALTH CARE EDUCATION/TRAINING PROGRAM

## 2023-09-18 PROCEDURE — 1126F PR PAIN SEVERITY QUANTIFIED, NO PAIN PRESENT: ICD-10-PCS | Mod: HCNC,CPTII,S$GLB, | Performed by: STUDENT IN AN ORGANIZED HEALTH CARE EDUCATION/TRAINING PROGRAM

## 2023-09-18 PROCEDURE — 99214 OFFICE O/P EST MOD 30 MIN: CPT | Mod: HCNC,S$GLB,, | Performed by: STUDENT IN AN ORGANIZED HEALTH CARE EDUCATION/TRAINING PROGRAM

## 2023-09-18 PROCEDURE — 1157F PR ADVANCE CARE PLAN OR EQUIV PRESENT IN MEDICAL RECORD: ICD-10-PCS | Mod: HCNC,CPTII,S$GLB, | Performed by: STUDENT IN AN ORGANIZED HEALTH CARE EDUCATION/TRAINING PROGRAM

## 2023-09-18 PROCEDURE — 99999 PR PBB SHADOW E&M-EST. PATIENT-LVL V: ICD-10-PCS | Mod: PBBFAC,HCNC,, | Performed by: STUDENT IN AN ORGANIZED HEALTH CARE EDUCATION/TRAINING PROGRAM

## 2023-09-18 PROCEDURE — 3077F PR MOST RECENT SYSTOLIC BLOOD PRESSURE >= 140 MM HG: ICD-10-PCS | Mod: HCNC,CPTII,S$GLB, | Performed by: STUDENT IN AN ORGANIZED HEALTH CARE EDUCATION/TRAINING PROGRAM

## 2023-09-18 PROCEDURE — 3288F FALL RISK ASSESSMENT DOCD: CPT | Mod: HCNC,CPTII,S$GLB, | Performed by: STUDENT IN AN ORGANIZED HEALTH CARE EDUCATION/TRAINING PROGRAM

## 2023-09-18 PROCEDURE — 1101F PT FALLS ASSESS-DOCD LE1/YR: CPT | Mod: HCNC,CPTII,S$GLB, | Performed by: STUDENT IN AN ORGANIZED HEALTH CARE EDUCATION/TRAINING PROGRAM

## 2023-09-18 PROCEDURE — 1159F MED LIST DOCD IN RCRD: CPT | Mod: HCNC,CPTII,S$GLB, | Performed by: STUDENT IN AN ORGANIZED HEALTH CARE EDUCATION/TRAINING PROGRAM

## 2023-09-18 PROCEDURE — 1157F ADVNC CARE PLAN IN RCRD: CPT | Mod: HCNC,CPTII,S$GLB, | Performed by: STUDENT IN AN ORGANIZED HEALTH CARE EDUCATION/TRAINING PROGRAM

## 2023-09-18 PROCEDURE — 3080F PR MOST RECENT DIASTOLIC BLOOD PRESSURE >= 90 MM HG: ICD-10-PCS | Mod: HCNC,CPTII,S$GLB, | Performed by: STUDENT IN AN ORGANIZED HEALTH CARE EDUCATION/TRAINING PROGRAM

## 2023-09-18 PROCEDURE — 99999 PR PBB SHADOW E&M-EST. PATIENT-LVL V: CPT | Mod: PBBFAC,HCNC,, | Performed by: STUDENT IN AN ORGANIZED HEALTH CARE EDUCATION/TRAINING PROGRAM

## 2023-09-18 PROCEDURE — 1159F PR MEDICATION LIST DOCUMENTED IN MEDICAL RECORD: ICD-10-PCS | Mod: HCNC,CPTII,S$GLB, | Performed by: STUDENT IN AN ORGANIZED HEALTH CARE EDUCATION/TRAINING PROGRAM

## 2023-09-18 PROCEDURE — 1101F PR PT FALLS ASSESS DOC 0-1 FALLS W/OUT INJ PAST YR: ICD-10-PCS | Mod: HCNC,CPTII,S$GLB, | Performed by: STUDENT IN AN ORGANIZED HEALTH CARE EDUCATION/TRAINING PROGRAM

## 2023-09-18 PROCEDURE — 99214 PR OFFICE/OUTPT VISIT, EST, LEVL IV, 30-39 MIN: ICD-10-PCS | Mod: HCNC,S$GLB,, | Performed by: STUDENT IN AN ORGANIZED HEALTH CARE EDUCATION/TRAINING PROGRAM

## 2023-09-18 RX ORDER — FUROSEMIDE 20 MG/1
20 TABLET ORAL DAILY
Qty: 30 TABLET | Refills: 1 | Status: SHIPPED | OUTPATIENT
Start: 2023-09-18 | End: 2023-10-10

## 2023-09-18 NOTE — PATIENT INSTRUCTIONS
For Covid Booster Vaccines, please contact department for availability options,     [phone]

## 2023-09-18 NOTE — PROGRESS NOTES
Primary Care  Return/Acute Office Visit - In Person  9/18/2023  Mwengwe Ndhlovu      HPI    Patient is a 78 y.o.   Michela Franco  has a past medical history of Allergy, Asthma, Chronic diastolic heart failure (4/5/2018), Glaucoma suspect, Hyperlipidemia, Hypertension, Neuromuscular disorder, JOHN on CPAP, Osteoporosis, Other neutropenia (8/31/2023), Recurrent sinusitis (3/25/2014), Recurrent upper respiratory infection (URI), and Urticaria.    Patient presents with   Chief Complaint   Patient presents with    Medication Problem     Sob since Metoprolol usage       Patient reports worsening dyspnea since starting metoprolol on  Thursday      Social History     Social History Narrative         Michela Franco family history includes Cancer in her sister; Hypertension in her daughter and daughter; Kidney cancer in her sister; No Known Problems in her father and mother; Ovarian cancer in her sister.    Active Medications:  Review of patient's allergies indicates:   Allergen Reactions    Metoprolol Shortness Of Breath    Prednisone Other (See Comments)     Bone loss    Adhesive      PAPER TAPE    Diazepam Other (See Comments)     Nervous, jittery    Gabapentin      Nervous      Keppra [levetiracetam]      nervous    Mold      sneezing     Current Outpatient Medications   Medication Instructions    ADVAIR DISKUS 500-50 mcg/dose DsDv diskus inhaler INHALE 1 PUFF TWICE DAILY    albuterol (VENTOLIN HFA) 90 mcg/actuation inhaler 2 puffs, Inhalation, Every 6 hours PRN, Rescue    albuterol-ipratropium (DUO-NEB) 2.5 mg-0.5 mg/3 mL nebulizer solution 3 mLs, Nebulization, Every 6 hours PRN, Rescue    azelastine (ASTELIN) 274 mcg, Nasal, 2 times daily    carboxymethylcellulose sodium (REFRESH TEARS) 0.5 % Drop 1 drop, Ophthalmic, 3 times daily PRN    cholecalciferol (vitamin D3) (VITAMIN D3) 360 mg, Oral, Daily, Take 6 capsules (6,000 units total) by mouth once daily     COD LIVER OIL ORAL 1 tablet, Oral, Daily     "dupilumab 300 mg, Subcutaneous, Every 14 days    ferrous sulfate 325 mg, Oral, 3 times daily with meals    fluticasone propionate (FLONASE) 100 mcg, Each Nostril, 2 times daily    furosemide (LASIX) 20 mg, Oral, Daily    GAVILYTE-G 236-22.74-6.74 -5.86 gram suspension 4,000 mLs, Oral, Once    guaiFENesin (MUCINEX) 600 mg, Oral, 2 times daily    hydrocortisone 2.5 % cream Topical (Top), 2 times daily, Prn face rash.Stop using steroid topical when skin is smooth and non itchy.  Do not treat dark or red coloring.    milk thistle 150 mg Cap 1 capsule, Oral, Daily    montelukast (SINGULAIR) 10 mg, Oral, Nightly    multivitamin (THERAGRAN) per tablet 1 tablet, Oral, Daily,      potassium chloride SA (K-DUR,KLOR-CON) 20 MEQ tablet 20 mEq, Oral, Daily    SENNA 8.6 mg tablet TAKE 2 TAB(S) BY MOUTH NIGHTLY AS NEEDED FOR CONSTIPATION    tiotropium bromide (SPIRIVA RESPIMAT) 2.5 mcg/actuation inhaler 2 puffs, Inhalation, Daily, Controller    tretinoin (RETIN-A) 0.05 % cream Topical (Top), Nightly, Start with every other night and move up to nightly after 2 weeks if not too dry.    valsartan (DIOVAN) 320 mg, Oral, Daily       Review of Systems   All other systems reviewed and are negative.      Vitals:    09/18/23 1414 09/18/23 1447   BP: (!) 168/96  Comment: didn't take Metoprolol today (!) 160/90   BP Location: Right arm    Pulse: 98    Temp: 98.8 °F (37.1 °C)    SpO2: 97%    Weight: 82.7 kg (182 lb 5.1 oz)    Height: 5' 5" (1.651 m)        Physical Exam  Vitals reviewed.   Constitutional:       General: She is not in acute distress.     Appearance: She is not ill-appearing.   Cardiovascular:      Rate and Rhythm: Normal rate and regular rhythm.      Pulses: Normal pulses.      Heart sounds: Normal heart sounds.   Pulmonary:      Effort: Pulmonary effort is normal. No respiratory distress.      Breath sounds: Wheezing present.   Abdominal:      General: Abdomen is flat. Bowel sounds are normal.      Palpations: Abdomen is " soft.   Musculoskeletal:      Right lower leg: No edema.      Left lower leg: No edema.   Neurological:      General: No focal deficit present.      Mental Status: She is oriented to person, place, and time.          Assessment and Plan     Hypertension  Continue valsartan 300 mg daily  Previously discontinued nifedipine for lower extremity edema  Will avoid thiazide in patient given history of hyponatremia  Patient likely intolerant to beta-blocker given history of asthma.  Discontinue metoprolol    Moderate severe persistent Asthma  Allergic rhinitis  Chronic pansinusitis  Patient oxygenating well on RA   Followed by Dr. Barrera  Continue inhalers   Recommended patient use Duo-Neb daily for the next 3 days                Upcoming Scheduled Appointments and Follow Up:    Future Appointments   Date Time Provider Department Center   11/7/2023  9:00 AM Mayela Broussard MD [facility]   1/2/2024  8:00 AM Joe King DO [facility]       Follow Up [facility] (with who? when?): No follow-ups on file.      Extended Emergency Contact Information  Primary Emergency Contact: DaianaCharley  Address: [address] [place] [address]  Mobile Phone: [phone]  Relation: Sister      Mayela Broussard MD   Attending Physician  Primary Care  9/18/2023 - 2:31 PM    I spent a total of 22 minutes on the day of the visit.This includes face to face time and non-face to face time preparing to see the patient (eg, review of tests), obtaining and/or reviewing separately obtained history, documenting clinical information in the electronic or other health record, independently interpreting results and communicating results to the patient/family/caregiver, or care coordinator.

## 2023-09-18 NOTE — TELEPHONE ENCOUNTER
Per pt, since starting Metoprolol last Friday, she's noticed an increase in Sob and dizziness mostly in the morning when moving around. No other discomfort mentioned. Please advise

## 2023-09-23 DIAGNOSIS — E87.8 ELECTROLYTE ABNORMALITY: ICD-10-CM

## 2023-09-23 NOTE — TELEPHONE ENCOUNTER
Refill Routing Note   Medication(s) are not appropriate for processing by [facility] for the following reason(s):      Drug-disease interaction: potassium chloride SA and Hyperkalemia    [facility] action(s):  Defer Care Due:  None identified          Appointments  past 12m or future 3m with PCP    Date Provider   Last Visit   9/18/2023 Mayela Broussard MD   Next Visit   11/7/2023 Mayela Broussard MD   ED visits in past 90 days: 0        Note composed:1:16 PM 09/23/2023

## 2023-09-23 NOTE — TELEPHONE ENCOUNTER
No care due was identified.  [facility] Embedded Care Due Messages. Reference number: [id number].   9/23/2023 12:51:29 AM CDT

## 2023-09-25 RX ORDER — POTASSIUM CHLORIDE 20 MEQ/1
20 TABLET, EXTENDED RELEASE ORAL
Qty: 30 TABLET | Refills: 0 | Status: ON HOLD | OUTPATIENT
Start: 2023-09-25 | End: 2023-10-24

## 2023-10-10 RX ORDER — FUROSEMIDE 20 MG/1
20 TABLET ORAL
Qty: 90 TABLET | Refills: 3 | Status: ON HOLD | OUTPATIENT
Start: 2023-10-10 | End: 2024-01-08 | Stop reason: HOSPADM

## 2023-10-10 NOTE — TELEPHONE ENCOUNTER
No care due was identified.  [facility] Embedded Care Due Messages. Reference number: [id number].   10/10/2023 10:32:17 AM CDT

## 2023-10-10 NOTE — TELEPHONE ENCOUNTER
Refill Routing Note   Medication(s) are not appropriate for processing by [facility] for the following reason(s):      Required vitals abnormal  New or recently adjusted medication    [facility] action(s):  Defer Care Due:  None identified     Medication Therapy Plan: Recently approved 30day with 1 refill; Pharmacy is requesting 90 day.      Appointments  past 12m or future 3m with PCP    Date Provider   Last Visit   9/18/2023 Mayela Broussard MD   Next Visit   11/7/2023 Mayela Broussard MD   ED visits in past 90 days: 0        Note composed:12:09 PM 10/10/2023

## 2023-10-16 PROBLEM — A41.9 SEPSIS, UNSPECIFIED ORGANISM: Status: RESOLVED | Noted: 2023-07-16 | Resolved: 2023-10-16

## 2023-10-16 RX ORDER — IPRATROPIUM BROMIDE AND ALBUTEROL SULFATE 2.5; .5 MG/3ML; MG/3ML
3 SOLUTION RESPIRATORY (INHALATION) EVERY 6 HOURS PRN
Qty: 75 ML | Refills: 0 | Status: SHIPPED | OUTPATIENT
Start: 2023-10-16 | End: 2023-11-07 | Stop reason: SDUPTHER

## 2023-10-16 NOTE — TELEPHONE ENCOUNTER
No care due was identified.  [facility] Embedded Care Due Messages. Reference number: [id number].   10/16/2023 11:13:42 AM CDT

## 2023-10-16 NOTE — TELEPHONE ENCOUNTER
----- Message from Nelida Coffman sent at 10/16/2023 10:56 AM CDT -----  Contact: Pt [phone]  Requesting an RX refill or new RX.  Is this a refill or new RX: Refill  RX name and strength (copy/paste from chart):  albuterol-ipratropium (DUO-NEB) 2.5 mg-0.5 mg/3 mL nebulizer solution  Is this a 30 day or 90 day RX: 90  Pharmacy name and phone # (copy/paste from chart):    [id number] - [place], [place] - [address]  [address]  [place] [address]  Phone: [phone] Fax: [phone]    Patient is OUT of this medication and needs it right away with the weather change.    Please call and advise.    Thank You

## 2023-10-17 ENCOUNTER — TELEPHONE (OUTPATIENT)
Dept: ENDOSCOPY | Facility: HOSPITAL | Age: 78
End: 2023-10-17
Payer: MEDICARE

## 2023-10-23 ENCOUNTER — TELEPHONE (OUTPATIENT)
Dept: ENDOSCOPY | Facility: HOSPITAL | Age: 78
End: 2023-10-23
Payer: MEDICARE

## 2023-10-23 NOTE — TELEPHONE ENCOUNTER
Called and spoke to patient. Instructed patient If you take HEART, BLOOD PRESSURE, SEIZURE, PAIN, LUNG (including inhalers/nebulizers), ANTI-REJECTION (transplant patients), or PSYCHIATRIC medications, please take at your regular times with a sip of water or as directed by the scheduling nurse. Patient verbalized an understanding.

## 2023-10-24 ENCOUNTER — ANESTHESIA EVENT (OUTPATIENT)
Dept: ENDOSCOPY | Facility: HOSPITAL | Age: 78
End: 2023-10-24
Payer: MEDICARE

## 2023-10-24 ENCOUNTER — ANESTHESIA (OUTPATIENT)
Dept: ENDOSCOPY | Facility: HOSPITAL | Age: 78
End: 2023-10-24
Payer: MEDICARE

## 2023-10-24 ENCOUNTER — HOSPITAL ENCOUNTER (OUTPATIENT)
Facility: HOSPITAL | Age: 78
Discharge: HOME | End: 2023-10-24
Attending: INTERNAL MEDICINE | Admitting: INTERNAL MEDICINE
Payer: MEDICARE

## 2023-10-24 VITALS
SYSTOLIC BLOOD PRESSURE: 154 MMHG | OXYGEN SATURATION: 99 % | BODY MASS INDEX: 28.99 KG/M2 | DIASTOLIC BLOOD PRESSURE: 99 MMHG | RESPIRATION RATE: 16 BRPM | TEMPERATURE: 98 F | WEIGHT: 174 LBS | HEIGHT: 65 IN | HEART RATE: 81 BPM

## 2023-10-24 DIAGNOSIS — Z86.010 HISTORY OF COLON POLYPS: ICD-10-CM

## 2023-10-24 DIAGNOSIS — E87.8 ELECTROLYTE ABNORMALITY: ICD-10-CM

## 2023-10-24 PROCEDURE — 45380 COLONOSCOPY AND BIOPSY: CPT | Mod: PT,HCNC,, | Performed by: INTERNAL MEDICINE

## 2023-10-24 PROCEDURE — 27201012 HC FORCEPS, HOT/COLD, DISP: Mod: HCNC | Performed by: INTERNAL MEDICINE

## 2023-10-24 PROCEDURE — 63600175 PHARM REV CODE 636 W HCPCS: Mod: HCNC | Performed by: NURSE ANESTHETIST, CERTIFIED REGISTERED

## 2023-10-24 PROCEDURE — 45380 COLONOSCOPY AND BIOPSY: CPT | Mod: PT,HCNC | Performed by: INTERNAL MEDICINE

## 2023-10-24 PROCEDURE — 88305 TISSUE EXAM BY PATHOLOGIST: CPT | Mod: 26,HCNC,, | Performed by: STUDENT IN AN ORGANIZED HEALTH CARE EDUCATION/TRAINING PROGRAM

## 2023-10-24 PROCEDURE — E9220 PRA ENDO ANESTHESIA: ICD-10-PCS | Mod: PT,HCNC,, | Performed by: NURSE ANESTHETIST, CERTIFIED REGISTERED

## 2023-10-24 PROCEDURE — E9220 PRA ENDO ANESTHESIA: HCPCS | Mod: PT,HCNC,, | Performed by: NURSE ANESTHETIST, CERTIFIED REGISTERED

## 2023-10-24 PROCEDURE — 37000009 HC ANESTHESIA EA ADD 15 MINS: Mod: HCNC | Performed by: INTERNAL MEDICINE

## 2023-10-24 PROCEDURE — 45380 PR COLONOSCOPY,BIOPSY: ICD-10-PCS | Mod: PT,HCNC,, | Performed by: INTERNAL MEDICINE

## 2023-10-24 PROCEDURE — 37000008 HC ANESTHESIA 1ST 15 MINUTES: Mod: HCNC | Performed by: INTERNAL MEDICINE

## 2023-10-24 PROCEDURE — 88305 TISSUE EXAM BY PATHOLOGIST: ICD-10-PCS | Mod: 26,HCNC,, | Performed by: STUDENT IN AN ORGANIZED HEALTH CARE EDUCATION/TRAINING PROGRAM

## 2023-10-24 PROCEDURE — 88305 TISSUE EXAM BY PATHOLOGIST: CPT | Mod: HCNC | Performed by: STUDENT IN AN ORGANIZED HEALTH CARE EDUCATION/TRAINING PROGRAM

## 2023-10-24 PROCEDURE — 25000003 PHARM REV CODE 250: Mod: HCNC | Performed by: NURSE ANESTHETIST, CERTIFIED REGISTERED

## 2023-10-24 RX ORDER — POTASSIUM CHLORIDE 20 MEQ/1
20 TABLET, EXTENDED RELEASE ORAL DAILY
Qty: 90 TABLET | Refills: 1 | Status: SHIPPED | OUTPATIENT
Start: 2023-10-24 | End: 2024-01-08

## 2023-10-24 RX ORDER — SODIUM CHLORIDE 0.9 % (FLUSH) 0.9 %
10 SYRINGE (ML) INJECTION
Status: DISCONTINUED | OUTPATIENT
Start: 2023-10-24 | End: 2023-10-24 | Stop reason: HOSPADM

## 2023-10-24 RX ORDER — LIDOCAINE HYDROCHLORIDE 20 MG/ML
INJECTION INTRAVENOUS
Status: DISCONTINUED | OUTPATIENT
Start: 2023-10-24 | End: 2023-10-24

## 2023-10-24 RX ORDER — SODIUM CHLORIDE 9 MG/ML
INJECTION, SOLUTION INTRAVENOUS CONTINUOUS
Status: DISCONTINUED | OUTPATIENT
Start: 2023-10-24 | End: 2023-10-24 | Stop reason: HOSPADM

## 2023-10-24 RX ORDER — POTASSIUM CHLORIDE 20 MEQ/1
20 TABLET, EXTENDED RELEASE ORAL
Qty: 90 TABLET | Refills: 3 | Status: SHIPPED | OUTPATIENT
Start: 2023-10-24 | End: 2023-10-24

## 2023-10-24 RX ORDER — PROPOFOL 10 MG/ML
VIAL (ML) INTRAVENOUS
Status: DISCONTINUED | OUTPATIENT
Start: 2023-10-24 | End: 2023-10-24

## 2023-10-24 RX ADMIN — PROPOFOL 100 MG: 10 INJECTION, EMULSION INTRAVENOUS at 10:10

## 2023-10-24 RX ADMIN — SODIUM CHLORIDE: 9 INJECTION, SOLUTION INTRAVENOUS at 09:10

## 2023-10-24 RX ADMIN — PROPOFOL 150 MCG/KG/MIN: 10 INJECTION, EMULSION INTRAVENOUS at 10:10

## 2023-10-24 RX ADMIN — LIDOCAINE HYDROCHLORIDE 50 MG: 20 INJECTION INTRAVENOUS at 10:10

## 2023-10-24 NOTE — TRANSFER OF CARE
"Anesthesia Transfer of Care Note    Patient: Michela Franco    Procedure(s) Performed: Procedure(s) (LRB):  COLONOSCOPY (N/A)    Patient location: GI    Anesthesia Type: general    Transport from OR: Transported from OR on room air with adequate spontaneous ventilation    Post pain: adequate analgesia    Post assessment: no apparent anesthetic complications    Post vital signs: stable    Level of consciousness: awake and alert    Nausea/Vomiting: no nausea/vomiting    Complications: none    Transfer of care protocol was followed      Last vitals:   Visit Vitals  BP (!) 189/87 (BP Location: Left arm, Patient Position: Lying)   Pulse 87   Temp 36.7 °C (98.1 °F) (Temporal)   Resp 16   Ht 5' 5" (1.651 m)   Wt 78.9 kg (174 lb)   SpO2 97%   Breastfeeding No   BMI 28.96 kg/m²     "

## 2023-10-24 NOTE — TELEPHONE ENCOUNTER
Refill Routing Note   Medication(s) are not appropriate for processing by [facility] for the following reason(s):      Drug-disease interaction    [facility] action(s):  Defer Care Due:  None identified     Medication Therapy Plan: potassium chloride SA and Hyperkalemia    Pharmacist review requested: Yes     Appointments  past 12m or future 3m with PCP    Date Provider   Last Visit   9/18/2023 Mayela Broussard MD   Next Visit   11/7/2023 Mayela Broussard MD   ED visits in past 90 days: 0        Note composed:6:37 AM 10/24/2023

## 2023-10-24 NOTE — PROVATION PATIENT INSTRUCTIONS
Discharge Summary/Instructions after an Endoscopic Procedure  Patient Name: Michela Franco  Patient MRN: [id number]  Patient YOB: 1945 Tuesday, October 24, 2023  Ward Merino MD  Dear patient,  As a result of recent federal legislation (The Federal Cures Act), you may   receive lab or pathology results from your procedure in your [facility]   account before your physician is able to contact you. Your physician or   their representative will relay the results to you with their   recommendations at their soonest availability.  Thank you,  RESTRICTIONS:  During your procedure today, you received medications for sedation.  These   medications may affect your judgment, balance and coordination.  Therefore,   for 24 hours, you have the following restrictions:   - DO NOT drive a car, operate machinery, make legal/financial decisions,   sign important papers or drink alcohol.    ACTIVITY:  Today: no heavy lifting, straining or running due to procedural   sedation/anesthesia.  The following day: return to full activity including work.  DIET:  Eat and drink normally unless instructed otherwise.     TREATMENT FOR COMMON SIDE EFFECTS:  - Mild abdominal pain, nausea, belching, bloating or excessive gas:  rest,   eat lightly and use a heating pad.  - Sore Throat: treat with throat lozenges and/or gargle with warm salt   water.  - Because air was used during the procedure, expelling large amounts of air   from your rectum or belching is normal.  - If a bowel prep was taken, you may not have a bowel movement for 1-3 days.    This is normal.  SYMPTOMS TO WATCH FOR AND REPORT TO YOUR PHYSICIAN:  1. Abdominal pain or bloating, other than gas cramps.  2. Chest pain.  3. Back pain.  4. Signs of infection such as: chills or fever occurring within 24 hours   after the procedure.  5. Rectal bleeding, which would show as bright red, maroon, or black stools.   (A tablespoon of blood from the rectum is not serious, especially if    hemorrhoids are present.)  6. Vomiting.  7. Weakness or dizziness.  GO DIRECTLY TO THE NEAREST EMERGENCY ROOM IF YOU HAVE ANY OF THE FOLLOWING:      Difficulty breathing              Chills and/or fever over 101 F   Persistent vomiting and/or vomiting blood   Severe abdominal pain   Severe chest pain   Black, tarry stools   Bleeding- more than one tablespoon   Any other symptom or condition that you feel may need urgent attention  Your doctor recommends these additional instructions:  If any biopsies were taken, your doctors clinic will contact you in 1 to 2   weeks with any results.  - Discharge patient to home (ambulatory).   - Patient has a contact number available for emergencies.  The signs and   symptoms of potential delayed complications were discussed with the   patient.  Return to normal activities tomorrow.  Written discharge   instructions were provided to the patient.   - Resume previous diet.   - Continue present medications.   - Return to primary care physician as previously scheduled.   - Repeat colonoscopy in 7 years for surveillance based on pathology   results.  For questions, problems or results please call your physician - Ward Merino MD at Work:  [phone].  [facility], EMERGENCY ROOM PHONE NUMBER: [phone]  IF A COMPLICATION OR EMERGENCY SITUATION ARISES AND YOU ARE UNABLE TO REACH   YOUR PHYSICIAN - GO DIRECTLY TO THE EMERGENCY ROOM.  Ward Merino MD  10/24/2023 10:33:55 AM  This report has been verified and signed electronically.  Dear patient,  As a result of recent federal legislation (The Federal Cures Act), you may   receive lab or pathology results from your procedure in your [facility]   account before your physician is able to contact you. Your physician or   their representative will relay the results to you with their   recommendations at their soonest availability.  Thank you,  PROVATION

## 2023-10-24 NOTE — TELEPHONE ENCOUNTER
Refill Decision Note   Eribertolawson Joan  is requesting a refill authorization.  Brief Assessment and Rationale for Refill:  Approve     Medication Therapy Plan:         Pharmacist review requested: Yes   Extended chart review required: Yes   Comments:     Note composed:9:58 AM 10/24/2023

## 2023-10-24 NOTE — TELEPHONE ENCOUNTER
No care due was identified.  [facility] Embedded Care Due Messages. Reference number: [id number].   10/24/2023 12:59:44 AM CDT

## 2023-10-24 NOTE — ANESTHESIA PREPROCEDURE EVALUATION
10/24/2023  Michela Franco is a 78 y.o., female.  Patient Active Problem List   Diagnosis    HTN (hypertension)    GERD (gastroesophageal reflux disease)    DJD (degenerative joint disease)    Osteoporosis    Moderate persistent asthma not dependent on systemic steroids with acute exacerbation    Chronic rhinitis    History of colon polyps    Vitamin D deficiency    Aortic atherosclerosis    Allergic rhinitis    Chronic pansinusitis    Allergic rhinitis due to animal (cat) (dog) hair and dander    Nose discharge    Nasal turbinate hypertrophy    Chronic cough    Meralgia paraesthetica    Calcified granuloma of lung    BMI 32.0-32.9,adult    Asthma    Adrenal adenoma, left    Pain of left hip joint    SOB (shortness of breath)    Nuclear sclerosis, bilateral    Nuclear sclerotic cataract of left eye    Left shoulder pain    Decreased range of motion of left shoulder    Closed fracture of left shoulder    Hyponatremia    Abdominal distension    Nicotine dependence in remission    Elevated transaminase level    Hyperkalemia    Urinary retention    Other neutropenia    Chronic obstructive pulmonary disease, unspecified COPD type         Pre-op Assessment    I have reviewed the Patient Summary Reports.     I have reviewed the Nursing Notes. I have reviewed the NPO Status.   I have reviewed the Medications.     Review of Systems  Anesthesia Hx:  No problems with previous Anesthesia  Denies Family Hx of Anesthesia complications.   Denies Personal Hx of Anesthesia complications.       Physical Exam  General: Alert and Oriented    Airway:  Mallampati: II   Mouth Opening: Normal  TM Distance: Normal  Tongue: Normal  Neck ROM: Normal ROM    Dental:  Dentures, Partial Dentures        Anesthesia Plan  Type of Anesthesia, risks & benefits discussed:    Anesthesia Type: Gen Natural  Airway  Intra-op Monitoring Plan: Standard ASA Monitors  Post Op Pain Control Plan: IV/PO Opioids PRN  Induction:  IV  Informed Consent: Informed consent signed with the Patient and all parties understand the risks and agree with anesthesia plan.  All questions answered.   ASA Score: 3  Day of Surgery Review of History & Physical: H&P Update referred to the surgeon/provider.    Ready For Surgery From Anesthesia Perspective.     .

## 2023-10-24 NOTE — H&P
Short Stay Endoscopy History and Physical    PCP - Mayela Broussard MD    Procedure - Colonoscopy  ASA - per anesthesia  Mallampati - per anesthesia  History of Anesthesia problems - no  Family history Anesthesia problems - no   Plan of anesthesia - General    HPI:  This is a 78 y.o. female here for evaluation of : asymptomatic screening exam and personal history of colon polyps.    ROS:  Constitutional: No fevers, chills, No weight loss  CV: No chest pain  Pulm: No cough, No shortness of breath  GI: see HPI  Derm: No rash    Medical History:  has a past medical history of Allergy, Asthma, Chronic diastolic heart failure (4/5/2018), Glaucoma suspect, Hyperlipidemia, Hypertension, Neuromuscular disorder, JOHN on CPAP, Osteoporosis, Other neutropenia (8/31/2023), Recurrent sinusitis (3/25/2014), Recurrent upper respiratory infection (URI), and Urticaria.    Surgical History:  has a past surgical history that includes Sinus surgery; Rotator cuff repair (Left); Hysterectomy; Colonoscopy (N/A, 5/18/2018); Oophorectomy; Cataract extraction w/  intraocular lens implant (Right, 2/11/2021); and Cataract extraction w/  intraocular lens implant (Left, 3/4/2021).    Family History: family history includes Cancer in her sister; Hypertension in her daughter and daughter; Kidney cancer in her sister; No Known Problems in her father and mother; Ovarian cancer in her sister.. Otherwise no colon cancer, inflammatory bowel disease, or GI malignancies.    Social History:  reports that she quit smoking about 18 years ago. Her smoking use included cigarettes. She started smoking about 58 years ago. She has a 10.0 pack-year smoking history. She has never used smokeless tobacco. She reports current alcohol use. She reports that she does not use drugs.    Review of patient's allergies indicates:   Allergen Reactions    Metoprolol Shortness Of Breath    Prednisone Other (See Comments)     Bone loss    Adhesive      PAPER TAPE    Diazepam  Other (See Comments)     Nervous, jittery    Gabapentin      Nervous      Keppra [levetiracetam]      nervous    Mold      sneezing       Medications:   Medications Prior to Admission   Medication Sig Dispense Refill Last Dose    ADVAIR DISKUS 500-50 mcg/dose DsDv diskus inhaler INHALE 1 PUFF TWICE DAILY 1 each 11 10/24/2023    albuterol (VENTOLIN HFA) 90 mcg/actuation inhaler Inhale 2 puffs into the lungs every 6 (six) hours as needed for Wheezing. Rescue 8 g 11 10/23/2023    albuterol-ipratropium (DUO-NEB) 2.5 mg-0.5 mg/3 mL nebulizer solution Take 3 mLs by nebulization every 6 (six) hours as needed for Wheezing. Rescue 75 mL 0 10/23/2023    cholecalciferol, vitamin D3, 10,000 unit Cap Take 360 mg by mouth once daily. Take 6 capsules (6,000 units total) by mouth once daily   10/23/2023    COD LIVER OIL ORAL Take 1 tablet by mouth once daily.   10/23/2023    ferrous sulfate 325 (65 FE) MG EC tablet Take 1 tablet (325 mg total) by mouth 3 (three) times daily with meals. 270 tablet 3 10/23/2023    furosemide (LASIX) 20 MG tablet TAKE 1 TABLET BY MOUTH EVERY DAY 90 tablet 3 10/24/2023    guaiFENesin (MUCINEX) 600 mg 12 hr tablet Take 1 tablet (600 mg total) by mouth 2 (two) times daily. 60 tablet 5 10/23/2023    milk thistle 150 mg Cap Take 1 capsule by mouth once daily. 90 each 3 10/23/2023    potassium chloride SA (K-DUR,KLOR-CON) 20 MEQ tablet TAKE 1 TABLET BY MOUTH ONCE DAILY 30 tablet 0 10/24/2023    valsartan (DIOVAN) 320 MG tablet Take 1 tablet (320 mg total) by mouth once daily. 90 tablet 3 10/23/2023    azelastine (ASTELIN) 137 mcg (0.1 %) nasal spray 2 sprays (274 mcg total) by Nasal route 2 (two) times daily. 30 mL 11 Unknown    carboxymethylcellulose sodium (REFRESH TEARS) 0.5 % Drop Apply 1 drop to eye 3 (three) times daily as needed (dry eyes). 30 mL 11 Unknown    dupilumab 300 mg/2 mL PnIj Inject 300 mg into the skin every 14 (fourteen) days. 4 mL 12     fluticasone propionate (FLONASE) 50 mcg/actuation  nasal spray 2 sprays (100 mcg total) by Each Nostril route 2 (two) times a day. 16 g 11 Unknown    GAVILYTE-G 236-22.74-6.74 -5.86 gram suspension Take 4,000 mLs by mouth once.       hydrocortisone 2.5 % cream Apply topically 2 (two) times daily. Prn face rash.Stop using steroid topical when skin is smooth and non itchy.  Do not treat dark or red coloring. 45 g 0 Unknown    multivitamin (THERAGRAN) per tablet Take 1 tablet by mouth once daily.       RSVPreF3 antigen-AS01E, PF, (AREXVY, PF,) 120 mcg/0.5 mL SusR vaccine Inject into the muscle. 1 each 0 Unknown    SENNA 8.6 mg tablet TAKE 2 TAB(S) BY MOUTH NIGHTLY AS NEEDED FOR CONSTIPATION       tiotropium bromide (SPIRIVA RESPIMAT) 2.5 mcg/actuation inhaler Inhale 2 puffs into the lungs Daily. Controller 4 g 5     tretinoin (RETIN-A) 0.05 % cream Apply topically every evening. Start with every other night and move up to nightly after 2 weeks if not too dry. 20 g 2 Unknown         Physical Exam:    Vital Signs:   Vitals:    10/24/23 0830   BP: (!) 189/87   Pulse: 87   Resp: 16   Temp: 98.1 °F (36.7 °C)       General Appearance: Well appearing in no acute distress  Eyes:    No scleral icterus  ENT: Neck supple, Lips, mucosa, and tongue normal; teeth and gums normal  Abdomen: Soft, non tender, non distended with positive bowel sounds. No hepatosplenomegaly, ascites, or mass.  Extremities: 2+ pulses, no clubbing, cyanosis or edema  Skin: No rash      Labs:  Lab Results   Component Value Date    WBC 3.72 (L) 08/04/2023    HGB 11.9 (L) 08/04/2023    HCT 37.4 08/04/2023     08/04/2023    CHOL 199 08/26/2021    TRIG 47 08/26/2021    HDL 86 (H) 08/26/2021    ALT 16 08/04/2023    AST 25 08/04/2023     08/04/2023    K 3.8 08/04/2023     08/04/2023    CREATININE 0.7 08/04/2023    BUN 10 08/04/2023    CO2 24 08/04/2023    TSH 2.405 04/25/2022    HGBA1C 5.3 10/18/2022       I have explained the risks and benefits of endoscopy procedures to the patient including  but not limited to bleeding, perforation, infection, and death.  The patient was asked if they understand and allowed to ask any further questions to their satisfaction.    Jo-Ann Amador, DO

## 2023-10-25 NOTE — ANESTHESIA POSTPROCEDURE EVALUATION
Anesthesia Post Evaluation    Patient: Michela Camrenrly    Procedure(s) Performed: Procedure(s) (LRB):  COLONOSCOPY (N/A)    Final Anesthesia Type: general      Patient location during evaluation: GI PACU  Patient participation: Yes- Able to Participate  Level of consciousness: awake and alert  Post-procedure vital signs: reviewed and stable  Pain control: Pain has been treated.  Airway patency: patent    PONV status: PONV absent or treated.  Anesthetic complications: no      Cardiovascular status: hemodynamically stable  Respiratory status: spontaneous ventilation  Hydration status: euvolemic  Follow-up not needed.          Vitals Value Taken Time   /99 10/24/23 1110   Temp 98 10/25/23 0707   Pulse 81 10/24/23 1110   Resp 16 10/25/23 0707   SpO2 99 % 10/24/23 1110         Event Time   Out of Recovery 11:21:28         Pain/Lee Ann Score: Lee Ann Score: 10 (10/24/2023 10:38 AM)

## 2023-10-27 LAB
FINAL PATHOLOGIC DIAGNOSIS: NORMAL
GROSS: NORMAL
Lab: NORMAL

## 2023-11-07 ENCOUNTER — PATIENT OUTREACH (OUTPATIENT)
Dept: ADMINISTRATIVE | Facility: HOSPITAL | Age: 78
End: 2023-11-07
Payer: MEDICARE

## 2023-11-07 ENCOUNTER — OFFICE VISIT (OUTPATIENT)
Dept: PRIMARY CARE CLINIC | Facility: CLINIC | Age: 78
End: 2023-11-07
Payer: MEDICARE

## 2023-11-07 VITALS
TEMPERATURE: 98 F | HEIGHT: 65 IN | WEIGHT: 181.69 LBS | DIASTOLIC BLOOD PRESSURE: 84 MMHG | SYSTOLIC BLOOD PRESSURE: 170 MMHG | BODY MASS INDEX: 30.27 KG/M2 | OXYGEN SATURATION: 96 % | HEART RATE: 107 BPM

## 2023-11-07 DIAGNOSIS — J45.51 SEVERE PERSISTENT ASTHMA WITH ACUTE EXACERBATION: ICD-10-CM

## 2023-11-07 DIAGNOSIS — M25.552 PAIN OF LEFT HIP JOINT: ICD-10-CM

## 2023-11-07 DIAGNOSIS — G47.33 OSA (OBSTRUCTIVE SLEEP APNEA): ICD-10-CM

## 2023-11-07 DIAGNOSIS — I10 PRIMARY HYPERTENSION: Primary | ICD-10-CM

## 2023-11-07 PROCEDURE — 1159F PR MEDICATION LIST DOCUMENTED IN MEDICAL RECORD: ICD-10-PCS | Mod: HCNC,CPTII,S$GLB, | Performed by: STUDENT IN AN ORGANIZED HEALTH CARE EDUCATION/TRAINING PROGRAM

## 2023-11-07 PROCEDURE — 1159F MED LIST DOCD IN RCRD: CPT | Mod: HCNC,CPTII,S$GLB, | Performed by: STUDENT IN AN ORGANIZED HEALTH CARE EDUCATION/TRAINING PROGRAM

## 2023-11-07 PROCEDURE — 3077F SYST BP >= 140 MM HG: CPT | Mod: HCNC,CPTII,S$GLB, | Performed by: STUDENT IN AN ORGANIZED HEALTH CARE EDUCATION/TRAINING PROGRAM

## 2023-11-07 PROCEDURE — 1126F PR PAIN SEVERITY QUANTIFIED, NO PAIN PRESENT: ICD-10-PCS | Mod: HCNC,CPTII,S$GLB, | Performed by: STUDENT IN AN ORGANIZED HEALTH CARE EDUCATION/TRAINING PROGRAM

## 2023-11-07 PROCEDURE — 99999 PR PBB SHADOW E&M-EST. PATIENT-LVL V: ICD-10-PCS | Mod: PBBFAC,HCNC,, | Performed by: STUDENT IN AN ORGANIZED HEALTH CARE EDUCATION/TRAINING PROGRAM

## 2023-11-07 PROCEDURE — 1157F ADVNC CARE PLAN IN RCRD: CPT | Mod: HCNC,CPTII,S$GLB, | Performed by: STUDENT IN AN ORGANIZED HEALTH CARE EDUCATION/TRAINING PROGRAM

## 2023-11-07 PROCEDURE — 1126F AMNT PAIN NOTED NONE PRSNT: CPT | Mod: HCNC,CPTII,S$GLB, | Performed by: STUDENT IN AN ORGANIZED HEALTH CARE EDUCATION/TRAINING PROGRAM

## 2023-11-07 PROCEDURE — 99214 OFFICE O/P EST MOD 30 MIN: CPT | Mod: HCNC,S$GLB,, | Performed by: STUDENT IN AN ORGANIZED HEALTH CARE EDUCATION/TRAINING PROGRAM

## 2023-11-07 PROCEDURE — 99214 PR OFFICE/OUTPT VISIT, EST, LEVL IV, 30-39 MIN: ICD-10-PCS | Mod: HCNC,S$GLB,, | Performed by: STUDENT IN AN ORGANIZED HEALTH CARE EDUCATION/TRAINING PROGRAM

## 2023-11-07 PROCEDURE — 3079F DIAST BP 80-89 MM HG: CPT | Mod: HCNC,CPTII,S$GLB, | Performed by: STUDENT IN AN ORGANIZED HEALTH CARE EDUCATION/TRAINING PROGRAM

## 2023-11-07 PROCEDURE — 1157F PR ADVANCE CARE PLAN OR EQUIV PRESENT IN MEDICAL RECORD: ICD-10-PCS | Mod: HCNC,CPTII,S$GLB, | Performed by: STUDENT IN AN ORGANIZED HEALTH CARE EDUCATION/TRAINING PROGRAM

## 2023-11-07 PROCEDURE — 99999 PR PBB SHADOW E&M-EST. PATIENT-LVL V: CPT | Mod: PBBFAC,HCNC,, | Performed by: STUDENT IN AN ORGANIZED HEALTH CARE EDUCATION/TRAINING PROGRAM

## 2023-11-07 PROCEDURE — 3079F PR MOST RECENT DIASTOLIC BLOOD PRESSURE 80-89 MM HG: ICD-10-PCS | Mod: HCNC,CPTII,S$GLB, | Performed by: STUDENT IN AN ORGANIZED HEALTH CARE EDUCATION/TRAINING PROGRAM

## 2023-11-07 PROCEDURE — 3077F PR MOST RECENT SYSTOLIC BLOOD PRESSURE >= 140 MM HG: ICD-10-PCS | Mod: HCNC,CPTII,S$GLB, | Performed by: STUDENT IN AN ORGANIZED HEALTH CARE EDUCATION/TRAINING PROGRAM

## 2023-11-07 RX ORDER — IPRATROPIUM BROMIDE AND ALBUTEROL SULFATE 2.5; .5 MG/3ML; MG/3ML
3 SOLUTION RESPIRATORY (INHALATION) EVERY 6 HOURS PRN
Qty: 75 ML | Refills: 0 | Status: SHIPPED | OUTPATIENT
Start: 2023-11-07 | End: 2023-11-07

## 2023-11-07 RX ORDER — IPRATROPIUM BROMIDE AND ALBUTEROL SULFATE 2.5; .5 MG/3ML; MG/3ML
3 SOLUTION RESPIRATORY (INHALATION) EVERY 6 HOURS PRN
Qty: 75 ML | Refills: 0 | Status: SHIPPED | OUTPATIENT
Start: 2023-11-07 | End: 2024-02-26 | Stop reason: SDUPTHER

## 2023-11-07 RX ORDER — MELOXICAM 7.5 MG/1
7.5 TABLET ORAL DAILY
Qty: 14 TABLET | Refills: 0 | Status: SHIPPED | OUTPATIENT
Start: 2023-11-07 | End: 2023-11-21

## 2023-11-07 RX ORDER — PREDNISONE 20 MG/1
20 TABLET ORAL DAILY
Qty: 5 TABLET | Refills: 0 | Status: SHIPPED | OUTPATIENT
Start: 2023-11-07 | End: 2023-11-12

## 2023-11-07 RX ORDER — CLONIDINE HYDROCHLORIDE 0.1 MG/1
0.1 TABLET ORAL 2 TIMES DAILY
Qty: 60 TABLET | Refills: 1 | Status: SHIPPED | OUTPATIENT
Start: 2023-11-07 | End: 2023-12-05 | Stop reason: SDUPTHER

## 2023-11-07 NOTE — PROGRESS NOTES
"Primary Care  Office Visit - In Person  11/7/2023      HPI    Patient is a 78 y.o.   Michela Franco  has a past medical history of Allergy, Asthma, Chronic diastolic heart failure (4/5/2018), Glaucoma suspect, Hyperlipidemia, Hypertension, Neuromuscular disorder, JOHN on CPAP, Osteoporosis, Other neutropenia (8/31/2023), Recurrent sinusitis (3/25/2014), Recurrent upper respiratory infection (URI), and Urticaria.    Patient presents with     Worsening shortness of breath and elevated blood pressure.  Patient states that she has no quality of life."  She has been using DuoNebs twice a day and albuterol up to 3 times daily    Active Medications:  Current Outpatient Medications   Medication Instructions    ADVAIR DISKUS 500-50 mcg/dose DsDv diskus inhaler INHALE 1 PUFF TWICE DAILY    albuterol (VENTOLIN HFA) 90 mcg/actuation inhaler 2 puffs, Inhalation, Every 6 hours PRN, Rescue    albuterol-ipratropium (DUO-NEB) 2.5 mg-0.5 mg/3 mL nebulizer solution 3 mLs, Nebulization, Every 6 hours PRN    azelastine (ASTELIN) 274 mcg, Nasal, 2 times daily    carboxymethylcellulose sodium (REFRESH TEARS) 0.5 % Drop 1 drop, Ophthalmic, 3 times daily PRN    cholecalciferol (vitamin D3) (VITAMIN D3) 360 mg, Oral, Daily, Take 6 capsules (6,000 units total) by mouth once daily     cloNIDine (CATAPRES) 0.1 mg, Oral, 2 times daily    COD LIVER OIL ORAL 1 tablet, Oral, Daily    dupilumab 300 mg, Subcutaneous, Every 14 days    ferrous sulfate 325 mg, Oral, 3 times daily with meals    fluticasone propionate (FLONASE) 100 mcg, Each Nostril, 2 times daily    furosemide (LASIX) 20 mg, Oral    GAVILYTE-G 236-22.74-6.74 -5.86 gram suspension 4,000 mLs, Oral, Once    guaiFENesin (MUCINEX) 600 mg, Oral, 2 times daily    hydrocortisone 2.5 % cream Topical (Top), 2 times daily, Prn face rash.Stop using steroid topical when skin is smooth and non itchy.  Do not treat dark or red coloring.    meloxicam (MOBIC) 7.5 mg, Oral, Daily    milk thistle 150 mg " "Cap 1 capsule, Oral, Daily    multivitamin (THERAGRAN) per tablet 1 tablet, Oral, Daily,      potassium chloride SA (K-DUR,KLOR-CON) 20 MEQ tablet 20 mEq, Oral, Daily    predniSONE (DELTASONE) 20 mg, Oral, Daily    RSVPreF3 antigen-AS01E, PF, (AREXVY, PF,) 120 mcg/0.5 mL SusR vaccine Intramuscular    sars-cov-2, covid-19, (SPIKEVAX, MODERNA,, 12YRS AND UP 2023,) 50 mcg/0.5 mL injection 0.5 mLs, Intramuscular, Once, Inject 0.5 mL into the muscle once.    SENNA 8.6 mg tablet TAKE 2 TAB(S) BY MOUTH NIGHTLY AS NEEDED FOR CONSTIPATION    tretinoin (RETIN-A) 0.05 % cream Topical (Top), Nightly, Start with every other night and move up to nightly after 2 weeks if not too dry.    valsartan (DIOVAN) 320 mg, Oral, Daily       Vitals:    11/07/23 0906 11/07/23 0942   BP: (!) 182/90 (!) 170/84   BP Location: Right arm    Pulse: 107    Temp: 98.4 °F (36.9 °C)    SpO2: 96%    Weight: 82.4 kg (181 lb 10.5 oz)    Height: 5' 5" (1.651 m)        Physical Exam  Vitals reviewed.   Constitutional:       General: She is not in acute distress.  Cardiovascular:      Rate and Rhythm: Normal rate and regular rhythm.   Pulmonary:      Effort: Pulmonary effort is normal. No respiratory distress.      Breath sounds: Wheezing present.   Musculoskeletal:      Left lower leg: No edema.          Assessment and Plan     1. Primary hypertension  Comments:  CCB - leg swelling   BB - SOB   Thiazide - hyponatremia  Continue valsartan 320 mg daily and lasix 20 mg daily   Orders:  -     cloNIDine (CATAPRES) 0.1 MG tablet; Take 1 tablet (0.1 mg total) by mouth 2 (two) times daily.  Dispense: 60 tablet; Refill: 1    2. Severe persistent asthma with acute exacerbation  Comments:  Reports some improvement in shortness of breath after administering Dupixent this weekend  Recommended scheduling an appointment with Allergy and immunology.  Environmental smoke exposure likely contributing  Patient reports previous reaction to steroids with joint pain  Orders:  -  "    predniSONE (DELTASONE) 20 MG tablet; Take 1 tablet (20 mg total) by mouth once daily. for 5 days  Dispense: 5 tablet; Refill: 0  -     albuterol-ipratropium (DUO-NEB) 2.5 mg-0.5 mg/3 mL nebulizer solution; Take 3 mLs by nebulization every 6 (six) hours as needed for Wheezing.  Dispense: 75 mL; Refill: 0    3. JOHN (obstructive sleep apnea)  Comments:  Patient reports difficulty getting supplies for older model of CPAP machine   Orders:  -     Ambulatory referral/consult to Sleep Disorders; Future; Expected date: 11/14/2023    4. Pain of left hip joint  -     meloxicam (MOBIC) 7.5 MG tablet; Take 1 tablet (7.5 mg total) by mouth once daily. for 14 days  Dispense: 14 tablet; Refill: 0                     Upcoming Scheduled Appointments and Follow Up:    Future Appointments   Date Time Provider Department Center   12/20/2023  9:00 AM Mayela Broussard MD [facility]   1/2/2024  8:00 AM Joe King DO [facility]   2/14/2024  8:40 AM China Cook NP [facility]       Follow Up [facility] (with who? when?): Follow up in about 4 weeks (around 12/5/2023) for bp check .      Extended Emergency Contact Information  Primary Emergency Contact: Charley Ahmadi  Address: [address] [place] [address] of [place]  Mobile Phone: [phone]  Relation: Sister      Mayela Broussard MD   Internal Medicine  11/7/2023 - 9:12 AM    I spent a total of 37 minutes on the day of the visit.This includes face to face time and non-face to face time preparing to see the patient (eg, review of tests), obtaining and/or reviewing separately obtained history, documenting clinical information in the electronic or other health record, independently interpreting results and communicating results to the patient/family/caregiver, or care coordinator.    While patients have the right to access their medical record, it is essential to recognize that progress  notes primarily serve as a means of communication among healthcare professionals.

## 2023-11-07 NOTE — PROGRESS NOTES
No HM topics due    Immunizations: reviewed and updated  Care Everywhere: triggered  Care Teams: up to date  Outreach: completed

## 2023-11-10 ENCOUNTER — TELEPHONE (OUTPATIENT)
Dept: ALLERGY | Facility: CLINIC | Age: 78
End: 2023-11-10
Payer: MEDICARE

## 2023-11-10 NOTE — TELEPHONE ENCOUNTER
Please advise    Called pt in regards to below message.  Pt verbalized  she went to PCP on Tuesday she was advised to see her allergist in regards to   Severe sinus that drips in her throat and mimics asthma.

## 2023-11-13 ENCOUNTER — TELEPHONE (OUTPATIENT)
Dept: ALLERGY | Facility: CLINIC | Age: 78
End: 2023-11-13
Payer: MEDICARE

## 2023-11-13 NOTE — TELEPHONE ENCOUNTER
Please advise    Spoke with pt in regards to below message. Advised pt the message was being sent to provider in regards to her symptoms she verbalized to me congestion wheezing and sob.  She also would like to know if she can be seen or can something be prescribed for her.        LINA BEAN calling regarding Patient Advice (message) Pt calling to follow up on a call she call on Friday and spoke with someone and  someone was suppose to give her a call back but no one did asking to speak with the nurse to advice her on what she needs to do

## 2023-11-13 NOTE — TELEPHONE ENCOUNTER
----- Message from Jasper Burris sent at 11/13/2023 10:24 AM CST -----  Contact: [phone]  LINA BEAN calling regarding Patient Advice (message) Pt calling to follow up on a call she call on Friday and spoke with someone and  someone was suppose to give her a call back but no one did asking to speak with the nurse to advice her on what she needs to do

## 2023-11-14 ENCOUNTER — HOSPITAL ENCOUNTER (OUTPATIENT)
Dept: RADIOLOGY | Facility: HOSPITAL | Age: 78
Discharge: HOME | End: 2023-11-14
Attending: INTERNAL MEDICINE
Payer: MEDICARE

## 2023-11-14 ENCOUNTER — OFFICE VISIT (OUTPATIENT)
Dept: ALLERGY | Facility: CLINIC | Age: 78
End: 2023-11-14
Payer: MEDICARE

## 2023-11-14 VITALS — WEIGHT: 188.5 LBS | BODY MASS INDEX: 31.4 KG/M2 | HEIGHT: 65 IN | HEART RATE: 75 BPM | OXYGEN SATURATION: 83 %

## 2023-11-14 DIAGNOSIS — R05.1 ACUTE COUGH: Primary | ICD-10-CM

## 2023-11-14 DIAGNOSIS — J45.51 SEVERE PERSISTENT ASTHMA WITH ACUTE EXACERBATION: ICD-10-CM

## 2023-11-14 DIAGNOSIS — R05.1 ACUTE COUGH: ICD-10-CM

## 2023-11-14 PROCEDURE — 99999 PR PBB SHADOW E&M-EST. PATIENT-LVL IV: CPT | Mod: PBBFAC,HCNC,GC, | Performed by: INTERNAL MEDICINE

## 2023-11-14 PROCEDURE — 99999 PR PBB SHADOW E&M-EST. PATIENT-LVL IV: ICD-10-PCS | Mod: PBBFAC,HCNC,GC, | Performed by: INTERNAL MEDICINE

## 2023-11-14 PROCEDURE — 99214 OFFICE O/P EST MOD 30 MIN: CPT | Mod: HCNC,GC,S$GLB, | Performed by: INTERNAL MEDICINE

## 2023-11-14 PROCEDURE — 1157F PR ADVANCE CARE PLAN OR EQUIV PRESENT IN MEDICAL RECORD: ICD-10-PCS | Mod: HCNC,CPTII,GC,S$GLB | Performed by: INTERNAL MEDICINE

## 2023-11-14 PROCEDURE — 1159F MED LIST DOCD IN RCRD: CPT | Mod: HCNC,CPTII,GC,S$GLB | Performed by: INTERNAL MEDICINE

## 2023-11-14 PROCEDURE — 1125F PR PAIN SEVERITY QUANTIFIED, PAIN PRESENT: ICD-10-PCS | Mod: HCNC,CPTII,GC,S$GLB | Performed by: INTERNAL MEDICINE

## 2023-11-14 PROCEDURE — 71046 X-RAY EXAM CHEST 2 VIEWS: CPT | Mod: 26,HCNC,, | Performed by: RADIOLOGY

## 2023-11-14 PROCEDURE — 1157F ADVNC CARE PLAN IN RCRD: CPT | Mod: HCNC,CPTII,GC,S$GLB | Performed by: INTERNAL MEDICINE

## 2023-11-14 PROCEDURE — 1159F PR MEDICATION LIST DOCUMENTED IN MEDICAL RECORD: ICD-10-PCS | Mod: HCNC,CPTII,GC,S$GLB | Performed by: INTERNAL MEDICINE

## 2023-11-14 PROCEDURE — 1125F AMNT PAIN NOTED PAIN PRSNT: CPT | Mod: HCNC,CPTII,GC,S$GLB | Performed by: INTERNAL MEDICINE

## 2023-11-14 PROCEDURE — 71046 X-RAY EXAM CHEST 2 VIEWS: CPT | Mod: TC,HCNC,PO

## 2023-11-14 PROCEDURE — 71046 XR CHEST PA AND LATERAL: ICD-10-PCS | Mod: 26,HCNC,, | Performed by: RADIOLOGY

## 2023-11-14 PROCEDURE — 99214 PR OFFICE/OUTPT VISIT, EST, LEVL IV, 30-39 MIN: ICD-10-PCS | Mod: HCNC,GC,S$GLB, | Performed by: INTERNAL MEDICINE

## 2023-11-14 RX ORDER — IPRATROPIUM BROMIDE AND ALBUTEROL SULFATE 2.5; .5 MG/3ML; MG/3ML
3 SOLUTION RESPIRATORY (INHALATION) EVERY 6 HOURS PRN
Qty: 360 ML | Refills: 0 | Status: SHIPPED | OUTPATIENT
Start: 2023-11-14 | End: 2023-12-14

## 2023-11-14 RX ORDER — AZITHROMYCIN 500 MG/1
500 TABLET, FILM COATED ORAL DAILY
Qty: 3 TABLET | Refills: 0 | Status: SHIPPED | OUTPATIENT
Start: 2023-11-14 | End: 2023-11-17

## 2023-11-14 RX ORDER — PREDNISONE 20 MG/1
20 TABLET ORAL DAILY
Qty: 30 TABLET | Refills: 0 | Status: SHIPPED | OUTPATIENT
Start: 2023-11-14 | End: 2023-12-14

## 2023-11-14 NOTE — PROGRESS NOTES
ALLERGY & IMMUNOLOGY CLINIC - INITIAL CONSULTATION     HISTORY OF PRESENT ILLNESS     Patient ID: Michela Franco is a 78 y.o. female    CC: dyspnea    HPI: Ms. Michela Franco is a 78 year old female with asthma, allergic rhinitis, and chronic pansinusitis who presents to clinic today for follow up. Approximately 1 week ago she noticed fevers, cough, sputum production, wheezing, worsening dyspnea. She has started to feel somewhat better over the past 24 hours. She is currently using duonebs q6hrs, additional albuterol 2-3x daily, dupixent 300mg q14 days, advair 500/50 1 puff BID. She was evaluated by her PCP who started her on 20mg daily prednisone for 5 days, today is her last day. Has not had fever since day 1. Even prior to this most recent exacerbation, she has noticed that she has near daily symptoms of dyspnea, cough, wheezing. She was started on dupilumab about 3 years ago, she initially had some improvement but it seems like her symptoms have become worse and worse over this past year.         REVIEW OF SYSTEMS     Review of Systems   Constitutional: Negative.    HENT: Negative.     Respiratory:  Positive for cough, sputum production, shortness of breath and wheezing.    Cardiovascular: Negative.    Gastrointestinal: Negative.    Musculoskeletal: Negative.    Skin: Negative.    Neurological: Negative.    Psychiatric/Behavioral: Negative.         Balance of review of systems negative except as mentioned above     MEDICAL HISTORY     MedHx: active problems reviewed  SurgHx:   Past Surgical History:   Procedure Laterality Date    CATARACT EXTRACTION W/  INTRAOCULAR LENS IMPLANT Right 2/11/2021    Procedure: EXTRACTION, CATARACT, WITH IOL INSERTION;  Surgeon: John Merino MD;  Location: [facility];  Service: Ophthalmology;  Laterality: Right;    CATARACT EXTRACTION W/  INTRAOCULAR LENS IMPLANT Left 3/4/2021    Procedure: EXTRACTION, CATARACT, WITH IOL INSERTION;  Surgeon: John Merino MD;  Location: [facility];   Service: Ophthalmology;  Laterality: Left;    COLONOSCOPY N/A 5/18/2018    Procedure: COLONOSCOPY;  Surgeon: Calixto Brian MD;  [facility]  Service: Endoscopy;  Laterality: N/A;    COLONOSCOPY N/A 10/24/2023    Procedure: COLONOSCOPY;  Surgeon: Ward Merino MD;  [facility]  Service: Endoscopy;  Laterality: N/A;  ref by / golytely [facility] [id number]  10/17-lvm for precall-MS  10/20-precall complete-MS    HYSTERECTOMY      total    OOPHORECTOMY      ROTATOR CUFF REPAIR Left     SINUS SURGERY         Allergies: see below  Medications: MAR reviewed       PHYSICAL EXAM     Physical Exam  Constitutional:       Appearance: Normal appearance.   HENT:      Head: Normocephalic and atraumatic.   Eyes:      Conjunctiva/sclera: Conjunctivae normal.   Cardiovascular:      Rate and Rhythm: Normal rate and regular rhythm.   Pulmonary:      Comments: Trace wheeze heard in upper lung fields, crackles in bilateral lower lung fields. WOB normal  Skin:     General: Skin is warm and dry.   Neurological:      General: No focal deficit present.      Mental Status: She is alert.   Psychiatric:         Mood and Affect: Mood normal.         Behavior: Behavior normal.          ALLERGEN TESTING      Immunocaps:   Component      Latest Ref Rng & Units 12/18/2019   Allergen Acremonium (Cephalosporium) IgE      <0.10 kU/L <0.10   Allergen Acremonium (Cephalosporium) Class       CLASS 0   Botrytis Cinerea      <0.10 kU/L <0.10   Botrytis Cinerea Class       CLASS 0   Allergen Candida albicans IgE      <0.10 kU/L 0.68 (H)   Allergen Candida albicans Class       CLASS 1   Chaetomium      <0.10 kU/L <0.10   Chaetomium Glob. Class       CLASS 0   Cladosporium, IgE      <0.10 kU/L <0.10   Cladosporium Class       CLASS 0   Curvularia lunata      <0.10 kU/L <0.10   Curvularia Lunata Class       CLASS 0   Epicoccum purpurascens, IgE      <0.10 kU/L <0.10   Epicoccum pupurascens Class       CLASS  0   Helminthosporium Halodes IgE      <0.10 kU/L <0.10   Helminthosporium Class       CLASS 0   Allergen Trichoderma Viride IgE      <0.10 kU/L <0.10   Allergen Trichoderma Viride Class       CLASS 0   Stemphyllium, IgE      <0.10 kU/L <0.10   Stemphylium Herbarum Class       CLASS 0   Allergen Rhodotorula IgE      <0.35 kU/L <0.35   Rhodotorula , IgE Class       0   Rhizopus Nigrican, IgE      <0.10 kU/L 0.31 (H)   Rhizopus Nigricans Class       CLASS 0/1   Phoma betae      <0.10 kU/L <0.10   Phoma Betae Class       CLASS 0   Penicillium, IgE      <0.10 kU/L 0.17 (H)   Penicillium Class       CLASS 0/1   Mucor racemosus, IgE      <0.10 kU/L 0.34 (H)   Mucor racemosus Class       CLASS 0/1   RAST Allergen for Trichophyton rubrum      kU/L <0.35      Component      Latest Ref Rng & Units 12/18/2019 12/18/2019 12/18/2019           3:07 PM  3:07 PM  3:07 PM   D. farinae      <0.10 kU/L     1.94 (H)   D. farinae Class           CLASS 2   Mite Dust Pteronyssinus IgE      <0.10 kU/L     0.19 (H)   D. pteronyssinus Class           CLASS 0/1   BERMUDA GRASS      <0.10 kU/L     0.71 (H)   [place]uda Grass Class           CLASS 2   Joshua Grass      <0.10 kU/L     0.72 (H)   Joshua Grass Class           CLASS 2   Camas IgE      <0.10 kU/L     0.17 (H)   Camas Class           CLASS 0/1   Plantain      <0.10 kU/L     0.63 (H)   English Plantain Class           CLASS 1   White Oak(Quercus alba) IgE      <0.10 kU/L     0.74 (H)   [place], Class           CLASS 2   Pecan [place] Tree      <0.10 kU/L     0.47 (H)   Pecan, Class           CLASS 1   Marshelder IgE      <0.10 kU/L     0.59 (H)   Marshelder Class           CLASS 1   Ragweed, Western IgE      <0.10 kU/L     0.54 (H)   Ragweed, Western, Class           CLASS 1   Alternaria alternata      <0.10 kU/L     <0.10   Altern. alternata Class           CLASS 0   Aspergillus Fumigatus IgE      <0.10 kU/L     0.22 (H)   A. fumigatus Class           CLASS 0/1   Cat Dander      <0.10 kU/L      <0.10   Cat Epithelium Class           CLASS 0   Cockroach, IgE      <0.10 kU/L CLASS 0/1 0.28 (H)     Dog Dander, IgE      <0.10 kU/L     0.51 (H)   Dog Dander Class           CLASS 1      Component      Latest Ref Rng & Units 12/18/2019 12/29/2017 10/30/2017 4/8/2014   Aspergillus Fumigatus IgE      <0.10 kU/L 0.22 (H)   <0.35 <0.35   A. fumigatus Class       CLASS 0/1   CLASS 0 CLASS 0   Aspergillus Antigen      <0.5 index   <0.500 0.676 (A)        Component      Latest Ref Rng & Units 2/20/2014   Aspergillus Fumigatus IgE      <0.10 kU/L 0.36 (H)   A. fumigatus Class       CLASS I   Aspergillus Antigen      <0.5 index            PULMONARY FUNCTION      PFTs:     Spirometry 8/23:   Spirometry shows mild obstruction. Lung volume determination is normal. Spirometry remains unimproved following bronchodilator. DLCO is moderately decreased. Notes: The failure to demonstrate improvement in spirometry does not preclude a clinical response to a trial of bronchodilators. DLCO interpretation based on the adjusted DLCO value due to a low hemoglobin. Discrepancy in TLC and VA makes DLCO interpretation potentially unreliable.      11/26/2021:              Pre:                   Post:  FEV1: 1.49 (82.8%)--> 1.56 (+4.1%)  FVC:   2.22  (95%)----> 2.30 (+3.6%)  Ratio:  67.37%      ----> 67.77%        5/28/2018  FVC: 2.30 (78%)  FEV1: 1.62 (70%)  Ratio: 70  No reversibility post bronchodilator.     Also performed 12/2019 in pulmonary clinic      ASSESSMENT & PLAN      Michela Franco is a 78 y.o. female with      Cough  -Symptoms concerning for possible asthma exacerbation and possible CAP  -She has been taking 20mg prednisone for 5 days. Continues to have symptoms of dyspnea, cough  -Continue prednisone 20mg daily. She will need to be on an extended course as she continues to be symptomatic. Will provide 30 day supply, will reevaluate her in 2 weeks to discuss tapering at that time if she is able to.  -Will send azithromycin  500mg PO x3 days for treatment of possible CAP. Will also evaluate with CXR.  -She has been using duonebs q 6 hrs and getting benefit from this. Will send another prescription for her. Can continue to use q6hrs PRN.     Severe persistent asthma w acute exacerbation:  -Recent PFT's showing mild obstruction but no reversibility  -She has been on dupilumab 300 mg subq every 14 days. While she initially improved on dupixent she seems that she is no longer receiving as much benefit. Even prior to this recent exacerbation, she continues to have daily symptoms with significant quality of life impairment. As she is currently uncontrolled with severe asthma, will send a prescription for tezepelumab 210mg q 4 weeks. Prescription sent to [facility]. She will continue dupilimab while awaiting approval then switch to tezepelumab. Will discuss this more at her next visit in 2 weeks prior to starting this medication. (For future reference, patient uses [facility]. For refills, call [phone] ext [phone]. All other medications to be filled by [facility] pharmacy)   -Continue advair 500-50 mcg/dose 1 puff BID  -Continue albuterol as needed for SOB  -Duonebs as above     Allergic rhinitis: Continues to have congestion during current grass pollen season.  -Continue fluticasone to 2 SEN BID  -Continue azelastine up to SEN BID  -Continue nasal irrigation BID  -AIT previously discussed. In this patient who is 78 will hold off on AIT at this time due to risk of inability to compensate in setting of possible anaphylaxis to AIT  -continue montelukast 10mg daily        Patient discussed with Dr. Geller.  RTC 2 weeks     Joe King DO  Allergy/Immunology Fellow

## 2023-11-15 PROBLEM — J45.51 SEVERE PERSISTENT ASTHMA WITH ACUTE EXACERBATION: Status: ACTIVE | Noted: 2023-11-15

## 2023-11-16 ENCOUNTER — TELEPHONE (OUTPATIENT)
Dept: ALLERGY | Facility: CLINIC | Age: 78
End: 2023-11-16
Payer: MEDICARE

## 2023-11-16 NOTE — TELEPHONE ENCOUNTER
Spoke with Vel [facility] in regards to below message. She advised that clinicals can be faxed to [phone]  attn [facility] NEED MORE INFO TEAM.         ----- Message from Jason Dubon sent at 11/16/2023 10:00 AM CST -----  Regarding: Pharmacy Auth  Contact: Joao [phone]  Rx Refill/Request    Is this a Refill or New Rx:  refill    Rx Name and Strength:  tezepelumab-ekko 210 mg/1.91 mL (110 mg/mL) Syrg   [[id number]]    Preferred Pharmacy with phone number: N/A       Communication Preference: Please call Joao @ [phone]    Additional Information: Ref#[id number], Charissa w/[facility] is calling for additional Clinicals needed for PA

## 2023-11-16 NOTE — TELEPHONE ENCOUNTER
----- Message from Jason Dubon sent at 11/16/2023 10:00 AM CST -----  Regarding: Pharmacy Auth  Contact: [facility] [phone]  Rx Refill/Request    Is this a Refill or New Rx:  refill    Rx Name and Strength:  tezepelumab-ekko 210 mg/1.91 mL (110 mg/mL) Syrg   [[id number]]    Preferred Pharmacy with phone number: N/A       Communication Preference: Please call Joao @ [phone]    Additional Information: Ref#[id number], Summer w/[facility] is calling for additional Clinicals needed for PA

## 2023-11-21 ENCOUNTER — PATIENT MESSAGE (OUTPATIENT)
Dept: ALLERGY | Facility: CLINIC | Age: 78
End: 2023-11-21
Payer: MEDICARE

## 2023-12-05 ENCOUNTER — OFFICE VISIT (OUTPATIENT)
Dept: PRIMARY CARE CLINIC | Facility: CLINIC | Age: 78
End: 2023-12-05
Payer: MEDICARE

## 2023-12-05 ENCOUNTER — OFFICE VISIT (OUTPATIENT)
Dept: ALLERGY | Facility: CLINIC | Age: 78
End: 2023-12-05
Payer: MEDICARE

## 2023-12-05 VITALS — HEIGHT: 65 IN | BODY MASS INDEX: 30.74 KG/M2 | WEIGHT: 184.5 LBS

## 2023-12-05 VITALS
HEART RATE: 102 BPM | WEIGHT: 188.25 LBS | OXYGEN SATURATION: 95 % | SYSTOLIC BLOOD PRESSURE: 162 MMHG | DIASTOLIC BLOOD PRESSURE: 78 MMHG | TEMPERATURE: 98 F | HEIGHT: 65 IN | BODY MASS INDEX: 31.36 KG/M2

## 2023-12-05 DIAGNOSIS — I10 PRIMARY HYPERTENSION: Primary | ICD-10-CM

## 2023-12-05 DIAGNOSIS — R05.9 COUGH, UNSPECIFIED TYPE: Primary | ICD-10-CM

## 2023-12-05 DIAGNOSIS — J45.909 ASTHMA, UNSPECIFIED ASTHMA SEVERITY, UNSPECIFIED WHETHER COMPLICATED, UNSPECIFIED WHETHER PERSISTENT: Primary | ICD-10-CM

## 2023-12-05 DIAGNOSIS — J45.51 SEVERE PERSISTENT ASTHMA WITH ACUTE EXACERBATION: ICD-10-CM

## 2023-12-05 DIAGNOSIS — J30.9 ALLERGIC RHINITIS, UNSPECIFIED SEASONALITY, UNSPECIFIED TRIGGER: ICD-10-CM

## 2023-12-05 DIAGNOSIS — J45.50 SEVERE PERSISTENT ASTHMA, UNSPECIFIED WHETHER COMPLICATED: ICD-10-CM

## 2023-12-05 PROCEDURE — 1125F PR PAIN SEVERITY QUANTIFIED, PAIN PRESENT: ICD-10-PCS | Mod: HCNC,CPTII,S$GLB, | Performed by: STUDENT IN AN ORGANIZED HEALTH CARE EDUCATION/TRAINING PROGRAM

## 2023-12-05 PROCEDURE — 99214 OFFICE O/P EST MOD 30 MIN: CPT | Mod: HCNC,S$GLB,, | Performed by: STUDENT IN AN ORGANIZED HEALTH CARE EDUCATION/TRAINING PROGRAM

## 2023-12-05 PROCEDURE — 1125F AMNT PAIN NOTED PAIN PRSNT: CPT | Mod: HCNC,CPTII,S$GLB, | Performed by: STUDENT IN AN ORGANIZED HEALTH CARE EDUCATION/TRAINING PROGRAM

## 2023-12-05 PROCEDURE — 3288F PR FALLS RISK ASSESSMENT DOCUMENTED: ICD-10-PCS | Mod: HCNC,CPTII,S$GLB, | Performed by: STUDENT IN AN ORGANIZED HEALTH CARE EDUCATION/TRAINING PROGRAM

## 2023-12-05 PROCEDURE — 3078F DIAST BP <80 MM HG: CPT | Mod: HCNC,CPTII,S$GLB, | Performed by: STUDENT IN AN ORGANIZED HEALTH CARE EDUCATION/TRAINING PROGRAM

## 2023-12-05 PROCEDURE — 1157F ADVNC CARE PLAN IN RCRD: CPT | Mod: HCNC,CPTII,S$GLB, | Performed by: STUDENT IN AN ORGANIZED HEALTH CARE EDUCATION/TRAINING PROGRAM

## 2023-12-05 PROCEDURE — 3288F FALL RISK ASSESSMENT DOCD: CPT | Mod: HCNC,CPTII,S$GLB, | Performed by: STUDENT IN AN ORGANIZED HEALTH CARE EDUCATION/TRAINING PROGRAM

## 2023-12-05 PROCEDURE — 99214 PR OFFICE/OUTPT VISIT, EST, LEVL IV, 30-39 MIN: ICD-10-PCS | Mod: HCNC,S$GLB,, | Performed by: STUDENT IN AN ORGANIZED HEALTH CARE EDUCATION/TRAINING PROGRAM

## 2023-12-05 PROCEDURE — 99214 OFFICE O/P EST MOD 30 MIN: CPT | Mod: HCNC,GC,S$GLB, | Performed by: INTERNAL MEDICINE

## 2023-12-05 PROCEDURE — 1159F PR MEDICATION LIST DOCUMENTED IN MEDICAL RECORD: ICD-10-PCS | Mod: HCNC,CPTII,S$GLB, | Performed by: STUDENT IN AN ORGANIZED HEALTH CARE EDUCATION/TRAINING PROGRAM

## 2023-12-05 PROCEDURE — 1101F PR PT FALLS ASSESS DOC 0-1 FALLS W/OUT INJ PAST YR: ICD-10-PCS | Mod: HCNC,CPTII,S$GLB, | Performed by: STUDENT IN AN ORGANIZED HEALTH CARE EDUCATION/TRAINING PROGRAM

## 2023-12-05 PROCEDURE — 1159F MED LIST DOCD IN RCRD: CPT | Mod: HCNC,CPTII,GC,S$GLB | Performed by: INTERNAL MEDICINE

## 2023-12-05 PROCEDURE — 3077F SYST BP >= 140 MM HG: CPT | Mod: HCNC,CPTII,S$GLB, | Performed by: STUDENT IN AN ORGANIZED HEALTH CARE EDUCATION/TRAINING PROGRAM

## 2023-12-05 PROCEDURE — 1159F PR MEDICATION LIST DOCUMENTED IN MEDICAL RECORD: ICD-10-PCS | Mod: HCNC,CPTII,GC,S$GLB | Performed by: INTERNAL MEDICINE

## 2023-12-05 PROCEDURE — 1159F MED LIST DOCD IN RCRD: CPT | Mod: HCNC,CPTII,S$GLB, | Performed by: STUDENT IN AN ORGANIZED HEALTH CARE EDUCATION/TRAINING PROGRAM

## 2023-12-05 PROCEDURE — 99214 PR OFFICE/OUTPT VISIT, EST, LEVL IV, 30-39 MIN: ICD-10-PCS | Mod: HCNC,GC,S$GLB, | Performed by: INTERNAL MEDICINE

## 2023-12-05 PROCEDURE — 99999 PR PBB SHADOW E&M-EST. PATIENT-LVL III: ICD-10-PCS | Mod: PBBFAC,HCNC,GC, | Performed by: INTERNAL MEDICINE

## 2023-12-05 PROCEDURE — 1126F PR PAIN SEVERITY QUANTIFIED, NO PAIN PRESENT: ICD-10-PCS | Mod: HCNC,CPTII,GC,S$GLB | Performed by: INTERNAL MEDICINE

## 2023-12-05 PROCEDURE — 3077F PR MOST RECENT SYSTOLIC BLOOD PRESSURE >= 140 MM HG: ICD-10-PCS | Mod: HCNC,CPTII,S$GLB, | Performed by: STUDENT IN AN ORGANIZED HEALTH CARE EDUCATION/TRAINING PROGRAM

## 2023-12-05 PROCEDURE — 1101F PT FALLS ASSESS-DOCD LE1/YR: CPT | Mod: HCNC,CPTII,S$GLB, | Performed by: STUDENT IN AN ORGANIZED HEALTH CARE EDUCATION/TRAINING PROGRAM

## 2023-12-05 PROCEDURE — 3078F PR MOST RECENT DIASTOLIC BLOOD PRESSURE < 80 MM HG: ICD-10-PCS | Mod: HCNC,CPTII,S$GLB, | Performed by: STUDENT IN AN ORGANIZED HEALTH CARE EDUCATION/TRAINING PROGRAM

## 2023-12-05 PROCEDURE — 1157F PR ADVANCE CARE PLAN OR EQUIV PRESENT IN MEDICAL RECORD: ICD-10-PCS | Mod: HCNC,CPTII,GC,S$GLB | Performed by: INTERNAL MEDICINE

## 2023-12-05 PROCEDURE — 1126F AMNT PAIN NOTED NONE PRSNT: CPT | Mod: HCNC,CPTII,GC,S$GLB | Performed by: INTERNAL MEDICINE

## 2023-12-05 PROCEDURE — 99999 PR PBB SHADOW E&M-EST. PATIENT-LVL V: ICD-10-PCS | Mod: PBBFAC,HCNC,, | Performed by: STUDENT IN AN ORGANIZED HEALTH CARE EDUCATION/TRAINING PROGRAM

## 2023-12-05 PROCEDURE — 99999 PR PBB SHADOW E&M-EST. PATIENT-LVL V: CPT | Mod: PBBFAC,HCNC,, | Performed by: STUDENT IN AN ORGANIZED HEALTH CARE EDUCATION/TRAINING PROGRAM

## 2023-12-05 PROCEDURE — 99999 PR PBB SHADOW E&M-EST. PATIENT-LVL III: CPT | Mod: PBBFAC,HCNC,GC, | Performed by: INTERNAL MEDICINE

## 2023-12-05 PROCEDURE — 1157F ADVNC CARE PLAN IN RCRD: CPT | Mod: HCNC,CPTII,GC,S$GLB | Performed by: INTERNAL MEDICINE

## 2023-12-05 PROCEDURE — 1157F PR ADVANCE CARE PLAN OR EQUIV PRESENT IN MEDICAL RECORD: ICD-10-PCS | Mod: HCNC,CPTII,S$GLB, | Performed by: STUDENT IN AN ORGANIZED HEALTH CARE EDUCATION/TRAINING PROGRAM

## 2023-12-05 RX ORDER — BENZONATATE 100 MG/1
100 CAPSULE ORAL 3 TIMES DAILY PRN
Qty: 90 CAPSULE | Refills: 0 | Status: ON HOLD | OUTPATIENT
Start: 2023-12-05 | End: 2024-01-08 | Stop reason: HOSPADM

## 2023-12-05 RX ORDER — MONTELUKAST SODIUM 10 MG/1
10 TABLET ORAL NIGHTLY
Qty: 90 TABLET | Refills: 3 | Status: ON HOLD | OUTPATIENT
Start: 2023-12-05 | End: 2024-01-08 | Stop reason: HOSPADM

## 2023-12-05 RX ORDER — BUDESONIDE AND FORMOTEROL FUMARATE DIHYDRATE 160; 4.5 UG/1; UG/1
2 AEROSOL RESPIRATORY (INHALATION) EVERY 12 HOURS
Qty: 10.2 G | Refills: 5 | Status: SHIPPED | OUTPATIENT
Start: 2023-12-05 | End: 2024-12-04

## 2023-12-05 RX ORDER — PREDNISONE 5 MG/1
15 TABLET ORAL DAILY
Qty: 90 TABLET | Refills: 0 | Status: ON HOLD | OUTPATIENT
Start: 2023-12-05 | End: 2024-01-08

## 2023-12-05 RX ORDER — CLONIDINE HYDROCHLORIDE 0.1 MG/1
0.2 TABLET ORAL 2 TIMES DAILY
Qty: 360 TABLET | Refills: 3 | Status: ON HOLD | OUTPATIENT
Start: 2023-12-05 | End: 2024-01-08 | Stop reason: HOSPADM

## 2023-12-05 NOTE — PROGRESS NOTES
Primary Care  Office Visit - In Person  12/5/2023      HPI    Patient is a 78 y.o.   Michela Franco  has a past medical history of Allergy, Asthma, Chronic diastolic heart failure (4/5/2018), Glaucoma suspect, Hyperlipidemia, Hypertension, Neuromuscular disorder, JOHN on CPAP, Osteoporosis, Other neutropenia (8/31/2023), Recurrent sinusitis (3/25/2014), Recurrent upper respiratory infection (URI), and Urticaria.    Patient presenting for f/u          Active Medications:  Current Outpatient Medications   Medication Instructions    ADVAIR DISKUS 500-50 mcg/dose DsDv diskus inhaler INHALE 1 PUFF TWICE DAILY    albuterol (VENTOLIN HFA) 90 mcg/actuation inhaler 2 puffs, Inhalation, Every 6 hours PRN, Rescue    albuterol-ipratropium (DUO-NEB) 2.5 mg-0.5 mg/3 mL nebulizer solution 3 mLs, Nebulization, Every 6 hours PRN    albuterol-ipratropium (DUO-NEB) 2.5 mg-0.5 mg/3 mL nebulizer solution 3 mLs, Nebulization, Every 6 hours PRN, Rescue    azelastine (ASTELIN) 274 mcg, Nasal, 2 times daily    benralizumab 30 mg, Subcutaneous, Every 28 days    carboxymethylcellulose sodium (REFRESH TEARS) 0.5 % Drop 1 drop, Ophthalmic, 3 times daily PRN    cholecalciferol (vitamin D3) (VITAMIN D3) 360 mg, Oral, Daily, Take 6 capsules (6,000 units total) by mouth once daily     cloNIDine (CATAPRES) 0.2 mg, Oral, 2 times daily    COD LIVER OIL ORAL 1 tablet, Oral, Daily    dupilumab 300 mg, Subcutaneous, Every 14 days    ferrous sulfate 325 mg, Oral, 3 times daily with meals    fluticasone propionate (FLONASE) 100 mcg, Each Nostril, 2 times daily    furosemide (LASIX) 20 mg, Oral    GAVILYTE-G 236-22.74-6.74 -5.86 gram suspension 4,000 mLs, Oral, Once    guaiFENesin (MUCINEX) 600 mg, Oral, 2 times daily    hydrocortisone 2.5 % cream Topical (Top), 2 times daily, Prn face rash.Stop using steroid topical when skin is smooth and non itchy.  Do not treat dark or red coloring.    milk thistle 150 mg Cap 1 capsule, Oral, Daily    multivitamin  "(THERAGRAN) per tablet 1 tablet, Oral, Daily,      potassium chloride SA (K-DUR,KLOR-CON) 20 MEQ tablet 20 mEq, Oral, Daily    predniSONE (DELTASONE) 20 mg, Oral, Daily    RSVPreF3 antigen-AS01E, PF, (AREXVY, PF,) 120 mcg/0.5 mL SusR vaccine Intramuscular    SENNA 8.6 mg tablet TAKE 2 TAB(S) BY MOUTH NIGHTLY AS NEEDED FOR CONSTIPATION    tezepelumab-ekko 210 mg, Subcutaneous, Every 28 days    tretinoin (RETIN-A) 0.05 % cream Topical (Top), Nightly, Start with every other night and move up to nightly after 2 weeks if not too dry.    valsartan (DIOVAN) 320 mg, Oral, Daily       Vitals:    12/05/23 0927 12/05/23 1015   BP: (!) 168/80 (!) 162/78   BP Location: Right arm    Pulse: 102    Temp: 97.8 °F (36.6 °C)    SpO2: 95%    Weight: 85.4 kg (188 lb 4.4 oz)    Height: 5' 5" (1.651 m)        Physical Exam  Vitals reviewed.   Constitutional:       General: She is not in acute distress.  Cardiovascular:      Rate and Rhythm: Normal rate and regular rhythm.      Pulses: Normal pulses.      Heart sounds: Normal heart sounds.   Pulmonary:      Effort: Pulmonary effort is normal.      Breath sounds: Wheezing present.   Abdominal:      General: Abdomen is flat. Bowel sounds are normal. There is distension.      Palpations: Abdomen is soft.      Tenderness: There is no abdominal tenderness.   Musculoskeletal:      Right lower leg: No edema.      Left lower leg: No edema.   Neurological:      General: No focal deficit present.      Mental Status: She is oriented to person, place, and time.          Assessment and Plan     1. Primary hypertension  Comments:  Continue valsartan 320 mg daily   Increase clonidine from 0.1 to 0.2 mg BID  Prednisone likely contributing  Orders:  -     cloNIDine (CATAPRES) 0.1 MG tablet; Take 2 tablets (0.2 mg total) by mouth 2 (two) times daily.  Dispense: 360 tablet; Refill: 3    2. Severe persistent asthma with acute exacerbation  Comments:  Followed by allergy/imuno   Patient on prolonged course of " steroids   F/u scheduled                   Upcoming Scheduled Appointments and Follow Up:    Future Appointments   Date Time Provider Department Center   12/5/2023  2:00 PM Joe King, DO [facility] [address]   1/3/2024  8:00 AM Joe King, DO [facility]   1/16/2024 11:00 AM NURSE, [facility]   2/14/2024  8:40 AM China Cook NP [facility]       Follow Up [facility] (with who? when?): Follow up in about 6 weeks (around 1/16/2024) for nursing bp check .      Extended Emergency Contact Information  Primary Emergency Contact: Charley Ahmadi  Address: [address] [place] [address]  Mobile Phone: [phone]  Relation: Sister      Mayela Broussard MD   Internal Medicine  12/5/2023 - 9:52 AM    I spent a total of 36 minutes on the day of the visit.This includes face to face time and non-face to face time preparing to see the patient (eg, review of tests), obtaining and/or reviewing separately obtained history, documenting clinical information in the electronic or other health record, independently interpreting results and communicating results to the patient/family/caregiver, or care coordinator.    While patients have the right to access their medical record, it is essential to recognize that progress notes primarily serve as a means of communication among healthcare professionals.       soft

## 2023-12-05 NOTE — PROGRESS NOTES
ALLERGY & IMMUNOLOGY CLINIC - Follow Up     HISTORY OF PRESENT ILLNESS     Patient ID: Michela Franco is a 78 y.o. female    CC:     HPI: Ms. Michela Franco is a 78 year old female with asthma, allergic rhinitis, and chronic pansinusitis who presents to clinic today for follow up. Last seen in clinic approximately 4 weeks ago. At last visit we decided to continue her on a longer prednisone course due to her ongoing respiratory difficulties. She states that she is feeling better since her last visit. Feeling about 75% of normal. She continues to need albuterol every 4-6 hours for hear breathing. Otherwise currently on duonebs q6hrs, additional albuterol 2-3x daily, dupixent 300mg q14 days, advair 500/50 1 puff BID, Singulair 10mg daily, and prednisone 20mg daily (has been on for approximately 1 month).    At last visit also attempted to switch her from dupilumab to tezepelumab. This change was denied by her insurance company. They are requiring a trial of benralizumab first. Discussed this with patient and she is ok with doing of trial of benralizumab.  Prescription to benralizumab sent today.     Also requesting a change of her Advair due to insurance request.       REVIEW OF SYSTEMS     Review of Systems   Constitutional: Negative.    HENT:  Positive for congestion.    Eyes: Negative.    Respiratory:  Positive for cough and shortness of breath.    Cardiovascular: Negative.    Musculoskeletal: Negative.    Skin: Negative.    Neurological: Negative.    Psychiatric/Behavioral: Negative.         Balance of review of systems negative except as mentioned above     MEDICAL HISTORY     MedHx: active problems reviewed  SurgHx:   Past Surgical History:   Procedure Laterality Date    CATARACT EXTRACTION W/  INTRAOCULAR LENS IMPLANT Right 2/11/2021    Procedure: EXTRACTION, CATARACT, WITH IOL INSERTION;  Surgeon: John Merino MD;  Location: [facility];  Service: Ophthalmology;  Laterality: Right;    CATARACT EXTRACTION W/   INTRAOCULAR LENS IMPLANT Left 3/4/2021    Procedure: EXTRACTION, CATARACT, WITH IOL INSERTION;  Surgeon: Jhon Merino MD;  Location: [facility] OR;  Service: Ophthalmology;  Laterality: Left;    COLONOSCOPY N/A 5/18/2018    Procedure: COLONOSCOPY;  Surgeon: Calixto Brian MD;  Location: [facility] ENDO (4TH FLR);  Service: Endoscopy;  Laterality: N/A;    COLONOSCOPY N/A 10/24/2023    Procedure: COLONOSCOPY;  Surgeon: Ward Merino MD;  Location: [facility] ENDO (4TH FLR);  Service: Endoscopy;  Laterality: N/A;  ref by / golytely [facility] [id number]  10/17-lvm for precall-MS  10/20-precall complete-MS    HYSTERECTOMY      total    OOPHORECTOMY      ROTATOR CUFF REPAIR Left     SINUS SURGERY         Otherwise no Family History of asthma, allergic rhinitis, atopic dermatitis, drug allergy, food allergy, venom allergy or immune deficiency.     Allergies: see below  Medications: MAR reviewed       PHYSICAL EXAM     Physical Exam  Constitutional:       Appearance: Normal appearance.   HENT:      Head: Normocephalic and atraumatic.   Eyes:      Conjunctiva/sclera: Conjunctivae normal.   Cardiovascular:      Rate and Rhythm: Normal rate and regular rhythm.   Pulmonary:      Effort: Pulmonary effort is normal.      Breath sounds: Normal breath sounds.   Skin:     General: Skin is warm and dry.   Neurological:      General: No focal deficit present.      Mental Status: She is alert.   Psychiatric:         Mood and Affect: Mood normal.         Behavior: Behavior normal.         ALLERGEN TESTING     Immunocaps:   Component      Latest Ref Rng & Units 12/18/2019   Allergen Acremonium (Cephalosporium) IgE      <0.10 kU/L <0.10   Allergen Acremonium (Cephalosporium) Class       CLASS 0   Botrytis Cinerea      <0.10 kU/L <0.10   Botrytis Cinerea Class       CLASS 0   Allergen Candida albicans IgE      <0.10 kU/L 0.68 (H)   Allergen Candida albicans Class       CLASS 1   Chaetomium      <0.10 kU/L <0.10   Chaetomium Glob. Class        CLASS 0   Cladosporium, IgE      <0.10 kU/L <0.10   Cladosporium Class       CLASS 0   Curvularia lunata      <0.10 kU/L <0.10   Curvularia Lunata Class       CLASS 0   Epicoccum purpurascens, IgE      <0.10 kU/L <0.10   Epicoccum pupurascens Class       CLASS 0   Helminthosporium Halodes IgE      <0.10 kU/L <0.10   Helminthosporium Class       CLASS 0   Allergen Trichoderma Viride IgE      <0.10 kU/L <0.10   Allergen Trichoderma Viride Class       CLASS 0   Stemphyllium, IgE      <0.10 kU/L <0.10   Stemphylium Herbarum Class       CLASS 0   Allergen Rhodotorula IgE      <0.35 kU/L <0.35   Rhodotorula , IgE Class       0   Rhizopus Nigrican, IgE      <0.10 kU/L 0.31 (H)   Rhizopus Nigricans Class       CLASS 0/1   Phoma betae      <0.10 kU/L <0.10   Phoma Betae Class       CLASS 0   Penicillium, IgE      <0.10 kU/L 0.17 (H)   Penicillium Class       CLASS 0/1   Mucor racemosus, IgE      <0.10 kU/L 0.34 (H)   Mucor racemosus Class       CLASS 0/1   RAST Allergen for Trichophyton rubrum      kU/L <0.35      Component      Latest Ref Rng & Units 12/18/2019 12/18/2019 12/18/2019           3:07 PM  3:07 PM  3:07 PM   D. farinae      <0.10 kU/L     1.94 (H)   D. farinae Class           CLASS 2   Mite Dust Pteronyssinus IgE      <0.10 kU/L     0.19 (H)   D. pteronyssinus Class           CLASS 0/1   BERMUDA GRASS      <0.10 kU/L     0.71 (H)   Bermuda Grass Class           CLASS 2   Joshua Grass      <0.10 kU/L     0.72 (H)   Joshua Grass Class           CLASS 2   Flagstaff IgE      <0.10 kU/L     0.17 (H)   Flagstaff Class           CLASS 0/1   Plantain      <0.10 kU/L     0.63 (H)   English Plantain Class           CLASS 1   White Oak(Quercus alba) IgE      <0.10 kU/L     0.74 (H)   [place], Class           CLASS 2   Pecan McClain Tree      <0.10 kU/L     0.47 (H)   Pecan, Class           CLASS 1   Marshelder IgE      <0.10 kU/L     0.59 (H)   Marshelder Class           CLASS 1   Ragweed, Western IgE      <0.10 kU/L     0.54 (H)    Ragweed, Western, Class           CLASS 1   Alternaria alternata      <0.10 kU/L     <0.10   Altern. alternata Class           CLASS 0   Aspergillus Fumigatus IgE      <0.10 kU/L     0.22 (H)   A. fumigatus Class           CLASS 0/1   Cat Dander      <0.10 kU/L     <0.10   Cat Epithelium Class           CLASS 0   Cockroach, IgE      <0.10 kU/L CLASS 0/1 0.28 (H)     Dog Dander, IgE      <0.10 kU/L     0.51 (H)   Dog Dander Class           CLASS 1      Component      Latest Ref Rng & Units 12/18/2019 12/29/2017 10/30/2017 4/8/2014   Aspergillus Fumigatus IgE      <0.10 kU/L 0.22 (H)   <0.35 <0.35   A. fumigatus Class       CLASS 0/1   CLASS 0 CLASS 0   Aspergillus Antigen      <0.5 index   <0.500 0.676 (A)        Component      Latest Ref Rng & Units 2/20/2014   Aspergillus Fumigatus IgE      <0.10 kU/L 0.36 (H)   A. fumigatus Class       CLASS I   Aspergillus Antigen      <0.5 index            PULMONARY FUNCTION      PFTs:     Spirometry 8/23:   Spirometry shows mild obstruction. Lung volume determination is normal. Spirometry remains unimproved following bronchodilator. DLCO is moderately decreased. Notes: The failure to demonstrate improvement in spirometry does not preclude a clinical response to a trial of bronchodilators. DLCO interpretation based on the adjusted DLCO value due to a low hemoglobin. Discrepancy in TLC and VA makes DLCO interpretation potentially unreliable.      11/26/2021:              Pre:                   Post:  FEV1: 1.49 (82.8%)--> 1.56 (+4.1%)  FVC:   2.22  (95%)----> 2.30 (+3.6%)  Ratio:  67.37%      ----> 67.77%        5/28/2018  FVC: 2.30 (78%)  FEV1: 1.62 (70%)  Ratio: 70  No reversibility post bronchodilator.     Also performed 12/2019 in pulmonary clinic      ASSESSMENT & PLAN      Michela Franco is a 78 y.o. female with      Severe persistent asthma  -Recent PFT's showing mild obstruction but no reversibility  -She has been on dupilumab 300 mg subq every 14 days. While she  initially improved on dupixent she seems that she is no longer receiving as much benefit. Even prior to this recent exacerbation, she continues to have daily symptoms with significant quality of life impairment. As she is currently uncontrolled with severe asthma, I sent a prescription for tezepelumab 210mg q 4 weeks at last visit which was denied by her insurance company. Requiring trial of Fasenra first. Prescription for Fasenra 30mg subcutaneous q28 days sent to [facility]. She will continue dupilimab while awaiting approval then switch to Fasenra. (For future reference, patient uses [facility]. For refills, call [phone] ext [phone]. All other medications to be filled by [facility] pharmacy)   -She has been on an extended prednisone course as she continues to be symptomatic. Currently on 20mg daily. She has another 2 weeks of this dose. She will continue this for 2 more weeks then she will decrease to 15mg daily. Will reassess dose at next visit in 4 weeks.  -Per insurance, will switch her advair 500-50 mcg/dose 1 puff BID to symbicort 160/4.5 2 puffs BID.  -Continue albuterol as needed for SOB  -Continue duonebs as needed     Allergic rhinitis: Continues to have congestion during current grass pollen season.  -Continue fluticasone to 2 SEN BID  -Continue azelastine up to SEN BID  -Continue nasal irrigation BID  -AIT previously discussed. In this patient who is 78 will hold off on AIT at this time due to risk of inability to compensate in setting of possible anaphylaxis to AIT  -continue montelukast 10mg daily-refill provided today    Cough  -start benzonatate 100mg PRN up to 3x daily.        Patient discussed with Dr. Geller.  RTC 2 weeks     Joe King DO  Allergy/Immunology Fellow

## 2023-12-13 ENCOUNTER — PATIENT MESSAGE (OUTPATIENT)
Dept: PRIMARY CARE CLINIC | Facility: CLINIC | Age: 78
End: 2023-12-13
Payer: MEDICARE

## 2023-12-13 DIAGNOSIS — I10 PRIMARY HYPERTENSION: ICD-10-CM

## 2023-12-13 RX ORDER — CLONIDINE HYDROCHLORIDE 0.1 MG/1
0.2 TABLET ORAL 2 TIMES DAILY
Qty: 360 TABLET | Refills: 3 | OUTPATIENT
Start: 2023-12-13 | End: 2024-12-12

## 2023-12-13 NOTE — TELEPHONE ENCOUNTER
----- Message from Tabatha Kevin sent at 12/13/2023  9:05 AM CST -----  Contact: [phone]@[email or web address]  Requesting an RX refill or new RX.cloNIDine (CATAPRES) 0.1 MG tablet  Is this a refill or new RX: refill   RX name and strength (copy/paste from chart):    Is this a 30 day or 90 day RX:   Pharmacy name and phone #  [id number] - [place], [place] - [address]   Phone: [phone]  The doctors have asked that we provide their patients with the following 2 reminders -- prescription refills can take up to 72 hours, and a friendly reminder that in the future you can use your [facility] account to request refills:

## 2023-12-13 NOTE — TELEPHONE ENCOUNTER
Per pt, med won't be available from Mail order until Feb. 2024. Would like script sent to [facility] instead

## 2023-12-13 NOTE — TELEPHONE ENCOUNTER
No care due was identified.  [facility] Embedded Care Due Messages. Reference number: [id number].   12/13/2023 9:17:56 AM CST

## 2023-12-19 ENCOUNTER — TELEPHONE (OUTPATIENT)
Dept: ALLERGY | Facility: CLINIC | Age: 78
End: 2023-12-19
Payer: MEDICARE

## 2023-12-19 NOTE — TELEPHONE ENCOUNTER
Don King ,    Patient assistance form has been placed on your desk for a signature.     Thank you,  Fabi

## 2023-12-19 NOTE — TELEPHONE ENCOUNTER
----- Message from Bianca Jerez sent at 12/8/2023 10:49 AM CST -----  Regarding: Patient assistance program  Hi Dr. King and team,     I am assisting Michela Franco with applying for the patient assistance program for medication Fasnera.. I have faxed your office the form at [phone]. Please review, sign, date, and return to my attention at your earliest convenience. Once we have received this form and patient's portion, we will submit to the  for review.     Thank you,  Bianca Jerez, CPT [facility]  Ph. [phone]  Fax [phone]

## 2024-01-01 ENCOUNTER — HOSPITAL ENCOUNTER (INPATIENT)
Facility: HOSPITAL | Age: 79
LOS: 7 days | Discharge: HOME | DRG: 291 | End: 2024-01-08
Attending: STUDENT IN AN ORGANIZED HEALTH CARE EDUCATION/TRAINING PROGRAM | Admitting: STUDENT IN AN ORGANIZED HEALTH CARE EDUCATION/TRAINING PROGRAM
Payer: MEDICARE

## 2024-01-01 DIAGNOSIS — R06.02 SOB (SHORTNESS OF BREATH): ICD-10-CM

## 2024-01-01 DIAGNOSIS — E87.5 HYPERKALEMIA: ICD-10-CM

## 2024-01-01 DIAGNOSIS — R07.9 CHEST PAIN: ICD-10-CM

## 2024-01-01 DIAGNOSIS — R05.9 COUGH: ICD-10-CM

## 2024-01-01 DIAGNOSIS — R79.89 ELEVATED TROPONIN LEVEL NOT DUE MYOCARDIAL INFARCTION: ICD-10-CM

## 2024-01-01 DIAGNOSIS — I50.9 ACUTE ON CHRONIC CONGESTIVE HEART FAILURE, UNSPECIFIED HEART FAILURE TYPE: Primary | ICD-10-CM

## 2024-01-01 DIAGNOSIS — E87.1 HYPONATREMIA: ICD-10-CM

## 2024-01-01 DIAGNOSIS — N17.9 AKI (ACUTE KIDNEY INJURY): ICD-10-CM

## 2024-01-01 DIAGNOSIS — J45.51 SEVERE PERSISTENT ASTHMA WITH ACUTE EXACERBATION: ICD-10-CM

## 2024-01-01 DIAGNOSIS — E80.6 BILIRUBINEMIA: ICD-10-CM

## 2024-01-01 DIAGNOSIS — R79.89 ELEVATED TROPONIN: ICD-10-CM

## 2024-01-01 DIAGNOSIS — J18.9 PNEUMONIA DUE TO INFECTIOUS ORGANISM, UNSPECIFIED LATERALITY, UNSPECIFIED PART OF LUNG: ICD-10-CM

## 2024-01-01 DIAGNOSIS — I10 PRIMARY HYPERTENSION: ICD-10-CM

## 2024-01-01 DIAGNOSIS — I50.23 ACUTE ON CHRONIC SYSTOLIC HEART FAILURE: ICD-10-CM

## 2024-01-01 PROBLEM — I21.4 NSTEMI (NON-ST ELEVATED MYOCARDIAL INFARCTION): Status: ACTIVE | Noted: 2024-01-01

## 2024-01-01 PROBLEM — Z71.89 ACP (ADVANCE CARE PLANNING): Status: ACTIVE | Noted: 2024-01-01

## 2024-01-01 LAB
ALBUMIN SERPL BCP-MCNC: 3.2 G/DL (ref 3.5–5.2)
ALP SERPL-CCNC: 106 U/L (ref 55–135)
ALT SERPL W/O P-5'-P-CCNC: 68 U/L (ref 10–44)
ANION GAP SERPL CALC-SCNC: 14 MMOL/L (ref 8–16)
ANION GAP SERPL CALC-SCNC: 17 MMOL/L (ref 8–16)
AST SERPL-CCNC: 60 U/L (ref 10–40)
BASOPHILS # BLD AUTO: 0.03 K/UL (ref 0–0.2)
BASOPHILS NFR BLD: 0.4 % (ref 0–1.9)
BILIRUB DIRECT SERPL-MCNC: 0.5 MG/DL (ref 0.1–0.3)
BILIRUB SERPL-MCNC: 1.5 MG/DL (ref 0.1–1)
BNP SERPL-MCNC: 4700 PG/ML (ref 0–99)
BUN SERPL-MCNC: 20 MG/DL (ref 8–23)
BUN SERPL-MCNC: 23 MG/DL (ref 8–23)
CALCIUM SERPL-MCNC: 9.1 MG/DL (ref 8.7–10.5)
CALCIUM SERPL-MCNC: 9.3 MG/DL (ref 8.7–10.5)
CHLORIDE SERPL-SCNC: 101 MMOL/L (ref 95–110)
CHLORIDE SERPL-SCNC: 99 MMOL/L (ref 95–110)
CO2 SERPL-SCNC: 21 MMOL/L (ref 23–29)
CO2 SERPL-SCNC: 24 MMOL/L (ref 23–29)
CREAT SERPL-MCNC: 0.9 MG/DL (ref 0.5–1.4)
CREAT SERPL-MCNC: 1.1 MG/DL (ref 0.5–1.4)
DIFFERENTIAL METHOD BLD: ABNORMAL
EOSINOPHIL # BLD AUTO: 0 K/UL (ref 0–0.5)
EOSINOPHIL NFR BLD: 0.1 % (ref 0–8)
ERYTHROCYTE [DISTWIDTH] IN BLOOD BY AUTOMATED COUNT: 18.5 % (ref 11.5–14.5)
EST. GFR  (NO RACE VARIABLE): 52.1 ML/MIN/1.73 M^2
EST. GFR  (NO RACE VARIABLE): >60 ML/MIN/1.73 M^2
GLUCOSE SERPL-MCNC: 112 MG/DL (ref 70–110)
GLUCOSE SERPL-MCNC: 133 MG/DL (ref 70–110)
HCT VFR BLD AUTO: 38.8 % (ref 37–48.5)
HGB BLD-MCNC: 13 G/DL (ref 12–16)
IMM GRANULOCYTES # BLD AUTO: 0.01 K/UL (ref 0–0.04)
IMM GRANULOCYTES NFR BLD AUTO: 0.1 % (ref 0–0.5)
INFLUENZA A, MOLECULAR: NEGATIVE
INFLUENZA B, MOLECULAR: NEGATIVE
LACTATE SERPL-SCNC: 2.6 MMOL/L (ref 0.5–2.2)
LIPASE SERPL-CCNC: 13 U/L (ref 4–60)
LYMPHOCYTES # BLD AUTO: 1.5 K/UL (ref 1–4.8)
LYMPHOCYTES NFR BLD: 20.7 % (ref 18–48)
MAGNESIUM SERPL-MCNC: 1.9 MG/DL (ref 1.6–2.6)
MCH RBC QN AUTO: 26.9 PG (ref 27–31)
MCHC RBC AUTO-ENTMCNC: 33.5 G/DL (ref 32–36)
MCV RBC AUTO: 80 FL (ref 82–98)
MONOCYTES # BLD AUTO: 0.8 K/UL (ref 0.3–1)
MONOCYTES NFR BLD: 10.3 % (ref 4–15)
NEUTROPHILS # BLD AUTO: 5 K/UL (ref 1.8–7.7)
NEUTROPHILS NFR BLD: 68.4 % (ref 38–73)
NRBC BLD-RTO: 0 /100 WBC
PLATELET # BLD AUTO: 183 K/UL (ref 150–450)
PMV BLD AUTO: 11.1 FL (ref 9.2–12.9)
POTASSIUM SERPL-SCNC: 4 MMOL/L (ref 3.5–5.1)
POTASSIUM SERPL-SCNC: 4.2 MMOL/L (ref 3.5–5.1)
PROT SERPL-MCNC: 6.6 G/DL (ref 6–8.4)
RBC # BLD AUTO: 4.84 M/UL (ref 4–5.4)
SARS-COV-2 RDRP RESP QL NAA+PROBE: NEGATIVE
SODIUM SERPL-SCNC: 134 MMOL/L (ref 136–145)
SODIUM SERPL-SCNC: 142 MMOL/L (ref 136–145)
SPECIMEN SOURCE: NORMAL
TROPONIN I SERPL DL<=0.01 NG/ML-MCNC: 0.46 NG/ML (ref 0–0.03)
TROPONIN I SERPL DL<=0.01 NG/ML-MCNC: 0.49 NG/ML (ref 0–0.03)
WBC # BLD AUTO: 7.35 K/UL (ref 3.9–12.7)

## 2024-01-01 PROCEDURE — 63600175 PHARM REV CODE 636 W HCPCS: Performed by: STUDENT IN AN ORGANIZED HEALTH CARE EDUCATION/TRAINING PROGRAM

## 2024-01-01 PROCEDURE — 12000002 HC ACUTE/MED SURGE SEMI-PRIVATE ROOM: Mod: HCNC

## 2024-01-01 PROCEDURE — 83690 ASSAY OF LIPASE: CPT | Performed by: STUDENT IN AN ORGANIZED HEALTH CARE EDUCATION/TRAINING PROGRAM

## 2024-01-01 PROCEDURE — 96374 THER/PROPH/DIAG INJ IV PUSH: CPT | Mod: HCNC

## 2024-01-01 PROCEDURE — 99285 EMERGENCY DEPT VISIT HI MDM: CPT | Mod: 25,HCNC

## 2024-01-01 PROCEDURE — 85025 COMPLETE CBC W/AUTO DIFF WBC: CPT | Performed by: STUDENT IN AN ORGANIZED HEALTH CARE EDUCATION/TRAINING PROGRAM

## 2024-01-01 PROCEDURE — 94640 AIRWAY INHALATION TREATMENT: CPT | Mod: HCNC

## 2024-01-01 PROCEDURE — 93005 ELECTROCARDIOGRAM TRACING: CPT | Mod: HCNC | Performed by: INTERNAL MEDICINE

## 2024-01-01 PROCEDURE — 84484 ASSAY OF TROPONIN QUANT: CPT | Performed by: STUDENT IN AN ORGANIZED HEALTH CARE EDUCATION/TRAINING PROGRAM

## 2024-01-01 PROCEDURE — 82248 BILIRUBIN DIRECT: CPT | Performed by: STUDENT IN AN ORGANIZED HEALTH CARE EDUCATION/TRAINING PROGRAM

## 2024-01-01 PROCEDURE — 25000003 PHARM REV CODE 250: Mod: HCNC | Performed by: STUDENT IN AN ORGANIZED HEALTH CARE EDUCATION/TRAINING PROGRAM

## 2024-01-01 PROCEDURE — 94640 AIRWAY INHALATION TREATMENT: CPT | Mod: HCNC,XB

## 2024-01-01 PROCEDURE — 80048 BASIC METABOLIC PNL TOTAL CA: CPT | Mod: HCNC,XB | Performed by: STUDENT IN AN ORGANIZED HEALTH CARE EDUCATION/TRAINING PROGRAM

## 2024-01-01 PROCEDURE — 25000242 PHARM REV CODE 250 ALT 637 W/ HCPCS: Mod: HCNC | Performed by: STUDENT IN AN ORGANIZED HEALTH CARE EDUCATION/TRAINING PROGRAM

## 2024-01-01 PROCEDURE — 84484 ASSAY OF TROPONIN QUANT: CPT | Mod: 91 | Performed by: STUDENT IN AN ORGANIZED HEALTH CARE EDUCATION/TRAINING PROGRAM

## 2024-01-01 PROCEDURE — 94761 N-INVAS EAR/PLS OXIMETRY MLT: CPT | Mod: HCNC

## 2024-01-01 PROCEDURE — 87502 INFLUENZA DNA AMP PROBE: CPT | Performed by: STUDENT IN AN ORGANIZED HEALTH CARE EDUCATION/TRAINING PROGRAM

## 2024-01-01 PROCEDURE — 83605 ASSAY OF LACTIC ACID: CPT | Mod: HCNC | Performed by: STUDENT IN AN ORGANIZED HEALTH CARE EDUCATION/TRAINING PROGRAM

## 2024-01-01 PROCEDURE — 80053 COMPREHEN METABOLIC PANEL: CPT | Performed by: STUDENT IN AN ORGANIZED HEALTH CARE EDUCATION/TRAINING PROGRAM

## 2024-01-01 PROCEDURE — 63600175 PHARM REV CODE 636 W HCPCS: Mod: HCNC | Performed by: STUDENT IN AN ORGANIZED HEALTH CARE EDUCATION/TRAINING PROGRAM

## 2024-01-01 PROCEDURE — 93005 ELECTROCARDIOGRAM TRACING: CPT | Mod: HCNC

## 2024-01-01 PROCEDURE — 11000001 HC ACUTE MED/SURG PRIVATE ROOM: Mod: HCNC

## 2024-01-01 PROCEDURE — 83735 ASSAY OF MAGNESIUM: CPT | Performed by: STUDENT IN AN ORGANIZED HEALTH CARE EDUCATION/TRAINING PROGRAM

## 2024-01-01 PROCEDURE — 93010 ELECTROCARDIOGRAM REPORT: CPT | Mod: HCNC,,, | Performed by: INTERNAL MEDICINE

## 2024-01-01 PROCEDURE — U0002 COVID-19 LAB TEST NON-CDC: HCPCS | Performed by: STUDENT IN AN ORGANIZED HEALTH CARE EDUCATION/TRAINING PROGRAM

## 2024-01-01 PROCEDURE — 83880 ASSAY OF NATRIURETIC PEPTIDE: CPT | Performed by: STUDENT IN AN ORGANIZED HEALTH CARE EDUCATION/TRAINING PROGRAM

## 2024-01-01 RX ORDER — BUDESONIDE 0.5 MG/2ML
0.5 INHALANT ORAL EVERY 12 HOURS
Status: DISCONTINUED | OUTPATIENT
Start: 2024-01-01 | End: 2024-01-08 | Stop reason: HOSPADM

## 2024-01-01 RX ORDER — CLONIDINE HYDROCHLORIDE 0.1 MG/1
0.2 TABLET ORAL 2 TIMES DAILY
Status: DISCONTINUED | OUTPATIENT
Start: 2024-01-01 | End: 2024-01-02

## 2024-01-01 RX ORDER — SODIUM CHLORIDE 0.9 % (FLUSH) 0.9 %
10 SYRINGE (ML) INJECTION EVERY 12 HOURS PRN
Status: DISCONTINUED | OUTPATIENT
Start: 2024-01-01 | End: 2024-01-08 | Stop reason: HOSPADM

## 2024-01-01 RX ORDER — NALOXONE HCL 0.4 MG/ML
0.02 VIAL (ML) INJECTION
Status: DISCONTINUED | OUTPATIENT
Start: 2024-01-01 | End: 2024-01-08 | Stop reason: HOSPADM

## 2024-01-01 RX ORDER — PREDNISONE 20 MG/1
20 TABLET ORAL DAILY
Status: DISCONTINUED | OUTPATIENT
Start: 2024-01-02 | End: 2024-01-06

## 2024-01-01 RX ORDER — BENZONATATE 100 MG/1
200 CAPSULE ORAL 3 TIMES DAILY PRN
Status: DISCONTINUED | OUTPATIENT
Start: 2024-01-01 | End: 2024-01-08 | Stop reason: HOSPADM

## 2024-01-01 RX ORDER — ENOXAPARIN SODIUM 100 MG/ML
40 INJECTION SUBCUTANEOUS EVERY 24 HOURS
Status: DISCONTINUED | OUTPATIENT
Start: 2024-01-01 | End: 2024-01-08 | Stop reason: HOSPADM

## 2024-01-01 RX ORDER — SIMETHICONE 80 MG
1 TABLET,CHEWABLE ORAL 4 TIMES DAILY PRN
Status: DISCONTINUED | OUTPATIENT
Start: 2024-01-01 | End: 2024-01-08 | Stop reason: HOSPADM

## 2024-01-01 RX ORDER — IBUPROFEN 200 MG
24 TABLET ORAL
Status: DISCONTINUED | OUTPATIENT
Start: 2024-01-01 | End: 2024-01-08 | Stop reason: HOSPADM

## 2024-01-01 RX ORDER — FUROSEMIDE 10 MG/ML
80 INJECTION INTRAMUSCULAR; INTRAVENOUS
Status: COMPLETED | OUTPATIENT
Start: 2024-01-01 | End: 2024-01-01

## 2024-01-01 RX ORDER — VALSARTAN 160 MG/1
320 TABLET ORAL DAILY
Status: DISCONTINUED | OUTPATIENT
Start: 2024-01-01 | End: 2024-01-02

## 2024-01-01 RX ORDER — IBUPROFEN 200 MG
16 TABLET ORAL
Status: DISCONTINUED | OUTPATIENT
Start: 2024-01-01 | End: 2024-01-08 | Stop reason: HOSPADM

## 2024-01-01 RX ORDER — MAG HYDROX/ALUMINUM HYD/SIMETH 200-200-20
30 SUSPENSION, ORAL (FINAL DOSE FORM) ORAL 4 TIMES DAILY PRN
Status: DISCONTINUED | OUTPATIENT
Start: 2024-01-01 | End: 2024-01-08 | Stop reason: HOSPADM

## 2024-01-01 RX ORDER — HYDRALAZINE HYDROCHLORIDE 25 MG/1
25 TABLET, FILM COATED ORAL EVERY 6 HOURS PRN
Status: DISCONTINUED | OUTPATIENT
Start: 2024-01-01 | End: 2024-01-08 | Stop reason: HOSPADM

## 2024-01-01 RX ORDER — MONTELUKAST SODIUM 10 MG/1
10 TABLET ORAL NIGHTLY
Status: DISCONTINUED | OUTPATIENT
Start: 2024-01-01 | End: 2024-01-08 | Stop reason: HOSPADM

## 2024-01-01 RX ORDER — POLYETHYLENE GLYCOL 3350 17 G/17G
17 POWDER, FOR SOLUTION ORAL 2 TIMES DAILY PRN
Status: DISCONTINUED | OUTPATIENT
Start: 2024-01-01 | End: 2024-01-08 | Stop reason: HOSPADM

## 2024-01-01 RX ORDER — FUROSEMIDE 10 MG/ML
80 INJECTION INTRAMUSCULAR; INTRAVENOUS ONCE
Status: COMPLETED | OUTPATIENT
Start: 2024-01-01 | End: 2024-01-01

## 2024-01-01 RX ORDER — ACETAMINOPHEN 325 MG/1
650 TABLET ORAL EVERY 4 HOURS PRN
Status: DISCONTINUED | OUTPATIENT
Start: 2024-01-01 | End: 2024-01-08 | Stop reason: HOSPADM

## 2024-01-01 RX ORDER — GLUCAGON 1 MG
1 KIT INJECTION
Status: DISCONTINUED | OUTPATIENT
Start: 2024-01-01 | End: 2024-01-08 | Stop reason: HOSPADM

## 2024-01-01 RX ORDER — TALC
6 POWDER (GRAM) TOPICAL NIGHTLY PRN
Status: DISCONTINUED | OUTPATIENT
Start: 2024-01-01 | End: 2024-01-08 | Stop reason: HOSPADM

## 2024-01-01 RX ORDER — METHYLPREDNISOLONE SOD SUCC 125 MG
125 VIAL (EA) INJECTION
Status: COMPLETED | OUTPATIENT
Start: 2024-01-01 | End: 2024-01-01

## 2024-01-01 RX ORDER — FLUTICASONE PROPIONATE 50 MCG
2 SPRAY, SUSPENSION (ML) NASAL 2 TIMES DAILY
Status: DISCONTINUED | OUTPATIENT
Start: 2024-01-01 | End: 2024-01-08 | Stop reason: HOSPADM

## 2024-01-01 RX ORDER — IPRATROPIUM BROMIDE AND ALBUTEROL SULFATE 2.5; .5 MG/3ML; MG/3ML
3 SOLUTION RESPIRATORY (INHALATION) EVERY 4 HOURS
Status: DISCONTINUED | OUTPATIENT
Start: 2024-01-01 | End: 2024-01-08 | Stop reason: HOSPADM

## 2024-01-01 RX ORDER — IPRATROPIUM BROMIDE AND ALBUTEROL SULFATE 2.5; .5 MG/3ML; MG/3ML
3 SOLUTION RESPIRATORY (INHALATION)
Status: COMPLETED | OUTPATIENT
Start: 2024-01-01 | End: 2024-01-01

## 2024-01-01 RX ORDER — AMOXICILLIN 250 MG
1 CAPSULE ORAL 2 TIMES DAILY
Status: DISCONTINUED | OUTPATIENT
Start: 2024-01-01 | End: 2024-01-08 | Stop reason: HOSPADM

## 2024-01-01 RX ADMIN — BENZONATATE 200 MG: 100 CAPSULE ORAL at 09:01

## 2024-01-01 RX ADMIN — IPRATROPIUM BROMIDE AND ALBUTEROL SULFATE 3 ML: .5; 3 SOLUTION RESPIRATORY (INHALATION) at 08:01

## 2024-01-01 RX ADMIN — VALSARTAN 320 MG: 160 TABLET, FILM COATED ORAL at 02:01

## 2024-01-01 RX ADMIN — IPRATROPIUM BROMIDE AND ALBUTEROL SULFATE 3 ML: .5; 3 SOLUTION RESPIRATORY (INHALATION) at 04:01

## 2024-01-01 RX ADMIN — MONTELUKAST 10 MG: 10 TABLET, FILM COATED ORAL at 09:01

## 2024-01-01 RX ADMIN — BENZONATATE 200 MG: 100 CAPSULE ORAL at 03:01

## 2024-01-01 RX ADMIN — FUROSEMIDE 80 MG: 10 INJECTION, SOLUTION INTRAVENOUS at 01:01

## 2024-01-01 RX ADMIN — CLONIDINE HYDROCHLORIDE 0.2 MG: 0.2 TABLET ORAL at 09:01

## 2024-01-01 RX ADMIN — IPRATROPIUM BROMIDE AND ALBUTEROL SULFATE 3 ML: .5; 3 SOLUTION RESPIRATORY (INHALATION) at 11:01

## 2024-01-01 RX ADMIN — CEFEPIME 2 G: 2 INJECTION, POWDER, FOR SOLUTION INTRAVENOUS at 08:01

## 2024-01-01 RX ADMIN — FUROSEMIDE 80 MG: 10 INJECTION, SOLUTION INTRAVENOUS at 08:01

## 2024-01-01 RX ADMIN — VANCOMYCIN HYDROCHLORIDE 1750 MG: 500 INJECTION, POWDER, LYOPHILIZED, FOR SOLUTION INTRAVENOUS at 03:01

## 2024-01-01 RX ADMIN — METHYLPREDNISOLONE SODIUM SUCCINATE 125 MG: 125 INJECTION, POWDER, FOR SOLUTION INTRAMUSCULAR; INTRAVENOUS at 11:01

## 2024-01-01 RX ADMIN — ENOXAPARIN SODIUM 40 MG: 40 INJECTION SUBCUTANEOUS at 08:01

## 2024-01-01 RX ADMIN — BUDESONIDE 0.5 MG: 0.5 INHALANT ORAL at 08:01

## 2024-01-01 RX ADMIN — AZITHROMYCIN MONOHYDRATE 500 MG: 500 INJECTION, POWDER, LYOPHILIZED, FOR SOLUTION INTRAVENOUS at 01:01

## 2024-01-01 RX ADMIN — CEFTRIAXONE SODIUM 1 G: 1 INJECTION, POWDER, FOR SOLUTION INTRAMUSCULAR; INTRAVENOUS at 01:01

## 2024-01-01 NOTE — LETTER
"Michela "Felecia Franco was seen and treated in our hospital from  1/1/2024 to 1/8/24.   She may return to work on 1/16/24.     Patient presented to the hospital in respiratory distress due to acute decompensated heart failure. It is imperative to limit strenuous activity until further notice.     If you have any questions or concerns, please don't hesitate to call.        Marta Jauregui M.D.  Internal Medicine PGY-1  [facility]    "

## 2024-01-01 NOTE — ED TRIAGE NOTES
79 y/o F presents to ER with c/c SOB and bilateral LE edema. Pt states she has had SOB for months with a recent admission for pneumonia, but endorses new bilateral LE edema in last two days and denies h/x CHF, c/p, N/V/D, fevers and chills.

## 2024-01-01 NOTE — PROGRESS NOTES
Pharmacokinetic Initial Assessment: IV Vancomycin    Assessment/Plan:  Administer loading dose of vancomycin 1750 mg (~20 mg/kg) once. Follow with a maintenance regimen of vancomycin 1250 mg (~15 mg/kg) IV every 24 hours  Baseline Scr ~0.7-8, Scr 0.9 today  Desired empiric serum trough concentration is 10 to 20 mcg/mL  Draw vancomycin trough level 60 min prior to third dose on 1/3/24 at approximately 1400, or sooner if clinically indicated.    Pharmacy will continue to follow and monitor vancomycin.      Please contact pharmacy with any questions regarding this assessment.     Thank you for the consult,   Nicola Bonner, PharmD       Patient brief summary:  Michela Franco is a 78 y.o. female initiated on antimicrobial therapy with IV Vancomycin for treatment of suspected lower respiratory infection.      Actual Body Weight:   83.9 kg    Renal Function:   Estimated Creatinine Clearance: 56.9 mL/min (based on SCr of 0.9 mg/dL).    Dialysis Method (if applicable):  N/A    Drug Allergies:   Review of patient's allergies indicates:   Allergen Reactions    Metoprolol Shortness Of Breath    Adhesive      PAPER TAPE    Diazepam Other (See Comments)     Nervous, jittery    Gabapentin      Nervous      Keppra [levetiracetam]      nervous    Mold      sneezing    Calcium channel blocking agents-dihydropyridines Other (See Comments)     Leg swelling     Thiazides Other (See Comments)     Hyponatremia        CBC (last 72 hours):  Recent Labs   Lab Result Units 01/01/24  1121   WBC K/uL 7.35   Hemoglobin g/dL 13.0   Hematocrit % 38.8   Platelets K/uL 183   Gran % % 68.4   Lymph % % 20.7   Mono % % 10.3   Eosinophil % % 0.1   Basophil % % 0.4   Differential Method  Automated       Metabolic Panel (last 72 hours):  Recent Labs   Lab Result Units 01/01/24  1121   Sodium mmol/L 142   Potassium mmol/L 4.2   Chloride mmol/L 101   CO2 mmol/L 24   Glucose mg/dL 112*   BUN mg/dL 20   Creatinine mg/dL 0.9   Albumin g/dL 3.2*   Total  "Bilirubin mg/dL 1.5*   Alkaline Phosphatase U/L 106   AST U/L 60*   ALT U/L 68*   Magnesium mg/dL 1.9       Drug levels (last 3 results):  No results for input(s): "VANCOMYCINRA", "VANCORANDOM", "VANCOMYCINPE", "VANCOPEAK", "VANCOMYCINTR", "VANCOTROUGH" in the last 72 hours.    Microbiologic Results:  Microbiology Results (last 7 days)       Procedure Component Value Units Date/Time    Culture, Respiratory with Gram Stain [[id number]]     Order Status: No result Specimen: Sputum, Expectorated     Influenza A & B by Molecular [[id number]] Collected: 01/01/24 1122    Order Status: Completed Specimen: Nasopharyngeal Swab Updated: 01/01/24 1159     Influenza A, Molecular Negative     Influenza B, Molecular Negative     Flu A & B Source Nasal swab           "

## 2024-01-01 NOTE — Clinical Note
Diagnosis: Acute on chronic congestive heart failure, unspecified heart failure type [2512492]   Future Attending Provider: NE LOWERY [[id number]]   Admitting Provider:: DIOGO BOSS [[id number]]   Reason for IP Medical Treatment  (Clinical interventions that can only be accomplished in the IP setting? ) :: CHF exacerbation/PNA   I certify that Inpatient services for greater than or equal to 2 midnights are medically necessary:: Yes   Plans for Post-Acute care--if anticipated (pick the single best option):: A. No post acute care anticipated at this time

## 2024-01-01 NOTE — ED PROVIDER NOTES
Encounter Date: 1/1/2024       History     Chief Complaint   Patient presents with    Shortness of Breath    Leg Swelling     Hx asthma     HPI    78-year-old female past medical history asthma, CHF, hypertension who presents to the ER for evaluation of dyspnea.  The patient states that over the last several days she has had worsening shortness of breath at rest with associated leg swelling.  She has been on her feet a lot the last few days.  Home medications are not helping.  She had pneumonia several months ago and feels she never fully recovered from that.  Patient has been on chronic steroids for some time.  She denies any fevers, chills, chest pain, syncope, nausea, vomiting, abdominal pain, or any trauma.      Review of patient's allergies indicates:   Allergen Reactions    Metoprolol Shortness Of Breath    Adhesive      PAPER TAPE    Diazepam Other (See Comments)     Nervous, jittery    Gabapentin      Nervous      Keppra [levetiracetam]      nervous    Mold      sneezing    Calcium channel blocking agents-dihydropyridines Other (See Comments)     Leg swelling     Thiazides Other (See Comments)     Hyponatremia      Past Medical History:   Diagnosis Date    Allergy     Asthma     Chronic diastolic heart failure 4/5/2018    Glaucoma suspect     Hyperlipidemia     Hypertension     Neuromuscular disorder     JOHN on CPAP     Osteoporosis     Other neutropenia 8/31/2023    Recurrent sinusitis 3/25/2014    Recurrent upper respiratory infection (URI)     Urticaria      Past Surgical History:   Procedure Laterality Date    CATARACT EXTRACTION W/  INTRAOCULAR LENS IMPLANT Right 2/11/2021    Procedure: EXTRACTION, CATARACT, WITH IOL INSERTION;  Surgeon: John Merino MD;  Location: [facility];  Service: Ophthalmology;  Laterality: Right;    CATARACT EXTRACTION W/  INTRAOCULAR LENS IMPLANT Left 3/4/2021    Procedure: EXTRACTION, CATARACT, WITH IOL INSERTION;  Surgeon: John Merino MD;  Location: [facility];  Service:  Ophthalmology;  Laterality: Left;    COLONOSCOPY N/A 2018    Procedure: COLONOSCOPY;  Surgeon: Calixto Brian MD;  Location: [facility] ENDO (4TH FLR);  Service: Endoscopy;  Laterality: N/A;    COLONOSCOPY N/A 10/24/2023    Procedure: COLONOSCOPY;  Surgeon: Ward Merino MD;  Location: [facility] ENDO (4TH FLR);  Service: Endoscopy;  Laterality: N/A;  ref by / golytely [facility] [id number]  10/17-lvm for precall-MS  10/20-precall complete-MS    HYSTERECTOMY      total    OOPHORECTOMY      ROTATOR CUFF REPAIR Left     SINUS SURGERY       Family History   Problem Relation Age of Onset    No Known Problems Mother         healthy in 90s    No Known Problems Father         accident-related death in 50s    Kidney cancer Sister     Cancer Sister         renal    Hypertension Daughter     Ovarian cancer Sister     Hypertension Daughter      Social History     Tobacco Use    Smoking status: Former     Current packs/day: 0.00     Average packs/day: 0.3 packs/day for 40.0 years (10.0 ttl pk-yrs)     Types: Cigarettes     Start date: 1965     Quit date: 2005     Years since quittin.0    Smokeless tobacco: Never   Substance Use Topics    Alcohol use: Yes     Comment: rare beer    Drug use: No     Review of Systems   Constitutional:  Negative for fever.   Respiratory:  Positive for cough and shortness of breath.    Gastrointestinal:  Negative for nausea.   Genitourinary:  Negative for dysuria.   Musculoskeletal:  Negative for back pain.   Neurological:  Positive for weakness.   Hematological:  Does not bruise/bleed easily.       Physical Exam     Initial Vitals [24 1032]   BP Pulse Resp Temp SpO2   (!) 204/97 100 (!) 24 98.2 °F (36.8 °C) 100 %      MAP       --         Physical Exam    Nursing note and vitals reviewed.  Constitutional: She appears well-nourished.   HENT:   Head: Atraumatic.   Eyes: EOM are normal.   Neck: Neck supple.   Cardiovascular:  Normal rate and regular rhythm.            Pulmonary/Chest:   End-expiratory wheezing, coarse breath sounds throughout   Abdominal: Abdomen is soft. There is no abdominal tenderness.   Musculoskeletal:         General: Normal range of motion.      Cervical back: Neck supple.      Comments: 3+ pitting edema bilateral lower extremities     Neurological: She is alert and oriented to person, place, and time.   Skin: Skin is warm and dry.   Psychiatric: She has a normal mood and affect. Thought content normal.         ED Course   Procedures  Labs Reviewed   CBC W/ AUTO DIFFERENTIAL - Abnormal; Notable for the following components:       Result Value    MCV 80 (*)     MCH 26.9 (*)     RDW 18.5 (*)     All other components within normal limits   COMPREHENSIVE METABOLIC PANEL - Abnormal; Notable for the following components:    Glucose 112 (*)     Albumin 3.2 (*)     Total Bilirubin 1.5 (*)     AST 60 (*)     ALT 68 (*)     Anion Gap 17 (*)     All other components within normal limits   B-TYPE NATRIURETIC PEPTIDE - Abnormal; Notable for the following components:    BNP 4,700 (*)     All other components within normal limits   TROPONIN I - Abnormal; Notable for the following components:    Troponin I 0.461 (*)     All other components within normal limits   INFLUENZA A & B BY MOLECULAR   LIPASE   MAGNESIUM   SARS-COV-2 RNA AMPLIFICATION, QUAL          Imaging Results              X-Ray Chest AP Portable (Final result)  Result time 01/01/24 12:16:00      Final result by Asif Rothman Jr., MD (01/01/24 12:16:00)                   Impression:      Bilateral pneumonia      Electronically signed by: Asif Romero Jr  Date:    01/01/2024  Time:    12:16               Narrative:    EXAMINATION:  XR CHEST AP PORTABLE    CLINICAL HISTORY:  Cough, unspecified    TECHNIQUE:  Single frontal view of the chest was performed.    COMPARISON:  11/14/2023    FINDINGS:  Cardiomediastinal silhouetteremains enlarged.  Aortic atherosclerosis is present.    Patchy  interstitial lung opacity right greater than left redemonstrated, possibly related to pneumonia.    Stable blunting of the left costophrenic sulcus probably postinflammatory in nature.                                       Medications   furosemide injection 80 mg (has no administration in time range)   azithromycin (ZITHROMAX) 500 mg in dextrose 5 % (D5W) 250 mL IVPB (Vial-Mate) (has no administration in time range)   cefTRIAXone (ROCEPHIN) 1 g in dextrose 5 % in water (D5W) 100 mL IVPB (MB+) (has no administration in time range)   albuterol-ipratropium 2.5 mg-0.5 mg/3 mL nebulizer solution 3 mL (3 mLs Nebulization Given 1/1/24 1124)   methylPREDNISolone sodium succinate injection 125 mg (125 mg Intravenous Given 1/1/24 1122)     Medical Decision Making  Amount and/or Complexity of Data Reviewed  Labs: ordered.  Radiology: ordered.    Risk  Prescription drug management.  Decision regarding hospitalization.                     EKG:  Rate 102  Sinus tachycardia  Left axis deviation  No STEMI                   78-year-old female presenting with dyspnea.  Vital signs stable in emergency room, wheezing and coarse breath sounds throughout the bases.  Notable pedal edema.  EKG no STEMI.  Chest x-ray showing multiple infiltrates, versus fluid overload.  She will be diuresed with 80 mg IV Lasix, and treated with IV antibiotics for presumed pneumonia as well.  Labs with no leukocytosis, electrolytes normal, troponin and BNP elevated  Admitted to hospital medicine.        Clinical Impression:  Final diagnoses:  [R06.02] SOB (shortness of breath)  [R05.9] Cough  [I50.9] Acute on chronic congestive heart failure, unspecified heart failure type (Primary)  [J18.9] Pneumonia due to infectious organism, unspecified laterality, unspecified part of lung          ED Disposition Condition    Admit                 Karson Soriano MD  01/01/24 1300

## 2024-01-01 NOTE — ED NOTES
Patient identifiers for Michela Franco 78 y.o. female checked and correct.  Chief Complaint   Patient presents with    Shortness of Breath    Leg Swelling     Hx asthma     Past Medical History:   Diagnosis Date    Allergy     Asthma     Chronic diastolic heart failure 4/5/2018    Glaucoma suspect     Hyperlipidemia     Hypertension     Neuromuscular disorder     JOHN on CPAP     Osteoporosis     Other neutropenia 8/31/2023    Recurrent sinusitis 3/25/2014    Recurrent upper respiratory infection (URI)     Urticaria      Allergies reported:   Review of patient's allergies indicates:   Allergen Reactions    Metoprolol Shortness Of Breath    Adhesive      PAPER TAPE    Diazepam Other (See Comments)     Nervous, jittery    Gabapentin      Nervous      Keppra [levetiracetam]      nervous    Mold      sneezing    Calcium channel blocking agents-dihydropyridines Other (See Comments)     Leg swelling     Thiazides Other (See Comments)     Hyponatremia          LOC: Patient is awake, alert, and aware of environment with an appropriate affect. Patient is oriented x 4 and speaking appropriately.  APPEARANCE: Patient resting comfortably and in no acute distress. Patient is clean and well groomed, patient's clothing is properly fastened.  HEENT: - JVD, + midline trach  SKIN: The skin is warm and dry. Patient has normal skin turgor and moist mucus membranes.   MUSKULOSKELETAL: Patient is moving all extremities well, no obvious deformities noted. Pulses intact. PMS x 4  RESPIRATORY: Airway is open and patent. Respirations are spontaneous and non-labored with normal effort and rate. = CBBS. Productive cough. Endorses SOB  CARDIAC: Patient has a normal rate and rhythm. 94 on cardiac monitor. Bilateral LE edema. Denies c/p. + 2 bilateral pedal and radial pulses, < 3 s cap refill  ABDOMEN: No distention noted. Soft and non-tender upon palpation.  NEUROLOGICAL: pupils 4 mm, PERRL. Facial expression is symmetrical. Hand grasps are  equal bilaterally. Normal sensation in all extremities when touched with finger.

## 2024-01-01 NOTE — PROGRESS NOTES
Pharmacist Renal Dose Adjustment Note    Michela Franco is a 78 y.o. female being treated with the medication cefepime.    Patient Data:    Vital Signs (Most Recent):  Temp: 98.3 °F (36.8 °C) (01/01/24 1112)  Pulse: 101 (01/01/24 1330)  Resp: 18 (01/01/24 1330)  BP: (!) 155/104 (01/01/24 1330)  SpO2: 97 % (01/01/24 1330) Vital Signs (72h Range):  Temp:  [98.2 °F (36.8 °C)-98.3 °F (36.8 °C)]   Pulse:  []   Resp:  [17-24]   BP: (155-204)/()   SpO2:  [97 %-100 %]      Recent Labs   Lab 01/01/24  1121   CREATININE 0.9     Serum creatinine: 0.9 mg/dL 01/01/24 1121  Estimated creatinine clearance: 56.9 mL/min    Medication: Cefepime 2 grams every 8 hours will be changed to cefepime 2 grams every 12 hours.     Pharmacist's Name: Nicola Bonner, PharmD

## 2024-01-02 ENCOUNTER — DOCUMENTATION ONLY (OUTPATIENT)
Dept: CARDIOLOGY | Facility: CLINIC | Age: 79
End: 2024-01-02
Payer: MEDICARE

## 2024-01-02 LAB
ALBUMIN SERPL BCP-MCNC: 3.2 G/DL (ref 3.5–5.2)
ALP SERPL-CCNC: 101 U/L (ref 55–135)
ALT SERPL W/O P-5'-P-CCNC: 89 U/L (ref 10–44)
ANION GAP SERPL CALC-SCNC: 15 MMOL/L (ref 8–16)
ASCENDING AORTA: 3.21 CM
AST SERPL-CCNC: 85 U/L (ref 10–40)
AV INDEX (PROSTH): 0.87
AV MEAN GRADIENT: 2 MMHG
AV PEAK GRADIENT: 3 MMHG
AV VALVE AREA BY VELOCITY RATIO: 3.11 CM²
AV VALVE AREA: 3 CM²
AV VELOCITY RATIO: 0.91
BASOPHILS # BLD AUTO: 0 K/UL (ref 0–0.2)
BASOPHILS NFR BLD: 0 % (ref 0–1.9)
BILIRUB SERPL-MCNC: 1.2 MG/DL (ref 0.1–1)
BSA FOR ECHO PROCEDURE: 1.96 M2
BUN SERPL-MCNC: 24 MG/DL (ref 8–23)
CALCIUM SERPL-MCNC: 9.1 MG/DL (ref 8.7–10.5)
CHLORIDE SERPL-SCNC: 99 MMOL/L (ref 95–110)
CO2 SERPL-SCNC: 21 MMOL/L (ref 23–29)
CREAT SERPL-MCNC: 1.1 MG/DL (ref 0.5–1.4)
CV ECHO LV RWT: 0.28 CM
DIFFERENTIAL METHOD BLD: ABNORMAL
DOP CALC AO PEAK VEL: 0.85 M/S
DOP CALC AO VTI: 15.01 CM
DOP CALC LVOT AREA: 3.4 CM2
DOP CALC LVOT DIAMETER: 2.09 CM
DOP CALC LVOT PEAK VEL: 0.77 M/S
DOP CALC LVOT STROKE VOLUME: 44.99 CM3
DOP CALCLVOT PEAK VEL VTI: 13.12 CM
E WAVE DECELERATION TIME: 140.58 MSEC
E/A RATIO: 2.12
E/E' RATIO: 9.17 M/S
ECHO LV POSTERIOR WALL: 0.79 CM (ref 0.6–1.1)
EJECTION FRACTION: 23 %
EOSINOPHIL # BLD AUTO: 0 K/UL (ref 0–0.5)
EOSINOPHIL NFR BLD: 0 % (ref 0–8)
ERYTHROCYTE [DISTWIDTH] IN BLOOD BY AUTOMATED COUNT: 18.3 % (ref 11.5–14.5)
EST. GFR  (NO RACE VARIABLE): 52.1 ML/MIN/1.73 M^2
FRACTIONAL SHORTENING: 15 % (ref 28–44)
GLUCOSE SERPL-MCNC: 143 MG/DL (ref 70–110)
HCT VFR BLD AUTO: 39.2 % (ref 37–48.5)
HGB BLD-MCNC: 12.9 G/DL (ref 12–16)
IMM GRANULOCYTES # BLD AUTO: 0.02 K/UL (ref 0–0.04)
IMM GRANULOCYTES NFR BLD AUTO: 0.5 % (ref 0–0.5)
INTERVENTRICULAR SEPTUM: 0.81 CM (ref 0.6–1.1)
IVRT: 74.22 MSEC
LA MAJOR: 6.22 CM
LA MINOR: 6.53 CM
LA WIDTH: 5.06 CM
LACTATE SERPL-SCNC: 2.2 MMOL/L (ref 0.5–2.2)
LACTATE SERPL-SCNC: 2.4 MMOL/L (ref 0.5–2.2)
LEFT ATRIUM SIZE: 4.88 CM
LEFT ATRIUM VOLUME INDEX MOD: 70.7 ML/M2
LEFT ATRIUM VOLUME INDEX: 70 ML/M2
LEFT ATRIUM VOLUME MOD: 135 CM3
LEFT ATRIUM VOLUME: 133.73 CM3
LEFT INTERNAL DIMENSION IN SYSTOLE: 4.81 CM (ref 2.1–4)
LEFT VENTRICLE DIASTOLIC VOLUME INDEX: 81.77 ML/M2
LEFT VENTRICLE DIASTOLIC VOLUME: 156.19 ML
LEFT VENTRICLE MASS INDEX: 87 G/M2
LEFT VENTRICLE SYSTOLIC VOLUME INDEX: 56.5 ML/M2
LEFT VENTRICLE SYSTOLIC VOLUME: 107.9 ML
LEFT VENTRICULAR INTERNAL DIMENSION IN DIASTOLE: 5.64 CM (ref 3.5–6)
LEFT VENTRICULAR MASS: 167.08 G
LV LATERAL E/E' RATIO: 6.11 M/S
LV SEPTAL E/E' RATIO: 18.33 M/S
LYMPHOCYTES # BLD AUTO: 0.9 K/UL (ref 1–4.8)
LYMPHOCYTES NFR BLD: 20.4 % (ref 18–48)
MAGNESIUM SERPL-MCNC: 1.8 MG/DL (ref 1.6–2.6)
MCH RBC QN AUTO: 27.2 PG (ref 27–31)
MCHC RBC AUTO-ENTMCNC: 32.9 G/DL (ref 32–36)
MCV RBC AUTO: 83 FL (ref 82–98)
MONOCYTES # BLD AUTO: 0.3 K/UL (ref 0.3–1)
MONOCYTES NFR BLD: 8.2 % (ref 4–15)
MV PEAK A VEL: 0.26 M/S
MV PEAK E VEL: 0.55 M/S
MV STENOSIS PRESSURE HALF TIME: 40.77 MS
MV VALVE AREA P 1/2 METHOD: 5.4 CM2
NEUTROPHILS # BLD AUTO: 3 K/UL (ref 1.8–7.7)
NEUTROPHILS NFR BLD: 70.9 % (ref 38–73)
NRBC BLD-RTO: 0 /100 WBC
PISA TR MAX VEL: 2.71 M/S
PLATELET # BLD AUTO: 173 K/UL (ref 150–450)
PMV BLD AUTO: 13 FL (ref 9.2–12.9)
POTASSIUM SERPL-SCNC: 4 MMOL/L (ref 3.5–5.1)
PROT SERPL-MCNC: 6.6 G/DL (ref 6–8.4)
RA MAJOR: 5.28 CM
RA PRESSURE ESTIMATED: 15 MMHG
RA WIDTH: 4.73 CM
RBC # BLD AUTO: 4.75 M/UL (ref 4–5.4)
RIGHT VENTRICULAR END-DIASTOLIC DIMENSION: 4.18 CM
RV TB RVSP: 18 MMHG
SINUS: 3.27 CM
SODIUM SERPL-SCNC: 135 MMOL/L (ref 136–145)
STJ: 2.93 CM
TDI LATERAL: 0.09 M/S
TDI SEPTAL: 0.03 M/S
TDI: 0.06 M/S
TR MAX PG: 29 MMHG
TRICUSPID ANNULAR PLANE SYSTOLIC EXCURSION: 1.33 CM
TV REST PULMONARY ARTERY PRESSURE: 44 MMHG
WBC # BLD AUTO: 4.16 K/UL (ref 3.9–12.7)
Z-SCORE OF LEFT VENTRICULAR DIMENSION IN END DIASTOLE: 0.48
Z-SCORE OF LEFT VENTRICULAR DIMENSION IN END SYSTOLE: 2.95

## 2024-01-02 PROCEDURE — 27000221 HC OXYGEN, UP TO 24 HOURS: Mod: HCNC

## 2024-01-02 PROCEDURE — 99223 1ST HOSP IP/OBS HIGH 75: CPT | Mod: HCNC,GC,, | Performed by: INTERNAL MEDICINE

## 2024-01-02 PROCEDURE — 36415 COLL VENOUS BLD VENIPUNCTURE: CPT | Mod: HCNC | Performed by: STUDENT IN AN ORGANIZED HEALTH CARE EDUCATION/TRAINING PROGRAM

## 2024-01-02 PROCEDURE — 25000003 PHARM REV CODE 250: Mod: HCNC | Performed by: STUDENT IN AN ORGANIZED HEALTH CARE EDUCATION/TRAINING PROGRAM

## 2024-01-02 PROCEDURE — 99900035 HC TECH TIME PER 15 MIN (STAT): Mod: HCNC

## 2024-01-02 PROCEDURE — 20000000 HC ICU ROOM: Mod: HCNC

## 2024-01-02 PROCEDURE — 83605 ASSAY OF LACTIC ACID: CPT | Mod: HCNC | Performed by: STUDENT IN AN ORGANIZED HEALTH CARE EDUCATION/TRAINING PROGRAM

## 2024-01-02 PROCEDURE — 94761 N-INVAS EAR/PLS OXIMETRY MLT: CPT | Mod: HCNC

## 2024-01-02 PROCEDURE — 94799 UNLISTED PULMONARY SVC/PX: CPT | Mod: HCNC

## 2024-01-02 PROCEDURE — 94640 AIRWAY INHALATION TREATMENT: CPT | Mod: HCNC

## 2024-01-02 PROCEDURE — 5A09357 ASSISTANCE WITH RESPIRATORY VENTILATION, LESS THAN 24 CONSECUTIVE HOURS, CONTINUOUS POSITIVE AIRWAY PRESSURE: ICD-10-PCS | Performed by: INTERNAL MEDICINE

## 2024-01-02 PROCEDURE — 63600175 PHARM REV CODE 636 W HCPCS: Mod: HCNC | Performed by: STUDENT IN AN ORGANIZED HEALTH CARE EDUCATION/TRAINING PROGRAM

## 2024-01-02 PROCEDURE — 94660 CPAP INITIATION&MGMT: CPT | Mod: HCNC

## 2024-01-02 PROCEDURE — 25000242 PHARM REV CODE 250 ALT 637 W/ HCPCS: Mod: HCNC | Performed by: STUDENT IN AN ORGANIZED HEALTH CARE EDUCATION/TRAINING PROGRAM

## 2024-01-02 PROCEDURE — 85025 COMPLETE CBC W/AUTO DIFF WBC: CPT | Mod: HCNC | Performed by: STUDENT IN AN ORGANIZED HEALTH CARE EDUCATION/TRAINING PROGRAM

## 2024-01-02 PROCEDURE — 83605 ASSAY OF LACTIC ACID: CPT | Mod: 91,HCNC | Performed by: STUDENT IN AN ORGANIZED HEALTH CARE EDUCATION/TRAINING PROGRAM

## 2024-01-02 PROCEDURE — 87040 BLOOD CULTURE FOR BACTERIA: CPT | Mod: HCNC | Performed by: STUDENT IN AN ORGANIZED HEALTH CARE EDUCATION/TRAINING PROGRAM

## 2024-01-02 PROCEDURE — 80053 COMPREHEN METABOLIC PANEL: CPT | Mod: HCNC | Performed by: STUDENT IN AN ORGANIZED HEALTH CARE EDUCATION/TRAINING PROGRAM

## 2024-01-02 PROCEDURE — 27100171 HC OXYGEN HIGH FLOW UP TO 24 HOURS: Mod: HCNC

## 2024-01-02 PROCEDURE — 27000190 HC CPAP FULL FACE MASK W/VALVE: Mod: HCNC

## 2024-01-02 PROCEDURE — 83735 ASSAY OF MAGNESIUM: CPT | Mod: HCNC | Performed by: STUDENT IN AN ORGANIZED HEALTH CARE EDUCATION/TRAINING PROGRAM

## 2024-01-02 RX ORDER — HYDRALAZINE HYDROCHLORIDE 25 MG/1
25 TABLET, FILM COATED ORAL EVERY 8 HOURS
Status: DISCONTINUED | OUTPATIENT
Start: 2024-01-02 | End: 2024-01-06

## 2024-01-02 RX ORDER — DOBUTAMINE HYDROCHLORIDE 400 MG/100ML
2.5 INJECTION INTRAVENOUS CONTINUOUS
Status: DISCONTINUED | OUTPATIENT
Start: 2024-01-02 | End: 2024-01-04

## 2024-01-02 RX ORDER — FUROSEMIDE 10 MG/ML
80 INJECTION INTRAMUSCULAR; INTRAVENOUS ONCE
Status: COMPLETED | OUTPATIENT
Start: 2024-01-02 | End: 2024-01-02

## 2024-01-02 RX ADMIN — BUDESONIDE 0.5 MG: 0.5 INHALANT ORAL at 08:01

## 2024-01-02 RX ADMIN — VANCOMYCIN HYDROCHLORIDE 1250 MG: 1.25 INJECTION, POWDER, LYOPHILIZED, FOR SOLUTION INTRAVENOUS at 02:01

## 2024-01-02 RX ADMIN — IPRATROPIUM BROMIDE AND ALBUTEROL SULFATE 3 ML: .5; 3 SOLUTION RESPIRATORY (INHALATION) at 08:01

## 2024-01-02 RX ADMIN — CEFEPIME 2 G: 2 INJECTION, POWDER, FOR SOLUTION INTRAVENOUS at 04:01

## 2024-01-02 RX ADMIN — BENZONATATE 200 MG: 100 CAPSULE ORAL at 09:01

## 2024-01-02 RX ADMIN — BUDESONIDE 0.5 MG: 0.5 INHALANT ORAL at 07:01

## 2024-01-02 RX ADMIN — FLUTICASONE PROPIONATE 100 MCG: 50 SPRAY, METERED NASAL at 11:01

## 2024-01-02 RX ADMIN — IPRATROPIUM BROMIDE AND ALBUTEROL SULFATE 3 ML: .5; 3 SOLUTION RESPIRATORY (INHALATION) at 03:01

## 2024-01-02 RX ADMIN — FLUTICASONE PROPIONATE 100 MCG: 50 SPRAY, METERED NASAL at 01:01

## 2024-01-02 RX ADMIN — FUROSEMIDE 20 MG/HR: 10 INJECTION, SOLUTION INTRAMUSCULAR; INTRAVENOUS at 04:01

## 2024-01-02 RX ADMIN — FUROSEMIDE 80 MG: 10 INJECTION, SOLUTION INTRAVENOUS at 04:01

## 2024-01-02 RX ADMIN — SENNOSIDES AND DOCUSATE SODIUM 1 TABLET: 8.6; 5 TABLET ORAL at 09:01

## 2024-01-02 RX ADMIN — CEFEPIME 2 G: 2 INJECTION, POWDER, FOR SOLUTION INTRAVENOUS at 03:01

## 2024-01-02 RX ADMIN — IPRATROPIUM BROMIDE AND ALBUTEROL SULFATE 3 ML: .5; 3 SOLUTION RESPIRATORY (INHALATION) at 04:01

## 2024-01-02 RX ADMIN — IPRATROPIUM BROMIDE AND ALBUTEROL SULFATE 3 ML: .5; 3 SOLUTION RESPIRATORY (INHALATION) at 11:01

## 2024-01-02 RX ADMIN — FUROSEMIDE 80 MG: 10 INJECTION, SOLUTION INTRAVENOUS at 11:01

## 2024-01-02 RX ADMIN — SENNOSIDES AND DOCUSATE SODIUM 1 TABLET: 8.6; 5 TABLET ORAL at 08:01

## 2024-01-02 RX ADMIN — VALSARTAN 320 MG: 160 TABLET, FILM COATED ORAL at 09:01

## 2024-01-02 RX ADMIN — DOBUTAMINE HYDROCHLORIDE 5 MCG/KG/MIN: 400 INJECTION INTRAVENOUS at 06:01

## 2024-01-02 RX ADMIN — HYDRALAZINE HYDROCHLORIDE 25 MG: 25 TABLET ORAL at 10:01

## 2024-01-02 RX ADMIN — FLUTICASONE PROPIONATE 100 MCG: 50 SPRAY, METERED NASAL at 09:01

## 2024-01-02 RX ADMIN — CLONIDINE HYDROCHLORIDE 0.2 MG: 0.2 TABLET ORAL at 09:01

## 2024-01-02 RX ADMIN — PREDNISONE 20 MG: 20 TABLET ORAL at 09:01

## 2024-01-02 RX ADMIN — BENZONATATE 200 MG: 100 CAPSULE ORAL at 08:01

## 2024-01-02 RX ADMIN — IPRATROPIUM BROMIDE AND ALBUTEROL SULFATE 3 ML: .5; 3 SOLUTION RESPIRATORY (INHALATION) at 12:01

## 2024-01-02 RX ADMIN — MONTELUKAST 10 MG: 10 TABLET, FILM COATED ORAL at 08:01

## 2024-01-02 RX ADMIN — IPRATROPIUM BROMIDE AND ALBUTEROL SULFATE 3 ML: .5; 3 SOLUTION RESPIRATORY (INHALATION) at 07:01

## 2024-01-02 RX ADMIN — ENOXAPARIN SODIUM 40 MG: 40 INJECTION SUBCUTANEOUS at 04:01

## 2024-01-02 NOTE — SUBJECTIVE & OBJECTIVE
Past Medical History:   Diagnosis Date    Allergy     Asthma     Chronic diastolic heart failure 4/5/2018    Glaucoma suspect     Hyperlipidemia     Hypertension     Neuromuscular disorder     NSTEMI (non-ST elevated myocardial infarction) 1/1/2024    JOHN on CPAP     Osteoporosis     Other neutropenia 8/31/2023    Recurrent sinusitis 3/25/2014    Recurrent upper respiratory infection (URI)     Urticaria        Past Surgical History:   Procedure Laterality Date    CATARACT EXTRACTION W/  INTRAOCULAR LENS IMPLANT Right 2/11/2021    Procedure: EXTRACTION, CATARACT, WITH IOL INSERTION;  Surgeon: John Merino MD;  Location: [facility] OR;  Service: Ophthalmology;  Laterality: Right;    CATARACT EXTRACTION W/  INTRAOCULAR LENS IMPLANT Left 3/4/2021    Procedure: EXTRACTION, CATARACT, WITH IOL INSERTION;  Surgeon: John Meirno MD;  Location: [facility] OR;  Service: Ophthalmology;  Laterality: Left;    COLONOSCOPY N/A 5/18/2018    Procedure: COLONOSCOPY;  Surgeon: Calixto Brian MD;  Location: [facility] ENDO (4TH FLR);  Service: Endoscopy;  Laterality: N/A;    COLONOSCOPY N/A 10/24/2023    Procedure: COLONOSCOPY;  Surgeon: Ward Merino MD;  [facility]  Service: Endoscopy;  Laterality: N/A;  ref by / golytely [facility] [id number]  10/17-lvm for precall-MS  10/20-precall complete-MS    HYSTERECTOMY      total    OOPHORECTOMY      ROTATOR CUFF REPAIR Left     SINUS SURGERY         Review of patient's allergies indicates:   Allergen Reactions    Metoprolol Shortness Of Breath    Adhesive      PAPER TAPE    Diazepam Other (See Comments)     Nervous, jittery    Gabapentin      Nervous      Keppra [levetiracetam]      nervous    Mold      sneezing    Calcium channel blocking agents-dihydropyridines Other (See Comments)     Leg swelling     Thiazides Other (See Comments)     Hyponatremia        No current facility-administered medications on file prior to encounter.     Current Outpatient Medications on  File Prior to Encounter   Medication Sig    ADVAIR DISKUS 500-50 mcg/dose DsDv diskus inhaler INHALE 1 PUFF TWICE DAILY    albuterol (VENTOLIN HFA) 90 mcg/actuation inhaler Inhale 2 puffs into the lungs every 6 (six) hours as needed for Wheezing. Rescue    azelastine (ASTELIN) 137 mcg (0.1 %) nasal spray 2 sprays (274 mcg total) by Nasal route 2 (two) times daily.    cloNIDine (CATAPRES) 0.1 MG tablet Take 2 tablets (0.2 mg total) by mouth 2 (two) times daily.    albuterol-ipratropium (DUO-NEB) 2.5 mg-0.5 mg/3 mL nebulizer solution Take 3 mLs by nebulization every 6 (six) hours as needed for Wheezing.    benralizumab 30 mg/mL AtIn Inject 30 mg into the skin every 28 days. For first 3 doses    benralizumab 30 mg/mL AtIn Inject 30 mg into the skin every 8 weeks.    benzonatate (TESSALON) 100 MG capsule Take 1 capsule (100 mg total) by mouth 3 (three) times daily as needed for Cough.    budesonide-formoterol 160-4.5 mcg (SYMBICORT) 160-4.5 mcg/actuation HFAA Inhale 2 puffs into the lungs every 12 (twelve) hours. Controller    carboxymethylcellulose sodium (REFRESH TEARS) 0.5 % Drop Apply 1 drop to eye 3 (three) times daily as needed (dry eyes).    cholecalciferol, vitamin D3, 10,000 unit Cap Take 360 mg by mouth once daily. Take 6 capsules (6,000 units total) by mouth once daily    COD LIVER OIL ORAL Take 1 tablet by mouth once daily.    dupilumab 300 mg/2 mL PnIj Inject 300 mg into the skin every 14 (fourteen) days.    ferrous sulfate 325 (65 FE) MG EC tablet Take 1 tablet (325 mg total) by mouth 3 (three) times daily with meals.    fluticasone propionate (FLONASE) 50 mcg/actuation nasal spray 2 sprays (100 mcg total) by Each Nostril route 2 (two) times a day.    furosemide (LASIX) 20 MG tablet TAKE 1 TABLET BY MOUTH EVERY DAY    GAVILYTE-G 236-22.74-6.74 -5.86 gram suspension Take 4,000 mLs by mouth once.    guaiFENesin (MUCINEX) 600 mg 12 hr tablet Take 1 tablet (600 mg total) by mouth 2 (two) times daily.     hydrocortisone 2.5 % cream Apply topically 2 (two) times daily. Prn face rash.Stop using steroid topical when skin is smooth and non itchy.  Do not treat dark or red coloring. (Patient not taking: Reported on 2023)    milk thistle 150 mg Cap Take 1 capsule by mouth once daily.    montelukast (SINGULAIR) 10 mg tablet Take 1 tablet (10 mg total) by mouth every evening.    multivitamin (THERAGRAN) per tablet Take 1 tablet by mouth once daily.    potassium chloride SA (K-DUR,KLOR-CON) 20 MEQ tablet Take 1 tablet (20 mEq total) by mouth once daily.    predniSONE (DELTASONE) 5 MG tablet Take 3 tablets (15 mg total) by mouth once daily.    SENNA 8.6 mg tablet TAKE 2 TAB(S) BY MOUTH NIGHTLY AS NEEDED FOR CONSTIPATION    tretinoin (RETIN-A) 0.05 % cream Apply topically every evening. Start with every other night and move up to nightly after 2 weeks if not too dry.    valsartan (DIOVAN) 320 MG tablet Take 1 tablet (320 mg total) by mouth once daily.     Family History       Problem Relation (Age of Onset)    Cancer Sister    Hypertension Daughter, Daughter    Kidney cancer Sister    No Known Problems Mother, Father    Ovarian cancer Sister          Tobacco Use    Smoking status: Former     Current packs/day: 0.00     Average packs/day: 0.3 packs/day for 40.0 years (10.0 ttl pk-yrs)     Types: Cigarettes     Start date: 1965     Quit date: 2005     Years since quittin.0    Smokeless tobacco: Never   Substance and Sexual Activity    Alcohol use: Yes     Comment: rare beer    Drug use: No    Sexual activity: Not Currently     Review of Systems   Constitutional:  Negative for chills and fever.   Eyes:  Negative for visual disturbance.   Respiratory:  Positive for cough, shortness of breath and wheezing.    Cardiovascular:  Positive for leg swelling. Negative for chest pain.   Gastrointestinal:  Negative for abdominal pain, constipation, diarrhea, nausea and vomiting.   Allergic/Immunologic: Positive for  immunocompromised state.     Objective:     Vital Signs (Most Recent):  Temp: 98.6 °F (37 °C) (01/01/24 1500)  Pulse: (!) 118 (01/01/24 1700)  Resp: 16 (01/01/24 1700)  BP: (!) 177/109 (01/01/24 1700)  SpO2: 96 % (01/01/24 1700) Vital Signs (24h Range):  Temp:  [98.2 °F (36.8 °C)-98.6 °F (37 °C)] 98.6 °F (37 °C)  Pulse:  [] 118  Resp:  [16-24] 16  SpO2:  [96 %-100 %] 96 %  BP: (138-204)/() 177/109     Weight: 83.9 kg (185 lb)  Body mass index is 30.79 kg/m².     Physical Exam  Vitals and nursing note reviewed.   Constitutional:       General: She is not in acute distress.     Appearance: Normal appearance. She is not ill-appearing.   Eyes:      Pupils: Pupils are equal, round, and reactive to light.   Cardiovascular:      Rate and Rhythm: Regular rhythm. Tachycardia present.      Heart sounds: Normal heart sounds.   Pulmonary:      Effort: No respiratory distress.      Breath sounds: Wheezing present.   Abdominal:      General: There is no distension.      Palpations: Abdomen is soft.      Tenderness: There is no abdominal tenderness.   Musculoskeletal:      Right lower leg: Edema present.      Left lower leg: Edema present.      Comments: 3+ to knees   Skin:     General: Skin is warm and dry.      Findings: No lesion or rash.   Neurological:      General: No focal deficit present.      Mental Status: She is alert and oriented to person, place, and time. Mental status is at baseline.   Psychiatric:         Mood and Affect: Mood normal.         Behavior: Behavior normal.              CRANIAL NERVES     CN III, IV, VI   Pupils are equal, round, and reactive to light.       Significant Labs: All pertinent labs within the past 24 hours have been reviewed.  CBC:   Recent Labs   Lab 01/01/24  1121   WBC 7.35   HGB 13.0   HCT 38.8        CMP:   Recent Labs   Lab 01/01/24  1121      K 4.2      CO2 24   *   BUN 20   CREATININE 0.9   CALCIUM 9.3   PROT 6.6   ALBUMIN 3.2*   BILITOT 1.5*    ALKPHOS 106   AST 60*   ALT 68*   ANIONGAP 17*     Troponin:   Recent Labs   Lab 01/01/24  1121 01/01/24  1409   TROPONINI 0.461* 0.489*       Significant Imaging: I have reviewed all pertinent imaging results/findings within the past 24 hours.  X-Ray Chest AP Portable  Narrative: EXAMINATION:  XR CHEST AP PORTABLE    CLINICAL HISTORY:  Cough, unspecified    TECHNIQUE:  Single frontal view of the chest was performed.    COMPARISON:  11/14/2023    FINDINGS:  Cardiomediastinal silhouetteremains enlarged.  Aortic atherosclerosis is present.    Patchy interstitial lung opacity right greater than left redemonstrated, possibly related to pneumonia.    Stable blunting of the left costophrenic sulcus probably postinflammatory in nature.  Impression: Bilateral pneumonia    Electronically signed by: Asif Romero Jr  Date:    01/01/2024  Time:    12:16

## 2024-01-02 NOTE — ASSESSMENT & PLAN NOTE
Patient with newly reduced EF 20-25% and not appropriately responding to IV Lasix. Physical exam concerning for cool lower extremities and volume overload. Stat lactate ordered. Will transfer to CCU tonight.     --F/u stat lactate  --Dexter for I/Os  --Lasix drip started

## 2024-01-02 NOTE — ASSESSMENT & PLAN NOTE
Follows with allergy outpatient. Was on steroid taper after chronic use. No respiratory distress currently. No concern for acute exacerbation despite wheezes on exam.   - continue home inhalers  - duonebs q4h  - Singulair  - increase home prednisone back to 20mg daily from 15mg   - consider allergy vs pulm consult if no improvement with diuresis

## 2024-01-02 NOTE — ASSESSMENT & PLAN NOTE
Chronic, uncontrolled. Latest blood pressure and vitals reviewed-     Temp:  [98.2 °F (36.8 °C)-98.6 °F (37 °C)]   Pulse:  []   Resp:  [16-24]   BP: (138-204)/()   SpO2:  [95 %-100 %] .   Home meds for hypertension were reviewed and noted below.   Hypertension Medications               cloNIDine (CATAPRES) 0.1 MG tablet Take 2 tablets (0.2 mg total) by mouth 2 (two) times daily.    furosemide (LASIX) 20 MG tablet TAKE 1 TABLET BY MOUTH EVERY DAY    valsartan (DIOVAN) 320 MG tablet Take 1 tablet (320 mg total) by mouth once daily.            While in the hospital, will manage blood pressure as follows; Continue home antihypertensive regimen    Will utilize p.r.n. blood pressure medication only if patient's blood pressure greater than 180/110 and she develops symptoms such as worsening chest pain or shortness of breath.

## 2024-01-02 NOTE — ASSESSMENT & PLAN NOTE
Troponin elevated on presentation. No history of previous elevations. Patient denies chest pain. Likely related to demand ischemia in setting of CHF exacerbation  - EKG stat for chest pain  - TTE ordered

## 2024-01-02 NOTE — CARE UPDATE
I have reviewed the chart of Michela Franco and participated in the care of the patient who is hospitalized for the following:    Active Hospital Problems    Diagnosis    *Bilateral pneumonia    Elevated troponin    Acute on chronic heart failure    ACP (advance care planning)    Bilirubinemia    Severe asthma    HTN (hypertension)          I have reviewed the Michela Franco with the multidisciplinary team during rounds.      Desiree Ortiz PA-C  [facility]

## 2024-01-02 NOTE — SUBJECTIVE & OBJECTIVE
Interval History: Patient remains on room air. Reports little change in urine output. Reports increase SOB compared to baseline. Denies chest pain.     Review of Systems  Objective:     Vital Signs (Most Recent):  Temp: 98.2 °F (36.8 °C) (01/02/24 1558)  Pulse: 88 (01/02/24 1624)  Resp: 20 (01/02/24 1624)  BP: 111/84 (01/02/24 1624)  SpO2: 100 % (01/02/24 1624) Vital Signs (24h Range):  Temp:  [97.5 °F (36.4 °C)-98.2 °F (36.8 °C)] 98.2 °F (36.8 °C)  Pulse:  [] 88  Resp:  [16-20] 20  SpO2:  [93 %-100 %] 100 %  BP: (111-177)/() 111/84     Weight: 84.2 kg (185 lb 10 oz)  Body mass index is 30.89 kg/m².    Intake/Output Summary (Last 24 hours) at 1/2/2024 1639  Last data filed at 1/2/2024 1556  Gross per 24 hour   Intake 573.52 ml   Output 550 ml   Net 23.52 ml         Physical Exam  Vitals and nursing note reviewed.   Constitutional:       General: She is not in acute distress.     Appearance: Normal appearance. She is not ill-appearing.   Eyes:      Pupils: Pupils are equal, round, and reactive to light.   Cardiovascular:      Rate and Rhythm: Regular rhythm. Tachycardia present.      Heart sounds: Normal heart sounds.   Pulmonary:      Effort: Pulmonary effort is normal. No respiratory distress.      Breath sounds: Wheezing present.   Abdominal:      General: There is no distension.      Palpations: Abdomen is soft.      Tenderness: There is no abdominal tenderness.   Musculoskeletal:      Right lower leg: Edema present.      Left lower leg: Edema present.      Comments: 3+ to knees   Skin:     General: Skin is warm and dry.      Findings: No lesion or rash.   Neurological:      General: No focal deficit present.      Mental Status: She is alert and oriented to person, place, and time. Mental status is at baseline.   Psychiatric:         Mood and Affect: Mood normal.         Behavior: Behavior normal.             Significant Labs: All pertinent labs within the past 24 hours have been  reviewed.    Significant Imaging: I have reviewed all pertinent imaging results/findings within the past 24 hours.

## 2024-01-02 NOTE — HOSPITAL COURSE
Given lasix 40mg IV x2 on 1/1 without adequate urine production, some improvement in pedal edema. Stable on room air. Lactic slightly above normal on admit, stable on repeat. Increased lasix to 80mg IV AM 1/2. TTE concerning for severe reduction in heart function. Cardiology consulted. Patient to be transferred to CICU for further care.

## 2024-01-02 NOTE — ASSESSMENT & PLAN NOTE
Troponin elevated on presentation. No history of previous elevations. Patient denies chest pain. Likely related to demand ischemia in setting of CHF exacerbation  - EKG stat for chest pain

## 2024-01-02 NOTE — ASSESSMENT & PLAN NOTE
Advance Care Planning     Date: 01/01/2024    Code Status  Patient would like to remain full code but is not interested in long term artifical support should she be in that position. She is interested in quality of life, not quantity of lime.  I spent a total of 16 minutes engaging the patient in this advance care planning discussion.

## 2024-01-02 NOTE — SUBJECTIVE & OBJECTIVE
On evaluation, she endorsed dyspnea sitting in bed. She reports she has not made much urine with the Lasix.       Past Medical History:   Diagnosis Date    Allergy     Asthma     Chronic diastolic heart failure 4/5/2018    Glaucoma suspect     Hyperlipidemia     Hypertension     Neuromuscular disorder     NSTEMI (non-ST elevated myocardial infarction) 1/1/2024    JOHN on CPAP     Osteoporosis     Other neutropenia 8/31/2023    Recurrent sinusitis 3/25/2014    Recurrent upper respiratory infection (URI)     Urticaria        Past Surgical History:   Procedure Laterality Date    CATARACT EXTRACTION W/  INTRAOCULAR LENS IMPLANT Right 2/11/2021    Procedure: EXTRACTION, CATARACT, WITH IOL INSERTION;  Surgeon: John Merino MD;  Location: [facility] OR;  Service: Ophthalmology;  Laterality: Right;    CATARACT EXTRACTION W/  INTRAOCULAR LENS IMPLANT Left 3/4/2021    Procedure: EXTRACTION, CATARACT, WITH IOL INSERTION;  Surgeon: John Merino MD;  Location: [facility] OR;  Service: Ophthalmology;  Laterality: Left;    COLONOSCOPY N/A 5/18/2018    Procedure: COLONOSCOPY;  Surgeon: Calixto Brian MD;  Location: [facility] ([facility]  Service: Endoscopy;  Laterality: N/A;    COLONOSCOPY N/A 10/24/2023    Procedure: COLONOSCOPY;  Surgeon: Ward Merino MD;  [facility]  Service: Endoscopy;  Laterality: N/A;  ref by / golytely [facility] [id number]  10/17-lvm for precall-MS  10/20-precall complete-MS    HYSTERECTOMY      total    OOPHORECTOMY      ROTATOR CUFF REPAIR Left     SINUS SURGERY         Review of patient's allergies indicates:   Allergen Reactions    Metoprolol Shortness Of Breath    Adhesive      PAPER TAPE    Diazepam Other (See Comments)     Nervous, jittery    Gabapentin      Nervous      Keppra [levetiracetam]      nervous    Mold      sneezing    Calcium channel blocking agents-dihydropyridines Other (See Comments)     Leg swelling     Thiazides Other (See Comments)     Hyponatremia         No current facility-administered medications on file prior to encounter.     Current Outpatient Medications on File Prior to Encounter   Medication Sig    ADVAIR DISKUS 500-50 mcg/dose DsDv diskus inhaler INHALE 1 PUFF TWICE DAILY    albuterol (VENTOLIN HFA) 90 mcg/actuation inhaler Inhale 2 puffs into the lungs every 6 (six) hours as needed for Wheezing. Rescue    azelastine (ASTELIN) 137 mcg (0.1 %) nasal spray 2 sprays (274 mcg total) by Nasal route 2 (two) times daily.    cloNIDine (CATAPRES) 0.1 MG tablet Take 2 tablets (0.2 mg total) by mouth 2 (two) times daily.    albuterol-ipratropium (DUO-NEB) 2.5 mg-0.5 mg/3 mL nebulizer solution Take 3 mLs by nebulization every 6 (six) hours as needed for Wheezing.    benralizumab 30 mg/mL AtIn Inject 30 mg into the skin every 28 days. For first 3 doses    benralizumab 30 mg/mL AtIn Inject 30 mg into the skin every 8 weeks.    benzonatate (TESSALON) 100 MG capsule Take 1 capsule (100 mg total) by mouth 3 (three) times daily as needed for Cough.    budesonide-formoterol 160-4.5 mcg (SYMBICORT) 160-4.5 mcg/actuation HFAA Inhale 2 puffs into the lungs every 12 (twelve) hours. Controller    carboxymethylcellulose sodium (REFRESH TEARS) 0.5 % Drop Apply 1 drop to eye 3 (three) times daily as needed (dry eyes).    cholecalciferol, vitamin D3, 10,000 unit Cap Take 360 mg by mouth once daily. Take 6 capsules (6,000 units total) by mouth once daily    COD LIVER OIL ORAL Take 1 tablet by mouth once daily.    dupilumab 300 mg/2 mL PnIj Inject 300 mg into the skin every 14 (fourteen) days.    ferrous sulfate 325 (65 FE) MG EC tablet Take 1 tablet (325 mg total) by mouth 3 (three) times daily with meals.    fluticasone propionate (FLONASE) 50 mcg/actuation nasal spray 2 sprays (100 mcg total) by Each Nostril route 2 (two) times a day.    furosemide (LASIX) 20 MG tablet TAKE 1 TABLET BY MOUTH EVERY DAY    GAVILYTE-G 236-22.74-6.74 -5.86 gram suspension Take 4,000 mLs by mouth  once.    guaiFENesin (MUCINEX) 600 mg 12 hr tablet Take 1 tablet (600 mg total) by mouth 2 (two) times daily.    hydrocortisone 2.5 % cream Apply topically 2 (two) times daily. Prn face rash.Stop using steroid topical when skin is smooth and non itchy.  Do not treat dark or red coloring. (Patient not taking: Reported on 2023)    milk thistle 150 mg Cap Take 1 capsule by mouth once daily.    montelukast (SINGULAIR) 10 mg tablet Take 1 tablet (10 mg total) by mouth every evening.    multivitamin (THERAGRAN) per tablet Take 1 tablet by mouth once daily.    potassium chloride SA (K-DUR,KLOR-CON) 20 MEQ tablet Take 1 tablet (20 mEq total) by mouth once daily.    predniSONE (DELTASONE) 5 MG tablet Take 3 tablets (15 mg total) by mouth once daily.    SENNA 8.6 mg tablet TAKE 2 TAB(S) BY MOUTH NIGHTLY AS NEEDED FOR CONSTIPATION    tretinoin (RETIN-A) 0.05 % cream Apply topically every evening. Start with every other night and move up to nightly after 2 weeks if not too dry.    valsartan (DIOVAN) 320 MG tablet Take 1 tablet (320 mg total) by mouth once daily.     Family History       Problem Relation (Age of Onset)    Cancer Sister    Hypertension Daughter, Daughter    Kidney cancer Sister    No Known Problems Mother, Father    Ovarian cancer Sister          Tobacco Use    Smoking status: Former     Current packs/day: 0.00     Average packs/day: 0.3 packs/day for 40.0 years (10.0 ttl pk-yrs)     Types: Cigarettes     Start date: 1965     Quit date: 2005     Years since quittin.0    Smokeless tobacco: Never   Substance and Sexual Activity    Alcohol use: Yes     Comment: rare beer    Drug use: No    Sexual activity: Not Currently     ROS  Objective:     Vital Signs (Most Recent):  Temp: 98.2 °F (36.8 °C) (24 1558)  Pulse: 88 (24 1624)  Resp: 20 (24 1624)  BP: 111/84 (24 1624)  SpO2: 100 % (24 1624) Vital Signs (24h Range):  Temp:  [97.5 °F (36.4 °C)-98.2 °F (36.8 °C)] 98.2 °F  (36.8 °C)  Pulse:  [] 88  Resp:  [16-20] 20  SpO2:  [93 %-100 %] 100 %  BP: (111-164)/() 111/84     Weight: 84.2 kg (185 lb 10 oz)  Body mass index is 30.89 kg/m².    SpO2: 100 %         Intake/Output Summary (Last 24 hours) at 1/2/2024 1704  Last data filed at 1/2/2024 1644  Gross per 24 hour   Intake 573.52 ml   Output 900 ml   Net -326.48 ml       Lines/Drains/Airways       Drain  Duration                  Urethral Catheter 01/02/24 1640 16 Fr. <1 day              Peripheral Intravenous Line  Duration                  Peripheral IV - Single Lumen 01/02/24 0355 20 G Anterior;Right Forearm <1 day         Peripheral IV - Single Lumen 01/02/24 1650 20 G Anterior;Proximal;Right Forearm <1 day                     Physical Exam  Constitutional:       General: She is in acute distress.      Appearance: She is ill-appearing. She is not diaphoretic.   HENT:      Head: Normocephalic and atraumatic.   Eyes:      Extraocular Movements: Extraocular movements intact.      Pupils: Pupils are equal, round, and reactive to light.   Cardiovascular:      Rate and Rhythm: Normal rate and regular rhythm.      Comments: JVD present  Pulmonary:      Effort: Respiratory distress present.      Breath sounds: Wheezing present.      Comments: Wheezing in LML  Abdominal:      General: There is distension.      Tenderness: There is no abdominal tenderness.   Musculoskeletal:         General: No tenderness.      Right lower leg: Edema present.      Left lower leg: Edema present.      Comments: BLE: cool to touch, 1+ pitting edema   Neurological:      General: No focal deficit present.      Mental Status: She is oriented to person, place, and time.   Psychiatric:         Mood and Affect: Mood normal.            Significant Labs: All pertinent lab results from the last 24 hours have been reviewed.

## 2024-01-02 NOTE — ASSESSMENT & PLAN NOTE
Chronic, uncontrolled. Latest blood pressure and vitals reviewed-     Temp:  [97.5 °F (36.4 °C)-98.2 °F (36.8 °C)]   Pulse:  []   Resp:  [16-20]   BP: (111-177)/()   SpO2:  [93 %-100 %] .   Home meds for hypertension were reviewed and noted below.   Hypertension Medications               cloNIDine (CATAPRES) 0.1 MG tablet Take 2 tablets (0.2 mg total) by mouth 2 (two) times daily.    furosemide (LASIX) 20 MG tablet TAKE 1 TABLET BY MOUTH EVERY DAY    valsartan (DIOVAN) 320 MG tablet Take 1 tablet (320 mg total) by mouth once daily.            While in the hospital, will manage blood pressure as follows; Continue home antihypertensive regimen    Will utilize p.r.n. blood pressure medication only if patient's blood pressure greater than 180/110 and she develops symptoms such as worsening chest pain or shortness of breath.

## 2024-01-02 NOTE — HPI
Ms. Franco is a 77 yo with HFpEF (EF 50% 7/2023), severe asthma, HTN admitted for bilateral lower extremity edema on 1/1/24. She reported dyspnea on exertion and orthopnea requiring multiple pillows. In the ED, BNP was elevated 4700 and trop 0.489. CXR was concerning for pulmonary congestion in the setting of a CHF exacerbation. Her recent echo on admission showed a newly reduced EF of 20-25% with Grade III diastolic dysfunction and wall motion abnormalities. Down from EF 50% in July 2023. She initially received 160 mg IV Lasix which generated a poor response (~650cc urine). Cardiology consults was consulted for her newly reduced EF and SOB concerning for CHF exacerbation. On their exam, patient had cool extremities bilaterally with 1+ pitting edema. Concern for cardiogenic shock. She was started on a  gtt and lasix gtt. Transferred to the CCU for further management of CHF.

## 2024-01-02 NOTE — PROGRESS NOTES
[facility] - [facility] ([address] Medicine  Progress Note    Patient Name: Michela Franco  MRN: [id number]  Patient Class: IP- Inpatient   Admission Date: 1/1/2024  Length of Stay: 1 days  Attending Physician: Ekaterina Lyles MD  Primary Care Provider: Mayela Broussard MD        Subjective:     Principal Problem:Bilateral pneumonia        HPI:  Michela Franco is a 78 y.o. female w/ PMHx of HFpEF, severe asthma, recurrent pulmonary infections, HTN, and JOHN presenting with BLE swelling. Patient reports that the swelling began a day prior to admission. She took double her home dose of lasix 20mg without improvement. She has had baseline dyspnea, wheezing, cough, and SOB since she was admitted in July 2023 for pneumonia. She is chronic steroids. Approximately 3 weeks ago, her prednisone dose was decreased from 20mg daily to 15mg daily. She has been taking her medication as prescribed. She does have a productive cough. Denies chest pain, n/v, c/d.     ED course: Patient found to have bilateral pneumonia. BNP 4700 with elevated troponin. Patient admitted for PNA and CHF exacerbation.       Overview/Hospital Course:  Given lasix 40mg IV x2 on 1/1 without adequate urine production, some improvement in pedal edema. Stable on room air. Lactic slightly above normal on admit, stable on repeat. Increased lasix to 80mg IV AM 1/2. TTE concerning for severe reduction in heart function. Cardiology consulted. Patient to be transferred to CICU for further care.     Interval History: Patient remains on room air. Reports little change in urine output. Reports increase SOB compared to baseline. Denies chest pain.     Review of Systems  Objective:     Vital Signs (Most Recent):  Temp: 98.2 °F (36.8 °C) (01/02/24 1558)  Pulse: 88 (01/02/24 1624)  Resp: 20 (01/02/24 1624)  BP: 111/84 (01/02/24 1624)  SpO2: 100 % (01/02/24 1624) Vital Signs (24h Range):  Temp:  [97.5 °F (36.4 °C)-98.2 °F (36.8 °C)] 98.2 °F (36.8  °C)  Pulse:  [] 88  Resp:  [16-20] 20  SpO2:  [93 %-100 %] 100 %  BP: (111-177)/() 111/84     Weight: 84.2 kg (185 lb 10 oz)  Body mass index is 30.89 kg/m².    Intake/Output Summary (Last 24 hours) at 1/2/2024 1639  Last data filed at 1/2/2024 1556  Gross per 24 hour   Intake 573.52 ml   Output 550 ml   Net 23.52 ml         Physical Exam  Vitals and nursing note reviewed.   Constitutional:       General: She is not in acute distress.     Appearance: Normal appearance. She is not ill-appearing.   Eyes:      Pupils: Pupils are equal, round, and reactive to light.   Cardiovascular:      Rate and Rhythm: Regular rhythm. Tachycardia present.      Heart sounds: Normal heart sounds.   Pulmonary:      Effort: Pulmonary effort is normal. No respiratory distress.      Breath sounds: Wheezing present.   Abdominal:      General: There is no distension.      Palpations: Abdomen is soft.      Tenderness: There is no abdominal tenderness.   Musculoskeletal:      Right lower leg: Edema present.      Left lower leg: Edema present.      Comments: 3+ to knees   Skin:     General: Skin is warm and dry.      Findings: No lesion or rash.   Neurological:      General: No focal deficit present.      Mental Status: She is alert and oriented to person, place, and time. Mental status is at baseline.   Psychiatric:         Mood and Affect: Mood normal.         Behavior: Behavior normal.             Significant Labs: All pertinent labs within the past 24 hours have been reviewed.    Significant Imaging: I have reviewed all pertinent imaging results/findings within the past 24 hours.    Assessment/Plan:      * Bilateral pneumonia  CXR with bilateral infiltrates. Patient with known history of recurrent lung infections. On chronic steroids. Tachycardic on presentation. No hypotension, afebrile. Lactate 2.6 > 2.4   - respiratory culture ordered  - f/u blood cultures  - continue vanc and cefepime (1/1 -)      Acute on chronic heart  failure  Transfer to CICU for further care in setting of severely reduced heart function and minimal response to IV diuretic pushes.    HFpEF 50% on TTE in July 2023.   - telemetery  - strict I&Os  - fluid restriction  - goal net negative 1.5L  - continue agressive diuresis  - K>4, Mag>2      Patient is identified as having Diastolic (HFpEF) heart failure that is Acute on chronic. CHF is currently uncontrolled due to Continued edema of extremities and Rales/crackles on pulmonary exam. Latest ECHO performed and demonstrates- Results for orders placed during the hospital encounter of 07/16/23    Echo    Interpretation Summary  · The left ventricle is moderately enlarged with eccentric hypertrophy and low normal systolic function. The estimated ejection fraction is 50%.  · Normal right ventricular size with normal right ventricular systolic function.  · Indeterminate left ventricular diastolic function.  · Mild left atrial enlargement.  · Normal central venous pressure (3 mmHg).  . Continue Furosemide and monitor clinical status closely. Monitor on telemetry. Patient is off CHF pathway.  Monitor strict Is&Os and daily weights.  Place on fluid restriction of 1.5 L. Continue to stress to patient importance of self efficacy and  on diet for CHF. Last BNP reviewed- and noted below   Recent Labs   Lab 01/01/24  1121   BNP 4,700*       Results for orders placed during the hospital encounter of 01/01/24    Echo    Interpretation Summary    Left Ventricle: The left ventricle is mildly dilated. Ventricular mass is normal. Normal wall thickness. Regional wall motion abnormalities present. See diagram for wall motion findings. There is severely reduced systolic function with a visually estimated ejection fraction of 20 - 25%. Ejection fraction by visual approximation is 23%. Grade III diastolic dysfunction.    Right Ventricle: Mild right ventricular enlargement. Wall thickness is normal. Right ventricle wall motion  is  normal. Systolic function is borderline low.    Left Atrium: Left atrium is severely dilated.    Right Atrium: Right atrium is mildly dilated.    Aortic Valve: There is mild aortic valve sclerosis.    Mitral Valve: There is mild mitral annular calcification present. There is moderate to  moderate-severe regurgitation.    Tricuspid Valve: There is moderate- severe  to severe (3-4+) regurgitation.    Pulmonary Artery: There is mild pulmonary hypertension. The estimated pulmonary artery systolic pressure is 44 mmHg.    IVC/SVC: Elevated venous pressure at 15 mmHg.    Pericardium: There is a trivial posterior effusion.      HTN (hypertension)  Chronic, uncontrolled. Latest blood pressure and vitals reviewed-     Temp:  [97.5 °F (36.4 °C)-98.2 °F (36.8 °C)]   Pulse:  []   Resp:  [16-20]   BP: (111-177)/()   SpO2:  [93 %-100 %] .   Home meds for hypertension were reviewed and noted below.   Hypertension Medications               cloNIDine (CATAPRES) 0.1 MG tablet Take 2 tablets (0.2 mg total) by mouth 2 (two) times daily.    furosemide (LASIX) 20 MG tablet TAKE 1 TABLET BY MOUTH EVERY DAY    valsartan (DIOVAN) 320 MG tablet Take 1 tablet (320 mg total) by mouth once daily.            While in the hospital, will manage blood pressure as follows; Continue home antihypertensive regimen    Will utilize p.r.n. blood pressure medication only if patient's blood pressure greater than 180/110 and she develops symptoms such as worsening chest pain or shortness of breath.    Elevated troponin  Troponin elevated on presentation. No history of previous elevations. Patient denies chest pain. Likely related to demand ischemia in setting of CHF exacerbation  - EKG stat for chest pain      Severe asthma  Follows with allergy outpatient. Was on steroid taper after chronic use. No respiratory distress currently. No concern for acute exacerbation despite wheezes on exam.   - continue home inhalers  - duonebs q4h  - Singulair  -  increase home prednisone back to 20mg daily from 15mg   - consider allergy vs pulm consult if no improvement with diuresis       Bilirubinemia  T bili up to 1.5 on presentation, direct 0.5 - no history of prior elevations. AST/ALT slightly above normal. Likely secondary to CHF exacerbation  - daily BMP      ACP (advance care planning)  Advance Care Planning    Date: 01/01/2024    Code Status  Patient would like to remain full code but is not interested in long term artifical support should she be in that position. She is interested in quality of life, not quantity of lime.  I spent a total of 16 minutes engaging the patient in this advance care planning discussion.                VTE Risk Mitigation (From admission, onward)           Ordered     enoxaparin injection 40 mg  Daily         01/01/24 1346     IP VTE HIGH RISK PATIENT  Once         01/01/24 1346     Place sequential compression device  Until discontinued         01/01/24 1346                    Discharge Planning   CAMRYN: 1/4/2024     Code Status: Full Code   Is the patient medically ready for discharge?: No    Reason for patient still in hospital (select all that apply): Patient unstable                     Ekaterina Lyles MD  Department of Hospital Medicine   [facility] - Intensive Care ([place])     Depth Of Biopsy: dermis Complex Repair And Flap Additional Text (Will Appearing After The Standard Complex Repair Text): The complex repair was not sufficient to completely close the primary defect. The remaining additional defect was repaired with the flap mentioned below.

## 2024-01-02 NOTE — ASSESSMENT & PLAN NOTE
CXR with bilateral infiltrates. Patient with known history of recurrent lung infections. On chronic steroids. Tachycardic on presentation. No hypotension, afebrile. Lactate 2.6 > 2.4   - respiratory culture ordered  - f/u blood cultures  - continue vanc and cefepime (1/1 -)

## 2024-01-02 NOTE — ASSESSMENT & PLAN NOTE
CXR with bilateral infiltrates. Patient with known history of recurrent lung infections. On chronic steroids. Tachycardic on presentation. No hypotension, afebrile.   - respiratory culture ordered  - lactate ordered  - f/u blood cultures  - start vanc and cefepime (1/1 -)

## 2024-01-02 NOTE — CONSULTS
[facility] ([place] [address])  Cardiology  Consult Note    Patient Name: Michela Franco  MRN: [id number]  Admission Date: 1/1/2024  Hospital Length of Stay: 1 days  Code Status: Full Code   Attending Provider: Ekaterina Lyles MD   Consulting Provider: Liliana Henriquez DO  Primary Care Physician: Mayela Broussard MD  Principal Problem:Bilateral pneumonia    Patient information was obtained from patient and ER records.     Inpatient consult to Cardiology  Consult performed by: Liliana Henriquez DO  Consult ordered by: Ekaterina Lyles MD  Reason for consult: newly reduced EF        Subjective:     Chief Complaint:  shortness of breath     HPI:   79 yo with HFpEF (EF 50% 7/2023), severe asthma, HTN admitted for CHF exacerbation. She endorses dyspnea on exertion and orthopnea requiring multiple pillows. CXR concerning for pulmonary congestion in the setting of a CHF exacerbation. She has received 160 mg IV Lasix in the past 24 hours and has made minimal urine (650 mL today). Her recent echo on admission showed a newly reduced EF of 20-25% with Grade III diastolic dysfunction and wall motion abnormalities. Cardiology was consulted for her newly reduced EF.     On evaluation, she endorsed dyspnea sitting in bed. She reports she has not made much urine with the Lasix.       Past Medical History:   Diagnosis Date    Allergy     Asthma     Chronic diastolic heart failure 4/5/2018    Glaucoma suspect     Hyperlipidemia     Hypertension     Neuromuscular disorder     NSTEMI (non-ST elevated myocardial infarction) 1/1/2024    JOHN on CPAP     Osteoporosis     Other neutropenia 8/31/2023    Recurrent sinusitis 3/25/2014    Recurrent upper respiratory infection (URI)     Urticaria        Past Surgical History:   Procedure Laterality Date    CATARACT EXTRACTION W/  INTRAOCULAR LENS IMPLANT Right 2/11/2021    Procedure: EXTRACTION, CATARACT, WITH IOL INSERTION;  Surgeon: John Merino MD;  Location: [facility] OR;   Service: Ophthalmology;  Laterality: Right;    CATARACT EXTRACTION W/  INTRAOCULAR LENS IMPLANT Left 3/4/2021    Procedure: EXTRACTION, CATARACT, WITH IOL INSERTION;  Surgeon: John Merino MD;  Location: [facility] OR;  Service: Ophthalmology;  Laterality: Left;    COLONOSCOPY N/A 5/18/2018    Procedure: COLONOSCOPY;  Surgeon: Calixto Brian MD;  Location: [facility] ENDO (4TH FLR);  Service: Endoscopy;  Laterality: N/A;    COLONOSCOPY N/A 10/24/2023    Procedure: COLONOSCOPY;  Surgeon: Ward Merino MD;  Location: [facility] ENDO (4TH FLR);  Service: Endoscopy;  Laterality: N/A;  ref by / golytely [facility] [id number]  10/17-lvm for precall-MS  10/20-precall complete-MS    HYSTERECTOMY      total    OOPHORECTOMY      ROTATOR CUFF REPAIR Left     SINUS SURGERY         Review of patient's allergies indicates:   Allergen Reactions    Metoprolol Shortness Of Breath    Adhesive      PAPER TAPE    Diazepam Other (See Comments)     Nervous, jittery    Gabapentin      Nervous      Keppra [levetiracetam]      nervous    Mold      sneezing    Calcium channel blocking agents-dihydropyridines Other (See Comments)     Leg swelling     Thiazides Other (See Comments)     Hyponatremia        No current facility-administered medications on file prior to encounter.     Current Outpatient Medications on File Prior to Encounter   Medication Sig    ADVAIR DISKUS 500-50 mcg/dose DsDv diskus inhaler INHALE 1 PUFF TWICE DAILY    albuterol (VENTOLIN HFA) 90 mcg/actuation inhaler Inhale 2 puffs into the lungs every 6 (six) hours as needed for Wheezing. Rescue    azelastine (ASTELIN) 137 mcg (0.1 %) nasal spray 2 sprays (274 mcg total) by Nasal route 2 (two) times daily.    cloNIDine (CATAPRES) 0.1 MG tablet Take 2 tablets (0.2 mg total) by mouth 2 (two) times daily.    albuterol-ipratropium (DUO-NEB) 2.5 mg-0.5 mg/3 mL nebulizer solution Take 3 mLs by nebulization every 6 (six) hours as needed for Wheezing.    benralizumab 30 mg/mL AtIn  Inject 30 mg into the skin every 28 days. For first 3 doses    benralizumab 30 mg/mL AtIn Inject 30 mg into the skin every 8 weeks.    benzonatate (TESSALON) 100 MG capsule Take 1 capsule (100 mg total) by mouth 3 (three) times daily as needed for Cough.    budesonide-formoterol 160-4.5 mcg (SYMBICORT) 160-4.5 mcg/actuation HFAA Inhale 2 puffs into the lungs every 12 (twelve) hours. Controller    carboxymethylcellulose sodium (REFRESH TEARS) 0.5 % Drop Apply 1 drop to eye 3 (three) times daily as needed (dry eyes).    cholecalciferol, vitamin D3, 10,000 unit Cap Take 360 mg by mouth once daily. Take 6 capsules (6,000 units total) by mouth once daily    COD LIVER OIL ORAL Take 1 tablet by mouth once daily.    dupilumab 300 mg/2 mL PnIj Inject 300 mg into the skin every 14 (fourteen) days.    ferrous sulfate 325 (65 FE) MG EC tablet Take 1 tablet (325 mg total) by mouth 3 (three) times daily with meals.    fluticasone propionate (FLONASE) 50 mcg/actuation nasal spray 2 sprays (100 mcg total) by Each Nostril route 2 (two) times a day.    furosemide (LASIX) 20 MG tablet TAKE 1 TABLET BY MOUTH EVERY DAY    GAVILYTE-G 236-22.74-6.74 -5.86 gram suspension Take 4,000 mLs by mouth once.    guaiFENesin (MUCINEX) 600 mg 12 hr tablet Take 1 tablet (600 mg total) by mouth 2 (two) times daily.    hydrocortisone 2.5 % cream Apply topically 2 (two) times daily. Prn face rash.Stop using steroid topical when skin is smooth and non itchy.  Do not treat dark or red coloring. (Patient not taking: Reported on 12/5/2023)    milk thistle 150 mg Cap Take 1 capsule by mouth once daily.    montelukast (SINGULAIR) 10 mg tablet Take 1 tablet (10 mg total) by mouth every evening.    multivitamin (THERAGRAN) per tablet Take 1 tablet by mouth once daily.    potassium chloride SA (K-DUR,KLOR-CON) 20 MEQ tablet Take 1 tablet (20 mEq total) by mouth once daily.    predniSONE (DELTASONE) 5 MG tablet Take 3 tablets (15 mg total) by mouth once daily.     SENNA 8.6 mg tablet TAKE 2 TAB(S) BY MOUTH NIGHTLY AS NEEDED FOR CONSTIPATION    tretinoin (RETIN-A) 0.05 % cream Apply topically every evening. Start with every other night and move up to nightly after 2 weeks if not too dry.    valsartan (DIOVAN) 320 MG tablet Take 1 tablet (320 mg total) by mouth once daily.     Family History       Problem Relation (Age of Onset)    Cancer Sister    Hypertension Daughter, Daughter    Kidney cancer Sister    No Known Problems Mother, Father    Ovarian cancer Sister          Tobacco Use    Smoking status: Former     Current packs/day: 0.00     Average packs/day: 0.3 packs/day for 40.0 years (10.0 ttl pk-yrs)     Types: Cigarettes     Start date: 1965     Quit date: 2005     Years since quittin.0    Smokeless tobacco: Never   Substance and Sexual Activity    Alcohol use: Yes     Comment: rare beer    Drug use: No    Sexual activity: Not Currently     ROS  Objective:     Vital Signs (Most Recent):  Temp: 98.2 °F (36.8 °C) (24 1558)  Pulse: 88 (24 1624)  Resp: 20 (24 1624)  BP: 111/84 (24 1624)  SpO2: 100 % (24 162) Vital Signs (24h Range):  Temp:  [97.5 °F (36.4 °C)-98.2 °F (36.8 °C)] 98.2 °F (36.8 °C)  Pulse:  [] 88  Resp:  [16-20] 20  SpO2:  [93 %-100 %] 100 %  BP: (111-164)/() 111/84     Weight: 84.2 kg (185 lb 10 oz)  Body mass index is 30.89 kg/m².    SpO2: 100 %         Intake/Output Summary (Last 24 hours) at 2024 1704  Last data filed at 2024 1644  Gross per 24 hour   Intake 573.52 ml   Output 900 ml   Net -326.48 ml       Lines/Drains/Airways       Drain  Duration                  Urethral Catheter 24 1640 16 Fr. <1 day              Peripheral Intravenous Line  Duration                  Peripheral IV - Single Lumen 24 0355 20 G Anterior;Right Forearm <1 day         Peripheral IV - Single Lumen 24 1650 20 G Anterior;Proximal;Right Forearm <1 day                     Physical  Exam  Constitutional:       General: She is in acute distress.      Appearance: She is ill-appearing. She is not diaphoretic.   HENT:      Head: Normocephalic and atraumatic.   Eyes:      Extraocular Movements: Extraocular movements intact.      Pupils: Pupils are equal, round, and reactive to light.   Cardiovascular:      Rate and Rhythm: Normal rate and regular rhythm.      Comments: JVD present  Pulmonary:      Effort: Respiratory distress present.      Breath sounds: Wheezing present.      Comments: Wheezing in LML  Abdominal:      General: There is distension.      Tenderness: There is no abdominal tenderness.   Musculoskeletal:         General: No tenderness.      Right lower leg: Edema present.      Left lower leg: Edema present.      Comments: BLE: cool to touch, 1+ pitting edema   Neurological:      General: No focal deficit present.      Mental Status: She is oriented to person, place, and time.   Psychiatric:         Mood and Affect: Mood normal.            Significant Labs: All pertinent lab results from the last 24 hours have been reviewed.        Assessment and Plan:     Acute on chronic heart failure  Patient with newly reduced EF 20-25% and not appropriately responding to IV Lasix. Physical exam concerning for cool lower extremities and volume overload. Stat lactate ordered. Will transfer to CCU tonight.     --F/u stat lactate  --Renteria for I/Os  --Lasix drip started      Elevated troponin  See acute on chronic HF    Severe asthma  Wheezing noted on exam  Continue inhalers and breathing treatments        VTE Risk Mitigation (From admission, onward)           Ordered     enoxaparin injection 40 mg  Daily         01/01/24 1346     IP VTE HIGH RISK PATIENT  Once         01/01/24 1346     Place sequential compression device  Until discontinued         01/01/24 1346                    Thank you for your consult. She will be admitted to the CCU today. I will sign off. Please contact us if you have any  additional questions.    Liliana Henriquez,   Cardiology   [facility] ([place])

## 2024-01-02 NOTE — HPI
Michela Franco is a 78 y.o. female w/ PMHx of HFpEF, severe asthma, recurrent pulmonary infections, HTN, and JOHN presenting with BLE swelling. Patient reports that the swelling began a day prior to admission. She took double her home dose of lasix 20mg without improvement. She has had baseline dyspnea, wheezing, cough, and SOB since she was admitted in July 2023 for pneumonia. She is chronic steroids. Approximately 3 weeks ago, her prednisone dose was decreased from 20mg daily to 15mg daily. She has been taking her medication as prescribed. She does have a productive cough. Denies chest pain, n/v, c/d.     ED course: Patient found to have bilateral pneumonia. BNP 4700 with elevated troponin. Patient admitted for PNA and CHF exacerbation.

## 2024-01-02 NOTE — PROGRESS NOTES
Attempted to Enroll PT in the HFTCC, Hospital Education was started and then Dr. Michel and his team enter the room. PT will be transferred to the ICU so that care can be intensified in order to remove more fluid. I explain to the Patient that I will see her tomorrow in the ICU to finish my explanation about the HFTCC. PT agrees.

## 2024-01-02 NOTE — PLAN OF CARE
[facility] ([place] [address])  Initial Discharge Assessment       Primary Care Provider: Mayela Broussard MD    Admission Diagnosis: Cough [R05.9]  SOB (shortness of breath) [R06.02]  Chest pain [R07.9]  Pneumonia due to infectious organism, unspecified laterality, unspecified part of lung [J18.9]  Acute on chronic congestive heart failure, unspecified heart failure type [I50.9]  Elevated troponin level not due myocardial infarction [R79.89]    Admission Date: 1/1/2024  Expected Discharge Date: 1/4/2024    Transition of Care Barriers: None    Payor: HU[employer]NA MANAGED MEDICARE / Plan: HU[employer]NA MEDICARE HMO / Product Type: Capitation /     Extended Emergency Contact Information  Primary Emergency Contact: Charley Ahmadi  Address: [address] [place] [address] [place]  Mobile Phone: [phone]  Relation: Sister    Discharge Plan A: Home Health  Discharge Plan B: Home with family      [id number] - [place], [place] - [address]  [address]  [facility] [address]  Phone: [phone] Fax: [phone]    [facility] - [facility] [address]  [address] [facility] [address]  Phone: [phone] Fax: [phone]    [facility]  [address]  [facility] [address]  Phone: [phone] Fax: [phone]      Initial Assessment (most recent)       Adult Discharge Assessment - 01/02/24 1704          Discharge Assessment    Assessment Type Discharge Planning Reassessment     Confirmed/corrected address, phone number and insurance Yes     Source of Information patient     When was your last doctors appointment? 12/12/23     Communicated CAMRYN with patient/caregiver Yes     Reason For Admission Bilateral pneumonia     People in Home alone     Facility Arrived From: home     Do you expect to return to your current living situation? Yes     Do you have help at home or someone to help you  manage your care at home? Yes     Who are your caregiver(s) and their phone number(s)? Charley Ahmadi (Sister) [phone] (Mobile     Prior to hospitilization cognitive status: Alert/Oriented;No Deficits     Current cognitive status: Alert/Oriented;No Deficits     Walking or Climbing Stairs Difficulty no     Dressing/Bathing Difficulty no     Equipment Currently Used at Home CPAP     Readmission within 30 days? No     Patient currently being followed by outpatient case management? No     Do you currently have service(s) that help you manage your care at home? No     Do you take prescription medications? Yes     Do you have prescription coverage? Yes     Coverage HU[employer] MANAGED MEDICARE - HU[employer]NA MEDICARE HMO -     Do you have any problems affording any of your prescribed medications? No     Is the patient taking medications as prescribed? yes     Who is going to help you get home at discharge? Charley Ahmadi (Sister) [phone] (Mobile)     How do you get to doctors appointments? car, drives self;family or friend will provide     Are you on dialysis? No     Do you take coumadin? No     Discharge Plan A Home Health     Discharge Plan B Home with family     DME Needed Upon Discharge  other (see comments)   TBD    Discharge Plan discussed with: Patient     Transition of Care Barriers None        Physical Activity    On average, how many days per week do you engage in moderate to strenuous exercise (like a brisk walk)? 7 days     On average, how many minutes do you engage in exercise at this level? 30 min        Financial Resource Strain    How hard is it for you to pay for the very basics like food, housing, medical care, and heating? Not hard at all        Housing Stability    In the last 12 months, was there a time when you were not able to pay the mortgage or rent on time? No     In the last 12 months, was there a time when you did not have a steady place to sleep or slept in a shelter (including now)? No         Transportation Needs    In the past 12 months, has lack of transportation kept you from medical appointments or from getting medications? No     In the past 12 months, has lack of transportation kept you from meetings, work, or from getting things needed for daily living? No        Food Insecurity    Within the past 12 months, you worried that your food would run out before you got the money to buy more. Never true     Within the past 12 months, the food you bought just didn't last and you didn't have money to get more. Never true        Stress    Do you feel stress - tense, restless, nervous, or anxious, or unable to sleep at night because your mind is troubled all the time - these days? Only a little        Social Connections    In a typical week, how many times do you talk on the phone with family, friends, or neighbors? Three times a week     How often do you get together with friends or relatives? Twice a week     How often do you attend Episcopalian or Synagogue services? 1 to 4 times per year     Do you belong to any clubs or organizations such as Episcopalian groups, unions, fraternal or athletic groups, or school groups? No     How often do you attend meetings of the clubs or organizations you belong to? 1 to 4 times per year     Are you , , , , never , or living with a partner? Never         Alcohol Use    Q1: How often do you have a drink containing alcohol? Never     Q2: How many drinks containing alcohol do you have on a typical day when you are drinking? Patient does not drink     Q3: How often do you have six or more drinks on one occasion? Never                          CM met with patient at bedside to complete discharge planning assessment.  Patient alert and oriented xs 4.  Patient verified all demographic information on facesheet is correct.  Patient verified PCP is Dr Mayela Broussard.  Patient verified primary health insurance is [employer]. Patient is  agreeable with bedside medication delivery.   Patient  is not active with  home health and has listed DME.  Patient with NO POA or Living Will.  Patient not on dialysis or medication coumadin.  Patient with no 30 day admission.  Patient with no financial issues at this time.  Patient family will provide transportation upon discharge from facility.  Patient still works FT as a .  Patient will have assistance from her sister  upon discharge Patient independent with ADLs, lives alone.  All questions answered regarding Case Management Discharge Planning, patient verbalized understanding.  Discharge booklet with CM's contact information given to patient.    Celine Hahn RN  Case Management  [facility]  [phone]

## 2024-01-02 NOTE — ASSESSMENT & PLAN NOTE
HFpEF 50% on TTE in July 2023.   - telemetery  - strict I&Os  - fluid restriction  - goal net negative 1.5L  - continue agressive diuresis  - K>4, Mag>2  - TTE ordered  - cardiology consult pending TTE results and response to diuresis    Patient is identified as having Diastolic (HFpEF) heart failure that is Acute on chronic. CHF is currently uncontrolled due to Continued edema of extremities and Rales/crackles on pulmonary exam. Latest ECHO performed and demonstrates- Results for orders placed during the hospital encounter of 07/16/23    Echo    Interpretation Summary  · The left ventricle is moderately enlarged with eccentric hypertrophy and low normal systolic function. The estimated ejection fraction is 50%.  · Normal right ventricular size with normal right ventricular systolic function.  · Indeterminate left ventricular diastolic function.  · Mild left atrial enlargement.  · Normal central venous pressure (3 mmHg).  . Continue Furosemide and monitor clinical status closely. Monitor on telemetry. Patient is off CHF pathway.  Monitor strict Is&Os and daily weights.  Place on fluid restriction of 1.5 L. Continue to stress to patient importance of self efficacy and  on diet for CHF. Last BNP reviewed- and noted below   Recent Labs   Lab 01/01/24  1121   BNP 4,700*   .

## 2024-01-02 NOTE — NURSING
Nurses Note -- 4 Eyes      1/2/2024   7:13 AM      Skin assessed during: Admit      [x] No Altered Skin Integrity Present    []Prevention Measures Documented      [] Yes- Altered Skin Integrity Present or Discovered   [] LDA Added if Not in Epic (Describe Wound)   [] New Altered Skin Integrity was Present on Admit and Documented in LDA   [] Wound Image Taken    Wound Care Consulted? No    Attending Nurse:  Leah Santamaria RN/Staff Member:  Paresh

## 2024-01-02 NOTE — ED NOTES
Telemetry Verification   Patient placed on Telemetry Box  Verified with War Room          Rate 91   Rhythm NS

## 2024-01-02 NOTE — ASSESSMENT & PLAN NOTE
T bili up to 1.5 on presentation, direct 0.5 - no history of prior elevations. AST/ALT slightly above normal. Likely secondary to CHF exacerbation  - daily BMP

## 2024-01-02 NOTE — ASSESSMENT & PLAN NOTE
Transfer to CICU for further care in setting of severely reduced heart function and minimal response to IV diuretic pushes.    HFpEF 50% on TTE in July 2023.   - telemetery  - strict I&Os  - fluid restriction  - goal net negative 1.5L  - continue agressive diuresis  - K>4, Mag>2      Patient is identified as having Diastolic (HFpEF) heart failure that is Acute on chronic. CHF is currently uncontrolled due to Continued edema of extremities and Rales/crackles on pulmonary exam. Latest ECHO performed and demonstrates- Results for orders placed during the hospital encounter of 07/16/23    Echo    Interpretation Summary  · The left ventricle is moderately enlarged with eccentric hypertrophy and low normal systolic function. The estimated ejection fraction is 50%.  · Normal right ventricular size with normal right ventricular systolic function.  · Indeterminate left ventricular diastolic function.  · Mild left atrial enlargement.  · Normal central venous pressure (3 mmHg).  . Continue Furosemide and monitor clinical status closely. Monitor on telemetry. Patient is off CHF pathway.  Monitor strict Is&Os and daily weights.  Place on fluid restriction of 1.5 L. Continue to stress to patient importance of self efficacy and  on diet for CHF. Last BNP reviewed- and noted below   Recent Labs   Lab 01/01/24  1121   BNP 4,700*       Results for orders placed during the hospital encounter of 01/01/24    Echo    Interpretation Summary    Left Ventricle: The left ventricle is mildly dilated. Ventricular mass is normal. Normal wall thickness. Regional wall motion abnormalities present. See diagram for wall motion findings. There is severely reduced systolic function with a visually estimated ejection fraction of 20 - 25%. Ejection fraction by visual approximation is 23%. Grade III diastolic dysfunction.    Right Ventricle: Mild right ventricular enlargement. Wall thickness is normal. Right ventricle wall motion  is normal.  Systolic function is borderline low.    Left Atrium: Left atrium is severely dilated.    Right Atrium: Right atrium is mildly dilated.    Aortic Valve: There is mild aortic valve sclerosis.    Mitral Valve: There is mild mitral annular calcification present. There is moderate to  moderate-severe regurgitation.    Tricuspid Valve: There is moderate- severe  to severe (3-4+) regurgitation.    Pulmonary Artery: There is mild pulmonary hypertension. The estimated pulmonary artery systolic pressure is 44 mmHg.    IVC/SVC: Elevated venous pressure at 15 mmHg.    Pericardium: There is a trivial posterior effusion.

## 2024-01-02 NOTE — H&P
[facility] - Emergency Dept  [facility] Medicine  History & Physical    Patient Name: Michela Franco  MRN: [id number]  Patient Class: IP- Inpatient  Admission Date: 1/1/2024  Attending Physician: Ekaterina Lyles MD  Primary Care Provider: Mayela Broussard MD         Patient information was obtained from patient, past medical records, and ER records.     Subjective:     Principal Problem:Bilateral pneumonia    Chief Complaint:   Chief Complaint   Patient presents with    Shortness of Breath    Leg Swelling     Hx asthma        HPI: Michela Franco is a 78 y.o. female w/ PMHx of HFpEF, severe asthma, recurrent pulmonary infections, HTN, and JOHN presenting with BLE swelling. Patient reports that the swelling began a day prior to admission. She took double her home dose of lasix 20mg without improvement. She has had baseline dyspnea, wheezing, cough, and SOB since she was admitted in July 2023 for pneumonia. She is chronic steroids. Approximately 3 weeks ago, her prednisone dose was decreased from 20mg daily to 15mg daily. She has been taking her medication as prescribed. She does have a productive cough. Denies chest pain, n/v, c/d.     ED course: Patient found to have bilateral pneumonia. BNP 4700 with elevated troponin. Patient admitted for PNA and CHF exacerbation.       Past Medical History:   Diagnosis Date    Allergy     Asthma     Chronic diastolic heart failure 4/5/2018    Glaucoma suspect     Hyperlipidemia     Hypertension     Neuromuscular disorder     NSTEMI (non-ST elevated myocardial infarction) 1/1/2024    JOHN on CPAP     Osteoporosis     Other neutropenia 8/31/2023    Recurrent sinusitis 3/25/2014    Recurrent upper respiratory infection (URI)     Urticaria        Past Surgical History:   Procedure Laterality Date    CATARACT EXTRACTION W/  INTRAOCULAR LENS IMPLANT Right 2/11/2021    Procedure: EXTRACTION, CATARACT, WITH IOL INSERTION;  Surgeon: John Merino MD;  Location: [facility];  Service:  Ophthalmology;  Laterality: Right;    CATARACT EXTRACTION W/  INTRAOCULAR LENS IMPLANT Left 3/4/2021    Procedure: EXTRACTION, CATARACT, WITH IOL INSERTION;  Surgeon: John Merino MD;  Location: [facility] OR;  Service: Ophthalmology;  Laterality: Left;    COLONOSCOPY N/A 5/18/2018    Procedure: COLONOSCOPY;  Surgeon: Calixto Brian MD;  Location: [facility] ENDO (4TH FLR);  Service: Endoscopy;  Laterality: N/A;    COLONOSCOPY N/A 10/24/2023    Procedure: COLONOSCOPY;  Surgeon: Ward Merino MD;  Location: [facility] ENDO (4TH FLR);  Service: Endoscopy;  Laterality: N/A;  ref by / golytely [facility] [id number]  10/17-lvm for precall-MS  10/20-precall complete-MS    HYSTERECTOMY      total    OOPHORECTOMY      ROTATOR CUFF REPAIR Left     SINUS SURGERY         Review of patient's allergies indicates:   Allergen Reactions    Metoprolol Shortness Of Breath    Adhesive      PAPER TAPE    Diazepam Other (See Comments)     Nervous, jittery    Gabapentin      Nervous      Keppra [levetiracetam]      nervous    Mold      sneezing    Calcium channel blocking agents-dihydropyridines Other (See Comments)     Leg swelling     Thiazides Other (See Comments)     Hyponatremia        No current facility-administered medications on file prior to encounter.     Current Outpatient Medications on File Prior to Encounter   Medication Sig    ADVAIR DISKUS 500-50 mcg/dose DsDv diskus inhaler INHALE 1 PUFF TWICE DAILY    albuterol (VENTOLIN HFA) 90 mcg/actuation inhaler Inhale 2 puffs into the lungs every 6 (six) hours as needed for Wheezing. Rescue    azelastine (ASTELIN) 137 mcg (0.1 %) nasal spray 2 sprays (274 mcg total) by Nasal route 2 (two) times daily.    cloNIDine (CATAPRES) 0.1 MG tablet Take 2 tablets (0.2 mg total) by mouth 2 (two) times daily.    albuterol-ipratropium (DUO-NEB) 2.5 mg-0.5 mg/3 mL nebulizer solution Take 3 mLs by nebulization every 6 (six) hours as needed for Wheezing.    benralizumab 30 mg/mL AtIn Inject 30 mg  into the skin every 28 days. For first 3 doses    benralizumab 30 mg/mL AtIn Inject 30 mg into the skin every 8 weeks.    benzonatate (TESSALON) 100 MG capsule Take 1 capsule (100 mg total) by mouth 3 (three) times daily as needed for Cough.    budesonide-formoterol 160-4.5 mcg (SYMBICORT) 160-4.5 mcg/actuation HFAA Inhale 2 puffs into the lungs every 12 (twelve) hours. Controller    carboxymethylcellulose sodium (REFRESH TEARS) 0.5 % Drop Apply 1 drop to eye 3 (three) times daily as needed (dry eyes).    cholecalciferol, vitamin D3, 10,000 unit Cap Take 360 mg by mouth once daily. Take 6 capsules (6,000 units total) by mouth once daily    COD LIVER OIL ORAL Take 1 tablet by mouth once daily.    dupilumab 300 mg/2 mL PnIj Inject 300 mg into the skin every 14 (fourteen) days.    ferrous sulfate 325 (65 FE) MG EC tablet Take 1 tablet (325 mg total) by mouth 3 (three) times daily with meals.    fluticasone propionate (FLONASE) 50 mcg/actuation nasal spray 2 sprays (100 mcg total) by Each Nostril route 2 (two) times a day.    furosemide (LASIX) 20 MG tablet TAKE 1 TABLET BY MOUTH EVERY DAY    GAVILYTE-G 236-22.74-6.74 -5.86 gram suspension Take 4,000 mLs by mouth once.    guaiFENesin (MUCINEX) 600 mg 12 hr tablet Take 1 tablet (600 mg total) by mouth 2 (two) times daily.    hydrocortisone 2.5 % cream Apply topically 2 (two) times daily. Prn face rash.Stop using steroid topical when skin is smooth and non itchy.  Do not treat dark or red coloring. (Patient not taking: Reported on 12/5/2023)    milk thistle 150 mg Cap Take 1 capsule by mouth once daily.    montelukast (SINGULAIR) 10 mg tablet Take 1 tablet (10 mg total) by mouth every evening.    multivitamin (THERAGRAN) per tablet Take 1 tablet by mouth once daily.    potassium chloride SA (K-DUR,KLOR-CON) 20 MEQ tablet Take 1 tablet (20 mEq total) by mouth once daily.    predniSONE (DELTASONE) 5 MG tablet Take 3 tablets (15 mg total) by mouth once daily.    SENNA 8.6 mg  tablet TAKE 2 TAB(S) BY MOUTH NIGHTLY AS NEEDED FOR CONSTIPATION    tretinoin (RETIN-A) 0.05 % cream Apply topically every evening. Start with every other night and move up to nightly after 2 weeks if not too dry.    valsartan (DIOVAN) 320 MG tablet Take 1 tablet (320 mg total) by mouth once daily.     Family History       Problem Relation (Age of Onset)    Cancer Sister    Hypertension Daughter, Daughter    Kidney cancer Sister    No Known Problems Mother, Father    Ovarian cancer Sister          Tobacco Use    Smoking status: Former     Current packs/day: 0.00     Average packs/day: 0.3 packs/day for 40.0 years (10.0 ttl pk-yrs)     Types: Cigarettes     Start date: 1965     Quit date: 2005     Years since quittin.0    Smokeless tobacco: Never   Substance and Sexual Activity    Alcohol use: Yes     Comment: rare beer    Drug use: No    Sexual activity: Not Currently     Review of Systems   Constitutional:  Negative for chills and fever.   Eyes:  Negative for visual disturbance.   Respiratory:  Positive for cough, shortness of breath and wheezing.    Cardiovascular:  Positive for leg swelling. Negative for chest pain.   Gastrointestinal:  Negative for abdominal pain, constipation, diarrhea, nausea and vomiting.   Allergic/Immunologic: Positive for immunocompromised state.     Objective:     Vital Signs (Most Recent):  Temp: 98.6 °F (37 °C) (24 1500)  Pulse: (!) 118 (24 1700)  Resp: 16 (24 1700)  BP: (!) 177/109 (24 1700)  SpO2: 96 % (24 1700) Vital Signs (24h Range):  Temp:  [98.2 °F (36.8 °C)-98.6 °F (37 °C)] 98.6 °F (37 °C)  Pulse:  [] 118  Resp:  [16-24] 16  SpO2:  [96 %-100 %] 96 %  BP: (138-204)/() 177/109     Weight: 83.9 kg (185 lb)  Body mass index is 30.79 kg/m².     Physical Exam  Vitals and nursing note reviewed.   Constitutional:       General: She is not in acute distress.     Appearance: Normal appearance. She is not ill-appearing.   Eyes:       Pupils: Pupils are equal, round, and reactive to light.   Cardiovascular:      Rate and Rhythm: Regular rhythm. Tachycardia present.      Heart sounds: Normal heart sounds.   Pulmonary:      Effort: No respiratory distress.      Breath sounds: Wheezing present.   Abdominal:      General: There is no distension.      Palpations: Abdomen is soft.      Tenderness: There is no abdominal tenderness.   Musculoskeletal:      Right lower leg: Edema present.      Left lower leg: Edema present.      Comments: 3+ to knees   Skin:     General: Skin is warm and dry.      Findings: No lesion or rash.   Neurological:      General: No focal deficit present.      Mental Status: She is alert and oriented to person, place, and time. Mental status is at baseline.   Psychiatric:         Mood and Affect: Mood normal.         Behavior: Behavior normal.              CRANIAL NERVES     CN III, IV, VI   Pupils are equal, round, and reactive to light.       Significant Labs: All pertinent labs within the past 24 hours have been reviewed.  CBC:   Recent Labs   Lab 01/01/24  1121   WBC 7.35   HGB 13.0   HCT 38.8        CMP:   Recent Labs   Lab 01/01/24  1121      K 4.2      CO2 24   *   BUN 20   CREATININE 0.9   CALCIUM 9.3   PROT 6.6   ALBUMIN 3.2*   BILITOT 1.5*   ALKPHOS 106   AST 60*   ALT 68*   ANIONGAP 17*     Troponin:   Recent Labs   Lab 01/01/24  1121 01/01/24  1409   TROPONINI 0.461* 0.489*       Significant Imaging: I have reviewed all pertinent imaging results/findings within the past 24 hours.  X-Ray Chest AP Portable  Narrative: EXAMINATION:  XR CHEST AP PORTABLE    CLINICAL HISTORY:  Cough, unspecified    TECHNIQUE:  Single frontal view of the chest was performed.    COMPARISON:  11/14/2023    FINDINGS:  Cardiomediastinal silhouetteremains enlarged.  Aortic atherosclerosis is present.    Patchy interstitial lung opacity right greater than left redemonstrated, possibly related to pneumonia.    Stable  blunting of the left costophrenic sulcus probably postinflammatory in nature.  Impression: Bilateral pneumonia    Electronically signed by: Asif Romero Jr  Date:    01/01/2024  Time:    12:16      Assessment/Plan:     * Bilateral pneumonia  CXR with bilateral infiltrates. Patient with known history of recurrent lung infections. On chronic steroids. Tachycardic on presentation. No hypotension, afebrile.   - respiratory culture ordered  - lactate ordered  - f/u blood cultures  - start vanc and cefepime (1/1 -)      Acute on chronic heart failure  HFpEF 50% on TTE in July 2023.   - telemetery  - strict I&Os  - fluid restriction  - goal net negative 1.5L  - continue agressive diuresis  - K>4, Mag>2  - TTE ordered  - cardiology consult pending TTE results and response to diuresis    Patient is identified as having Diastolic (HFpEF) heart failure that is Acute on chronic. CHF is currently uncontrolled due to Continued edema of extremities and Rales/crackles on pulmonary exam. Latest ECHO performed and demonstrates- Results for orders placed during the hospital encounter of 07/16/23    Echo    Interpretation Summary  · The left ventricle is moderately enlarged with eccentric hypertrophy and low normal systolic function. The estimated ejection fraction is 50%.  · Normal right ventricular size with normal right ventricular systolic function.  · Indeterminate left ventricular diastolic function.  · Mild left atrial enlargement.  · Normal central venous pressure (3 mmHg).  . Continue Furosemide and monitor clinical status closely. Monitor on telemetry. Patient is off CHF pathway.  Monitor strict Is&Os and daily weights.  Place on fluid restriction of 1.5 L. Continue to stress to patient importance of self efficacy and  on diet for CHF. Last BNP reviewed- and noted below   Recent Labs   Lab 01/01/24  1121   BNP 4,700*   .    HTN (hypertension)  Chronic, uncontrolled. Latest blood pressure and vitals reviewed-      Temp:  [98.2 °F (36.8 °C)-98.6 °F (37 °C)]   Pulse:  []   Resp:  [16-24]   BP: (138-204)/()   SpO2:  [95 %-100 %] .   Home meds for hypertension were reviewed and noted below.   Hypertension Medications               cloNIDine (CATAPRES) 0.1 MG tablet Take 2 tablets (0.2 mg total) by mouth 2 (two) times daily.    furosemide (LASIX) 20 MG tablet TAKE 1 TABLET BY MOUTH EVERY DAY    valsartan (DIOVAN) 320 MG tablet Take 1 tablet (320 mg total) by mouth once daily.            While in the hospital, will manage blood pressure as follows; Continue home antihypertensive regimen    Will utilize p.r.n. blood pressure medication only if patient's blood pressure greater than 180/110 and she develops symptoms such as worsening chest pain or shortness of breath.    Elevated troponin  Troponin elevated on presentation. No history of previous elevations. Patient denies chest pain. Likely related to demand ischemia in setting of CHF exacerbation  - EKG stat for chest pain  - TTE ordered      Severe asthma  Follows with allergy outpatient. Was on steroid taper after chronic use. No respiratory distress currently. No concern for acute exacerbation despite wheezes on exam.   - continue home inhalers  - duonebs q4h  - Singulair  - increase home prednisone back to 20mg daily from 15mg   - consider allergy vs pulm consult if no improvement with diuresis       Bilirubinemia  T bili up to 1.5 on presentation, direct 0.5 - no history of prior elevations. AST/ALT slightly above normal. Likely secondary to CHF exacerbation  - daily BMP      ACP (advance care planning)  Advance Care Planning    Date: 01/01/2024    Code Status  Patient would like to remain full code but is not interested in long term artifical support should she be in that position. She is interested in quality of life, not quantity of lime.  I spent a total of 16 minutes engaging the patient in this advance care planning discussion.                VTE Risk  Mitigation (From admission, onward)           Ordered     enoxaparin injection 40 mg  Daily         01/01/24 1346     IP VTE HIGH RISK PATIENT  Once         01/01/24 1346     Place sequential compression device  Until discontinued         01/01/24 1346                               Pharmacist Renal Dose Adjustment Note    Michela Franco is a 78 y.o. female being treated with the medication cefepime.    Patient Data:    Vital Signs (Most Recent):  Temp: 98.3 °F (36.8 °C) (01/01/24 1112)  Pulse: 101 (01/01/24 1330)  Resp: 18 (01/01/24 1330)  BP: (!) 155/104 (01/01/24 1330)  SpO2: 97 % (01/01/24 1330) Vital Signs (72h Range):  Temp:  [98.2 °F (36.8 °C)-98.3 °F (36.8 °C)]   Pulse:  []   Resp:  [17-24]   BP: (155-204)/()   SpO2:  [97 %-100 %]      Recent Labs   Lab 01/01/24  1121   CREATININE 0.9     Serum creatinine: 0.9 mg/dL 01/01/24 1121  Estimated creatinine clearance: 56.9 mL/min    Medication: Cefepime 2 grams every 8 hours will be changed to cefepime 2 grams every 12 hours.     Pharmacist's Name: Nicola Bonner, Iliana Lyles MD  Department of Hospital Medicine  [facility] - Emergency Dept

## 2024-01-03 ENCOUNTER — DOCUMENTATION ONLY (OUTPATIENT)
Dept: CARDIOLOGY | Facility: CLINIC | Age: 79
End: 2024-01-03
Payer: MEDICARE

## 2024-01-03 DIAGNOSIS — R06.02 SOB (SHORTNESS OF BREATH): Primary | ICD-10-CM

## 2024-01-03 PROBLEM — N17.9 AKI (ACUTE KIDNEY INJURY): Status: ACTIVE | Noted: 2024-01-03

## 2024-01-03 LAB
ALBUMIN SERPL BCP-MCNC: 2.9 G/DL (ref 3.5–5.2)
ALBUMIN SERPL BCP-MCNC: 3 G/DL (ref 3.5–5.2)
ALP SERPL-CCNC: 92 U/L (ref 55–135)
ALT SERPL W/O P-5'-P-CCNC: 91 U/L (ref 10–44)
ANION GAP SERPL CALC-SCNC: 13 MMOL/L (ref 8–16)
ANION GAP SERPL CALC-SCNC: 15 MMOL/L (ref 8–16)
ANION GAP SERPL CALC-SCNC: 16 MMOL/L (ref 8–16)
AST SERPL-CCNC: 76 U/L (ref 10–40)
BASOPHILS # BLD AUTO: 0.01 K/UL (ref 0–0.2)
BASOPHILS NFR BLD: 0.2 % (ref 0–1.9)
BILIRUB SERPL-MCNC: 0.9 MG/DL (ref 0.1–1)
BUN SERPL-MCNC: 29 MG/DL (ref 8–23)
BUN SERPL-MCNC: 30 MG/DL (ref 8–23)
BUN SERPL-MCNC: 31 MG/DL (ref 8–23)
CALCIUM SERPL-MCNC: 8.5 MG/DL (ref 8.7–10.5)
CALCIUM SERPL-MCNC: 8.8 MG/DL (ref 8.7–10.5)
CALCIUM SERPL-MCNC: 8.9 MG/DL (ref 8.7–10.5)
CHLORIDE SERPL-SCNC: 96 MMOL/L (ref 95–110)
CHLORIDE SERPL-SCNC: 96 MMOL/L (ref 95–110)
CHLORIDE SERPL-SCNC: 98 MMOL/L (ref 95–110)
CO2 SERPL-SCNC: 24 MMOL/L (ref 23–29)
CO2 SERPL-SCNC: 24 MMOL/L (ref 23–29)
CO2 SERPL-SCNC: 28 MMOL/L (ref 23–29)
CREAT SERPL-MCNC: 1.2 MG/DL (ref 0.5–1.4)
CREAT SERPL-MCNC: 1.2 MG/DL (ref 0.5–1.4)
CREAT SERPL-MCNC: 1.3 MG/DL (ref 0.5–1.4)
DIFFERENTIAL METHOD BLD: ABNORMAL
EOSINOPHIL # BLD AUTO: 0 K/UL (ref 0–0.5)
EOSINOPHIL NFR BLD: 0 % (ref 0–8)
ERYTHROCYTE [DISTWIDTH] IN BLOOD BY AUTOMATED COUNT: 18.7 % (ref 11.5–14.5)
EST. GFR  (NO RACE VARIABLE): 42.6 ML/MIN/1.73 M^2
EST. GFR  (NO RACE VARIABLE): 46.9 ML/MIN/1.73 M^2
EST. GFR  (NO RACE VARIABLE): 46.9 ML/MIN/1.73 M^2
GLUCOSE SERPL-MCNC: 130 MG/DL (ref 70–110)
GLUCOSE SERPL-MCNC: 91 MG/DL (ref 70–110)
GLUCOSE SERPL-MCNC: 92 MG/DL (ref 70–110)
HCT VFR BLD AUTO: 35.8 % (ref 37–48.5)
HGB BLD-MCNC: 12.5 G/DL (ref 12–16)
IMM GRANULOCYTES # BLD AUTO: 0.02 K/UL (ref 0–0.04)
IMM GRANULOCYTES NFR BLD AUTO: 0.3 % (ref 0–0.5)
LYMPHOCYTES # BLD AUTO: 0.9 K/UL (ref 1–4.8)
LYMPHOCYTES NFR BLD: 14.8 % (ref 18–48)
MAGNESIUM SERPL-MCNC: 1.8 MG/DL (ref 1.6–2.6)
MAGNESIUM SERPL-MCNC: 1.8 MG/DL (ref 1.6–2.6)
MCH RBC QN AUTO: 26.6 PG (ref 27–31)
MCHC RBC AUTO-ENTMCNC: 34.9 G/DL (ref 32–36)
MCV RBC AUTO: 76 FL (ref 82–98)
MONOCYTES # BLD AUTO: 0.6 K/UL (ref 0.3–1)
MONOCYTES NFR BLD: 11 % (ref 4–15)
NEUTROPHILS # BLD AUTO: 4.3 K/UL (ref 1.8–7.7)
NEUTROPHILS NFR BLD: 73.7 % (ref 38–73)
NRBC BLD-RTO: 0 /100 WBC
PHOSPHATE SERPL-MCNC: 2.9 MG/DL (ref 2.7–4.5)
PHOSPHATE SERPL-MCNC: 4 MG/DL (ref 2.7–4.5)
PLATELET # BLD AUTO: 153 K/UL (ref 150–450)
PMV BLD AUTO: ABNORMAL FL (ref 9.2–12.9)
POTASSIUM SERPL-SCNC: 3.2 MMOL/L (ref 3.5–5.1)
POTASSIUM SERPL-SCNC: 3.2 MMOL/L (ref 3.5–5.1)
POTASSIUM SERPL-SCNC: 3.7 MMOL/L (ref 3.5–5.1)
PROT SERPL-MCNC: 6.3 G/DL (ref 6–8.4)
RBC # BLD AUTO: 4.7 M/UL (ref 4–5.4)
SODIUM SERPL-SCNC: 136 MMOL/L (ref 136–145)
SODIUM SERPL-SCNC: 137 MMOL/L (ref 136–145)
SODIUM SERPL-SCNC: 137 MMOL/L (ref 136–145)
VANCOMYCIN TROUGH SERPL-MCNC: 20.8 UG/ML (ref 10–22)
WBC # BLD AUTO: 5.82 K/UL (ref 3.9–12.7)

## 2024-01-03 PROCEDURE — 83735 ASSAY OF MAGNESIUM: CPT | Mod: 91,HCNC | Performed by: STUDENT IN AN ORGANIZED HEALTH CARE EDUCATION/TRAINING PROGRAM

## 2024-01-03 PROCEDURE — 94761 N-INVAS EAR/PLS OXIMETRY MLT: CPT | Mod: HCNC

## 2024-01-03 PROCEDURE — 80048 BASIC METABOLIC PNL TOTAL CA: CPT | Mod: 91,HCNC,XB | Performed by: STUDENT IN AN ORGANIZED HEALTH CARE EDUCATION/TRAINING PROGRAM

## 2024-01-03 PROCEDURE — 80048 BASIC METABOLIC PNL TOTAL CA: CPT | Mod: HCNC,XB | Performed by: STUDENT IN AN ORGANIZED HEALTH CARE EDUCATION/TRAINING PROGRAM

## 2024-01-03 PROCEDURE — 25000003 PHARM REV CODE 250: Mod: HCNC | Performed by: STUDENT IN AN ORGANIZED HEALTH CARE EDUCATION/TRAINING PROGRAM

## 2024-01-03 PROCEDURE — 80202 ASSAY OF VANCOMYCIN: CPT | Mod: HCNC | Performed by: STUDENT IN AN ORGANIZED HEALTH CARE EDUCATION/TRAINING PROGRAM

## 2024-01-03 PROCEDURE — 63600175 PHARM REV CODE 636 W HCPCS: Mod: HCNC | Performed by: STUDENT IN AN ORGANIZED HEALTH CARE EDUCATION/TRAINING PROGRAM

## 2024-01-03 PROCEDURE — 83735 ASSAY OF MAGNESIUM: CPT | Mod: HCNC | Performed by: STUDENT IN AN ORGANIZED HEALTH CARE EDUCATION/TRAINING PROGRAM

## 2024-01-03 PROCEDURE — 87205 SMEAR GRAM STAIN: CPT | Mod: HCNC | Performed by: STUDENT IN AN ORGANIZED HEALTH CARE EDUCATION/TRAINING PROGRAM

## 2024-01-03 PROCEDURE — 20000000 HC ICU ROOM: Mod: HCNC

## 2024-01-03 PROCEDURE — 36415 COLL VENOUS BLD VENIPUNCTURE: CPT | Mod: HCNC | Performed by: STUDENT IN AN ORGANIZED HEALTH CARE EDUCATION/TRAINING PROGRAM

## 2024-01-03 PROCEDURE — 87070 CULTURE OTHR SPECIMN AEROBIC: CPT | Mod: HCNC | Performed by: STUDENT IN AN ORGANIZED HEALTH CARE EDUCATION/TRAINING PROGRAM

## 2024-01-03 PROCEDURE — 99291 CRITICAL CARE FIRST HOUR: CPT | Mod: HCNC,,, | Performed by: INTERNAL MEDICINE

## 2024-01-03 PROCEDURE — 94799 UNLISTED PULMONARY SVC/PX: CPT | Mod: HCNC

## 2024-01-03 PROCEDURE — 94640 AIRWAY INHALATION TREATMENT: CPT | Mod: HCNC

## 2024-01-03 PROCEDURE — 99900035 HC TECH TIME PER 15 MIN (STAT): Mod: HCNC

## 2024-01-03 PROCEDURE — 27100171 HC OXYGEN HIGH FLOW UP TO 24 HOURS: Mod: HCNC

## 2024-01-03 PROCEDURE — 87106 FUNGI IDENTIFICATION YEAST: CPT | Mod: HCNC | Performed by: STUDENT IN AN ORGANIZED HEALTH CARE EDUCATION/TRAINING PROGRAM

## 2024-01-03 PROCEDURE — 25000242 PHARM REV CODE 250 ALT 637 W/ HCPCS: Mod: HCNC | Performed by: STUDENT IN AN ORGANIZED HEALTH CARE EDUCATION/TRAINING PROGRAM

## 2024-01-03 PROCEDURE — 80069 RENAL FUNCTION PANEL: CPT | Mod: HCNC | Performed by: STUDENT IN AN ORGANIZED HEALTH CARE EDUCATION/TRAINING PROGRAM

## 2024-01-03 PROCEDURE — 80053 COMPREHEN METABOLIC PANEL: CPT | Mod: HCNC | Performed by: STUDENT IN AN ORGANIZED HEALTH CARE EDUCATION/TRAINING PROGRAM

## 2024-01-03 PROCEDURE — 94660 CPAP INITIATION&MGMT: CPT | Mod: HCNC

## 2024-01-03 PROCEDURE — 85025 COMPLETE CBC W/AUTO DIFF WBC: CPT | Mod: HCNC | Performed by: STUDENT IN AN ORGANIZED HEALTH CARE EDUCATION/TRAINING PROGRAM

## 2024-01-03 PROCEDURE — 84100 ASSAY OF PHOSPHORUS: CPT | Mod: HCNC | Performed by: STUDENT IN AN ORGANIZED HEALTH CARE EDUCATION/TRAINING PROGRAM

## 2024-01-03 RX ORDER — ISOSORBIDE DINITRATE 10 MG/1
10 TABLET ORAL 3 TIMES DAILY
Status: DISCONTINUED | OUTPATIENT
Start: 2024-01-03 | End: 2024-01-06

## 2024-01-03 RX ORDER — SODIUM,POTASSIUM PHOSPHATES 280-250MG
2 POWDER IN PACKET (EA) ORAL
Status: DISCONTINUED | OUTPATIENT
Start: 2024-01-03 | End: 2024-01-08 | Stop reason: HOSPADM

## 2024-01-03 RX ORDER — LANOLIN ALCOHOL/MO/W.PET/CERES
800 CREAM (GRAM) TOPICAL
Status: DISCONTINUED | OUTPATIENT
Start: 2024-01-03 | End: 2024-01-08 | Stop reason: HOSPADM

## 2024-01-03 RX ORDER — POTASSIUM CHLORIDE 20 MEQ/1
40 TABLET, EXTENDED RELEASE ORAL ONCE
Status: DISCONTINUED | OUTPATIENT
Start: 2024-01-03 | End: 2024-01-04

## 2024-01-03 RX ORDER — POTASSIUM CHLORIDE 20 MEQ/1
40 TABLET, EXTENDED RELEASE ORAL ONCE
Status: COMPLETED | OUTPATIENT
Start: 2024-01-03 | End: 2024-01-03

## 2024-01-03 RX ORDER — MAGNESIUM SULFATE HEPTAHYDRATE 40 MG/ML
2 INJECTION, SOLUTION INTRAVENOUS ONCE
Status: DISCONTINUED | OUTPATIENT
Start: 2024-01-03 | End: 2024-01-08 | Stop reason: HOSPADM

## 2024-01-03 RX ADMIN — SENNOSIDES AND DOCUSATE SODIUM 1 TABLET: 8.6; 5 TABLET ORAL at 09:01

## 2024-01-03 RX ADMIN — POTASSIUM BICARBONATE 50 MEQ: 978 TABLET, EFFERVESCENT ORAL at 05:01

## 2024-01-03 RX ADMIN — ISOSORBIDE DINITRATE 10 MG: 10 TABLET ORAL at 03:01

## 2024-01-03 RX ADMIN — HYDRALAZINE HYDROCHLORIDE 25 MG: 25 TABLET ORAL at 03:01

## 2024-01-03 RX ADMIN — HYDRALAZINE HYDROCHLORIDE 25 MG: 25 TABLET ORAL at 05:01

## 2024-01-03 RX ADMIN — ISOSORBIDE DINITRATE 10 MG: 10 TABLET ORAL at 09:01

## 2024-01-03 RX ADMIN — POTASSIUM BICARBONATE 35 MEQ: 391 TABLET, EFFERVESCENT ORAL at 05:01

## 2024-01-03 RX ADMIN — BUDESONIDE 0.5 MG: 0.5 INHALANT ORAL at 07:01

## 2024-01-03 RX ADMIN — SACUBITRIL AND VALSARTAN 1 TABLET: 24; 26 TABLET, FILM COATED ORAL at 11:01

## 2024-01-03 RX ADMIN — ENOXAPARIN SODIUM 40 MG: 40 INJECTION SUBCUTANEOUS at 05:01

## 2024-01-03 RX ADMIN — IPRATROPIUM BROMIDE AND ALBUTEROL SULFATE 3 ML: .5; 3 SOLUTION RESPIRATORY (INHALATION) at 07:01

## 2024-01-03 RX ADMIN — IPRATROPIUM BROMIDE AND ALBUTEROL SULFATE 3 ML: .5; 3 SOLUTION RESPIRATORY (INHALATION) at 04:01

## 2024-01-03 RX ADMIN — CEFEPIME 2 G: 2 INJECTION, POWDER, FOR SOLUTION INTRAVENOUS at 03:01

## 2024-01-03 RX ADMIN — SACUBITRIL AND VALSARTAN 1 TABLET: 24; 26 TABLET, FILM COATED ORAL at 09:01

## 2024-01-03 RX ADMIN — Medication 800 MG: at 05:01

## 2024-01-03 RX ADMIN — FLUTICASONE PROPIONATE 100 MCG: 50 SPRAY, METERED NASAL at 09:01

## 2024-01-03 RX ADMIN — SENNOSIDES AND DOCUSATE SODIUM 1 TABLET: 8.6; 5 TABLET ORAL at 08:01

## 2024-01-03 RX ADMIN — IPRATROPIUM BROMIDE AND ALBUTEROL SULFATE 3 ML: .5; 3 SOLUTION RESPIRATORY (INHALATION) at 11:01

## 2024-01-03 RX ADMIN — IPRATROPIUM BROMIDE AND ALBUTEROL SULFATE 3 ML: .5; 3 SOLUTION RESPIRATORY (INHALATION) at 03:01

## 2024-01-03 RX ADMIN — HYDRALAZINE HYDROCHLORIDE 25 MG: 25 TABLET ORAL at 10:01

## 2024-01-03 RX ADMIN — IPRATROPIUM BROMIDE AND ALBUTEROL SULFATE 3 ML: .5; 3 SOLUTION RESPIRATORY (INHALATION) at 08:01

## 2024-01-03 RX ADMIN — MONTELUKAST 10 MG: 10 TABLET, FILM COATED ORAL at 09:01

## 2024-01-03 RX ADMIN — FLUTICASONE PROPIONATE 100 MCG: 50 SPRAY, METERED NASAL at 08:01

## 2024-01-03 RX ADMIN — BUDESONIDE 0.5 MG: 0.5 INHALANT ORAL at 08:01

## 2024-01-03 RX ADMIN — FUROSEMIDE 20 MG/HR: 10 INJECTION, SOLUTION INTRAMUSCULAR; INTRAVENOUS at 03:01

## 2024-01-03 RX ADMIN — PREDNISONE 20 MG: 20 TABLET ORAL at 08:01

## 2024-01-03 RX ADMIN — POTASSIUM CHLORIDE 40 MEQ: 1500 TABLET, EXTENDED RELEASE ORAL at 08:01

## 2024-01-03 NOTE — PROGRESS NOTES
Patient arrived to SICU [id number] transported by 2 Rns. 2 L NC. Lasix at 20 mg/hr infusing. Patient transferred to ICU bed, connected to monitoring and assessed.     Patient AAOx4, follows commands and moves all extremities purposefully.   Dobutamine started.   Skin without breakdown.    MD notified upon patient arrival to ICU.

## 2024-01-03 NOTE — ASSESSMENT & PLAN NOTE
Trop 0.4 on admission. No calcifications seen on CT Chest (7/2023) Possible underlying ischemia given patient's HTN. Less concern for ACS but could consider ACS workup outpatient.

## 2024-01-03 NOTE — PROGRESS NOTES
Nurses Note -- 4 Eyes      1/2/2024   7:10 PM      Skin assessed during: Transfer      [x] No Altered Skin Integrity Present    []Prevention Measures Documented      [] Yes- Altered Skin Integrity Present or Discovered   [] LDA Added if Not in Epic (Describe Wound)   [] New Altered Skin Integrity was Present on Admit and Documented in LDA   [] Wound Image Taken    Wound Care Consulted? No    Attending Nurse:  Zulema Sanchez RN     Second RN/Staff Member:  Yoseph Wills RN

## 2024-01-03 NOTE — ASSESSMENT & PLAN NOTE
CXR (1/1) demonstrated patchy interstitial lung opacity R greater than L re-demonstrated possibly related to bilateral PNA. Afebrile w/out leukocytosis. No sputum production. Bcx NGTD.     - Less concern with PNA given improvement in symptoms s/p Lasix gtt.   - Discontinue abx   - Will monitor for infectious pathology if patient decompensates

## 2024-01-03 NOTE — CARE UPDATE
Overnight Care update:       Patient transferred to CCU service due to concerns for cardiogenic shock. Patient with newly reduced EF 20-25% and not appropriately responding to IV Lasix. Patient was started on a  gtt and on lasix gtt with good response and feeling better.     Lactic has normalized. Urine output >100cc/hr.       Plan:   - Decreasing  from 5 to 2.5mcg/kg/min.   - Continue lasix drip at 20cc/hr with goal net -2L.   - pt on broad spectrum abx for PNA- Bcx x 2, resp. Cx obtained.         Discussed with staff.         Jj Boateng MD  Cardiology fellow

## 2024-01-03 NOTE — ASSESSMENT & PLAN NOTE
Home regimen: Clonidine 0.1mg, Valsartan 320mg     Plan:   - Start Hydralazine 25mg and Entresto BID  - Stop Clonidine d/t reflex hypertension

## 2024-01-03 NOTE — PROGRESS NOTES
"[facility]) nurse navigator notified of H[facility] candidate in need of education and introduction to 4-6 week program.      PT aao x 3 while lying in bed. Introduced self to pt as HFT[facility] nurse navigator.     Patient given "[facility] Home Care Guide" which includes "Daily weight and symptom tracker".  Encouraged pt to review information.      Reviewed the following key points of HFT[facility] program with PT :              1.) Take your medications as directed. Call Ms Giuliano if any health Care Professional changes your Heart/Fluid medications.               2.) Weight yourself daily. Daily Dry Weights. Upon waking ,empty your bladder, weigh with as little clothes as possible before eating/drinking. Record weight in Symptom Tracker and compare these weights for fluid gain. Weight gain overnight of 3-4 lbs is Fluid, also a gain of 5 lbs in 3 days is Fluid as well. Call US!              3.) Follow low salt and limited fluid diet. Salt/Sodium Restriction 5935-2172 mg, see page 4. Sodium makes you hold onto Fluid. High Sodium Foods;Deli Meat/Cheese, Sausages/Hot Dogs, Fast Food/Restaurant Food, Anything in a box, bottle or can. Cook with Fresh or Frozen ingredients and use seasonings that are labeled NO SALT. Check Portion Sizes, Salt is reported for 1 portion. A can may contain 2-3 portions. Fluid Restriction 1.5 -2 Liters/Day, see page 6, measure all of your Fluid, the milk in your cereal, broth in your soup and ice cream because anything that melts at room temp is a liquid.              4.) Stop smoking and start exercising. Brisk walking is good, don't walk like you are going to the [employer] and DON"T WALK IN THE HEAT! Start low and increase as tolerated. Remember if you don't use it you lose it.              5.) Go to your appointments and call your team. Have your weight and BP/P ready when we call to do the Phone Check ins and call us if you have fluid gain or " questions     Pt reminded to follow Symptom tracker and to call at the onset of symptoms according to tracker.     Reviewed plan for follow up once discharged to include phone calls, in person and virtual visits to assist pt optimizing their heart failure medication regimen and encouraging healthy lifestyle modifications.  Reminded pt that program will assist them over the next 4-6 weeks and then patient will be transferred to long term care provider .  [facility] via phone and or via [employer].     Pt instructed appointment will be printed on hospital discharge paperwork.     Pt also reminded RN will call 48-72 hours after discharge to check on them.     PT can verbalize read back of some of the information given.  Encouraged pt and  to read over information often and contact team with any questions or concerns.       PT prefers morning appointments.

## 2024-01-03 NOTE — SUBJECTIVE & OBJECTIVE
Past Medical History:   Diagnosis Date    Allergy     Asthma     Chronic diastolic heart failure 4/5/2018    Glaucoma suspect     Hyperlipidemia     Hypertension     Neuromuscular disorder     NSTEMI (non-ST elevated myocardial infarction) 1/1/2024    JOHN on CPAP     Osteoporosis     Other neutropenia 8/31/2023    Recurrent sinusitis 3/25/2014    Recurrent upper respiratory infection (URI)     Urticaria        Past Surgical History:   Procedure Laterality Date    CATARACT EXTRACTION W/  INTRAOCULAR LENS IMPLANT Right 2/11/2021    Procedure: EXTRACTION, CATARACT, WITH IOL INSERTION;  Surgeon: John Merino MD;  Location: [facility] OR;  Service: Ophthalmology;  Laterality: Right;    CATARACT EXTRACTION W/  INTRAOCULAR LENS IMPLANT Left 3/4/2021    Procedure: EXTRACTION, CATARACT, WITH IOL INSERTION;  Surgeon: John Merino MD;  Location: [facility] OR;  Service: Ophthalmology;  Laterality: Left;    COLONOSCOPY N/A 5/18/2018    Procedure: COLONOSCOPY;  Surgeon: Calixto Brian MD;  Location: [facility] ENDO (4TH FLR);  Service: Endoscopy;  Laterality: N/A;    COLONOSCOPY N/A 10/24/2023    Procedure: COLONOSCOPY;  Surgeon: Ward Merino MD;  [facility]  Service: Endoscopy;  Laterality: N/A;  ref by / golytely [facility] [id number]  10/17-lvm for precall-MS  10/20-precall complete-MS    HYSTERECTOMY      total    OOPHORECTOMY      ROTATOR CUFF REPAIR Left     SINUS SURGERY         Review of patient's allergies indicates:   Allergen Reactions    Metoprolol Shortness Of Breath    Adhesive      PAPER TAPE    Diazepam Other (See Comments)     Nervous, jittery    Gabapentin      Nervous      Keppra [levetiracetam]      nervous    Mold      sneezing    Calcium channel blocking agents-dihydropyridines Other (See Comments)     Leg swelling     Thiazides Other (See Comments)     Hyponatremia        No current facility-administered medications on file prior to encounter.     Current Outpatient Medications on  File Prior to Encounter   Medication Sig    ADVAIR DISKUS 500-50 mcg/dose DsDv diskus inhaler INHALE 1 PUFF TWICE DAILY    albuterol (VENTOLIN HFA) 90 mcg/actuation inhaler Inhale 2 puffs into the lungs every 6 (six) hours as needed for Wheezing. Rescue    azelastine (ASTELIN) 137 mcg (0.1 %) nasal spray 2 sprays (274 mcg total) by Nasal route 2 (two) times daily.    cloNIDine (CATAPRES) 0.1 MG tablet Take 2 tablets (0.2 mg total) by mouth 2 (two) times daily.    albuterol-ipratropium (DUO-NEB) 2.5 mg-0.5 mg/3 mL nebulizer solution Take 3 mLs by nebulization every 6 (six) hours as needed for Wheezing.    benralizumab 30 mg/mL AtIn Inject 30 mg into the skin every 28 days. For first 3 doses    benralizumab 30 mg/mL AtIn Inject 30 mg into the skin every 8 weeks.    benzonatate (TESSALON) 100 MG capsule Take 1 capsule (100 mg total) by mouth 3 (three) times daily as needed for Cough.    budesonide-formoterol 160-4.5 mcg (SYMBICORT) 160-4.5 mcg/actuation HFAA Inhale 2 puffs into the lungs every 12 (twelve) hours. Controller    carboxymethylcellulose sodium (REFRESH TEARS) 0.5 % Drop Apply 1 drop to eye 3 (three) times daily as needed (dry eyes).    cholecalciferol, vitamin D3, 10,000 unit Cap Take 360 mg by mouth once daily. Take 6 capsules (6,000 units total) by mouth once daily    COD LIVER OIL ORAL Take 1 tablet by mouth once daily.    dupilumab 300 mg/2 mL PnIj Inject 300 mg into the skin every 14 (fourteen) days.    ferrous sulfate 325 (65 FE) MG EC tablet Take 1 tablet (325 mg total) by mouth 3 (three) times daily with meals.    fluticasone propionate (FLONASE) 50 mcg/actuation nasal spray 2 sprays (100 mcg total) by Each Nostril route 2 (two) times a day.    furosemide (LASIX) 20 MG tablet TAKE 1 TABLET BY MOUTH EVERY DAY    GAVILYTE-G 236-22.74-6.74 -5.86 gram suspension Take 4,000 mLs by mouth once.    guaiFENesin (MUCINEX) 600 mg 12 hr tablet Take 1 tablet (600 mg total) by mouth 2 (two) times daily.     hydrocortisone 2.5 % cream Apply topically 2 (two) times daily. Prn face rash.Stop using steroid topical when skin is smooth and non itchy.  Do not treat dark or red coloring. (Patient not taking: Reported on 2023)    milk thistle 150 mg Cap Take 1 capsule by mouth once daily.    montelukast (SINGULAIR) 10 mg tablet Take 1 tablet (10 mg total) by mouth every evening.    multivitamin (THERAGRAN) per tablet Take 1 tablet by mouth once daily.    potassium chloride SA (K-DUR,KLOR-CON) 20 MEQ tablet Take 1 tablet (20 mEq total) by mouth once daily.    predniSONE (DELTASONE) 5 MG tablet Take 3 tablets (15 mg total) by mouth once daily.    SENNA 8.6 mg tablet TAKE 2 TAB(S) BY MOUTH NIGHTLY AS NEEDED FOR CONSTIPATION    tretinoin (RETIN-A) 0.05 % cream Apply topically every evening. Start with every other night and move up to nightly after 2 weeks if not too dry.    valsartan (DIOVAN) 320 MG tablet Take 1 tablet (320 mg total) by mouth once daily.     Family History       Problem Relation (Age of Onset)    Cancer Sister    Hypertension Daughter, Daughter    Kidney cancer Sister    No Known Problems Mother, Father    Ovarian cancer Sister          Tobacco Use    Smoking status: Former     Current packs/day: 0.00     Average packs/day: 0.3 packs/day for 40.0 years (10.0 ttl pk-yrs)     Types: Cigarettes     Start date: 1965     Quit date: 2005     Years since quittin.0    Smokeless tobacco: Never   Substance and Sexual Activity    Alcohol use: Yes     Comment: rare beer    Drug use: No    Sexual activity: Not Currently     Review of Systems   Constitutional: Negative for fever and malaise/fatigue.   Eyes:  Negative for blurred vision.   Cardiovascular:  Positive for orthopnea. Negative for chest pain and irregular heartbeat.   Respiratory:  Positive for cough. Negative for shortness of breath.    Gastrointestinal:  Negative for abdominal pain, jaundice, nausea and vomiting.     Objective:     Vital Signs  (Most Recent):  Temp: 98.6 °F (37 °C) (01/03/24 1100)  Pulse: 100 (01/03/24 1131)  Resp: (!) 27 (01/03/24 1131)  BP: 130/62 (01/03/24 1131)  SpO2: 100 % (01/03/24 1131) Vital Signs (24h Range):  Temp:  [97.4 °F (36.3 °C)-98.6 °F (37 °C)] 98.6 °F (37 °C)  Pulse:  [] 100  Resp:  [10-35] 27  SpO2:  [96 %-100 %] 100 %  BP: (105-171)/() 130/62     Weight: 86 kg (189 lb 9.5 oz)  Body mass index is 31.55 kg/m².    SpO2: 100 %         Intake/Output Summary (Last 24 hours) at 1/3/2024 1148  Last data filed at 1/3/2024 1100  Gross per 24 hour   Intake 446.41 ml   Output 3890 ml   Net -3443.59 ml       Lines/Drains/Airways       Drain  Duration                  Urethral Catheter 01/02/24 1640 16 Fr. <1 day              Peripheral Intravenous Line  Duration                  Peripheral IV - Single Lumen 01/02/24 0355 20 G Anterior;Right Forearm 1 day         Peripheral IV - Single Lumen 01/02/24 1650 20 G Anterior;Proximal;Right Forearm <1 day                     Physical Exam  Constitutional:       General: She is not in acute distress.     Appearance: Normal appearance. She is not ill-appearing.   HENT:      Head: Normocephalic.      Right Ear: External ear normal.      Left Ear: External ear normal.   Eyes:      General: No scleral icterus.        Right eye: No discharge.         Left eye: No discharge.      Extraocular Movements: Extraocular movements intact.   Cardiovascular:      Rate and Rhythm: Normal rate and regular rhythm.      Pulses: Normal pulses.      Heart sounds: Normal heart sounds. No murmur heard.     No friction rub. No gallop.   Pulmonary:      Effort: Pulmonary effort is normal.      Breath sounds: Wheezing (Expiratory wheezes auscultated anteriorly) present.      Comments: Coarse breath sounds heard in b/l lower lobes  Neurological:      Mental Status: She is alert.          Significant Labs: All pertinent lab results from the last 24 hours have been reviewed.    Significant Imaging:  Echocardiogram: Transthoracic echo (TTE) complete (Cupid Only):   Results for orders placed or performed during the hospital encounter of 01/01/24   Echo   Result Value Ref Range    RA Width 4.73 cm    LA volume (mod) 135.00 cm3    Left Atrium Major Axis 6.22 cm    Left Atrium Minor Axis 6.53 cm    RA Major Axis 5.28 cm    LV Diastolic Volume 156.19 mL    LV Systolic Volume 107.90 mL    MV Peak A Ventura 0.26 m/s    MV stenosis pressure 1/2 time 40.77 ms    TR Max Ventura 2.71 m/s    MV Peak E Ventura 0.55 m/s    Ao VTI 15.01 cm    Ao peak ventura 0.85 m/s    LVOT peak VTI 13.12 cm    LVOT peak ventura 0.77 m/s    LVOT diameter 2.09 cm    IVRT 74.22 msec    E wave deceleration time 140.58 msec    AV mean gradient 2 mmHg    TAPSE 1.33 cm    RVDD 4.18 cm    LA size 4.88 cm    Ascending aorta 3.21 cm    STJ 2.93 cm    Sinus 3.27 cm    LVIDs 4.81 (A) 2.1 - 4.0 cm    Posterior Wall 0.79 0.6 - 1.1 cm    IVS 0.81 0.6 - 1.1 cm    LVIDd 5.64 3.5 - 6.0 cm    TDI LATERAL 0.09 m/s    LA WIDTH 5.06 cm    TDI SEPTAL 0.03 m/s    LV LATERAL E/E' RATIO 6.11 m/s    LV SEPTAL E/E' RATIO 18.33 m/s    FS 15 (A) 28 - 44 %    LA volume 133.73 cm3    LV mass 167.08 g    ZLVIDD 0.48     ZLVIDS 2.95     Left Ventricle Relative Wall Thickness 0.28 cm    AV valve area 3.00 cm²    AV Velocity Ratio 0.91     AV index (prosthetic) 0.87     MV valve area p 1/2 method 5.40 cm2    E/A ratio 2.12     Mean e' 0.06 m/s    LVOT area 3.4 cm2    LVOT stroke volume 44.99 cm3    AV peak gradient 3 mmHg    E/E' ratio 9.17 m/s    LV Systolic Volume Index 56.5 mL/m2    LV Diastolic Volume Index 81.77 mL/m2    LA Volume Index 70.0 mL/m2    LV Mass Index 87 g/m2    Triscuspid Valve Regurgitation Peak Gradient 29 mmHg    LA Volume Index (Mod) 70.7 mL/m2    MANDA by Velocity Ratio 3.11 cm²    BSA 1.96 m2    EF 23 %    TV resting pulmonary artery pressure 44 mmHg    RV TB RVSP 18 mmHg    Est. RA pres 15 mmHg    Narrative      Left Ventricle: The left ventricle is mildly dilated.  Ventricular mass   is normal. Normal wall thickness. Regional wall motion abnormalities   present. See diagram for wall motion findings. There is severely reduced   systolic function with a visually estimated ejection fraction of 20 - 25%.   Ejection fraction by visual approximation is 23%. Grade III diastolic   dysfunction.    Right Ventricle: Mild right ventricular enlargement. Wall thickness is   normal. Right ventricle wall motion  is normal. Systolic function is   borderline low.    Left Atrium: Left atrium is severely dilated.    Right Atrium: Right atrium is mildly dilated.    Aortic Valve: There is mild aortic valve sclerosis.    Mitral Valve: There is mild mitral annular calcification present. There   is moderate to  moderate-severe regurgitation.    Tricuspid Valve: There is moderate- severe  to severe (3-4+)   regurgitation.    Pulmonary Artery: There is mild pulmonary hypertension. The estimated   pulmonary artery systolic pressure is 44 mmHg.    IVC/SVC: Elevated venous pressure at 15 mmHg.    Pericardium: There is a trivial posterior effusion.

## 2024-01-03 NOTE — PLAN OF CARE
SICU PLAN OF CARE NOTE    Dx: Bilateral pneumonia    Vital Signs (last 12 hours):   Temp:  [97.4 °F (36.3 °C)-98.1 °F (36.7 °C)]   Pulse:  [72-95]   Resp:  [13-35]   BP: (109-171)/(56-93)   SpO2:  [99 %-100 %]      Neuro: AAO x4, Follows Commands, and Moves All Extremities    Cardiac: NSR with PVCs    Respiratory: Nasal Cannula 2 L    Gtts: Dobutamine and Lasix    Urine Output: Urinary Catheter 175 - 200 cc/hour    Diet: Cardiac Diet     Labs/Accuchecks: AM Labs    Skin: Foam dressings in use. Skin clean, dry, and intact    Shift Events:  CPAP at night, dobutamine titrated down

## 2024-01-03 NOTE — NURSING
Pt AAO x4, no c/o pain. Transthoracic Echo done during the shift. Lasix 80mg IV given x2 per order, inserted martinez, and started on lasix drip per order. Pt tolerated well. Pt transferred to SICU with all her personal belongings, report given to VIANEY Poole.

## 2024-01-03 NOTE — ASSESSMENT & PLAN NOTE
Elevated LFTs for the last few months. Likely hepatic congestion 2/2 uncontrolled CHF.     - Continue to monitor

## 2024-01-03 NOTE — CARE UPDATE
I have reviewed the chart of Michela Franco and participated in the care of the patient who is hospitalized for the following:    Active Hospital Problems    Diagnosis    *Bilateral pneumonia    JEFFY (acute kidney injury)    Elevated troponin    Acute on chronic heart failure    ACP (advance care planning)    Bilirubinemia    Transaminitis    Severe asthma    HTN (hypertension)    Hypokalemia      I have reviewed Michela Franco with the multidisciplinary team during rounds.      Jyoti Denny, CHAVA  Unit-Based [facility]

## 2024-01-03 NOTE — ASSESSMENT & PLAN NOTE
Patient with newly reduced EF 20-25% and not appropriately responding to IV Lasix. Physical exam concerning for cool lower extremities and volume overload. Started on Lasix gtt and  gtt. Good response to Lasix gtt with UOP 3140, net -2959mL. Weaning  gtt.     - Continue lasix gtt for volume optimization; weaning  gtt   - Start Entresto and Hydralazine  - Strict Is and Os

## 2024-01-03 NOTE — H&P
[facility]  Cardiology  History and Physical     Patient Name: Michela Franco  MRN: [id number]  Admission Date: 1/1/2024  Code Status: Full Code   Attending Provider: Albin Diaz MD   Primary Care Physician: Mayela Broussard MD  Principal Problem:Acute on chronic heart failure    Patient information was obtained from patient, past medical records, and ER records.     Subjective:     Chief Complaint:  SOB     HPI:  Ms. Franco is a 77 yo with HFpEF (EF 50% 7/2023), severe asthma, HTN admitted for bilateral lower extremity edema on 1/1/24. She reported dyspnea on exertion and orthopnea requiring multiple pillows. In the ED, BNP was elevated 4700 and trop 0.489. CXR was concerning for pulmonary congestion in the setting of a CHF exacerbation. Her recent echo on admission showed a newly reduced EF of 20-25% with Grade III diastolic dysfunction and wall motion abnormalities. Down from EF 50% in July 2023. She initially received 160 mg IV Lasix which generated a poor response (~650cc urine). Cardiology consults was consulted for her newly reduced EF and SOB concerning for CHF exacerbation. On their exam, patient had cool extremities bilaterally with 1+ pitting edema. Concern for cardiogenic shock. She was started on a  gtt and lasix gtt. Transferred to the CCU for further management of CHF.         Past Medical History:   Diagnosis Date    Allergy     Asthma     Chronic diastolic heart failure 4/5/2018    Glaucoma suspect     Hyperlipidemia     Hypertension     Neuromuscular disorder     NSTEMI (non-ST elevated myocardial infarction) 1/1/2024    JOHN on CPAP     Osteoporosis     Other neutropenia 8/31/2023    Recurrent sinusitis 3/25/2014    Recurrent upper respiratory infection (URI)     Urticaria        Past Surgical History:   Procedure Laterality Date    CATARACT EXTRACTION W/  INTRAOCULAR LENS IMPLANT Right 2/11/2021    Procedure: EXTRACTION, CATARACT, WITH IOL INSERTION;  Surgeon:  John Merino MD;  Location: [facility] OR;  Service: Ophthalmology;  Laterality: Right;    CATARACT EXTRACTION W/  INTRAOCULAR LENS IMPLANT Left 3/4/2021    Procedure: EXTRACTION, CATARACT, WITH IOL INSERTION;  Surgeon: John Merino MD;  Location: [facility] OR;  Service: Ophthalmology;  Laterality: Left;    COLONOSCOPY N/A 5/18/2018    Procedure: COLONOSCOPY;  Surgeon: Calixto Brian MD;  Location: [facility] ENDO (4TH FLR);  Service: Endoscopy;  Laterality: N/A;    COLONOSCOPY N/A 10/24/2023    Procedure: COLONOSCOPY;  Surgeon: Ward Merino MD;  Location: [facility] ENDO (4TH FLR);  Service: Endoscopy;  Laterality: N/A;  ref by / golytely [facility] [id number]  10/17-lvm for precall-MS  10/20-precall complete-MS    HYSTERECTOMY      total    OOPHORECTOMY      ROTATOR CUFF REPAIR Left     SINUS SURGERY         Review of patient's allergies indicates:   Allergen Reactions    Metoprolol Shortness Of Breath    Adhesive      PAPER TAPE    Diazepam Other (See Comments)     Nervous, jittery    Gabapentin      Nervous      Keppra [levetiracetam]      nervous    Mold      sneezing    Calcium channel blocking agents-dihydropyridines Other (See Comments)     Leg swelling     Thiazides Other (See Comments)     Hyponatremia        No current facility-administered medications on file prior to encounter.     Current Outpatient Medications on File Prior to Encounter   Medication Sig    ADVAIR DISKUS 500-50 mcg/dose DsDv diskus inhaler INHALE 1 PUFF TWICE DAILY    albuterol (VENTOLIN HFA) 90 mcg/actuation inhaler Inhale 2 puffs into the lungs every 6 (six) hours as needed for Wheezing. Rescue    azelastine (ASTELIN) 137 mcg (0.1 %) nasal spray 2 sprays (274 mcg total) by Nasal route 2 (two) times daily.    cloNIDine (CATAPRES) 0.1 MG tablet Take 2 tablets (0.2 mg total) by mouth 2 (two) times daily.    albuterol-ipratropium (DUO-NEB) 2.5 mg-0.5 mg/3 mL nebulizer solution Take 3 mLs by nebulization every 6 (six) hours as needed for  Wheezing.    benralizumab 30 mg/mL AtIn Inject 30 mg into the skin every 28 days. For first 3 doses    benralizumab 30 mg/mL AtIn Inject 30 mg into the skin every 8 weeks.    benzonatate (TESSALON) 100 MG capsule Take 1 capsule (100 mg total) by mouth 3 (three) times daily as needed for Cough.    budesonide-formoterol 160-4.5 mcg (SYMBICORT) 160-4.5 mcg/actuation HFAA Inhale 2 puffs into the lungs every 12 (twelve) hours. Controller    carboxymethylcellulose sodium (REFRESH TEARS) 0.5 % Drop Apply 1 drop to eye 3 (three) times daily as needed (dry eyes).    cholecalciferol, vitamin D3, 10,000 unit Cap Take 360 mg by mouth once daily. Take 6 capsules (6,000 units total) by mouth once daily    COD LIVER OIL ORAL Take 1 tablet by mouth once daily.    dupilumab 300 mg/2 mL PnIj Inject 300 mg into the skin every 14 (fourteen) days.    ferrous sulfate 325 (65 FE) MG EC tablet Take 1 tablet (325 mg total) by mouth 3 (three) times daily with meals.    fluticasone propionate (FLONASE) 50 mcg/actuation nasal spray 2 sprays (100 mcg total) by Each Nostril route 2 (two) times a day.    furosemide (LASIX) 20 MG tablet TAKE 1 TABLET BY MOUTH EVERY DAY    GAVILYTE-G 236-22.74-6.74 -5.86 gram suspension Take 4,000 mLs by mouth once.    guaiFENesin (MUCINEX) 600 mg 12 hr tablet Take 1 tablet (600 mg total) by mouth 2 (two) times daily.    hydrocortisone 2.5 % cream Apply topically 2 (two) times daily. Prn face rash.Stop using steroid topical when skin is smooth and non itchy.  Do not treat dark or red coloring. (Patient not taking: Reported on 12/5/2023)    milk thistle 150 mg Cap Take 1 capsule by mouth once daily.    montelukast (SINGULAIR) 10 mg tablet Take 1 tablet (10 mg total) by mouth every evening.    multivitamin (THERAGRAN) per tablet Take 1 tablet by mouth once daily.    potassium chloride SA (K-DUR,KLOR-CON) 20 MEQ tablet Take 1 tablet (20 mEq total) by mouth once daily.    predniSONE (DELTASONE) 5 MG tablet Take 3  tablets (15 mg total) by mouth once daily.    SENNA 8.6 mg tablet TAKE 2 TAB(S) BY MOUTH NIGHTLY AS NEEDED FOR CONSTIPATION    tretinoin (RETIN-A) 0.05 % cream Apply topically every evening. Start with every other night and move up to nightly after 2 weeks if not too dry.    valsartan (DIOVAN) 320 MG tablet Take 1 tablet (320 mg total) by mouth once daily.     Family History       Problem Relation (Age of Onset)    Cancer Sister    Hypertension Daughter, Daughter    Kidney cancer Sister    No Known Problems Mother, Father    Ovarian cancer Sister          Tobacco Use    Smoking status: Former     Current packs/day: 0.00     Average packs/day: 0.3 packs/day for 40.0 years (10.0 ttl pk-yrs)     Types: Cigarettes     Start date: 1965     Quit date: 2005     Years since quittin.0    Smokeless tobacco: Never   Substance and Sexual Activity    Alcohol use: Yes     Comment: rare beer    Drug use: No    Sexual activity: Not Currently     Review of Systems   Constitutional: Negative for fever and malaise/fatigue.   Eyes:  Negative for blurred vision.   Cardiovascular:  Positive for orthopnea. Negative for chest pain and irregular heartbeat.   Respiratory:  Positive for cough. Negative for shortness of breath.    Gastrointestinal:  Negative for abdominal pain, jaundice, nausea and vomiting.     Objective:     Vital Signs (Most Recent):  Temp: 98.6 °F (37 °C) (24 1100)  Pulse: 100 (24 1131)  Resp: (!) 27 (24 1131)  BP: 130/62 (24 1131)  SpO2: 100 % (24 1131) Vital Signs (24h Range):  Temp:  [97.4 °F (36.3 °C)-98.6 °F (37 °C)] 98.6 °F (37 °C)  Pulse:  [] 100  Resp:  [10-35] 27  SpO2:  [96 %-100 %] 100 %  BP: (105-171)/() 130/62     Weight: 86 kg (189 lb 9.5 oz)  Body mass index is 31.55 kg/m².    SpO2: 100 %         Intake/Output Summary (Last 24 hours) at 1/3/2024 1148  Last data filed at 1/3/2024 1100  Gross per 24 hour   Intake 446.41 ml   Output 3890 ml   Net -3443.59  ml       Lines/Drains/Airways       Drain  Duration                  Urethral Catheter 01/02/24 1640 16 Fr. <1 day              Peripheral Intravenous Line  Duration                  Peripheral IV - Single Lumen 01/02/24 0355 20 G Anterior;Right Forearm 1 day         Peripheral IV - Single Lumen 01/02/24 1650 20 G Anterior;Proximal;Right Forearm <1 day                     Physical Exam  Constitutional:       General: She is not in acute distress.     Appearance: Normal appearance. She is not ill-appearing.   HENT:      Head: Normocephalic.      Right Ear: External ear normal.      Left Ear: External ear normal.   Eyes:      General: No scleral icterus.        Right eye: No discharge.         Left eye: No discharge.      Extraocular Movements: Extraocular movements intact.   Cardiovascular:      Rate and Rhythm: Normal rate and regular rhythm.      Pulses: Normal pulses.      Heart sounds: Normal heart sounds. No murmur heard.     No friction rub. No gallop.   Pulmonary:      Effort: Pulmonary effort is normal.      Breath sounds: Wheezing (Expiratory wheezes auscultated anteriorly) present.      Comments: Coarse breath sounds heard in b/l lower lobes  Neurological:      Mental Status: She is alert.          Significant Labs: All pertinent lab results from the last 24 hours have been reviewed.    Significant Imaging: Echocardiogram: Transthoracic echo (TTE) complete (Cupid Only):   Results for orders placed or performed during the hospital encounter of 01/01/24   Echo   Result Value Ref Range    RA Width 4.73 cm    LA volume (mod) 135.00 cm3    Left Atrium Major Axis 6.22 cm    Left Atrium Minor Axis 6.53 cm    RA Major Axis 5.28 cm    LV Diastolic Volume 156.19 mL    LV Systolic Volume 107.90 mL    MV Peak A Ventura 0.26 m/s    MV stenosis pressure 1/2 time 40.77 ms    TR Max Ventura 2.71 m/s    MV Peak E Ventura 0.55 m/s    Ao VTI 15.01 cm    Ao peak ventura 0.85 m/s    LVOT peak VTI 13.12 cm    LVOT peak ventura 0.77 m/s    LVOT  diameter 2.09 cm    IVRT 74.22 msec    E wave deceleration time 140.58 msec    AV mean gradient 2 mmHg    TAPSE 1.33 cm    RVDD 4.18 cm    LA size 4.88 cm    Ascending aorta 3.21 cm    STJ 2.93 cm    Sinus 3.27 cm    LVIDs 4.81 (A) 2.1 - 4.0 cm    Posterior Wall 0.79 0.6 - 1.1 cm    IVS 0.81 0.6 - 1.1 cm    LVIDd 5.64 3.5 - 6.0 cm    TDI LATERAL 0.09 m/s    LA WIDTH 5.06 cm    TDI SEPTAL 0.03 m/s    LV LATERAL E/E' RATIO 6.11 m/s    LV SEPTAL E/E' RATIO 18.33 m/s    FS 15 (A) 28 - 44 %    LA volume 133.73 cm3    LV mass 167.08 g    ZLVIDD 0.48     ZLVIDS 2.95     Left Ventricle Relative Wall Thickness 0.28 cm    AV valve area 3.00 cm²    AV Velocity Ratio 0.91     AV index (prosthetic) 0.87     MV valve area p 1/2 method 5.40 cm2    E/A ratio 2.12     Mean e' 0.06 m/s    LVOT area 3.4 cm2    LVOT stroke volume 44.99 cm3    AV peak gradient 3 mmHg    E/E' ratio 9.17 m/s    LV Systolic Volume Index 56.5 mL/m2    LV Diastolic Volume Index 81.77 mL/m2    LA Volume Index 70.0 mL/m2    LV Mass Index 87 g/m2    Triscuspid Valve Regurgitation Peak Gradient 29 mmHg    LA Volume Index (Mod) 70.7 mL/m2    MANDA by Velocity Ratio 3.11 cm²    BSA 1.96 m2    EF 23 %    TV resting pulmonary artery pressure 44 mmHg    RV TB RVSP 18 mmHg    Est. RA pres 15 mmHg    Narrative      Left Ventricle: The left ventricle is mildly dilated. Ventricular mass   is normal. Normal wall thickness. Regional wall motion abnormalities   present. See diagram for wall motion findings. There is severely reduced   systolic function with a visually estimated ejection fraction of 20 - 25%.   Ejection fraction by visual approximation is 23%. Grade III diastolic   dysfunction.    Right Ventricle: Mild right ventricular enlargement. Wall thickness is   normal. Right ventricle wall motion  is normal. Systolic function is   borderline low.    Left Atrium: Left atrium is severely dilated.    Right Atrium: Right atrium is mildly dilated.    Aortic Valve: There is  mild aortic valve sclerosis.    Mitral Valve: There is mild mitral annular calcification present. There   is moderate to  moderate-severe regurgitation.    Tricuspid Valve: There is moderate- severe  to severe (3-4+)   regurgitation.    Pulmonary Artery: There is mild pulmonary hypertension. The estimated   pulmonary artery systolic pressure is 44 mmHg.    IVC/SVC: Elevated venous pressure at 15 mmHg.    Pericardium: There is a trivial posterior effusion.       Assessment and Plan:     * Acute on chronic heart failure  Patient with newly reduced EF 20-25% and not appropriately responding to IV Lasix. Physical exam concerning for cool lower extremities and volume overload. Started on Lasix gtt and  gtt. Good response to Lasix gtt with UOP 3140, net -2959mL. Weaning  gtt.     - Continue lasix gtt for volume optimization; weaning  gtt   - Start Entresto and Hydralazine  - Strict Is and Os         Bilirubinemia  T bili up to 1.5 on presentation, direct 0.5 - no history of prior elevations. AST/ALT slightly above normal. Likely secondary to CHF exacerbation  - daily BMP       Elevated troponin  Trop 0.4 on admission. No calcifications seen on CT Chest (7/2023) Possible underlying ischemia given patient's HTN. Less concern for ACS but could consider ACS workup outpatient.     Transaminitis  Elevated LFTs for the last few months. Likely hepatic congestion 2/2 uncontrolled CHF.     - Continue to monitor    Bilateral pneumonia  CXR (1/1) demonstrated patchy interstitial lung opacity R greater than L re-demonstrated possibly related to bilateral PNA. Afebrile w/out leukocytosis. No sputum production. Bcx NGTD.     - Less concern with PNA given improvement in symptoms s/p Lasix gtt.   - Discontinue abx   - Will monitor for infectious pathology if patient decompensates    Severe asthma  Wheezing noted on exam  Continue inhalers and breathing treatments    HTN (hypertension)  Home regimen: Clonidine 0.1mg, Valsartan  320mg     Plan:   - Start Hydralazine 25mg and Entresto BID  - Stop Clonidine d/t reflex hypertension      Hypokalemia  Possibly d/t albuterol vs pseudohypokalemia     - Received IV Potassium  - Repeat BMP pending  - Replete PRN        VTE Risk Mitigation (From admission, onward)           Ordered     enoxaparin injection 40 mg  Daily         01/01/24 1346     IP VTE HIGH RISK PATIENT  Once         01/01/24 1346     Place sequential compression device  Until discontinued         01/01/24 1346                    Marta Olvera MD  Cardiology   [facility]

## 2024-01-03 NOTE — ASSESSMENT & PLAN NOTE
Possibly d/t albuterol vs pseudohypokalemia     - Received IV Potassium  - Repeat BMP pending  - Replete PRN

## 2024-01-03 NOTE — PLAN OF CARE
[facility]  Discharge Reassessment    Primary Care Provider: Mayela Broussard MD    Expected Discharge Date: 1/4/2024    Reassessment (most recent)       Discharge Reassessment - 01/03/24 1331          Discharge Reassessment    Assessment Type Discharge Planning Reassessment     Communicated CAMRYN with patient/caregiver Date not available/Unable to determine     Discharge Plan A Home with family;Home     Discharge Plan B Home Health     DME Needed Upon Discharge  other (see comments)   TBD    Transition of Care Barriers None     Why the patient remains in the hospital Requires continued medical care                     Discharge Plan A and Plan B have been determined by review of patient's clinical status, future medical and therapeutic needs, and coverage/benefits for post-acute care in coordination with multidisciplinary team members.      Jana Urias LMSW  Case Management [facility]

## 2024-01-04 LAB
ALBUMIN SERPL BCP-MCNC: 2.8 G/DL (ref 3.5–5.2)
ALP SERPL-CCNC: 100 U/L (ref 55–135)
ALT SERPL W/O P-5'-P-CCNC: 110 U/L (ref 10–44)
ANION GAP SERPL CALC-SCNC: 12 MMOL/L (ref 8–16)
ANION GAP SERPL CALC-SCNC: 13 MMOL/L (ref 8–16)
ANION GAP SERPL CALC-SCNC: 14 MMOL/L (ref 8–16)
AST SERPL-CCNC: 106 U/L (ref 10–40)
BASOPHILS # BLD AUTO: 0.01 K/UL (ref 0–0.2)
BASOPHILS NFR BLD: 0.1 % (ref 0–1.9)
BILIRUB SERPL-MCNC: 1 MG/DL (ref 0.1–1)
BUN SERPL-MCNC: 31 MG/DL (ref 8–23)
BUN SERPL-MCNC: 32 MG/DL (ref 8–23)
BUN SERPL-MCNC: 32 MG/DL (ref 8–23)
CALCIUM SERPL-MCNC: 8.6 MG/DL (ref 8.7–10.5)
CALCIUM SERPL-MCNC: 8.6 MG/DL (ref 8.7–10.5)
CALCIUM SERPL-MCNC: 9.1 MG/DL (ref 8.7–10.5)
CHLORIDE SERPL-SCNC: 93 MMOL/L (ref 95–110)
CHLORIDE SERPL-SCNC: 95 MMOL/L (ref 95–110)
CHLORIDE SERPL-SCNC: 95 MMOL/L (ref 95–110)
CO2 SERPL-SCNC: 28 MMOL/L (ref 23–29)
CO2 SERPL-SCNC: 30 MMOL/L (ref 23–29)
CO2 SERPL-SCNC: 31 MMOL/L (ref 23–29)
CREAT SERPL-MCNC: 1.2 MG/DL (ref 0.5–1.4)
DIFFERENTIAL METHOD BLD: ABNORMAL
EOSINOPHIL # BLD AUTO: 0 K/UL (ref 0–0.5)
EOSINOPHIL NFR BLD: 0.1 % (ref 0–8)
ERYTHROCYTE [DISTWIDTH] IN BLOOD BY AUTOMATED COUNT: 19.6 % (ref 11.5–14.5)
EST. GFR  (NO RACE VARIABLE): 46.9 ML/MIN/1.73 M^2
GLUCOSE SERPL-MCNC: 110 MG/DL (ref 70–110)
GLUCOSE SERPL-MCNC: 128 MG/DL (ref 70–110)
GLUCOSE SERPL-MCNC: 91 MG/DL (ref 70–110)
HCT VFR BLD AUTO: 39.4 % (ref 37–48.5)
HGB BLD-MCNC: 13.1 G/DL (ref 12–16)
IMM GRANULOCYTES # BLD AUTO: 0.02 K/UL (ref 0–0.04)
IMM GRANULOCYTES NFR BLD AUTO: 0.3 % (ref 0–0.5)
LYMPHOCYTES # BLD AUTO: 1.4 K/UL (ref 1–4.8)
LYMPHOCYTES NFR BLD: 19.3 % (ref 18–48)
MAGNESIUM SERPL-MCNC: 2 MG/DL (ref 1.6–2.6)
MCH RBC QN AUTO: 26.7 PG (ref 27–31)
MCHC RBC AUTO-ENTMCNC: 33.2 G/DL (ref 32–36)
MCV RBC AUTO: 80 FL (ref 82–98)
MONOCYTES # BLD AUTO: 0.8 K/UL (ref 0.3–1)
MONOCYTES NFR BLD: 10.7 % (ref 4–15)
NEUTROPHILS # BLD AUTO: 5.1 K/UL (ref 1.8–7.7)
NEUTROPHILS NFR BLD: 69.5 % (ref 38–73)
NRBC BLD-RTO: 0 /100 WBC
PHOSPHATE SERPL-MCNC: 2.9 MG/DL (ref 2.7–4.5)
PLATELET # BLD AUTO: 156 K/UL (ref 150–450)
PMV BLD AUTO: ABNORMAL FL (ref 9.2–12.9)
POTASSIUM SERPL-SCNC: 3.5 MMOL/L (ref 3.5–5.1)
POTASSIUM SERPL-SCNC: 4 MMOL/L (ref 3.5–5.1)
POTASSIUM SERPL-SCNC: 5 MMOL/L (ref 3.5–5.1)
PROT SERPL-MCNC: 6.4 G/DL (ref 6–8.4)
RBC # BLD AUTO: 4.9 M/UL (ref 4–5.4)
SODIUM SERPL-SCNC: 136 MMOL/L (ref 136–145)
SODIUM SERPL-SCNC: 137 MMOL/L (ref 136–145)
SODIUM SERPL-SCNC: 138 MMOL/L (ref 136–145)
WBC # BLD AUTO: 7.31 K/UL (ref 3.9–12.7)

## 2024-01-04 PROCEDURE — 99900035 HC TECH TIME PER 15 MIN (STAT): Mod: HCNC

## 2024-01-04 PROCEDURE — 25000003 PHARM REV CODE 250: Mod: HCNC | Performed by: STUDENT IN AN ORGANIZED HEALTH CARE EDUCATION/TRAINING PROGRAM

## 2024-01-04 PROCEDURE — 27100171 HC OXYGEN HIGH FLOW UP TO 24 HOURS: Mod: HCNC

## 2024-01-04 PROCEDURE — 94799 UNLISTED PULMONARY SVC/PX: CPT | Mod: HCNC

## 2024-01-04 PROCEDURE — 85025 COMPLETE CBC W/AUTO DIFF WBC: CPT | Mod: HCNC | Performed by: STUDENT IN AN ORGANIZED HEALTH CARE EDUCATION/TRAINING PROGRAM

## 2024-01-04 PROCEDURE — 20600001 HC STEP DOWN PRIVATE ROOM: Mod: HCNC

## 2024-01-04 PROCEDURE — 84100 ASSAY OF PHOSPHORUS: CPT | Mod: HCNC | Performed by: INTERNAL MEDICINE

## 2024-01-04 PROCEDURE — 99233 SBSQ HOSP IP/OBS HIGH 50: CPT | Mod: HCNC,,, | Performed by: INTERNAL MEDICINE

## 2024-01-04 PROCEDURE — 25000242 PHARM REV CODE 250 ALT 637 W/ HCPCS: Mod: HCNC | Performed by: STUDENT IN AN ORGANIZED HEALTH CARE EDUCATION/TRAINING PROGRAM

## 2024-01-04 PROCEDURE — 83735 ASSAY OF MAGNESIUM: CPT | Mod: HCNC | Performed by: INTERNAL MEDICINE

## 2024-01-04 PROCEDURE — 80053 COMPREHEN METABOLIC PANEL: CPT | Mod: HCNC | Performed by: INTERNAL MEDICINE

## 2024-01-04 PROCEDURE — 63600175 PHARM REV CODE 636 W HCPCS: Mod: HCNC

## 2024-01-04 PROCEDURE — 63600175 PHARM REV CODE 636 W HCPCS: Mod: HCNC | Performed by: STUDENT IN AN ORGANIZED HEALTH CARE EDUCATION/TRAINING PROGRAM

## 2024-01-04 PROCEDURE — 80048 BASIC METABOLIC PNL TOTAL CA: CPT | Mod: HCNC,XB | Performed by: STUDENT IN AN ORGANIZED HEALTH CARE EDUCATION/TRAINING PROGRAM

## 2024-01-04 PROCEDURE — 94761 N-INVAS EAR/PLS OXIMETRY MLT: CPT | Mod: HCNC

## 2024-01-04 PROCEDURE — 94640 AIRWAY INHALATION TREATMENT: CPT | Mod: HCNC

## 2024-01-04 PROCEDURE — 94660 CPAP INITIATION&MGMT: CPT | Mod: HCNC

## 2024-01-04 RX ORDER — FUROSEMIDE 10 MG/ML
80 INJECTION INTRAMUSCULAR; INTRAVENOUS
Status: DISCONTINUED | OUTPATIENT
Start: 2024-01-04 | End: 2024-01-07

## 2024-01-04 RX ORDER — POTASSIUM CHLORIDE 750 MG/1
30 CAPSULE, EXTENDED RELEASE ORAL ONCE
Status: DISCONTINUED | OUTPATIENT
Start: 2024-01-04 | End: 2024-01-04

## 2024-01-04 RX ORDER — POTASSIUM CHLORIDE 20 MEQ/1
40 TABLET, EXTENDED RELEASE ORAL ONCE
Status: COMPLETED | OUTPATIENT
Start: 2024-01-04 | End: 2024-01-04

## 2024-01-04 RX ORDER — CALCIUM CARBONATE 200(500)MG
500 TABLET,CHEWABLE ORAL 2 TIMES DAILY PRN
Status: DISCONTINUED | OUTPATIENT
Start: 2024-01-04 | End: 2024-01-08 | Stop reason: HOSPADM

## 2024-01-04 RX ORDER — FUROSEMIDE 10 MG/ML
80 INJECTION INTRAMUSCULAR; INTRAVENOUS ONCE
Status: DISCONTINUED | OUTPATIENT
Start: 2024-01-04 | End: 2024-01-04

## 2024-01-04 RX ADMIN — HYDRALAZINE HYDROCHLORIDE 25 MG: 25 TABLET ORAL at 09:01

## 2024-01-04 RX ADMIN — FUROSEMIDE 20 MG/HR: 10 INJECTION, SOLUTION INTRAMUSCULAR; INTRAVENOUS at 03:01

## 2024-01-04 RX ADMIN — FUROSEMIDE 80 MG: 10 INJECTION, SOLUTION INTRAVENOUS at 06:01

## 2024-01-04 RX ADMIN — BUDESONIDE 0.5 MG: 0.5 INHALANT ORAL at 07:01

## 2024-01-04 RX ADMIN — HYDRALAZINE HYDROCHLORIDE 25 MG: 25 TABLET ORAL at 05:01

## 2024-01-04 RX ADMIN — ENOXAPARIN SODIUM 40 MG: 40 INJECTION SUBCUTANEOUS at 05:01

## 2024-01-04 RX ADMIN — MONTELUKAST 10 MG: 10 TABLET, FILM COATED ORAL at 08:01

## 2024-01-04 RX ADMIN — PREDNISONE 20 MG: 20 TABLET ORAL at 08:01

## 2024-01-04 RX ADMIN — IPRATROPIUM BROMIDE AND ALBUTEROL SULFATE 3 ML: .5; 3 SOLUTION RESPIRATORY (INHALATION) at 04:01

## 2024-01-04 RX ADMIN — IPRATROPIUM BROMIDE AND ALBUTEROL SULFATE 3 ML: .5; 3 SOLUTION RESPIRATORY (INHALATION) at 08:01

## 2024-01-04 RX ADMIN — FLUTICASONE PROPIONATE 100 MCG: 50 SPRAY, METERED NASAL at 08:01

## 2024-01-04 RX ADMIN — HYDRALAZINE HYDROCHLORIDE 25 MG: 25 TABLET ORAL at 02:01

## 2024-01-04 RX ADMIN — ISOSORBIDE DINITRATE 10 MG: 10 TABLET ORAL at 03:01

## 2024-01-04 RX ADMIN — ACETAMINOPHEN 650 MG: 325 TABLET ORAL at 12:01

## 2024-01-04 RX ADMIN — IPRATROPIUM BROMIDE AND ALBUTEROL SULFATE 3 ML: .5; 3 SOLUTION RESPIRATORY (INHALATION) at 11:01

## 2024-01-04 RX ADMIN — ISOSORBIDE DINITRATE 10 MG: 10 TABLET ORAL at 08:01

## 2024-01-04 RX ADMIN — SENNOSIDES AND DOCUSATE SODIUM 1 TABLET: 8.6; 5 TABLET ORAL at 08:01

## 2024-01-04 RX ADMIN — BUDESONIDE 0.5 MG: 0.5 INHALANT ORAL at 09:01

## 2024-01-04 RX ADMIN — POTASSIUM CHLORIDE 40 MEQ: 1500 TABLET, EXTENDED RELEASE ORAL at 01:01

## 2024-01-04 RX ADMIN — CALCIUM CARBONATE (ANTACID) CHEW TAB 500 MG 500 MG: 500 CHEW TAB at 05:01

## 2024-01-04 RX ADMIN — SACUBITRIL AND VALSARTAN 1 TABLET: 24; 26 TABLET, FILM COATED ORAL at 08:01

## 2024-01-04 RX ADMIN — IPRATROPIUM BROMIDE AND ALBUTEROL SULFATE 3 ML: .5; 3 SOLUTION RESPIRATORY (INHALATION) at 03:01

## 2024-01-04 RX ADMIN — IPRATROPIUM BROMIDE AND ALBUTEROL SULFATE 3 ML: .5; 3 SOLUTION RESPIRATORY (INHALATION) at 07:01

## 2024-01-04 NOTE — CARE UPDATE
"      SICU PLAN OF CARE NOTE    Dx: Acute on chronic heart failure    Shift Events: CMP trended, PRN tums given, bath given, linens changed, OOBTC, BM x1    Goals of Care: MAPs >65, CMP Q6H    Neuro: AAO x4, Follows Commands, and Moves All Extremities    Vital Signs: /69 (BP Location: Left arm, Patient Position: Lying)   Pulse 88   Temp 98.1 °F (36.7 °C) (Oral)   Resp 19   Ht 5' 5" (1.651 m)   Wt 83 kg (182 lb 15.7 oz)   SpO2 95%   BMI 30.45 kg/m²     Respiratory: Room Air    Diet: Cardiac Diet    Gtts: Dobutamine and Lasix    Urine Output: Urinary Catheter 3575 cc/shift     Labs/Accuchecks: Daily labs, Q6H BMP.    Skin: Foams in place, no breakdown noted, IV infiltration site @ R. AC, IV removed. Repositions/transfers w/ standby assist. Bath given, sheets changed.        "

## 2024-01-04 NOTE — ASSESSMENT & PLAN NOTE
Patient with newly reduced EF 20-25% and not appropriately responding to IV Lasix. Physical exam concerning for cool lower extremities and volume overload. Started on Lasix gtt and  gtt. Good response to Lasix gtt with UOP 3140, net -2959mL. Weaning  gtt.     - Stopped dobutamine gtt  - Transitioned from lasix gtt to Lasix pushes (IV 80mg)   - Continue Entresto and Hydralazine  - Strict Is and Os        Patient requests all Lab and Radiology Results on their Discharge Instructions

## 2024-01-04 NOTE — SUBJECTIVE & OBJECTIVE
Interval History: NAEON. Reports doing well this morning and breathing better. Continued to have appropriate response to Lasix gtt. UOP 5500cc, net negative 4467.     Review of Systems   Constitutional: Positive for malaise/fatigue. Negative for decreased appetite.   Cardiovascular:  Positive for orthopnea. Negative for chest pain, irregular heartbeat and palpitations.   Respiratory:  Positive for cough. Negative for shortness of breath and sputum production.    Gastrointestinal:  Negative for abdominal pain, diarrhea, nausea and vomiting.     Objective:     Vital Signs (Most Recent):  Temp: 98.1 °F (36.7 °C) (01/04/24 0700)  Pulse: 82 (01/04/24 1200)  Resp: (!) 22 (01/04/24 1200)  BP: (!) 135/59 (01/04/24 1200)  SpO2: 99 % (01/04/24 1200) Vital Signs (24h Range):  Temp:  [98.1 °F (36.7 °C)-98.5 °F (36.9 °C)] 98.1 °F (36.7 °C)  Pulse:  [] 82  Resp:  [10-42] 22  SpO2:  [92 %-100 %] 99 %  BP: ()/() 135/59     Weight: 83 kg (182 lb 15.7 oz)  Body mass index is 30.45 kg/m².     SpO2: 99 %         Intake/Output Summary (Last 24 hours) at 1/4/2024 1334  Last data filed at 1/4/2024 1200  Gross per 24 hour   Intake 823.18 ml   Output 5285 ml   Net -4461.82 ml       Lines/Drains/Airways       Drain  Duration                  Urethral Catheter 01/02/24 1640 16 Fr. 1 day              Peripheral Intravenous Line  Duration                  Peripheral IV - Single Lumen 01/02/24 0355 20 G Anterior;Right Forearm 2 days         Peripheral IV - Single Lumen 01/04/24 0344 20 G Anterior;Left Forearm <1 day                       Physical Exam  Constitutional:       General: She is not in acute distress.     Appearance: Normal appearance. She is not ill-appearing.   HENT:      Head: Normocephalic.      Right Ear: External ear normal.      Left Ear: External ear normal.   Eyes:      General: No scleral icterus.        Right eye: No discharge.         Left eye: No discharge.      Extraocular Movements: Extraocular  movements intact.   Cardiovascular:      Rate and Rhythm: Normal rate and regular rhythm.      Pulses: Normal pulses.      Heart sounds: No murmur heard.     No friction rub. No gallop.   Pulmonary:      Effort: Pulmonary effort is normal. No respiratory distress.   Abdominal:      Palpations: Abdomen is soft.   Skin:     General: Skin is warm.   Neurological:      Mental Status: She is alert and oriented to person, place, and time.   Psychiatric:         Mood and Affect: Mood normal.         Behavior: Behavior normal.            Significant Labs: All pertinent lab results from the last 24 hours have been reviewed.    Significant Imaging: Echocardiogram: Transthoracic echo (TTE) complete (Cupid Only):   Results for orders placed or performed during the hospital encounter of 01/01/24   Echo   Result Value Ref Range    RA Width 4.73 cm    LA volume (mod) 135.00 cm3    Left Atrium Major Axis 6.22 cm    Left Atrium Minor Axis 6.53 cm    RA Major Axis 5.28 cm    LV Diastolic Volume 156.19 mL    LV Systolic Volume 107.90 mL    MV Peak A Ventura 0.26 m/s    MV stenosis pressure 1/2 time 40.77 ms    TR Max Ventura 2.71 m/s    MV Peak E Ventura 0.55 m/s    Ao VTI 15.01 cm    Ao peak ventura 0.85 m/s    LVOT peak VTI 13.12 cm    LVOT peak ventura 0.77 m/s    LVOT diameter 2.09 cm    IVRT 74.22 msec    E wave deceleration time 140.58 msec    AV mean gradient 2 mmHg    TAPSE 1.33 cm    RVDD 4.18 cm    LA size 4.88 cm    Ascending aorta 3.21 cm    STJ 2.93 cm    Sinus 3.27 cm    LVIDs 4.81 (A) 2.1 - 4.0 cm    Posterior Wall 0.79 0.6 - 1.1 cm    IVS 0.81 0.6 - 1.1 cm    LVIDd 5.64 3.5 - 6.0 cm    TDI LATERAL 0.09 m/s    LA WIDTH 5.06 cm    TDI SEPTAL 0.03 m/s    LV LATERAL E/E' RATIO 6.11 m/s    LV SEPTAL E/E' RATIO 18.33 m/s    FS 15 (A) 28 - 44 %    LA volume 133.73 cm3    LV mass 167.08 g    ZLVIDD 0.48     ZLVIDS 2.95     Left Ventricle Relative Wall Thickness 0.28 cm    AV valve area 3.00 cm²    AV Velocity Ratio 0.91     AV index (prosthetic)  0.87     MV valve area p 1/2 method 5.40 cm2    E/A ratio 2.12     Mean e' 0.06 m/s    LVOT area 3.4 cm2    LVOT stroke volume 44.99 cm3    AV peak gradient 3 mmHg    E/E' ratio 9.17 m/s    LV Systolic Volume Index 56.5 mL/m2    LV Diastolic Volume Index 81.77 mL/m2    LA Volume Index 70.0 mL/m2    LV Mass Index 87 g/m2    Triscuspid Valve Regurgitation Peak Gradient 29 mmHg    LA Volume Index (Mod) 70.7 mL/m2    MANDA by Velocity Ratio 3.11 cm²    BSA 1.96 m2    EF 23 %    TV resting pulmonary artery pressure 44 mmHg    RV TB RVSP 18 mmHg    Est. RA pres 15 mmHg    Narrative      Left Ventricle: The left ventricle is mildly dilated. Ventricular mass   is normal. Normal wall thickness. Regional wall motion abnormalities   present. See diagram for wall motion findings. There is severely reduced   systolic function with a visually estimated ejection fraction of 20 - 25%.   Ejection fraction by visual approximation is 23%. Grade III diastolic   dysfunction.    Right Ventricle: Mild right ventricular enlargement. Wall thickness is   normal. Right ventricle wall motion  is normal. Systolic function is   borderline low.    Left Atrium: Left atrium is severely dilated.    Right Atrium: Right atrium is mildly dilated.    Aortic Valve: There is mild aortic valve sclerosis.    Mitral Valve: There is mild mitral annular calcification present. There   is moderate to  moderate-severe regurgitation.    Tricuspid Valve: There is moderate- severe  to severe (3-4+)   regurgitation.    Pulmonary Artery: There is mild pulmonary hypertension. The estimated   pulmonary artery systolic pressure is 44 mmHg.    IVC/SVC: Elevated venous pressure at 15 mmHg.    Pericardium: There is a trivial posterior effusion.

## 2024-01-04 NOTE — PROGRESS NOTES
[facility]  Cardiology  Progress Note    Patient Name: Michela Franco  MRN: [id number]  Admission Date: 1/1/2024  Hospital Length of Stay: 3 days  Code Status: Full Code   Attending Physician: Albin Diaz MD   Primary Care Physician: Mayela Broussard MD  Expected Discharge Date: 1/4/2024  Principal Problem:Acute on chronic heart failure    Subjective:     Hospital Course:   No notes on file    Interval History: NAEON. Reports doing well this morning and breathing better. Continued to have appropriate response to Lasix gtt. UOP 5500cc, net negative 4467.     Review of Systems   Constitutional: Positive for malaise/fatigue. Negative for decreased appetite.   Cardiovascular:  Positive for orthopnea. Negative for chest pain, irregular heartbeat and palpitations.   Respiratory:  Positive for cough. Negative for shortness of breath and sputum production.    Gastrointestinal:  Negative for abdominal pain, diarrhea, nausea and vomiting.     Objective:     Vital Signs (Most Recent):  Temp: 98.1 °F (36.7 °C) (01/04/24 0700)  Pulse: 82 (01/04/24 1200)  Resp: (!) 22 (01/04/24 1200)  BP: (!) 135/59 (01/04/24 1200)  SpO2: 99 % (01/04/24 1200) Vital Signs (24h Range):  Temp:  [98.1 °F (36.7 °C)-98.5 °F (36.9 °C)] 98.1 °F (36.7 °C)  Pulse:  [] 82  Resp:  [10-42] 22  SpO2:  [92 %-100 %] 99 %  BP: ()/() 135/59     Weight: 83 kg (182 lb 15.7 oz)  Body mass index is 30.45 kg/m².     SpO2: 99 %         Intake/Output Summary (Last 24 hours) at 1/4/2024 1334  Last data filed at 1/4/2024 1200  Gross per 24 hour   Intake 823.18 ml   Output 5285 ml   Net -4461.82 ml       Lines/Drains/Airways       Drain  Duration                  Urethral Catheter 01/02/24 1640 16 Fr. 1 day              Peripheral Intravenous Line  Duration                  Peripheral IV - Single Lumen 01/02/24 0355 20 G Anterior;Right Forearm 2 days         Peripheral IV - Single Lumen 01/04/24 0344 20 G Anterior;Left  Forearm <1 day                       Physical Exam  Constitutional:       General: She is not in acute distress.     Appearance: Normal appearance. She is not ill-appearing.   HENT:      Head: Normocephalic.      Right Ear: External ear normal.      Left Ear: External ear normal.   Eyes:      General: No scleral icterus.        Right eye: No discharge.         Left eye: No discharge.      Extraocular Movements: Extraocular movements intact.   Cardiovascular:      Rate and Rhythm: Normal rate and regular rhythm.      Pulses: Normal pulses.      Heart sounds: No murmur heard.     No friction rub. No gallop.   Pulmonary:      Effort: Pulmonary effort is normal. No respiratory distress.   Abdominal:      Palpations: Abdomen is soft.   Skin:     General: Skin is warm.   Neurological:      Mental Status: She is alert and oriented to person, place, and time.   Psychiatric:         Mood and Affect: Mood normal.         Behavior: Behavior normal.            Significant Labs: All pertinent lab results from the last 24 hours have been reviewed.    Significant Imaging: Echocardiogram: Transthoracic echo (TTE) complete (Cupid Only):   Results for orders placed or performed during the hospital encounter of 01/01/24   Echo   Result Value Ref Range    RA Width 4.73 cm    LA volume (mod) 135.00 cm3    Left Atrium Major Axis 6.22 cm    Left Atrium Minor Axis 6.53 cm    RA Major Axis 5.28 cm    LV Diastolic Volume 156.19 mL    LV Systolic Volume 107.90 mL    MV Peak A Ventura 0.26 m/s    MV stenosis pressure 1/2 time 40.77 ms    TR Max Ventura 2.71 m/s    MV Peak E Ventura 0.55 m/s    Ao VTI 15.01 cm    Ao peak ventura 0.85 m/s    LVOT peak VTI 13.12 cm    LVOT peak ventura 0.77 m/s    LVOT diameter 2.09 cm    IVRT 74.22 msec    E wave deceleration time 140.58 msec    AV mean gradient 2 mmHg    TAPSE 1.33 cm    RVDD 4.18 cm    LA size 4.88 cm    Ascending aorta 3.21 cm    STJ 2.93 cm    Sinus 3.27 cm    LVIDs 4.81 (A) 2.1 - 4.0 cm    Posterior Wall 0.79  0.6 - 1.1 cm    IVS 0.81 0.6 - 1.1 cm    LVIDd 5.64 3.5 - 6.0 cm    TDI LATERAL 0.09 m/s    LA WIDTH 5.06 cm    TDI SEPTAL 0.03 m/s    LV LATERAL E/E' RATIO 6.11 m/s    LV SEPTAL E/E' RATIO 18.33 m/s    FS 15 (A) 28 - 44 %    LA volume 133.73 cm3    LV mass 167.08 g    ZLVIDD 0.48     ZLVIDS 2.95     Left Ventricle Relative Wall Thickness 0.28 cm    AV valve area 3.00 cm²    AV Velocity Ratio 0.91     AV index (prosthetic) 0.87     MV valve area p 1/2 method 5.40 cm2    E/A ratio 2.12     Mean e' 0.06 m/s    LVOT area 3.4 cm2    LVOT stroke volume 44.99 cm3    AV peak gradient 3 mmHg    E/E' ratio 9.17 m/s    LV Systolic Volume Index 56.5 mL/m2    LV Diastolic Volume Index 81.77 mL/m2    LA Volume Index 70.0 mL/m2    LV Mass Index 87 g/m2    Triscuspid Valve Regurgitation Peak Gradient 29 mmHg    LA Volume Index (Mod) 70.7 mL/m2    MANDA by Velocity Ratio 3.11 cm²    BSA 1.96 m2    EF 23 %    TV resting pulmonary artery pressure 44 mmHg    RV TB RVSP 18 mmHg    Est. RA pres 15 mmHg    Narrative      Left Ventricle: The left ventricle is mildly dilated. Ventricular mass   is normal. Normal wall thickness. Regional wall motion abnormalities   present. See diagram for wall motion findings. There is severely reduced   systolic function with a visually estimated ejection fraction of 20 - 25%.   Ejection fraction by visual approximation is 23%. Grade III diastolic   dysfunction.    Right Ventricle: Mild right ventricular enlargement. Wall thickness is   normal. Right ventricle wall motion  is normal. Systolic function is   borderline low.    Left Atrium: Left atrium is severely dilated.    Right Atrium: Right atrium is mildly dilated.    Aortic Valve: There is mild aortic valve sclerosis.    Mitral Valve: There is mild mitral annular calcification present. There   is moderate to  moderate-severe regurgitation.    Tricuspid Valve: There is moderate- severe  to severe (3-4+)   regurgitation.    Pulmonary Artery: There is mild  pulmonary hypertension. The estimated   pulmonary artery systolic pressure is 44 mmHg.    IVC/SVC: Elevated venous pressure at 15 mmHg.    Pericardium: There is a trivial posterior effusion.       Assessment and Plan:       * Acute on chronic heart failure  Patient with newly reduced EF 20-25% and not appropriately responding to IV Lasix. Physical exam concerning for cool lower extremities and volume overload. Started on Lasix gtt and  gtt. Good response to Lasix gtt with UOP 3140, net -2959mL. Weaning  gtt.     - Stopped dobutamine gtt  - Transitioned from lasix gtt to Lasix pushes (IV 80mg)   - Continue Entresto and Hydralazine  - Strict Is and Os         Bilirubinemia  T bili up to 1.5 on presentation, direct 0.5 - no history of prior elevations. AST/ALT slightly above normal. Likely secondary to CHF exacerbation  - daily BMP       Elevated troponin  Trop 0.4 on admission. No calcifications seen on CT Chest (7/2023) Possible underlying ischemia given patient's HTN. Less concern for ACS but could consider ACS workup outpatient.     Transaminitis  Elevated LFTs for the last few months. Likely hepatic congestion 2/2 uncontrolled CHF but cannot r/o drug induced hepatitis.     - Continue to monitor    Bilateral pneumonia  CXR (1/1) demonstrated patchy interstitial lung opacity R greater than L re-demonstrated possibly related to bilateral PNA. Afebrile w/out leukocytosis. No sputum production. Bcx NGTD.     - Less concern with PNA given improvement in symptoms s/p Lasix gtt.   - Discontinue abx   - Will monitor for infectious pathology if patient decompensates    Severe asthma  Wheezing noted on exam  Continue inhalers and breathing treatments    HTN (hypertension)  Home regimen: Clonidine 0.1mg, Valsartan 320mg     Plan:   - Start Hydralazine 25mg and Entresto BID  - Stop Clonidine d/t reflex hypertension      Hypokalemia  Possibly d/t albuterol vs pseudohypokalemia     - Received IV Potassium  - Repeat BMP  pending  - Replete PRN        VTE Risk Mitigation (From admission, onward)           Ordered     enoxaparin injection 40 mg  Daily         01/01/24 1346     IP VTE HIGH RISK PATIENT  Once         01/01/24 1346     Place sequential compression device  Until discontinued         01/01/24 1346                    Marta Olvera MD  Cardiology  [facility]

## 2024-01-04 NOTE — PHYSICIAN QUERY
PT Name: Michela Franco  MR #: [id number]     DOCUMENTATION CLARIFICATION     CDS/: Lori FlanneryRN               Contact information: [email or web address]  This form is a permanent document in the medical record.     Query Date: January 4, 2024    By submitting this query, we are merely seeking further clarification of documentation.  Please utilize your independent clinical judgment when addressing the question(s) below.    The Medical Record contains the following   Indicators Supporting Clinical Findings Location in Medical Record   x Heart Failure documented Acute on chronic heart failure  HFpEF 50% on TTE in July 2023.  - telemetery  - strict I&Os  - fluid restriction  - goal net negative 1.5L  - continue agressive diuresis  - K>4, Mag>2  - TTE ordered  - cardiology consult pending TTE results and response to diuresis  Patient is identified as having Diastolic (HFpEF) heart failure that is Acute on chronic. CHF is currently uncontrolled due to Continued edema of extremities and Rales/crackles on pulmonary exam.     Acute on chronic heart failure  Patient with newly reduced EF 20-25% and not appropriately responding to IV Lasix. Physical exam concerning for cool lower extremities and volume overload. Stat lactate ordered. Will transfer  to CCU tonight.  --F/u stat lactate  --Renteria for I/Os  --Lasix drip started    Acute on chronic heart failure  Patient with newly reduced EF 20-25% and not appropriately responding to IV Lasix. Physical exam concerning for cool lower extremities and volume overload. Started on Lasix gtt and  gtt.  Good response to Lasix gtt with UOP 3140, net -2959mL. Weaning  gtt.  - Continue lasix gtt for volume optimization; weaning  gtt  - Start Entresto and Hydralazine  - Strict Is and Os    Acute on chronic heart failure  Patient with newly reduced EF 20-25% and not appropriately responding to IV Lasix. Physical exam concerning for cool lower extremities and volume  overload. Started on Lasix gtt and  gtt.  Good response to Lasix gtt with UOP 3140, net -2959mL. Weaning  gtt.  - Stopped dobutamine gtt  - Transitioned from lasix gtt to Lasix pushes (IV 80mg)  - Continue Entresto and Hydralazine  - Strict Is and Os   H&P 12/1  HM PN 1/2                          Cards Consult 1/2                H&P 1/3                  H&P 1/4     x BNP BNP: 4,700   Labs 1/1   x EF/Echo Echo:    Left Ventricle: The left ventricle is mildly dilated. Ventricular mass is normal. Normal wall thickness. Regional wall motion abnormalities present. See diagram for wall motion findings. There is severely reduced systolic function with a visually estimated ejection fraction of 20 - 25%. Ejection fraction by visual approximation is 23%. Grade III diastolic dysfunction.    Right Ventricle: Mild right ventricular enlargement. Wall thickness is normal. Right ventricle wall motion  is normal. Systolic function is borderline low.    Left Atrium: Left atrium is severely dilated.    Right Atrium: Right atrium is mildly dilated.    Aortic Valve: There is mild aortic valve sclerosis.    Mitral Valve: There is mild mitral annular calcification present. There is moderate to  moderate-severe regurgitation.    Tricuspid Valve: There is moderate- severe  to severe (3-4+) regurgitation.    Pulmonary Artery: There is mild pulmonary hypertension. The estimated pulmonary artery systolic pressure is 44 mmHg.    IVC/SVC: Elevated venous pressure at 15 mmHg.    Pericardium: There is a trivial posterior effusion.    Echo 1/2/24   x Radiology findings CXR:  Impression:  Bilateral pneumonia    FINDINGS:  Allowing for a poorer inspiratory depth level on the current examination, there has been no significant detrimental interval change in the appearance of the chest since 01/01/2024 at 11:47.  Impression:  As above   CXR 1/1        CXR 1/3     x Subjective/Objective Respiratory Conditions Respiratory:  Positive for cough and  shortness of breath.    Respiratory:  Positive for cough, shortness of breath and wheezing.    Respiratory:  Positive for cough. Negative for shortness of breath   ED Prov Note 1/1    H&P 1/1    H&P 1/3      Recent/Current MI      Heart Transplant, LVAD     x Edema, JVD 3+ pitting edema bilateral lower extremities    Right lower leg: Edema present.     Left lower leg: Edema present.     Comments: 3+ to knees    JVD present ED Prov Note 1/1    H&P 1/1        Cards Consult 1/2      Ascites     x Diuretics/Meds Lasix 20 mg/hr IV gtt  Lasix 10 mg/hr IV gtt  Lasix 80 mg IV x 2 doses  Lasix 80 mg IV x 2 doses   MAR 1/2-1/4  MAR 1/4  MAR 1/1  MAR 1/2    Other Treatment      Other       Heart failure is a clinical diagnosis which includes symptomatic fluid retention, elevated intracardiac pressures, and/or the inability of the heart to deliver adequate blood flow.    Heart Failure with reduced Ejection Fraction (HFrEF) or Systolic Heart Failure (loses ability to contract normally, EF is <40%)    Heart Failure with preserved Ejection Fraction (HFpEF) or Diastolic Heart Failure (stiff ventricles, does not relax properly, EF is >50%)     Heart Failure with Combined Systolic and Diastolic Failure (stiff ventricles, does not relax properly and EF is <50%)    Mid-range or mildly reduced ejection fraction (HFmrEF) is classified as systolic heart failure.  Congestive heart failure with a recovered EF is classified as Diastolic Heart Failure.  Common clues to acute exacerbation:  Rapidly progressive symptoms (w/in 2 weeks of presentation), using IV diuretics, using supplemental O2, pulmonary edema on Xray, new or worsening pleural effusion, +JVD or other signs of volume overload, MI w/in 4 weeks, and/or BNP >500  The clinical guidelines noted are only system guidelines, and do not replace the providers clinical judgment.      Provider, please clarify the heart failure diagnosis associated with the above clinical findings.    [ x  ]   Acute on Chronic Systolic Heart Failure (HFrEF or HFmrEF) - worsening of CHF signs/symptoms in preexisting CHF     [   ]  Acute on Chronic Diastolic Heart Failure (HFpEF) - worsening of CHF signs/symptoms in preexisting CHF     [   ]  Other (please specify): ___________________________________       Please document in your progress notes daily for the duration of treatment until resolved and include in your discharge summary.    References:  American Heart Association editorial staff. (2017, May). Ejection Fraction Heart Failure Measurement. American Heart Association. https://www.heart.org/en/health-topics/heart-failure/diagnosing-heart-failure/ejection-fraction-heart-failure-measurement#:~:text=Ejection%20fraction%20(EF)%20is%20a,pushed%20out%20with%20each%20heartbeat  ANTHONY Sandoval (2020, December 15). Heart failure with preserved ejection fraction: Clinical manifestations and diagnosis. [employer]. https://www.[employer]/contents/heart-failure-with-preserved-ejection-fraction-clinical-manifestations-and-diagnosis.  ICD-10-CM/PCS Coding Clinic Third Quarter ICD-10, Effective with discharges: September 8, 2020 [facility] § Heart failure with mid-range or mildly reduced ejection fraction (2020).  ICD-10-CM/PCS Coding Clinic Third Quarter ICD-10, Effective with discharges: September 8, 2020 [facility] § Heart failure with recovered ejection fraction (2020).  Form No. 02768

## 2024-01-04 NOTE — ASSESSMENT & PLAN NOTE
Elevated LFTs for the last few months. Likely hepatic congestion 2/2 uncontrolled CHF but cannot r/o drug induced hepatitis.     - Continue to monitor

## 2024-01-05 PROBLEM — J18.9 BILATERAL PNEUMONIA: Status: RESOLVED | Noted: 2023-07-16 | Resolved: 2024-01-05

## 2024-01-05 LAB
ALBUMIN SERPL BCP-MCNC: 2.7 G/DL (ref 3.5–5.2)
ALP SERPL-CCNC: 97 U/L (ref 55–135)
ALT SERPL W/O P-5'-P-CCNC: 105 U/L (ref 10–44)
ANION GAP SERPL CALC-SCNC: 11 MMOL/L (ref 8–16)
AST SERPL-CCNC: 77 U/L (ref 10–40)
BASOPHILS # BLD AUTO: 0.01 K/UL (ref 0–0.2)
BASOPHILS NFR BLD: 0.2 % (ref 0–1.9)
BILIRUB SERPL-MCNC: 0.8 MG/DL (ref 0.1–1)
BUN SERPL-MCNC: 35 MG/DL (ref 8–23)
CALCIUM SERPL-MCNC: 9.1 MG/DL (ref 8.7–10.5)
CHLORIDE SERPL-SCNC: 96 MMOL/L (ref 95–110)
CO2 SERPL-SCNC: 31 MMOL/L (ref 23–29)
CREAT SERPL-MCNC: 1.3 MG/DL (ref 0.5–1.4)
DIFFERENTIAL METHOD BLD: ABNORMAL
EOSINOPHIL # BLD AUTO: 0 K/UL (ref 0–0.5)
EOSINOPHIL NFR BLD: 0.2 % (ref 0–8)
ERYTHROCYTE [DISTWIDTH] IN BLOOD BY AUTOMATED COUNT: 19.6 % (ref 11.5–14.5)
EST. GFR  (NO RACE VARIABLE): 42.6 ML/MIN/1.73 M^2
GLUCOSE SERPL-MCNC: 105 MG/DL (ref 70–110)
HCT VFR BLD AUTO: 41.6 % (ref 37–48.5)
HGB BLD-MCNC: 13.8 G/DL (ref 12–16)
IMM GRANULOCYTES # BLD AUTO: 0.01 K/UL (ref 0–0.04)
IMM GRANULOCYTES NFR BLD AUTO: 0.2 % (ref 0–0.5)
LYMPHOCYTES # BLD AUTO: 1 K/UL (ref 1–4.8)
LYMPHOCYTES NFR BLD: 16.3 % (ref 18–48)
MCH RBC QN AUTO: 26.3 PG (ref 27–31)
MCHC RBC AUTO-ENTMCNC: 33.2 G/DL (ref 32–36)
MCV RBC AUTO: 79 FL (ref 82–98)
MONOCYTES # BLD AUTO: 0.7 K/UL (ref 0.3–1)
MONOCYTES NFR BLD: 12.3 % (ref 4–15)
NEUTROPHILS # BLD AUTO: 4.3 K/UL (ref 1.8–7.7)
NEUTROPHILS NFR BLD: 70.8 % (ref 38–73)
NRBC BLD-RTO: 0 /100 WBC
PLATELET # BLD AUTO: 156 K/UL (ref 150–450)
PMV BLD AUTO: ABNORMAL FL (ref 9.2–12.9)
POTASSIUM SERPL-SCNC: 3.9 MMOL/L (ref 3.5–5.1)
PROT SERPL-MCNC: 5.9 G/DL (ref 6–8.4)
RBC # BLD AUTO: 5.24 M/UL (ref 4–5.4)
SODIUM SERPL-SCNC: 138 MMOL/L (ref 136–145)
WBC # BLD AUTO: 6 K/UL (ref 3.9–12.7)

## 2024-01-05 PROCEDURE — 63600175 PHARM REV CODE 636 W HCPCS: Mod: HCNC

## 2024-01-05 PROCEDURE — 94640 AIRWAY INHALATION TREATMENT: CPT | Mod: HCNC

## 2024-01-05 PROCEDURE — 63600175 PHARM REV CODE 636 W HCPCS: Mod: HCNC | Performed by: STUDENT IN AN ORGANIZED HEALTH CARE EDUCATION/TRAINING PROGRAM

## 2024-01-05 PROCEDURE — 25000242 PHARM REV CODE 250 ALT 637 W/ HCPCS: Mod: HCNC | Performed by: STUDENT IN AN ORGANIZED HEALTH CARE EDUCATION/TRAINING PROGRAM

## 2024-01-05 PROCEDURE — 25000003 PHARM REV CODE 250: Mod: HCNC | Performed by: STUDENT IN AN ORGANIZED HEALTH CARE EDUCATION/TRAINING PROGRAM

## 2024-01-05 PROCEDURE — 20600001 HC STEP DOWN PRIVATE ROOM: Mod: HCNC

## 2024-01-05 PROCEDURE — 94761 N-INVAS EAR/PLS OXIMETRY MLT: CPT | Mod: HCNC

## 2024-01-05 PROCEDURE — 99231 SBSQ HOSP IP/OBS SF/LOW 25: CPT | Mod: HCNC,GC,, | Performed by: INTERNAL MEDICINE

## 2024-01-05 PROCEDURE — 94760 N-INVAS EAR/PLS OXIMETRY 1: CPT | Mod: HCNC

## 2024-01-05 PROCEDURE — 85025 COMPLETE CBC W/AUTO DIFF WBC: CPT | Mod: HCNC | Performed by: STUDENT IN AN ORGANIZED HEALTH CARE EDUCATION/TRAINING PROGRAM

## 2024-01-05 PROCEDURE — 80053 COMPREHEN METABOLIC PANEL: CPT | Mod: HCNC | Performed by: STUDENT IN AN ORGANIZED HEALTH CARE EDUCATION/TRAINING PROGRAM

## 2024-01-05 PROCEDURE — 99900035 HC TECH TIME PER 15 MIN (STAT): Mod: HCNC

## 2024-01-05 PROCEDURE — 94799 UNLISTED PULMONARY SVC/PX: CPT | Mod: HCNC

## 2024-01-05 PROCEDURE — 94660 CPAP INITIATION&MGMT: CPT | Mod: HCNC

## 2024-01-05 PROCEDURE — 27100171 HC OXYGEN HIGH FLOW UP TO 24 HOURS: Mod: HCNC

## 2024-01-05 RX ADMIN — SACUBITRIL AND VALSARTAN 1 TABLET: 49; 51 TABLET, FILM COATED ORAL at 08:01

## 2024-01-05 RX ADMIN — SENNOSIDES AND DOCUSATE SODIUM 1 TABLET: 8.6; 5 TABLET ORAL at 08:01

## 2024-01-05 RX ADMIN — IPRATROPIUM BROMIDE AND ALBUTEROL SULFATE 3 ML: .5; 3 SOLUTION RESPIRATORY (INHALATION) at 08:01

## 2024-01-05 RX ADMIN — FUROSEMIDE 80 MG: 10 INJECTION, SOLUTION INTRAVENOUS at 05:01

## 2024-01-05 RX ADMIN — ENOXAPARIN SODIUM 40 MG: 40 INJECTION SUBCUTANEOUS at 05:01

## 2024-01-05 RX ADMIN — BENZONATATE 200 MG: 100 CAPSULE ORAL at 08:01

## 2024-01-05 RX ADMIN — ISOSORBIDE DINITRATE 10 MG: 10 TABLET ORAL at 08:01

## 2024-01-05 RX ADMIN — PREDNISONE 20 MG: 20 TABLET ORAL at 08:01

## 2024-01-05 RX ADMIN — ISOSORBIDE DINITRATE 10 MG: 10 TABLET ORAL at 04:01

## 2024-01-05 RX ADMIN — FLUTICASONE PROPIONATE 100 MCG: 50 SPRAY, METERED NASAL at 08:01

## 2024-01-05 RX ADMIN — IPRATROPIUM BROMIDE AND ALBUTEROL SULFATE 3 ML: .5; 3 SOLUTION RESPIRATORY (INHALATION) at 04:01

## 2024-01-05 RX ADMIN — HYDRALAZINE HYDROCHLORIDE 25 MG: 25 TABLET ORAL at 04:01

## 2024-01-05 RX ADMIN — BUDESONIDE 0.5 MG: 0.5 INHALANT ORAL at 08:01

## 2024-01-05 RX ADMIN — HYDRALAZINE HYDROCHLORIDE 25 MG: 25 TABLET ORAL at 05:01

## 2024-01-05 RX ADMIN — SACUBITRIL AND VALSARTAN 1 TABLET: 24; 26 TABLET, FILM COATED ORAL at 08:01

## 2024-01-05 RX ADMIN — SACUBITRIL AND VALSARTAN 1 TABLET: 49; 51 TABLET, FILM COATED ORAL at 12:01

## 2024-01-05 RX ADMIN — MONTELUKAST 10 MG: 10 TABLET, FILM COATED ORAL at 08:01

## 2024-01-05 RX ADMIN — IPRATROPIUM BROMIDE AND ALBUTEROL SULFATE 3 ML: .5; 3 SOLUTION RESPIRATORY (INHALATION) at 03:01

## 2024-01-05 RX ADMIN — BENZONATATE 200 MG: 100 CAPSULE ORAL at 04:01

## 2024-01-05 RX ADMIN — IPRATROPIUM BROMIDE AND ALBUTEROL SULFATE 3 ML: .5; 3 SOLUTION RESPIRATORY (INHALATION) at 11:01

## 2024-01-05 NOTE — PLAN OF CARE
Plan of care reviewed with pt, and verbalization of understanding obtained.  Emotional support offered.    Problem: Adult Inpatient Plan of Care  Goal: Plan of Care Review  Outcome: Ongoing, Progressing

## 2024-01-05 NOTE — ASSESSMENT & PLAN NOTE
T bili up to 1.5 on presentation, direct 0.5 - no history of prior elevations. AST/ALT slightly above normal. Likely secondary to CHF exacerbation  - daily CMP

## 2024-01-05 NOTE — NURSING
Patient received a bed on CSU room 351. Report called to VIANEY Reid. Patient placed on telemetry and transported via wheelchair and settled into room. All belongings transferred with patient. Patient given call bell, oriented to room and left with side rails up x2, bed in lowest position and locked. Receiving RN not present but notified patient was in room.

## 2024-01-05 NOTE — PROGRESS NOTES
[facility]  Cardiology  Progress Note    Patient Name: Michela Franco  MRN: [id number]  Admission Date: 1/1/2024  Hospital Length of Stay: 4 days  Code Status: Full Code   Attending Physician: Ifeanyi Coy MD   Primary Care Physician: Mayela Broussard MD  Expected Discharge Date: 1/8/2024  Principal Problem:Acute on chronic heart failure    Subjective:       Interval History: Patient is diuresing and doing well. She is making good urine output. Downgraded to the foor    Review of Systems   Constitutional: Negative.   HENT: Negative.     Eyes: Negative.    Cardiovascular:  Negative for chest pain and dyspnea on exertion.   Respiratory:  Positive for cough.    Endocrine: Negative.    All other systems reviewed and are negative.    Objective:     Vital Signs (Most Recent):  Temp: 97.8 °F (36.6 °C) (01/05/24 1030)  Pulse: 71 (01/05/24 1219)  Resp: 18 (01/05/24 1112)  BP: (!) 138/55 (01/05/24 1224)  SpO2: 96 % (01/05/24 1112) Vital Signs (24h Range):  Temp:  [97.6 °F (36.4 °C)-98.5 °F (36.9 °C)] 97.8 °F (36.6 °C)  Pulse:  [] 71  Resp:  [16-46] 18  SpO2:  [90 %-98 %] 96 %  BP: (102-150)/(52-84) 138/55     Weight: 83 kg (182 lb 15.7 oz)  Body mass index is 30.45 kg/m².     SpO2: 96 %         Intake/Output Summary (Last 24 hours) at 1/5/2024 1357  Last data filed at 1/5/2024 1000  Gross per 24 hour   Intake 680 ml   Output 1950 ml   Net -1270 ml       Lines/Drains/Airways       Peripheral Intravenous Line  Duration                  Peripheral IV - Single Lumen 01/02/24 0355 20 G Anterior;Right Forearm 3 days         Peripheral IV - Single Lumen 01/04/24 0344 20 G Anterior;Left Forearm 1 day                       Physical Exam  Constitutional:       Appearance: Normal appearance.   Cardiovascular:      Rate and Rhythm: Normal rate and regular rhythm.      Pulses: Normal pulses.   Pulmonary:      Effort: Pulmonary effort is normal.   Abdominal:      Palpations: Abdomen is soft.    Musculoskeletal:      Right lower leg: No edema.      Left lower leg: No edema.   Skin:     General: Skin is warm.   Neurological:      General: No focal deficit present.      Mental Status: She is alert and oriented to person, place, and time.            Significant Labs: All pertinent lab results from the last 24 hours have been reviewed.      Assessment and Plan:     Brief HPI:   Ms. Franco is a 77 yo with HFpEF (EF 50% 7/2023), severe asthma, HTN admitted for new onset heart failure        * Acute on chronic heart failure  Patient with newly reduced EF 20-25% and not appropriately responding to IV Lasix. Physical exam concerning for cool lower extremities and volume overload. Started on Lasix gtt and  gtt and was successfully weaned off      - Continue Lasix pushes (IV 80mg bid)   - Increase Entresto 49-51mg bid   - Continue Hydralazine and Isosorbide  - Strict Is and Os         Bilirubinemia  T bili up to 1.5 on presentation, direct 0.5 - no history of prior elevations. AST/ALT slightly above normal. Likely secondary to CHF exacerbation  - daily CMP       Elevated troponin  Trop 0.4 on admission. No calcifications seen on CT Chest (7/2023) Possible underlying ischemia given patient's HTN. Less concern for ACS but could consider ACS workup outpatient.     Transaminitis  Elevated LFTs for the last few months. Likely hepatic congestion 2/2 uncontrolled CHF but cannot r/o drug induced hepatitis.     - Continue to monitor    Severe asthma  Wheezing noted on exam  Continue inhalers and breathing treatments    HTN (hypertension)  Home regimen: Clonidine 0.1mg, Valsartan 320mg     Plan:   - Hydralazine 25mg and Isosorbide dinitrate 10mg tid   - Increase  Entresto BID            VTE Risk Mitigation (From admission, onward)           Ordered     enoxaparin injection 40 mg  Daily         01/01/24 1346     IP VTE HIGH RISK PATIENT  Once         01/01/24 1346     Place sequential compression device  Until discontinued          01/01/24 1346                    Angella Puckett MD  Cardiology  [facility]

## 2024-01-05 NOTE — ASSESSMENT & PLAN NOTE
Patient with newly reduced EF 20-25% and not appropriately responding to IV Lasix. Physical exam concerning for cool lower extremities and volume overload. Started on Lasix gtt and  gtt and was successfully weaned off      - Continue Lasix pushes (IV 80mg bid)   - Increase Entresto 49-51mg bid   - Continue Hydralazine and Isosorbide  - Strict Is and Os

## 2024-01-05 NOTE — SUBJECTIVE & OBJECTIVE
Interval History: Patient is diuresing and doing well. She is making good urine output. Downgraded to the [facility]    Review of Systems   Constitutional: Negative.   HENT: Negative.     Eyes: Negative.    Cardiovascular:  Negative for chest pain and dyspnea on exertion.   Respiratory:  Positive for cough.    Endocrine: Negative.    All other systems reviewed and are negative.    Objective:     Vital Signs (Most Recent):  Temp: 97.8 °F (36.6 °C) (01/05/24 1030)  Pulse: 71 (01/05/24 1219)  Resp: 18 (01/05/24 1112)  BP: (!) 138/55 (01/05/24 1224)  SpO2: 96 % (01/05/24 1112) Vital Signs (24h Range):  Temp:  [97.6 °F (36.4 °C)-98.5 °F (36.9 °C)] 97.8 °F (36.6 °C)  Pulse:  [] 71  Resp:  [16-46] 18  SpO2:  [90 %-98 %] 96 %  BP: (102-150)/(52-84) 138/55     Weight: 83 kg (182 lb 15.7 oz)  Body mass index is 30.45 kg/m².     SpO2: 96 %         Intake/Output Summary (Last 24 hours) at 1/5/2024 1357  Last data filed at 1/5/2024 1000  Gross per 24 hour   Intake 680 ml   Output 1950 ml   Net -1270 ml       Lines/Drains/Airways       Peripheral Intravenous Line  Duration                  Peripheral IV - Single Lumen 01/02/24 0355 20 G Anterior;Right Forearm 3 days         Peripheral IV - Single Lumen 01/04/24 0344 20 G Anterior;Left Forearm 1 day                       Physical Exam  Constitutional:       Appearance: Normal appearance.   Cardiovascular:      Rate and Rhythm: Normal rate and regular rhythm.      Pulses: Normal pulses.   Pulmonary:      Effort: Pulmonary effort is normal.   Abdominal:      Palpations: Abdomen is soft.   Musculoskeletal:      Right lower leg: No edema.      Left lower leg: No edema.   Skin:     General: Skin is warm.   Neurological:      General: No focal deficit present.      Mental Status: She is alert and oriented to person, place, and time.            Significant Labs: All pertinent lab results from the last 24 hours have been reviewed.

## 2024-01-05 NOTE — ASSESSMENT & PLAN NOTE
Home regimen: Clonidine 0.1mg, Valsartan 320mg     Plan:   - Hydralazine 25mg and Isosorbide dinitrate 10mg tid   - Increase  Entresto BID

## 2024-01-05 NOTE — PLAN OF CARE
SICU PLAN OF CARE NOTE    Dx: Acute on chronic heart failure    Goals of Care: MAP >65    Vital Signs (last 12 hours):   Temp:  [98.4 °F (36.9 °C)-98.5 °F (36.9 °C)]   Pulse:  []   Resp:  [16-46]   BP: (102-150)/(56-84)   SpO2:  [90 %-97 %]      Neuro: AAO x4, Follows Commands, and Moves All Extremities    Cardiac: NSR    Respiratory: Room Air    Urine Output: Voids Spontaneously 200 cc/shift    Diet: Cardiac Diet    Skin:  Generalized skin intact. Weight shifting assistance provided PRN.    Shift Events:  NAEON. VSS. Patient denying acute pain at this time. Urinating spontaneously. OOBTC in AM.

## 2024-01-06 LAB
ALBUMIN SERPL BCP-MCNC: 2.6 G/DL (ref 3.5–5.2)
ALP SERPL-CCNC: 85 U/L (ref 55–135)
ALT SERPL W/O P-5'-P-CCNC: 80 U/L (ref 10–44)
ANION GAP SERPL CALC-SCNC: 12 MMOL/L (ref 8–16)
AST SERPL-CCNC: 55 U/L (ref 10–40)
BACTERIA SPEC AEROBE CULT: ABNORMAL
BACTERIA SPEC AEROBE CULT: ABNORMAL
BASOPHILS # BLD AUTO: 0.01 K/UL (ref 0–0.2)
BASOPHILS NFR BLD: 0.2 % (ref 0–1.9)
BILIRUB SERPL-MCNC: 0.7 MG/DL (ref 0.1–1)
BNP SERPL-MCNC: 259 PG/ML (ref 0–99)
BUN SERPL-MCNC: 37 MG/DL (ref 8–23)
CALCIUM SERPL-MCNC: 8.6 MG/DL (ref 8.7–10.5)
CHLORIDE SERPL-SCNC: 98 MMOL/L (ref 95–110)
CO2 SERPL-SCNC: 28 MMOL/L (ref 23–29)
CREAT SERPL-MCNC: 1.2 MG/DL (ref 0.5–1.4)
DIFFERENTIAL METHOD BLD: ABNORMAL
EOSINOPHIL # BLD AUTO: 0 K/UL (ref 0–0.5)
EOSINOPHIL NFR BLD: 0.6 % (ref 0–8)
ERYTHROCYTE [DISTWIDTH] IN BLOOD BY AUTOMATED COUNT: 19.9 % (ref 11.5–14.5)
EST. GFR  (NO RACE VARIABLE): 46.9 ML/MIN/1.73 M^2
GLUCOSE SERPL-MCNC: 83 MG/DL (ref 70–110)
GRAM STN SPEC: ABNORMAL
HCT VFR BLD AUTO: 38.6 % (ref 37–48.5)
HGB BLD-MCNC: 13.3 G/DL (ref 12–16)
IMM GRANULOCYTES # BLD AUTO: 0.01 K/UL (ref 0–0.04)
IMM GRANULOCYTES NFR BLD AUTO: 0.2 % (ref 0–0.5)
LYMPHOCYTES # BLD AUTO: 1.3 K/UL (ref 1–4.8)
LYMPHOCYTES NFR BLD: 25 % (ref 18–48)
MCH RBC QN AUTO: 26.5 PG (ref 27–31)
MCHC RBC AUTO-ENTMCNC: 34.5 G/DL (ref 32–36)
MCV RBC AUTO: 77 FL (ref 82–98)
MONOCYTES # BLD AUTO: 0.7 K/UL (ref 0.3–1)
MONOCYTES NFR BLD: 12.8 % (ref 4–15)
NEUTROPHILS # BLD AUTO: 3.1 K/UL (ref 1.8–7.7)
NEUTROPHILS NFR BLD: 61.2 % (ref 38–73)
NRBC BLD-RTO: 0 /100 WBC
PLATELET # BLD AUTO: 136 K/UL (ref 150–450)
PMV BLD AUTO: ABNORMAL FL (ref 9.2–12.9)
POTASSIUM SERPL-SCNC: 3.5 MMOL/L (ref 3.5–5.1)
PROT SERPL-MCNC: 5.6 G/DL (ref 6–8.4)
RBC # BLD AUTO: 5.02 M/UL (ref 4–5.4)
SODIUM SERPL-SCNC: 138 MMOL/L (ref 136–145)
WBC # BLD AUTO: 5.09 K/UL (ref 3.9–12.7)

## 2024-01-06 PROCEDURE — 63600175 PHARM REV CODE 636 W HCPCS: Mod: HCNC | Performed by: STUDENT IN AN ORGANIZED HEALTH CARE EDUCATION/TRAINING PROGRAM

## 2024-01-06 PROCEDURE — 99900035 HC TECH TIME PER 15 MIN (STAT): Mod: HCNC

## 2024-01-06 PROCEDURE — 25000003 PHARM REV CODE 250: Mod: HCNC | Performed by: STUDENT IN AN ORGANIZED HEALTH CARE EDUCATION/TRAINING PROGRAM

## 2024-01-06 PROCEDURE — 99231 SBSQ HOSP IP/OBS SF/LOW 25: CPT | Mod: HCNC,GC,, | Performed by: INTERNAL MEDICINE

## 2024-01-06 PROCEDURE — 94660 CPAP INITIATION&MGMT: CPT | Mod: HCNC

## 2024-01-06 PROCEDURE — 27000221 HC OXYGEN, UP TO 24 HOURS: Mod: HCNC

## 2024-01-06 PROCEDURE — 80053 COMPREHEN METABOLIC PANEL: CPT | Mod: HCNC | Performed by: INTERNAL MEDICINE

## 2024-01-06 PROCEDURE — 36415 COLL VENOUS BLD VENIPUNCTURE: CPT | Mod: HCNC,XB

## 2024-01-06 PROCEDURE — 36415 COLL VENOUS BLD VENIPUNCTURE: CPT | Mod: HCNC | Performed by: INTERNAL MEDICINE

## 2024-01-06 PROCEDURE — 25000242 PHARM REV CODE 250 ALT 637 W/ HCPCS: Mod: HCNC | Performed by: STUDENT IN AN ORGANIZED HEALTH CARE EDUCATION/TRAINING PROGRAM

## 2024-01-06 PROCEDURE — 94640 AIRWAY INHALATION TREATMENT: CPT | Mod: HCNC

## 2024-01-06 PROCEDURE — 94761 N-INVAS EAR/PLS OXIMETRY MLT: CPT | Mod: HCNC

## 2024-01-06 PROCEDURE — 25000003 PHARM REV CODE 250: Mod: HCNC

## 2024-01-06 PROCEDURE — 94799 UNLISTED PULMONARY SVC/PX: CPT | Mod: HCNC

## 2024-01-06 PROCEDURE — 63600175 PHARM REV CODE 636 W HCPCS: Mod: HCNC

## 2024-01-06 PROCEDURE — 83880 ASSAY OF NATRIURETIC PEPTIDE: CPT | Mod: HCNC

## 2024-01-06 PROCEDURE — 85025 COMPLETE CBC W/AUTO DIFF WBC: CPT | Mod: HCNC | Performed by: INTERNAL MEDICINE

## 2024-01-06 PROCEDURE — 20600001 HC STEP DOWN PRIVATE ROOM: Mod: HCNC

## 2024-01-06 PROCEDURE — 27100171 HC OXYGEN HIGH FLOW UP TO 24 HOURS: Mod: HCNC

## 2024-01-06 RX ORDER — POTASSIUM CHLORIDE 750 MG/1
50 CAPSULE, EXTENDED RELEASE ORAL ONCE
Status: COMPLETED | OUTPATIENT
Start: 2024-01-06 | End: 2024-01-06

## 2024-01-06 RX ORDER — AZITHROMYCIN 250 MG/1
500 TABLET, FILM COATED ORAL DAILY
Status: DISCONTINUED | OUTPATIENT
Start: 2024-01-07 | End: 2024-01-08

## 2024-01-06 RX ADMIN — IPRATROPIUM BROMIDE AND ALBUTEROL SULFATE 3 ML: .5; 3 SOLUTION RESPIRATORY (INHALATION) at 08:01

## 2024-01-06 RX ADMIN — SENNOSIDES AND DOCUSATE SODIUM 1 TABLET: 8.6; 5 TABLET ORAL at 08:01

## 2024-01-06 RX ADMIN — IPRATROPIUM BROMIDE AND ALBUTEROL SULFATE 3 ML: .5; 3 SOLUTION RESPIRATORY (INHALATION) at 12:01

## 2024-01-06 RX ADMIN — FLUTICASONE PROPIONATE 100 MCG: 50 SPRAY, METERED NASAL at 08:01

## 2024-01-06 RX ADMIN — BUDESONIDE 0.5 MG: 0.5 INHALANT ORAL at 08:01

## 2024-01-06 RX ADMIN — CEFTRIAXONE SODIUM 1 G: 1 INJECTION, POWDER, FOR SOLUTION INTRAMUSCULAR; INTRAVENOUS at 06:01

## 2024-01-06 RX ADMIN — MONTELUKAST 10 MG: 10 TABLET, FILM COATED ORAL at 08:01

## 2024-01-06 RX ADMIN — IPRATROPIUM BROMIDE AND ALBUTEROL SULFATE 3 ML: .5; 3 SOLUTION RESPIRATORY (INHALATION) at 11:01

## 2024-01-06 RX ADMIN — BENZONATATE 200 MG: 100 CAPSULE ORAL at 11:01

## 2024-01-06 RX ADMIN — PREDNISONE 20 MG: 20 TABLET ORAL at 08:01

## 2024-01-06 RX ADMIN — HYDRALAZINE HYDROCHLORIDE 25 MG: 25 TABLET ORAL at 05:01

## 2024-01-06 RX ADMIN — FUROSEMIDE 80 MG: 10 INJECTION, SOLUTION INTRAVENOUS at 05:01

## 2024-01-06 RX ADMIN — POTASSIUM CHLORIDE 50 MEQ: 10 CAPSULE, COATED, EXTENDED RELEASE ORAL at 11:01

## 2024-01-06 RX ADMIN — ENOXAPARIN SODIUM 40 MG: 40 INJECTION SUBCUTANEOUS at 05:01

## 2024-01-06 RX ADMIN — IPRATROPIUM BROMIDE AND ALBUTEROL SULFATE 3 ML: .5; 3 SOLUTION RESPIRATORY (INHALATION) at 04:01

## 2024-01-06 RX ADMIN — FUROSEMIDE 80 MG: 10 INJECTION, SOLUTION INTRAVENOUS at 06:01

## 2024-01-06 RX ADMIN — ISOSORBIDE DINITRATE 10 MG: 10 TABLET ORAL at 08:01

## 2024-01-06 RX ADMIN — SACUBITRIL AND VALSARTAN 1 TABLET: 49; 51 TABLET, FILM COATED ORAL at 09:01

## 2024-01-06 RX ADMIN — BENZONATATE 200 MG: 100 CAPSULE ORAL at 08:01

## 2024-01-06 RX ADMIN — SACUBITRIL AND VALSARTAN 1 TABLET: 49; 51 TABLET, FILM COATED ORAL at 08:01

## 2024-01-06 NOTE — PLAN OF CARE
Pt remains free from fall/trauma/injury. POC reviewed with pt; verbalized understanding. Pt diuresing with 80 mg IV lasix BID; intake/output maintained.    Problem: Adult Inpatient Plan of Care  Goal: Plan of Care Review  Outcome: Ongoing, Progressing  Goal: Patient-Specific Goal (Individualized)  Outcome: Ongoing, Progressing  Goal: Absence of Hospital-Acquired Illness or Injury  Outcome: Ongoing, Progressing  Goal: Optimal Comfort and Wellbeing  Outcome: Ongoing, Progressing  Goal: Readiness for Transition of Care  Outcome: Ongoing, Progressing     Problem: Fluid Imbalance (Pneumonia)  Goal: Fluid Balance  Outcome: Ongoing, Progressing     Problem: Infection (Pneumonia)  Goal: Resolution of Infection Signs and Symptoms  Outcome: Ongoing, Progressing     Problem: Respiratory Compromise (Pneumonia)  Goal: Effective Oxygenation and Ventilation  Outcome: Ongoing, Progressing     Problem: Infection  Goal: Absence of Infection Signs and Symptoms  Outcome: Ongoing, Progressing     Problem: Fluid and Electrolyte Imbalance (Acute Kidney Injury/Impairment)  Goal: Fluid and Electrolyte Balance  Outcome: Ongoing, Progressing     Problem: Oral Intake Inadequate (Acute Kidney Injury/Impairment)  Goal: Optimal Nutrition Intake  Outcome: Ongoing, Progressing     Problem: Renal Function Impairment (Acute Kidney Injury/Impairment)  Goal: Effective Renal Function  Outcome: Ongoing, Progressing

## 2024-01-06 NOTE — ASSESSMENT & PLAN NOTE
Patient with newly reduced EF 20-25% and not appropriately responding to IV Lasix. Physical exam concerning for cool lower extremities and volume overload. Started on Lasix gtt and  gtt and was successfully weaned off      - Continue Lasix pushes (IV 80mg bid)   - Increase Entresto 49-51mg bid   - Continue Isosorbide, hold hydralazine  -  today, improved from 4700 on admission  - Repeat CXR mostly unchanged from prior  - Strict Is and Os

## 2024-01-06 NOTE — PLAN OF CARE
Pt maintained free from falls/trauma/injuries and skin breakdown. Pt denied pain or discomfort. IVP lasix given. Plan of care reviewed. Pt verbalized understanding. All questions and concerns addressed. Will continue to monitor.      Problem: Adult Inpatient Plan of Care  Goal: Plan of Care Review  Outcome: Ongoing, Progressing  Goal: Patient-Specific Goal (Individualized)  Outcome: Ongoing, Progressing  Goal: Optimal Comfort and Wellbeing  Outcome: Ongoing, Progressing     Problem: Fluid Imbalance (Pneumonia)  Goal: Fluid Balance  Outcome: Ongoing, Progressing     Problem: Fluid and Electrolyte Imbalance (Acute Kidney Injury/Impairment)  Goal: Fluid and Electrolyte Balance  Outcome: Ongoing, Progressing

## 2024-01-06 NOTE — PROGRESS NOTES
01/05/24 2152   Vital Signs   BP (!) 100/51   MAP (mmHg) 71     Pt's BP soft, MD Boateng ok to hold PM hydralazine.

## 2024-01-06 NOTE — PROGRESS NOTES
[facility] - Cardiology Step[facility]  Cardiology  Progress Note    Patient Name: Michela Franco  MRN: [id number]  Admission Date: 1/1/2024  Hospital Length of Stay: 5 days  Code Status: Full Code   Attending Physician: Ifeanyi Coy MD   Primary Care Physician: Mayela Broussard MD  Expected Discharge Date: 1/8/2024  Principal Problem:Acute on chronic heart failure    Subjective:     Hospital Course:   No notes on file    Interval History: NAEON. Hypotensive in the SBP 100s-110s so Hydralazine was held. Good UOP (~1900cc) on lasix pushes.     Review of Systems   Constitutional: Negative. Negative for malaise/fatigue.   HENT: Negative.     Eyes: Negative.    Cardiovascular:  Negative for chest pain and dyspnea on exertion.   Respiratory:  Positive for cough. Negative for shortness of breath.    Endocrine: Negative.    All other systems reviewed and are negative.    Objective:     Vital Signs (Most Recent):  Temp: 97.8 °F (36.6 °C) (01/06/24 1506)  Pulse: 90 (01/06/24 1507)  Resp: 16 (01/06/24 1506)  BP: (!) 140/58 (01/06/24 1506)  SpO2: 96 % (01/06/24 1506) Vital Signs (24h Range):  Temp:  [97.4 °F (36.3 °C)-98.6 °F (37 °C)] 97.8 °F (36.6 °C)  Pulse:  [61-96] 90  Resp:  [16-20] 16  SpO2:  [91 %-97 %] 96 %  BP: ()/(51-74) 140/58     Weight: 83 kg (182 lb 15.7 oz)  Body mass index is 30.45 kg/m².     SpO2: 96 %         Intake/Output Summary (Last 24 hours) at 1/6/2024 1528  Last data filed at 1/6/2024 1452  Gross per 24 hour   Intake 240 ml   Output 1550 ml   Net -1310 ml         Lines/Drains/Airways       Peripheral Intravenous Line  Duration                  Peripheral IV - Single Lumen 01/02/24 0355 20 G Anterior;Right Forearm 4 days         Peripheral IV - Single Lumen 01/04/24 0344 20 G Anterior;Left Forearm 2 days                       Physical Exam  Constitutional:       Appearance: Normal appearance.   HENT:      Head: Normocephalic.      Right Ear: External ear normal.      Left Ear: External ear normal.    Eyes:      General: No scleral icterus.        Right eye: No discharge.         Left eye: No discharge.      Extraocular Movements: Extraocular movements intact.   Cardiovascular:      Rate and Rhythm: Normal rate and regular rhythm.      Pulses: Normal pulses.   Pulmonary:      Effort: Pulmonary effort is normal.   Abdominal:      Palpations: Abdomen is soft.   Musculoskeletal:      Right lower leg: No edema.      Left lower leg: No edema.   Skin:     General: Skin is warm.   Neurological:      General: No focal deficit present.      Mental Status: She is alert and oriented to person, place, and time.            Significant Labs: All pertinent lab results from the last 24 hours have been reviewed.      Assessment and Plan:     * Acute on chronic heart failure  Patient with newly reduced EF 20-25% and not appropriately responding to IV Lasix. Physical exam concerning for cool lower extremities and volume overload. Started on Lasix gtt and  gtt and was successfully weaned off      - Continue Lasix pushes (IV 80mg bid)   - Increase Entresto 49-51mg bid   - Continue Isosorbide, hold hydralazine  -  today, improved from 4700 on admission  - Repeat CXR mostly unchanged from prior  - Strict Is and Os         Bilirubinemia  T bili up to 1.5 on presentation, direct 0.5 - no history of prior elevations. AST/ALT slightly above normal. Likely secondary to CHF exacerbation  - daily CMP       Elevated troponin  Trop 0.4 on admission. No calcifications seen on CT Chest (7/2023) Possible underlying ischemia given patient's HTN. Less concern for ACS but could consider ACS workup outpatient.     Transaminitis  Elevated LFTs for the last few months. Likely hepatic congestion 2/2 uncontrolled CHF but cannot r/o drug induced hepatitis.     - Continue to monitor    Severe asthma  Wheezing noted on exam  Continue inhalers and breathing treatments    HTN (hypertension)  Home regimen: Clonidine 0.1mg, Valsartan 320mg     Plan:    - Continue Isosorbide dinitrate 10mg tid, Hold Hydralazine  - Increase  Entresto BID            VTE Risk Mitigation (From admission, onward)           Ordered     enoxaparin injection 40 mg  Daily         01/01/24 1346     IP VTE HIGH RISK PATIENT  Once         01/01/24 1346     Place sequential compression device  Until discontinued         01/01/24 1346                    Marta Olvera MD  Cardiology  [facility] - Cardiology Stepdown

## 2024-01-06 NOTE — SUBJECTIVE & OBJECTIVE
Interval History: NAEON. Hypotensive in the SBP 100s-110s so Hydralazine was held. Good UOP (~1900cc) on lasix pushes.     Review of Systems   Constitutional: Negative. Negative for malaise/fatigue.   HENT: Negative.     Eyes: Negative.    Cardiovascular:  Negative for chest pain and dyspnea on exertion.   Respiratory:  Positive for cough. Negative for shortness of breath.    Endocrine: Negative.    All other systems reviewed and are negative.    Objective:     Vital Signs (Most Recent):  Temp: 97.8 °F (36.6 °C) (01/06/24 1506)  Pulse: 90 (01/06/24 1507)  Resp: 16 (01/06/24 1506)  BP: (!) 140/58 (01/06/24 1506)  SpO2: 96 % (01/06/24 1506) Vital Signs (24h Range):  Temp:  [97.4 °F (36.3 °C)-98.6 °F (37 °C)] 97.8 °F (36.6 °C)  Pulse:  [61-96] 90  Resp:  [16-20] 16  SpO2:  [91 %-97 %] 96 %  BP: ()/(51-74) 140/58     Weight: 83 kg (182 lb 15.7 oz)  Body mass index is 30.45 kg/m².     SpO2: 96 %         Intake/Output Summary (Last 24 hours) at 1/6/2024 1528  Last data filed at 1/6/2024 1452  Gross per 24 hour   Intake 240 ml   Output 1550 ml   Net -1310 ml         Lines/Drains/Airways       Peripheral Intravenous Line  Duration                  Peripheral IV - Single Lumen 01/02/24 0355 20 G Anterior;Right Forearm 4 days         Peripheral IV - Single Lumen 01/04/24 0344 20 G Anterior;Left Forearm 2 days                       Physical Exam  Constitutional:       Appearance: Normal appearance.   HENT:      Head: Normocephalic.      Right Ear: External ear normal.      Left Ear: External ear normal.   Eyes:      General: No scleral icterus.        Right eye: No discharge.         Left eye: No discharge.      Extraocular Movements: Extraocular movements intact.   Cardiovascular:      Rate and Rhythm: Normal rate and regular rhythm.      Pulses: Normal pulses.   Pulmonary:      Effort: Pulmonary effort is normal.   Abdominal:      Palpations: Abdomen is soft.   Musculoskeletal:      Right lower leg: No edema.      Left  lower leg: No edema.   Skin:     General: Skin is warm.   Neurological:      General: No focal deficit present.      Mental Status: She is alert and oriented to person, place, and time.            Significant Labs: All pertinent lab results from the last 24 hours have been reviewed.

## 2024-01-06 NOTE — PLAN OF CARE
Problem: Adult Inpatient Plan of Care  Goal: Plan of Care Review  Outcome: Ongoing, Progressing  Goal: Patient-Specific Goal (Individualized)  Outcome: Ongoing, Progressing  Goal: Absence of Hospital-Acquired Illness or Injury  Outcome: Ongoing, Progressing  Goal: Optimal Comfort and Wellbeing  Outcome: Ongoing, Progressing  Goal: Readiness for Transition of Care  Outcome: Ongoing, Progressing     Problem: Fluid Imbalance (Pneumonia)  Goal: Fluid Balance  Outcome: Ongoing, Progressing     Problem: Infection (Pneumonia)  Goal: Resolution of Infection Signs and Symptoms  Outcome: Ongoing, Progressing     Problem: Respiratory Compromise (Pneumonia)  Goal: Effective Oxygenation and Ventilation  Outcome: Ongoing, Progressing     Problem: Infection  Goal: Absence of Infection Signs and Symptoms  Outcome: Ongoing, Progressing     Problem: Fluid and Electrolyte Imbalance (Acute Kidney Injury/Impairment)  Goal: Fluid and Electrolyte Balance  Outcome: Ongoing, Progressing     Problem: Oral Intake Inadequate (Acute Kidney Injury/Impairment)  Goal: Optimal Nutrition Intake  Outcome: Ongoing, Progressing     Problem: Renal Function Impairment (Acute Kidney Injury/Impairment)  Goal: Effective Renal Function  Outcome: Ongoing, Progressing   Plan of care discussed with patient. Patient is free of fall/trauma/injury. Denies CP, SOB, or pain/discomfort. All questions addressed. Will continue to monitor. AAOx4 and VSS.

## 2024-01-06 NOTE — ASSESSMENT & PLAN NOTE
Home regimen: Clonidine 0.1mg, Valsartan 320mg     Plan:   - Continue Isosorbide dinitrate 10mg tid, Hold Hydralazine  - Increase  Entresto BID

## 2024-01-07 LAB
ALBUMIN SERPL BCP-MCNC: 2.6 G/DL (ref 3.5–5.2)
ALP SERPL-CCNC: 73 U/L (ref 55–135)
ALT SERPL W/O P-5'-P-CCNC: 66 U/L (ref 10–44)
ANION GAP SERPL CALC-SCNC: 11 MMOL/L (ref 8–16)
AST SERPL-CCNC: 43 U/L (ref 10–40)
BASOPHILS # BLD AUTO: 0.01 K/UL (ref 0–0.2)
BASOPHILS NFR BLD: 0.2 % (ref 0–1.9)
BILIRUB SERPL-MCNC: 0.7 MG/DL (ref 0.1–1)
BUN SERPL-MCNC: 33 MG/DL (ref 8–23)
CALCIUM SERPL-MCNC: 8.3 MG/DL (ref 8.7–10.5)
CHLORIDE SERPL-SCNC: 100 MMOL/L (ref 95–110)
CO2 SERPL-SCNC: 25 MMOL/L (ref 23–29)
CREAT SERPL-MCNC: 1 MG/DL (ref 0.5–1.4)
DIFFERENTIAL METHOD BLD: ABNORMAL
EOSINOPHIL # BLD AUTO: 0 K/UL (ref 0–0.5)
EOSINOPHIL NFR BLD: 0.6 % (ref 0–8)
ERYTHROCYTE [DISTWIDTH] IN BLOOD BY AUTOMATED COUNT: 19.9 % (ref 11.5–14.5)
EST. GFR  (NO RACE VARIABLE): 58.4 ML/MIN/1.73 M^2
GLUCOSE SERPL-MCNC: 86 MG/DL (ref 70–110)
HCT VFR BLD AUTO: 40.5 % (ref 37–48.5)
HGB BLD-MCNC: 13.4 G/DL (ref 12–16)
IMM GRANULOCYTES # BLD AUTO: 0.01 K/UL (ref 0–0.04)
IMM GRANULOCYTES NFR BLD AUTO: 0.2 % (ref 0–0.5)
LYMPHOCYTES # BLD AUTO: 0.9 K/UL (ref 1–4.8)
LYMPHOCYTES NFR BLD: 19.8 % (ref 18–48)
MCH RBC QN AUTO: 26.6 PG (ref 27–31)
MCHC RBC AUTO-ENTMCNC: 33.1 G/DL (ref 32–36)
MCV RBC AUTO: 80 FL (ref 82–98)
MONOCYTES # BLD AUTO: 0.5 K/UL (ref 0.3–1)
MONOCYTES NFR BLD: 11.3 % (ref 4–15)
NEUTROPHILS # BLD AUTO: 3.2 K/UL (ref 1.8–7.7)
NEUTROPHILS NFR BLD: 67.9 % (ref 38–73)
NRBC BLD-RTO: 0 /100 WBC
PLATELET # BLD AUTO: 148 K/UL (ref 150–450)
PMV BLD AUTO: ABNORMAL FL (ref 9.2–12.9)
POTASSIUM SERPL-SCNC: 3.7 MMOL/L (ref 3.5–5.1)
PROT SERPL-MCNC: 5.7 G/DL (ref 6–8.4)
RBC # BLD AUTO: 5.04 M/UL (ref 4–5.4)
SODIUM SERPL-SCNC: 136 MMOL/L (ref 136–145)
WBC # BLD AUTO: 4.7 K/UL (ref 3.9–12.7)

## 2024-01-07 PROCEDURE — 63600175 PHARM REV CODE 636 W HCPCS: Mod: HCNC

## 2024-01-07 PROCEDURE — 25000003 PHARM REV CODE 250: Mod: HCNC

## 2024-01-07 PROCEDURE — 25000003 PHARM REV CODE 250: Mod: HCNC | Performed by: STUDENT IN AN ORGANIZED HEALTH CARE EDUCATION/TRAINING PROGRAM

## 2024-01-07 PROCEDURE — 94761 N-INVAS EAR/PLS OXIMETRY MLT: CPT | Mod: HCNC

## 2024-01-07 PROCEDURE — 85025 COMPLETE CBC W/AUTO DIFF WBC: CPT | Mod: HCNC | Performed by: INTERNAL MEDICINE

## 2024-01-07 PROCEDURE — 36415 COLL VENOUS BLD VENIPUNCTURE: CPT | Mod: HCNC | Performed by: INTERNAL MEDICINE

## 2024-01-07 PROCEDURE — 25000242 PHARM REV CODE 250 ALT 637 W/ HCPCS: Mod: HCNC | Performed by: STUDENT IN AN ORGANIZED HEALTH CARE EDUCATION/TRAINING PROGRAM

## 2024-01-07 PROCEDURE — 27100171 HC OXYGEN HIGH FLOW UP TO 24 HOURS: Mod: HCNC

## 2024-01-07 PROCEDURE — 20600001 HC STEP DOWN PRIVATE ROOM: Mod: HCNC

## 2024-01-07 PROCEDURE — 63600175 PHARM REV CODE 636 W HCPCS: Mod: HCNC | Performed by: STUDENT IN AN ORGANIZED HEALTH CARE EDUCATION/TRAINING PROGRAM

## 2024-01-07 PROCEDURE — 94640 AIRWAY INHALATION TREATMENT: CPT | Mod: HCNC

## 2024-01-07 PROCEDURE — 63700000 PHARM REV CODE 250 ALT 637 W/O HCPCS: Mod: HCNC

## 2024-01-07 PROCEDURE — 99900035 HC TECH TIME PER 15 MIN (STAT): Mod: HCNC

## 2024-01-07 PROCEDURE — 99231 SBSQ HOSP IP/OBS SF/LOW 25: CPT | Mod: HCNC,GC,, | Performed by: INTERNAL MEDICINE

## 2024-01-07 PROCEDURE — 80053 COMPREHEN METABOLIC PANEL: CPT | Mod: HCNC | Performed by: INTERNAL MEDICINE

## 2024-01-07 PROCEDURE — 94660 CPAP INITIATION&MGMT: CPT | Mod: HCNC

## 2024-01-07 RX ORDER — FUROSEMIDE 40 MG/1
40 TABLET ORAL 2 TIMES DAILY
Status: DISCONTINUED | OUTPATIENT
Start: 2024-01-07 | End: 2024-01-08 | Stop reason: HOSPADM

## 2024-01-07 RX ADMIN — IPRATROPIUM BROMIDE AND ALBUTEROL SULFATE 3 ML: .5; 3 SOLUTION RESPIRATORY (INHALATION) at 04:01

## 2024-01-07 RX ADMIN — IPRATROPIUM BROMIDE AND ALBUTEROL SULFATE 3 ML: .5; 3 SOLUTION RESPIRATORY (INHALATION) at 01:01

## 2024-01-07 RX ADMIN — BENZONATATE 200 MG: 100 CAPSULE ORAL at 05:01

## 2024-01-07 RX ADMIN — FLUTICASONE PROPIONATE 100 MCG: 50 SPRAY, METERED NASAL at 09:01

## 2024-01-07 RX ADMIN — FUROSEMIDE 80 MG: 10 INJECTION, SOLUTION INTRAVENOUS at 06:01

## 2024-01-07 RX ADMIN — BUDESONIDE 0.5 MG: 0.5 INHALANT ORAL at 09:01

## 2024-01-07 RX ADMIN — ENOXAPARIN SODIUM 40 MG: 40 INJECTION SUBCUTANEOUS at 05:01

## 2024-01-07 RX ADMIN — SACUBITRIL AND VALSARTAN 1 TABLET: 49; 51 TABLET, FILM COATED ORAL at 09:01

## 2024-01-07 RX ADMIN — SENNOSIDES AND DOCUSATE SODIUM 1 TABLET: 8.6; 5 TABLET ORAL at 09:01

## 2024-01-07 RX ADMIN — AZITHROMYCIN DIHYDRATE 500 MG: 250 TABLET ORAL at 09:01

## 2024-01-07 RX ADMIN — MONTELUKAST 10 MG: 10 TABLET, FILM COATED ORAL at 08:01

## 2024-01-07 RX ADMIN — SACUBITRIL AND VALSARTAN 1 TABLET: 97; 103 TABLET, FILM COATED ORAL at 08:01

## 2024-01-07 RX ADMIN — CEFTRIAXONE SODIUM 1 G: 1 INJECTION, POWDER, FOR SOLUTION INTRAMUSCULAR; INTRAVENOUS at 08:01

## 2024-01-07 RX ADMIN — IPRATROPIUM BROMIDE AND ALBUTEROL SULFATE 3 ML: .5; 3 SOLUTION RESPIRATORY (INHALATION) at 09:01

## 2024-01-07 RX ADMIN — BENZONATATE 200 MG: 100 CAPSULE ORAL at 09:01

## 2024-01-07 RX ADMIN — FUROSEMIDE 40 MG: 40 TABLET ORAL at 05:01

## 2024-01-07 RX ADMIN — PREDNISONE 15 MG: 5 TABLET ORAL at 09:01

## 2024-01-07 RX ADMIN — SENNOSIDES AND DOCUSATE SODIUM 1 TABLET: 8.6; 5 TABLET ORAL at 08:01

## 2024-01-07 NOTE — ASSESSMENT & PLAN NOTE
Patient with newly reduced EF 20-25% and not appropriately responding to IV Lasix. Physical exam concerning for cool lower extremities and volume overload. Started on Lasix gtt and  gtt and was successfully weaned off      - Transitioned from IV lasix to PO Lasix 40mg BID  - Increase Entresto 97-103mg BID  - Continue Isosorbide, hold hydralazine  -  today, improved from 4700 on admission  - Repeat CXR mostly unchanged from prior  - Strict Is and Os

## 2024-01-07 NOTE — PROGRESS NOTES
[facility] - Cardiology Stepdown  Cardiology  Progress Note    Patient Name: Michela Franco  MRN: [id number]  Admission Date: 1/1/2024  Hospital Length of Stay: 6 days  Code Status: Full Code   Attending Physician: Ifeanyi Coy MD   Primary Care Physician: Mayela Broussard MD  Expected Discharge Date: 1/8/2024  Principal Problem:Acute on chronic heart failure    Subjective:     Hospital Course:   No notes on file    Interval History: NAEON. Reports doing well this morning. Noticed that she is peeing more and coughing less. Continues to have good response to lasix. UOP 1550cc (net neg 1210cc). Possible discharge tomorrow or Tuesday.     Review of Systems   Constitutional: Negative. Negative for malaise/fatigue.   HENT: Negative.     Eyes: Negative.    Cardiovascular:  Negative for chest pain and dyspnea on exertion.   Respiratory:  Positive for cough (improving). Negative for shortness of breath.    Endocrine: Negative.    All other systems reviewed and are negative.    Objective:     Vital Signs (Most Recent):  Temp: 98 °F (36.7 °C) (01/07/24 1237)  Pulse: 90 (01/07/24 1237)  Resp: 16 (01/07/24 1237)  BP: 136/71 (01/07/24 1237)  SpO2: 99 % (01/07/24 1237) Vital Signs (24h Range):  Temp:  [97.6 °F (36.4 °C)-98.2 °F (36.8 °C)] 98 °F (36.7 °C)  Pulse:  [] 90  Resp:  [16-19] 16  SpO2:  [94 %-100 %] 99 %  BP: ()/(56-83) 136/71     Weight: 83 kg (182 lb 15.7 oz)  Body mass index is 30.45 kg/m².     SpO2: 99 %         Intake/Output Summary (Last 24 hours) at 1/7/2024 1344  Last data filed at 1/7/2024 1259  Gross per 24 hour   Intake 1280 ml   Output 3230 ml   Net -1950 ml         Lines/Drains/Airways       Peripheral Intravenous Line  Duration                  Peripheral IV - Single Lumen 01/02/24 0355 20 G Anterior;Right Forearm 5 days         Peripheral IV - Single Lumen 01/04/24 0344 20 G Anterior;Left Forearm 3 days                       Physical Exam  Constitutional:       Appearance: Normal  appearance.   HENT:      Head: Normocephalic.      Right Ear: External ear normal.      Left Ear: External ear normal.   Eyes:      General: No scleral icterus.        Right eye: No discharge.         Left eye: No discharge.      Extraocular Movements: Extraocular movements intact.   Cardiovascular:      Rate and Rhythm: Normal rate and regular rhythm.      Pulses: Normal pulses.   Pulmonary:      Effort: Pulmonary effort is normal. No respiratory distress.      Breath sounds: No wheezing or rales.   Abdominal:      Palpations: Abdomen is soft.   Musculoskeletal:      Right lower leg: No edema.      Left lower leg: No edema.   Skin:     General: Skin is warm.   Neurological:      General: No focal deficit present.      Mental Status: She is alert and oriented to person, place, and time.   Psychiatric:         Mood and Affect: Mood normal.         Behavior: Behavior normal.            Significant Labs: All pertinent lab results from the last 24 hours have been reviewed.      Assessment and Plan:       * Acute on chronic heart failure  Patient with newly reduced EF 20-25% and not appropriately responding to IV Lasix. Physical exam concerning for cool lower extremities and volume overload. Started on Lasix gtt and  gtt and was successfully weaned off      - Transitioned from IV lasix to PO Lasix 40mg BID  - Increase Entresto 97-103mg BID  - Continue Isosorbide, hold hydralazine  -  today, improved from 4700 on admission  - Repeat CXR mostly unchanged from prior  - Strict Is and Os         Bilirubinemia  T bili up to 1.5 on presentation, direct 0.5 - no history of prior elevations. AST/ALT slightly above normal. Likely secondary to CHF exacerbation  - daily CMP       Elevated troponin  Trop 0.4 on admission. No calcifications seen on CT Chest (7/2023) Possible underlying ischemia given patient's HTN. Less concern for ACS but could consider ACS workup outpatient.     Transaminitis  Elevated LFTs for the last  few months. Likely hepatic congestion 2/2 uncontrolled CHF but cannot r/o drug induced hepatitis.     - Continue to monitor    Severe asthma  Wheezing noted on exam  Continue inhalers and breathing treatments    HTN (hypertension)  Home regimen: Clonidine 0.1mg, Valsartan 320mg     Plan:   - Continue Isosorbide dinitrate 10mg tid, Hold Hydralazine  - Increased Entresto BID            VTE Risk Mitigation (From admission, onward)           Ordered     enoxaparin injection 40 mg  Daily         01/01/24 1346     IP VTE HIGH RISK PATIENT  Once         01/01/24 1346     Place sequential compression device  Until discontinued         01/01/24 1346                    Marta Olvera MD  Cardiology  [facility] - Cardiology Stepdown

## 2024-01-07 NOTE — ASSESSMENT & PLAN NOTE
Home regimen: Clonidine 0.1mg, Valsartan 320mg     Plan:   - Continue Isosorbide dinitrate 10mg tid, Hold Hydralazine  - Increased Entresto BID

## 2024-01-07 NOTE — SUBJECTIVE & OBJECTIVE
Interval History: NAEON. Reports doing well this morning. Noticed that she is peeing more and coughing less. Continues to have good response to lasix. UOP 1550cc (net neg 1210cc). Possible discharge tomorrow or Tuesday.     Review of Systems   Constitutional: Negative. Negative for malaise/fatigue.   HENT: Negative.     Eyes: Negative.    Cardiovascular:  Negative for chest pain and dyspnea on exertion.   Respiratory:  Positive for cough (improving). Negative for shortness of breath.    Endocrine: Negative.    All other systems reviewed and are negative.    Objective:     Vital Signs (Most Recent):  Temp: 98 °F (36.7 °C) (01/07/24 1237)  Pulse: 90 (01/07/24 1237)  Resp: 16 (01/07/24 1237)  BP: 136/71 (01/07/24 1237)  SpO2: 99 % (01/07/24 1237) Vital Signs (24h Range):  Temp:  [97.6 °F (36.4 °C)-98.2 °F (36.8 °C)] 98 °F (36.7 °C)  Pulse:  [] 90  Resp:  [16-19] 16  SpO2:  [94 %-100 %] 99 %  BP: ()/(56-83) 136/71     Weight: 83 kg (182 lb 15.7 oz)  Body mass index is 30.45 kg/m².     SpO2: 99 %         Intake/Output Summary (Last 24 hours) at 1/7/2024 1344  Last data filed at 1/7/2024 1259  Gross per 24 hour   Intake 1280 ml   Output 3230 ml   Net -1950 ml         Lines/Drains/Airways       Peripheral Intravenous Line  Duration                  Peripheral IV - Single Lumen 01/02/24 0355 20 G Anterior;Right Forearm 5 days         Peripheral IV - Single Lumen 01/04/24 0344 20 G Anterior;Left Forearm 3 days                       Physical Exam  Constitutional:       Appearance: Normal appearance.   HENT:      Head: Normocephalic.      Right Ear: External ear normal.      Left Ear: External ear normal.   Eyes:      General: No scleral icterus.        Right eye: No discharge.         Left eye: No discharge.      Extraocular Movements: Extraocular movements intact.   Cardiovascular:      Rate and Rhythm: Normal rate and regular rhythm.      Pulses: Normal pulses.   Pulmonary:      Effort: Pulmonary effort is  normal. No respiratory distress.      Breath sounds: No wheezing or rales.   Abdominal:      Palpations: Abdomen is soft.   Musculoskeletal:      Right lower leg: No edema.      Left lower leg: No edema.   Skin:     General: Skin is warm.   Neurological:      General: No focal deficit present.      Mental Status: She is alert and oriented to person, place, and time.   Psychiatric:         Mood and Affect: Mood normal.         Behavior: Behavior normal.            Significant Labs: All pertinent lab results from the last 24 hours have been reviewed.

## 2024-01-08 ENCOUNTER — TELEPHONE (OUTPATIENT)
Dept: CARDIOLOGY | Facility: HOSPITAL | Age: 79
End: 2024-01-08
Payer: MEDICARE

## 2024-01-08 VITALS
BODY MASS INDEX: 30.49 KG/M2 | OXYGEN SATURATION: 97 % | WEIGHT: 183 LBS | DIASTOLIC BLOOD PRESSURE: 98 MMHG | HEART RATE: 94 BPM | SYSTOLIC BLOOD PRESSURE: 141 MMHG | HEIGHT: 65 IN | TEMPERATURE: 98 F | RESPIRATION RATE: 16 BRPM

## 2024-01-08 DIAGNOSIS — I50.9 ACUTE ON CHRONIC CONGESTIVE HEART FAILURE, UNSPECIFIED HEART FAILURE TYPE: Primary | ICD-10-CM

## 2024-01-08 LAB
ALBUMIN SERPL BCP-MCNC: 2.6 G/DL (ref 3.5–5.2)
ALP SERPL-CCNC: 67 U/L (ref 55–135)
ALT SERPL W/O P-5'-P-CCNC: 57 U/L (ref 10–44)
ANION GAP SERPL CALC-SCNC: 11 MMOL/L (ref 8–16)
AST SERPL-CCNC: 43 U/L (ref 10–40)
BACTERIA BLD CULT: NORMAL
BACTERIA BLD CULT: NORMAL
BASOPHILS # BLD AUTO: 0.01 K/UL (ref 0–0.2)
BASOPHILS NFR BLD: 0.2 % (ref 0–1.9)
BILIRUB SERPL-MCNC: 0.6 MG/DL (ref 0.1–1)
BUN SERPL-MCNC: 28 MG/DL (ref 8–23)
CALCIUM SERPL-MCNC: 8.6 MG/DL (ref 8.7–10.5)
CHLORIDE SERPL-SCNC: 101 MMOL/L (ref 95–110)
CO2 SERPL-SCNC: 24 MMOL/L (ref 23–29)
CREAT SERPL-MCNC: 1 MG/DL (ref 0.5–1.4)
DIFFERENTIAL METHOD BLD: ABNORMAL
EOSINOPHIL # BLD AUTO: 0 K/UL (ref 0–0.5)
EOSINOPHIL NFR BLD: 0.7 % (ref 0–8)
ERYTHROCYTE [DISTWIDTH] IN BLOOD BY AUTOMATED COUNT: 19.5 % (ref 11.5–14.5)
EST. GFR  (NO RACE VARIABLE): 58.4 ML/MIN/1.73 M^2
GLUCOSE SERPL-MCNC: 83 MG/DL (ref 70–110)
HCT VFR BLD AUTO: 39.6 % (ref 37–48.5)
HGB BLD-MCNC: 13.3 G/DL (ref 12–16)
IMM GRANULOCYTES # BLD AUTO: 0.01 K/UL (ref 0–0.04)
IMM GRANULOCYTES NFR BLD AUTO: 0.2 % (ref 0–0.5)
LYMPHOCYTES # BLD AUTO: 1.3 K/UL (ref 1–4.8)
LYMPHOCYTES NFR BLD: 31.2 % (ref 18–48)
MCH RBC QN AUTO: 26 PG (ref 27–31)
MCHC RBC AUTO-ENTMCNC: 33.6 G/DL (ref 32–36)
MCV RBC AUTO: 78 FL (ref 82–98)
MONOCYTES # BLD AUTO: 0.6 K/UL (ref 0.3–1)
MONOCYTES NFR BLD: 15.5 % (ref 4–15)
NEUTROPHILS # BLD AUTO: 2.2 K/UL (ref 1.8–7.7)
NEUTROPHILS NFR BLD: 52.2 % (ref 38–73)
NRBC BLD-RTO: 0 /100 WBC
PLATELET # BLD AUTO: 139 K/UL (ref 150–450)
PMV BLD AUTO: ABNORMAL FL (ref 9.2–12.9)
POTASSIUM SERPL-SCNC: 3.5 MMOL/L (ref 3.5–5.1)
PROT SERPL-MCNC: 5.8 G/DL (ref 6–8.4)
RBC # BLD AUTO: 5.11 M/UL (ref 4–5.4)
SODIUM SERPL-SCNC: 136 MMOL/L (ref 136–145)
WBC # BLD AUTO: 4.13 K/UL (ref 3.9–12.7)

## 2024-01-08 PROCEDURE — 25000003 PHARM REV CODE 250: Mod: HCNC | Performed by: STUDENT IN AN ORGANIZED HEALTH CARE EDUCATION/TRAINING PROGRAM

## 2024-01-08 PROCEDURE — 94761 N-INVAS EAR/PLS OXIMETRY MLT: CPT | Mod: HCNC

## 2024-01-08 PROCEDURE — 25000003 PHARM REV CODE 250: Mod: HCNC | Performed by: INTERNAL MEDICINE

## 2024-01-08 PROCEDURE — 80053 COMPREHEN METABOLIC PANEL: CPT | Mod: HCNC | Performed by: INTERNAL MEDICINE

## 2024-01-08 PROCEDURE — 85025 COMPLETE CBC W/AUTO DIFF WBC: CPT | Mod: HCNC | Performed by: INTERNAL MEDICINE

## 2024-01-08 PROCEDURE — 99900035 HC TECH TIME PER 15 MIN (STAT): Mod: HCNC

## 2024-01-08 PROCEDURE — 36415 COLL VENOUS BLD VENIPUNCTURE: CPT | Mod: HCNC | Performed by: INTERNAL MEDICINE

## 2024-01-08 PROCEDURE — 63600175 PHARM REV CODE 636 W HCPCS: Mod: HCNC

## 2024-01-08 PROCEDURE — 99239 HOSP IP/OBS DSCHRG MGMT >30: CPT | Mod: HCNC,GC,, | Performed by: INTERNAL MEDICINE

## 2024-01-08 PROCEDURE — 25000003 PHARM REV CODE 250: Mod: HCNC

## 2024-01-08 PROCEDURE — 25000242 PHARM REV CODE 250 ALT 637 W/ HCPCS: Mod: HCNC | Performed by: STUDENT IN AN ORGANIZED HEALTH CARE EDUCATION/TRAINING PROGRAM

## 2024-01-08 PROCEDURE — 63600175 PHARM REV CODE 636 W HCPCS: Mod: HCNC | Performed by: STUDENT IN AN ORGANIZED HEALTH CARE EDUCATION/TRAINING PROGRAM

## 2024-01-08 PROCEDURE — 1111F DSCHRG MED/CURRENT MED MERGE: CPT | Mod: HCNC,CPTII,GC, | Performed by: INTERNAL MEDICINE

## 2024-01-08 PROCEDURE — 63700000 PHARM REV CODE 250 ALT 637 W/O HCPCS: Mod: HCNC

## 2024-01-08 PROCEDURE — 94640 AIRWAY INHALATION TREATMENT: CPT | Mod: HCNC

## 2024-01-08 PROCEDURE — 27100171 HC OXYGEN HIGH FLOW UP TO 24 HOURS: Mod: HCNC

## 2024-01-08 RX ORDER — AMOXICILLIN AND CLAVULANATE POTASSIUM 875; 125 MG/1; MG/1
1 TABLET, FILM COATED ORAL EVERY 12 HOURS
Status: DISCONTINUED | OUTPATIENT
Start: 2024-01-08 | End: 2024-01-08 | Stop reason: HOSPADM

## 2024-01-08 RX ORDER — POTASSIUM CHLORIDE 750 MG/1
50 CAPSULE, EXTENDED RELEASE ORAL ONCE
Status: COMPLETED | OUTPATIENT
Start: 2024-01-08 | End: 2024-01-08

## 2024-01-08 RX ORDER — PREDNISONE 5 MG/1
15 TABLET ORAL DAILY
Qty: 90 TABLET | Refills: 0 | Status: SHIPPED | OUTPATIENT
Start: 2024-01-08 | End: 2024-01-10 | Stop reason: SDUPTHER

## 2024-01-08 RX ORDER — SPIRONOLACTONE 25 MG/1
25 TABLET ORAL DAILY
Qty: 30 TABLET | Refills: 11 | Status: SHIPPED | OUTPATIENT
Start: 2024-01-08 | End: 2025-01-07

## 2024-01-08 RX ORDER — FUROSEMIDE 40 MG/1
40 TABLET ORAL 2 TIMES DAILY
Qty: 60 TABLET | Refills: 11 | Status: SHIPPED | OUTPATIENT
Start: 2024-01-08 | End: 2025-01-07

## 2024-01-08 RX ORDER — AMOXICILLIN AND CLAVULANATE POTASSIUM 875; 125 MG/1; MG/1
1 TABLET, FILM COATED ORAL EVERY 12 HOURS
Qty: 10 TABLET | Refills: 0 | Status: SHIPPED | OUTPATIENT
Start: 2024-01-08 | End: 2024-01-13

## 2024-01-08 RX ADMIN — IPRATROPIUM BROMIDE AND ALBUTEROL SULFATE 3 ML: .5; 3 SOLUTION RESPIRATORY (INHALATION) at 04:01

## 2024-01-08 RX ADMIN — IPRATROPIUM BROMIDE AND ALBUTEROL SULFATE 3 ML: .5; 3 SOLUTION RESPIRATORY (INHALATION) at 08:01

## 2024-01-08 RX ADMIN — PREDNISONE 15 MG: 5 TABLET ORAL at 08:01

## 2024-01-08 RX ADMIN — IPRATROPIUM BROMIDE AND ALBUTEROL SULFATE 3 ML: .5; 3 SOLUTION RESPIRATORY (INHALATION) at 11:01

## 2024-01-08 RX ADMIN — SACUBITRIL AND VALSARTAN 1 TABLET: 97; 103 TABLET, FILM COATED ORAL at 08:01

## 2024-01-08 RX ADMIN — POTASSIUM CHLORIDE 50 MEQ: 10 CAPSULE, COATED, EXTENDED RELEASE ORAL at 05:01

## 2024-01-08 RX ADMIN — CEFTRIAXONE SODIUM 1 G: 1 INJECTION, POWDER, FOR SOLUTION INTRAMUSCULAR; INTRAVENOUS at 08:01

## 2024-01-08 RX ADMIN — FUROSEMIDE 40 MG: 40 TABLET ORAL at 08:01

## 2024-01-08 RX ADMIN — AZITHROMYCIN DIHYDRATE 500 MG: 250 TABLET ORAL at 08:01

## 2024-01-08 RX ADMIN — BUDESONIDE 0.5 MG: 0.5 INHALANT ORAL at 08:01

## 2024-01-08 RX ADMIN — SENNOSIDES AND DOCUSATE SODIUM 1 TABLET: 8.6; 5 TABLET ORAL at 08:01

## 2024-01-08 RX ADMIN — AMOXICILLIN AND CLAVULANATE POTASSIUM 1 TABLET: 875; 125 TABLET, FILM COATED ORAL at 12:01

## 2024-01-08 RX ADMIN — IPRATROPIUM BROMIDE AND ALBUTEROL SULFATE 3 ML: .5; 3 SOLUTION RESPIRATORY (INHALATION) at 12:01

## 2024-01-08 NOTE — PLAN OF CARE
[facility] - Cardiology Stepdown  Discharge Final Note    Primary Care Provider: Mayela Broussard MD    Expected Discharge Date: 1/8/2024    Final Discharge Note (most recent)       Final Note - 01/08/24 1205          Final Note    Assessment Type Final Discharge Note     Anticipated Discharge Disposition Home or Self Care     Hospital Resources/Appts/Education Provided Appointments scheduled and added to AVS                   Robina Pinto LMSW  [facility]  [phone]      Important Message from Medicare  Important Message from Medicare regarding Discharge Appeal Rights: Given to patient/caregiver, Explained to patient/caregiver, Signed/date by patient/caregiver     Date IMM was signed: 01/08/24  Time IMM was signed: 1033      Future Appointments   Date Time Provider Department Center   1/10/2024  8:30 AM Joe King DO [facility] ALLM [facility] [address]   1/11/2024  8:30 AM LAB, APPOINTMENT [facility] LAB VNP [facility]   1/11/2024  9:00 AM Marjan Nobles PA-C [facility]   1/11/2024  9:45 AM [facility] HEART FAILURE NURSE [facility] [address]   1/23/2024 11:00 AM NURSE, [facility] PRICARE [facility]   1/25/2024  8:00 AM Pako Mayorga MD [facility] CARDIO [facility]   2/14/2024  8:40 AM China Cook, NP [facility] SLEEP [facility]

## 2024-01-08 NOTE — PLAN OF CARE
CHW met with patient/family at bedside. Patient experience rounding completed and reviewed the following.     Do you know your discharge plan? Yes or No,    If yes, what is the plan? (Home, Home Health, Rehab, SNF, LTAC, or Other) Yes Home    Have you discussed your needs and preferences with your SW/CM? Yes or No  Yes Home    If you are discharging home, do you have help at home? Yes or No Yes     Do you think you will need help additional at home at discharge? Yes or No  No    Do you currently have difficulty keeping up with bills, affording medicine or buying food? Yes or No No    Assigned SW/CM notified of any patient/family needs or concerns. Appropriate resources provided to address patient's needs.  Stefani MARTÍNEZ          General Cardiology appointment scheduled 01/25 at 8:00am              Ritika Chandler CHW  Case Management   [phone]

## 2024-01-08 NOTE — PLAN OF CARE
Problem: Adult Inpatient Plan of Care  Goal: Plan of Care Review  1/8/2024 1350 by Alba Day RN  Outcome: Met  1/8/2024 0945 by Alba Day RN  Outcome: Ongoing, Progressing  Goal: Patient-Specific Goal (Individualized)  1/8/2024 1350 by Alba Day RN  Outcome: Met  1/8/2024 0945 by Alba Day RN  Outcome: Ongoing, Progressing  Goal: Absence of Hospital-Acquired Illness or Injury  1/8/2024 1350 by Alba Day RN  Outcome: Met  1/8/2024 0945 by Alba Day RN  Outcome: Ongoing, Progressing  Goal: Optimal Comfort and Wellbeing  1/8/2024 1350 by Alba Day RN  Outcome: Met  1/8/2024 0945 by Alba Day RN  Outcome: Ongoing, Progressing  Goal: Readiness for Transition of Care  1/8/2024 1350 by Alba Day RN  Outcome: Met  1/8/2024 0945 by Alba Day RN  Outcome: Ongoing, Progressing     Problem: Fluid Imbalance (Pneumonia)  Goal: Fluid Balance  1/8/2024 1350 by Alba Day RN  Outcome: Met  1/8/2024 0945 by Alba Day RN  Outcome: Ongoing, Progressing     Problem: Infection (Pneumonia)  Goal: Resolution of Infection Signs and Symptoms  1/8/2024 1350 by Alba Day RN  Outcome: Met  1/8/2024 0945 by Alba Day RN  Outcome: Ongoing, Progressing     Problem: Respiratory Compromise (Pneumonia)  Goal: Effective Oxygenation and Ventilation  1/8/2024 1350 by Alba Day RN  Outcome: Met  1/8/2024 0945 by Alba Day RN  Outcome: Ongoing, Progressing     Problem: Infection  Goal: Absence of Infection Signs and Symptoms  1/8/2024 1350 by Alba Day RN  Outcome: Met  1/8/2024 0945 by Alba Day RN  Outcome: Ongoing, Progressing     Problem: Fluid and Electrolyte Imbalance (Acute Kidney Injury/Impairment)  Goal: Fluid and Electrolyte Balance  1/8/2024 1350 by Alba Day RN  Outcome: Met  1/8/2024 0945 by Barb, Alba, RN  Outcome: Ongoing, Progressing     Problem: Oral Intake Inadequate (Acute Kidney Injury/Impairment)  Goal: Optimal Nutrition Intake  1/8/2024  1350 by Alba Day RN  Outcome: Met  1/8/2024 0945 by Alba Day RN  Outcome: Ongoing, Progressing     Problem: Renal Function Impairment (Acute Kidney Injury/Impairment)  Goal: Effective Renal Function  1/8/2024 1350 by Alba Day RN  Outcome: Met  1/8/2024 0945 by Alba Day RN  Outcome: Ongoing, Progressing

## 2024-01-08 NOTE — DISCHARGE SUMMARY
[facility]  Cardiology  Discharge Summary      Patient Name: Michela Franco  MRN: [id number]  Admission Date: 1/1/2024  Hospital Length of Stay: 7 days  Discharge Date and Time:  01/08/2024 2:44 PM  Attending Physician: Ifeanyi Coy MD    Discharging Provider: Marta Olvera MD  Primary Care Physician: Mayela Broussard MD    HPI:   Ms. Franco is a 77 yo with HFpEF (EF 50% 7/2023), severe asthma, HTN admitted for bilateral lower extremity edema on 1/1/24. She reported dyspnea on exertion and orthopnea requiring multiple pillows. In the ED, BNP was elevated 4700 and trop 0.489. CXR was concerning for pulmonary congestion in the setting of a CHF exacerbation. Her recent echo on admission showed a newly reduced EF of 20-25% with Grade III diastolic dysfunction and wall motion abnormalities. Down from EF 50% in July 2023. She initially received 160 mg IV Lasix which generated a poor response (~650cc urine). Cardiology consults was consulted for her newly reduced EF and SOB concerning for CHF exacerbation. On their exam, patient had cool extremities bilaterally with 1+ pitting edema. Concern for cardiogenic shock. She was started on a  gtt and lasix gtt. Transferred to the CCU for further management of CHF.         * No surgery found *     Indwelling Lines/Drains at time of discharge:  Lines/Drains/Airways       None                   Hospital Course:  Upon admission to the CCU, we continued to aggressively diuresis her with a lasix drip. She had appropriate response in her urine output. She was transitioned off of the Lasix gtt to IV Lasix pushes and then to PO Lasix. There was concern for community-acquired pneumonia given consolidation on CXR despite diuresis. She was started on CAP coverage antibiotics. We discussed her new Heart Failure diagnosis and slowly started her on GDMT prior to discharge. We educated her on the importance of maintaining a cardiac diet, exercise, cessation of  smoking if patient is still smoking, and signs that she needs to return to the hospital. She voiced understanding of the plan and was OK to discharge home. Her medications were reviewed and updated in the chart. She will be discharged on a 5-day course of antibiotics for her presumed pneumonia. She has an appointment with Transitional Heart Failure clinic as well as a General Cardiology clinic appointment. She should also have close follow-up with her PCP.     Goals of Care Treatment Preferences:  Code Status: Full Code    Living Will: Yes          Physical Exam  Constitutional:       Appearance: Normal appearance.   HENT:      Head: Normocephalic.      Right Ear: External ear normal.      Left Ear: External ear normal.   Eyes:      General: No scleral icterus.        Right eye: No discharge.         Left eye: No discharge.      Extraocular Movements: Extraocular movements intact.   Cardiovascular:      Rate and Rhythm: Normal rate and regular rhythm.      Pulses: Normal pulses.   Pulmonary:      Effort: Pulmonary effort is normal. No respiratory distress.      Breath sounds: No wheezing or rales.   Abdominal:      Palpations: Abdomen is soft.   Musculoskeletal:      Right lower leg: No edema.      Left lower leg: No edema.   Skin:     General: Skin is warm.   Neurological:      General: No focal deficit present.      Mental Status: She is alert and oriented to person, place, and time.   Psychiatric:         Mood and Affect: Mood normal.         Behavior: Behavior normal.     Consults:   Consults (From admission, onward)          Status Ordering Provider     Inpatient consult to Cardiology  Once        Provider:  (Not yet assigned)    NE Gonzales            Significant Diagnostic Studies: N/A    Pending Diagnostic Studies:       Procedure Component Value Units Date/Time    Basic metabolic panel [[id number]]     Order Status: Sent Lab Status: No result     Specimen: Blood             Final Active  Diagnoses:    Diagnosis Date Noted POA    PRINCIPAL PROBLEM:  Acute on chronic heart failure [I50.9] 01/01/2024 Yes    JEFFY (acute kidney injury) [N17.9] 01/03/2024 Yes    Elevated troponin [R79.89] 01/01/2024 Yes    ACP (advance care planning) [Z71.89] 01/01/2024 Not Applicable    Bilirubinemia [E80.6] 01/01/2024 Yes    Transaminitis [R74.01] 07/17/2023 Yes    Severe asthma [J45.909]  Yes    HTN (hypertension) [I10] 11/06/2012 Yes      Problems Resolved During this Admission:    Diagnosis Date Noted Date Resolved POA    Bilateral pneumonia [J18.9] 07/16/2023 01/05/2024 Yes    Hypokalemia [E87.6] 11/06/2012 01/05/2024 Yes     No new Assessment & Plan notes have been filed under this hospital service since the last note was generated.  Service: Cardiology      Discharged Condition: good    Disposition: Home or Self Care    Follow Up:   Follow-up Information       Mayela Broussard MD Follow up.    Specialty: Internal Medicine  Contact information:  [address] [facility] [address]  [phone]                           Patient Instructions:      BASIC METABOLIC PANEL   Standing Status: Future Standing Exp. Date: 03/08/25     BASIC METABOLIC PANEL   Standing Status: Future Standing Exp. Date: 03/08/25     Ambulatory referral/consult to Heart Failure Transitional Care Clinic   Standing Status: Future   Referral Priority: Routine Referral Type: Consultation   Referral Reason: Specialty Services Required   Requested Specialty: Cardiology   Number of Visits Requested: 1     Medications:  Reconciled Home Medications:      Medication List        START taking these medications      amoxicillin-clavulanate 875-125mg 875-125 mg per tablet  Commonly known as: AUGMENTIN  Take 1 tablet by mouth every 12 (twelve) hours. for 5 days     ENTRESTO  mg per tablet  Generic drug: sacubitriL-valsartan  Take 1 tablet by mouth 2 (two) times daily.     spironolactone 25 MG tablet  Commonly known as: ALDACTONE  Take 1 tablet (25  mg total) by mouth once daily.            CHANGE how you take these medications      furosemide 40 MG tablet  Commonly known as: LASIX  Take 1 tablet (40 mg total) by mouth 2 (two) times daily.  What changed:   medication strength  how much to take  when to take this            CONTINUE taking these medications      ADVAIR DISKUS 500-50 mcg/dose Dsdv diskus inhaler  Generic drug: fluticasone-salmeterol 500-50 mcg/dose  INHALE 1 PUFF TWICE DAILY     albuterol 90 mcg/actuation inhaler  Commonly known as: VENTOLIN HFA  Inhale 2 puffs into the lungs every 6 (six) hours as needed for Wheezing. Rescue     albuterol-ipratropium 2.5 mg-0.5 mg/3 mL nebulizer solution  Commonly known as: DUO-NEB  Take 3 mLs by nebulization every 6 (six) hours as needed for Wheezing.     azelastine 137 mcg (0.1 %) nasal spray  Commonly known as: ASTELIN  2 sprays (274 mcg total) by Nasal route 2 (two) times daily.     budesonide-formoterol 160-4.5 mcg 160-4.5 mcg/actuation Hfaa  Commonly known as: SYMBICORT  Inhale 2 puffs into the lungs every 12 (twelve) hours. Controller     carboxymethylcellulose sodium 0.5 % Drop  Commonly known as: REFRESH TEARS  Apply 1 drop to eye 3 (three) times daily as needed (dry eyes).     cholecalciferol (vitamin D3) 250 mcg (10,000 unit) Cap capsule  Commonly known as: VITAMIN D3  Take 360 mg by mouth once daily. Take 6 capsules (6,000 units total) by mouth once daily     COD LIVER OIL ORAL  Take 1 tablet by mouth once daily.     dupilumab 300 mg/2 mL Pnij  Inject 300 mg into the skin every 14 (fourteen) days.     ferrous sulfate 325 (65 FE) MG EC tablet  Take 1 tablet (325 mg total) by mouth 3 (three) times daily with meals.     fluticasone propionate 50 mcg/actuation nasal spray  Commonly known as: FLONASE  2 sprays (100 mcg total) by Each Nostril route 2 (two) times a day.     guaiFENesin 600 mg 12 hr tablet  Commonly known as: MUCINEX  Take 1 tablet (600 mg total) by mouth 2 (two) times daily.     milk  thistle 150 mg Cap  Take 1 capsule by mouth once daily.     multivitamin per tablet  Commonly known as: THERAGRAN  Take 1 tablet by mouth once daily.     potassium chloride SA 20 MEQ tablet  Commonly known as: K-DUR,KLOR-CON  Take 1 tablet (20 mEq total) by mouth once daily.     predniSONE 5 MG tablet  Commonly known as: DELTASONE  Take 3 tablets (15 mg total) by mouth once daily.     SENNA 8.6 mg tablet  Generic drug: senna  TAKE 2 TAB(S) BY MOUTH NIGHTLY AS NEEDED FOR CONSTIPATION     tretinoin 0.05 % cream  Commonly known as: RETIN-A  Apply topically every evening. Start with every other night and move up to nightly after 2 weeks if not too dry.            STOP taking these medications      benralizumab 30 mg/mL Atin     benzonatate 100 MG capsule  Commonly known as: TESSALON     cloNIDine 0.1 MG tablet  Commonly known as: CATAPRES     GAVILYTE-G 236-22.74-6.74 -5.86 gram suspension  Generic drug: polyethylene glycol     hydrocortisone 2.5 % cream     montelukast 10 mg tablet  Commonly known as: SINGULAIR     valsartan 320 MG tablet  Commonly known as: DIOVAN              Time spent on the discharge of patient: 20 minutes    Marta Olvera MD  Cardiology  [facility]

## 2024-01-08 NOTE — PLAN OF CARE
Pt maintained free from falls/trauma/injuries and skin breakdown. Pt denied pain or discomfort. Pt on CPAP overnight. Potassium replaced in AM. Plan of care reviewed. Pt verbalized understanding. All questions and concerns addressed. Will continue to monitor.      Problem: Adult Inpatient Plan of Care  Goal: Plan of Care Review  Outcome: Ongoing, Progressing  Goal: Patient-Specific Goal (Individualized)  Outcome: Ongoing, Progressing  Goal: Optimal Comfort and Wellbeing  Outcome: Ongoing, Progressing     Problem: Fluid Imbalance (Pneumonia)  Goal: Fluid Balance  Outcome: Ongoing, Progressing     Problem: Fluid and Electrolyte Imbalance (Acute Kidney Injury/Impairment)  Goal: Fluid and Electrolyte Balance  Outcome: Ongoing, Progressing

## 2024-01-08 NOTE — NURSING
Discharge instructions provided to pt at this time.  Understanding verbalized and questions denied.  IV and Cardiac monitor removed at this time, pt tolerated well.  Transport here to escort pt to parking garage with all belongings at this time.

## 2024-01-08 NOTE — TELEPHONE ENCOUNTER
Spoke with patient in regards to stopping her home potassium given we started spironolactone and entresto. Patient verbalized understanding and medication was discontinued on epic. Patient will have labs repeated on Thursday to evaluate K.     Orders placed and case discussed with Dr. Zbigniew CARBAJAL MD  Cardiology Fellow  Pager: [phone]  [facility]

## 2024-01-08 NOTE — PLAN OF CARE
Problem: Adult Inpatient Plan of Care  Goal: Plan of Care Review  Outcome: Ongoing, Progressing  Goal: Patient-Specific Goal (Individualized)  Outcome: Ongoing, Progressing  Goal: Absence of Hospital-Acquired Illness or Injury  Outcome: Ongoing, Progressing  Goal: Optimal Comfort and Wellbeing  Outcome: Ongoing, Progressing  Goal: Readiness for Transition of Care  Outcome: Ongoing, Progressing     Problem: Fluid Imbalance (Pneumonia)  Goal: Fluid Balance  Outcome: Ongoing, Progressing     Problem: Infection (Pneumonia)  Goal: Resolution of Infection Signs and Symptoms  Outcome: Ongoing, Progressing     Problem: Respiratory Compromise (Pneumonia)  Goal: Effective Oxygenation and Ventilation  Outcome: Ongoing, Progressing     Problem: Infection  Goal: Absence of Infection Signs and Symptoms  Outcome: Ongoing, Progressing     Problem: Fluid and Electrolyte Imbalance (Acute Kidney Injury/Impairment)  Goal: Fluid and Electrolyte Balance  Outcome: Ongoing, Progressing     Problem: Oral Intake Inadequate (Acute Kidney Injury/Impairment)  Goal: Optimal Nutrition Intake  Outcome: Ongoing, Progressing     Problem: Renal Function Impairment (Acute Kidney Injury/Impairment)  Goal: Effective Renal Function  Outcome: Ongoing, Progressing

## 2024-01-08 NOTE — HOSPITAL COURSE
Upon admission to the CCU, we continued to aggressively diuresis her with a lasix drip. She had appropriate response in her urine output. She was transitioned off of the Lasix gtt to IV Lasix pushes and then to PO Lasix. There was concern for community-acquired pneumonia given consolidation on CXR despite diuresis. She was started on CAP coverage antibiotics. We discussed her new Heart Failure diagnosis and slowly started her on GDMT prior to discharge. We educated her on the importance of maintaining a cardiac diet, exercise, cessation of smoking if patient is still smoking, and signs that she needs to return to the hospital. She voiced understanding of the plan and was OK to discharge home. Her medications were reviewed and updated in the chart. She will be discharged on a 5-day course of antibiotics for her presumed pneumonia. She has an appointment with Transitional Heart Failure clinic as well as a General Cardiology clinic appointment. She should also have close follow-up with her PCP.

## 2024-01-09 ENCOUNTER — PATIENT OUTREACH (OUTPATIENT)
Dept: ADMINISTRATIVE | Facility: CLINIC | Age: 79
End: 2024-01-09
Payer: MEDICARE

## 2024-01-09 NOTE — PROGRESS NOTES
C3 nurse spoke with Michela Franco for a TCC post hospital discharge follow up call. The patient has a scheduled HOSFU appointment with Mayela Broussard MD   on 1/10/24 @ 930am.

## 2024-01-09 NOTE — PHYSICIAN QUERY
PT Name: Michela Franco  MR #: [id number]     DOCUMENTATION CLARIFICATION     CDS/: Lori Flannery RN               Contact information: [email or web address]  This form is a permanent document in the medical record.     Query Date: January 9, 2024    By submitting this query, we are merely seeking further clarification of documentation.  Please utilize your independent clinical judgment when addressing the question(s) below.    The Medical Record contains the following   Indicators Supporting Clinical Findings Location in Medical Record   x Heart Failure documented [ x  ]  Acute on Chronic Systolic Heart Failure (HFrEF or HFmrEF) - worsening of CHF signs/symptoms in preexisting CHF    Diagnosed with Acute CHF due to systolic and diastolic dysfunction. Started on GDMT - Entresto, Spironolactone .  Physician Query response from 1/5/24 at 8:41 PM    DCS 1/8/24        BNP     x EF/Echo Echo:    Left Ventricle: The left ventricle is mildly dilated. Ventricular mass is normal. Normal wall thickness. Regional wall motion abnormalities present. See diagram for wall motion findings. There is severely reduced systolic function with a visually estimated ejection fraction of 20 - 25%. Ejection fraction by visual approximation is 23%. Grade III diastolic dysfunction.    Right Ventricle: Mild right ventricular enlargement. Wall thickness is normal. Right ventricle wall motion  is normal. Systolic function is borderline low.    Left Atrium: Left atrium is severely dilated.    Right Atrium: Right atrium is mildly dilated.    Aortic Valve: There is mild aortic valve sclerosis.    Mitral Valve: There is mild mitral annular calcification present. There is moderate to  moderate-severe regurgitation.    Tricuspid Valve: There is moderate- severe  to severe (3-4+) regurgitation.    Pulmonary Artery: There is mild pulmonary hypertension. The estimated pulmonary artery systolic pressure is 44 mmHg.    IVC/SVC: Elevated venous  pressure at 15 mmHg.    Pericardium: There is a trivial posterior effusion.    Echo 1/2/24    Radiology findings      Subjective/Objective Respiratory Conditions      Recent/Current MI      Heart Transplant, LVAD      Edema, JVD      Ascites      Diuretics/Meds      Other Treatment     x Other Michela Franco is a 78 y.o. female w/ PMHx of HFpEF, severe asthma, recurrent pulmonary infections, HTN, and JOHN presenting with BLE swelling. Patient reports that the swelling began a day prior to admission. H&P 1/3/24           Heart failure is a clinical diagnosis which includes symptomatic fluid retention, elevated intracardiac pressures, and/or the inability of the heart to deliver adequate blood flow.    Heart Failure with reduced Ejection Fraction (HFrEF) or Systolic Heart Failure (loses ability to contract normally, EF is <40%)    Heart Failure with preserved Ejection Fraction (HFpEF) or Diastolic Heart Failure (stiff ventricles, does not relax properly, EF is >50%)     Heart Failure with Combined Systolic and Diastolic Failure (stiff ventricles, does not relax properly and EF is <50%)    Mid-range or mildly reduced ejection fraction (HFmrEF) is classified as systolic heart failure.  Congestive heart failure with a recovered EF is classified as Diastolic Heart Failure.  Common clues to acute exacerbation:  Rapidly progressive symptoms (w/in 2 weeks of presentation), using IV diuretics, using supplemental O2, pulmonary edema on Xray, new or worsening pleural effusion, +JVD or other signs of volume overload, MI w/in 4 weeks, and/or BNP >500  The clinical guidelines noted are only system guidelines, and do not replace the providers clinical judgment.      Provider, due to conflicting documentation, please clarify the heart failure diagnosis associated with the above clinical findings.    [   ]  Acute on Chronic Systolic Heart Failure (HFrEF or HFmrEF) - worsening of CHF signs/symptoms in preexisting CHF     [ x  ]   Acute Combined Systolic and Diastolic Heart Failure - new diagnosis     [   ]  Acute on Chronic Combined Systolic and Diastolic Heart Failure - worsening of CHF signs/symptoms in preexisting CHF     [   ]  Other (please specify): ___________________________________         Please document in your progress notes daily for the duration of treatment until resolved and include in your discharge summary.    References:  American Heart Association editorial staff. (2017, May). Ejection Fraction Heart Failure Measurement. American Heart Association. https://www.heart.org/en/health-topics/heart-failure/diagnosing-heart-failure/ejection-fraction-heart-failure-measurement#:~:text=Ejection%20fraction%20(EF)%20is%20a,pushed%20out%20with%20each%20heartbeat  ANTHONY Sandoval (2020, December 15). Heart failure with preserved ejection fraction: Clinical manifestations and diagnosis. [employer]. https://www.[employer].CQuotient/contents/heart-failure-with-preserved-ejection-fraction-clinical-manifestations-and-diagnosis.  ICD-10-CM/PCS Coding Clinic Third Quarter ICD-10, Effective with discharges: September 8, 2020 [facility] § Heart failure with mid-range or mildly reduced ejection fraction (2020).  ICD-10-CM/PCS Coding Clinic Third Quarter ICD-10, Effective with discharges: September 8, 2020 [facility] § Heart failure with recovered ejection fraction (2020).  Form No. 41031

## 2024-01-09 NOTE — PHYSICIAN QUERY
PT Name: Michela Franco  MR #: [id number]     DOCUMENTATION CLARIFICATION     CDS/: Lori FlanneryRN               Contact information: [email or web address]  This form is a permanent document in the medical record.    Query Date: January 9, 2024    By submitting this query, we are merely seeking further clarification of documentation.  Please utilize your independent clinical judgment when addressing the question(s) below.    The Medical Record contains the following:   Indicator Supporting Clinical Findings Location in Medical Record    Kidney (Renal) Insufficiency     x Kidney (Renal) Failure/Injury Active Hospital Problems :   Diagnosis  JEFFY (acute kidney injury)    Final Active Diagnoses:   Diagnosis  JEFFY (acute kidney injury)   CCS Care Update 1/3        DCS 1/8    Nephrotoxic Agents     x BUN/Creatinine           GFR  01/01/24 11:21 01/01/24 21:58 01/02/24 05:05 01/03/24 03:41 01/03/24 15:38 01/03/24 23:31   BUN 20 23 24 (H) 29 (H)  30 (H) 31 (H) 31 (H)   Creatinine 0.9 1.1 1.1 1.2  1.2 1.3 1.2   eGFR >60.0 52.1 ! 52.1 ! 46.9 !  46.9 ! 42.6 ! 46.9 !      01/04/24 03:45 01/04/24 12:30 01/05/24 03:24 01/06/24 06:05 01/07/24 03:40 01/08/24 02:41   BUN 32 (H) 32 (H) 35 (H) 37 (H) 33 (H) 28 (H)   Creatinine 1.2 1.2 1.3 1.2 1.0 1.0   eGFR 46.9 ! 46.9 ! 42.6 ! 46.9 ! 58.4 ! 58.4 !    Labs 1/1-1/8                                        Urine: Casts         Eosinophils      Urine Output      Dehydration      Nausea/Vomiting      Dialysis/CRRT      Treatment     x Other The following diagnosis as relevant to RAMI were present on admission:             Chronic Kidney Disease - creatinine 1.1 on admission GFR 52, CKD 3. Cards H&P 1/3           [facility] approved diagnostic criteria for acute kidney injury is based on KDIGO criteria:    An increase in serum creatinine > 0.3mg/dl within 48 hours  OR  Increase in serum creatinine to > 1.5x baseline, which is known or presumed to have occurred within the prior 7  days  OR  Urine volume <0.5 ml/kg/hr for 6 hours       The clinical guidelines noted above are only a system guideline. It does not replace the providers clinical judgment.       Provider, please clarify the renal diagnosis associated with above clinical findings.      [    ] JEFFY on Chronic Kidney Disease (CKD) stage 3a - Mildly to moderately decreased eGFR 45-59    Please specify clinical support (signs, symptoms, and treatment) for  the confirmed diagnosis: ____________________     [    ] JEFFY on Chronic Kidney Disease (CKD) stage 3b - Moderately to severely decreased eGFR 30-44    Please specify clinical support (signs, symptoms, and treatment) for  the confirmed diagnosis: ____________________     [   x ] JEFFY Ruled Out, Chronic Kidney Disease (CKD) stage 3a Ruled In   - Mildly to moderately decreased eGFR 45-59     [    ] JEFFY Ruled Out ,Chronic Kidney Disease (CKD) stage 3b Ruled In   - Moderately to severely decreased eGFR 30-44     [    ] Other (please specify): _______________________________           Please document in your progress notes daily for the duration of treatment until resolved and include in your discharge summary.    References:   KDIGO Clinical Practice Guideline for Acute Kidney Injury. (2012, March). Retrieved October 21, 2020, from https://kdigo.org/wp-content/uploads/2016/10/NYFJW-0634-BLI-Guideline-English.pdf    ANNA Jaeger MD, ELOISA Tee MD, & MAYI Gold MD. (1960). Renal medullary necrosis [Abstract]. The American Journal of Medicine, 29(1), 132-156. Doi:https://www.sciencedirect.com/science/article/abs/pii/1337480186131922    MAYI Lake MD, & FABIO Watkins MD, MS. (2020, June 18). Definition and staging of chronic kidney disease in adults ([id number] ELOISA Sousa MD, ScD & [id number] YUE Walter MD, MSc, Eds.). Retrieved October 21, 2020, from  https://www.[employer]/contents/definition-and-staging-of-chronic-kidney-disease-in-adults?search=ckd%20staging&source=search_result&selectedTitle=1~150&usage_type=default&display_rank=1     ASIM Loza MD, FACP. (2015, Michelle 15). Acute kidney injury revisited. Retrieved October 21, 2020, from https://acphospitalist.org/archives/2015/06/coding-acute-kidney-injury.htm    YANE Ma MD. (2019, July). Renal Cortical Necrosis. Retrieved October 21, 2020, from https://www.[employer]/professional/genitourinary-disorders/renovascular-disorders/renal-cortical-necrosis    Form No. [id number]

## 2024-01-10 ENCOUNTER — OFFICE VISIT (OUTPATIENT)
Dept: ALLERGY | Facility: CLINIC | Age: 79
End: 2024-01-10
Payer: MEDICARE

## 2024-01-10 VITALS — HEIGHT: 65 IN | WEIGHT: 175.25 LBS | BODY MASS INDEX: 29.2 KG/M2

## 2024-01-10 DIAGNOSIS — J45.909 ASTHMA, UNSPECIFIED ASTHMA SEVERITY, UNSPECIFIED WHETHER COMPLICATED, UNSPECIFIED WHETHER PERSISTENT: Primary | ICD-10-CM

## 2024-01-10 DIAGNOSIS — R06.00 DYSPNEA, UNSPECIFIED TYPE: ICD-10-CM

## 2024-01-10 PROCEDURE — 1157F ADVNC CARE PLAN IN RCRD: CPT | Mod: HCNC,CPTII,GC,S$GLB | Performed by: INTERNAL MEDICINE

## 2024-01-10 PROCEDURE — 99214 OFFICE O/P EST MOD 30 MIN: CPT | Mod: HCNC,GC,S$GLB, | Performed by: INTERNAL MEDICINE

## 2024-01-10 PROCEDURE — 1126F AMNT PAIN NOTED NONE PRSNT: CPT | Mod: HCNC,CPTII,GC,S$GLB | Performed by: INTERNAL MEDICINE

## 2024-01-10 PROCEDURE — 99999 PR PBB SHADOW E&M-EST. PATIENT-LVL III: CPT | Mod: PBBFAC,HCNC,GC, | Performed by: INTERNAL MEDICINE

## 2024-01-10 RX ORDER — PREDNISONE 5 MG/1
5 TABLET ORAL DAILY
Qty: 75 TABLET | Refills: 0 | Status: SHIPPED | OUTPATIENT
Start: 2024-01-10 | End: 2024-03-25

## 2024-01-10 NOTE — PROGRESS NOTES
ALLERGY & IMMUNOLOGY CLINIC - FOLLOW UP     HISTORY OF PRESENT ILLNESS     Patient ID: Michela Franco is a 78 y.o. female    CC: follow up    HPI:     Ms. Michela Franco is a 78 year old female with asthma, allergic rhinitis, and chronic pansinusitis who presents to clinic today for follow up. Last seen in clinic approximately 4 weeks ago. Has been on an extended prednisone course for ~2 months for continued uncontrolled asthma. Recently hospitalized last week for acute HF exacerbation with systolic and diastolic dysfunction. Was diuresed and her HTN medications were changed.    Since her hospitalization, She states her breathing has been much better. She continues to use her duoneb nebulizer BID. Although she has not needed her albuterol inhaler for 2 days. Otherwise currently on dupixent (last dose was Monday), advair 500/50 1 puff BID (has received new symbicort inhalers which she will be switching to due to insurance reasons, Singulair 10mg daily.     Previously attempted to switch her from dupilumab to tezepelumab due to worsening symptoms.. This change was denied by her insurance company. They are requiring a trial of benralizumab first. Prescription sent for benralizumab at last visit. She will be picking this up today.         REVIEW OF SYSTEMS     Review of Systems   Constitutional: Negative.    HENT: Negative.     Eyes: Negative.    Respiratory: Negative.     Cardiovascular: Negative.    Gastrointestinal: Negative.    Musculoskeletal: Negative.    Skin: Negative.    Neurological: Negative.    Psychiatric/Behavioral: Negative.           Balance of review of systems negative except as mentioned above     MEDICAL HISTORY     MedHx: active problems reviewed  SurgHx:   Past Surgical History:   Procedure Laterality Date    CATARACT EXTRACTION W/  INTRAOCULAR LENS IMPLANT Right 2/11/2021    Procedure: EXTRACTION, CATARACT, WITH IOL INSERTION;  Surgeon: John Merino MD;  Location: [facility] OR;  Service:  Ophthalmology;  Laterality: Right;    CATARACT EXTRACTION W/  INTRAOCULAR LENS IMPLANT Left 3/4/2021    Procedure: EXTRACTION, CATARACT, WITH IOL INSERTION;  Surgeon: John Merino MD;  Location: [facility] OR;  Service: Ophthalmology;  Laterality: Left;    COLONOSCOPY N/A 5/18/2018    Procedure: COLONOSCOPY;  Surgeon: Calixto Brian MD;  Location: [facility] ENDO 4TH FLR);  Service: Endoscopy;  Laterality: N/A;    COLONOSCOPY N/A 10/24/2023    Procedure: COLONOSCOPY;  Surgeon: Ward Merino MD;  [address] [facility] [address]  Service: Endoscopy;  Laterality: N/A;  ref by / golytely [facility] [id number]-[id number]-OA  10/17-lvm for precall-MS  10/20-precall complete-MS    HYSTERECTOMY      total    OOPHORECTOMY      ROTATOR CUFF REPAIR Left     SINUS SURGERY         Otherwise no Family History of asthma, allergic rhinitis, atopic dermatitis, drug allergy, food allergy, venom allergy or immune deficiency.     Allergies: see below  Medications: MAR reviewed       PHYSICAL EXAM     Physical Exam  Constitutional:       Appearance: Normal appearance.   Eyes:      Conjunctiva/sclera: Conjunctivae normal.   Cardiovascular:      Rate and Rhythm: Normal rate and regular rhythm.   Pulmonary:      Effort: Pulmonary effort is normal.      Breath sounds: Normal breath sounds.   Lymphadenopathy:      Comments: Trace edema of lower extremities R>L   Skin:     General: Skin is warm and dry.   Neurological:      Mental Status: She is alert.              ALLERGEN TESTING     Immunocaps:   Component      Latest Ref Rng & Units 12/18/2019   Allergen Acremonium (Cephalosporium) IgE      <0.10 kU/L <0.10   Allergen Acremonium (Cephalosporium) Class       CLASS 0   Botrytis Cinerea      <0.10 kU/L <0.10   Botrytis Cinerea Class       CLASS 0   Allergen Candida albicans IgE      <0.10 kU/L 0.68 (H)   Allergen Candida albicans Class       CLASS 1   Chaetomium      <0.10 kU/L <0.10   Chaetomium Glob. Class       CLASS 0   Cladosporium, IgE       <0.10 kU/L <0.10   Cladosporium Class       CLASS 0   Curvularia lunata      <0.10 kU/L <0.10   Curvularia Lunata Class       CLASS 0   Epicoccum purpurascens, IgE      <0.10 kU/L <0.10   Epicoccum pupurascens Class       CLASS 0   Helminthosporium Halodes IgE      <0.10 kU/L <0.10   Helminthosporium Class       CLASS 0   Allergen Trichoderma Viride IgE      <0.10 kU/L <0.10   Allergen Trichoderma Viride Class       CLASS 0   Stemphyllium, IgE      <0.10 kU/L <0.10   Stemphylium Herbarum Class       CLASS 0   Allergen Rhodotorula IgE      <0.35 kU/L <0.35   Rhodotorula , IgE Class       0   Rhizopus Nigrican, IgE      <0.10 kU/L 0.31 (H)   Rhizopus Nigricans Class       CLASS 0/1   Phoma betae      <0.10 kU/L <0.10   Phoma Betae Class       CLASS 0   Penicillium, IgE      <0.10 kU/L 0.17 (H)   Penicillium Class       CLASS 0/1   Mucor racemosus, IgE      <0.10 kU/L 0.34 (H)   Mucor racemosus Class       CLASS 0/1   RAST Allergen for Trichophyton rubrum      kU/L <0.35      Component      Latest Ref Rng & Units 12/18/2019 12/18/2019 12/18/2019           3:07 PM  3:07 PM  3:07 PM   D. farinae      <0.10 kU/L     1.94 (H)   D. farinae Class           CLASS 2   Mite Dust Pteronyssinus IgE      <0.10 kU/L     0.19 (H)   D. pteronyssinus Class           CLASS 0/1   BERMUDA GRASS      <0.10 kU/L     0.71 (H)   Bermuda Grass Class           CLASS 2   Joshua Grass      <0.10 kU/L     0.72 (H)   Joshua Grass Class           CLASS 2   Treasure IgE      <0.10 kU/L     0.17 (H)   Treasure Class           CLASS 0/1   Plantain      <0.10 kU/L     0.63 (H)   English Plantain Class           CLASS 1   White Oak(Quercus alba) IgE      <0.10 kU/L     0.74 (H)   [place], Class           CLASS 2   Pecan Baker Tree      <0.10 kU/L     0.47 (H)   Pecan, Class           CLASS 1   Marshelder IgE      <0.10 kU/L     0.59 (H)   Marshelder Class           CLASS 1   Ragweed, Western IgE      <0.10 kU/L     0.54 (H)   Ragweed, Western, Class            CLASS 1   Alternaria alternata      <0.10 kU/L     <0.10   Altern. alternata Class           CLASS 0   Aspergillus Fumigatus IgE      <0.10 kU/L     0.22 (H)   A. fumigatus Class           CLASS 0/1   Cat Dander      <0.10 kU/L     <0.10   Cat Epithelium Class           CLASS 0   Cockroach, IgE      <0.10 kU/L CLASS 0/1 0.28 (H)     Dog Dander, IgE      <0.10 kU/L     0.51 (H)   Dog Dander Class           CLASS 1      Component      Latest Ref Rng & Units 12/18/2019 12/29/2017 10/30/2017 4/8/2014   Aspergillus Fumigatus IgE      <0.10 kU/L 0.22 (H)   <0.35 <0.35   A. fumigatus Class       CLASS 0/1   CLASS 0 CLASS 0   Aspergillus Antigen      <0.5 index   <0.500 0.676 (A)        Component      Latest Ref Rng & Units 2/20/2014   Aspergillus Fumigatus IgE      <0.10 kU/L 0.36 (H)   A. fumigatus Class       CLASS I   Aspergillus Antigen      <0.5 index           PULMONARY FUNCTION     PFTs:     Spirometry 8/23:   Spirometry shows mild obstruction. Lung volume determination is normal. Spirometry remains unimproved following bronchodilator. DLCO is moderately decreased. Notes: The failure to demonstrate improvement in spirometry does not preclude a clinical response to a trial of bronchodilators. DLCO interpretation based on the adjusted DLCO value due to a low hemoglobin. Discrepancy in TLC and VA makes DLCO interpretation potentially unreliable.      11/26/2021:              Pre:                   Post:  FEV1: 1.49 (82.8%)--> 1.56 (+4.1%)  FVC:   2.22  (95%)----> 2.30 (+3.6%)  Ratio:  67.37%      ----> 67.77%        5/28/2018  FVC: 2.30 (78%)  FEV1: 1.62 (70%)  Ratio: 70  No reversibility post bronchodilator.     Also performed 12/2019 in pulmonary clinic       ASSESSMENT & PLAN     Michela Franco is a 78 y.o. female with        Dyspnea  -recently hospitalized and found to be in heart failure exacerbation. EF in July ~50%, however echo on admission showing EF of 20-25%. Suspect this was likely contributing to her  dyspnea over the past few months especially in the setting of her severe asthma. Will continue to     Severe persistent asthma  -Most recent PFT's showing mild obstruction but no reversibility  -She has been on dupilumab 300 mg subq every 14 days. While she initially improved on dupixent she seems that she is no longer receiving as much benefit. Attempted to switch to tezepelumab which was denied by her insurance company. Requiring trial of Fasenra first. Prescription for Fasenra 30mg subcutaneous q28 days then after 3 months q 8 weeks sent to [facility]. She will be picking up her Fasenra today and will take her first dose today. (For future reference, patient uses [facility] [address]. For refills, call [phone] ext [phone]. All other medications to be filled by [facility] pharmacy)   -She has been on an extended prednisone course as she continues to be symptomatic. Currently on 15mg daily. Will reduce to 10mg daily for 2 weeks, then 5mg for 2 weeks then have her return to clinic at that time to discuss further taper vs discontinuation if able to.  -Per insurance, will switch her advair 500-50 mcg/dose 1 puff BID to symbicort 160/4.5 2 puffs BID.  -Continue albuterol as needed for SOB  -Continue duonebs as needed     Allergic rhinitis: Continues to have congestion during current grass pollen season.  -Continue fluticasone to 2 SEN BID  -Continue azelastine up to SEN BID  -Continue nasal irrigation BID  -AIT previously discussed. In this patient who is 78 will hold off on AIT at this time due to risk of inability to compensate in setting of possible anaphylaxis to AIT  -continue montelukast 10mg daily    Next visit:  -will discuss further taper vs discontinuation of prednisone if able  -Assess response to benralizumab  -Assess response to symbicort     Patient discussed with Dr. Geller.  RTC 1 month     Joe King DO  Allergy/Immunology Fellow

## 2024-01-11 ENCOUNTER — TELEPHONE (OUTPATIENT)
Dept: ALLERGY | Facility: CLINIC | Age: 79
End: 2024-01-11
Payer: MEDICARE

## 2024-01-11 ENCOUNTER — OFFICE VISIT (OUTPATIENT)
Dept: PRIMARY CARE CLINIC | Facility: CLINIC | Age: 79
End: 2024-01-11
Payer: MEDICARE

## 2024-01-11 VITALS
OXYGEN SATURATION: 97 % | BODY MASS INDEX: 29.27 KG/M2 | HEIGHT: 65 IN | SYSTOLIC BLOOD PRESSURE: 120 MMHG | TEMPERATURE: 98 F | DIASTOLIC BLOOD PRESSURE: 60 MMHG | WEIGHT: 175.69 LBS | HEART RATE: 92 BPM

## 2024-01-11 DIAGNOSIS — J18.9 COMMUNITY ACQUIRED PNEUMONIA, UNSPECIFIED LATERALITY: ICD-10-CM

## 2024-01-11 DIAGNOSIS — I70.0 AORTIC ATHEROSCLEROSIS: ICD-10-CM

## 2024-01-11 DIAGNOSIS — I50.21 ACUTE HFREF (HEART FAILURE WITH REDUCED EJECTION FRACTION): Primary | ICD-10-CM

## 2024-01-11 DIAGNOSIS — D69.6 THROMBOCYTOPENIA, UNSPECIFIED: ICD-10-CM

## 2024-01-11 DIAGNOSIS — J45.51 SEVERE PERSISTENT ASTHMA WITH ACUTE EXACERBATION: ICD-10-CM

## 2024-01-11 PROBLEM — J44.9 CHRONIC OBSTRUCTIVE PULMONARY DISEASE, UNSPECIFIED COPD TYPE: Status: RESOLVED | Noted: 2023-08-31 | Resolved: 2024-01-11

## 2024-01-11 PROBLEM — D70.8 OTHER NEUTROPENIA: Status: RESOLVED | Noted: 2023-08-31 | Resolved: 2024-01-11

## 2024-01-11 PROCEDURE — 99214 OFFICE O/P EST MOD 30 MIN: CPT | Mod: S$GLB,,, | Performed by: STUDENT IN AN ORGANIZED HEALTH CARE EDUCATION/TRAINING PROGRAM

## 2024-01-11 PROCEDURE — 3078F DIAST BP <80 MM HG: CPT | Mod: CPTII,S$GLB,, | Performed by: STUDENT IN AN ORGANIZED HEALTH CARE EDUCATION/TRAINING PROGRAM

## 2024-01-11 PROCEDURE — 1159F MED LIST DOCD IN RCRD: CPT | Mod: CPTII,S$GLB,, | Performed by: STUDENT IN AN ORGANIZED HEALTH CARE EDUCATION/TRAINING PROGRAM

## 2024-01-11 PROCEDURE — 1157F ADVNC CARE PLAN IN RCRD: CPT | Mod: CPTII,S$GLB,, | Performed by: STUDENT IN AN ORGANIZED HEALTH CARE EDUCATION/TRAINING PROGRAM

## 2024-01-11 PROCEDURE — 1126F AMNT PAIN NOTED NONE PRSNT: CPT | Mod: CPTII,S$GLB,, | Performed by: STUDENT IN AN ORGANIZED HEALTH CARE EDUCATION/TRAINING PROGRAM

## 2024-01-11 PROCEDURE — 1101F PT FALLS ASSESS-DOCD LE1/YR: CPT | Mod: CPTII,S$GLB,, | Performed by: STUDENT IN AN ORGANIZED HEALTH CARE EDUCATION/TRAINING PROGRAM

## 2024-01-11 PROCEDURE — 3074F SYST BP LT 130 MM HG: CPT | Mod: CPTII,S$GLB,, | Performed by: STUDENT IN AN ORGANIZED HEALTH CARE EDUCATION/TRAINING PROGRAM

## 2024-01-11 PROCEDURE — 1111F DSCHRG MED/CURRENT MED MERGE: CPT | Mod: CPTII,S$GLB,, | Performed by: STUDENT IN AN ORGANIZED HEALTH CARE EDUCATION/TRAINING PROGRAM

## 2024-01-11 PROCEDURE — 99999 PR PBB SHADOW E&M-EST. PATIENT-LVL V: CPT | Mod: PBBFAC,HCNC,, | Performed by: STUDENT IN AN ORGANIZED HEALTH CARE EDUCATION/TRAINING PROGRAM

## 2024-01-11 PROCEDURE — 3288F FALL RISK ASSESSMENT DOCD: CPT | Mod: CPTII,S$GLB,, | Performed by: STUDENT IN AN ORGANIZED HEALTH CARE EDUCATION/TRAINING PROGRAM

## 2024-01-11 NOTE — TELEPHONE ENCOUNTER
----- Message from Sabino Andino MA sent at 12/29/2023  4:26 PM CST -----  Regarding: FW: Patient assistance program    ----- Message -----  From: Taniya Lewis MA  Sent: 12/29/2023   4:23 PM CST  To: Sabino Andino MA  Subject: RE: Patient assistance program                   I'm not seeing anything here   ----- Message -----  From: Sabino Andino MA  Sent: 12/29/2023   4:01 PM CST  To: Taniya Lewis MA  Subject: FW: Patient assistance program                   Please assist  ----- Message -----  From: Bianca Jerez  Sent: 12/27/2023  12:30 PM CST  To: Gokul Geller MD; Duyen Hodgson Staff  Subject: Patient assistance program                       Hi Dr. Geller and Team,    I am assisting Eribertolawson Joan with applying for the patient assistance program for medication Faserna. I have faxed your office the form at [phone]. Please review, sign, date, and return to my attention at your earliest convenience. Once we have received this form and patient's portion, we will submit to the  for review.     Thank you,  Bianca Jerez, CPT [facility]  Ph. [phone]  Fax [phone]

## 2024-01-11 NOTE — TELEPHONE ENCOUNTER
Spoke with Bianca at [facility] advised me that the forms were received and I can disregard this message.      Hi Dr. Geller and Team,     I am assisting Michela Franco with applying for the patient assistance program for medication Faserna. I have faxed your office the form at [phone]. Please review, sign, date, and return to my attention at your earliest convenience. Once we have received this form and patient's portion, we will submit to the  for review.     Thank you,   Bianca Jerez, CPT [facility]

## 2024-01-11 NOTE — PROGRESS NOTES
Primary Care  Transition of Care - In Person  1/11/2024      Patient is a 78 y.o.     Michela Franco has   Past Medical History:   Diagnosis Date    Allergy     Asthma     Chronic diastolic heart failure 4/5/2018    Glaucoma suspect     Hyperlipidemia     Hypertension     Neuromuscular disorder     NSTEMI (non-ST elevated myocardial infarction) 1/1/2024    JOHN on CPAP     Osteoporosis     Other neutropenia 8/31/2023    Recurrent sinusitis 3/25/2014    Recurrent upper respiratory infection (URI)     Urticaria        Medications  Outpatient Medications Marked as Taking for the 1/11/24 encounter (Office Visit) with Mayela Broussard MD   Medication Sig Dispense Refill    ADVAIR DISKUS 500-50 mcg/dose DsDv diskus inhaler INHALE 1 PUFF TWICE DAILY 1 each 11    albuterol (VENTOLIN HFA) 90 mcg/actuation inhaler Inhale 2 puffs into the lungs every 6 (six) hours as needed for Wheezing. Rescue 8 g 11    albuterol-ipratropium (DUO-NEB) 2.5 mg-0.5 mg/3 mL nebulizer solution Take 3 mLs by nebulization every 6 (six) hours as needed for Wheezing. 75 mL 0    amoxicillin-clavulanate 875-125mg (AUGMENTIN) 875-125 mg per tablet Take 1 tablet by mouth every 12 (twelve) hours. for 5 days 10 tablet 0    azelastine (ASTELIN) 137 mcg (0.1 %) nasal spray 2 sprays (274 mcg total) by Nasal route 2 (two) times daily. 30 mL 11    budesonide-formoterol 160-4.5 mcg (SYMBICORT) 160-4.5 mcg/actuation HFAA Inhale 2 puffs into the lungs every 12 (twelve) hours. Controller 10.2 g 5    carboxymethylcellulose sodium (REFRESH TEARS) 0.5 % Drop Apply 1 drop to eye 3 (three) times daily as needed (dry eyes). 30 mL 11    cholecalciferol, vitamin D3, 10,000 unit Cap Take 360 mg by mouth once daily. Take 6 capsules (6,000 units total) by mouth once daily      COD LIVER OIL ORAL Take 1 tablet by mouth once daily.      dupilumab 300 mg/2 mL PnIj Inject 300 mg into the skin every 14 (fourteen) days. 4 mL 12    ferrous sulfate 325 (65 FE) MG EC tablet Take 1  tablet (325 mg total) by mouth 3 (three) times daily with meals. 270 tablet 3    fluticasone propionate (FLONASE) 50 mcg/actuation nasal spray 2 sprays (100 mcg total) by Each Nostril route 2 (two) times a day. 16 g 11    furosemide (LASIX) 40 MG tablet Take 1 tablet (40 mg total) by mouth 2 (two) times daily. 60 tablet 11    guaiFENesin (MUCINEX) 600 mg 12 hr tablet Take 1 tablet (600 mg total) by mouth 2 (two) times daily. 60 tablet 5    milk thistle 150 mg Cap Take 1 capsule by mouth once daily. 90 each 3    multivitamin (THERAGRAN) per tablet Take 1 tablet by mouth once daily.      predniSONE (DELTASONE) 5 MG tablet Take 1 tablet (5 mg total) by mouth once daily. for 75 doses 75 tablet 0    sacubitriL-valsartan (ENTRESTO)  mg per tablet Take 1 tablet by mouth 2 (two) times daily. 60 tablet 3    SENNA 8.6 mg tablet TAKE 2 TAB(S) BY MOUTH NIGHTLY AS NEEDED FOR CONSTIPATION      spironolactone (ALDACTONE) 25 MG tablet Take 1 tablet (25 mg total) by mouth once daily. 30 tablet 11    tretinoin (RETIN-A) 0.05 % cream Apply topically every evening. Start with every other night and move up to nightly after 2 weeks if not too dry. 20 g 2       Summary of Hospitalization  Date of Admission - 1/1/24  Date of D/C- 1/8/24   D/C diagnoses - ADHFrEF     I have reviewed the discharge summary    A brief summary of their hospitalization is as follows   Patient presented to the ED on 01/01/2024 with chief complaint worsening shortness of breath, orthopnea, and lower extremity edema.  Labs significant for BNP elevation to 4700 and troponin 0.489.  Chest x-ray concerning for pulmonary edema and TTE showed reduced ejection fraction of 20-25%.  Previously 50% in July 2023.  Patient was diuresed and treated for CAP due to consolidation on chest x-ray that did not improve with diuresis.      Since discharge patient reports her breathing has improved significantly       Physical Exam   Vitals:    01/11/24 0953   BP: 120/60   BP  "Location: Right arm   Pulse: 92   Temp: 97.7 °F (36.5 °C)   SpO2: 97%   Weight: 79.7 kg (175 lb 11.3 oz)   Height: 5' 5" (1.651 m)         Physical Exam  Vitals reviewed.   Constitutional:       General: She is not in acute distress.  Cardiovascular:      Rate and Rhythm: Normal rate and regular rhythm.      Pulses: Normal pulses.      Heart sounds: Normal heart sounds.   Pulmonary:      Effort: Pulmonary effort is normal.      Breath sounds: Wheezing present. No rales.   Musculoskeletal:      Right lower leg: No edema.      Left lower leg: No edema.           Results  Lab Results   Component Value Date    CREATININE 1.0 01/08/2024     Lab Results   Component Value Date     01/08/2024    K 3.5 01/08/2024     01/08/2024    CO2 24 01/08/2024    ANIONGAP 11 01/08/2024     Lab Results   Component Value Date    WBC 4.13 01/08/2024    RBC 5.11 01/08/2024    HGB 13.3 01/08/2024    HCT 39.6 01/08/2024    MCV 78 (L) 01/08/2024    MCH 26.0 (L) 01/08/2024    MCHC 33.6 01/08/2024    RDW 19.5 (H) 01/08/2024     (L) 01/08/2024     Lab Results   Component Value Date    GLU 83 01/08/2024     Lab Results   Component Value Date    HGBA1C 5.3 10/18/2022    HGBA1C 6.0 11/01/2012    HGBA1C 6.2 10/26/2011       Imaging reviewed     Assessment and Plan     1. Acute HFrEF (heart failure with reduced ejection fraction)  Comments:  Unabe to tolerate beta blocker   Current regimen lasix 40 mg BID, Entresto  mg BID, and spironolactone 25 mg daily  Follow up scheduled with Cardiology    2. Community acquired pneumonia, unspecified laterality  Comments:  Patient to complete course of Augmentin on 1/18/23    3. Severe persistent asthma with acute exacerbation  Comments:  Trial of Fasenra  Patient on extended prednisone course   Continue Symbicort, albuterol, and duonebs    4. Thrombocytopenia, unspecified  Comments:  Monitor    5. Aortic atherosclerosis  Overview:  Pt has normal lipid profile and is not interested in " antilipid meds at this time will continue to monitor                     Follow Up [facility] (with who? when?): Follow up in about 3 months (around 4/11/2024).        Extended Emergency Contact Information  Primary Emergency Contact: Charley Ahmadi  Address: [address] [place] [address] of [place]  Mobile Phone: [phone]  Relation: Sister      Mayela Broussard MD   Internal Medicine  1/11/2024 - 10:40 AM    I spent a total of 30 minutes on the day of the visit.This includes face to face time and non-face to face time preparing to see the patient (eg, review of tests), obtaining and/or reviewing separately obtained history, documenting clinical information in the electronic or other health record, independently interpreting results and communicating results to the patient/family/caregiver, or care coordinator.    While patients have the right to access their medical record, it is essential to recognize that progress notes primarily serve as a means of communication among healthcare professionals.

## 2024-01-12 ENCOUNTER — OFFICE VISIT (OUTPATIENT)
Dept: CARDIOLOGY | Facility: CLINIC | Age: 79
End: 2024-01-12
Payer: MEDICARE

## 2024-01-12 ENCOUNTER — LAB VISIT (OUTPATIENT)
Dept: LAB | Facility: HOSPITAL | Age: 79
End: 2024-01-12
Payer: MEDICARE

## 2024-01-12 ENCOUNTER — DOCUMENTATION ONLY (OUTPATIENT)
Dept: CARDIOLOGY | Facility: CLINIC | Age: 79
End: 2024-01-12
Payer: MEDICARE

## 2024-01-12 VITALS
WEIGHT: 174.19 LBS | DIASTOLIC BLOOD PRESSURE: 71 MMHG | OXYGEN SATURATION: 96 % | SYSTOLIC BLOOD PRESSURE: 137 MMHG | HEIGHT: 65 IN | BODY MASS INDEX: 29.02 KG/M2 | HEART RATE: 94 BPM

## 2024-01-12 DIAGNOSIS — I50.9 NEW ONSET OF CONGESTIVE HEART FAILURE: ICD-10-CM

## 2024-01-12 DIAGNOSIS — J45.50 SEVERE PERSISTENT ASTHMA WITHOUT COMPLICATION: ICD-10-CM

## 2024-01-12 DIAGNOSIS — R06.02 SOB (SHORTNESS OF BREATH): ICD-10-CM

## 2024-01-12 DIAGNOSIS — I10 PRIMARY HYPERTENSION: ICD-10-CM

## 2024-01-12 DIAGNOSIS — I50.20 HFREF (HEART FAILURE WITH REDUCED EJECTION FRACTION): Primary | ICD-10-CM

## 2024-01-12 DIAGNOSIS — I70.0 AORTIC ATHEROSCLEROSIS: ICD-10-CM

## 2024-01-12 LAB
ALBUMIN SERPL BCP-MCNC: 3 G/DL (ref 3.5–5.2)
ALP SERPL-CCNC: 89 U/L (ref 55–135)
ALT SERPL W/O P-5'-P-CCNC: 49 U/L (ref 10–44)
ANION GAP SERPL CALC-SCNC: 9 MMOL/L (ref 8–16)
AST SERPL-CCNC: 41 U/L (ref 10–40)
BASOPHILS # BLD AUTO: 0.04 K/UL (ref 0–0.2)
BASOPHILS NFR BLD: 1 % (ref 0–1.9)
BILIRUB SERPL-MCNC: 0.7 MG/DL (ref 0.1–1)
BNP SERPL-MCNC: 1856 PG/ML (ref 0–99)
BUN SERPL-MCNC: 22 MG/DL (ref 8–23)
CALCIUM SERPL-MCNC: 8.8 MG/DL (ref 8.7–10.5)
CHLORIDE SERPL-SCNC: 103 MMOL/L (ref 95–110)
CO2 SERPL-SCNC: 26 MMOL/L (ref 23–29)
CREAT SERPL-MCNC: 1 MG/DL (ref 0.5–1.4)
DIFFERENTIAL METHOD BLD: ABNORMAL
EOSINOPHIL # BLD AUTO: 0 K/UL (ref 0–0.5)
EOSINOPHIL NFR BLD: 1 % (ref 0–8)
ERYTHROCYTE [DISTWIDTH] IN BLOOD BY AUTOMATED COUNT: 19.9 % (ref 11.5–14.5)
EST. GFR  (NO RACE VARIABLE): 58.4 ML/MIN/1.73 M^2
GLUCOSE SERPL-MCNC: 117 MG/DL (ref 70–110)
HCT VFR BLD AUTO: 42.5 % (ref 37–48.5)
HGB BLD-MCNC: 13.6 G/DL (ref 12–16)
IMM GRANULOCYTES # BLD AUTO: 0.02 K/UL (ref 0–0.04)
IMM GRANULOCYTES NFR BLD AUTO: 0.5 % (ref 0–0.5)
LYMPHOCYTES # BLD AUTO: 1.2 K/UL (ref 1–4.8)
LYMPHOCYTES NFR BLD: 31 % (ref 18–48)
MAGNESIUM SERPL-MCNC: 1.8 MG/DL (ref 1.6–2.6)
MCH RBC QN AUTO: 26.5 PG (ref 27–31)
MCHC RBC AUTO-ENTMCNC: 32 G/DL (ref 32–36)
MCV RBC AUTO: 83 FL (ref 82–98)
MONOCYTES # BLD AUTO: 0.4 K/UL (ref 0.3–1)
MONOCYTES NFR BLD: 10.1 % (ref 4–15)
NEUTROPHILS # BLD AUTO: 2.2 K/UL (ref 1.8–7.7)
NEUTROPHILS NFR BLD: 56.4 % (ref 38–73)
NRBC BLD-RTO: 0 /100 WBC
PHOSPHATE SERPL-MCNC: 2.5 MG/DL (ref 2.7–4.5)
PLATELET # BLD AUTO: 154 K/UL (ref 150–450)
PMV BLD AUTO: ABNORMAL FL (ref 9.2–12.9)
POTASSIUM SERPL-SCNC: 3.7 MMOL/L (ref 3.5–5.1)
PROT SERPL-MCNC: 6.5 G/DL (ref 6–8.4)
RBC # BLD AUTO: 5.13 M/UL (ref 4–5.4)
SODIUM SERPL-SCNC: 138 MMOL/L (ref 136–145)
WBC # BLD AUTO: 3.97 K/UL (ref 3.9–12.7)

## 2024-01-12 PROCEDURE — 1111F DSCHRG MED/CURRENT MED MERGE: CPT | Mod: CPTII,S$GLB,,

## 2024-01-12 PROCEDURE — 83735 ASSAY OF MAGNESIUM: CPT

## 2024-01-12 PROCEDURE — 3288F FALL RISK ASSESSMENT DOCD: CPT | Mod: CPTII,S$GLB,,

## 2024-01-12 PROCEDURE — 36415 COLL VENOUS BLD VENIPUNCTURE: CPT

## 2024-01-12 PROCEDURE — 99999 PR PBB SHADOW E&M-EST. PATIENT-LVL V: CPT | Mod: PBBFAC,HCNC,,

## 2024-01-12 PROCEDURE — 99496 TRANSJ CARE MGMT HIGH F2F 7D: CPT | Mod: S$GLB,,,

## 2024-01-12 PROCEDURE — 3078F DIAST BP <80 MM HG: CPT | Mod: CPTII,S$GLB,,

## 2024-01-12 PROCEDURE — 83880 ASSAY OF NATRIURETIC PEPTIDE: CPT

## 2024-01-12 PROCEDURE — 85025 COMPLETE CBC W/AUTO DIFF WBC: CPT

## 2024-01-12 PROCEDURE — 1159F MED LIST DOCD IN RCRD: CPT | Mod: CPTII,S$GLB,,

## 2024-01-12 PROCEDURE — 3075F SYST BP GE 130 - 139MM HG: CPT | Mod: CPTII,S$GLB,,

## 2024-01-12 PROCEDURE — 1101F PT FALLS ASSESS-DOCD LE1/YR: CPT | Mod: CPTII,S$GLB,,

## 2024-01-12 PROCEDURE — 84100 ASSAY OF PHOSPHORUS: CPT

## 2024-01-12 PROCEDURE — 80053 COMPREHEN METABOLIC PANEL: CPT

## 2024-01-12 PROCEDURE — 1157F ADVNC CARE PLAN IN RCRD: CPT | Mod: CPTII,S$GLB,,

## 2024-01-12 PROCEDURE — 1160F RVW MEDS BY RX/DR IN RCRD: CPT | Mod: CPTII,S$GLB,,

## 2024-01-12 PROCEDURE — 1126F AMNT PAIN NOTED NONE PRSNT: CPT | Mod: CPTII,S$GLB,,

## 2024-01-12 NOTE — PROGRESS NOTES
"PT here for her first [facility] visit. PT states she has her Booklet (HCG) and has been weighing and taking her BP and started watching the Salt and Fluid. Reviewed the HFTCC # and that we are not a part of the switchboard, stressed the after hours #. Teach PT that we will call weekly but she can call us any time M-F between 8 AM and 5 PM. Stressed importance of Fluid Balance and that we don't want her fluid overloaded but we also don't want her "too dry", explain that if she feels bad the first thing you check is your blood glucose if you are a Diabetic and then you check your BP and if it is too low please call us.    This PT has a very good understanding of the contents of the HCG, suggested that she call us with any questions and can also leave questions on the VM as overnight it is easy to forget your question.    "

## 2024-01-12 NOTE — PROGRESS NOTES
HF T[facility] Provider Note (Initial Clinic) Consult Note    Date of original referral: 1/2/24  Age: 78 y.o.  Gender: female  Ethnicity: Black or   Number of admissions for CHF within the preceding year: 1   Duration of CHF: diagnosed recent admission  Type of Congestive Heart Failure: Combined   Etiology: new HFrEF  Enrolled in Infusion suite: no    Diagnostic Labs:   EKG - 01/02/2024  CXR - 01/06/2024  ECHO - 01/02/2024  Stress test -   Stress echo -   Pharmacologic stress -   Cardiac catheterization -    Cardiac MRI -     Lab Results   Component Value Date     01/08/2024     01/07/2024    K 3.5 01/08/2024    K 3.7 01/07/2024     01/08/2024     01/07/2024    CO2 24 01/08/2024    CO2 25 01/07/2024    GLU 83 01/08/2024    GLU 86 01/07/2024    BUN 28 (H) 01/08/2024    BUN 33 (H) 01/07/2024    CREATININE 1.0 01/08/2024    CREATININE 1.0 01/07/2024    CALCIUM 8.6 (L) 01/08/2024    CALCIUM 8.3 (L) 01/07/2024    PROT 5.8 (L) 01/08/2024    PROT 5.7 (L) 01/07/2024    ALBUMIN 2.6 (L) 01/08/2024    ALBUMIN 2.6 (L) 01/07/2024    BILITOT 0.6 01/08/2024    BILITOT 0.7 01/07/2024    ALKPHOS 67 01/08/2024    ALKPHOS 73 01/07/2024    AST 43 (H) 01/08/2024    AST 43 (H) 01/07/2024    ALT 57 (H) 01/08/2024    ALT 66 (H) 01/07/2024    ANIONGAP 11 01/08/2024    ANIONGAP 11 01/07/2024    ESTGFRAFRICA >60.0 08/26/2021    ESTGFRAFRICA >60.0 07/11/2020    EGFRNONAA >60.0 08/26/2021    EGFRNONAA >60.0 07/11/2020       Lab Results   Component Value Date    WBC 4.13 01/08/2024    WBC 4.70 01/07/2024    RBC 5.11 01/08/2024    RBC 5.04 01/07/2024    HGB 13.3 01/08/2024    HGB 13.4 01/07/2024    HCT 39.6 01/08/2024    HCT 40.5 01/07/2024    MCV 78 (L) 01/08/2024    MCV 80 (L) 01/07/2024    MCH 26.0 (L) 01/08/2024    MCH 26.6 (L) 01/07/2024    MCHC 33.6 01/08/2024    MCHC 33.1 01/07/2024    RDW 19.5 (H) 01/08/2024    RDW 19.9 (H) 01/07/2024     (L) 01/08/2024     (L) 01/07/2024    MPV SEE COMMENT  01/08/2024    MPV SEE COMMENT 01/07/2024    IMMGR 0.2 01/08/2024    IMMGR 0.2 01/07/2024    IGABS 0.01 01/08/2024    IGABS 0.01 01/07/2024    LYMPH 1.3 01/08/2024    LYMPH 31.2 01/08/2024    MONO 0.6 01/08/2024    MONO 15.5 (H) 01/08/2024    EOS 0.0 01/08/2024    EOS 0.0 01/07/2024    BASO 0.01 01/08/2024    BASO 0.01 01/07/2024    NRBC 0 01/08/2024    NRBC 0 01/07/2024    GRAN 2.2 01/08/2024    GRAN 52.2 01/08/2024    EOSINOPHIL 0.7 01/08/2024    EOSINOPHIL 0.6 01/07/2024    BASOPHIL 0.2 01/08/2024    BASOPHIL 0.2 01/07/2024    PLTEST Increased (A) 07/20/2023    PLTEST Increased (A) 07/17/2023    ANISO Slight 07/20/2023    ANISO Slight 07/17/2023    HYPO Occasional 07/20/2023       Lab Results   Component Value Date     (H) 01/06/2024    BNP 4,700 (H) 01/01/2024    MG 2.0 01/04/2024    MG 1.8 01/03/2024    PHOS 2.9 01/04/2024    PHOS 2.9 01/03/2024    TROPONINI 0.489 (H) 01/01/2024    TROPONINI 0.461 (H) 01/01/2024    HGBA1C 5.3 10/18/2022    HGBA1C 6.0 11/01/2012    TSH 2.405 04/25/2022    TSH 2.149 08/26/2021    K1FMGDF 6.8 02/25/2011       Lab Results   Component Value Date    IRON 111 02/07/2023    TIBC 346 02/07/2023    FERRITIN 220 02/07/2023    CHOL 199 08/26/2021    TRIG 47 08/26/2021    HDL 86 (H) 08/26/2021    LDLCALC 103.6 08/26/2021    CHOLHDL 43.2 08/26/2021    TOTALCHOLEST 2.3 08/26/2021    NONHDLCHOL 113 08/26/2021    COLORU Straw 04/03/2018    APPEARANCEUA Clear 04/03/2018    PHUR 7.0 04/03/2018    SPECGRAV 1.010 04/03/2018    PROTEINUA Negative 04/03/2018    GLUCUA Negative 04/03/2018    KETONESU Negative 04/03/2018    BILIRUBINUA Negative 04/03/2018    OCCULTUA Negative 04/03/2018    NITRITE Negative 04/03/2018    LEUKOCYTESUR Trace (A) 04/03/2018       No implanted cardiac devices    Current Outpatient Medications on File Prior to Visit   Medication Sig Dispense Refill    ADVAIR DISKUS 500-50 mcg/dose DsDv diskus inhaler INHALE 1 PUFF TWICE DAILY 1 each 11    albuterol (VENTOLIN HFA) 90  mcg/actuation inhaler Inhale 2 puffs into the lungs every 6 (six) hours as needed for Wheezing. Rescue 8 g 11    albuterol-ipratropium (DUO-NEB) 2.5 mg-0.5 mg/3 mL nebulizer solution Take 3 mLs by nebulization every 6 (six) hours as needed for Wheezing. 75 mL 0    amoxicillin-clavulanate 875-125mg (AUGMENTIN) 875-125 mg per tablet Take 1 tablet by mouth every 12 (twelve) hours. for 5 days 10 tablet 0    azelastine (ASTELIN) 137 mcg (0.1 %) nasal spray 2 sprays (274 mcg total) by Nasal route 2 (two) times daily. 30 mL 11    budesonide-formoterol 160-4.5 mcg (SYMBICORT) 160-4.5 mcg/actuation HFAA Inhale 2 puffs into the lungs every 12 (twelve) hours. Controller 10.2 g 5    carboxymethylcellulose sodium (REFRESH TEARS) 0.5 % Drop Apply 1 drop to eye 3 (three) times daily as needed (dry eyes). 30 mL 11    cholecalciferol, vitamin D3, 10,000 unit Cap Take 360 mg by mouth once daily. Take 6 capsules (6,000 units total) by mouth once daily      COD LIVER OIL ORAL Take 1 tablet by mouth once daily.      dupilumab 300 mg/2 mL PnIj Inject 300 mg into the skin every 14 (fourteen) days. 4 mL 12    ferrous sulfate 325 (65 FE) MG EC tablet Take 1 tablet (325 mg total) by mouth 3 (three) times daily with meals. 270 tablet 3    fluticasone propionate (FLONASE) 50 mcg/actuation nasal spray 2 sprays (100 mcg total) by Each Nostril route 2 (two) times a day. 16 g 11    furosemide (LASIX) 40 MG tablet Take 1 tablet (40 mg total) by mouth 2 (two) times daily. 60 tablet 11    guaiFENesin (MUCINEX) 600 mg 12 hr tablet Take 1 tablet (600 mg total) by mouth 2 (two) times daily. 60 tablet 5    milk thistle 150 mg Cap Take 1 capsule by mouth once daily. 90 each 3    multivitamin (THERAGRAN) per tablet Take 1 tablet by mouth once daily.      predniSONE (DELTASONE) 5 MG tablet Take 1 tablet (5 mg total) by mouth once daily. for 75 doses 75 tablet 0    sacubitriL-valsartan (ENTRESTO)  mg per tablet Take 1 tablet by mouth 2 (two) times  daily. 60 tablet 3    SENNA 8.6 mg tablet TAKE 2 TAB(S) BY MOUTH NIGHTLY AS NEEDED FOR CONSTIPATION      spironolactone (ALDACTONE) 25 MG tablet Take 1 tablet (25 mg total) by mouth once daily. 30 tablet 11    tretinoin (RETIN-A) 0.05 % cream Apply topically every evening. Start with every other night and move up to nightly after 2 weeks if not too dry. 20 g 2     No current facility-administered medications on file prior to visit.         HPI:  Patient distance walked not limited by SOB and pace slow. Endorses SHAH only on more significant exertion/faster pace    Patient sleeps on 2 number of pillows with CPAP   Patient wakes up SOB, has to get out of bed, associated cough- denies PND symptoms, endorses continued intermittent nighttime cough. Dry but sometimes productive for white sputum   Palpitations - endorses heart racing sensation on exertion   Dizzy, light-headed, pre-syncope or syncope- denies   Since discharge frequency of performing weights, home weight and weight change- weight downtrending on home scale. 172lbs on hospital discharge. 170lbs today   Other information felt pertinent to HPI: Ms. Michela Franco is a 77 yo with a PMHx of HFpEF (EF 50% 7/2023), severe asthma, HTN [facility] admission for ADHF and new HFrEF. On ED presentation, BNP was elevated 4700 and trop 0.489. CXR was concerning for pulmonary edema. TTE during admission showed new HFrEF (EF 20-25%) with G3DD and WMA. Cardiology consulted and she was admitted to CCU for concern of cardiogenic shock and started on a  gtt and lasix gtt. She was transitioned off of the Lasix gtt to IV Lasix pushes and then to PO Lasix. There was concern for community-acquired pneumonia given consolidation on CXR despite diuresis. She was started on CAP coverage antibiotics. GDMT initiated prior to discharge with exception of BB due to past h/o bronchospasm.      PHYSICAL:   Vitals:    01/12/24 0903   BP: 137/71    Pulse: 94      @[id number](3)@    JVD: no   Heart rhythm: regular  Cardiac murmur: No    S3: no  S4: no  Lungs: minimal crackles in posterior lung bases  Hepatojugular reflux: no  Edema: no      Echo 1/2/24:    Left Ventricle: The left ventricle is mildly dilated. Ventricular mass is normal. Normal wall thickness. Regional wall motion abnormalities present. See diagram for wall motion findings. There is severely reduced systolic function with a visually estimated ejection fraction of 20 - 25%. Ejection fraction by visual approximation is 23%. Grade III diastolic dysfunction.    Right Ventricle: Mild right ventricular enlargement. Wall thickness is normal. Right ventricle wall motion  is normal. Systolic function is borderline low.    Left Atrium: Left atrium is severely dilated.    Right Atrium: Right atrium is mildly dilated.    Aortic Valve: There is mild aortic valve sclerosis.    Mitral Valve: There is mild mitral annular calcification present. There is moderate to  moderate-severe regurgitation.    Tricuspid Valve: There is moderate- severe  to severe (3-4+) regurgitation.    Pulmonary Artery: There is mild pulmonary hypertension. The estimated pulmonary artery systolic pressure is 44 mmHg.    IVC/SVC: Elevated venous pressure at 15 mmHg.    Pericardium: There is a trivial posterior effusion.    ASSESSMENT: HFrEF    PLAN:      Patient Instructions:   Instruct the patient to notify this clinic if HH, a physician or an advanced care provider wants to change medication one of their HF medications   Activity and Diet restrictions:   Recommend 2-3 gram sodium restriction and 1500cc- 2000cc fluid restriction.  Encourage physical activity with graded exercise program.  Requested patient to weigh themselves daily, and to notify us if their weight increases by more than 3 lbs in 1 day or 5 lbs in 3 days.    Assigned dry weight on home scale: 170lbs  Medication changes (include current dose and changed dose): NYHA  Class II symptoms. Well compensated on exam, suspect elevation in BNP 2/2 high dose entresto. Pro-BNP ordered. Continue lasix 40mg BID. Okay to take an additional 40mg lasix prn for worsening fluid symptoms. Start jardiance 10mg daily to further optimize GDMT. Not on BB therapy 2/2 history of asthma and bronchospasm.  Upcoming labs and date anticipated: RTC in 1 week for repeat labs and close follow up.  Other diagnostic tests ordered: Nuclear stress ordered for ischemic eval of new HFrEF. Plan for repeat TTE after 3 months of maximally tolerated GDMT with possible referral for ICD at that time.    Transitional Care Note    Family and/or Caretaker present at visit?  No.  Diagnostic tests reviewed/disposition: I have reviewed all completed as well as pending diagnostic tests at the time of discharge.  Disease/illness education: CHF  Home health/community services discussion/referrals: Patient does not have home health established from hospital visit.  They do not need home health.  If needed, we will set up home health for the patient.   Establishment or re-establishment of referral orders for community resources: No other necessary community resources.   Discussion with other health care providers: No discussion with other health care providers necessary.       Marjan Nobles PA-C

## 2024-01-12 NOTE — PATIENT INSTRUCTIONS
Start jardiance 10mg once daily to further optimize heart regimen.    Continue lasix 40mg twice daily. Okay to take an additional 40mg lasix (80 total in the morning) as needed for any worsening shortness of breath, swelling, or 3lb weight gain on home scale.    Notify us ([phone]) with any worsening fluid symptoms.

## 2024-01-16 NOTE — PROGRESS NOTES
[facility] Provider Note (Follow-up) Consult Note      HPI:     Patient reports worsening SOB on exertion starting yesterday. Ambulating to clinic today with SOB and pace slow.               Patient sleeps on 2 number of pillows with CPAP, endorses increased work of breathing last night               Patient wakes up SOB, has to get out of bed, associated cough- denies PND symptoms, endorses continued intermittent nighttime cough. Dry but sometimes productive for clear/white sputum. Wheezing today              Palpitations - endorses heart racing sensation on exertion              Dizzy, light-headed, pre-syncope or syncope- denies              Since discharge frequency of performing weights, home weight and weight change- performing daily weights. 172lbs on hospital discharge. 166.8lbs today              Other information felt pertinent to HPI: Ms. Michela Franco is a 79 yo with a PMHx of HFpEF (EF 50% 7/2023), severe asthma, HTN who presents to second [facility] visit following recent admission for ADHF and new HFrEF. On ED presentation, BNP was elevated 4700 and trop 0.489. CXR was concerning for pulmonary edema. TTE during admission showed new HFrEF (EF 20-25%) with G3DD and WMA. Cardiology consulted and she was admitted to CCU for concern of cardiogenic shock and started on a  gtt and lasix gtt. She was transitioned off of the Lasix gtt to IV Lasix pushes and then to PO Lasix. There was concern for community-acquired pneumonia given consolidation on CXR despite diuresis. She was started on CAP coverage antibiotics. GDMT initiated prior to discharge with exception of BB due to past h/o bronchospasm.     1/17/24: Last visit we started jardiance 10mg daily. Today she reports worsening SHAH starting last night with associated cough and wheezing. Reports taking breathing treatment this morning without appreciable improvement. Otherwise denies weight gain on home scale/worsening swelling.     PHYSICAL: There were no vitals  filed for this visit.   @[id number](3)@    JVD: 2cm above clavicle sitting  Heart rhythm: regular  Cardiac murmur: No    S3: no  S4: no  Lungs: crackles and wheezing  Hepatojugular reflux: yes  Edema: no      Lab Results   Component Value Date     01/17/2024    K 3.8 01/17/2024    MG 1.7 01/17/2024     01/17/2024    CO2 23 01/17/2024    BUN 13 01/17/2024    CREATININE 1.0 01/17/2024     (H) 01/17/2024    CALCIUM 9.0 01/17/2024    AST 35 01/17/2024    ALT 35 01/17/2024    ALBUMIN 3.2 (L) 01/17/2024    PROT 6.7 01/17/2024    BILITOT 1.1 (H) 01/17/2024     Lab Results   Component Value Date    BNP 4,644 (H) 01/17/2024    BNP 1,856 (H) 01/12/2024     (H) 01/06/2024       ASSESSMENT: HFrEF     PLAN:      Patient Instructions:   Instruct the patient to notify this clinic if HH, a physician or an advanced care provider wants to change medication one of their HF medications   Activity and Diet restrictions:   Recommend 2-3 gram sodium restriction and 1500cc- 2000cc fluid restriction.  Encourage physical activity with graded exercise program.  Requested patient to weigh themselves daily, and to notify us if their weight increases by more than 3 lbs in 1 day or 5 lbs in 3 days.    Assigned dry weight on home scale: 170lbs  Medication changes (include current dose and changed dose): NYHA Class III symptoms. Volume up on exam with significant uptrend in BNP (1800->4600) from last week. Is on high dose entresto, Pro-BNP ordered for future. Lasix 80mg IVP administered in clinic with 40meq K. Plan for phone check in tomorrow to assess response. Pending response, plan to increase po lasix to 80mg in the morning and 40mg in the afternoon. Not on BB therapy 2/2 history of asthma and bronchospasm.    RD education provided during visit.    Upcoming labs and date anticipated: Phone check in tomorrow to assess diuretic response. RTC in 1 week for repeat labs and close follow up.    Other diagnostic tests  ordered: Nuclear stress ordered for ischemic eval of new HFrEF. Plan for repeat TTE after 3 months of maximally tolerated GDMT with possible referral for ICD at that time.    Marjan Nobles PA-C

## 2024-01-17 ENCOUNTER — OFFICE VISIT (OUTPATIENT)
Dept: CARDIOLOGY | Facility: CLINIC | Age: 79
End: 2024-01-17
Payer: MEDICARE

## 2024-01-17 ENCOUNTER — LAB VISIT (OUTPATIENT)
Dept: LAB | Facility: HOSPITAL | Age: 79
End: 2024-01-17
Payer: MEDICARE

## 2024-01-17 ENCOUNTER — NUTRITION (OUTPATIENT)
Dept: TRANSPLANT | Facility: CLINIC | Age: 79
End: 2024-01-17
Payer: MEDICARE

## 2024-01-17 VITALS
WEIGHT: 173.5 LBS | DIASTOLIC BLOOD PRESSURE: 82 MMHG | SYSTOLIC BLOOD PRESSURE: 142 MMHG | OXYGEN SATURATION: 96 % | HEIGHT: 65 IN | BODY MASS INDEX: 28.91 KG/M2 | HEART RATE: 107 BPM

## 2024-01-17 DIAGNOSIS — R06.02 SOB (SHORTNESS OF BREATH): ICD-10-CM

## 2024-01-17 DIAGNOSIS — I50.9 NEW ONSET OF CONGESTIVE HEART FAILURE: ICD-10-CM

## 2024-01-17 DIAGNOSIS — I70.0 AORTIC ATHEROSCLEROSIS: ICD-10-CM

## 2024-01-17 DIAGNOSIS — I10 PRIMARY HYPERTENSION: ICD-10-CM

## 2024-01-17 DIAGNOSIS — J45.50 SEVERE PERSISTENT ASTHMA WITHOUT COMPLICATION: ICD-10-CM

## 2024-01-17 DIAGNOSIS — I50.20 HFREF (HEART FAILURE WITH REDUCED EJECTION FRACTION): Primary | ICD-10-CM

## 2024-01-17 DIAGNOSIS — I50.9 ACUTE ON CHRONIC HEART FAILURE, UNSPECIFIED HEART FAILURE TYPE: Primary | ICD-10-CM

## 2024-01-17 PROBLEM — E87.5 HYPERKALEMIA: Status: RESOLVED | Noted: 2023-07-18 | Resolved: 2024-01-17

## 2024-01-17 LAB
ALBUMIN SERPL BCP-MCNC: 3.2 G/DL (ref 3.5–5.2)
ALP SERPL-CCNC: 67 U/L (ref 55–135)
ALT SERPL W/O P-5'-P-CCNC: 35 U/L (ref 10–44)
ANION GAP SERPL CALC-SCNC: 10 MMOL/L (ref 8–16)
AST SERPL-CCNC: 35 U/L (ref 10–40)
BILIRUB SERPL-MCNC: 1.1 MG/DL (ref 0.1–1)
BNP SERPL-MCNC: 4644 PG/ML (ref 0–99)
BUN SERPL-MCNC: 13 MG/DL (ref 8–23)
CALCIUM SERPL-MCNC: 9 MG/DL (ref 8.7–10.5)
CHLORIDE SERPL-SCNC: 106 MMOL/L (ref 95–110)
CO2 SERPL-SCNC: 23 MMOL/L (ref 23–29)
CREAT SERPL-MCNC: 1 MG/DL (ref 0.5–1.4)
EST. GFR  (NO RACE VARIABLE): 58.4 ML/MIN/1.73 M^2
GLUCOSE SERPL-MCNC: 149 MG/DL (ref 70–110)
MAGNESIUM SERPL-MCNC: 1.7 MG/DL (ref 1.6–2.6)
PHOSPHATE SERPL-MCNC: 3.2 MG/DL (ref 2.7–4.5)
POTASSIUM SERPL-SCNC: 3.8 MMOL/L (ref 3.5–5.1)
PROT SERPL-MCNC: 6.7 G/DL (ref 6–8.4)
SODIUM SERPL-SCNC: 139 MMOL/L (ref 136–145)

## 2024-01-17 PROCEDURE — 3079F DIAST BP 80-89 MM HG: CPT | Mod: CPTII,S$GLB,,

## 2024-01-17 PROCEDURE — 3077F SYST BP >= 140 MM HG: CPT | Mod: CPTII,S$GLB,,

## 2024-01-17 PROCEDURE — 1101F PT FALLS ASSESS-DOCD LE1/YR: CPT | Mod: CPTII,S$GLB,,

## 2024-01-17 PROCEDURE — 1111F DSCHRG MED/CURRENT MED MERGE: CPT | Mod: CPTII,S$GLB,,

## 2024-01-17 PROCEDURE — 83735 ASSAY OF MAGNESIUM: CPT

## 2024-01-17 PROCEDURE — 1160F RVW MEDS BY RX/DR IN RCRD: CPT | Mod: CPTII,S$GLB,,

## 2024-01-17 PROCEDURE — 84100 ASSAY OF PHOSPHORUS: CPT

## 2024-01-17 PROCEDURE — 99999 PR PBB SHADOW E&M-EST. PATIENT-LVL V: CPT | Mod: PBBFAC,,,

## 2024-01-17 PROCEDURE — 96374 THER/PROPH/DIAG INJ IV PUSH: CPT | Mod: S$GLB,,, | Performed by: INTERNAL MEDICINE

## 2024-01-17 PROCEDURE — 1157F ADVNC CARE PLAN IN RCRD: CPT | Mod: CPTII,S$GLB,,

## 2024-01-17 PROCEDURE — 97802 MEDICAL NUTRITION INDIV IN: CPT | Mod: S$GLB,,,

## 2024-01-17 PROCEDURE — 1159F MED LIST DOCD IN RCRD: CPT | Mod: CPTII,S$GLB,,

## 2024-01-17 PROCEDURE — 3288F FALL RISK ASSESSMENT DOCD: CPT | Mod: CPTII,S$GLB,,

## 2024-01-17 PROCEDURE — 80053 COMPREHEN METABOLIC PANEL: CPT

## 2024-01-17 PROCEDURE — 99214 OFFICE O/P EST MOD 30 MIN: CPT | Mod: S$GLB,,,

## 2024-01-17 PROCEDURE — 83880 ASSAY OF NATRIURETIC PEPTIDE: CPT

## 2024-01-17 PROCEDURE — 1126F AMNT PAIN NOTED NONE PRSNT: CPT | Mod: CPTII,S$GLB,,

## 2024-01-17 RX ORDER — POTASSIUM CHLORIDE 750 MG/1
10 TABLET, EXTENDED RELEASE ORAL
Status: COMPLETED | OUTPATIENT
Start: 2024-01-17 | End: 2024-01-17

## 2024-01-17 RX ORDER — FUROSEMIDE 10 MG/ML
80 INJECTION INTRAMUSCULAR; INTRAVENOUS
Status: COMPLETED | OUTPATIENT
Start: 2024-01-17 | End: 2024-01-17

## 2024-01-17 RX ADMIN — FUROSEMIDE 80 MG: 10 INJECTION INTRAMUSCULAR; INTRAVENOUS at 09:01

## 2024-01-17 RX ADMIN — POTASSIUM CHLORIDE 10 MEQ: 750 TABLET, EXTENDED RELEASE ORAL at 10:01

## 2024-01-17 RX ADMIN — POTASSIUM CHLORIDE 10 MEQ: 750 TABLET, EXTENDED RELEASE ORAL at 09:01

## 2024-01-17 NOTE — PROGRESS NOTES
RN inserted 25 g PIV to Left hand x 1 insertion, IVP Lasix 80 mg administered over 3-5 minutes. PIV removed, catheter intact. Pt educated r/t expected SE, she tolerated PIV insertion fair.

## 2024-01-17 NOTE — PROGRESS NOTES
TRANSPLANT NUTRITIONAL ASSESSMENT    Referring Provider: Emilie Granados MD     Reason for Visit: HF diet education     Age: 78 y.o.  Sex: female    Patient Active Problem List   Diagnosis    HTN (hypertension)    GERD (gastroesophageal reflux disease)    DJD (degenerative joint disease)    Osteoporosis    Moderate persistent asthma not dependent on systemic steroids with acute exacerbation    Chronic rhinitis    History of colon polyps    Vitamin D deficiency    Aortic atherosclerosis    Allergic rhinitis    Chronic pansinusitis    Allergic rhinitis due to animal (cat) (dog) hair and dander    Nose discharge    Nasal turbinate hypertrophy    Chronic cough    Meralgia paraesthetica    Calcified granuloma of lung    BMI 32.0-32.9,adult    Severe asthma    Adrenal adenoma, left    Pain of left hip joint    SOB (shortness of breath)    Nuclear sclerosis, bilateral    Nuclear sclerotic cataract of left eye    Left shoulder pain    Decreased range of motion of left shoulder    Closed fracture of left shoulder    Hyponatremia    Abdominal distension    Nicotine dependence in remission    Transaminitis    Urinary retention    Severe persistent asthma with acute exacerbation    Elevated troponin    Acute on chronic heart failure    ACP (advance care planning)    Bilirubinemia    JEFFY (acute kidney injury)    Thrombocytopenia, unspecified     Past Medical History:   Diagnosis Date    Allergy     Asthma     Chronic diastolic heart failure 4/5/2018    Glaucoma suspect     Hyperlipidemia     Hypertension     Neuromuscular disorder     NSTEMI (non-ST elevated myocardial infarction) 1/1/2024    JOHN on CPAP     Osteoporosis     Other neutropenia 8/31/2023    Recurrent sinusitis 3/25/2014    Recurrent upper respiratory infection (URI)     Urticaria      Lab Results   Component Value Date     (H) 01/17/2024    K 3.8 01/17/2024    PHOS 3.2 01/17/2024    MG 1.7 01/17/2024    CHOL 199 08/26/2021    HDL 86 (H) 08/26/2021    TRIG  47 08/26/2021    ALBUMIN 3.2 (L) 01/17/2024    HGBA1C 5.3 10/18/2022    CALCIUM 9.0 01/17/2024     Other Pertinent Labs: GFR: 58.4 (L)    Current Outpatient Medications   Medication Sig    ADVAIR DISKUS 500-50 mcg/dose DsDv diskus inhaler INHALE 1 PUFF TWICE DAILY    albuterol (VENTOLIN HFA) 90 mcg/actuation inhaler Inhale 2 puffs into the lungs every 6 (six) hours as needed for Wheezing. Rescue    albuterol-ipratropium (DUO-NEB) 2.5 mg-0.5 mg/3 mL nebulizer solution Take 3 mLs by nebulization every 6 (six) hours as needed for Wheezing.    azelastine (ASTELIN) 137 mcg (0.1 %) nasal spray 2 sprays (274 mcg total) by Nasal route 2 (two) times daily.    budesonide-formoterol 160-4.5 mcg (SYMBICORT) 160-4.5 mcg/actuation HFAA Inhale 2 puffs into the lungs every 12 (twelve) hours. Controller    carboxymethylcellulose sodium (REFRESH TEARS) 0.5 % Drop Apply 1 drop to eye 3 (three) times daily as needed (dry eyes).    cholecalciferol, vitamin D3, 10,000 unit Cap Take 360 mg by mouth once daily. Take 6 capsules (6,000 units total) by mouth once daily    COD LIVER OIL ORAL Take 1 tablet by mouth once daily.    dupilumab 300 mg/2 mL PnIj Inject 300 mg into the skin every 14 (fourteen) days.    empagliflozin (JARDIANCE) 10 mg tablet Take 1 tablet (10 mg total) by mouth once daily.    ferrous sulfate 325 (65 FE) MG EC tablet Take 1 tablet (325 mg total) by mouth 3 (three) times daily with meals.    fluticasone propionate (FLONASE) 50 mcg/actuation nasal spray 2 sprays (100 mcg total) by Each Nostril route 2 (two) times a day.    furosemide (LASIX) 40 MG tablet Take 1 tablet (40 mg total) by mouth 2 (two) times daily.    guaiFENesin (MUCINEX) 600 mg 12 hr tablet Take 1 tablet (600 mg total) by mouth 2 (two) times daily.    milk thistle 150 mg Cap Take 1 capsule by mouth once daily.    multivitamin (THERAGRAN) per tablet Take 1 tablet by mouth once daily.    predniSONE (DELTASONE) 5 MG tablet Take 1 tablet (5 mg total) by mouth  "once daily. for 75 doses    sacubitriL-valsartan (ENTRESTO)  mg per tablet Take 1 tablet by mouth 2 (two) times daily.    SENNA 8.6 mg tablet TAKE 2 TAB(S) BY MOUTH NIGHTLY AS NEEDED FOR CONSTIPATION    spironolactone (ALDACTONE) 25 MG tablet Take 1 tablet (25 mg total) by mouth once daily.    tretinoin (RETIN-A) 0.05 % cream Apply topically every evening. Start with every other night and move up to nightly after 2 weeks if not too dry.     No current facility-administered medications for this visit.     Allergies: Carvedilol, Metoprolol, Adhesive, Diazepam, Gabapentin, Keppra [levetiracetam], Mold, Calcium channel blocking agents-dihydropyridines, and Thiazides    Ht Readings from Last 1 Encounters:   01/17/24 5' 5" (1.651 m)     Wt Readings from Last 1 Encounters:   01/17/24 78.7 kg (173 lb 8 oz)      BMI: 28.87 kg/m² Abnormal      Usual Weight: 79 kg  Weight Change/Time: down 1 kg since last visit (1/12)  Current Diet: dee, 2000 mL fluid res   Appetite/Current Intake: good   Exercise/Physical Activity: lightly active   Nutritional/Herbal Supplements: none  Potential Food/Medication Interactions: Aldactone: avoid high K+ foods, Jardiance: avoid green tea  Chewing/Swallowing Problems: none   Symptoms: none  Assessment of Lab Values: HF  Support System: family     Estimated Kcal Need: 1975 kcals (25 kcal/kg CBW)  Estimated Protein Need: 62g (0.8 g.kg CBW)    Nutritional History: pt has 2 meals a day. Breakfast is oatmeal and fruit, 2 eggs and avocado, or shredded wheat and fruit. Meal 2 has been green with grocery store smoked turkey or a salad with chicken and kimchi. Pt stats under 2000 mL of fluid per day.     Nutritional Diagnoses  Problem: excessive mineral intake: sodium  Etiology: cardiac dysfunction   Symptoms: nutr hist     Educational Need? yes  Barriers: none identified  Discussed with: patient  Interventions: Patient taught nutrition information regarding HF diet education   .  Pt was educated on " how to read nutrition labels to understand food has natural sodium present, serving sizes, and reduced Na does not always mean the product does not have low Na. Recc pt limit foods that are cooked with higher sodium (smoked turkey) Recc'd of 600mg Na per meal. Pt recc drink no more than 2000ml per day. pt given contact info if questions or needs arise.      Goals/Recommendations: diet adherence and limit fluid intake  Goals/Recommendations: diet adherence and limit fluid intake  Actions Taken: instruct/provide written information  Strategies Used: problem solving, goal setting, motivational interviewing  Patient and/or family comprehend instructions: yes , adherence expected  Outcome: Verbalizes understanding  Monitoring: Contact information provided, will f/u in clinic and communicate with the care team as needed.     Counseling Time: 15 minutes

## 2024-01-18 ENCOUNTER — TELEPHONE (OUTPATIENT)
Dept: CARDIOLOGY | Facility: CLINIC | Age: 79
End: 2024-01-18
Payer: MEDICARE

## 2024-01-18 ENCOUNTER — TELEPHONE (OUTPATIENT)
Dept: CARDIOLOGY | Facility: HOSPITAL | Age: 79
End: 2024-01-18
Payer: MEDICARE

## 2024-01-18 NOTE — TELEPHONE ENCOUNTER
[facility] contacted pt to assess 80 mg IVP Lasix administration, pt reports her weight is 164 lbs, took Lasix as prescribed, reports decreased SOB.  Pt will avoid smoked and processed meats in the future per RD advice. RN instructed pt to contact [facility] if she experiences HF sxs.  Pt agreed.

## 2024-01-19 ENCOUNTER — DOCUMENTATION ONLY (OUTPATIENT)
Dept: ALLERGY | Facility: CLINIC | Age: 79
End: 2024-01-19
Payer: MEDICARE

## 2024-01-19 PROBLEM — Z99.81 CHRONIC HYPOXIC RESPIRATORY FAILURE, ON HOME OXYGEN THERAPY: Status: ACTIVE | Noted: 2024-01-19

## 2024-01-19 PROBLEM — J96.11 CHRONIC HYPOXIC RESPIRATORY FAILURE, ON HOME OXYGEN THERAPY: Status: ACTIVE | Noted: 2024-01-19

## 2024-01-19 NOTE — PROGRESS NOTES
Signed assistance application for [employer] injector faxed to Bianca Jerez with [employer] @ [phone]. Original sent to scanning.

## 2024-01-22 ENCOUNTER — HOSPITAL ENCOUNTER (OUTPATIENT)
Dept: CARDIOLOGY | Facility: HOSPITAL | Age: 79
Discharge: HOME | End: 2024-01-22
Payer: MEDICARE

## 2024-01-22 ENCOUNTER — TELEPHONE (OUTPATIENT)
Dept: ALLERGY | Facility: CLINIC | Age: 79
End: 2024-01-22
Payer: MEDICARE

## 2024-01-22 VITALS
BODY MASS INDEX: 28.82 KG/M2 | HEART RATE: 85 BPM | DIASTOLIC BLOOD PRESSURE: 84 MMHG | RESPIRATION RATE: 16 BRPM | WEIGHT: 173 LBS | HEIGHT: 65 IN | SYSTOLIC BLOOD PRESSURE: 138 MMHG

## 2024-01-22 DIAGNOSIS — I50.9 NEW ONSET OF CONGESTIVE HEART FAILURE: ICD-10-CM

## 2024-01-22 DIAGNOSIS — I50.20 HFREF (HEART FAILURE WITH REDUCED EJECTION FRACTION): ICD-10-CM

## 2024-01-22 LAB
CV STRESS BASE HR: 90 BPM
DIASTOLIC BLOOD PRESSURE: 91 MMHG
EJECTION FRACTION- HIGH: 59 %
END DIASTOLIC INDEX-HIGH: 155 ML/M2
END DIASTOLIC INDEX-LOW: 91 ML/M2
END SYSTOLIC INDEX-HIGH: 78 ML/M2
END SYSTOLIC INDEX-LOW: 40 ML/M2
NUC REST DIASTOLIC VOLUME INDEX: 178
NUC REST EJECTION FRACTION: 32
NUC REST SYSTOLIC VOLUME INDEX: 120
NUC STRESS DIASTOLIC VOLUME INDEX: 172
NUC STRESS EJECTION FRACTION: 29 %
NUC STRESS SYSTOLIC VOLUME INDEX: 122
OHS CV CPX 1 MINUTE RECOVERY HEART RATE: 96 BPM
OHS CV CPX 85 PERCENT MAX PREDICTED HEART RATE MALE: 122
OHS CV CPX MAX PREDICTED HEART RATE: 144
OHS CV CPX PATIENT IS FEMALE: 1
OHS CV CPX PATIENT IS MALE: 0
OHS CV CPX PEAK DIASTOLIC BLOOD PRESSURE: 92 MMHG
OHS CV CPX PEAK HEAR RATE: 86 BPM
OHS CV CPX PEAK RATE PRESSURE PRODUCT: NORMAL
OHS CV CPX PEAK SYSTOLIC BLOOD PRESSURE: 130 MMHG
OHS CV CPX PERCENT MAX PREDICTED HEART RATE ACHIEVED: 62
OHS CV CPX RATE PRESSURE PRODUCT PRESENTING: NORMAL
RETIRED EF AND QEF - SEE NOTES: 47 %
SYSTOLIC BLOOD PRESSURE: 143 MMHG

## 2024-01-22 PROCEDURE — 93018 CV STRESS TEST I&R ONLY: CPT | Mod: ,,, | Performed by: INTERNAL MEDICINE

## 2024-01-22 PROCEDURE — 63600175 PHARM REV CODE 636 W HCPCS

## 2024-01-22 PROCEDURE — 93016 CV STRESS TEST SUPVJ ONLY: CPT | Mod: ,,, | Performed by: INTERNAL MEDICINE

## 2024-01-22 PROCEDURE — 78452 HT MUSCLE IMAGE SPECT MULT: CPT | Mod: 26,,, | Performed by: INTERNAL MEDICINE

## 2024-01-22 PROCEDURE — A9502 TC99M TETROFOSMIN: HCPCS

## 2024-01-22 RX ORDER — REGADENOSON 0.08 MG/ML
0.4 INJECTION, SOLUTION INTRAVENOUS
Status: COMPLETED | OUTPATIENT
Start: 2024-01-22 | End: 2024-01-22

## 2024-01-22 RX ORDER — AMINOPHYLLINE 25 MG/ML
75 INJECTION, SOLUTION INTRAVENOUS
Status: COMPLETED | OUTPATIENT
Start: 2024-01-22 | End: 2024-01-22

## 2024-01-22 RX ADMIN — REGADENOSON 0.4 MG: 0.08 INJECTION, SOLUTION INTRAVENOUS at 08:01

## 2024-01-22 RX ADMIN — AMINOPHYLLINE 75 MG: 25 INJECTION, SOLUTION INTRAVENOUS at 08:01

## 2024-01-22 NOTE — TELEPHONE ENCOUNTER
----- Message from Cindy Lindsay sent at 1/22/2024 10:06 AM CST -----  Regarding: Dosage clarification needed  Contact: [phone]  Hi, [facility] called to bruna with someone from the office to discuss the predniSONE (DELTASONE) 5 MG tablet. Pharmacy says there's 2 sets of directions. Pls call the pharmacy at :        [facility] - [place], [place] - [address]   [address]  [facility] [address]  Phone: [phone] Fax: [phone]

## 2024-01-22 NOTE — TELEPHONE ENCOUNTER
"Returned call to [facility] pharmacy. Per Sushma, a pharmacy technician, the pharmacist is requesting clarification on the prednisone prescription. " There are two different directions on the prescription. Please have provider call with clarification."      Sig - Route: Take 1 tablet (5 mg total) by mouth once daily. for 75 doses - Oral   Sent to pharmacy as: predniSONE (DELTASONE) 5 MG tablet   Class: Normal   Notes to Pharmacy: Patient to be titrating down to 10mg daily for 2 weeks, then 5 mg for 2 weeks, then will evaluate next steps at patients follow up appointment.     "

## 2024-01-24 ENCOUNTER — TELEPHONE (OUTPATIENT)
Dept: CARDIOLOGY | Facility: CLINIC | Age: 79
End: 2024-01-24

## 2024-01-24 ENCOUNTER — OFFICE VISIT (OUTPATIENT)
Dept: CARDIOLOGY | Facility: CLINIC | Age: 79
End: 2024-01-24
Payer: MEDICARE

## 2024-01-24 ENCOUNTER — LAB VISIT (OUTPATIENT)
Dept: LAB | Facility: HOSPITAL | Age: 79
End: 2024-01-24
Payer: MEDICARE

## 2024-01-24 VITALS
OXYGEN SATURATION: 96 % | DIASTOLIC BLOOD PRESSURE: 65 MMHG | WEIGHT: 165.81 LBS | HEIGHT: 65 IN | HEART RATE: 86 BPM | RESPIRATION RATE: 16 BRPM | BODY MASS INDEX: 27.63 KG/M2 | SYSTOLIC BLOOD PRESSURE: 111 MMHG

## 2024-01-24 DIAGNOSIS — R06.02 SOB (SHORTNESS OF BREATH): ICD-10-CM

## 2024-01-24 DIAGNOSIS — I50.9 NEW ONSET OF CONGESTIVE HEART FAILURE: ICD-10-CM

## 2024-01-24 DIAGNOSIS — I70.0 AORTIC ATHEROSCLEROSIS: ICD-10-CM

## 2024-01-24 DIAGNOSIS — I50.20 HFREF (HEART FAILURE WITH REDUCED EJECTION FRACTION): Primary | ICD-10-CM

## 2024-01-24 DIAGNOSIS — I10 PRIMARY HYPERTENSION: ICD-10-CM

## 2024-01-24 DIAGNOSIS — J45.50 SEVERE PERSISTENT ASTHMA WITHOUT COMPLICATION: ICD-10-CM

## 2024-01-24 LAB
ALBUMIN SERPL BCP-MCNC: 3.4 G/DL (ref 3.5–5.2)
ALP SERPL-CCNC: 71 U/L (ref 55–135)
ALT SERPL W/O P-5'-P-CCNC: 24 U/L (ref 10–44)
ANION GAP SERPL CALC-SCNC: 10 MMOL/L (ref 8–16)
AST SERPL-CCNC: 27 U/L (ref 10–40)
BILIRUB SERPL-MCNC: 0.9 MG/DL (ref 0.1–1)
BNP SERPL-MCNC: 332 PG/ML (ref 0–99)
BUN SERPL-MCNC: 24 MG/DL (ref 8–23)
CALCIUM SERPL-MCNC: 9.5 MG/DL (ref 8.7–10.5)
CHLORIDE SERPL-SCNC: 99 MMOL/L (ref 95–110)
CO2 SERPL-SCNC: 28 MMOL/L (ref 23–29)
CREAT SERPL-MCNC: 1.3 MG/DL (ref 0.5–1.4)
EST. GFR  (NO RACE VARIABLE): 42.6 ML/MIN/1.73 M^2
GLUCOSE SERPL-MCNC: 112 MG/DL (ref 70–110)
MAGNESIUM SERPL-MCNC: 2.3 MG/DL (ref 1.6–2.6)
PHOSPHATE SERPL-MCNC: 3.8 MG/DL (ref 2.7–4.5)
POTASSIUM SERPL-SCNC: 3.9 MMOL/L (ref 3.5–5.1)
PROT SERPL-MCNC: 7.1 G/DL (ref 6–8.4)
SODIUM SERPL-SCNC: 137 MMOL/L (ref 136–145)

## 2024-01-24 PROCEDURE — 3074F SYST BP LT 130 MM HG: CPT | Mod: CPTII,S$GLB,,

## 2024-01-24 PROCEDURE — 83880 ASSAY OF NATRIURETIC PEPTIDE: CPT

## 2024-01-24 PROCEDURE — 80053 COMPREHEN METABOLIC PANEL: CPT

## 2024-01-24 PROCEDURE — 36415 COLL VENOUS BLD VENIPUNCTURE: CPT

## 2024-01-24 PROCEDURE — 99999 PR PBB SHADOW E&M-EST. PATIENT-LVL V: CPT | Mod: PBBFAC,,,

## 2024-01-24 PROCEDURE — 83735 ASSAY OF MAGNESIUM: CPT

## 2024-01-24 PROCEDURE — 1111F DSCHRG MED/CURRENT MED MERGE: CPT | Mod: CPTII,S$GLB,,

## 2024-01-24 PROCEDURE — 1157F ADVNC CARE PLAN IN RCRD: CPT | Mod: CPTII,S$GLB,,

## 2024-01-24 PROCEDURE — 1160F RVW MEDS BY RX/DR IN RCRD: CPT | Mod: CPTII,S$GLB,,

## 2024-01-24 PROCEDURE — 1159F MED LIST DOCD IN RCRD: CPT | Mod: CPTII,S$GLB,,

## 2024-01-24 PROCEDURE — 1126F AMNT PAIN NOTED NONE PRSNT: CPT | Mod: CPTII,S$GLB,,

## 2024-01-24 PROCEDURE — 84100 ASSAY OF PHOSPHORUS: CPT

## 2024-01-24 PROCEDURE — 99214 OFFICE O/P EST MOD 30 MIN: CPT | Mod: S$GLB,,,

## 2024-01-24 PROCEDURE — 3078F DIAST BP <80 MM HG: CPT | Mod: CPTII,S$GLB,,

## 2024-01-24 NOTE — TELEPHONE ENCOUNTER
Call to PT to advise her of Lab results and POC changes as follows;    Can you call and let her know labs look good. BNP back to baseline, continue current lasix 40mg BID. I'm still waiting to hear back from cardiology regarding appointments. We'll call her next week to check in and schedule follow up once we hear back from [facility].    PT states she got a call re the 2 appts and chose the Feb 8th appt as the caller told her that the weather is supposed to be bad tomorrow. PT hopes that works for you and please let her know if it doesn't.    PT verbalized understanding of the above lab results and POC changes.

## 2024-01-24 NOTE — PROGRESS NOTES
HF TCC Provider Note (Follow-up) Consult Note      HPI:     Patient reports SOB now resolved. Ambulating to clinic today without appreciable SOB and pace normal.              Patient sleeps on 2 number of pillows with CPAP, orthopnea resolved              Patient wakes up SOB, has to get out of bed, associated cough- denies PND symptoms, wheezing resolved              Palpitations - endorses heart racing sensation on exertion. Denies recent episodes              Dizzy, light-headed, pre-syncope or syncope- denies              Since discharge frequency of performing weights, home weight and weight change- performing daily weights. 172lbs on hospital discharge. 166.8lbs last visit. Downtrending and now stabilized at 160lbs              Other information felt pertinent to HPI: Ms. Michela Franco is a 77 yo with a PMHx of HFpEF (EF 50% 7/2023), severe asthma, HTN who presents to third HFTCC visit following recent admission for ADHF and new HFrEF. On ED presentation, BNP was elevated 4700 and trop 0.489. CXR was concerning for pulmonary edema. TTE during admission showed new HFrEF (EF 20-25%) with G3DD and WMA. Cardiology consulted and she was admitted to CCU for concern of cardiogenic shock and started on a  gtt and lasix gtt. She was transitioned off of the Lasix gtt to IV Lasix pushes and then to PO Lasix. There was concern for community-acquired pneumonia given consolidation on CXR despite diuresis. She was started on CAP coverage antibiotics. GDMT initiated prior to discharge with exception of BB due to past h/o bronchospasm.     1/17/24: Last visit we started jardiance 10mg daily. Today she reports worsening SHAH starting last night with associated cough and wheezing. Reports taking breathing treatment this morning without appreciable improvement. Otherwise denies weight gain on home scale/worsening swelling.    1/24/24: Last visit we administered lasix 80mg IVP in clinic. Today she reports SOB, orthopnea,  wheezing now resolved. Down 6lbs on home scale. Weight now stabilized at 160lbs     PHYSICAL:   Vitals:    01/24/24 0902   BP: 111/65   Pulse: 86   Resp: 16      @[id number](3)@    JVD: no  Heart rhythm: regular  Cardiac murmur: No    S3: no  S4: no  Lungs: clear  Hepatojugular reflux: no  Edema: no      Lab Results   Component Value Date     01/24/2024    K 3.9 01/24/2024    MG 2.3 01/24/2024    CL 99 01/24/2024    CO2 28 01/24/2024    BUN 24 (H) 01/24/2024    CREATININE 1.3 01/24/2024     (H) 01/24/2024    CALCIUM 9.5 01/24/2024    AST 27 01/24/2024    ALT 24 01/24/2024    ALBUMIN 3.4 (L) 01/24/2024    PROT 7.1 01/24/2024    BILITOT 0.9 01/24/2024     Lab Results   Component Value Date     (H) 01/24/2024    BNP 4,644 (H) 01/17/2024    BNP 1,856 (H) 01/12/2024       ASSESSMENT: HFrEF     PLAN:      Patient Instructions:   Instruct the patient to notify this clinic if HH, a physician or an advanced care provider wants to change medication one of their HF medications   Activity and Diet restrictions:   Recommend 2-3 gram sodium restriction and 1500cc- 2000cc fluid restriction.  Encourage physical activity with graded exercise program.  Requested patient to weigh themselves daily, and to notify us if their weight increases by more than 3 lbs in 1 day or 5 lbs in 3 days.    Assigned dry weight on home scale: 160lbs  Medication changes (include current dose and changed dose): NYHA Class II symptoms. Well compensated on exam. Continue lasix 40mg BID. Not on BB therapy 2/2 history of asthma and bronchospasm.    Upcoming labs and date anticipated: Will coordinate TCC follow up with [facility] appointments. Tentative RTC in 2 weeks for repeat labs and close follow up.    Other diagnostic tests ordered: Nuclear stress ordered for ischemic eval of new HFrEF. Equivocal perfusion scan, appointment scheduled to establish care with [facility]. Will defer PET stress to their discretion. Plan for repeat TTE  after 3 months of maximally tolerated GDMT with possible referral for ICD at that time.    Marjan Nobles PA-C

## 2024-02-06 ENCOUNTER — OFFICE VISIT (OUTPATIENT)
Dept: SLEEP MEDICINE | Facility: CLINIC | Age: 79
End: 2024-02-06
Payer: MEDICARE

## 2024-02-06 VITALS — HEIGHT: 65 IN | BODY MASS INDEX: 27.49 KG/M2 | WEIGHT: 165 LBS

## 2024-02-06 DIAGNOSIS — G47.33 OSA (OBSTRUCTIVE SLEEP APNEA): Primary | ICD-10-CM

## 2024-02-06 PROCEDURE — 1101F PT FALLS ASSESS-DOCD LE1/YR: CPT | Mod: HCNC,CPTII,S$GLB, | Performed by: PHYSICIAN ASSISTANT

## 2024-02-06 PROCEDURE — 99999 PR PBB SHADOW E&M-EST. PATIENT-LVL III: CPT | Mod: PBBFAC,HCNC,, | Performed by: PHYSICIAN ASSISTANT

## 2024-02-06 PROCEDURE — 99203 OFFICE O/P NEW LOW 30 MIN: CPT | Mod: HCNC,S$GLB,, | Performed by: PHYSICIAN ASSISTANT

## 2024-02-06 PROCEDURE — 1111F DSCHRG MED/CURRENT MED MERGE: CPT | Mod: HCNC,CPTII,S$GLB, | Performed by: PHYSICIAN ASSISTANT

## 2024-02-06 PROCEDURE — 1159F MED LIST DOCD IN RCRD: CPT | Mod: HCNC,CPTII,S$GLB, | Performed by: PHYSICIAN ASSISTANT

## 2024-02-06 PROCEDURE — 3288F FALL RISK ASSESSMENT DOCD: CPT | Mod: HCNC,CPTII,S$GLB, | Performed by: PHYSICIAN ASSISTANT

## 2024-02-06 PROCEDURE — 1157F ADVNC CARE PLAN IN RCRD: CPT | Mod: HCNC,CPTII,S$GLB, | Performed by: PHYSICIAN ASSISTANT

## 2024-02-06 PROCEDURE — 1160F RVW MEDS BY RX/DR IN RCRD: CPT | Mod: HCNC,CPTII,S$GLB, | Performed by: PHYSICIAN ASSISTANT

## 2024-02-06 NOTE — PROGRESS NOTES
Referred by Mayela Broussard MD     NEW PATIENT VISIT    Michela Franco  is a pleasant 78 y.o. female  with PMH significant for HFrEF, HTN, asthma, chronic respiratroy failure (on home O2), chronic rhinitis, GERD, vit D def, DJD, BMI 27+, JOHN who presents for JOHN management      Reports being dx with JOHN 2009 (AHI 40), using CPAP nightly since dx. States current CPAP is no longer working properly and she was referred to the sleep clinic to see about getting a new one. Still using nightly but CPAP is displaying error message and air pressures feel inconsistent.     PAP history   Problems    Mask Nasal pillows   Pressure 4-20cwp   DME [facility] [place] [facility]   Machine age Circa 2015   Download 2/6/24: 28/30 x 8.5hrs, 4-20cwp, MF 98%, AHI 4.7       Past Medical History:   Diagnosis Date    Allergy     Asthma     Chronic diastolic heart failure 4/5/2018    Glaucoma suspect     Hyperlipidemia     Hypertension     Neuromuscular disorder     NSTEMI (non-ST elevated myocardial infarction) 1/1/2024    JOHN on CPAP     Osteoporosis     Other neutropenia 8/31/2023    Recurrent sinusitis 3/25/2014    Recurrent upper respiratory infection (URI)     Urticaria      Patient Active Problem List   Diagnosis    HTN (hypertension)    GERD (gastroesophageal reflux disease)    DJD (degenerative joint disease)    Osteoporosis    Moderate persistent asthma not dependent on systemic steroids with acute exacerbation    Chronic rhinitis    History of colon polyps    Vitamin D deficiency    Aortic atherosclerosis    Allergic rhinitis    Chronic pansinusitis    Allergic rhinitis due to animal (cat) (dog) hair and dander    Nose discharge    Nasal turbinate hypertrophy    Chronic cough    Meralgia paraesthetica    Calcified granuloma of lung    BMI 32.0-32.9,adult    Severe asthma    Adrenal adenoma, left    Pain of left hip joint    SOB (shortness of breath)    Nuclear sclerosis, bilateral    Nuclear sclerotic cataract of left eye    Left shoulder  pain    Decreased range of motion of left shoulder    Closed fracture of left shoulder    Hyponatremia    Abdominal distension    Nicotine dependence in remission    Transaminitis    Urinary retention    Severe persistent asthma with acute exacerbation    Elevated troponin    Acute on chronic heart failure    ACP (advance care planning)    Bilirubinemia    JEFFY (acute kidney injury)    Thrombocytopenia, unspecified    Chronic hypoxic respiratory failure, on home oxygen therapy       Current Outpatient Medications:     ADVAIR DISKUS 500-50 mcg/dose DsDv diskus inhaler, INHALE 1 PUFF TWICE DAILY, Disp: 1 each, Rfl: 11    albuterol (VENTOLIN HFA) 90 mcg/actuation inhaler, Inhale 2 puffs into the lungs every 6 (six) hours as needed for Wheezing. Rescue, Disp: 8 g, Rfl: 11    albuterol-ipratropium (DUO-NEB) 2.5 mg-0.5 mg/3 mL nebulizer solution, Take 3 mLs by nebulization every 6 (six) hours as needed for Wheezing., Disp: 75 mL, Rfl: 0    azelastine (ASTELIN) 137 mcg (0.1 %) nasal spray, 2 sprays (274 mcg total) by Nasal route 2 (two) times daily., Disp: 30 mL, Rfl: 11    cholecalciferol, vitamin D3, 10,000 unit Cap, Take 360 mg by mouth once daily. Take 6 capsules (6,000 units total) by mouth once daily, Disp: , Rfl:     COD LIVER OIL ORAL, Take 1 tablet by mouth once daily., Disp: , Rfl:     empagliflozin (JARDIANCE) 10 mg tablet, Take 1 tablet (10 mg total) by mouth once daily., Disp: 30 tablet, Rfl: 11    ferrous sulfate 325 (65 FE) MG EC tablet, Take 1 tablet (325 mg total) by mouth 3 (three) times daily with meals., Disp: 270 tablet, Rfl: 3    fluticasone propionate (FLONASE) 50 mcg/actuation nasal spray, 2 sprays (100 mcg total) by Each Nostril route 2 (two) times a day., Disp: 16 g, Rfl: 11    furosemide (LASIX) 40 MG tablet, Take 1 tablet (40 mg total) by mouth 2 (two) times daily., Disp: 60 tablet, Rfl: 11    milk thistle 150 mg Cap, Take 1 capsule by mouth once daily., Disp: 90 each, Rfl: 3    multivitamin  "(THERAGRAN) per tablet, Take 1 tablet by mouth once daily., Disp: , Rfl:     predniSONE (DELTASONE) 5 MG tablet, Take 1 tablet (5 mg total) by mouth once daily. for 75 doses, Disp: 75 tablet, Rfl: 0    sacubitriL-valsartan (ENTRESTO)  mg per tablet, Take 1 tablet by mouth 2 (two) times daily., Disp: 60 tablet, Rfl: 3    SENNA 8.6 mg tablet, TAKE 2 TAB(S) BY MOUTH NIGHTLY AS NEEDED FOR CONSTIPATION, Disp: , Rfl:     spironolactone (ALDACTONE) 25 MG tablet, Take 1 tablet (25 mg total) by mouth once daily., Disp: 30 tablet, Rfl: 11    budesonide-formoterol 160-4.5 mcg (SYMBICORT) 160-4.5 mcg/actuation HFAA, Inhale 2 puffs into the lungs every 12 (twelve) hours. Controller (Patient not taking: Reported on 1/24/2024), Disp: 10.2 g, Rfl: 5    carboxymethylcellulose sodium (REFRESH TEARS) 0.5 % Drop, Apply 1 drop to eye 3 (three) times daily as needed (dry eyes). (Patient not taking: Reported on 1/24/2024), Disp: 30 mL, Rfl: 11    dupilumab 300 mg/2 mL PnIj, Inject 300 mg into the skin every 14 (fourteen) days. (Patient not taking: Reported on 1/24/2024), Disp: 4 mL, Rfl: 12    guaiFENesin (MUCINEX) 600 mg 12 hr tablet, Take 1 tablet (600 mg total) by mouth 2 (two) times daily. (Patient not taking: Reported on 1/24/2024), Disp: 60 tablet, Rfl: 5    tretinoin (RETIN-A) 0.05 % cream, Apply topically every evening. Start with every other night and move up to nightly after 2 weeks if not too dry. (Patient not taking: Reported on 1/24/2024), Disp: 20 g, Rfl: 2       Vitals:    02/06/24 0935   Weight: 74.8 kg (165 lb)   Height: 5' 5" (1.651 m)     Physical Exam:    GEN:   Well-appearing  Psych:  Appropriate affect, demonstrates insight  SKIN:  No rash on the face or bridge of the nose      LABS:   Lab Results   Component Value Date    HGB 13.6 01/12/2024    CO2 28 01/24/2024         RECORDS REVIEWED:    6/6/2009 SPLIT (scanned into media): AHI 40.3, titrated to CPAP 9cwp    1/22/24 Echo:  ·  Left Ventricle: The left " ventricle is mildly dilated. Ventricular mass is normal. Normal wall thickness. Regional wall motion abnormalities present. See diagram for wall motion findings. There is severely reduced systolic function with a visually estimated ejection fraction of 20 - 25%. Ejection fraction by visual approximation is 23%. Grade III diastolic dysfunction.  ·  Right Ventricle: Mild right ventricular enlargement. Wall thickness is normal. Right ventricle wall motion  is normal. Systolic function is borderline low.  ·  Left Atrium: Left atrium is severely dilated.  ·  Right Atrium: Right atrium is mildly dilated.  ·  Aortic Valve: There is mild aortic valve sclerosis.  ·  Mitral Valve: There is mild mitral annular calcification present. There is moderate to  moderate-severe regurgitation.  ·  Tricuspid Valve: There is moderate- severe  to severe (3-4+) regurgitation.  ·  Pulmonary Artery: There is mild pulmonary hypertension. The estimated pulmonary artery systolic pressure is 44 mmHg.  ·  IVC/SVC: Elevated venous pressure at 15 mmHg.  ·  Pericardium: There is a trivial posterior effusion.    ASSESSMENT    PROBLEM DESCRIPTION/ Sx on Presentation  STATUS   hx JOHN   Dx JOHN 2009 (AHI 40)  Using CPAP nightly since dx with good control of sx  States current CPAP circa 2015, no longer working well    New to me   Daytime Sx   + sleepiness when inactive   ESS 11/24 on intake  New to me   Insomnia   Trouble falling asleep:   Arousals:         disrupted  Hard to get back to sleep?:     Prior pertinent medications:  Current pertinent medications:   New to me   Other issues: HFrEF, HTN    PLAN     -using and benefiting from CPAP therapy  -CPAP has reached the end of its usable life and patient will need a new one  -CPAP and supplies ordered  -discussed JOHN and CPAP with patient in detail, including possible complications of untreated JOHN like heart attack/stroke  -advised on strict driving precautions; advised never to drive drowsy    Advised  on plan of care. Answered all patient questions. Patient verbalized understanding and voiced agreement with plan of care.     RTC will need follow up 31-90 days after receiving new CPAP machine for compliance      The patient was given open opportunity to ask questions and/or express concerns about treatment plan. All questions/concerns were discussed.     Two patient identifiers used prior to evaluation.

## 2024-02-07 ENCOUNTER — TELEPHONE (OUTPATIENT)
Dept: CARDIOLOGY | Facility: CLINIC | Age: 79
End: 2024-02-07
Payer: MEDICARE

## 2024-02-07 NOTE — TELEPHONE ENCOUNTER
"[facility]) weekly phone follow up / triage call completed.     TCC RN Navigator spoke with PT    Current Patient reported weight: 159 lbs   Patient Goal Weight: (160 lbs)  Recent Patient reported blood pressure and heart rate: Did not check today but it has been good    Pt reports the following:  []  Shortness of Breath with Activity  []  Shortness of Breath at rest   []  Fatigue  []  Edema   [] Chest pain or tightness  [] Weight Increase since discharge  [x] None of the above    Pt reports using "Daily weight and symptom tracker".    Pt reports being in the GREEN(color) Zone. If in yellow/red, reminded that they should be calling HFTCC today or now.     Medications:   Medication compliance reviewed with pt.  Pt reports having medication list available and has all medications at home for use per list. Just got refills    Education:   Confirmed pt still has "[facility] Home Care Guide"  . YES    Reminded of key points as listed below.     Recommend 2 -3 gram sodium restriction and 1500 cc-2000 cc fluid restriction.  Encourage physical activity with graded exercise program.  Requested patient to weigh themselves daily, and to notify us if their weight increases by more than 3 lbs in 1 day or 5 lbs in 3 days.   Reminded to use "Daily weight and symptom tracker".  Even if pt does not have a scale, to use symptom tracker.       Watch for these Signs and Symptoms: If any of these occur, contact HFTCC immediately:   Increase in shortness of breath with movement   Increase in swelling in your legs and ankles   Weight gain of more than 3 pounds in a day or 5 pounds in 3 days.   Difficulty breathing when you are lying down   Worsening fatigue or tiredness   Stomach bloating, a full feeling or a loss of appetite   Increased coughing--especially when you are lying down      Pt was able to verbalize back to RN in their own words correct diet/fluid restrictions, " necessity for exercise, warning signs and symptoms, when and how to contact their HFTCC team.      Pt reminded of upcoming appointment.  PT reports they will attend. Sees Adin Toribio 2-8-24. Next HFT[facility] appt 2-15-24      Pt reminded of how and when to contact HFT[facility]:  [phone] (Mon-Fri, 8a-5p) & for urgent issues on the weekend to page the Heart Transplant MD on call.  Pt also encouraged utilize [facility] messaging as well.      Pt  verbalized understanding and in agreement of plan.       Will follow up with pt at next clinic visit and RN navigator available for pt questions, issues or concerns.     No questions at this time.

## 2024-02-08 ENCOUNTER — OFFICE VISIT (OUTPATIENT)
Dept: CARDIOLOGY | Facility: CLINIC | Age: 79
End: 2024-02-08
Payer: MEDICARE

## 2024-02-08 VITALS
WEIGHT: 159.81 LBS | OXYGEN SATURATION: 98 % | HEIGHT: 65 IN | DIASTOLIC BLOOD PRESSURE: 65 MMHG | BODY MASS INDEX: 26.63 KG/M2 | HEART RATE: 94 BPM | SYSTOLIC BLOOD PRESSURE: 104 MMHG

## 2024-02-08 DIAGNOSIS — I50.20 HFREF (HEART FAILURE WITH REDUCED EJECTION FRACTION): Primary | ICD-10-CM

## 2024-02-08 DIAGNOSIS — I10 PRIMARY HYPERTENSION: ICD-10-CM

## 2024-02-08 DIAGNOSIS — I50.9 ACUTE ON CHRONIC CONGESTIVE HEART FAILURE, UNSPECIFIED HEART FAILURE TYPE: ICD-10-CM

## 2024-02-08 DIAGNOSIS — I07.1 SEVERE TRICUSPID REGURGITATION: ICD-10-CM

## 2024-02-08 DIAGNOSIS — I70.0 AORTIC ATHEROSCLEROSIS: ICD-10-CM

## 2024-02-08 DIAGNOSIS — J45.50 SEVERE PERSISTENT ASTHMA WITHOUT COMPLICATION: ICD-10-CM

## 2024-02-08 DIAGNOSIS — I34.0 SEVERE MITRAL REGURGITATION: ICD-10-CM

## 2024-02-08 DIAGNOSIS — I50.42 CHRONIC COMBINED SYSTOLIC AND DIASTOLIC HEART FAILURE: ICD-10-CM

## 2024-02-08 DIAGNOSIS — I50.9 NEW ONSET OF CONGESTIVE HEART FAILURE: ICD-10-CM

## 2024-02-08 PROCEDURE — 1126F AMNT PAIN NOTED NONE PRSNT: CPT | Mod: HCNC,CPTII,GC,S$GLB | Performed by: STUDENT IN AN ORGANIZED HEALTH CARE EDUCATION/TRAINING PROGRAM

## 2024-02-08 PROCEDURE — 3074F SYST BP LT 130 MM HG: CPT | Mod: HCNC,CPTII,GC,S$GLB | Performed by: STUDENT IN AN ORGANIZED HEALTH CARE EDUCATION/TRAINING PROGRAM

## 2024-02-08 PROCEDURE — 3288F FALL RISK ASSESSMENT DOCD: CPT | Mod: HCNC,CPTII,GC,S$GLB | Performed by: STUDENT IN AN ORGANIZED HEALTH CARE EDUCATION/TRAINING PROGRAM

## 2024-02-08 PROCEDURE — 1101F PT FALLS ASSESS-DOCD LE1/YR: CPT | Mod: HCNC,CPTII,GC,S$GLB | Performed by: STUDENT IN AN ORGANIZED HEALTH CARE EDUCATION/TRAINING PROGRAM

## 2024-02-08 PROCEDURE — 1157F ADVNC CARE PLAN IN RCRD: CPT | Mod: HCNC,CPTII,GC,S$GLB | Performed by: STUDENT IN AN ORGANIZED HEALTH CARE EDUCATION/TRAINING PROGRAM

## 2024-02-08 PROCEDURE — 3078F DIAST BP <80 MM HG: CPT | Mod: HCNC,CPTII,GC,S$GLB | Performed by: STUDENT IN AN ORGANIZED HEALTH CARE EDUCATION/TRAINING PROGRAM

## 2024-02-08 PROCEDURE — 99214 OFFICE O/P EST MOD 30 MIN: CPT | Mod: HCNC,GC,S$GLB, | Performed by: STUDENT IN AN ORGANIZED HEALTH CARE EDUCATION/TRAINING PROGRAM

## 2024-02-08 PROCEDURE — 1159F MED LIST DOCD IN RCRD: CPT | Mod: HCNC,CPTII,GC,S$GLB | Performed by: STUDENT IN AN ORGANIZED HEALTH CARE EDUCATION/TRAINING PROGRAM

## 2024-02-08 PROCEDURE — 1160F RVW MEDS BY RX/DR IN RCRD: CPT | Mod: HCNC,CPTII,GC,S$GLB | Performed by: STUDENT IN AN ORGANIZED HEALTH CARE EDUCATION/TRAINING PROGRAM

## 2024-02-08 PROCEDURE — 99999 PR PBB SHADOW E&M-EST. PATIENT-LVL V: CPT | Mod: PBBFAC,HCNC,GC, | Performed by: STUDENT IN AN ORGANIZED HEALTH CARE EDUCATION/TRAINING PROGRAM

## 2024-02-08 NOTE — PROGRESS NOTES
Cardiology Clinic Note  Reason for Visit: MR    KIMBERLY:   79 yo with a PMHx of HFrEF (EF 23% 1/2024), severe asthma, HTN who presents to clinic for evaluation    She has been seen in [facility] numerous times.  Last seen she was complaining of SOB.  She had an echo performed showing a reduced EF, mod-sev MR and mod-sev TR. . She had a SPECT ordered which was equivocal due to soft tissue shadow.  She presents today for follow up.  She has no complaints.  She is walking 35min 3x a week and having no issues.  No orthopnea.  LE swelling no longer present.  She is taking her lasix and doing a low salt low fluid diet.  She denies any palpitations or chest pain.      ROS:    Constitution: Negative for fever, chills, weight loss or gain.   HENT: Negative for sore throat, rhinorrhea, or headache.  Eyes: Negative for blurred or double vision.   Cardiovascular: See above  Pulmonary: Negative for SOB   Gastrointestinal: Negative for abdominal pain, nausea, vomiting, or diarrhea.   : Negative for dysuria.   Neurological: Negative for focal weakness or sensory changes.  PMH:     Past Medical History:   Diagnosis Date    Allergy     Asthma     Chronic diastolic heart failure 4/5/2018    Glaucoma suspect     Hyperlipidemia     Hypertension     Neuromuscular disorder     NSTEMI (non-ST elevated myocardial infarction) 1/1/2024    JOHN on CPAP     Osteoporosis     Other neutropenia 8/31/2023    Recurrent sinusitis 3/25/2014    Recurrent upper respiratory infection (URI)     Urticaria      Past Surgical History:   Procedure Laterality Date    CATARACT EXTRACTION W/  INTRAOCULAR LENS IMPLANT Right 2/11/2021    Procedure: EXTRACTION, CATARACT, WITH IOL INSERTION;  Surgeon: John Merino MD;  Location: [facility] OR;  Service: Ophthalmology;  Laterality: Right;    CATARACT EXTRACTION W/  INTRAOCULAR LENS IMPLANT Left 3/4/2021    Procedure: EXTRACTION, CATARACT, WITH IOL INSERTION;  Surgeon: John Merino MD;  Location: [facility] OR;  Service:  Ophthalmology;  Laterality: Left;    COLONOSCOPY N/A 5/18/2018    Procedure: COLONOSCOPY;  Surgeon: Calixto Brian MD;  Location: [facility] ENDO (4TH FLR);  Service: Endoscopy;  Laterality: N/A;    COLONOSCOPY N/A 10/24/2023    Procedure: COLONOSCOPY;  Surgeon: Ward Merino MD;  Location: [facility] ENDO (4TH FLR);  Service: Endoscopy;  Laterality: N/A;  ref by / golytely [facility] [id number]  10/17-lvm for precall-MS  10/20-precall complete-MS    HYSTERECTOMY      total    OOPHORECTOMY      ROTATOR CUFF REPAIR Left     SINUS SURGERY       Allergies:     Review of patient's allergies indicates:   Allergen Reactions    Carvedilol Shortness Of Breath    Metoprolol Shortness Of Breath    Adhesive      PAPER TAPE    Diazepam Other (See Comments)     Nervous, jittery    Gabapentin      Nervous      Keppra [levetiracetam]      nervous    Mold      sneezing    Calcium channel blocking agents-dihydropyridines Other (See Comments)     Leg swelling     Thiazides Other (See Comments)     Hyponatremia      Medications:     Current Outpatient Medications on File Prior to Visit   Medication Sig Dispense Refill    ADVAIR DISKUS 500-50 mcg/dose DsDv diskus inhaler INHALE 1 PUFF TWICE DAILY 1 each 11    albuterol (VENTOLIN HFA) 90 mcg/actuation inhaler Inhale 2 puffs into the lungs every 6 (six) hours as needed for Wheezing. Rescue 8 g 11    albuterol-ipratropium (DUO-NEB) 2.5 mg-0.5 mg/3 mL nebulizer solution Take 3 mLs by nebulization every 6 (six) hours as needed for Wheezing. 75 mL 0    azelastine (ASTELIN) 137 mcg (0.1 %) nasal spray 2 sprays (274 mcg total) by Nasal route 2 (two) times daily. 30 mL 11    budesonide-formoterol 160-4.5 mcg (SYMBICORT) 160-4.5 mcg/actuation HFAA Inhale 2 puffs into the lungs every 12 (twelve) hours. Controller 10.2 g 5    carboxymethylcellulose sodium (REFRESH TEARS) 0.5 % Drop Apply 1 drop to eye 3 (three) times daily as needed (dry eyes). 30 mL 11    cholecalciferol, vitamin D3, 10,000  unit Cap Take 360 mg by mouth once daily. Take 6 capsules (6,000 units total) by mouth once daily      COD LIVER OIL ORAL Take 1 tablet by mouth once daily.      empagliflozin (JARDIANCE) 10 mg tablet Take 1 tablet (10 mg total) by mouth once daily. 30 tablet 11    ferrous sulfate 325 (65 FE) MG EC tablet Take 1 tablet (325 mg total) by mouth 3 (three) times daily with meals. 270 tablet 3    fluticasone propionate (FLONASE) 50 mcg/actuation nasal spray 2 sprays (100 mcg total) by Each Nostril route 2 (two) times a day. 16 g 11    furosemide (LASIX) 40 MG tablet Take 1 tablet (40 mg total) by mouth 2 (two) times daily. 60 tablet 11    guaiFENesin (MUCINEX) 600 mg 12 hr tablet Take 1 tablet (600 mg total) by mouth 2 (two) times daily. 60 tablet 5    milk thistle 150 mg Cap Take 1 capsule by mouth once daily. 90 each 3    multivitamin (THERAGRAN) per tablet Take 1 tablet by mouth once daily.      predniSONE (DELTASONE) 5 MG tablet Take 1 tablet (5 mg total) by mouth once daily. for 75 doses 75 tablet 0    sacubitriL-valsartan (ENTRESTO)  mg per tablet Take 1 tablet by mouth 2 (two) times daily. 60 tablet 3    SENNA 8.6 mg tablet TAKE 2 TAB(S) BY MOUTH NIGHTLY AS NEEDED FOR CONSTIPATION      spironolactone (ALDACTONE) 25 MG tablet Take 1 tablet (25 mg total) by mouth once daily. 30 tablet 11    dupilumab 300 mg/2 mL PnIj Inject 300 mg into the skin every 14 (fourteen) days. (Patient not taking: Reported on 1/24/2024) 4 mL 12    tretinoin (RETIN-A) 0.05 % cream Apply topically every evening. Start with every other night and move up to nightly after 2 weeks if not too dry. (Patient not taking: Reported on 1/24/2024) 20 g 2     No current facility-administered medications on file prior to visit.     Social History:     Social History     Tobacco Use    Smoking status: Former     Current packs/day: 0.00     Average packs/day: 0.3 packs/day for 40.0 years (10.0 ttl pk-yrs)     Types: Cigarettes     Start date: 1/1/1965  "    Quit date: 2005     Years since quittin.1    Smokeless tobacco: Never   Substance Use Topics    Alcohol use: Yes     Comment: rare beer     Family History:     Family History   Problem Relation Age of Onset    No Known Problems Mother         healthy in 90s    No Known Problems Father         accident-related death in 50s    Kidney cancer Sister     Cancer Sister         renal    Hypertension Daughter     Ovarian cancer Sister     Hypertension Daughter      Physical Exam:   /65   Pulse 94   Ht 5' 5" (1.651 m)   Wt 72.5 kg (159 lb 13.3 oz)   SpO2 98%   BMI 26.60 kg/m²    Wt Readings from Last 4 Encounters:   24 72.5 kg (159 lb 13.3 oz)   24 74.8 kg (165 lb)   24 75.2 kg (165 lb 12.6 oz)   24 78.5 kg (173 lb)         Constitutional: No distress, obese, conversant  HEENT: Sclera anicteric, PERRLA, EOMI  Neck: large v waves, no masses, good movement  CV: RRR, S1 and S2 normal, loud holosystolic murmur. Pulses 2+ and equal bilaterally in radial arteries, Cachorro's normal on right. Distal pulses are 2+ and equal in the femoral, DP and PT areas bilaterally  Pulm: Clear to auscultation bilaterally with symmetrical expansion. Chest wall palpated for reproduction of pain symptoms, and no pain was able to be produced on palpation or resistance exercises  GI: Abdomen soft, non-tender, good bowel sounds  Extremities: Both extremities intact and grossly normal, skin is warm, no edema noted  Skin: No ecchymosis, erythema, or ulcers  Psych: AOx3, appropriate affect  Neuro: CNII-XII intact, no focal deficits      Labs:     Lab Results   Component Value Date     2024    K 3.9 2024    CL 99 2024    CO2 28 2024    BUN 24 (H) 2024    CREATININE 1.3 2024    ANIONGAP 10 2024     Lab Results   Component Value Date    HGBA1C 5.3 10/18/2022     Lab Results   Component Value Date     (H) 2024    BNP 4,644 (H) 2024    BNP 1,856 (H) " 01/12/2024    Lab Results   Component Value Date    WBC 3.97 01/12/2024    HGB 13.6 01/12/2024    HCT 42.5 01/12/2024     01/12/2024    GRAN 2.2 01/12/2024    GRAN 56.4 01/12/2024     Lab Results   Component Value Date    CHOL 199 08/26/2021    HDL 86 (H) 08/26/2021    LDLCALC 103.6 08/26/2021    TRIG 47 08/26/2021          Imaging:       EF   Date Value Ref Range Status   01/02/2024 23 % Final   07/18/2023 50 % Final     Nuc Stress EF   Date Value Ref Range Status   01/22/2024 29 % Final     Nuc Rest EF   Date Value Ref Range Status   01/22/2024 32  Final         TTE:   Echo    Result Date: 1/2/2024    Left Ventricle: The left ventricle is mildly dilated. Ventricular mass   is normal. Normal wall thickness. Regional wall motion abnormalities   present. See diagram for wall motion findings. There is severely reduced   systolic function with a visually estimated ejection fraction of 20 - 25%.   Ejection fraction by visual approximation is 23%. Grade III diastolic   dysfunction.    Right Ventricle: Mild right ventricular enlargement. Wall thickness is   normal. Right ventricle wall motion  is normal. Systolic function is   borderline low.    Left Atrium: Left atrium is severely dilated.    Right Atrium: Right atrium is mildly dilated.    Aortic Valve: There is mild aortic valve sclerosis.    Mitral Valve: There is mild mitral annular calcification present. There   is moderate to  moderate-severe regurgitation.    Tricuspid Valve: There is moderate- severe  to severe (3-4+)   regurgitation.    Pulmonary Artery: There is mild pulmonary hypertension. The estimated   pulmonary artery systolic pressure is 44 mmHg.    IVC/SVC: Elevated venous pressure at 15 mmHg.    Pericardium: There is a trivial posterior effusion.        Echo    Result Date: 7/18/2023  · The left ventricle is moderately enlarged with eccentric hypertrophy and   low normal systolic function. The estimated ejection fraction is 50%.  · Normal right  ventricular size with normal right ventricular systolic   function.  · Indeterminate left ventricular diastolic function.  · Mild left atrial enlargement.  · Normal central venous pressure (3 mmHg).         Stress Test:  1/2024    Equivocal myocardial perfusion scan.    There is a mild intensity, small sized, equivocal perfusion abnormality that is reversible in the lateral wall(s). This finding is equivocal due to soft tissue shadow.    A PET stress exam is recommended if clinically indicated.    There are no other significant perfusion abnormalities.    The gated perfusion images showed an ejection fraction of 32% at rest. The gated perfusion images showed an ejection fraction of 29% post stress. Normal ejection fraction is greater than 47%.    There is moderate global hypokinesis at rest and stress.    LV cavity size is mildly enlarged at rest and mildly enlarged at stress.    The ECG portion of the study is negative for ischemia.    The patient reported no chest pain during the stress test.    During stress, frequent PVCs are noted.    There are no prior studies for comparison.  Assessment:    77 yo with a PMHx of HFrEF (EF 23% 1/2024), severe asthma, HTN who presents to clinic for evaluation     Plan:     #HFrEF:  Currently euvolemic  - unclear etiology as she had a EF of 50% last year but her valve disease has worsened.  Equivocal SPECT  - will order PET for new reduced EF  - continue GDMT: jardiance 10mg qD, aldactone 25mg qD, entresto 97-103mg BID.  Not on BB due to bronchospasm  - continue volume control with lasix 80mg qAM    #Severe MR:  #Severe TR:  Meets criteria with pHTN and reduced EF for consideration of valve repair/replacement  - will refer to CTS and IC clinic    #HTN:  BP at goal    #HLD:  - stable    #Severe asthma:  Cannot tolerate BB  - continue inhalers      Signed:  Adin Toribio MD  Cardiology Fellow  Pager - [phone]  [facility]  2/8/2024 9:01 AM

## 2024-02-12 NOTE — PROGRESS NOTES
HF TCC Provider Note (Follow-up) Consult Note      HPI:     Patient reports SOB now resolved. Walking daily, walked ~35 mins the other day without appreciable SOB and pace normal             Patient sleeps on 2 number of pillows with CPAP              Patient wakes up SOB, has to get out of bed, associated cough- denies              Palpitations - denies              Dizzy, light-headed, pre-syncope or syncope- denies              Since discharge frequency of performing weights, home weight and weight change- performing daily weights. 172lbs on hospital discharge. 166.8lbs last visit. Downtrending and now stabilized at 157-160lbs              Other information felt pertinent to HPI: Ms. Michela Franco is a 79 yo with a PMHx of HFpEF (EF 50% 7/2023), severe asthma, HTN who presents to third H[facility]CC visit following recent admission for ADHF and new HFrEF. On ED presentation, BNP was elevated 4700 and trop 0.489. CXR was concerning for pulmonary edema. TTE during admission showed new HFrEF (EF 20-25%) with G3DD and WMA. Cardiology consulted and she was admitted to CCU for concern of cardiogenic shock and started on a  gtt and lasix gtt. She was transitioned off of the Lasix gtt to IV Lasix pushes and then to PO Lasix. There was concern for community-acquired pneumonia given consolidation on CXR despite diuresis. She was started on CAP coverage antibiotics. GDMT initiated prior to discharge with exception of BB due to past h/o bronchospasm.     1/17/24: Last visit we started jardiance 10mg daily. Today she reports worsening SHAH starting last night with associated cough and wheezing. Reports taking breathing treatment this morning without appreciable improvement. Otherwise denies weight gain on home scale/worsening swelling.    1/24/24: Last visit we administered lasix 80mg IVP in clinic. Today she reports SOB, orthopnea, wheezing now resolved. Down 6lbs on home scale. Weight now stabilized at 160lbs.    2/15/24: Today  she has no acute complaints. Reports breathing well, denies appreciable BLE edema. Since last visit she followed with [facility] who referred to IC and CTS for MR. PET Stress and repeat TTE also scheduled. Taking lasix 80mg in the morning.     PHYSICAL:   Vitals:    02/15/24 0858   BP: 138/69   Pulse: 89        @[id number](3)@    JVD: no  Heart rhythm: regular  Cardiac murmur: No    S3: no  S4: no  Lungs: clear  Hepatojugular reflux: no  Edema: no      Lab Results   Component Value Date     02/15/2024    K 4.2 02/15/2024    MG 1.9 02/15/2024     02/15/2024    CO2 27 02/15/2024    BUN 37 (H) 02/15/2024    CREATININE 1.1 02/15/2024     02/15/2024    CALCIUM 9.9 02/15/2024    AST 26 02/15/2024    ALT 18 02/15/2024    ALBUMIN 3.2 (L) 02/15/2024    PROT 7.2 02/15/2024    BILITOT 0.6 02/15/2024     Lab Results   Component Value Date     (H) 02/15/2024     (H) 01/24/2024    BNP 4,644 (H) 01/17/2024       ASSESSMENT: HFrEF     PLAN:      Patient Instructions:   Instruct the patient to notify this clinic if HH, a physician or an advanced care provider wants to change medication one of their HF medications   Activity and Diet restrictions:   Recommend 2-3 gram sodium restriction and 1500cc- 2000cc fluid restriction.  Encourage physical activity with graded exercise program.  Requested patient to weigh themselves daily, and to notify us if their weight increases by more than 3 lbs in 1 day or 5 lbs in 3 days.    Assigned dry weight on home scale: 160lbs  Medication changes (include current dose and changed dose): NYHA Class II symptoms. Well compensated on exam. Continue current GDMT. Not on BB therapy 2/2 history of severe asthma and bronchospasm.    Upcoming labs and date anticipated: pt completed [facility] program with no hospital readmissions for 31 days. Now established with [facility] with plan for PET Stress for further ischemic eval along with repeat TTE and IC/CTS referrals for MR Phillip  YUNI Nobles

## 2024-02-13 NOTE — PROGRESS NOTES
Subjective:      Patient ID: Michela Franco is a 78 y.o. female.    Chief Complaint: No chief complaint on file.      HPI:  Michela Franco is a 78 y.o. female who presents to an initial surgical evaluation for CAD. Medical condition include HFrEF (EF 23% 1/2024), severe asthma, HTN. She was admitted 1/1-1/8/24  for ADHF and new HFrEF. On ED presentation, BNP was elevated 4700 and trop 0.489. CXR was concerning for pulmonary edema. TTE during admission showed new HFrEF (EF 20-25%) with G3DD and WMA, mod-sev MR and mod-sev TR. Cardiology consulted and she was admitted to CCU for concern of cardiogenic shock and started on a  gtt and lasix gtt. She was transitioned off of the Lasix gtt to IV Lasix pushes and then to PO Lasix. There was concern for community-acquired pneumonia given consolidation on CXR despite diuresis. She was started on CAP coverage antibiotics. GDMT initiated prior to discharge with exception of BB due to past h/o bronchospasm.  Unclear etiology of CHF as she had a EF of 50% last year but her valve disease has worsened.       She is walking 35 min 3x a week and having no issues.  Independently perform ADLs. No Assistive devices for walking. Denies prior history of strokes, stents,blood clots, or sternotomies.  No orthopnea.  LE swelling no longer present.  She is taking her lasix and doing a low salt low fluid diet.  She denies any palpitations or chest pain. Denies tobacco, EtoH, or illicit drug use. Former smoker, quit 2005.      Family and social history reviewed    Review of patient's allergies indicates:   Allergen Reactions    Carvedilol Shortness Of Breath    Metoprolol Shortness Of Breath    Adhesive      PAPER TAPE    Diazepam Other (See Comments)     Nervous, jittery    Gabapentin      Nervous      Keppra [levetiracetam]      nervous    Mold      sneezing    Calcium channel blocking agents-dihydropyridines Other (See Comments)     Leg swelling     Thiazides Other (See Comments)      Hyponatremia      Past Medical History:   Diagnosis Date    Allergy     Asthma     Chronic diastolic heart failure 4/5/2018    Glaucoma suspect     Hyperlipidemia     Hypertension     Neuromuscular disorder     NSTEMI (non-ST elevated myocardial infarction) 1/1/2024    JHON on CPAP     Osteoporosis     Other neutropenia 8/31/2023    Recurrent sinusitis 3/25/2014    Recurrent upper respiratory infection (URI)     Urticaria      Past Surgical History:   Procedure Laterality Date    CATARACT EXTRACTION W/  INTRAOCULAR LENS IMPLANT Right 2/11/2021    Procedure: EXTRACTION, CATARACT, WITH IOL INSERTION;  Surgeon: John Merino MD;  Location: [facility] OR;  Service: Ophthalmology;  Laterality: Right;    CATARACT EXTRACTION W/  INTRAOCULAR LENS IMPLANT Left 3/4/2021    Procedure: EXTRACTION, CATARACT, WITH IOL INSERTION;  Surgeon: John Merino MD;  Location: [facility] OR;  Service: Ophthalmology;  Laterality: Left;    COLONOSCOPY N/A 5/18/2018    Procedure: COLONOSCOPY;  Surgeon: Calixto Brian MD;  Location: [facility] ENDO (4TH FLR);  Service: Endoscopy;  Laterality: N/A;    COLONOSCOPY N/A 10/24/2023    Procedure: COLONOSCOPY;  Surgeon: Ward Merino MD;  Location: [facility] ENDO ([address] [place] [address]);  Service: Endoscopy;  Laterality: N/A;  ref by / golytely [facility] [id number]  10/17-lvm for precall-MS  10/20-precall complete-MS    HYSTERECTOMY      total    OOPHORECTOMY      ROTATOR CUFF REPAIR Left     SINUS SURGERY       Family History       Problem Relation (Age of Onset)    Cancer Sister    Hypertension Daughter, Daughter    Kidney cancer Sister    No Known Problems Mother, Father    Ovarian cancer Sister          Social History     Socioeconomic History    Marital status: Single   Occupational History    Occupation:      Comment: [employer] for [employer]   Tobacco Use    Smoking status: Former     Current packs/day: 0.00     Average packs/day: 0.3 packs/day for 40.0 years (10.0 ttl pk-yrs)     Types:  Cigarettes     Start date: 1965     Quit date: 2005     Years since quittin.1    Smokeless tobacco: Never   Substance and Sexual Activity    Alcohol use: Yes     Comment: rare beer    Drug use: No    Sexual activity: Not Currently   Social History Narrative         Social Determinants of Health     Financial Resource Strain: Low Risk  (2024)    Overall Financial Resource Strain (CARDIA)     Difficulty of Paying Living Expenses: Not hard at all   Food Insecurity: No Food Insecurity (2024)    Hunger Vital Sign     Worried About Running Out of Food in the Last Year: Never true     Ran Out of Food in the Last Year: Never true   Transportation Needs: No Transportation Needs (2024)    PRAPARE - Transportation     Lack of Transportation (Medical): No     Lack of Transportation (Non-Medical): No   Physical Activity: Sufficiently Active (2024)    Exercise Vital Sign     Days of Exercise per Week: 7 days     Minutes of Exercise per Session: 30 min   Stress: No Stress Concern Present (2024)    [employer] [place] - Occupational Stress Questionnaire     Feeling of Stress : Only a little   Social Connections: Moderately Integrated (2024)    Social Connection and Isolation Panel [NHANES]     Frequency of Communication with Friends and Family: Three times a week     Frequency of Social Gatherings with Friends and Family: Twice a week     Attends Congregation Services: 1 to 4 times per year     Active Member of Clubs or Organizations: No     Attends Club or Organization Meetings: 1 to 4 times per year     Marital Status: Never    Housing Stability: Low Risk  (2024)    Housing Stability Vital Sign     Unable to Pay for Housing in the Last Year: No     Number of Places Lived in the Last Year: 1     Unstable Housing in the Last Year: No       Current medications Reviewed  Current Outpatient Medications on File Prior to Visit   Medication Sig Dispense Refill     ADVAIR DISKUS 500-50 mcg/dose DsDv diskus inhaler INHALE 1 PUFF TWICE DAILY 1 each 11    albuterol (VENTOLIN HFA) 90 mcg/actuation inhaler Inhale 2 puffs into the lungs every 6 (six) hours as needed for Wheezing. Rescue 8 g 11    albuterol-ipratropium (DUO-NEB) 2.5 mg-0.5 mg/3 mL nebulizer solution Take 3 mLs by nebulization every 6 (six) hours as needed for Wheezing. 75 mL 0    azelastine (ASTELIN) 137 mcg (0.1 %) nasal spray 2 sprays (274 mcg total) by Nasal route 2 (two) times daily. 30 mL 11    budesonide-formoterol 160-4.5 mcg (SYMBICORT) 160-4.5 mcg/actuation HFAA Inhale 2 puffs into the lungs every 12 (twelve) hours. Controller 10.2 g 5    carboxymethylcellulose sodium (REFRESH TEARS) 0.5 % Drop Apply 1 drop to eye 3 (three) times daily as needed (dry eyes). 30 mL 11    cholecalciferol, vitamin D3, 10,000 unit Cap Take 360 mg by mouth once daily. Take 6 capsules (6,000 units total) by mouth once daily      COD LIVER OIL ORAL Take 1 tablet by mouth once daily.      dupilumab 300 mg/2 mL PnIj Inject 300 mg into the skin every 14 (fourteen) days. (Patient not taking: Reported on 1/24/2024) 4 mL 12    empagliflozin (JARDIANCE) 10 mg tablet Take 1 tablet (10 mg total) by mouth once daily. 30 tablet 11    ferrous sulfate 325 (65 FE) MG EC tablet Take 1 tablet (325 mg total) by mouth 3 (three) times daily with meals. 270 tablet 3    fluticasone propionate (FLONASE) 50 mcg/actuation nasal spray 2 sprays (100 mcg total) by Each Nostril route 2 (two) times a day. 16 g 11    furosemide (LASIX) 40 MG tablet Take 1 tablet (40 mg total) by mouth 2 (two) times daily. 60 tablet 11    guaiFENesin (MUCINEX) 600 mg 12 hr tablet Take 1 tablet (600 mg total) by mouth 2 (two) times daily. 60 tablet 5    milk thistle 150 mg Cap Take 1 capsule by mouth once daily. 90 each 3    multivitamin (THERAGRAN) per tablet Take 1 tablet by mouth once daily.      predniSONE (DELTASONE) 5 MG tablet Take 1 tablet (5 mg total) by mouth once  daily. for 75 doses 75 tablet 0    sacubitriL-valsartan (ENTRESTO)  mg per tablet Take 1 tablet by mouth 2 (two) times daily. 60 tablet 3    SENNA 8.6 mg tablet TAKE 2 TAB(S) BY MOUTH NIGHTLY AS NEEDED FOR CONSTIPATION      spironolactone (ALDACTONE) 25 MG tablet Take 1 tablet (25 mg total) by mouth once daily. 30 tablet 11    tretinoin (RETIN-A) 0.05 % cream Apply topically every evening. Start with every other night and move up to nightly after 2 weeks if not too dry. (Patient not taking: Reported on 1/24/2024) 20 g 2     No current facility-administered medications on file prior to visit.       Review of Systems   Constitutional:  Negative for activity change, appetite change, fatigue and fever.   HENT:  Negative for nosebleeds.    Respiratory:  Negative for cough and shortness of breath.    Cardiovascular:  Negative for chest pain, palpitations and leg swelling.   Gastrointestinal:  Negative for abdominal distention, abdominal pain and nausea.   Genitourinary:  Negative for frequency.   Musculoskeletal:  Negative for arthralgias and myalgias.   Skin:  Negative for rash.   Neurological:  Negative for dizziness and numbness.   Hematological:  Does not bruise/bleed easily.     Objective:   Physical Exam  Constitutional:       Appearance: Normal appearance.   HENT:      Head: Normocephalic and atraumatic.   Eyes:      Extraocular Movements: Extraocular movements intact.   Cardiovascular:      Rate and Rhythm: Normal rate.      Heart sounds: Murmur heard.   Pulmonary:      Effort: Pulmonary effort is normal.   Abdominal:      General: Abdomen is flat.      Palpations: Abdomen is soft.   Musculoskeletal:         General: Normal range of motion.      Cervical back: Normal range of motion.   Skin:     General: Skin is warm and dry.      Capillary Refill: Capillary refill takes less than 2 seconds.      Coloration: Skin is not pale.   Neurological:      General: No focal deficit present.      Mental Status: She is  alert.         Diagnostic Results: reviewed   Stress test 1/22/24    Equivocal myocardial perfusion scan.    There is a mild intensity, small sized, equivocal perfusion abnormality that is reversible in the lateral wall(s). This finding is equivocal due to soft tissue shadow.    A PET stress exam is recommended if clinically indicated.    There are no other significant perfusion abnormalities.    The gated perfusion images showed an ejection fraction of 32% at rest. The gated perfusion images showed an ejection fraction of 29% post stress. Normal ejection fraction is greater than 47%.    There is moderate global hypokinesis at rest and stress.    LV cavity size is mildly enlarged at rest and mildly enlarged at stress.    The ECG portion of the study is negative for ischemia.    The patient reported no chest pain during the stress test.    During stress, frequent PVCs are noted.    There are no prior studies for comparison.    Echo 1/2/2024    Left Ventricle: The left ventricle is mildly dilated. Ventricular mass is normal. Normal wall thickness. Regional wall motion abnormalities present. See diagram for wall motion findings. There is severely reduced systolic function with a visually estimated ejection fraction of 20 - 25%. Ejection fraction by visual approximation is 23%. Grade III diastolic dysfunction.    Right Ventricle: Mild right ventricular enlargement. Wall thickness is normal. Right ventricle wall motion  is normal. Systolic function is borderline low.    Left Atrium: Left atrium is severely dilated.    Right Atrium: Right atrium is mildly dilated.    Aortic Valve: There is mild aortic valve sclerosis.    Mitral Valve: There is mild mitral annular calcification present. There is moderate to  moderate-severe regurgitation.    Tricuspid Valve: There is moderate- severe  to severe (3-4+) regurgitation.    Pulmonary Artery: There is mild pulmonary hypertension. The estimated pulmonary artery systolic pressure  is 44 mmHg.    IVC/SVC: Elevated venous pressure at 15 mmHg.    Pericardium: There is a trivial posterior effusion.  Assessment:   Mitral insufficiency   Tricuspid insufficiency   Plan:     I have seen the patient and reviewed the physician assistant's note above. I have personally interviewed and examined the patient at bedside and agree with the findings.     Ms. Franco is a pleasant 78 y.o. female with severe left ventricular dysfunction and severe mitral regurgitation.  With her 20% ejection fraction AND severe mitral regurgitation, I recommend medical management vs mitraclip. She is not a candidate for surgery.      Shyam Perdue MD  Cardiothoracic Surgery  [facility]

## 2024-02-14 ENCOUNTER — OFFICE VISIT (OUTPATIENT)
Dept: CARDIOTHORACIC SURGERY | Facility: CLINIC | Age: 79
End: 2024-02-14
Payer: MEDICARE

## 2024-02-14 VITALS
SYSTOLIC BLOOD PRESSURE: 130 MMHG | OXYGEN SATURATION: 95 % | HEART RATE: 89 BPM | DIASTOLIC BLOOD PRESSURE: 74 MMHG | WEIGHT: 162.5 LBS | BODY MASS INDEX: 27.04 KG/M2 | RESPIRATION RATE: 16 BRPM

## 2024-02-14 DIAGNOSIS — I50.20 HFREF (HEART FAILURE WITH REDUCED EJECTION FRACTION): ICD-10-CM

## 2024-02-14 PROCEDURE — 1159F MED LIST DOCD IN RCRD: CPT | Mod: HCNC,CPTII,S$GLB, | Performed by: THORACIC SURGERY (CARDIOTHORACIC VASCULAR SURGERY)

## 2024-02-14 PROCEDURE — 1157F ADVNC CARE PLAN IN RCRD: CPT | Mod: HCNC,CPTII,S$GLB, | Performed by: THORACIC SURGERY (CARDIOTHORACIC VASCULAR SURGERY)

## 2024-02-14 PROCEDURE — 3075F SYST BP GE 130 - 139MM HG: CPT | Mod: HCNC,CPTII,S$GLB, | Performed by: THORACIC SURGERY (CARDIOTHORACIC VASCULAR SURGERY)

## 2024-02-14 PROCEDURE — 1126F AMNT PAIN NOTED NONE PRSNT: CPT | Mod: HCNC,CPTII,S$GLB, | Performed by: THORACIC SURGERY (CARDIOTHORACIC VASCULAR SURGERY)

## 2024-02-14 PROCEDURE — 99205 OFFICE O/P NEW HI 60 MIN: CPT | Mod: HCNC,S$GLB,, | Performed by: THORACIC SURGERY (CARDIOTHORACIC VASCULAR SURGERY)

## 2024-02-14 PROCEDURE — 99999 PR PBB SHADOW E&M-EST. PATIENT-LVL IV: CPT | Mod: PBBFAC,HCNC,, | Performed by: THORACIC SURGERY (CARDIOTHORACIC VASCULAR SURGERY)

## 2024-02-14 PROCEDURE — 3078F DIAST BP <80 MM HG: CPT | Mod: HCNC,CPTII,S$GLB, | Performed by: THORACIC SURGERY (CARDIOTHORACIC VASCULAR SURGERY)

## 2024-02-15 ENCOUNTER — LAB VISIT (OUTPATIENT)
Dept: LAB | Facility: HOSPITAL | Age: 79
End: 2024-02-15
Payer: MEDICARE

## 2024-02-15 ENCOUNTER — OFFICE VISIT (OUTPATIENT)
Dept: CARDIOLOGY | Facility: CLINIC | Age: 79
End: 2024-02-15
Payer: MEDICARE

## 2024-02-15 VITALS
DIASTOLIC BLOOD PRESSURE: 69 MMHG | HEART RATE: 89 BPM | SYSTOLIC BLOOD PRESSURE: 138 MMHG | WEIGHT: 161.69 LBS | BODY MASS INDEX: 26.94 KG/M2 | OXYGEN SATURATION: 98 % | HEIGHT: 65 IN

## 2024-02-15 DIAGNOSIS — I70.0 AORTIC ATHEROSCLEROSIS: ICD-10-CM

## 2024-02-15 DIAGNOSIS — R06.02 SOB (SHORTNESS OF BREATH): ICD-10-CM

## 2024-02-15 DIAGNOSIS — I50.9 NEW ONSET OF CONGESTIVE HEART FAILURE: ICD-10-CM

## 2024-02-15 DIAGNOSIS — I10 PRIMARY HYPERTENSION: ICD-10-CM

## 2024-02-15 DIAGNOSIS — J45.50 SEVERE PERSISTENT ASTHMA WITHOUT COMPLICATION: ICD-10-CM

## 2024-02-15 DIAGNOSIS — I50.20 HFREF (HEART FAILURE WITH REDUCED EJECTION FRACTION): Primary | ICD-10-CM

## 2024-02-15 LAB
ALBUMIN SERPL BCP-MCNC: 3.2 G/DL (ref 3.5–5.2)
ALP SERPL-CCNC: 72 U/L (ref 55–135)
ALT SERPL W/O P-5'-P-CCNC: 18 U/L (ref 10–44)
ANION GAP SERPL CALC-SCNC: 9 MMOL/L (ref 8–16)
AST SERPL-CCNC: 26 U/L (ref 10–40)
BILIRUB SERPL-MCNC: 0.6 MG/DL (ref 0.1–1)
BNP SERPL-MCNC: 215 PG/ML (ref 0–99)
BUN SERPL-MCNC: 37 MG/DL (ref 8–23)
CALCIUM SERPL-MCNC: 9.9 MG/DL (ref 8.7–10.5)
CHLORIDE SERPL-SCNC: 102 MMOL/L (ref 95–110)
CO2 SERPL-SCNC: 27 MMOL/L (ref 23–29)
CREAT SERPL-MCNC: 1.1 MG/DL (ref 0.5–1.4)
EST. GFR  (NO RACE VARIABLE): 52.1 ML/MIN/1.73 M^2
GLUCOSE SERPL-MCNC: 102 MG/DL (ref 70–110)
MAGNESIUM SERPL-MCNC: 1.9 MG/DL (ref 1.6–2.6)
PHOSPHATE SERPL-MCNC: 3.7 MG/DL (ref 2.7–4.5)
POTASSIUM SERPL-SCNC: 4.2 MMOL/L (ref 3.5–5.1)
PROT SERPL-MCNC: 7.2 G/DL (ref 6–8.4)
SODIUM SERPL-SCNC: 138 MMOL/L (ref 136–145)

## 2024-02-15 PROCEDURE — 1157F ADVNC CARE PLAN IN RCRD: CPT | Mod: HCNC,CPTII,S$GLB,

## 2024-02-15 PROCEDURE — 83880 ASSAY OF NATRIURETIC PEPTIDE: CPT | Mod: HCNC

## 2024-02-15 PROCEDURE — 3288F FALL RISK ASSESSMENT DOCD: CPT | Mod: HCNC,CPTII,S$GLB,

## 2024-02-15 PROCEDURE — 1159F MED LIST DOCD IN RCRD: CPT | Mod: HCNC,CPTII,S$GLB,

## 2024-02-15 PROCEDURE — 3078F DIAST BP <80 MM HG: CPT | Mod: HCNC,CPTII,S$GLB,

## 2024-02-15 PROCEDURE — 1101F PT FALLS ASSESS-DOCD LE1/YR: CPT | Mod: HCNC,CPTII,S$GLB,

## 2024-02-15 PROCEDURE — 84100 ASSAY OF PHOSPHORUS: CPT | Mod: HCNC

## 2024-02-15 PROCEDURE — 80053 COMPREHEN METABOLIC PANEL: CPT | Mod: HCNC

## 2024-02-15 PROCEDURE — 83735 ASSAY OF MAGNESIUM: CPT | Mod: HCNC

## 2024-02-15 PROCEDURE — 99214 OFFICE O/P EST MOD 30 MIN: CPT | Mod: HCNC,S$GLB,,

## 2024-02-15 PROCEDURE — 36415 COLL VENOUS BLD VENIPUNCTURE: CPT | Mod: HCNC

## 2024-02-15 PROCEDURE — 3075F SYST BP GE 130 - 139MM HG: CPT | Mod: HCNC,CPTII,S$GLB,

## 2024-02-15 PROCEDURE — 1160F RVW MEDS BY RX/DR IN RCRD: CPT | Mod: HCNC,CPTII,S$GLB,

## 2024-02-15 PROCEDURE — 1126F AMNT PAIN NOTED NONE PRSNT: CPT | Mod: HCNC,CPTII,S$GLB,

## 2024-02-15 PROCEDURE — 99999 PR PBB SHADOW E&M-EST. PATIENT-LVL V: CPT | Mod: PBBFAC,HCNC,,

## 2024-02-19 ENCOUNTER — TELEPHONE (OUTPATIENT)
Dept: ALLERGY | Facility: CLINIC | Age: 79
End: 2024-02-19
Payer: MEDICARE

## 2024-02-19 NOTE — TELEPHONE ENCOUNTER
----- Message from Greg Malik PharmD sent at 2/19/2024  2:43 PM CST -----  Regarding: FW: Fasenra / Nucala  Good afternoon,    Just following up on the below message.    Patient was denied for Fasenra PAP due to her income. Nucala has a higher income eligibility requirement and the patient would meet criteria for their program. Would it be appropriate to change to Nucala due to cost? If so, please send new Rx to [facility] to begin processing.      Thank you,    Greg Malik PharmD  Clinical Pharmacist   [facility]   P: [phone]    ----- Message -----  From: Greg Malik PharmD  Sent: 2/2/2024   2:46 PM CST  To: Joe King DO; #  Subject: FW: Fasenra / Nucala                             Good afternoon,    Just following up on the below message.    Thank you,    Greg Malik PharmD  Clinical Pharmacist   [facility]   P: [phone]    ----- Message -----  From: Greg Malik PharmD  Sent: 1/22/2024   3:29 PM CST  To: Joe King DO; #  Subject: Fasenra / Nucala                                 Good afternoon Dr King,    Unfortunately, the patient was denied for Fasenra PAP due to her income. Nucala has a higher income eligibility requirement and the patient would meet criteria for their program. Would it be appropriate to change to Nucala due to cost? If so, please send new Rx to [facility] to begin processing.    Thank you,    Greg Malik PharmD  Clinical Pharmacist   [facility]   P: [phone]

## 2024-02-20 ENCOUNTER — PATIENT MESSAGE (OUTPATIENT)
Dept: CARDIOLOGY | Facility: CLINIC | Age: 79
End: 2024-02-20

## 2024-02-20 ENCOUNTER — OFFICE VISIT (OUTPATIENT)
Dept: CARDIOLOGY | Facility: CLINIC | Age: 79
End: 2024-02-20
Payer: MEDICARE

## 2024-02-20 ENCOUNTER — EDUCATION (OUTPATIENT)
Dept: CARDIOLOGY | Facility: CLINIC | Age: 79
End: 2024-02-20
Payer: MEDICARE

## 2024-02-20 VITALS
HEART RATE: 86 BPM | BODY MASS INDEX: 27.73 KG/M2 | SYSTOLIC BLOOD PRESSURE: 110 MMHG | OXYGEN SATURATION: 98 % | WEIGHT: 166.44 LBS | HEIGHT: 65 IN | DIASTOLIC BLOOD PRESSURE: 69 MMHG

## 2024-02-20 DIAGNOSIS — J45.41 MODERATE PERSISTENT ASTHMA NOT DEPENDENT ON SYSTEMIC STEROIDS WITH ACUTE EXACERBATION: ICD-10-CM

## 2024-02-20 DIAGNOSIS — I10 PRIMARY HYPERTENSION: Primary | ICD-10-CM

## 2024-02-20 DIAGNOSIS — I25.10 CORONARY ARTERY DISEASE, UNSPECIFIED VESSEL OR LESION TYPE, UNSPECIFIED WHETHER ANGINA PRESENT, UNSPECIFIED WHETHER NATIVE OR TRANSPLANTED HEART: Primary | ICD-10-CM

## 2024-02-20 DIAGNOSIS — I70.0 AORTIC ATHEROSCLEROSIS: ICD-10-CM

## 2024-02-20 DIAGNOSIS — I50.20 HFREF (HEART FAILURE WITH REDUCED EJECTION FRACTION): ICD-10-CM

## 2024-02-20 PROCEDURE — 99215 OFFICE O/P EST HI 40 MIN: CPT | Mod: S$GLB,,, | Performed by: INTERNAL MEDICINE

## 2024-02-20 PROCEDURE — 1157F ADVNC CARE PLAN IN RCRD: CPT | Mod: CPTII,S$GLB,, | Performed by: INTERNAL MEDICINE

## 2024-02-20 PROCEDURE — 1160F RVW MEDS BY RX/DR IN RCRD: CPT | Mod: CPTII,S$GLB,, | Performed by: INTERNAL MEDICINE

## 2024-02-20 PROCEDURE — 99999 PR PBB SHADOW E&M-EST. PATIENT-LVL V: CPT | Mod: PBBFAC,HCNC,, | Performed by: INTERNAL MEDICINE

## 2024-02-20 PROCEDURE — 3078F DIAST BP <80 MM HG: CPT | Mod: CPTII,S$GLB,, | Performed by: INTERNAL MEDICINE

## 2024-02-20 PROCEDURE — 3288F FALL RISK ASSESSMENT DOCD: CPT | Mod: CPTII,S$GLB,, | Performed by: INTERNAL MEDICINE

## 2024-02-20 PROCEDURE — 1101F PT FALLS ASSESS-DOCD LE1/YR: CPT | Mod: CPTII,S$GLB,, | Performed by: INTERNAL MEDICINE

## 2024-02-20 PROCEDURE — 3074F SYST BP LT 130 MM HG: CPT | Mod: CPTII,S$GLB,, | Performed by: INTERNAL MEDICINE

## 2024-02-20 PROCEDURE — 1126F AMNT PAIN NOTED NONE PRSNT: CPT | Mod: CPTII,S$GLB,, | Performed by: INTERNAL MEDICINE

## 2024-02-20 PROCEDURE — 1159F MED LIST DOCD IN RCRD: CPT | Mod: CPTII,S$GLB,, | Performed by: INTERNAL MEDICINE

## 2024-02-20 RX ORDER — NAPROXEN SODIUM 220 MG/1
81 TABLET, FILM COATED ORAL DAILY
Qty: 90 TABLET | Refills: 3 | Status: SHIPPED | OUTPATIENT
Start: 2024-02-20 | End: 2024-02-20

## 2024-02-20 RX ORDER — CLOPIDOGREL BISULFATE 75 MG/1
75 TABLET ORAL DAILY
Qty: 90 TABLET | Refills: 3 | Status: ON HOLD | OUTPATIENT
Start: 2024-02-20 | End: 2024-03-06 | Stop reason: HOSPADM

## 2024-02-20 RX ORDER — DIPHENHYDRAMINE HCL 50 MG
50 CAPSULE ORAL ONCE
Status: CANCELLED | OUTPATIENT
Start: 2024-02-20 | End: 2024-02-20

## 2024-02-20 RX ORDER — CLOPIDOGREL BISULFATE 75 MG/1
75 TABLET ORAL DAILY
Qty: 90 TABLET | Refills: 3 | Status: SHIPPED | OUTPATIENT
Start: 2024-02-20 | End: 2024-02-20

## 2024-02-20 RX ORDER — SODIUM CHLORIDE 9 MG/ML
INJECTION, SOLUTION INTRAVENOUS CONTINUOUS
Status: CANCELLED | OUTPATIENT
Start: 2024-02-20 | End: 2024-02-20

## 2024-02-20 RX ORDER — NAPROXEN SODIUM 220 MG/1
81 TABLET, FILM COATED ORAL DAILY
Qty: 90 TABLET | Refills: 3 | Status: SHIPPED | OUTPATIENT
Start: 2024-02-20 | End: 2025-02-19

## 2024-02-20 NOTE — PROGRESS NOTES
"Referring provider: Aaareferral Self     Patient ID:  Michela Franco is a 78 y.o. y.o. female who presents for evaluation Coronary Artery Disease      Michela Franco is a 77 yo F with HTN, HLD, aortic atherosclerosis, asthma and newly diagnosed HFrEF who presents to clinic for evaluation of MR and low EF. She previously had a normal EF, however, EF was found to be 25% at admission in January with wma to inferior wall and severe MR. During her hospital stay she was found to be in cardiogenic shock and started on  in addition to diuresis. She had a SPECT that was equivocal and no further ischemic evaluation. She was then discharged on GDMT with outpt fu.     Today, she states she is doing well and no longer has SHAH. Denies orthopnea, pnd or le edema. She is now able to ambulate for mango. 30 minutes around a trail without stopping to rest. No other acute events.     Review of Systems   All other systems reviewed and are negative.     Objective:     /69 (BP Location: Right arm, Patient Position: Sitting, BP Method: Large (Automatic))   Pulse 86   Ht 5' 5" (1.651 m)   Wt 75.5 kg (166 lb 7.2 oz)   SpO2 98%   BMI 27.70 kg/m²     Physical Exam  Vitals and nursing note reviewed.   Constitutional:       Appearance: Normal appearance.   HENT:      Head: Normocephalic and atraumatic.      Right Ear: External ear normal.      Left Ear: External ear normal.      Nose: Nose normal.      Mouth/Throat:      Mouth: Mucous membranes are moist.   Eyes:      Extraocular Movements: Extraocular movements intact.      Pupils: Pupils are equal, round, and reactive to light.   Cardiovascular:      Rate and Rhythm: Normal rate and regular rhythm.      Pulses: Normal pulses.   Pulmonary:      Effort: Pulmonary effort is normal.   Abdominal:      General: Abdomen is flat.   Musculoskeletal:         General: Normal range of motion.      Cervical back: Normal range of motion.      Right lower leg: No edema.      Left lower leg: No " edema.   Skin:     General: Skin is warm and dry.      Capillary Refill: Capillary refill takes less than 2 seconds.   Neurological:      General: No focal deficit present.      Mental Status: She is alert and oriented to person, place, and time. Mental status is at baseline.     Labs:     Lab Results   Component Value Date     02/15/2024    K 4.2 02/15/2024     02/15/2024    CO2 27 02/15/2024    BUN 37 (H) 02/15/2024    CREATININE 1.1 02/15/2024    ANIONGAP 9 02/15/2024     Lab Results   Component Value Date    HGBA1C 5.3 10/18/2022     Lab Results   Component Value Date     (H) 02/15/2024     (H) 01/24/2024    BNP 4,644 (H) 01/17/2024       Lab Results   Component Value Date    WBC 3.97 01/12/2024    HGB 13.6 01/12/2024    HCT 42.5 01/12/2024     01/12/2024    GRAN 2.2 01/12/2024    GRAN 56.4 01/12/2024     Lab Results   Component Value Date    CHOL 199 08/26/2021    HDL 86 (H) 08/26/2021    LDLCALC 103.6 08/26/2021    TRIG 47 08/26/2021       Meds:     Current Outpatient Medications:     ADVAIR DISKUS 500-50 mcg/dose DsDv diskus inhaler, INHALE 1 PUFF TWICE DAILY, Disp: 1 each, Rfl: 11    albuterol (VENTOLIN HFA) 90 mcg/actuation inhaler, Inhale 2 puffs into the lungs every 6 (six) hours as needed for Wheezing. Rescue, Disp: 8 g, Rfl: 11    albuterol-ipratropium (DUO-NEB) 2.5 mg-0.5 mg/3 mL nebulizer solution, Take 3 mLs by nebulization every 6 (six) hours as needed for Wheezing., Disp: 75 mL, Rfl: 0    azelastine (ASTELIN) 137 mcg (0.1 %) nasal spray, 2 sprays (274 mcg total) by Nasal route 2 (two) times daily., Disp: 30 mL, Rfl: 11    budesonide-formoterol 160-4.5 mcg (SYMBICORT) 160-4.5 mcg/actuation HFAA, Inhale 2 puffs into the lungs every 12 (twelve) hours. Controller, Disp: 10.2 g, Rfl: 5    carboxymethylcellulose sodium (REFRESH TEARS) 0.5 % Drop, Apply 1 drop to eye 3 (three) times daily as needed (dry eyes)., Disp: 30 mL, Rfl: 11    cholecalciferol, vitamin D3, 10,000  unit Cap, Take 360 mg by mouth once daily. Take 6 capsules (6,000 units total) by mouth once daily, Disp: , Rfl:     COD LIVER OIL ORAL, Take 1 tablet by mouth once daily., Disp: , Rfl:     dupilumab 300 mg/2 mL PnIj, Inject 300 mg into the skin every 14 (fourteen) days., Disp: 4 mL, Rfl: 12    empagliflozin (JARDIANCE) 10 mg tablet, Take 1 tablet (10 mg total) by mouth once daily., Disp: 30 tablet, Rfl: 11    ferrous sulfate 325 (65 FE) MG EC tablet, Take 1 tablet (325 mg total) by mouth 3 (three) times daily with meals., Disp: 270 tablet, Rfl: 3    fluticasone propionate (FLONASE) 50 mcg/actuation nasal spray, 2 sprays (100 mcg total) by Each Nostril route 2 (two) times a day., Disp: 16 g, Rfl: 11    furosemide (LASIX) 40 MG tablet, Take 1 tablet (40 mg total) by mouth 2 (two) times daily., Disp: 60 tablet, Rfl: 11    guaiFENesin (MUCINEX) 600 mg 12 hr tablet, Take 1 tablet (600 mg total) by mouth 2 (two) times daily., Disp: 60 tablet, Rfl: 5    milk thistle 150 mg Cap, Take 1 capsule by mouth once daily., Disp: 90 each, Rfl: 3    multivitamin (THERAGRAN) per tablet, Take 1 tablet by mouth once daily., Disp: , Rfl:     predniSONE (DELTASONE) 5 MG tablet, Take 1 tablet (5 mg total) by mouth once daily. for 75 doses, Disp: 75 tablet, Rfl: 0    sacubitriL-valsartan (ENTRESTO)  mg per tablet, Take 1 tablet by mouth 2 (two) times daily., Disp: 60 tablet, Rfl: 3    SENNA 8.6 mg tablet, TAKE 2 TAB(S) BY MOUTH NIGHTLY AS NEEDED FOR CONSTIPATION, Disp: , Rfl:     spironolactone (ALDACTONE) 25 MG tablet, Take 1 tablet (25 mg total) by mouth once daily., Disp: 30 tablet, Rfl: 11    tretinoin (RETIN-A) 0.05 % cream, Apply topically every evening. Start with every other night and move up to nightly after 2 weeks if not too dry., Disp: 20 g, Rfl: 2    aspirin 81 MG Chew, Take 1 tablet (81 mg total) by mouth once daily., Disp: 90 tablet, Rfl: 3    clopidogreL (PLAVIX) 75 mg tablet, Take 1 tablet (75 mg total) by mouth once  daily., Disp: 90 tablet, Rfl: 3      Assessment & Plan:     Moderate persistent asthma not dependent on systemic steroids with acute exacerbation  -per pulm     HFrEF (heart failure with reduced ejection fraction)  -newly diagnosed at hospital admission in January with TTE with RWMA   -NYHA I on GDMT, but needs ischemic evaluation  -discussed LHC vs PET, and pt elects for LHC. She can cancel PET    --LHC +/- PCI, patient is a ENRIQUE candidate  - Anti-platelet Therapy: ASA / clopidogrel   - Access: Right radial  - Catheters: Shade  - Creatinine/CrCl: 1.1  - Allergies: No shellfish / Iodine allergy  - Pre-Hydration: NS  - Pre-Op Med: Bendaryl 50mg pO   - All patient's questions were answered.  -The risks, benefits and alternatives of the procedure were explained to the patient.   -The risks of coronary angiography include but are not limited to: bleeding, infection, heart rhythm abnormalities, allergic reactions, kidney injury and potential need for dialysis, stroke and death.   - Should stenting be indicated, the patient has agreed to dual anti-platelet therapy for 1-consecutive year with a drug-eluting stent and a minimum of 1-month with the use of a bare metal stent  - Additionally, pt is aware that non-compliance is likely to result in stent clotting with heart attack, heart failure, and/or death  -The risks of moderate sedation include hypotension, respiratory depression, arrhythmias, bronchospasm, and death.   - Informed consent was obtained and the  patient is agreeable to proceed with the procedure.      Aortic atherosclerosis  -monitor     HTN (hypertension)  -well controlled on gdmt       Masoud Solorzano MD   Interventional Cardiology     I have seen the patient, reviewed the Fellow's history and physical, assessment and plan. I have personally interviewed and examined the patient and agree with the findings.     Moderate persistent asthma not dependent on systemic steroids with acute exacerbation  -per pulm      HFrEF (heart failure with reduced ejection fraction)  -newly diagnosed at hospital admission in January with TTE with RWMA   -NYHA I on GDMT, but needs ischemic evaluation  -discussed LHC vs PET, and pt elects for LHC. She can cancel PET    --LHC +/- PCI, patient is a ENRIQUE candidate  - Anti-platelet Therapy: ASA / clopidogrel   - Access: Right radial  - Catheters: Shade  - Creatinine/CrCl: 1.1  - Allergies: No shellfish / Iodine allergy  - Pre-Hydration: NS  - Pre-Op Med: Bendaryl 50mg pO   - All patient's questions were answered.  -The risks, benefits and alternatives of the procedure were explained to the patient.   -The risks of coronary angiography include but are not limited to: bleeding, infection, heart rhythm abnormalities, allergic reactions, kidney injury and potential need for dialysis, stroke and death.   - Should stenting be indicated, the patient has agreed to dual anti-platelet therapy for 1-consecutive year with a drug-eluting stent and a minimum of 1-month with the use of a bare metal stent  - Additionally, pt is aware that non-compliance is likely to result in stent clotting with heart attack, heart failure, and/or death  -The risks of moderate sedation include hypotension, respiratory depression, arrhythmias, bronchospasm, and death.   - Informed consent was obtained and the  patient is agreeable to proceed with the procedure.      Aortic atherosclerosis  -monitor     HTN (hypertension)  -well controlled on gdmt     YUE Matthews MD

## 2024-02-20 NOTE — PROGRESS NOTES
If you need to change the date of your procedure, please call  Ly CROWRN [phone]     OUTPATIENT CATHETERIZATION INSTRUCTIONS     You have been scheduled for a procedure in the catheterization lab on  WEDNESDAY, MARCH 6, 2024.     Please report to the Cardiology Waiting Area on the Third floor of the hospital and check in at [facility]AL at 0630am.  You will then be taken to the SSCU (Short Stay Cardiac Unit) and prepared for your procedure. Please be aware that this is not the time of your procedure but the time you are to arrive. The procedures are scheduled on an hourly basis; however, emergency cases take precedence over all other cases.         1. NOTHING TO EAT AFTER MIDNIGHT 12AM the morning of the procedure.  You may take your medications (PREDNISONE, ENTRESTO) with small sips of water. SEE BELOW INSTRUCTIONS ON MEDICATIONS.     2.   NEW MED:   MEDS FROM PHARMACY AND BEGIN TAKING DAILY. Do not stop your Aspirin, Plavix, Effient, or Brilinta.  SEE BELOW.    3.  Diabetics: JARDIANCE, hold the morning of the procedure.      4. Heart Failure Patients:N/A        The procedure will take 1-2 hours to perform. After the procedure, you will return to SSCU on the third floor of the hospital. You will need to lie still (or keep your arm still) for the next 4 to 6 hours to minimize bleeding from the puncture site. Your family may remain in the room with you during this time.         You may be able to be discharged home that same afternoon if there is someone to drive you home and there were no complications. If you have one of the balloon, stent, or device procedures you may spend the night in the hospital. Your doctor will determine, based on your progress, the date and time of your discharge. The results of your procedure will be discussed with you before you are discharged. Any further testing or procedures will be scheduled for you either before you leave or you will be called with these appointments.   YOU  WILL NOT BE ABLE TO DRIVE HOME     If you should have any questions, concerns, or please call Ly [phone]        Special Instructions:  Drink plenty of water the day before and the day after the procedure to flush your kidneys.     Continue daily medications, including  Aspirin and Plavix  the morning of the procedure.  See Special instructions for other medications.     IMPORTANT:  HOLD The following medications as directed:    HOLD FLUID PILLS - LASIX and SPIROALDACTONE.    HOLD JARDIANCE the morning of the procedure.    BRING INHALERS TO HOSPITAL.    DO NOT STOP ASPIRIN OR PLAVIX.  Take the morning of the procedure.              THE ABOVE INSTRUCTIONS WERE GIVEN TO THE PATIENT VERBALLY AND THEY VERBALIZED UNDERSTANDING.  THEY DO NOT REQUIRE ANY SPECIAL NEEDS AND DO NOT HAVE ANY LEARNING BARRIERS.              Directions for Reporting to Cardiology Waiting Area in the Hospital  If you park in the Parking Garage:  Take elevators to the1st floor of the parking garage.  Continue past the gift shop, coffee shop, and piano.  Take a right and go to the gold elevators. (Elevator B)  Take the elevator to the 3rd floor.  Follow the arrow on the sign on the wall that says Cath Lab Registration/EP Lab Registration.  Follow the long hallway all the way around until you come to a big open area.  This is the registration area.  Check in at Reception Desk.     OR     If family is dropping you off:  Have them drop you off at the front of the Hospital under the green overhang.  Enter through the doors and take a right.  Take the 'E' elevators to the 3rd floor Cardiology Waiting Area.  Check in at the Reception Desk in the waiting room.

## 2024-02-20 NOTE — ASSESSMENT & PLAN NOTE
-newly diagnosed at hospital admission in January with TTE with RWMA   -NYHA I on GDMT, but needs ischemic evaluation  -discussed LHC vs PET, and pt elects for LHC. She can cancel PET    --LHC +/- PCI, patient is a ENRIQUE candidate  - Anti-platelet Therapy: ASA / clopidogrel   - Access: Right radial  - Catheters: Shade  - Creatinine/CrCl: 1.1  - Allergies: No shellfish / Iodine allergy  - Pre-Hydration: NS  - Pre-Op Med: Bendaryl 50mg pO   - All patient's questions were answered.  -The risks, benefits and alternatives of the procedure were explained to the patient.   -The risks of coronary angiography include but are not limited to: bleeding, infection, heart rhythm abnormalities, allergic reactions, kidney injury and potential need for dialysis, stroke and death.   - Should stenting be indicated, the patient has agreed to dual anti-platelet therapy for 1-consecutive year with a drug-eluting stent and a minimum of 1-month with the use of a bare metal stent  - Additionally, pt is aware that non-compliance is likely to result in stent clotting with heart attack, heart failure, and/or death  -The risks of moderate sedation include hypotension, respiratory depression, arrhythmias, bronchospasm, and death.   - Informed consent was obtained and the  patient is agreeable to proceed with the procedure.

## 2024-02-26 DIAGNOSIS — J45.51 SEVERE PERSISTENT ASTHMA WITH ACUTE EXACERBATION: ICD-10-CM

## 2024-02-26 NOTE — TELEPHONE ENCOUNTER
Called Patient to assist with reschedule march 6th appointment to march 20th Dr. Johnson       ----- Message from Jasper Burris sent at 2/26/2024 10:05 AM CST -----  Contact: [phone]  LINA BEAN calling regarding Appointment Access  (message) Pt asking for a call back she states she needs to reschedule her appt that is schedule for 03/26 due to having a procedure that day she don't think she will be done in time for her visit i try to reschedule no appt available to schedule from

## 2024-02-27 RX ORDER — IPRATROPIUM BROMIDE AND ALBUTEROL SULFATE 2.5; .5 MG/3ML; MG/3ML
3 SOLUTION RESPIRATORY (INHALATION) EVERY 6 HOURS PRN
Qty: 75 ML | Refills: 0 | Status: ON HOLD | OUTPATIENT
Start: 2024-02-27 | End: 2024-03-06 | Stop reason: SDUPTHER

## 2024-02-27 NOTE — TELEPHONE ENCOUNTER
Called patient to inform that [facility] has reached out to Dr. Espinoza and that he will be In office tomorrow to handle this matter     ----- Message from Jasper Burris sent at 2/27/2024 10:23 AM CST -----  Contact: [phone]  Michela Franco calling regarding Patient Advice (message)Pt asking for a call back she states the provider sent in a Rx to [facility] Pt don't know the name of it but states she do not qualify but she do qualify for Nucala but needs the doctor to write the Rx for it Pt asking to speak with the nurse

## 2024-02-28 DIAGNOSIS — J45.909 SEVERE ASTHMA, UNSPECIFIED WHETHER COMPLICATED, UNSPECIFIED WHETHER PERSISTENT: Primary | ICD-10-CM

## 2024-02-28 RX ORDER — MEPOLIZUMAB 100 MG/ML
100 INJECTION, SOLUTION SUBCUTANEOUS
Qty: 1 EACH | Refills: 5 | Status: ACTIVE | OUTPATIENT
Start: 2024-02-28 | End: 2024-04-02 | Stop reason: SDUPTHER

## 2024-03-06 ENCOUNTER — HOSPITAL ENCOUNTER (OUTPATIENT)
Facility: HOSPITAL | Age: 79
Discharge: HOME | End: 2024-03-06
Attending: INTERNAL MEDICINE | Admitting: INTERNAL MEDICINE
Payer: MEDICARE

## 2024-03-06 ENCOUNTER — DOCUMENTATION ONLY (OUTPATIENT)
Dept: ALLERGY | Facility: CLINIC | Age: 79
End: 2024-03-06

## 2024-03-06 VITALS
BODY MASS INDEX: 26.29 KG/M2 | DIASTOLIC BLOOD PRESSURE: 55 MMHG | WEIGHT: 157.81 LBS | HEIGHT: 65 IN | SYSTOLIC BLOOD PRESSURE: 122 MMHG | TEMPERATURE: 98 F | RESPIRATION RATE: 18 BRPM | HEART RATE: 81 BPM | OXYGEN SATURATION: 98 %

## 2024-03-06 DIAGNOSIS — I25.10 CORONARY ARTERY DISEASE, UNSPECIFIED VESSEL OR LESION TYPE, UNSPECIFIED WHETHER ANGINA PRESENT, UNSPECIFIED WHETHER NATIVE OR TRANSPLANTED HEART: ICD-10-CM

## 2024-03-06 DIAGNOSIS — I25.10 CAD (CORONARY ARTERY DISEASE): ICD-10-CM

## 2024-03-06 DIAGNOSIS — J45.51 SEVERE PERSISTENT ASTHMA WITH ACUTE EXACERBATION: ICD-10-CM

## 2024-03-06 LAB
ABO + RH BLD: NORMAL
BLD GP AB SCN CELLS X3 SERPL QL: NORMAL
OHS QRS DURATION: 110 MS
OHS QTC CALCULATION: 459 MS
SPECIMEN OUTDATE: NORMAL

## 2024-03-06 PROCEDURE — 93010 ELECTROCARDIOGRAM REPORT: CPT | Mod: HCNC,,, | Performed by: INTERNAL MEDICINE

## 2024-03-06 PROCEDURE — 93005 ELECTROCARDIOGRAM TRACING: CPT | Mod: 59,HCNC

## 2024-03-06 PROCEDURE — 25000003 PHARM REV CODE 250: Mod: HCNC | Performed by: INTERNAL MEDICINE

## 2024-03-06 PROCEDURE — C1894 INTRO/SHEATH, NON-LASER: HCPCS | Mod: HCNC | Performed by: INTERNAL MEDICINE

## 2024-03-06 PROCEDURE — 63600175 PHARM REV CODE 636 W HCPCS: Mod: HCNC | Performed by: INTERNAL MEDICINE

## 2024-03-06 PROCEDURE — 99152 MOD SED SAME PHYS/QHP 5/>YRS: CPT | Mod: HCNC | Performed by: INTERNAL MEDICINE

## 2024-03-06 PROCEDURE — 99152 MOD SED SAME PHYS/QHP 5/>YRS: CPT | Mod: HCNC,,, | Performed by: INTERNAL MEDICINE

## 2024-03-06 PROCEDURE — C1769 GUIDE WIRE: HCPCS | Mod: HCNC | Performed by: INTERNAL MEDICINE

## 2024-03-06 PROCEDURE — 86850 RBC ANTIBODY SCREEN: CPT | Mod: HCNC | Performed by: INTERNAL MEDICINE

## 2024-03-06 PROCEDURE — 25500020 PHARM REV CODE 255: Mod: HCNC | Performed by: INTERNAL MEDICINE

## 2024-03-06 PROCEDURE — 93458 L HRT ARTERY/VENTRICLE ANGIO: CPT | Mod: 26,HCNC,, | Performed by: INTERNAL MEDICINE

## 2024-03-06 PROCEDURE — 93458 L HRT ARTERY/VENTRICLE ANGIO: CPT | Mod: HCNC | Performed by: INTERNAL MEDICINE

## 2024-03-06 PROCEDURE — C1887 CATHETER, GUIDING: HCPCS | Mod: HCNC | Performed by: INTERNAL MEDICINE

## 2024-03-06 RX ORDER — SODIUM CHLORIDE 9 MG/ML
INJECTION, SOLUTION INTRAVENOUS CONTINUOUS
Status: ACTIVE | OUTPATIENT
Start: 2024-03-06 | End: 2024-03-06

## 2024-03-06 RX ORDER — HEPARIN SOD,PORCINE/0.9 % NACL 1000/500ML
INTRAVENOUS SOLUTION INTRAVENOUS
Status: DISCONTINUED | OUTPATIENT
Start: 2024-03-06 | End: 2024-03-06 | Stop reason: HOSPADM

## 2024-03-06 RX ORDER — HEPARIN SODIUM 1000 [USP'U]/ML
INJECTION, SOLUTION INTRAVENOUS; SUBCUTANEOUS
Status: DISCONTINUED | OUTPATIENT
Start: 2024-03-06 | End: 2024-03-06 | Stop reason: HOSPADM

## 2024-03-06 RX ORDER — DIPHENHYDRAMINE HCL 50 MG
50 CAPSULE ORAL ONCE
Status: COMPLETED | OUTPATIENT
Start: 2024-03-06 | End: 2024-03-06

## 2024-03-06 RX ORDER — IPRATROPIUM BROMIDE AND ALBUTEROL SULFATE 2.5; .5 MG/3ML; MG/3ML
3 SOLUTION RESPIRATORY (INHALATION) EVERY 6 HOURS PRN
Qty: 75 ML | Refills: 5 | Status: SHIPPED | OUTPATIENT
Start: 2024-03-06

## 2024-03-06 RX ORDER — MIDAZOLAM HYDROCHLORIDE 1 MG/ML
INJECTION, SOLUTION INTRAMUSCULAR; INTRAVENOUS
Status: DISCONTINUED | OUTPATIENT
Start: 2024-03-06 | End: 2024-03-06 | Stop reason: HOSPADM

## 2024-03-06 RX ORDER — FENTANYL CITRATE 50 UG/ML
INJECTION, SOLUTION INTRAMUSCULAR; INTRAVENOUS
Status: DISCONTINUED | OUTPATIENT
Start: 2024-03-06 | End: 2024-03-06 | Stop reason: HOSPADM

## 2024-03-06 RX ORDER — LIDOCAINE HYDROCHLORIDE 20 MG/ML
INJECTION, SOLUTION EPIDURAL; INFILTRATION; INTRACAUDAL; PERINEURAL
Status: DISCONTINUED | OUTPATIENT
Start: 2024-03-06 | End: 2024-03-06 | Stop reason: HOSPADM

## 2024-03-06 RX ADMIN — DIPHENHYDRAMINE HYDROCHLORIDE 50 MG: 50 CAPSULE ORAL at 06:03

## 2024-03-06 RX ADMIN — SODIUM CHLORIDE: 9 INJECTION, SOLUTION INTRAVENOUS at 06:03

## 2024-03-06 NOTE — PLAN OF CARE
Pt arrived to unit accompanied by her sister.  Pre op orders and assessment initiated.  Pt remains npo.  Call bell within reach.

## 2024-03-06 NOTE — NURSING
Pt and pt's sibling given d/c paperwork and education reiterated. All questions and concerns addressed. Pt's Pt was able to drink, eat, walk, and use restroom without issues. Pt's right wrist vasc band was deflated and removed without issues. Gauze/tegaderm applied to site and site remained free from s/s of bleeding. Pt's iv and tele removed. Pt waiting to be wheeled out in wheelchair by sister.

## 2024-03-06 NOTE — NURSING
Report received from MOLLY Fletcher RN. Pt is AAOx4 and free from s/s of pain and distress. 2CC of air removed at 1150. Safety measures in place. Pt understands when to call for assistance.

## 2024-03-06 NOTE — PLAN OF CARE
Received report from VIANEY Harden. Patient s/p [facility], AAOx3. VSS, no c/o pain or discomfort at this time, resp even and unlabored. Vasc band to R wrsit is CDI. No active bleeding. No hematoma noted. Post procedure protocol reviewed with patient and patient's family. Understanding verbalized. Family members at bedside. Nurse call bell within reach. Will continue to monitor per post procedure protocol.

## 2024-03-06 NOTE — DISCHARGE SUMMARY
Discharge Summary  Interventional Cardiology      Admit Date: 3/6/2024    Discharge Date:  3/6/2024    Attending Physician: Edin Matthews MD    Discharge Physician: Edin Matthews MD    Principal Diagnoses: <principal problem not specified>  Indication for Admission: Angiogram, Coronary, with Left Heart Cath (N/A)    Discharged Condition: Good    Hospital Course:   Patient presented for outpatient coronary angiogram which went without complication. Coronary angiogram was nonobstructive. See full cath report in Epic for details. Hemostasis of patient's R Radial access site was achieved with VascBand. Patient was monitored per post-cath protocol, and her R radial access site was c/d/i with no hematoma. Patient was able to ambulate without difficulty. She was feeling well and anticipating discharge home today.     Outpatient Plan:  - Medication changes/ additions include: Stopping Plavix    Diet: Cardiac diet    Activity: Ad marguerite, wound care instructions provided    Disposition: Home or Self Care      Discharge Medications:      Medication List        CONTINUE taking these medications      ADVAIR DISKUS 500-50 mcg/dose Dsdv diskus inhaler  Generic drug: fluticasone-salmeterol 500-50 mcg/dose  INHALE 1 PUFF TWICE DAILY     albuterol 90 mcg/actuation inhaler  Commonly known as: VENTOLIN HFA  Inhale 2 puffs into the lungs every 6 (six) hours as needed for Wheezing. Rescue     albuterol-ipratropium 2.5 mg-0.5 mg/3 mL nebulizer solution  Commonly known as: DUO-NEB  Take 3 mLs by nebulization every 6 (six) hours as needed for Wheezing.     aspirin 81 MG Chew  Take 1 tablet (81 mg total) by mouth once daily.     azelastine 137 mcg (0.1 %) nasal spray  Commonly known as: ASTELIN  2 sprays (274 mcg total) by Nasal route 2 (two) times daily.     budesonide-formoterol 160-4.5 mcg 160-4.5 mcg/actuation Hfaa  Commonly known as: SYMBICORT  Inhale 2 puffs into the lungs every 12 (twelve) hours. Controller      carboxymethylcellulose sodium 0.5 % Drop  Commonly known as: REFRESH TEARS  Apply 1 drop to eye 3 (three) times daily as needed (dry eyes).     cholecalciferol (vitamin D3) 250 mcg (10,000 unit) Cap capsule  Commonly known as: VITAMIN D3     COD LIVER OIL ORAL     dupilumab 300 mg/2 mL Pnij  Inject 300 mg into the skin every 14 (fourteen) days.     ENTRESTO  mg per tablet  Generic drug: sacubitriL-valsartan  Take 1 tablet by mouth 2 (two) times daily.     ferrous sulfate 325 (65 FE) MG EC tablet  Take 1 tablet (325 mg total) by mouth 3 (three) times daily with meals.     fluticasone propionate 50 mcg/actuation nasal spray  Commonly known as: FLONASE  2 sprays (100 mcg total) by Each Nostril route 2 (two) times a day.     furosemide 40 MG tablet  Commonly known as: LASIX  Take 1 tablet (40 mg total) by mouth 2 (two) times daily.     guaiFENesin 600 mg 12 hr tablet  Commonly known as: MUCINEX  Take 1 tablet (600 mg total) by mouth 2 (two) times daily.     JARDIANCE 10 mg tablet  Generic drug: empagliflozin  Take 1 tablet (10 mg total) by mouth once daily.     mepolizumab 100 mg/mL Atin  Generic drug: mepolizumab  Inject 1 mL (100 mg total) into the skin every 28 days.     milk thistle 150 mg Cap  Take 1 capsule by mouth once daily.     multivitamin per tablet  Commonly known as: THERAGRAN     predniSONE 5 MG tablet  Commonly known as: DELTASONE  Take 1 tablet (5 mg total) by mouth once daily. for 75 doses     SENNA 8.6 mg tablet  Generic drug: senna     spironolactone 25 MG tablet  Commonly known as: ALDACTONE  Take 1 tablet (25 mg total) by mouth once daily.            STOP taking these medications      clopidogreL 75 mg tablet  Commonly known as: PLAVIX            ASK your doctor about these medications      tretinoin 0.05 % cream  Commonly known as: RETIN-A  Apply topically every evening. Start with every other night and move up to nightly after 2 weeks if not too dry.              Waylon Jensen  Cardiology  Fellow PGY-6

## 2024-03-06 NOTE — Clinical Note
The catheter was repositioned into the ostium   left main. An angiography was performed of the left coronary arteries. Multiple views were taken.  And was removed.

## 2024-03-06 NOTE — BRIEF OP NOTE
Brief Operative Note:    : Edin Matthews MD     Referring Physician: Kerri Toribio     All Operators: Surgeon(s):  Masoud Solorzano MD Graham, Jordan, MD Jenkins, James S., MD     Preoperative Diagnosis: Coronary artery disease, unspecified vessel or lesion type, unspecified whether angina present, unspecified whether native or transplanted heart [I25.10]     Postop Diagnosis: Coronary artery disease, unspecified vessel or lesion type, unspecified whether angina present, unspecified whether native or transplanted heart [I25.10]    Treatments/Procedures: Procedure(s) (LRB):  Angiogram, Coronary, with Left Heart Cath (N/A)    Access: Right radial artery    Findings: Non-obstructive CAD noted     See catheterization report for full details.    Intervention:     See catheterization report for full details.    Closure device:  VascBand       Plan:  - Post cath protocol   - IVF @ 250 cc/hr x 2 hours  - Bed rest x 1 hours   - Continue aspirin 81 mg daily indefinitely  - Continue high intensity statin therapy (LDL goal < 70)  - Risk factor reduction (BP <130/80 mmHg, glycemic control, etc)  - Cardiac rehab referral  - Follow up with outpatient cardiologist    Estimated Blood loss: 20 cc    Specimens removed: No    Waylon Jensen MD  Interventional Cardiology PGY-6

## 2024-03-06 NOTE — Clinical Note
The catheter was inserted into the ostium   right coronary artery. An angiography was performed of the right coronary arteries. Multiple views were taken.  And was removed.

## 2024-03-06 NOTE — INTERVAL H&P NOTE
The patient has been examined and the H&P has been reviewed:    I concur with the findings and no changes have occurred since H&P was written.    Procedure risks, benefits and alternative options discussed and understood by patient/family.    Waylon Jensen   Department of Cardiovascular Disease, PGY-6   [facility]

## 2024-03-08 ENCOUNTER — TELEPHONE (OUTPATIENT)
Dept: ALLERGY | Facility: CLINIC | Age: 79
End: 2024-03-08
Payer: MEDICARE

## 2024-03-08 NOTE — TELEPHONE ENCOUNTER
Alloy to [facility] enrollment form sent by ciera daniels with [facility]. Placed on Dr. King's desk to review and sign. Once signed this will need to be faxed to Lisa at [phone].

## 2024-03-13 ENCOUNTER — TELEPHONE (OUTPATIENT)
Dept: ALLERGY | Facility: CLINIC | Age: 79
End: 2024-03-13
Payer: MEDICARE

## 2024-03-13 NOTE — TELEPHONE ENCOUNTER
Patient enrollment form for Nucala has been faxed to [phone] -julian Valenzuela    Original sent to scanning

## 2024-03-13 NOTE — TELEPHONE ENCOUNTER
----- Message from Bianca Jerez sent at 3/6/2024 10:42 AM CST -----  Regarding: patient assistance program  Hi Dr. King and Team,     I am assisting Michela Franco with applying for the patient assistance program for medication Nucala. I have faxed your office the form at [phone]. Please review, sign, date, and return to my attention at your earliest convenience. Once we have received this form and patient's portion, we will submit to the  for review.     Thank you,  Bianca Jerez, CPT [facility]  Ph. [phone]  Fax [phone]

## 2024-03-20 ENCOUNTER — OFFICE VISIT (OUTPATIENT)
Dept: ALLERGY | Facility: CLINIC | Age: 79
End: 2024-03-20
Payer: MEDICARE

## 2024-03-20 VITALS — WEIGHT: 166 LBS | HEIGHT: 65 IN | BODY MASS INDEX: 27.66 KG/M2

## 2024-03-20 DIAGNOSIS — J45.50 SEVERE PERSISTENT ASTHMA, UNSPECIFIED WHETHER COMPLICATED: Primary | ICD-10-CM

## 2024-03-20 DIAGNOSIS — J31.0 RHINITIS, UNSPECIFIED TYPE: ICD-10-CM

## 2024-03-20 PROCEDURE — 99214 OFFICE O/P EST MOD 30 MIN: CPT | Mod: HCNC,GC,S$GLB, | Performed by: INTERNAL MEDICINE

## 2024-03-20 PROCEDURE — 99999 PR PBB SHADOW E&M-EST. PATIENT-LVL II: CPT | Mod: PBBFAC,HCNC,GC, | Performed by: INTERNAL MEDICINE

## 2024-03-20 PROCEDURE — 1157F ADVNC CARE PLAN IN RCRD: CPT | Mod: HCNC,CPTII,GC,S$GLB | Performed by: INTERNAL MEDICINE

## 2024-03-20 PROCEDURE — 1126F AMNT PAIN NOTED NONE PRSNT: CPT | Mod: HCNC,CPTII,GC,S$GLB | Performed by: INTERNAL MEDICINE

## 2024-03-20 NOTE — PROGRESS NOTES
ALLERGY & IMMUNOLOGY CLINIC - FOLLOW UP     HISTORY OF PRESENT ILLNESS     Patient ID: Michela Franco is a 78 y.o. female    Ms. Michela Franco is a 78 year old female with asthma, allergic rhinitis, chronic pansinusitis, and recently diagnosed HFrEF, who presents to clinic today for follow up. Last seen in clinic this January. She reports feeling well today. She states that her breathing has improved since this past December/January. She is now able to walk outside for 30 mins or so before she needs to rest. She is currently taking advair 500/50 1 puff BID, and albuterol PRN. She has only rarely needed her albuterol over the past 4 weeks. Not currently on a biologic while waiting to start mepolizumab. She is also still currently taking prednisone 5mg daily. ACT today 21. She will still notice dyspnea when walking up a flight of stairs.     Of note she was recently diagnosed with HFrEF. She is currently on GDMT and is due for a repeat TTE this month.     Previously attempted to switch her from dupilumab to tezepelumab due to worsening symptoms.. This change was denied by her insurance company. They are requiring a trial of benralizumab first. Prescription sent for benralizumab at last visit which was also denied. Prescription sent for mepolizumab and currently going through the approval process.     REVIEW OF SYSTEMS     Review of Systems   Constitutional: Negative.    HENT: Negative.     Respiratory: Negative.     Cardiovascular: Negative.    Musculoskeletal: Negative.    Skin: Negative.    Neurological: Negative.    Psychiatric/Behavioral: Negative.         Balance of review of systems negative except as mentioned above     MEDICAL HISTORY     MedHx: active problems reviewed  SurgHx:   Past Surgical History:   Procedure Laterality Date    ANGIOGRAM, CORONARY, WITH LEFT HEART CATHETERIZATION N/A 3/6/2024    Procedure: Angiogram, Coronary, with Left Heart Cath;  Surgeon: Edin Matthews MD;  Location: [facility]  LAB;  Service: Cardiology;  Laterality: N/A;    CATARACT EXTRACTION W/  INTRAOCULAR LENS IMPLANT Right 02/11/2021    Procedure: EXTRACTION, CATARACT, WITH IOL INSERTION;  Surgeon: John Merino MD;  Location: [facility] OR;  Service: Ophthalmology;  Laterality: Right;    CATARACT EXTRACTION W/  INTRAOCULAR LENS IMPLANT Left 03/04/2021    Procedure: EXTRACTION, CATARACT, WITH IOL INSERTION;  Surgeon: John Merino MD;  Location: [facility] OR;  Service: Ophthalmology;  Laterality: Left;    COLONOSCOPY N/A 05/18/2018    Procedure: COLONOSCOPY;  Surgeon: Calixto Brian MD;  Location: [facility] ENDO (4TH FLR);  Service: Endoscopy;  Laterality: N/A;    COLONOSCOPY N/A 10/24/2023    Procedure: COLONOSCOPY;  Surgeon: Ward Merino MD;  [facility]  Service: Endoscopy;  Laterality: N/A;  ref by / golytely inst [id number]  10/17-lvm for precall-MS  10/20-precall complete-MS    EYE SURGERY      HYSTERECTOMY      total    OOPHORECTOMY      ROTATOR CUFF REPAIR Left     SINUS SURGERY           Otherwise no Family History of asthma, allergic rhinitis, atopic dermatitis, drug allergy, food allergy, venom allergy or immune deficiency.     Allergies: see below  Medications: MAR reviewed       PHYSICAL EXAM     Physical Exam  Constitutional:       Appearance: Normal appearance.   HENT:      Head: Normocephalic and atraumatic.      Comments: Turbinates 2-3+ R>L, pale in color, no ulcers/polyps/bleeding  Eyes:      Conjunctiva/sclera: Conjunctivae normal.   Pulmonary:      Effort: Pulmonary effort is normal.      Breath sounds: Normal breath sounds.   Neurological:      Mental Status: She is alert.        ALLERGEN TESTING        Immunocaps:   Component      Latest Ref Rng & Units 12/18/2019   Allergen Acremonium (Cephalosporium) IgE      <0.10 kU/L <0.10   Allergen Acremonium (Cephalosporium) Class       CLASS 0   Botrytis Cinerea      <0.10 kU/L <0.10   Botrytis Cinerea Class       CLASS 0   Allergen Candida  albicans IgE      <0.10 kU/L 0.68 (H)   Allergen Candida albicans Class       CLASS 1   Chaetomium      <0.10 kU/L <0.10   Chaetomium Glob. Class       CLASS 0   Cladosporium, IgE      <0.10 kU/L <0.10   Cladosporium Class       CLASS 0   Curvularia lunata      <0.10 kU/L <0.10   Curvularia Lunata Class       CLASS 0   Epicoccum purpurascens, IgE      <0.10 kU/L <0.10   Epicoccum pupurascens Class       CLASS 0   Helminthosporium Halodes IgE      <0.10 kU/L <0.10   Helminthosporium Class       CLASS 0   Allergen Trichoderma Viride IgE      <0.10 kU/L <0.10   Allergen Trichoderma Viride Class       CLASS 0   Stemphyllium, IgE      <0.10 kU/L <0.10   Stemphylium Herbarum Class       CLASS 0   Allergen Rhodotorula IgE      <0.35 kU/L <0.35   Rhodotorula , IgE Class       0   Rhizopus Nigrican, IgE      <0.10 kU/L 0.31 (H)   Rhizopus Nigricans Class       CLASS 0/1   Phoma betae      <0.10 kU/L <0.10   Phoma Betae Class       CLASS 0   Penicillium, IgE      <0.10 kU/L 0.17 (H)   Penicillium Class       CLASS 0/1   Mucor racemosus, IgE      <0.10 kU/L 0.34 (H)   Mucor racemosus Class       CLASS 0/1   RAST Allergen for Trichophyton rubrum      kU/L <0.35      Component      Latest Ref Rng & Units 12/18/2019 12/18/2019 12/18/2019           3:07 PM  3:07 PM  3:07 PM   D. farinae      <0.10 kU/L     1.94 (H)   D. farinae Class           CLASS 2   Mite Dust Pteronyssinus IgE      <0.10 kU/L     0.19 (H)   D. pteronyssinus Class           CLASS 0/1   BERMUDA GRASS      <0.10 kU/L     0.71 (H)   Bermuda Grass Class           CLASS 2   [place]ua Grass      <0.10 kU/L     0.72 (H)   Joshua Grass Class           CLASS 2   Mobile IgE      <0.10 kU/L     0.17 (H)   Mobile Class           CLASS 0/1   Plantain      <0.10 kU/L     0.63 (H)   English Plantain Class           CLASS 1   White Oak(Quercus alba) IgE      <0.10 kU/L     0.74 (H)   [place], Class           CLASS 2   Pecan [place] Tree      <0.10 kU/L     0.47 (H)   Pecan, Class            CLASS 1   Marshelder IgE      <0.10 kU/L     0.59 (H)   Marshelder Class           CLASS 1   Ragweed, Western IgE      <0.10 kU/L     0.54 (H)   Ragweed, Western, Class           CLASS 1   Alternaria alternata      <0.10 kU/L     <0.10   Altern. alternata Class           CLASS 0   Aspergillus Fumigatus IgE      <0.10 kU/L     0.22 (H)   A. fumigatus Class           CLASS 0/1   Cat Dander      <0.10 kU/L     <0.10   Cat Epithelium Class           CLASS 0   Cockroach, IgE      <0.10 kU/L CLASS 0/1 0.28 (H)     Dog Dander, IgE      <0.10 kU/L     0.51 (H)   Dog Dander Class           CLASS 1      Component      Latest Ref Rng & Units 12/18/2019 12/29/2017 10/30/2017 4/8/2014   Aspergillus Fumigatus IgE      <0.10 kU/L 0.22 (H)   <0.35 <0.35   A. fumigatus Class       CLASS 0/1   CLASS 0 CLASS 0   Aspergillus Antigen      <0.5 index   <0.500 0.676 (A)        Component      Latest Ref Rng & Units 2/20/2014   Aspergillus Fumigatus IgE      <0.10 kU/L 0.36 (H)   A. fumigatus Class       CLASS I   Aspergillus Antigen      <0.5 index            PULMONARY FUNCTION      PFTs:     Spirometry 8/23:   Spirometry shows mild obstruction. Lung volume determination is normal. Spirometry remains unimproved following bronchodilator. DLCO is moderately decreased. Notes: The failure to demonstrate improvement in spirometry does not preclude a clinical response to a trial of bronchodilators. DLCO interpretation based on the adjusted DLCO value due to a low hemoglobin. Discrepancy in TLC and VA makes DLCO interpretation potentially unreliable.      11/26/2021:              Pre:                   Post:  FEV1: 1.49 (82.8%)--> 1.56 (+4.1%)  FVC:   2.22  (95%)----> 2.30 (+3.6%)  Ratio:  67.37%      ----> 67.77%        5/28/2018  FVC: 2.30 (78%)  FEV1: 1.62 (70%)  Ratio: 70  No reversibility post bronchodilator.     Also performed 12/2019 in pulmonary clinic         ASSESSMENT & PLAN      Michela Franco is a 78 y.o. female with          Dyspnea  -Suspect much of her symptoms from January-February were related to her HF recently diagnosed this year.   -She is currently on GDMT and is following with cardiology  -Dyspnea much improved and she is now able to walk 30 minutes without stopping.     Severe persistent asthma  -Most recent PFT's showing mild obstruction but no reversibility  -She had previously been on dupilumab 300 mg subq every 14 days until this past January. While she initially improved on dupixent she seems that she is no longer receiving as much benefit. Attempted to switch to tezepelumab which was denied by her insurance company. Requiring trial of Fasenra first. Prescription for Fasenra sent previously which was then subsequently denied. Prescription sent for Nucala before this visit. When she receives this prescription she will start 100 mg q 28 days. (For future reference, patient uses [facility] pharmacy. For refills, call [phone] ext [phone]. All other medications to be filled by [facility] pharmacy)   -She has been on an extended prednisone course. Currently on 5 mg daily. Will reduce her prednisone to 5mg every other day for 2 weeks then plan to DC. Discussed signs and symptoms to monitor for adrenal insufficiency.  -Per insurance, will switch her advair 500-50 mcg/dose 1 puff BID to symbicort 160/4.5 2 puffs BID. She will use her advair until she runs out then she will switch to symbicort.  -Continue albuterol as needed for SOB  -Continue duonebs as needed     Allergic rhinitis: Continues to have congestion during current grass pollen season.  -Continue fluticasone up to 2 SEN BID  -Continue azelastine up to SEN BID  -Continue nasal irrigation BID  -AIT previously discussed. In this patient who is 78 will hold off on AIT at this time due to risk of inability to compensate in setting of possible anaphylaxis to AIT  -continue montelukast 10mg daily     Next visit:  -Assess symptoms off prednisone and after she has started  Nucala.  -Will consider PFT's at that time.     Patient discussed with Dr. Geller.  RTC 6 weeks     Joe King DO  Allergy/Immunology Fellow

## 2024-03-26 ENCOUNTER — HOSPITAL ENCOUNTER (OUTPATIENT)
Dept: CARDIOLOGY | Facility: HOSPITAL | Age: 79
Discharge: HOME | End: 2024-03-26
Attending: STUDENT IN AN ORGANIZED HEALTH CARE EDUCATION/TRAINING PROGRAM
Payer: MEDICARE

## 2024-03-26 VITALS
WEIGHT: 166 LBS | DIASTOLIC BLOOD PRESSURE: 55 MMHG | BODY MASS INDEX: 27.66 KG/M2 | HEART RATE: 75 BPM | SYSTOLIC BLOOD PRESSURE: 122 MMHG | HEIGHT: 65 IN

## 2024-03-26 DIAGNOSIS — I50.42 CHRONIC COMBINED SYSTOLIC AND DIASTOLIC HEART FAILURE: ICD-10-CM

## 2024-03-26 DIAGNOSIS — I50.20 HFREF (HEART FAILURE WITH REDUCED EJECTION FRACTION): ICD-10-CM

## 2024-03-26 LAB
ASCENDING AORTA: 3.24 CM
AV INDEX (PROSTH): 0.78
AV MEAN GRADIENT: 3 MMHG
AV PEAK GRADIENT: 5 MMHG
AV VALVE AREA BY VELOCITY RATIO: 3.36 CM²
AV VALVE AREA: 3.26 CM²
AV VELOCITY RATIO: 0.8
BSA FOR ECHO PROCEDURE: 1.86 M2
CV ECHO LV RWT: 0.33 CM
DOP CALC AO PEAK VEL: 1.11 M/S
DOP CALC AO VTI: 19.74 CM
DOP CALC LVOT AREA: 4.2 CM2
DOP CALC LVOT DIAMETER: 2.31 CM
DOP CALC LVOT PEAK VEL: 0.89 M/S
DOP CALC LVOT STROKE VOLUME: 64.34 CM3
DOP CALCLVOT PEAK VEL VTI: 15.36 CM
E/E' RATIO: 19.75 M/S
ECHO LV POSTERIOR WALL: 0.8 CM (ref 0.6–1.1)
EJECTION FRACTION: 25 %
FRACTIONAL SHORTENING: 11 % (ref 28–44)
INTERVENTRICULAR SEPTUM: 0.86 CM (ref 0.6–1.1)
IVC DIAMETER: 1.4 CM
IVRT: 234.06 MSEC
LA MAJOR: 5.22 CM
LA MINOR: 4.34 CM
LA WIDTH: 3.73 CM
LEFT ATRIUM SIZE: 4.2 CM
LEFT ATRIUM VOLUME INDEX MOD: 19.9 ML/M2
LEFT ATRIUM VOLUME INDEX: 34.5 ML/M2
LEFT ATRIUM VOLUME MOD: 36.47 CM3
LEFT ATRIUM VOLUME: 63.11 CM3
LEFT INTERNAL DIMENSION IN SYSTOLE: 4.31 CM (ref 2.1–4)
LEFT VENTRICLE DIASTOLIC VOLUME INDEX: 59.7 ML/M2
LEFT VENTRICLE DIASTOLIC VOLUME: 109.26 ML
LEFT VENTRICLE MASS INDEX: 73 G/M2
LEFT VENTRICLE SYSTOLIC VOLUME INDEX: 45.6 ML/M2
LEFT VENTRICLE SYSTOLIC VOLUME: 83.37 ML
LEFT VENTRICULAR INTERNAL DIMENSION IN DIASTOLE: 4.83 CM (ref 3.5–6)
LEFT VENTRICULAR MASS: 134.29 G
LV LATERAL E/E' RATIO: 15.8 M/S
LV SEPTAL E/E' RATIO: 26.33 M/S
MV A" WAVE DURATION": 14.27 MSEC
MV PEAK E VEL: 0.79 M/S
PISA TR MAX VEL: 2.21 M/S
PULM VEIN S/D RATIO: 1.71
PV PEAK D VEL: 0.35 M/S
PV PEAK S VEL: 0.6 M/S
RA MAJOR: 4.66 CM
RA PRESSURE ESTIMATED: 8 MMHG
RA WIDTH: 3.15 CM
RIGHT VENTRICULAR END-DIASTOLIC DIMENSION: 2.87 CM
RV TB RVSP: 10 MMHG
SINUS: 2.81 CM
STJ: 2.36 CM
TDI LATERAL: 0.05 M/S
TDI SEPTAL: 0.03 M/S
TDI: 0.04 M/S
TR MAX PG: 20 MMHG
TRICUSPID ANNULAR PLANE SYSTOLIC EXCURSION: 1.15 CM
TV REST PULMONARY ARTERY PRESSURE: 28 MMHG
Z-SCORE OF LEFT VENTRICULAR DIMENSION IN END DIASTOLE: -0.49
Z-SCORE OF LEFT VENTRICULAR DIMENSION IN END SYSTOLE: 2.53

## 2024-03-26 PROCEDURE — 93306 TTE W/DOPPLER COMPLETE: CPT | Mod: 26,HCNC,, | Performed by: INTERNAL MEDICINE

## 2024-03-26 PROCEDURE — 93306 TTE W/DOPPLER COMPLETE: CPT | Mod: HCNC

## 2024-04-02 DIAGNOSIS — J45.909 SEVERE ASTHMA, UNSPECIFIED WHETHER COMPLICATED, UNSPECIFIED WHETHER PERSISTENT: ICD-10-CM

## 2024-04-03 RX ORDER — MEPOLIZUMAB 100 MG/ML
100 INJECTION, SOLUTION SUBCUTANEOUS
Qty: 1 EACH | Refills: 5 | Status: ACTIVE | OUTPATIENT
Start: 2024-04-03

## 2024-04-08 PROBLEM — N17.9 AKI (ACUTE KIDNEY INJURY): Status: RESOLVED | Noted: 2024-01-03 | Resolved: 2024-04-08

## 2024-04-16 ENCOUNTER — OFFICE VISIT (OUTPATIENT)
Dept: PRIMARY CARE CLINIC | Facility: CLINIC | Age: 79
End: 2024-04-16
Payer: MEDICARE

## 2024-04-16 VITALS
SYSTOLIC BLOOD PRESSURE: 100 MMHG | HEART RATE: 97 BPM | TEMPERATURE: 98 F | OXYGEN SATURATION: 99 % | BODY MASS INDEX: 27.56 KG/M2 | HEIGHT: 65 IN | DIASTOLIC BLOOD PRESSURE: 60 MMHG | WEIGHT: 165.38 LBS

## 2024-04-16 DIAGNOSIS — I50.20 HFREF (HEART FAILURE WITH REDUCED EJECTION FRACTION): Primary | ICD-10-CM

## 2024-04-16 DIAGNOSIS — N18.31 STAGE 3A CHRONIC KIDNEY DISEASE: ICD-10-CM

## 2024-04-16 DIAGNOSIS — I10 PRIMARY HYPERTENSION: ICD-10-CM

## 2024-04-16 DIAGNOSIS — J45.50 SEVERE PERSISTENT ASTHMA WITHOUT COMPLICATION: ICD-10-CM

## 2024-04-16 PROCEDURE — 99999 PR PBB SHADOW E&M-EST. PATIENT-LVL V: CPT | Mod: PBBFAC,HCNC,, | Performed by: STUDENT IN AN ORGANIZED HEALTH CARE EDUCATION/TRAINING PROGRAM

## 2024-04-16 PROCEDURE — 3288F FALL RISK ASSESSMENT DOCD: CPT | Mod: HCNC,CPTII,S$GLB, | Performed by: STUDENT IN AN ORGANIZED HEALTH CARE EDUCATION/TRAINING PROGRAM

## 2024-04-16 PROCEDURE — 1157F ADVNC CARE PLAN IN RCRD: CPT | Mod: HCNC,CPTII,S$GLB, | Performed by: STUDENT IN AN ORGANIZED HEALTH CARE EDUCATION/TRAINING PROGRAM

## 2024-04-16 PROCEDURE — 1101F PT FALLS ASSESS-DOCD LE1/YR: CPT | Mod: HCNC,CPTII,S$GLB, | Performed by: STUDENT IN AN ORGANIZED HEALTH CARE EDUCATION/TRAINING PROGRAM

## 2024-04-16 PROCEDURE — 3074F SYST BP LT 130 MM HG: CPT | Mod: HCNC,CPTII,S$GLB, | Performed by: STUDENT IN AN ORGANIZED HEALTH CARE EDUCATION/TRAINING PROGRAM

## 2024-04-16 PROCEDURE — 1126F AMNT PAIN NOTED NONE PRSNT: CPT | Mod: HCNC,CPTII,S$GLB, | Performed by: STUDENT IN AN ORGANIZED HEALTH CARE EDUCATION/TRAINING PROGRAM

## 2024-04-16 PROCEDURE — 3078F DIAST BP <80 MM HG: CPT | Mod: HCNC,CPTII,S$GLB, | Performed by: STUDENT IN AN ORGANIZED HEALTH CARE EDUCATION/TRAINING PROGRAM

## 2024-04-16 PROCEDURE — 99213 OFFICE O/P EST LOW 20 MIN: CPT | Mod: HCNC,S$GLB,, | Performed by: STUDENT IN AN ORGANIZED HEALTH CARE EDUCATION/TRAINING PROGRAM

## 2024-04-16 PROCEDURE — 1159F MED LIST DOCD IN RCRD: CPT | Mod: HCNC,CPTII,S$GLB, | Performed by: STUDENT IN AN ORGANIZED HEALTH CARE EDUCATION/TRAINING PROGRAM

## 2024-04-16 NOTE — PROGRESS NOTES
Primary Care  Office Visit - In Person  4/16/2024      HPI    Patient is a 78 y.o.   Michela Franco  has a past medical history of Allergy, Asthma, CHF (congestive heart failure), Chronic diastolic heart failure (04/05/2018), Glaucoma suspect, Hyperlipidemia, Hypertension, Neuromuscular disorder, NSTEMI (non-ST elevated myocardial infarction) (01/01/2024), JOHN on CPAP, Osteoporosis, Other neutropenia (08/31/2023), Recurrent sinusitis (03/25/2014), Recurrent upper respiratory infection (URI), and Urticaria.    Patient presenting for f/u     She reports skin has been very dry on diuretics   She weighs herself daily     Active Medications:  Current Outpatient Medications   Medication Instructions    ADVAIR DISKUS 500-50 mcg/dose DsDv diskus inhaler INHALE 1 PUFF TWICE DAILY    albuterol (VENTOLIN HFA) 90 mcg/actuation inhaler 2 puffs, Inhalation, Every 6 hours PRN, Rescue    albuterol-ipratropium (DUO-NEB) 2.5 mg-0.5 mg/3 mL nebulizer solution 3 mLs, Nebulization, Every 6 hours PRN    aspirin 81 mg, Oral, Daily    azelastine (ASTELIN) 274 mcg, Nasal, 2 times daily    budesonide-formoterol 160-4.5 mcg (SYMBICORT) 160-4.5 mcg/actuation HFAA 2 puffs, Inhalation, Every 12 hours, Controller    carboxymethylcellulose sodium (REFRESH TEARS) 0.5 % Drop 1 drop, Ophthalmic, 3 times daily PRN    cholecalciferol (vitamin D3) (VITAMIN D3) 360 mg, Oral, Daily, Take 6 capsules (6,000 units total) by mouth once daily     COD LIVER OIL ORAL 1 tablet, Oral, Daily    dupilumab 300 mg, Subcutaneous, Every 14 days    ferrous sulfate 325 mg, Oral, 3 times daily with meals    fluticasone propionate (FLONASE) 100 mcg, Each Nostril, 2 times daily    furosemide (LASIX) 40 mg, Oral, 2 times daily    guaiFENesin (MUCINEX) 600 mg, Oral, 2 times daily    JARDIANCE 10 mg, Oral, Daily    mepolizumab 100 mg, Subcutaneous, Every 28 days    milk thistle 150 mg Cap 1 capsule, Oral, Daily    multivitamin (THERAGRAN) per tablet 1 tablet, Oral, Daily,       "sacubitriL-valsartan (ENTRESTO)  mg per tablet 1 tablet, Oral, 2 times daily    SENNA 8.6 mg tablet TAKE 2 TAB(S) BY MOUTH NIGHTLY AS NEEDED FOR CONSTIPATION    spironolactone (ALDACTONE) 25 mg, Oral, Daily    tretinoin (RETIN-A) 0.05 % cream Topical (Top), Nightly, Start with every other night and move up to nightly after 2 weeks if not too dry.       Vitals:    04/16/24 0930   BP: 100/60   BP Location: Right arm   Pulse: 97   Temp: 97.9 °F (36.6 °C)   SpO2: 99%   Weight: 75 kg (165 lb 5.5 oz)   Height: 5' 4.5" (1.638 m)       Physical Exam  Vitals reviewed.   Constitutional:       General: She is not in acute distress.  Cardiovascular:      Rate and Rhythm: Normal rate and regular rhythm.      Pulses: Normal pulses.      Heart sounds: Normal heart sounds.   Pulmonary:      Effort: Pulmonary effort is normal. No respiratory distress.      Breath sounds: Wheezing present. No rales.   Abdominal:      General: Abdomen is flat. Bowel sounds are normal.      Palpations: Abdomen is soft.   Musculoskeletal:      Right lower leg: No edema.      Left lower leg: No edema.   Neurological:      General: No focal deficit present.      Mental Status: She is oriented to person, place, and time.          TTE 3/26      Left Ventricle: Regional wall motion abnormalities present. See diagram for wall motion findings. There is severely reduced systolic function with a visually estimated ejection fraction of 20 - 25%. Ejection fraction by visual approximation is 25%. Unable to assess diastolic function due to E-A fusion.    Right Ventricle: Normal right ventricular cavity size. Systolic function is mildly reduced.    Aortic Valve: The aortic valve is a trileaflet valve. There is mild aortic valve sclerosis. There is normal leaflet mobility.    Mitral Valve: The mitral valve is structurally normal. There is normal leaflet mobility. There is mild regurgitation with a centrally directed jet.    Tricuspid Valve: The tricuspid valve " is structurally normal.    Pulmonary Artery: The estimated pulmonary artery systolic pressure is 28 mmHg.    IVC/SVC: Intermediate venous pressure at 8 mmHg.      [facility] 3/6      The pre-procedure left ventricular end diastolic pressure was 3.    The estimated blood loss was none.    There was non-obstructive coronary artery disease..    Non-ischemic cardiomyopathy.    Assessment and Plan     1. HFrEF (heart failure with reduced ejection fraction)  Comments:  Current regimen lasix 40 mg BID, Entresto  mg BID, spironolactone 25 mg daily, and jardiance  No signs of decompensation on exam today    2. Primary hypertension  Comments:  As above    3. Severe persistent asthma without complication  Comments:  Trial of Nucala  Patient on extended prednisone course   Continue Symbicort, albuterol, and duonebs    4. Stage 3a chronic kidney disease  Comments:  Monitor        Previous labs, records, and notes reviewed and considered for their impact on clinical decision making today.                Upcoming Scheduled Appointments and Follow Up:    Future Appointments   Date Time Provider Department Center   5/1/2024  8:30 AM Joe King DO [facility]   7/16/2024  3:30 PM Mayela Broussard MD [facility]       Follow Up [facility] (with who? when?): Follow up in about 3 months (around 7/16/2024).      Extended Emergency Contact Information  Primary Emergency Contact: Charley Ahmadi  Address: [address] [place] [address] [place]  Mobile Phone: [phone]  Relation: Sister      Mayela Broussard MD   Internal Medicine  4/16/2024 - 10:39 AM    I spent a total of 30 minutes on the day of the visit.This includes face to face time and non-face to face time preparing to see the patient (eg, review of tests), obtaining and/or reviewing separately obtained history, documenting clinical information in the electronic or other health record, independently  interpreting results and communicating results to the patient/family/caregiver, or care coordinator.    While patients have the right to access their medical record, it is essential to recognize that progress notes primarily serve as a means of communication among healthcare professionals.

## 2024-04-22 PROBLEM — Z99.81 CHRONIC HYPOXIC RESPIRATORY FAILURE, ON HOME OXYGEN THERAPY: Status: RESOLVED | Noted: 2024-01-19 | Resolved: 2024-04-22

## 2024-04-22 PROBLEM — J96.11 CHRONIC HYPOXIC RESPIRATORY FAILURE, ON HOME OXYGEN THERAPY: Status: RESOLVED | Noted: 2024-01-19 | Resolved: 2024-04-22

## 2024-04-24 ENCOUNTER — PATIENT MESSAGE (OUTPATIENT)
Dept: SLEEP MEDICINE | Facility: CLINIC | Age: 79
End: 2024-04-24
Payer: MEDICARE

## 2024-05-01 ENCOUNTER — OFFICE VISIT (OUTPATIENT)
Dept: ALLERGY | Facility: CLINIC | Age: 79
End: 2024-05-01
Payer: MEDICARE

## 2024-05-01 VITALS — WEIGHT: 163.38 LBS | BODY MASS INDEX: 27.89 KG/M2 | HEIGHT: 64 IN

## 2024-05-01 DIAGNOSIS — J45.50 SEVERE PERSISTENT ASTHMA, UNSPECIFIED WHETHER COMPLICATED: Primary | ICD-10-CM

## 2024-05-01 DIAGNOSIS — J45.909 ASTHMA, UNSPECIFIED ASTHMA SEVERITY, UNSPECIFIED WHETHER COMPLICATED, UNSPECIFIED WHETHER PERSISTENT: ICD-10-CM

## 2024-05-01 DIAGNOSIS — J30.9 ALLERGIC RHINITIS, UNSPECIFIED SEASONALITY, UNSPECIFIED TRIGGER: ICD-10-CM

## 2024-05-01 PROCEDURE — 1159F MED LIST DOCD IN RCRD: CPT | Mod: HCNC,CPTII,GC,S$GLB | Performed by: INTERNAL MEDICINE

## 2024-05-01 PROCEDURE — 1126F AMNT PAIN NOTED NONE PRSNT: CPT | Mod: HCNC,CPTII,GC,S$GLB | Performed by: INTERNAL MEDICINE

## 2024-05-01 PROCEDURE — 99214 OFFICE O/P EST MOD 30 MIN: CPT | Mod: HCNC,GC,S$GLB, | Performed by: INTERNAL MEDICINE

## 2024-05-01 PROCEDURE — 99999 PR PBB SHADOW E&M-EST. PATIENT-LVL III: CPT | Mod: PBBFAC,HCNC,GC, | Performed by: INTERNAL MEDICINE

## 2024-05-01 PROCEDURE — 1157F ADVNC CARE PLAN IN RCRD: CPT | Mod: HCNC,CPTII,GC,S$GLB | Performed by: INTERNAL MEDICINE

## 2024-05-01 NOTE — PROGRESS NOTES
ALLERGY & IMMUNOLOGY CLINIC - FOLLOW UP     HISTORY OF PRESENT ILLNESS     Patient ID: Michela Franco is a 78 y.o. female     Ms. Michela Franco is a 78 year old female with asthma, allergic rhinitis, chronic pansinusitis, and HFrEF, who presents to clinic today for follow up. She reports feeling very well today. She states that her breathing has continued to improve since this past December/January. She had her last dose of prednisone about 2 weeks ago. She is now able to walk outside for 30-45 mins or so before she needs to rest. She is currently taking advair symbicort 1 puff /4.5 2 puffs BID, and albuterol PRN. She has only rarely needed her albuterol over the past  few months. She has received 1 dose of Nucala.    She continues to have some mild-moderate nasal congestion, although is using fluticasone 2 SEN BID, azelastine 2 SEN BID, nasal rinse 1x/daily        REVIEW OF SYSTEMS     Review of Systems   Constitutional: Negative.    HENT:  Positive for congestion.    Respiratory: Negative.     Cardiovascular: Negative.    Skin: Negative.    Neurological: Negative.    Psychiatric/Behavioral: Negative.           Balance of review of systems negative except as mentioned above     MEDICAL HISTORY     MedHx: active problems reviewed  SurgHx:   Past Surgical History:   Procedure Laterality Date    ANGIOGRAM, CORONARY, WITH LEFT HEART CATHETERIZATION N/A 3/6/2024    Procedure: Angiogram, Coronary, with Left Heart Cath;  Surgeon: Edin Matthews MD;  Location: [facility] CATH LAB;  Service: Cardiology;  Laterality: N/A;    CATARACT EXTRACTION W/  INTRAOCULAR LENS IMPLANT Right 02/11/2021    Procedure: EXTRACTION, CATARACT, WITH IOL INSERTION;  Surgeon: John Merino MD;  Location: [facility];  Service: Ophthalmology;  Laterality: Right;    CATARACT EXTRACTION W/  INTRAOCULAR LENS IMPLANT Left 03/04/2021    Procedure: EXTRACTION, CATARACT, WITH IOL INSERTION;  Surgeon: John Merino MD;  Location: [facility];  Service:  Ophthalmology;  Laterality: Left;    COLONOSCOPY N/A 05/18/2018    Procedure: COLONOSCOPY;  Surgeon: Calixto Brian MD;  Location: [facility] ENDO (4TH FLR);  Service: Endoscopy;  Laterality: N/A;    COLONOSCOPY N/A 10/24/2023    Procedure: COLONOSCOPY;  Surgeon: Ward Merino MD;  Location: [facility] ENDO (4TH FLR);  Service: Endoscopy;  Laterality: N/A;  ref by / golytely [facility] [id number]  10/17-lvm for precall-MS  10/20-precall complete-MS    EYE SURGERY      HYSTERECTOMY      total    OOPHORECTOMY      ROTATOR CUFF REPAIR Left     SINUS SURGERY         Otherwise no Family History of asthma, allergic rhinitis, atopic dermatitis, drug allergy, food allergy, venom allergy or immune deficiency.     Allergies: see below  Medications: MAR reviewed       PHYSICAL EXAM     Physical Exam  Constitutional:       Appearance: Normal appearance.   HENT:      Head: Normocephalic and atraumatic.      Nose: Congestion present.   Eyes:      Conjunctiva/sclera: Conjunctivae normal.   Cardiovascular:      Rate and Rhythm: Normal rate and regular rhythm.   Pulmonary:      Effort: Pulmonary effort is normal.      Breath sounds: Normal breath sounds.   Skin:     General: Skin is warm and dry.   Neurological:      General: No focal deficit present.      Mental Status: She is alert.   Psychiatric:         Mood and Affect: Mood normal.         Behavior: Behavior normal.          ALLERGEN TESTING     Immunocaps:   Component      Latest Ref Rng & Units 12/18/2019   Allergen Acremonium (Cephalosporium) IgE      <0.10 kU/L <0.10   Allergen Acremonium (Cephalosporium) Class       CLASS 0   Botrytis Cinerea      <0.10 kU/L <0.10   Botrytis Cinerea Class       CLASS 0   Allergen Candida albicans IgE      <0.10 kU/L 0.68 (H)   Allergen Candida albicans Class       CLASS 1   Chaetomium      <0.10 kU/L <0.10   Chaetomium Glob. Class       CLASS 0   Cladosporium, IgE      <0.10 kU/L <0.10   Cladosporium Class       CLASS 0   Curvularia  lunata      <0.10 kU/L <0.10   Curvularia Lunata Class       CLASS 0   Epicoccum purpurascens, IgE      <0.10 kU/L <0.10   Epicoccum pupurascens Class       CLASS 0   Helminthosporium Halodes IgE      <0.10 kU/L <0.10   Helminthosporium Class       CLASS 0   Allergen Trichoderma Viride IgE      <0.10 kU/L <0.10   Allergen Trichoderma Viride Class       CLASS 0   Stemphyllium, IgE      <0.10 kU/L <0.10   Stemphylium Herbarum Class       CLASS 0   Allergen Rhodotorula IgE      <0.35 kU/L <0.35   Rhodotorula , IgE Class       0   Rhizopus Nigrican, IgE      <0.10 kU/L 0.31 (H)   Rhizopus Nigricans Class       CLASS 0/1   Phoma betae      <0.10 kU/L <0.10   Phoma Betae Class       CLASS 0   Penicillium, IgE      <0.10 kU/L 0.17 (H)   Penicillium Class       CLASS 0/1   Mucor racemosus, IgE      <0.10 kU/L 0.34 (H)   Mucor racemosus Class       CLASS 0/1   RAST Allergen for Trichophyton rubrum      kU/L <0.35      Component      Latest Ref Rng & Units 12/18/2019 12/18/2019 12/18/2019           3:07 PM  3:07 PM  3:07 PM   D. farinae      <0.10 kU/L     1.94 (H)   D. farinae Class           CLASS 2   Mite Dust Pteronyssinus IgE      <0.10 kU/L     0.19 (H)   D. pteronyssinus Class           CLASS 0/1   BERMUDA GRASS      <0.10 kU/L     0.71 (H)   Bermuda Grass Class           CLASS 2   Joshua Grass      <0.10 kU/L     0.72 (H)   Joshua Grass Class           CLASS 2   Hastings IgE      <0.10 kU/L     0.17 (H)   Hastings Class           CLASS 0/1   Plantain      <0.10 kU/L     0.63 (H)   English Plantain Class           CLASS 1   White Oak(Quercus alba) IgE      <0.10 kU/L     0.74 (H)   [place], Class           CLASS 2   Pecan [place] Tree      <0.10 kU/L     0.47 (H)   Pecan, Class           CLASS 1   Marshelder IgE      <0.10 kU/L     0.59 (H)   Marshelder Class           CLASS 1   Ragweed, Western IgE      <0.10 kU/L     0.54 (H)   Ragweed, Western, Class           CLASS 1   Alternaria alternata      <0.10 kU/L     <0.10    Altern. alternata Class           CLASS 0   Aspergillus Fumigatus IgE      <0.10 kU/L     0.22 (H)   A. fumigatus Class           CLASS 0/1   Cat Dander      <0.10 kU/L     <0.10   Cat Epithelium Class           CLASS 0   Cockroach, IgE      <0.10 kU/L CLASS 0/1 0.28 (H)     Dog Dander, IgE      <0.10 kU/L     0.51 (H)   Dog Dander Class           CLASS 1      Component      Latest Ref Rng & Units 12/18/2019 12/29/2017 10/30/2017 4/8/2014   Aspergillus Fumigatus IgE      <0.10 kU/L 0.22 (H)   <0.35 <0.35   A. fumigatus Class       CLASS 0/1   CLASS 0 CLASS 0   Aspergillus Antigen      <0.5 index   <0.500 0.676 (A)        Component      Latest Ref Rng & Units 2/20/2014   Aspergillus Fumigatus IgE      <0.10 kU/L 0.36 (H)   A. fumigatus Class       CLASS I   Aspergillus Antigen      <0.5 index            PULMONARY FUNCTION      PFTs:     Spirometry 8/23:   Spirometry shows mild obstruction. Lung volume determination is normal. Spirometry remains unimproved following bronchodilator. DLCO is moderately decreased. Notes: The failure to demonstrate improvement in spirometry does not preclude a clinical response to a trial of bronchodilators. DLCO interpretation based on the adjusted DLCO value due to a low hemoglobin. Discrepancy in TLC and VA makes DLCO interpretation potentially unreliable.      11/26/2021:              Pre:                   Post:  FEV1: 1.49 (82.8%)--> 1.56 (+4.1%)  FVC:   2.22  (95%)----> 2.30 (+3.6%)  Ratio:  67.37%      ----> 67.77%        5/28/2018  FVC: 2.30 (78%)  FEV1: 1.62 (70%)  Ratio: 70  No reversibility post bronchodilator.     Also performed 12/2019 in pulmonary clinic         ASSESSMENT & PLAN      Michela Franco is a 78 y.o. female with      Severe persistent asthma  -Doing very well since last visit. Currently able to walk 45 minutes without difficulty. No dyspnea at baseline and has only rarely needed albuterol since last visit. Has been off prednisone for over 2 weeks.  -She had  previously been on dupilumab 300 mg subq every 14 days until this past January. Previously switched from Dupixent to Nucala due to lack of efficacy.   -Continue Nucala, 100 mg q 28 days. (For future reference, patient uses [facility] pharmacy. For refills, call [phone] ext [phone]. All other medications to be filled by [facility] pharmacy)   -Continue symbicort 160/4.5 2 puffs BID.  -Continue albuterol as needed for SOB  -Continue duonebs as needed  -RTC 4 months, obtain PFT's in 3 months     Allergic rhinitis: Continues to have congestion.  -Continue fluticasone up to 2 SEN BID  -Continue azelastine up to SEN BID  -Increase nasal irrigation to BID  -AIT previously discussed. In this patient who is 78 will hold off on AIT at this time due to risk of inability to compensate in setting of possible anaphylaxis to AIT  -continue montelukast 10mg daily     Next visit:  -RTC 4 months, PFT's 3 months  -at next visit, assess response to Nucala and increased nasal saline irrigation.     Patient discussed with Dr. Geller.  RTC 4 months     Joe King DO  Allergy/Immunology Fellow

## 2024-05-16 ENCOUNTER — PATIENT MESSAGE (OUTPATIENT)
Dept: ADMINISTRATIVE | Facility: CLINIC | Age: 79
End: 2024-05-16
Payer: MEDICARE

## 2024-05-16 ENCOUNTER — TELEPHONE (OUTPATIENT)
Dept: ADMINISTRATIVE | Facility: CLINIC | Age: 79
End: 2024-05-16
Payer: MEDICARE

## 2024-05-29 ENCOUNTER — TELEPHONE (OUTPATIENT)
Dept: DERMATOLOGY | Facility: CLINIC | Age: 79
End: 2024-05-29
Payer: MEDICARE

## 2024-05-29 NOTE — TELEPHONE ENCOUNTER
I spoke with Mrs. Franco about a message in my in-basket that she would like to make an appointment for skin discoloration.  I asked what location she would like to be seen at, and she stated the main campus.  I offered her an appointment with Dr. Galo on 06/24/2024 at 11:30 AM.  She expressed her understanding and thanked me for the call.

## 2024-06-12 ENCOUNTER — OFFICE VISIT (OUTPATIENT)
Dept: CARDIOLOGY | Facility: CLINIC | Age: 79
End: 2024-06-12
Payer: MEDICARE

## 2024-06-12 ENCOUNTER — TELEPHONE (OUTPATIENT)
Dept: CARDIOLOGY | Facility: CLINIC | Age: 79
End: 2024-06-12
Payer: MEDICARE

## 2024-06-12 VITALS
WEIGHT: 162.06 LBS | HEART RATE: 79 BPM | SYSTOLIC BLOOD PRESSURE: 118 MMHG | OXYGEN SATURATION: 97 % | BODY MASS INDEX: 27 KG/M2 | HEIGHT: 65 IN | DIASTOLIC BLOOD PRESSURE: 60 MMHG

## 2024-06-12 DIAGNOSIS — I70.0 AORTIC ATHEROSCLEROSIS: ICD-10-CM

## 2024-06-12 DIAGNOSIS — I50.42 CHRONIC COMBINED SYSTOLIC AND DIASTOLIC HEART FAILURE: ICD-10-CM

## 2024-06-12 DIAGNOSIS — I50.20 HFREF (HEART FAILURE WITH REDUCED EJECTION FRACTION): Primary | ICD-10-CM

## 2024-06-12 DIAGNOSIS — I25.10 CORONARY ARTERY DISEASE, UNSPECIFIED VESSEL OR LESION TYPE, UNSPECIFIED WHETHER ANGINA PRESENT, UNSPECIFIED WHETHER NATIVE OR TRANSPLANTED HEART: ICD-10-CM

## 2024-06-12 DIAGNOSIS — I50.9 ACUTE ON CHRONIC CONGESTIVE HEART FAILURE, UNSPECIFIED HEART FAILURE TYPE: ICD-10-CM

## 2024-06-12 DIAGNOSIS — I34.0 SEVERE MITRAL REGURGITATION: ICD-10-CM

## 2024-06-12 DIAGNOSIS — J45.51 SEVERE PERSISTENT ASTHMA WITH ACUTE EXACERBATION: ICD-10-CM

## 2024-06-12 DIAGNOSIS — I10 HYPERTENSION, UNSPECIFIED TYPE: ICD-10-CM

## 2024-06-12 DIAGNOSIS — I07.1 SEVERE TRICUSPID REGURGITATION: ICD-10-CM

## 2024-06-12 PROCEDURE — 99214 OFFICE O/P EST MOD 30 MIN: CPT | Mod: HCNC,GC,S$GLB, | Performed by: STUDENT IN AN ORGANIZED HEALTH CARE EDUCATION/TRAINING PROGRAM

## 2024-06-12 PROCEDURE — 1101F PT FALLS ASSESS-DOCD LE1/YR: CPT | Mod: HCNC,CPTII,GC,S$GLB | Performed by: STUDENT IN AN ORGANIZED HEALTH CARE EDUCATION/TRAINING PROGRAM

## 2024-06-12 PROCEDURE — 1126F AMNT PAIN NOTED NONE PRSNT: CPT | Mod: HCNC,CPTII,GC,S$GLB | Performed by: STUDENT IN AN ORGANIZED HEALTH CARE EDUCATION/TRAINING PROGRAM

## 2024-06-12 PROCEDURE — 1157F ADVNC CARE PLAN IN RCRD: CPT | Mod: HCNC,CPTII,GC,S$GLB | Performed by: STUDENT IN AN ORGANIZED HEALTH CARE EDUCATION/TRAINING PROGRAM

## 2024-06-12 PROCEDURE — 99999 PR PBB SHADOW E&M-EST. PATIENT-LVL V: CPT | Mod: PBBFAC,HCNC,GC, | Performed by: STUDENT IN AN ORGANIZED HEALTH CARE EDUCATION/TRAINING PROGRAM

## 2024-06-12 PROCEDURE — 3288F FALL RISK ASSESSMENT DOCD: CPT | Mod: HCNC,CPTII,GC,S$GLB | Performed by: STUDENT IN AN ORGANIZED HEALTH CARE EDUCATION/TRAINING PROGRAM

## 2024-06-12 PROCEDURE — 3078F DIAST BP <80 MM HG: CPT | Mod: HCNC,CPTII,GC,S$GLB | Performed by: STUDENT IN AN ORGANIZED HEALTH CARE EDUCATION/TRAINING PROGRAM

## 2024-06-12 PROCEDURE — 1159F MED LIST DOCD IN RCRD: CPT | Mod: HCNC,CPTII,GC,S$GLB | Performed by: STUDENT IN AN ORGANIZED HEALTH CARE EDUCATION/TRAINING PROGRAM

## 2024-06-12 PROCEDURE — 3074F SYST BP LT 130 MM HG: CPT | Mod: HCNC,CPTII,GC,S$GLB | Performed by: STUDENT IN AN ORGANIZED HEALTH CARE EDUCATION/TRAINING PROGRAM

## 2024-06-12 NOTE — PROGRESS NOTES
Cardiology Clinic Note  Reason for Visit: MR    KIMBERLY:   79 yo with a PMHx of HFrEF (EF 23% 1/2024), severe asthma, HTN who presents to clinic for evaluation    She was seen a few months ago.  At that time she had a reduced EF and her valve regurgitation was worsening.  She presents for follow up.  Saw Dr. Matthews and had LHC which was negative.  She saw Dr. Perdue and recommended medical management vs MitralClip.  She has no complaints today.  Denies any SOB, Paulson, LE swelling, PND, CP.      ROS:    Constitution: Negative for fever, chills, weight loss or gain.   HENT: Negative for sore throat, rhinorrhea, or headache.  Eyes: Negative for blurred or double vision.   Cardiovascular: See above  Pulmonary: Negative for SOB   Gastrointestinal: Negative for abdominal pain, nausea, vomiting, or diarrhea.   : Negative for dysuria.   Neurological: Negative for focal weakness or sensory changes.  PMH:     Past Medical History:   Diagnosis Date    Allergy     Asthma     CHF (congestive heart failure)     Chronic diastolic heart failure 04/05/2018    Glaucoma suspect     Hyperlipidemia     Hypertension     Neuromuscular disorder     NSTEMI (non-ST elevated myocardial infarction) 01/01/2024    JOHN on CPAP     Osteoporosis     Other neutropenia 08/31/2023    Recurrent sinusitis 03/25/2014    Recurrent upper respiratory infection (URI)     Urticaria      Past Surgical History:   Procedure Laterality Date    ANGIOGRAM, CORONARY, WITH LEFT HEART CATHETERIZATION N/A 3/6/2024    Procedure: Angiogram, Coronary, with Left Heart Cath;  Surgeon: Edin Matthews MD;  Location: [facility] CATH LAB;  Service: Cardiology;  Laterality: N/A;    CATARACT EXTRACTION W/  INTRAOCULAR LENS IMPLANT Right 02/11/2021    Procedure: EXTRACTION, CATARACT, WITH IOL INSERTION;  Surgeon: John Merino MD;  Location: [facility] OR;  Service: Ophthalmology;  Laterality: Right;    CATARACT EXTRACTION W/  INTRAOCULAR LENS IMPLANT Left 03/04/2021    Procedure:  EXTRACTION, CATARACT, WITH IOL INSERTION;  Surgeon: John Merino MD;  Location: [facility] OR;  Service: Ophthalmology;  Laterality: Left;    COLONOSCOPY N/A 05/18/2018    Procedure: COLONOSCOPY;  Surgeon: Calixto rBian MD;  Location: [facility] ENDO (4TH FLR);  Service: Endoscopy;  Laterality: N/A;    COLONOSCOPY N/A 10/24/2023    Procedure: COLONOSCOPY;  Surgeon: Ward Merino MD;  Location: [facility] ENDO (4TH FLR);  Service: Endoscopy;  Laterality: N/A;  ref by / golytely [facility] [id number]  10/17-lvm for precall-MS  10/20-precall complete-MS    EYE SURGERY      HYSTERECTOMY      total    OOPHORECTOMY      ROTATOR CUFF REPAIR Left     SINUS SURGERY       Allergies:     Review of patient's allergies indicates:   Allergen Reactions    Carvedilol Shortness Of Breath    Metoprolol Shortness Of Breath    Adhesive      PAPER TAPE    Diazepam Other (See Comments)     Nervous, jittery    Gabapentin      Nervous      Keppra [levetiracetam]      nervous    Mold      sneezing    Calcium channel blocking agents-dihydropyridines Other (See Comments)     Leg swelling     Thiazides Other (See Comments)     Hyponatremia      Medications:     Current Outpatient Medications on File Prior to Visit   Medication Sig Dispense Refill    ADVAIR DISKUS 500-50 mcg/dose DsDv diskus inhaler INHALE 1 PUFF TWICE DAILY 1 each 11    albuterol (VENTOLIN HFA) 90 mcg/actuation inhaler Inhale 2 puffs into the lungs every 6 (six) hours as needed for Wheezing. Rescue 8 g 11    albuterol-ipratropium (DUO-NEB) 2.5 mg-0.5 mg/3 mL nebulizer solution Take 3 mLs by nebulization every 6 (six) hours as needed for Wheezing. 75 mL 5    aspirin 81 MG Chew Take 1 tablet (81 mg total) by mouth once daily. 90 tablet 3    azelastine (ASTELIN) 137 mcg (0.1 %) nasal spray 2 sprays (274 mcg total) by Nasal route 2 (two) times daily. 30 mL 11    budesonide-formoterol 160-4.5 mcg (SYMBICORT) 160-4.5 mcg/actuation HFAA Inhale 2 puffs into the lungs every 12 (twelve)  hours. Controller 10.2 g 5    carboxymethylcellulose sodium (REFRESH TEARS) 0.5 % Drop Apply 1 drop to eye 3 (three) times daily as needed (dry eyes). 30 mL 11    cholecalciferol, vitamin D3, 10,000 unit Cap Take 360 mg by mouth once daily. Take 6 capsules (6,000 units total) by mouth once daily      COD LIVER OIL ORAL Take 1 tablet by mouth once daily.      dupilumab 300 mg/2 mL PnIj Inject 300 mg into the skin every 14 (fourteen) days. 4 mL 12    empagliflozin (JARDIANCE) 10 mg tablet Take 1 tablet (10 mg total) by mouth once daily. 30 tablet 11    ferrous sulfate 325 (65 FE) MG EC tablet Take 1 tablet (325 mg total) by mouth 3 (three) times daily with meals. 270 tablet 3    fluticasone propionate (FLONASE) 50 mcg/actuation nasal spray 2 sprays (100 mcg total) by Each Nostril route 2 (two) times a day. 16 g 11    furosemide (LASIX) 40 MG tablet Take 1 tablet (40 mg total) by mouth 2 (two) times daily. 60 tablet 11    guaiFENesin (MUCINEX) 600 mg 12 hr tablet Take 1 tablet (600 mg total) by mouth 2 (two) times daily. 60 tablet 5    mepolizumab 100 mg/mL AtIn Inject 1 mL (100 mg total) into the skin every 28 days. 1 each 5    milk thistle 150 mg Cap Take 1 capsule by mouth once daily. 90 each 3    multivitamin (THERAGRAN) per tablet Take 1 tablet by mouth once daily.      sacubitriL-valsartan (ENTRESTO)  mg per tablet Take 1 tablet by mouth 2 (two) times daily. 60 tablet 3    SENNA 8.6 mg tablet TAKE 2 TAB(S) BY MOUTH NIGHTLY AS NEEDED FOR CONSTIPATION      spironolactone (ALDACTONE) 25 MG tablet Take 1 tablet (25 mg total) by mouth once daily. 30 tablet 11    tretinoin (RETIN-A) 0.05 % cream Apply topically every evening. Start with every other night and move up to nightly after 2 weeks if not too dry. 20 g 2     No current facility-administered medications on file prior to visit.     Social History:     Social History     Tobacco Use    Smoking status: Former     Current packs/day: 0.00     Average  packs/day: 0.3 packs/day for 40.0 years (10.0 ttl pk-yrs)     Types: Cigarettes     Start date: 1965     Quit date: 2005     Years since quittin.4     Passive exposure: Never    Smokeless tobacco: Never   Substance Use Topics    Alcohol use: Yes     Comment: rare beer-occ special events     Family History:     Family History   Problem Relation Name Age of Onset    No Known Problems Mother          healthy in 90s    No Known Problems Father 50's         accident-related death in 50s    Kidney cancer Sister 60     Cancer Sister 60         renal    Ovarian cancer Sister      No Known Problems Maternal Aunt      No Known Problems Maternal Uncle      No Known Problems Paternal Aunt      No Known Problems Paternal Uncle      No Known Problems Maternal Grandmother      No Known Problems Maternal Grandfather      No Known Problems Paternal Grandmother      No Known Problems Paternal Grandfather      Hypertension Daughter [place]     Hypertension Daughter Jacequlyn     No Known Problems Other      Allergic rhinitis Neg Hx      Allergies Neg Hx      Angioedema Neg Hx      Asthma Neg Hx      Atopy Neg Hx      Eczema Neg Hx      Immunodeficiency Neg Hx      Rhinitis Neg Hx      Urticaria Neg Hx       Physical Exam:   There were no vitals taken for this visit.   Wt Readings from Last 4 Encounters:   24 74.1 kg (163 lb 5.8 oz)   24 75 kg (165 lb 5.5 oz)   24 75.3 kg (166 lb 0.1 oz)   24 75.3 kg (166 lb 0.1 oz)         Constitutional: No distress, obese, conversant  HEENT: Sclera anicteric, PERRLA, EOMI  Neck: large v waves, no masses, good movement  CV: RRR, S1 and S2 normal, loud holosystolic murmur. Pulses 2+ and equal bilaterally in radial arteries, Cachorro's normal on right. Distal pulses are 2+ and equal in the femoral, DP and PT areas bilaterally  Pulm: Clear to auscultation bilaterally with symmetrical expansion. Chest wall palpated for reproduction of pain symptoms, and no pain was able  to be produced on palpation or resistance exercises  GI: Abdomen soft, non-tender, good bowel sounds  Extremities: Both extremities intact and grossly normal, skin is warm, no edema noted  Skin: No ecchymosis, erythema, or ulcers  Psych: AOx3, appropriate affect  Neuro: CNII-XII intact, no focal deficits      Labs:     Lab Results   Component Value Date     02/15/2024    K 4.2 02/15/2024     02/15/2024    CO2 27 02/15/2024    BUN 37 (H) 02/15/2024    CREATININE 1.1 02/15/2024    ANIONGAP 9 02/15/2024     Lab Results   Component Value Date    HGBA1C 5.3 10/18/2022     Lab Results   Component Value Date     (H) 02/15/2024     (H) 01/24/2024    BNP 4,644 (H) 01/17/2024    Lab Results   Component Value Date    WBC 3.97 01/12/2024    HGB 13.6 01/12/2024    HCT 42.5 01/12/2024     01/12/2024    GRAN 2.2 01/12/2024    GRAN 56.4 01/12/2024     Lab Results   Component Value Date    CHOL 199 08/26/2021    HDL 86 (H) 08/26/2021    LDLCALC 103.6 08/26/2021    TRIG 47 08/26/2021          Imaging:       EF   Date Value Ref Range Status   03/26/2024 25 % Final   01/02/2024 23 % Final   07/18/2023 50 % Final     Nuc Stress EF   Date Value Ref Range Status   01/22/2024 29 % Final     Nuc Rest EF   Date Value Ref Range Status   01/22/2024 32  Final         TTE:   Echo    Result Date: 3/26/2024    Left Ventricle: Regional wall motion abnormalities present. See diagram   for wall motion findings. There is severely reduced systolic function with   a visually estimated ejection fraction of 20 - 25%. Ejection fraction by   visual approximation is 25%. Unable to assess diastolic function due to   E-A fusion.    Right Ventricle: Normal right ventricular cavity size. Systolic   function is mildly reduced.    Aortic Valve: The aortic valve is a trileaflet valve. There is mild   aortic valve sclerosis. There is normal leaflet mobility.    Mitral Valve: The mitral valve is structurally normal. There is normal    leaflet mobility. There is mild regurgitation with a centrally directed   jet.    Tricuspid Valve: The tricuspid valve is structurally normal.    Pulmonary Artery: The estimated pulmonary artery systolic pressure is   28 mmHg.    IVC/SVC: Intermediate venous pressure at 8 mmHg.        Echo    Result Date: 1/2/2024    Left Ventricle: The left ventricle is mildly dilated. Ventricular mass   is normal. Normal wall thickness. Regional wall motion abnormalities   present. See diagram for wall motion findings. There is severely reduced   systolic function with a visually estimated ejection fraction of 20 - 25%.   Ejection fraction by visual approximation is 23%. Grade III diastolic   dysfunction.    Right Ventricle: Mild right ventricular enlargement. Wall thickness is   normal. Right ventricle wall motion  is normal. Systolic function is   borderline low.    Left Atrium: Left atrium is severely dilated.    Right Atrium: Right atrium is mildly dilated.    Aortic Valve: There is mild aortic valve sclerosis.    Mitral Valve: There is mild mitral annular calcification present. There   is moderate to  moderate-severe regurgitation.    Tricuspid Valve: There is moderate- severe  to severe (3-4+)   regurgitation.    Pulmonary Artery: There is mild pulmonary hypertension. The estimated   pulmonary artery systolic pressure is 44 mmHg.    IVC/SVC: Elevated venous pressure at 15 mmHg.    Pericardium: There is a trivial posterior effusion.        Echo    Result Date: 7/18/2023  · The left ventricle is moderately enlarged with eccentric hypertrophy and   low normal systolic function. The estimated ejection fraction is 50%.  · Normal right ventricular size with normal right ventricular systolic   function.  · Indeterminate left ventricular diastolic function.  · Mild left atrial enlargement.  · Normal central venous pressure (3 mmHg).         Stress Test:  1/2024    Equivocal myocardial perfusion scan.    There is a mild intensity,  small sized, equivocal perfusion abnormality that is reversible in the lateral wall(s). This finding is equivocal due to soft tissue shadow.    A PET stress exam is recommended if clinically indicated.    There are no other significant perfusion abnormalities.    The gated perfusion images showed an ejection fraction of 32% at rest. The gated perfusion images showed an ejection fraction of 29% post stress. Normal ejection fraction is greater than 47%.    There is moderate global hypokinesis at rest and stress.    LV cavity size is mildly enlarged at rest and mildly enlarged at stress.    The ECG portion of the study is negative for ischemia.    The patient reported no chest pain during the stress test.    During stress, frequent PVCs are noted.    There are no prior studies for comparison.    LHC:    The pre-procedure left ventricular end diastolic pressure was 3.    The estimated blood loss was none.    There was non-obstructive coronary artery disease..    Non-ischemic cardiomyopathy.  Assessment:    79 yo with a PMHx of HFrEF (EF 23% 1/2024), severe asthma, HTN who presents to clinic for evaluation     Plan:     #HFrEF:  Currently euvolemic.  NICM  - continue GDMT: jardiance 10mg qD, aldactone 25mg qD, entresto 97-103mg BID.  Not on BB due to bronchospasm  - continue volume control with lasix 80mg qAM    #Severe MR:  #Severe TR:  Meets criteria with pHTN and reduced EF for consideration of valve repair/replacement  - followed by Dr. Matthews.  [facility] showing NOCAD  - will discuss with him regarding MitraClip    #HTN:  BP at goal    #HLD:  - stable    #Severe asthma:  Cannot tolerate BB  - continue inhalers      Signed:  Adin Toribio MD  Cardiology Fellow  Pager - [phone]  [facility]  6/12/2024 9:01 AM

## 2024-06-12 NOTE — TELEPHONE ENCOUNTER
Contact with patient to discuss appointment made with Dr Long by Dr Aguilar office.  Pt states she requested appt with Dr Matthews as she is due for her valve check up and needs clinic appointment after the date of July 22, 2024.  Notified patient - request will be passed to Dr Matthews nurse to assist with scheduling and specific testing needed.  Pt voiced ok.  
oral

## 2024-06-13 ENCOUNTER — TELEPHONE (OUTPATIENT)
Dept: CARDIOLOGY | Facility: CLINIC | Age: 79
End: 2024-06-13
Payer: MEDICARE

## 2024-06-13 NOTE — TELEPHONE ENCOUNTER
IC staff contacted patient in regards to patient needing clarification if another appointment was needed to the provider. Patient condition improved from last echo and is currently in the mild stage of regurgitation, and no interventions are needed.  Patient was instructed to keep and maintain her general cardiology appointments, and to only see us PRN. Patient verbalized understanding

## 2024-06-24 ENCOUNTER — OFFICE VISIT (OUTPATIENT)
Dept: DERMATOLOGY | Facility: CLINIC | Age: 79
End: 2024-06-24
Payer: MEDICARE

## 2024-06-24 DIAGNOSIS — L21.9 SEBORRHEIC DERMATITIS: Primary | ICD-10-CM

## 2024-06-24 DIAGNOSIS — L81.9 HYPERPIGMENTATION: ICD-10-CM

## 2024-06-24 DIAGNOSIS — L82.1 SEBORRHEIC KERATOSES: ICD-10-CM

## 2024-06-24 PROCEDURE — 3288F FALL RISK ASSESSMENT DOCD: CPT | Mod: HCNC,CPTII,S$GLB, | Performed by: STUDENT IN AN ORGANIZED HEALTH CARE EDUCATION/TRAINING PROGRAM

## 2024-06-24 PROCEDURE — 1101F PT FALLS ASSESS-DOCD LE1/YR: CPT | Mod: HCNC,CPTII,S$GLB, | Performed by: STUDENT IN AN ORGANIZED HEALTH CARE EDUCATION/TRAINING PROGRAM

## 2024-06-24 PROCEDURE — 99999 PR PBB SHADOW E&M-EST. PATIENT-LVL IV: CPT | Mod: PBBFAC,HCNC,, | Performed by: STUDENT IN AN ORGANIZED HEALTH CARE EDUCATION/TRAINING PROGRAM

## 2024-06-24 PROCEDURE — 1126F AMNT PAIN NOTED NONE PRSNT: CPT | Mod: HCNC,CPTII,S$GLB, | Performed by: STUDENT IN AN ORGANIZED HEALTH CARE EDUCATION/TRAINING PROGRAM

## 2024-06-24 PROCEDURE — 1160F RVW MEDS BY RX/DR IN RCRD: CPT | Mod: HCNC,CPTII,S$GLB, | Performed by: STUDENT IN AN ORGANIZED HEALTH CARE EDUCATION/TRAINING PROGRAM

## 2024-06-24 PROCEDURE — 1159F MED LIST DOCD IN RCRD: CPT | Mod: HCNC,CPTII,S$GLB, | Performed by: STUDENT IN AN ORGANIZED HEALTH CARE EDUCATION/TRAINING PROGRAM

## 2024-06-24 PROCEDURE — 99214 OFFICE O/P EST MOD 30 MIN: CPT | Mod: HCNC,S$GLB,, | Performed by: STUDENT IN AN ORGANIZED HEALTH CARE EDUCATION/TRAINING PROGRAM

## 2024-06-24 PROCEDURE — 1157F ADVNC CARE PLAN IN RCRD: CPT | Mod: HCNC,CPTII,S$GLB, | Performed by: STUDENT IN AN ORGANIZED HEALTH CARE EDUCATION/TRAINING PROGRAM

## 2024-06-24 RX ORDER — KETOCONAZOLE 20 MG/G
CREAM TOPICAL DAILY
Qty: 60 G | Refills: 6 | Status: SHIPPED | OUTPATIENT
Start: 2024-06-24

## 2024-06-24 RX ORDER — HYDROCORTISONE 25 MG/G
CREAM TOPICAL 2 TIMES DAILY
Qty: 28 G | Refills: 3 | Status: SHIPPED | OUTPATIENT
Start: 2024-06-24

## 2024-06-24 NOTE — PROGRESS NOTES
Subjective:      Patient ID:  Michela Franco is a 79 y.o. female who presents for   Chief Complaint   Patient presents with    Dry Skin     face     Pt present for dry skin on face & breakouts.    Dry Skin    Saw Dr. Francois previously for seb derm and alopecia. Give tret 0.05%, lidex solution for scalp, HC for face    Review of Systems   Skin:  Positive for dry skin.       Objective:   Physical Exam   Constitutional: She appears well-developed and well-nourished.   Eyes: No conjunctival no injection.   Neurological: She is alert and oriented to person, place, and time.   Psychiatric: She has a normal mood and affect.   Skin:   Areas Examined (abnormalities noted in diagram):   Scalp / Hair Palpated and Inspected  Head / Face Inspection Performed            Diagram Legend     Erythematous scaling macule/papule c/w actinic keratosis       Vascular papule c/w angioma      Pigmented verrucoid papule/plaque c/w seborrheic keratosis      Yellow umbilicated papule c/w sebaceous hyperplasia      Irregularly shaped tan macule c/w lentigo     1-2 mm smooth white papules consistent with Milia      Movable subcutaneous cyst with punctum c/w epidermal inclusion cyst      Subcutaneous movable cyst c/w pilar cyst      Firm pink to brown papule c/w dermatofibroma      Pedunculated fleshy papule(s) c/w skin tag(s)      Evenly pigmented macule c/w junctional nevus     Mildly variegated pigmented, slightly irregular-bordered macule c/w mildly atypical nevus      Flesh colored to evenly pigmented papule c/w intradermal nevus       Pink pearly papule/plaque c/w basal cell carcinoma      Erythematous hyperkeratotic cursted plaque c/w SCC      Surgical scar with no sign of skin cancer recurrence      Open and closed comedones      Inflammatory papules and pustules      Verrucoid papule consistent consistent with wart     Erythematous eczematous patches and plaques     Dystrophic onycholytic nail with subungual debris c/w onychomycosis      Umbilicated papule    Erythematous-base heme-crusted tan verrucoid plaque consistent with inflamed seborrheic keratosis     Erythematous Silvery Scaling Plaque c/w Psoriasis     See annotation      Assessment / Plan:        Seborrheic dermatitis  -     hydrocortisone 2.5 % cream; Apply topically 2 (two) times daily. Use to affected areas for up to 2 weeks then take a 1 week break or decrease to 3 times weekly. Use to dry or itchy areas on face when flaring  Dispense: 28 g; Refill: 3  -     ketoconazole (NIZORAL) 2 % cream; Apply topically once daily. Apply to dry areas of face  Dispense: 60 g; Refill: 6    Soap to wash face: either zinc pyrithione (such as vanicream z bar) or 10% sulfur soap    Hyperpigmentation  - primarily due to volume loss. Could consider cosmetic procedures such as filler, bleph, etc. Patient not interested    Seborrheic keratoses  Reassurance given to patient. No treatment is necessary.   Treatment of benign, asymptomatic lesions may be considered cosmetic.               No follow-ups on file.

## 2024-07-09 ENCOUNTER — PATIENT MESSAGE (OUTPATIENT)
Dept: OTOLARYNGOLOGY | Facility: CLINIC | Age: 79
End: 2024-07-09
Payer: MEDICARE

## 2024-07-16 ENCOUNTER — LAB VISIT (OUTPATIENT)
Dept: LAB | Facility: HOSPITAL | Age: 79
End: 2024-07-16
Attending: STUDENT IN AN ORGANIZED HEALTH CARE EDUCATION/TRAINING PROGRAM
Payer: MEDICARE

## 2024-07-16 ENCOUNTER — OFFICE VISIT (OUTPATIENT)
Dept: PRIMARY CARE CLINIC | Facility: CLINIC | Age: 79
End: 2024-07-16
Payer: MEDICARE

## 2024-07-16 VITALS
HEART RATE: 74 BPM | OXYGEN SATURATION: 98 % | SYSTOLIC BLOOD PRESSURE: 120 MMHG | WEIGHT: 167.56 LBS | TEMPERATURE: 98 F | HEIGHT: 65 IN | BODY MASS INDEX: 27.92 KG/M2 | DIASTOLIC BLOOD PRESSURE: 60 MMHG

## 2024-07-16 DIAGNOSIS — I10 PRIMARY HYPERTENSION: ICD-10-CM

## 2024-07-16 DIAGNOSIS — J45.50 SEVERE PERSISTENT ASTHMA WITHOUT COMPLICATION: ICD-10-CM

## 2024-07-16 DIAGNOSIS — R09.82 ALLERGIC RHINITIS WITH POSTNASAL DRIP: ICD-10-CM

## 2024-07-16 DIAGNOSIS — I50.20 HFREF (HEART FAILURE WITH REDUCED EJECTION FRACTION): ICD-10-CM

## 2024-07-16 DIAGNOSIS — I50.20 HFREF (HEART FAILURE WITH REDUCED EJECTION FRACTION): Primary | ICD-10-CM

## 2024-07-16 DIAGNOSIS — J30.9 ALLERGIC RHINITIS WITH POSTNASAL DRIP: ICD-10-CM

## 2024-07-16 LAB
ANION GAP SERPL CALC-SCNC: 9 MMOL/L (ref 8–16)
BUN SERPL-MCNC: 54 MG/DL (ref 8–23)
CALCIUM SERPL-MCNC: 9.6 MG/DL (ref 8.7–10.5)
CHLORIDE SERPL-SCNC: 97 MMOL/L (ref 95–110)
CO2 SERPL-SCNC: 28 MMOL/L (ref 23–29)
CREAT SERPL-MCNC: 1.4 MG/DL (ref 0.5–1.4)
EST. GFR  (NO RACE VARIABLE): 38.7 ML/MIN/1.73 M^2
GLUCOSE SERPL-MCNC: 94 MG/DL (ref 70–110)
MAGNESIUM SERPL-MCNC: 2.7 MG/DL (ref 1.6–2.6)
POTASSIUM SERPL-SCNC: 5.1 MMOL/L (ref 3.5–5.1)
SODIUM SERPL-SCNC: 134 MMOL/L (ref 136–145)

## 2024-07-16 PROCEDURE — 83735 ASSAY OF MAGNESIUM: CPT | Mod: HCNC | Performed by: STUDENT IN AN ORGANIZED HEALTH CARE EDUCATION/TRAINING PROGRAM

## 2024-07-16 PROCEDURE — 99214 OFFICE O/P EST MOD 30 MIN: CPT | Mod: HCNC,S$GLB,, | Performed by: STUDENT IN AN ORGANIZED HEALTH CARE EDUCATION/TRAINING PROGRAM

## 2024-07-16 PROCEDURE — 99999 PR PBB SHADOW E&M-EST. PATIENT-LVL IV: CPT | Mod: PBBFAC,HCNC,, | Performed by: STUDENT IN AN ORGANIZED HEALTH CARE EDUCATION/TRAINING PROGRAM

## 2024-07-16 PROCEDURE — 1125F AMNT PAIN NOTED PAIN PRSNT: CPT | Mod: HCNC,CPTII,S$GLB, | Performed by: STUDENT IN AN ORGANIZED HEALTH CARE EDUCATION/TRAINING PROGRAM

## 2024-07-16 PROCEDURE — 1101F PT FALLS ASSESS-DOCD LE1/YR: CPT | Mod: HCNC,CPTII,S$GLB, | Performed by: STUDENT IN AN ORGANIZED HEALTH CARE EDUCATION/TRAINING PROGRAM

## 2024-07-16 PROCEDURE — 1157F ADVNC CARE PLAN IN RCRD: CPT | Mod: HCNC,CPTII,S$GLB, | Performed by: STUDENT IN AN ORGANIZED HEALTH CARE EDUCATION/TRAINING PROGRAM

## 2024-07-16 PROCEDURE — 3288F FALL RISK ASSESSMENT DOCD: CPT | Mod: HCNC,CPTII,S$GLB, | Performed by: STUDENT IN AN ORGANIZED HEALTH CARE EDUCATION/TRAINING PROGRAM

## 2024-07-16 PROCEDURE — 3078F DIAST BP <80 MM HG: CPT | Mod: HCNC,CPTII,S$GLB, | Performed by: STUDENT IN AN ORGANIZED HEALTH CARE EDUCATION/TRAINING PROGRAM

## 2024-07-16 PROCEDURE — 36415 COLL VENOUS BLD VENIPUNCTURE: CPT | Mod: HCNC,PN | Performed by: STUDENT IN AN ORGANIZED HEALTH CARE EDUCATION/TRAINING PROGRAM

## 2024-07-16 PROCEDURE — 3074F SYST BP LT 130 MM HG: CPT | Mod: HCNC,CPTII,S$GLB, | Performed by: STUDENT IN AN ORGANIZED HEALTH CARE EDUCATION/TRAINING PROGRAM

## 2024-07-16 PROCEDURE — 1159F MED LIST DOCD IN RCRD: CPT | Mod: HCNC,CPTII,S$GLB, | Performed by: STUDENT IN AN ORGANIZED HEALTH CARE EDUCATION/TRAINING PROGRAM

## 2024-07-16 PROCEDURE — 80048 BASIC METABOLIC PNL TOTAL CA: CPT | Mod: HCNC | Performed by: STUDENT IN AN ORGANIZED HEALTH CARE EDUCATION/TRAINING PROGRAM

## 2024-07-16 RX ORDER — BENZONATATE 100 MG/1
100 CAPSULE ORAL 3 TIMES DAILY PRN
Qty: 30 CAPSULE | Refills: 0 | Status: SHIPPED | OUTPATIENT
Start: 2024-07-16 | End: 2024-07-28

## 2024-07-16 NOTE — PROGRESS NOTES
Primary Care  Office Visit - In Person  7/16/2024      HPI    Patient is a 79 y.o.   Michela Franco  has a past medical history of Allergy, Asthma, CHF (congestive heart failure), Chronic diastolic heart failure (04/05/2018), Glaucoma suspect, Hyperlipidemia, Hypertension, Neuromuscular disorder, NSTEMI (non-ST elevated myocardial infarction) (01/01/2024), JOHN on CPAP, Osteoporosis, Other neutropenia (08/31/2023), Recurrent sinusitis (03/25/2014), Recurrent upper respiratory infection (URI), and Urticaria.    Patient presenting for f/u     She reports worsening of seasonal allergies with postnasal drip      Active Medications:  Current Outpatient Medications   Medication Instructions    albuterol (VENTOLIN HFA) 90 mcg/actuation inhaler 2 puffs, Inhalation, Every 6 hours PRN, Rescue    albuterol-ipratropium (DUO-NEB) 2.5 mg-0.5 mg/3 mL nebulizer solution 3 mLs, Nebulization, Every 6 hours PRN    aspirin 81 mg, Oral, Daily    azelastine (ASTELIN) 274 mcg, Nasal, 2 times daily    benzonatate (TESSALON) 100 mg, Oral, 3 times daily PRN    budesonide-formoterol 160-4.5 mcg (SYMBICORT) 160-4.5 mcg/actuation HFAA 2 puffs, Inhalation, Every 12 hours, Controller    carboxymethylcellulose sodium (REFRESH TEARS) 0.5 % Drop 1 drop, Ophthalmic, 3 times daily PRN    cholecalciferol (vitamin D3) (VITAMIN D3) 360 mg, Oral, Daily, Take 6 capsules (6,000 units total) by mouth once daily     COD LIVER OIL ORAL 1 tablet, Oral, Daily    ferrous sulfate 325 mg, Oral, 3 times daily with meals    fluticasone propionate (FLONASE) 100 mcg, Each Nostril, 2 times daily    furosemide (LASIX) 40 mg, Oral, 2 times daily    guaiFENesin (MUCINEX) 600 mg, Oral, 2 times daily    hydrocortisone 2.5 % cream Topical (Top), 2 times daily, Use to affected areas for up to 2 weeks then take a 1 week break or decrease to 3 times weekly. Use to dry or itchy areas on face when flaring    JARDIANCE 10 mg, Oral, Daily    ketoconazole (NIZORAL) 2 % cream Topical  "(Top), Daily, Apply to dry areas of face    mepolizumab 100 mg, Subcutaneous, Every 28 days    milk thistle 150 mg Cap 1 capsule, Oral, Daily    multivitamin (THERAGRAN) per tablet 1 tablet, Oral, Daily,      sacubitriL-valsartan (ENTRESTO)  mg per tablet 1 tablet, Oral, 2 times daily    spironolactone (ALDACTONE) 25 mg, Oral, Daily       Vitals:    07/16/24 1535   BP: 120/60   BP Location: Right arm   Pulse: 74   Temp: 97.9 °F (36.6 °C)   SpO2: 98%   Weight: 76 kg (167 lb 8.8 oz)   Height: 5' 5" (1.651 m)       Physical Exam  Vitals reviewed.   Constitutional:       General: She is not in acute distress.  HENT:      Right Ear: Tympanic membrane normal.      Left Ear: Tympanic membrane normal.      Nose:      Right Sinus: No maxillary sinus tenderness or frontal sinus tenderness.      Left Sinus: No maxillary sinus tenderness or frontal sinus tenderness.   Cardiovascular:      Rate and Rhythm: Normal rate and regular rhythm.      Pulses: Normal pulses.      Heart sounds: Normal heart sounds.   Pulmonary:      Effort: Pulmonary effort is normal.      Breath sounds: Wheezing (Scattered) present.   Abdominal:      General: Abdomen is flat. Bowel sounds are normal.      Palpations: Abdomen is soft.   Musculoskeletal:      Right lower leg: No edema.      Left lower leg: No edema.   Neurological:      General: No focal deficit present.      Mental Status: She is oriented to person, place, and time.          Assessment and Plan     1. HFrEF (heart failure with reduced ejection fraction)  Comments:  Current regimen lasix 40 mg BID, Entresto  mg BID, spironolactone 25 mg daily, and jardiance  Orders:  -     BASIC METABOLIC PANEL; Future; Expected date: 07/16/2024  -     Magnesium; Future; Expected date: 07/16/2024    2. Primary hypertension  Comments:  As above    3. Allergic rhinitis with postnasal drip  Comments:  Continue Singulair,  Astelin, Flonase, and nasal irrigation  Orders:  -     benzonatate (TESSALON) " 100 MG capsule; Take 1 capsule (100 mg total) by mouth 3 (three) times daily as needed for Cough.  Dispense: 30 capsule; Refill: 0    4. Severe persistent asthma without complication  Comments:  Continue Nucala, Symbicort, albuterol, and duonebs        Previous labs, records, and notes reviewed and considered for their impact on clinical decision making today.                Upcoming Scheduled Appointments and Follow Up:    Future Appointments   Date Time Provider Department Center   8/1/2024  7:30 AM PULMONARY FUNCTION [facility]   11/19/2024  2:20 PM Mayela Broussard MD [facility]       Follow Up [facility] (with who? when?): Follow up in about 4 months (around 11/16/2024).      Extended Emergency Contact Information  Primary Emergency Contact: Charley Ahmadi  Address: [address] [place] [address] of [place]  Mobile Phone: [phone]  Relation: Sister      Mayela Broussard MD   Internal Medicine  7/16/2024 - 3:44 PM    I spent a total of 30 minutes on the day of the visit.This includes face to face time and non-face to face time preparing to see the patient (eg, review of tests), obtaining and/or reviewing separately obtained history, documenting clinical information in the electronic or other health record, independently interpreting results and communicating results to the patient/family/caregiver, or care coordinator.    While patients have the right to access their medical record, it is essential to recognize that progress notes primarily serve as a means of communication among healthcare professionals.

## 2024-07-18 ENCOUNTER — PATIENT MESSAGE (OUTPATIENT)
Dept: PRIMARY CARE CLINIC | Facility: CLINIC | Age: 79
End: 2024-07-18
Payer: MEDICARE

## 2024-07-19 ENCOUNTER — PATIENT MESSAGE (OUTPATIENT)
Dept: PRIMARY CARE CLINIC | Facility: CLINIC | Age: 79
End: 2024-07-19

## 2024-07-19 ENCOUNTER — OFFICE VISIT (OUTPATIENT)
Dept: PRIMARY CARE CLINIC | Facility: CLINIC | Age: 79
End: 2024-07-19
Payer: MEDICARE

## 2024-07-19 DIAGNOSIS — N17.9 AKI (ACUTE KIDNEY INJURY): Primary | ICD-10-CM

## 2024-07-19 PROCEDURE — 1157F ADVNC CARE PLAN IN RCRD: CPT | Mod: HCNC,CPTII,95, | Performed by: STUDENT IN AN ORGANIZED HEALTH CARE EDUCATION/TRAINING PROGRAM

## 2024-07-19 PROCEDURE — 99213 OFFICE O/P EST LOW 20 MIN: CPT | Mod: HCNC,95,, | Performed by: STUDENT IN AN ORGANIZED HEALTH CARE EDUCATION/TRAINING PROGRAM

## 2024-07-19 NOTE — PROGRESS NOTES
Telehealth Visit    The patient location is: [place]   The chief complaint leading to consultation is: Lab follow up     Visit type: audiovisual    Face to Face time with patient: 5 minutes  10 minutes of total time spent on the encounter, which includes face to face time and non-face to face time preparing to see the patient (eg, review of tests), Obtaining and/or reviewing separately obtained history, Documenting clinical information in the electronic or other health record, Independently interpreting results (not separately reported) and communicating results to the patient/family/caregiver, or Care coordination (not separately reported).       HPI    Patient is a 79 y.o.   Michela Franco  has a past medical history of Allergy, Asthma, CHF (congestive heart failure), Chronic diastolic heart failure (04/05/2018), Glaucoma suspect, Hyperlipidemia, Hypertension, Neuromuscular disorder, NSTEMI (non-ST elevated myocardial infarction) (01/01/2024), JOHN on CPAP, Osteoporosis, Other neutropenia (08/31/2023), Recurrent sinusitis (03/25/2014), Recurrent upper respiratory infection (URI), and Urticaria.    Patient presenting for lab follow up             Active Medications:  Current Outpatient Medications   Medication Instructions    albuterol (VENTOLIN HFA) 90 mcg/actuation inhaler 2 puffs, Inhalation, Every 6 hours PRN, Rescue    albuterol-ipratropium (DUO-NEB) 2.5 mg-0.5 mg/3 mL nebulizer solution 3 mLs, Nebulization, Every 6 hours PRN    aspirin 81 mg, Oral, Daily    azelastine (ASTELIN) 274 mcg, Nasal, 2 times daily    benzonatate (TESSALON) 100 mg, Oral, 3 times daily PRN    budesonide-formoterol 160-4.5 mcg (SYMBICORT) 160-4.5 mcg/actuation HFAA 2 puffs, Inhalation, Every 12 hours, Controller    carboxymethylcellulose sodium (REFRESH TEARS) 0.5 % Drop 1 drop, Ophthalmic, 3 times daily PRN    cholecalciferol (vitamin D3) (VITAMIN D3) 360 mg, Oral, Daily, Take 6 capsules (6,000 units total) by mouth once daily      COD LIVER OIL ORAL 1 tablet, Oral, Daily    ferrous sulfate 325 mg, Oral, 3 times daily with meals    fluticasone propionate (FLONASE) 100 mcg, Each Nostril, 2 times daily    furosemide (LASIX) 40 mg, Oral, 2 times daily    guaiFENesin (MUCINEX) 600 mg, Oral, 2 times daily    hydrocortisone 2.5 % cream Topical (Top), 2 times daily, Use to affected areas for up to 2 weeks then take a 1 week break or decrease to 3 times weekly. Use to dry or itchy areas on face when flaring    JARDIANCE 10 mg, Oral, Daily    ketoconazole (NIZORAL) 2 % cream Topical (Top), Daily, Apply to dry areas of face    mepolizumab 100 mg, Subcutaneous, Every 28 days    milk thistle 150 mg Cap 1 capsule, Oral, Daily    multivitamin (THERAGRAN) per tablet 1 tablet, Oral, Daily,      sacubitriL-valsartan (ENTRESTO)  mg per tablet 1 tablet, Oral, 2 times daily    spironolactone (ALDACTONE) 25 mg, Oral, Daily       Physical Exam    General: Does not appear to be in acute distress    Assessment and Plan     1. JEFFY (acute kidney injury)  Comments:  Likely related to diuresis  Results sent to Cardiology  Continue to monitor                 Upcoming Scheduled Appointments and Follow Up:    Future Appointments   Date Time Provider Department Center   8/1/2024  7:30 AM PULMONARY FUNCTION [facility]   11/19/2024  2:20 PM Mayela Broussard MD [facility]       Follow Up [facility] Care (with who? when?): No follow-ups on file.        Extended Emergency Contact Information  Primary Emergency Contact: Charley Ahmadi  Address: [address] [place] [address] [place]  Mobile Phone: [phone]  Relation: Sister      Mayela Broussard MD   Internal Medicine  7/19/2024 - 11:27 AM        Each patient to whom he or she provides medical services by telemedicine is:  (1) informed of the relationship between the physician and patient and the respective role of any other health care provider with respect  to management of the patient; and (2) notified that he or she may decline to receive medical services by telemedicine and may withdraw from such care at any time.    While patients have the right to access their medical record, it is essential to recognize that progress notes primarily serve as a means of communication among healthcare professionals.

## 2024-08-01 ENCOUNTER — HOSPITAL ENCOUNTER (OUTPATIENT)
Dept: PULMONOLOGY | Facility: CLINIC | Age: 79
Discharge: HOME | End: 2024-08-01
Payer: MEDICARE

## 2024-08-01 DIAGNOSIS — J45.909 ASTHMA, UNSPECIFIED ASTHMA SEVERITY, UNSPECIFIED WHETHER COMPLICATED, UNSPECIFIED WHETHER PERSISTENT: ICD-10-CM

## 2024-08-02 LAB
DLCO SINGLE BREATH LLN: 14.53
DLCO SINGLE BREATH PRE REF: 52.9 %
DLCO SINGLE BREATH REF: 20.26
DLCOC SBVA LLN: 2.68
DLCOC SBVA REF: 4.1
DLCOC SINGLE BREATH LLN: 14.53
DLCOC SINGLE BREATH REF: 20.26
DLCOCSBVAULN: 5.53
DLCOCSINGLEBREATHULN: 25.99
DLCOSINGLEBREATHULN: 25.99
DLCOSINGLEBREATHZSCORE: -2.74
DLCOVA LLN: 2.68
DLCOVA PRE REF: 78.6 %
DLCOVA PRE: 3.22 ML/(MIN*MMHG*L) (ref 2.68–5.53)
DLCOVA REF: 4.1
DLCOVAULN: 5.53
ERV LLN: -16449.46
ERV PRE REF: 144.4 %
ERV REF: 0.54
ERVULN: ABNORMAL
FEF 25 75 LLN: 0.94
FEF 25 75 PRE REF: 21.4 %
FEF 25 75 REF: 2.33
FET100 CHG: 28.9 %
FEV05 LLN: 0.75
FEV05 REF: 1.6
FEV1 CHG: 11.1 %
FEV1 FVC LLN: 64
FEV1 FVC PRE REF: 74.6 %
FEV1 FVC REF: 78
FEV1 LLN: 1.17
FEV1 PRE REF: 68 %
FEV1 REF: 1.73
FEV1FVCZSCORE: -2.26
FEV1ZSCORE: -1.62
FRCPLETH LLN: 1.9
FRCPLETH PREREF: 157.2 %
FRCPLETH REF: 2.72
FRCPLETHULN: 3.54
FVC CHG: 15 %
FVC LLN: 1.54
FVC PRE REF: 90.3 %
FVC REF: 2.25
FVCZSCORE: -0.5
IVC PRE: 2.25 L (ref 1.54–3)
IVC SINGLE BREATH LLN: 1.54
IVC SINGLE BREATH PRE REF: 99.9 %
IVC SINGLE BREATH REF: 2.25
IVCSINGLEBREATHULN: 3
LLN IC: -16448.1
PEF LLN: 2.21
PEF PRE REF: 56.8 %
PEF REF: 4.22
PHYSICIAN COMMENT: ABNORMAL
POST FEF 25 75: 0.45 L/S (ref 0.94–3.73)
POST FET 100: 10.89 SEC
POST FEV1 FVC: 56.07 % (ref 63.87–89.78)
POST FEV1: 1.31 L (ref 1.17–2.26)
POST FEV5: 0.99 L (ref 0.75–2.46)
POST FVC: 2.33 L (ref 1.54–3)
POST PEF: 4.41 L/S (ref 2.21–6.22)
PRE DLCO: 10.71 ML/(MIN*MMHG) (ref 14.53–25.99)
PRE ERV: 0.79 L (ref -16449.46–16450.54)
PRE FEF 25 75: 0.5 L/S (ref 0.94–3.73)
PRE FET 100: 8.45 SEC
PRE FEV05 REF: 52.5 %
PRE FEV1 FVC: 58.01 % (ref 63.87–89.78)
PRE FEV1: 1.18 L (ref 1.17–2.26)
PRE FEV5: 0.84 L (ref 0.75–2.46)
PRE FRC PL: 4.27 L (ref 1.9–3.54)
PRE FVC: 2.03 L (ref 1.54–3)
PRE IC: 1.46 L (ref -16448.1–16451.9)
PRE PEF: 2.4 L/S (ref 2.21–6.22)
PRE REF IC: 76.7 %
PRE RV: 3.49 L (ref 1.6–2.75)
PRE TLC: 5.73 L (ref 3.95–5.93)
RAW PRE REF: 149.4 %
RAW PRE: 4.57 CMH2O*S/L (ref 3.06–3.06)
RAW REF: 3.06
REF IC: 1.9
RV LLN: 1.6
RV PRE REF: 160.4 %
RV REF: 2.17
RVTLC LLN: 36
RVTLC PRE REF: 134.8 %
RVTLC PRE: 60.84 % (ref 35.55–54.73)
RVTLC REF: 45
RVTLCULN: 55
RVULN: 2.75
SGAW PRE REF: 46.3 %
SGAW PRE: 0.05 1/(CMH2O*S) (ref 0.1–0.1)
SGAW REF: 0.1
TLC LLN: 3.95
TLC PRE REF: 116.1 %
TLC REF: 4.94
TLC ULN: 5.93
TLCZSCORE: 1.33
ULN IC: ABNORMAL
VA PRE: 3.32 L (ref 4.79–4.79)
VA SINGLE BREATH LLN: 4.79
VA SINGLE BREATH PRE REF: 69.4 %
VA SINGLE BREATH REF: 4.79
VASINGLEBREATHULN: 4.79
VC LLN: 1.54
VC PRE REF: 99.9 %
VC PRE: 2.25 L (ref 1.54–3)
VC REF: 2.25
VC ULN: 3

## 2024-08-19 ENCOUNTER — LAB VISIT (OUTPATIENT)
Dept: LAB | Facility: HOSPITAL | Age: 79
End: 2024-08-19
Attending: STUDENT IN AN ORGANIZED HEALTH CARE EDUCATION/TRAINING PROGRAM
Payer: MEDICARE

## 2024-08-19 DIAGNOSIS — N17.9 AKI (ACUTE KIDNEY INJURY): ICD-10-CM

## 2024-08-19 LAB
ANION GAP SERPL CALC-SCNC: 9 MMOL/L (ref 8–16)
BUN SERPL-MCNC: 48 MG/DL (ref 8–23)
CALCIUM SERPL-MCNC: 9.7 MG/DL (ref 8.7–10.5)
CHLORIDE SERPL-SCNC: 101 MMOL/L (ref 95–110)
CO2 SERPL-SCNC: 27 MMOL/L (ref 23–29)
CREAT SERPL-MCNC: 1.5 MG/DL (ref 0.5–1.4)
EST. GFR  (NO RACE VARIABLE): 35.7 ML/MIN/1.73 M^2
GLUCOSE SERPL-MCNC: 85 MG/DL (ref 70–110)
POTASSIUM SERPL-SCNC: 4.9 MMOL/L (ref 3.5–5.1)
SODIUM SERPL-SCNC: 137 MMOL/L (ref 136–145)

## 2024-08-19 PROCEDURE — 36415 COLL VENOUS BLD VENIPUNCTURE: CPT | Mod: HCNC,PN | Performed by: STUDENT IN AN ORGANIZED HEALTH CARE EDUCATION/TRAINING PROGRAM

## 2024-08-19 PROCEDURE — 80048 BASIC METABOLIC PNL TOTAL CA: CPT | Mod: HCNC | Performed by: STUDENT IN AN ORGANIZED HEALTH CARE EDUCATION/TRAINING PROGRAM

## 2024-08-19 NOTE — PROGRESS NOTES
Discussed results with patient via phone   Renal function relatively stable. Cardiology did not make any recommendations on adjustments for JEFFY   Patient would like to consider reducing dose of diuretic to avoid further injury   Reduce dose of lasix from 40 mg daily to 20 mg daily   Daily weights   Reassess in one week     Mayela Broussard MD   Internal Medicine   8/19/2024 - 3:10 PM

## 2024-08-20 ENCOUNTER — PATIENT MESSAGE (OUTPATIENT)
Dept: PRIMARY CARE CLINIC | Facility: CLINIC | Age: 79
End: 2024-08-20
Payer: MEDICARE

## 2024-08-22 ENCOUNTER — TELEPHONE (OUTPATIENT)
Dept: ALLERGY | Facility: CLINIC | Age: 79
End: 2024-08-22
Payer: MEDICARE

## 2024-08-22 ENCOUNTER — OFFICE VISIT (OUTPATIENT)
Dept: ALLERGY | Facility: CLINIC | Age: 79
End: 2024-08-22
Payer: MEDICARE

## 2024-08-22 VITALS — HEIGHT: 65 IN | WEIGHT: 171.94 LBS | BODY MASS INDEX: 28.65 KG/M2

## 2024-08-22 DIAGNOSIS — J32.9 CHRONIC SINUSITIS, UNSPECIFIED LOCATION: ICD-10-CM

## 2024-08-22 DIAGNOSIS — R76.0 ABNORMAL ANTIBODY TITER: ICD-10-CM

## 2024-08-22 DIAGNOSIS — K21.9 GASTROESOPHAGEAL REFLUX DISEASE, UNSPECIFIED WHETHER ESOPHAGITIS PRESENT: ICD-10-CM

## 2024-08-22 DIAGNOSIS — J45.909 ASTHMA, UNSPECIFIED ASTHMA SEVERITY, UNSPECIFIED WHETHER COMPLICATED, UNSPECIFIED WHETHER PERSISTENT: Primary | ICD-10-CM

## 2024-08-22 DIAGNOSIS — J30.9 ALLERGIC RHINITIS, UNSPECIFIED SEASONALITY, UNSPECIFIED TRIGGER: ICD-10-CM

## 2024-08-22 PROCEDURE — 99215 OFFICE O/P EST HI 40 MIN: CPT | Mod: HCNC,S$GLB,, | Performed by: STUDENT IN AN ORGANIZED HEALTH CARE EDUCATION/TRAINING PROGRAM

## 2024-08-22 PROCEDURE — 99999 PR PBB SHADOW E&M-EST. PATIENT-LVL III: CPT | Mod: PBBFAC,HCNC,, | Performed by: STUDENT IN AN ORGANIZED HEALTH CARE EDUCATION/TRAINING PROGRAM

## 2024-08-22 PROCEDURE — 1125F AMNT PAIN NOTED PAIN PRSNT: CPT | Mod: HCNC,CPTII,S$GLB, | Performed by: STUDENT IN AN ORGANIZED HEALTH CARE EDUCATION/TRAINING PROGRAM

## 2024-08-22 PROCEDURE — 1159F MED LIST DOCD IN RCRD: CPT | Mod: HCNC,CPTII,S$GLB, | Performed by: STUDENT IN AN ORGANIZED HEALTH CARE EDUCATION/TRAINING PROGRAM

## 2024-08-22 PROCEDURE — 1157F ADVNC CARE PLAN IN RCRD: CPT | Mod: HCNC,CPTII,S$GLB, | Performed by: STUDENT IN AN ORGANIZED HEALTH CARE EDUCATION/TRAINING PROGRAM

## 2024-08-22 RX ORDER — BUDESONIDE AND FORMOTEROL FUMARATE DIHYDRATE 80; 4.5 UG/1; UG/1
2 AEROSOL RESPIRATORY (INHALATION) 2 TIMES DAILY
Qty: 10.2 G | Refills: 11 | Status: SHIPPED | OUTPATIENT
Start: 2024-08-22 | End: 2025-08-22

## 2024-08-22 NOTE — PROGRESS NOTES
"ALLERGY & IMMUNOLOGY CLINIC - FOLLOW UP     HISTORY OF PRESENT ILLNESS   CC: follow-up PFT and Nucala    Patient ID: Michela Franco is a 78 y.o. female  -Has ?asthma (ex-smoker, never childhood asthma, no BD response but mild obstruction)  -Mixed rhinitis with GERD/allergic rhinitis  -chronic pansinusitis  -HFrEF    She reports feeling ok since LV. She received dupixent for 3 years but insurance stopped covering, and started Nucala 4/2024 and hasn't had a flare up that was severe since. Symbicort weekly 160. Mucinex BID. Nose sprays. Inhalers weekly. She continues to have some mild-moderate nasal congestion, although is using fluticasone 2 SEN BID, azelastine 2 SEN BID, nasal rinse 1x/daily     PHYSICAL EXAM   VS: Ht 5' 5" (1.651 m)   Wt 78 kg (171 lb 15.3 oz)   BMI 28.62 kg/m²   GENERAL: NAD, well nourished, well appearing  EYES: no conjunctival injection, no discharge, no infraorbital shiners  EARS: external auditory canals normal B/L  ORAL: MMM, no ulcers, no thrush, no cobblestoning  LUNGS: CTAB, no w/r/c, no increased WOB  DERM: no rashes       PULMONARY FUNCTION   Spirometry 8/23:   Spirometry shows mild obstruction. Lung volume determination is normal. Spirometry remains unimproved following bronchodilator. DLCO is moderately decreased. Notes: The failure to demonstrate improvement in spirometry does not preclude a clinical response to a trial of bronchodilators. DLCO interpretation based on the adjusted DLCO value due to a low hemoglobin. Discrepancy in TLC and VA makes DLCO interpretation potentially unreliable.      11/26/2021:              Pre:                   Post:  FEV1: 1.49 (82.8%)--> 1.56 (+4.1%)  FVC:   2.22  (95%)----> 2.30 (+3.6%)  Ratio:  67.37%      ----> 67.77%       5/28/2018  FVC: 2.30 (78%)  FEV1: 1.62 (70%)  Ratio: 70  No reversibility post bronchodilator.     Also performed 12/2019 in pulmonary clinic    PFT 2024 mild obstruction no BD response     ASSESSMENT & PLAN     Severe " persistent asthma: PFT with mild obstruction no BD, while on Nucala.   - On Nucala 300mg Q4 weeks since 5/2024  - S/p dupixent for 3 years until insurance stopped covering  - Symbicort decreased to 80 today, using about once a week  - Still has albuterol PRN, can review SMART at next visit  - Still has duonebs PRN     Chronic mixed rhinitis: chronic congestion. Villareal sensitized, +GERD  -fluticasone up to 2 SEN BID  -azelastine up to SEN BID  -Increase nasal irrigation to BID, using with budesonide  -AIT previously discussed. 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 This includes face to face time and non-face to face time preparing to see the patient (eg, review of tests), obtaining and/or reviewing separately obtained history, documenting clinical information in the electronic or other health record, independently interpreting results  and communicating results to the patient/family/caregiver, or care coordinator.

## 2024-08-22 NOTE — TELEPHONE ENCOUNTER
----- Message from Grace Del Toro sent at 2024  8:39 AM CDT -----  Regardin month f/u appt advise  Contact: [phone]  LINA BEAN calling regarding Appointment Access  (message) for # pt is calling to speak with nurse to schedule a 4 month follow up with another provider she states her provider left practice pls advise and schedule pt with another provider

## 2024-08-22 NOTE — TELEPHONE ENCOUNTER
Patient was called to schedule a 4 month follow up appt but received no answer, left a voicemail to call clinic back.

## 2024-08-23 ENCOUNTER — TELEPHONE (OUTPATIENT)
Dept: ALLERGY | Facility: CLINIC | Age: 79
End: 2024-08-23
Payer: MEDICARE

## 2024-08-23 NOTE — TELEPHONE ENCOUNTER
Called [facility] to let them know it is okay to run the patient's Budesonide-Formoterol inhaler as the covered drug, Symbicort

## 2024-08-28 ENCOUNTER — OFFICE VISIT (OUTPATIENT)
Dept: OPTOMETRY | Facility: CLINIC | Age: 79
End: 2024-08-28
Payer: COMMERCIAL

## 2024-08-28 DIAGNOSIS — Z01.00 ROUTINE EYE EXAM: Primary | ICD-10-CM

## 2024-08-28 DIAGNOSIS — H52.203 MYOPIA OF BOTH EYES WITH ASTIGMATISM: ICD-10-CM

## 2024-08-28 DIAGNOSIS — H52.13 MYOPIA OF BOTH EYES WITH ASTIGMATISM: ICD-10-CM

## 2024-08-28 PROCEDURE — 99999 PR PBB SHADOW E&M-EST. PATIENT-LVL III: CPT | Mod: PBBFAC,,, | Performed by: OPTOMETRIST

## 2024-08-28 PROCEDURE — 92015 DETERMINE REFRACTIVE STATE: CPT | Mod: S$GLB,,, | Performed by: OPTOMETRIST

## 2024-08-28 PROCEDURE — 92014 COMPRE OPH EXAM EST PT 1/>: CPT | Mod: S$GLB,,, | Performed by: OPTOMETRIST

## 2024-08-28 NOTE — PROGRESS NOTES
HPI    Annual [facility] Vision Exam   Pt states stable c specs     Pt denies F/F     Pt denies Dry/ Itchy/ Burning/ red eyes/ photophobia/ watery  Gtt: No    Last edited by Carlos Angeles, OD on 8/28/2024 11:35 AM.            Assessment /Plan     For exam results, see Encounter Report.    Routine eye exam  -[facility]    Myopia of both eyes with astigmatism  Eyeglass Final Rx       Eyeglass Final Rx         Sphere Cylinder Axis Dist VA Add    Right -0.50 +0.75 010 20/25 +2.50    Left -0.50 Sphere  20/25 +2.50      Type: PAL    Expiration Date: 8/28/2025                      RTC 1 yr

## 2024-09-05 ENCOUNTER — OFFICE VISIT (OUTPATIENT)
Dept: PRIMARY CARE CLINIC | Facility: CLINIC | Age: 79
End: 2024-09-05
Payer: MEDICARE

## 2024-09-05 ENCOUNTER — PATIENT MESSAGE (OUTPATIENT)
Dept: PRIMARY CARE CLINIC | Facility: CLINIC | Age: 79
End: 2024-09-05

## 2024-09-05 ENCOUNTER — TELEPHONE (OUTPATIENT)
Dept: ALLERGY | Facility: CLINIC | Age: 79
End: 2024-09-05
Payer: MEDICARE

## 2024-09-05 DIAGNOSIS — N17.9 AKI (ACUTE KIDNEY INJURY): Primary | ICD-10-CM

## 2024-09-05 DIAGNOSIS — I50.20 HFREF (HEART FAILURE WITH REDUCED EJECTION FRACTION): ICD-10-CM

## 2024-09-05 PROCEDURE — 1157F ADVNC CARE PLAN IN RCRD: CPT | Mod: HCNC,CPTII,95, | Performed by: STUDENT IN AN ORGANIZED HEALTH CARE EDUCATION/TRAINING PROGRAM

## 2024-09-05 PROCEDURE — 99213 OFFICE O/P EST LOW 20 MIN: CPT | Mod: HCNC,95,, | Performed by: STUDENT IN AN ORGANIZED HEALTH CARE EDUCATION/TRAINING PROGRAM

## 2024-09-05 NOTE — TELEPHONE ENCOUNTER
Called patient in regards to below message. I advise patient she can either upload the letter to her portal or bring it in to the office an d leave at the . Patient verbalized she will bring it into the office.      Nature of call: Pt states she has received a letter that the provider has to fill out for her prescriptions. Pt would like to know the best way to get letter to office. Please call to advise

## 2024-09-05 NOTE — PROGRESS NOTES
Telehealth Visit    The patient location is: [place]   The chief complaint leading to consultation is: Lab follow up     Visit type: audiovisual      Face to Face time with patient: 5 minutes  10 minutes of total time spent on the encounter, which includes face to face time and non-face to face time preparing to see the patient (eg, review of tests), Obtaining and/or reviewing separately obtained history, Documenting clinical information in the electronic or other health record, Independently interpreting results (not separately reported) and communicating results to the patient/family/caregiver, or Care coordination (not separately reported).       HPI    Patient is a 79 y.o.   Michela Franco  has a past medical history of Allergy, Asthma, CHF (congestive heart failure), Chronic diastolic heart failure (04/05/2018), Glaucoma suspect, Hyperlipidemia, Hypertension, Neuromuscular disorder, NSTEMI (non-ST elevated myocardial infarction) (01/01/2024), JOHN on CPAP, Osteoporosis, Other neutropenia (08/31/2023), Recurrent sinusitis (03/25/2014), Recurrent upper respiratory infection (URI), and Urticaria.    Patient presenting for follow up for JEFFY   She tolerated diuretic dose reduction well       Active Medications:  Current Outpatient Medications   Medication Instructions    albuterol (VENTOLIN HFA) 90 mcg/actuation inhaler 2 puffs, Inhalation, Every 6 hours PRN, Rescue    albuterol-ipratropium (DUO-NEB) 2.5 mg-0.5 mg/3 mL nebulizer solution 3 mLs, Nebulization, Every 6 hours PRN    aspirin 81 MG Chew Chew 1 tablet (81 mg total) by mouth once daily.    azelastine (ASTELIN) 274 mcg, Nasal, 2 times daily    budesonide-formoterol 80-4.5 mcg (SYMBICORT) 80-4.5 mcg/actuation HFAA 2 puffs, Inhalation, 2 times daily, Controller    carboxymethylcellulose sodium (REFRESH TEARS) 0.5 % Drop 1 drop, Ophthalmic, 3 times daily PRN    cholecalciferol (vitamin D3) (VITAMIN D3) 360 mg, Oral, Daily, Take 6 capsules (6,000 units total)  by mouth once daily     COD LIVER OIL ORAL 1 tablet, Oral, Daily    ferrous sulfate 325 mg, Oral, 3 times daily with meals    fluticasone propionate (FLONASE) 100 mcg, Each Nostril, 2 times daily    furosemide (LASIX) 40 mg, Oral, 2 times daily    guaiFENesin (MUCINEX) 600 mg, Oral, 2 times daily    hydrocortisone 2.5 % cream Topical (Top), 2 times daily, Use to affected areas for up to 2 weeks then take a 1 week break or decrease to 3 times weekly. Use to dry or itchy areas on face when flaring    JARDIANCE 10 mg, Oral, Daily    ketoconazole (NIZORAL) 2 % cream Topical (Top), Daily, Apply to dry areas of face    mepolizumab 100 mg, Subcutaneous, Every 28 days    milk thistle 150 mg Cap 1 capsule, Oral, Daily    multivitamin (THERAGRAN) per tablet 1 tablet, Oral, Daily,      sacubitriL-valsartan (ENTRESTO)  mg per tablet 1 tablet, Oral, 2 times daily    spironolactone (ALDACTONE) 25 mg, Oral, Daily       Physical Exam    General: Does not appear to be in acute distress    Assessment and Plan     1. JEFFY (acute kidney injury)  Comments:  Reduced dose of Lasix from 40 mg BID to 40 mg daily.  Patient denies any weight gain, leg swelling, or shortness of breath  Follow up with Cardiology  Orders:  -     BASIC METABOLIC PANEL; Future; Expected date: 09/05/2024    2. HFrEF (heart failure with reduced ejection fraction)                 Upcoming Scheduled Appointments and Follow Up:    Future Appointments   Date Time Provider Department Center   11/19/2024  2:20 PM Mayela Broussard MD [facility]   12/3/2024  6:30 PM [facility]       Follow Up [facility] Care (with who? when?): No follow-ups on file.        Extended Emergency Contact Information  Primary Emergency Contact: Charley Ahmadi  Address: [address] [place] [address] [place]  Mobile Phone: [phone]  Relation: Sister      Mayela Broussard MD   Internal Medicine  9/5/2024 - 11:41  AM        Each patient to whom he or she provides medical services by telemedicine is:  (1) informed of the relationship between the physician and patient and the respective role of any other health care provider with respect to management of the patient; and (2) notified that he or she may decline to receive medical services by telemedicine and may withdraw from such care at any time.    While patients have the right to access their medical record, it is essential to recognize that progress notes primarily serve as a means of communication among healthcare professionals.

## 2024-09-05 NOTE — TELEPHONE ENCOUNTER
----- Message from Cam Mederos sent at 9/5/2024  8:17 AM CDT -----  Regarding: Consult/Advisory  Contact: [phone]  Consult/Advisory     Name Of Caller: pt         Contact Preference:    [phone]     Nature of call: Pt states she has received a letter that the provider has to fill out for her prescriptions. Pt would like to know the best way to get letter to office. Please call to advise

## 2024-09-06 ENCOUNTER — LAB VISIT (OUTPATIENT)
Dept: LAB | Facility: HOSPITAL | Age: 79
End: 2024-09-06
Attending: STUDENT IN AN ORGANIZED HEALTH CARE EDUCATION/TRAINING PROGRAM
Payer: MEDICARE

## 2024-09-06 DIAGNOSIS — N17.9 AKI (ACUTE KIDNEY INJURY): ICD-10-CM

## 2024-09-06 LAB
ANION GAP SERPL CALC-SCNC: 10 MMOL/L (ref 8–16)
BUN SERPL-MCNC: 37 MG/DL (ref 8–23)
CALCIUM SERPL-MCNC: 9.6 MG/DL (ref 8.7–10.5)
CHLORIDE SERPL-SCNC: 104 MMOL/L (ref 95–110)
CO2 SERPL-SCNC: 23 MMOL/L (ref 23–29)
CREAT SERPL-MCNC: 1.2 MG/DL (ref 0.5–1.4)
EST. GFR  (NO RACE VARIABLE): 46.6 ML/MIN/1.73 M^2
GLUCOSE SERPL-MCNC: 82 MG/DL (ref 70–110)
POTASSIUM SERPL-SCNC: 4.9 MMOL/L (ref 3.5–5.1)
SODIUM SERPL-SCNC: 137 MMOL/L (ref 136–145)

## 2024-09-06 PROCEDURE — 80048 BASIC METABOLIC PNL TOTAL CA: CPT | Mod: HCNC | Performed by: STUDENT IN AN ORGANIZED HEALTH CARE EDUCATION/TRAINING PROGRAM

## 2024-09-06 PROCEDURE — 36415 COLL VENOUS BLD VENIPUNCTURE: CPT | Mod: HCNC,PN | Performed by: STUDENT IN AN ORGANIZED HEALTH CARE EDUCATION/TRAINING PROGRAM

## 2024-10-10 RX ORDER — SACUBITRIL AND VALSARTAN 97; 103 MG/1; MG/1
1 TABLET, FILM COATED ORAL 2 TIMES DAILY
Qty: 60 TABLET | Refills: 3 | Status: CANCELLED | OUTPATIENT
Start: 2024-10-10

## 2024-10-11 DIAGNOSIS — R00.2 PALPITATIONS: Primary | ICD-10-CM

## 2024-10-11 RX ORDER — BENZONATATE 100 MG/1
100 CAPSULE ORAL 3 TIMES DAILY PRN
COMMUNITY
End: 2024-10-11 | Stop reason: SDUPTHER

## 2024-10-11 RX ORDER — BENZONATATE 100 MG/1
100 CAPSULE ORAL 3 TIMES DAILY PRN
Qty: 60 CAPSULE | Refills: 2 | Status: SHIPPED | OUTPATIENT
Start: 2024-10-11

## 2024-10-11 NOTE — TELEPHONE ENCOUNTER
----- Message from Charity sent at 10/11/2024 12:18 PM CDT -----  Contact: [phone]  Requesting an RX refill or new RX.    Is this a refill or new RX: refill    RX name and strength (copy/paste from chart):  Benzonatate 100 MG (not in chart)    Is this a 30 day or 90 day RX: 30    Pharmacy name and phone # (copy/paste from chart):    [facility]  [address]  [place] [address]  Phone: [phone] Fax: [phone]    The doctors have asked that we provide their patients with the following 2 reminders -- prescription refills can take up to 72 hours, and a friendly reminder that in the future you can use your [facility] account to request refills: yes

## 2024-10-14 ENCOUNTER — TELEPHONE (OUTPATIENT)
Dept: CARDIOLOGY | Facility: CLINIC | Age: 79
End: 2024-10-14
Payer: MEDICARE

## 2024-10-14 RX ORDER — SACUBITRIL AND VALSARTAN 97; 103 MG/1; MG/1
1 TABLET, FILM COATED ORAL 2 TIMES DAILY
Qty: 60 TABLET | Refills: 11 | Status: SHIPPED | OUTPATIENT
Start: 2024-10-14

## 2024-10-14 NOTE — TELEPHONE ENCOUNTER
Refill request has been sent for approval       ----- Message from Jennifer sent at 10/11/2024  9:27 AM CDT -----  Regarding: refill  sacubitriL-valsartan (ENTRESTO)  mg per tablet    [facility]   Phone: [phone]  Fax: [phone]      PT IS OUT OF HER MEDICATION    LOV 6/12/24

## 2024-10-24 NOTE — PROGRESS NOTES
General Cardiology Clinic Note  Reason for Visit: Follow up  Last Clinic Visit: 06/12/24     HPI:     Michela Franco is a 79 y.o. , who presents for follow up of HFrEF     PROBLEM LIST:  HFrEF (EF 20-25%)  Hypertension   Hyperlipidemia  Aortic atherosclerosis   Severe MR   Asthma     Interval HPI:   Patient reports doing well overall from a cardiac standpoint. She endorses some dietary indiscretions that have contributed to recent weight increase. Patient denies any BLE or shortness of breath. She is able to walk a mile comfortably and goes up and down stairs with no cardiac restrictions or limitations. Patient adheres to sodium and fluid restriction. She weighs herself daily. Her furosemide was decreased from 40 mg BID to 40 mg qd due to CMP with Cr 1.5 > 1.2, BUN 48 > 37. She denies chest pain/pressure/tightness/discomfort, dyspnea on exertion, orthopnea, PND, peripheral edema, palpitations, syncope or claudication.    Last visit with Dr. Toribio: 06/12/24  She was seen a few months ago. At that time she had a reduced EF and her valve regurgitation was worsening. She presents for follow up. Saw Dr. Matthews and had LHC which was negative. She saw Dr. Perdue and recommended medical management vs MitralClip. She has no complaints today. Denies any SOB, Paulson, LE swelling, PND, CP      HPI 02/08/24 Dr. Toribio   She has been seen in [facility] numerous times. Last seen she was complaining of SOB. She had an echo performed showing a reduced EF, mod-sev MR and mod-sev TR. . She had a SPECT ordered which was equivocal due to soft tissue shadow. She presents today for follow up. She has no complaints. She is walking 35min 3x a week and having no issues. No orthopnea. LE swelling no longer present. She is taking her lasix and doing a low salt low fluid diet. She denies any palpitations or chest pain.     ROS:    Pertinent ROS included in HPI and below.  PMH:     Past Medical History:   Diagnosis Date    Allergy      Asthma     CHF (congestive heart failure)     Chronic diastolic heart failure 04/05/2018    Glaucoma suspect     Hyperlipidemia     Hypertension     Neuromuscular disorder     NSTEMI (non-ST elevated myocardial infarction) 01/01/2024    JOHN on CPAP     Osteoporosis     Other neutropenia 08/31/2023    Recurrent sinusitis 03/25/2014    Recurrent upper respiratory infection (URI)     Urticaria      Past Surgical History:   Procedure Laterality Date    ANGIOGRAM, CORONARY, WITH LEFT HEART CATHETERIZATION N/A 3/6/2024    Procedure: Angiogram, Coronary, with Left Heart Cath;  Surgeon: Edin Matthews MD;  Location: [facility] CATH LAB;  Service: Cardiology;  Laterality: N/A;    CATARACT EXTRACTION W/  INTRAOCULAR LENS IMPLANT Right 02/11/2021    Procedure: EXTRACTION, CATARACT, WITH IOL INSERTION;  Surgeon: John Merino MD;  Location: [facility] OR;  Service: Ophthalmology;  Laterality: Right;    CATARACT EXTRACTION W/  INTRAOCULAR LENS IMPLANT Left 03/04/2021    Procedure: EXTRACTION, CATARACT, WITH IOL INSERTION;  Surgeon: John Merino MD;  Location: [facility] OR;  Service: Ophthalmology;  Laterality: Left;    COLONOSCOPY N/A 05/18/2018    Procedure: COLONOSCOPY;  Surgeon: Calixto Brian MD;  Location: [facility] ENDO (4TH FLR);  Service: Endoscopy;  Laterality: N/A;    COLONOSCOPY N/A 10/24/2023    Procedure: COLONOSCOPY;  Surgeon: Ward Merino MD;  Location: [facility] ENDO (4TH FLR);  Service: Endoscopy;  Laterality: N/A;  ref by / golytely [facility] [id number]  10/17-lvm for precall-MS  10/20-precall complete-MS    EYE SURGERY      HYSTERECTOMY      total    OOPHORECTOMY      ROTATOR CUFF REPAIR Left     SINUS SURGERY       Allergies:     Review of patient's allergies indicates:   Allergen Reactions    Carvedilol Shortness Of Breath    Metoprolol Shortness Of Breath    Adhesive      PAPER TAPE    Diazepam Other (See Comments)     Nervous, jittery    Gabapentin      Nervous      Keppra [levetiracetam]      nervous    Mold       sneezing    Calcium channel blocking agents-dihydropyridines Other (See Comments)     Leg swelling     Thiazides Other (See Comments)     Hyponatremia      Medications:     Current Outpatient Medications on File Prior to Visit   Medication Sig Dispense Refill    albuterol (VENTOLIN HFA) 90 mcg/actuation inhaler Inhale 2 puffs into the lungs every 6 (six) hours as needed for Wheezing. Rescue 8 g 11    albuterol-ipratropium (DUO-NEB) 2.5 mg-0.5 mg/3 mL nebulizer solution Take 3 mLs by nebulization every 6 (six) hours as needed for Wheezing. 75 mL 5    aspirin 81 MG Chew Chew 1 tablet (81 mg total) by mouth once daily. 90 tablet 3    benzonatate (TESSALON) 100 MG capsule Take 1 capsule (100 mg total) by mouth 3 (three) times daily as needed for Cough. 60 capsule 2    budesonide-formoterol 80-4.5 mcg (SYMBICORT) 80-4.5 mcg/actuation HFAA Inhale 2 puffs into the lungs 2 (two) times a day. Controller 10.2 g 11    cholecalciferol, vitamin D3, 10,000 unit Cap Take 360 mg by mouth once daily. Take 6 capsules (6,000 units total) by mouth once daily      COD LIVER OIL ORAL Take 1 tablet by mouth once daily.      empagliflozin (JARDIANCE) 10 mg tablet Take 1 tablet (10 mg total) by mouth once daily. 30 tablet 11    ferrous sulfate 325 (65 FE) MG EC tablet Take 1 tablet (325 mg total) by mouth 3 (three) times daily with meals. 270 tablet 3    fluticasone propionate (FLONASE) 50 mcg/actuation nasal spray 2 sprays (100 mcg total) by Each Nostril route 2 (two) times a day. 16 g 11    furosemide (LASIX) 40 MG tablet Take 1 tablet (40 mg total) by mouth 2 (two) times daily. 60 tablet 11    guaiFENesin (MUCINEX) 600 mg 12 hr tablet Take 1 tablet (600 mg total) by mouth 2 (two) times daily. 60 tablet 5    hydrocortisone 2.5 % cream Apply topically 2 (two) times daily. Use to affected areas for up to 2 weeks then take a 1 week break or decrease to 3 times weekly. Use to dry or itchy areas on face when flaring 28 g 3    ketoconazole  (NIZORAL) 2 % cream Apply topically once daily. Apply to dry areas of face 60 g 6    mepolizumab 100 mg/mL AtIn Inject 1 mL (100 mg total) into the skin every 28 days. 1 each 5    milk thistle 150 mg Cap Take 1 capsule by mouth once daily. 90 each 3    multivitamin (THERAGRAN) per tablet Take 1 tablet by mouth once daily.      sacubitriL-valsartan (ENTRESTO)  mg per tablet Take 1 tablet by mouth 2 (two) times daily. 60 tablet 11    spironolactone (ALDACTONE) 25 MG tablet Take 1 tablet (25 mg total) by mouth once daily. 30 tablet 11    azelastine (ASTELIN) 137 mcg (0.1 %) nasal spray 2 sprays (274 mcg total) by Nasal route 2 (two) times daily. 30 mL 11    carboxymethylcellulose sodium (REFRESH TEARS) 0.5 % Drop Apply 1 drop to eye 3 (three) times daily as needed (dry eyes). 30 mL 11     No current facility-administered medications on file prior to visit.     Social History:     Social History     Tobacco Use    Smoking status: Former     Current packs/day: 0.00     Average packs/day: 0.3 packs/day for 40.0 years (10.0 ttl pk-yrs)     Types: Cigarettes     Start date: 1965     Quit date: 2005     Years since quittin.8     Passive exposure: Never    Smokeless tobacco: Never   Substance Use Topics    Alcohol use: Yes     Comment: rare beer-occ special events     Family History:     Family History   Problem Relation Name Age of Onset    No Known Problems Mother          healthy in 90s    No Known Problems Father 50's         accident-related death in 50s    Kidney cancer Sister 60     Cancer Sister 60         renal    Ovarian cancer Sister      No Known Problems Maternal Aunt      No Known Problems Maternal Uncle      No Known Problems Paternal Aunt      No Known Problems Paternal Uncle      No Known Problems Maternal Grandmother      No Known Problems Maternal Grandfather      No Known Problems Paternal Grandmother      No Known Problems Paternal Grandfather      Hypertension Daughter Jana      "Hypertension Daughter Garima     No Known Problems Other      Allergic rhinitis Neg Hx      Allergies Neg Hx      Angioedema Neg Hx      Asthma Neg Hx      Atopy Neg Hx      Eczema Neg Hx      Immunodeficiency Neg Hx      Rhinitis Neg Hx      Urticaria Neg Hx       Physical Exam:   /64   Pulse 68   Ht 5' 5" (1.651 m)   Wt 79.8 kg (176 lb)   SpO2 99%   BMI 29.29 kg/m²      Physical Exam  Constitutional:       General: She is not in acute distress.     Appearance: Normal appearance. She is normal weight. She is not ill-appearing.   HENT:      Head: Normocephalic and atraumatic.      Nose: Congestion present. No rhinorrhea.      Mouth/Throat:      Mouth: Mucous membranes are moist.      Pharynx: Oropharynx is clear. No oropharyngeal exudate or posterior oropharyngeal erythema.   Eyes:      General:         Right eye: No discharge.         Left eye: No discharge.      Extraocular Movements: Extraocular movements intact.      Conjunctiva/sclera: Conjunctivae normal.   Neck:      Vascular: No carotid bruit.   Cardiovascular:      Rate and Rhythm: Normal rate and regular rhythm.      Pulses: Normal pulses.      Heart sounds: Normal heart sounds. No murmur heard.     No friction rub. No gallop.   Pulmonary:      Effort: Pulmonary effort is normal. No respiratory distress.      Breath sounds: Normal breath sounds. No stridor. No wheezing or rales.   Musculoskeletal:         General: No swelling. Normal range of motion.      Cervical back: Normal range of motion. No rigidity or tenderness.      Right lower leg: No edema.      Left lower leg: No edema.   Skin:     General: Skin is warm and dry.      Coloration: Skin is not jaundiced.      Findings: No bruising or lesion.   Neurological:      General: No focal deficit present.      Mental Status: She is alert and oriented to person, place, and time. Mental status is at baseline.      Cranial Nerves: No cranial nerve deficit.      Motor: No weakness.      Gait: " Gait normal.   Psychiatric:         Mood and Affect: Mood normal.         Behavior: Behavior normal.         Thought Content: Thought content normal.         Judgment: Judgment normal.        Labs:     Blood Tests:  Lab Results   Component Value Date     (H) 02/15/2024     09/06/2024    K 4.9 09/06/2024     09/06/2024    CO2 23 09/06/2024    BUN 37 (H) 09/06/2024    CREATININE 1.2 09/06/2024    GLU 82 09/06/2024    HGBA1C 5.3 10/18/2022    MG 2.7 (H) 07/16/2024    AST 26 02/15/2024    ALT 18 02/15/2024    ALBUMIN 3.2 (L) 02/15/2024    PROT 7.2 02/15/2024    BILITOT 0.6 02/15/2024    WBC 3.97 01/12/2024    HGB 13.6 01/12/2024    HCT 42.5 01/12/2024    MCV 83 01/12/2024     01/12/2024    TSH 2.405 04/25/2022       Lab Results   Component Value Date    CHOL 199 08/26/2021    HDL 86 (H) 08/26/2021    TRIG 47 08/26/2021       Lab Results   Component Value Date    LDLCALC 103.6 08/26/2021       Lab Results   Component Value Date    TSH 2.405 04/25/2022       Lab Results   Component Value Date    HGBA1C 5.3 10/18/2022     Imaging:     Echocardiogram  TTE 03/26/24    Left Ventricle: Regional wall motion abnormalities present. See diagram for wall motion findings. There is severely reduced systolic function with a visually estimated ejection fraction of 20 - 25%. Ejection fraction by visual approximation is 25%. Unable to assess diastolic function due to E-A fusion.    Right Ventricle: Normal right ventricular cavity size. Systolic function is mildly reduced.    Aortic Valve: The aortic valve is a trileaflet valve. There is mild aortic valve sclerosis. There is normal leaflet mobility.    Mitral Valve: The mitral valve is structurally normal. There is normal leaflet mobility. There is mild regurgitation with a centrally directed jet.    Tricuspid Valve: The tricuspid valve is structurally normal.    Pulmonary Artery: The estimated pulmonary artery systolic pressure is 28 mmHg.    IVC/SVC:  Intermediate venous pressure at 8 mmHg.    TTE 01/02/24    Left Ventricle: The left ventricle is mildly dilated. Ventricular mass is normal. Normal wall thickness. Regional wall motion abnormalities present. See diagram for wall motion findings. There is severely reduced systolic function with a visually estimated ejection fraction of 20 - 25%. Ejection fraction by visual approximation is 23%. Grade III diastolic dysfunction.    Right Ventricle: Mild right ventricular enlargement. Wall thickness is normal. Right ventricle wall motion  is normal. Systolic function is borderline low.    Left Atrium: Left atrium is severely dilated.    Right Atrium: Right atrium is mildly dilated.    Aortic Valve: There is mild aortic valve sclerosis.    Mitral Valve: There is mild mitral annular calcification present. There is moderate to  moderate-severe regurgitation.    Tricuspid Valve: There is moderate- severe  to severe (3-4+) regurgitation.    Pulmonary Artery: There is mild pulmonary hypertension. The estimated pulmonary artery systolic pressure is 44 mmHg.    IVC/SVC: Elevated venous pressure at 15 mmHg.    Pericardium: There is a trivial posterior effusion.    TTE 07/18/23  The left ventricle is moderately enlarged with eccentric hypertrophy and low normal systolic function. The estimated ejection fraction is 50%.  Normal right ventricular size with normal right ventricular systolic function.  Indeterminate left ventricular diastolic function.  Mild left atrial enlargement.  Normal central venous pressure (3 mmHg).    TTE 04/04/18  CONCLUSIONS     1 - Eccentric hypertrophy.     2 - Normal left ventricular systolic function (EF 55-60%).     3 - Normal right ventricular systolic function .     4 - Impaired LV relaxation, normal LAP (grade 1 diastolic dysfunction).     5 - Moderate left atrial enlargement.     6 - The estimated PA systolic pressure is 34 mmHg.     Stress testing  Nuclear Stress test 01/22/24    Equivocal  myocardial perfusion scan.    There is a mild intensity, small sized, equivocal perfusion abnormality that is reversible in the lateral wall(s). This finding is equivocal due to soft tissue shadow.    A PET stress exam is recommended if clinically indicated.    There are no other significant perfusion abnormalities.    The gated perfusion images showed an ejection fraction of 32% at rest. The gated perfusion images showed an ejection fraction of 29% post stress. Normal ejection fraction is greater than 47%.    There is moderate global hypokinesis at rest and stress.    LV cavity size is mildly enlarged at rest and mildly enlarged at stress.    The ECG portion of the study is negative for ischemia.    The patient reported no chest pain during the stress test.    During stress, frequent PVCs are noted.    There are no prior studies for comparison    Cath Lab  [facility] 03/06/24    The pre-procedure left ventricular end diastolic pressure was 3.    The estimated blood loss was none.    There was non-obstructive coronary artery disease..    Non-ischemic cardiomyopathy.    Other  None    EKG:   10/25/24: Normal sinus rhythm Left anterior fascicular block at 74 bpm (personally reviewed)    Assessment:     1. HFrEF (heart failure with reduced ejection fraction)    2. Primary hypertension    3. Aortic atherosclerosis      Plan:     HFrEF (heart failure with reduced ejection fraction)  Patient euvolemic during examination today   She reports increased weight from a dietary standpoint  Continue Entresto  mg BID   Continue Spironolactone 25 mg qd  Continue Jardiance 10 mg qd   Repeat CMP and BNP   Continue daily weights and sodium/fluid restriction     Primary hypertension  BP at goal   Continue medications as above     Aortic atherosclerosis  [facility] 3/6/24 with non-obstructive CAD   FLP ordered       Follow up in 3 months     Signed:  Sharon Lagos, PA-C [facility]     10/25/2024 12:50 PM    Follow-up:     Future  Appointments   Date Time Provider Department Center   10/28/2024  7:10 AM LAB, [facility] LAB [facility]   11/19/2024  2:20 PM Mayela Broussard MD [facility]   12/3/2024  6:30 PM Vonda Michelle MD [facility] [place]   1/31/2025  1:00 PM Alycia Gardner PA-C [facility] [place]

## 2024-10-25 ENCOUNTER — OFFICE VISIT (OUTPATIENT)
Dept: CARDIOLOGY | Facility: CLINIC | Age: 79
End: 2024-10-25
Payer: MEDICARE

## 2024-10-25 ENCOUNTER — HOSPITAL ENCOUNTER (OUTPATIENT)
Dept: CARDIOLOGY | Facility: CLINIC | Age: 79
Discharge: HOME | End: 2024-10-25
Payer: MEDICARE

## 2024-10-25 VITALS
HEIGHT: 65 IN | HEART RATE: 68 BPM | WEIGHT: 176 LBS | DIASTOLIC BLOOD PRESSURE: 64 MMHG | OXYGEN SATURATION: 99 % | BODY MASS INDEX: 29.32 KG/M2 | SYSTOLIC BLOOD PRESSURE: 130 MMHG

## 2024-10-25 DIAGNOSIS — R00.2 PALPITATIONS: ICD-10-CM

## 2024-10-25 DIAGNOSIS — I70.0 AORTIC ATHEROSCLEROSIS: ICD-10-CM

## 2024-10-25 DIAGNOSIS — I50.20 HFREF (HEART FAILURE WITH REDUCED EJECTION FRACTION): Primary | ICD-10-CM

## 2024-10-25 DIAGNOSIS — I10 PRIMARY HYPERTENSION: ICD-10-CM

## 2024-10-25 LAB
OHS QRS DURATION: 98 MS
OHS QTC CALCULATION: 446 MS

## 2024-10-25 PROCEDURE — 99999 PR PBB SHADOW E&M-EST. PATIENT-LVL IV: CPT | Mod: PBBFAC,HCNC,,

## 2024-10-25 PROCEDURE — 93010 ELECTROCARDIOGRAM REPORT: CPT | Mod: HCNC,S$GLB,, | Performed by: INTERNAL MEDICINE

## 2024-10-28 ENCOUNTER — LAB VISIT (OUTPATIENT)
Dept: LAB | Facility: HOSPITAL | Age: 79
End: 2024-10-28
Payer: MEDICARE

## 2024-10-28 DIAGNOSIS — I50.20 HFREF (HEART FAILURE WITH REDUCED EJECTION FRACTION): ICD-10-CM

## 2024-10-28 LAB
ALBUMIN SERPL BCP-MCNC: 3.7 G/DL (ref 3.5–5.2)
ALP SERPL-CCNC: 94 U/L (ref 40–150)
ALT SERPL W/O P-5'-P-CCNC: 15 U/L (ref 10–44)
ANION GAP SERPL CALC-SCNC: 10 MMOL/L (ref 8–16)
AST SERPL-CCNC: 31 U/L (ref 10–40)
BILIRUB SERPL-MCNC: 0.6 MG/DL (ref 0.1–1)
BUN SERPL-MCNC: 35 MG/DL (ref 8–23)
CALCIUM SERPL-MCNC: 9.9 MG/DL (ref 8.7–10.5)
CHLORIDE SERPL-SCNC: 105 MMOL/L (ref 95–110)
CHOLEST SERPL-MCNC: 233 MG/DL (ref 120–199)
CHOLEST/HDLC SERPL: 2.5 {RATIO} (ref 2–5)
CO2 SERPL-SCNC: 23 MMOL/L (ref 23–29)
CREAT SERPL-MCNC: 1.3 MG/DL (ref 0.5–1.4)
EST. GFR  (NO RACE VARIABLE): 42.4 ML/MIN/1.73 M^2
GLUCOSE SERPL-MCNC: 73 MG/DL (ref 70–110)
HDLC SERPL-MCNC: 93 MG/DL (ref 40–75)
HDLC SERPL: 39.9 % (ref 20–50)
LDLC SERPL CALC-MCNC: 132 MG/DL (ref 63–159)
NONHDLC SERPL-MCNC: 140 MG/DL
POTASSIUM SERPL-SCNC: 4.9 MMOL/L (ref 3.5–5.1)
PROT SERPL-MCNC: 7.6 G/DL (ref 6–8.4)
SODIUM SERPL-SCNC: 138 MMOL/L (ref 136–145)
TRIGL SERPL-MCNC: 40 MG/DL (ref 30–150)

## 2024-10-28 PROCEDURE — 80053 COMPREHEN METABOLIC PANEL: CPT | Mod: HCNC

## 2024-10-28 PROCEDURE — 80061 LIPID PANEL: CPT | Mod: HCNC

## 2024-10-28 PROCEDURE — 83880 ASSAY OF NATRIURETIC PEPTIDE: CPT | Mod: HCNC

## 2024-10-28 PROCEDURE — 36415 COLL VENOUS BLD VENIPUNCTURE: CPT | Mod: HCNC,PN

## 2024-10-29 LAB — NT-PROBNP SERPL IA-MCNC: 308 PG/ML

## 2024-11-01 DIAGNOSIS — I70.0 AORTIC ATHEROSCLEROSIS: Primary | ICD-10-CM

## 2024-11-01 RX ORDER — ROSUVASTATIN CALCIUM 5 MG/1
5 TABLET, COATED ORAL DAILY
Qty: 30 TABLET | Refills: 11 | Status: SHIPPED | OUTPATIENT
Start: 2024-11-01 | End: 2025-11-01

## 2024-11-19 ENCOUNTER — LAB VISIT (OUTPATIENT)
Dept: LAB | Facility: HOSPITAL | Age: 79
End: 2024-11-19
Attending: STUDENT IN AN ORGANIZED HEALTH CARE EDUCATION/TRAINING PROGRAM
Payer: MEDICARE

## 2024-11-19 ENCOUNTER — OFFICE VISIT (OUTPATIENT)
Dept: PRIMARY CARE CLINIC | Facility: CLINIC | Age: 79
End: 2024-11-19
Payer: MEDICARE

## 2024-11-19 VITALS
HEART RATE: 85 BPM | DIASTOLIC BLOOD PRESSURE: 66 MMHG | BODY MASS INDEX: 29.75 KG/M2 | HEIGHT: 65 IN | TEMPERATURE: 99 F | WEIGHT: 178.56 LBS | OXYGEN SATURATION: 98 % | SYSTOLIC BLOOD PRESSURE: 110 MMHG

## 2024-11-19 DIAGNOSIS — J45.909 ASTHMA, UNSPECIFIED ASTHMA SEVERITY, UNSPECIFIED WHETHER COMPLICATED, UNSPECIFIED WHETHER PERSISTENT: ICD-10-CM

## 2024-11-19 DIAGNOSIS — J32.4 CHRONIC PANSINUSITIS: ICD-10-CM

## 2024-11-19 DIAGNOSIS — J31.0 CHRONIC RHINITIS: ICD-10-CM

## 2024-11-19 DIAGNOSIS — M10.371 ACUTE GOUT DUE TO RENAL IMPAIRMENT INVOLVING TOE OF RIGHT FOOT: ICD-10-CM

## 2024-11-19 DIAGNOSIS — M79.674 GREAT TOE PAIN, RIGHT: Primary | ICD-10-CM

## 2024-11-19 DIAGNOSIS — J45.50 SEVERE PERSISTENT ASTHMA WITHOUT COMPLICATION: ICD-10-CM

## 2024-11-19 DIAGNOSIS — I10 PRIMARY HYPERTENSION: ICD-10-CM

## 2024-11-19 DIAGNOSIS — E78.2 MIXED HYPERLIPIDEMIA: ICD-10-CM

## 2024-11-19 DIAGNOSIS — I50.20 HFREF (HEART FAILURE WITH REDUCED EJECTION FRACTION): ICD-10-CM

## 2024-11-19 DIAGNOSIS — M79.674 GREAT TOE PAIN, RIGHT: ICD-10-CM

## 2024-11-19 DIAGNOSIS — N18.31 STAGE 3A CHRONIC KIDNEY DISEASE: ICD-10-CM

## 2024-11-19 LAB — URATE SERPL-MCNC: 9.4 MG/DL (ref 2.4–5.7)

## 2024-11-19 PROCEDURE — 3078F DIAST BP <80 MM HG: CPT | Mod: HCNC,CPTII,S$GLB, | Performed by: STUDENT IN AN ORGANIZED HEALTH CARE EDUCATION/TRAINING PROGRAM

## 2024-11-19 PROCEDURE — 3288F FALL RISK ASSESSMENT DOCD: CPT | Mod: HCNC,CPTII,S$GLB, | Performed by: STUDENT IN AN ORGANIZED HEALTH CARE EDUCATION/TRAINING PROGRAM

## 2024-11-19 PROCEDURE — 99999 PR PBB SHADOW E&M-EST. PATIENT-LVL V: CPT | Mod: PBBFAC,HCNC,, | Performed by: STUDENT IN AN ORGANIZED HEALTH CARE EDUCATION/TRAINING PROGRAM

## 2024-11-19 PROCEDURE — 1157F ADVNC CARE PLAN IN RCRD: CPT | Mod: HCNC,CPTII,S$GLB, | Performed by: STUDENT IN AN ORGANIZED HEALTH CARE EDUCATION/TRAINING PROGRAM

## 2024-11-19 PROCEDURE — 1126F AMNT PAIN NOTED NONE PRSNT: CPT | Mod: HCNC,CPTII,S$GLB, | Performed by: STUDENT IN AN ORGANIZED HEALTH CARE EDUCATION/TRAINING PROGRAM

## 2024-11-19 PROCEDURE — 3074F SYST BP LT 130 MM HG: CPT | Mod: HCNC,CPTII,S$GLB, | Performed by: STUDENT IN AN ORGANIZED HEALTH CARE EDUCATION/TRAINING PROGRAM

## 2024-11-19 PROCEDURE — 1101F PT FALLS ASSESS-DOCD LE1/YR: CPT | Mod: HCNC,CPTII,S$GLB, | Performed by: STUDENT IN AN ORGANIZED HEALTH CARE EDUCATION/TRAINING PROGRAM

## 2024-11-19 PROCEDURE — 84550 ASSAY OF BLOOD/URIC ACID: CPT | Mod: HCNC | Performed by: STUDENT IN AN ORGANIZED HEALTH CARE EDUCATION/TRAINING PROGRAM

## 2024-11-19 PROCEDURE — G2211 COMPLEX E/M VISIT ADD ON: HCPCS | Mod: HCNC,S$GLB,, | Performed by: STUDENT IN AN ORGANIZED HEALTH CARE EDUCATION/TRAINING PROGRAM

## 2024-11-19 PROCEDURE — 1159F MED LIST DOCD IN RCRD: CPT | Mod: HCNC,CPTII,S$GLB, | Performed by: STUDENT IN AN ORGANIZED HEALTH CARE EDUCATION/TRAINING PROGRAM

## 2024-11-19 PROCEDURE — 36415 COLL VENOUS BLD VENIPUNCTURE: CPT | Mod: HCNC,PN | Performed by: STUDENT IN AN ORGANIZED HEALTH CARE EDUCATION/TRAINING PROGRAM

## 2024-11-19 PROCEDURE — 99214 OFFICE O/P EST MOD 30 MIN: CPT | Mod: HCNC,S$GLB,, | Performed by: STUDENT IN AN ORGANIZED HEALTH CARE EDUCATION/TRAINING PROGRAM

## 2024-11-19 RX ORDER — BUDESONIDE AND FORMOTEROL FUMARATE DIHYDRATE 80; 4.5 UG/1; UG/1
2 AEROSOL RESPIRATORY (INHALATION) 2 TIMES DAILY
Qty: 10.2 G | Refills: 11 | Status: SHIPPED | OUTPATIENT
Start: 2024-11-19 | End: 2025-11-19

## 2024-11-19 RX ORDER — COLCHICINE 0.6 MG/1
0.6 TABLET ORAL DAILY
Qty: 14 TABLET | Refills: 0 | Status: SHIPPED | OUTPATIENT
Start: 2024-11-19 | End: 2024-12-03

## 2024-11-19 NOTE — PROGRESS NOTES
"Primary Care  Office Visit - In Person  11/19/2024      HPI    Patient is a 79 y.o.   Michela Franco  has a past medical history of Allergy, Asthma, CHF (congestive heart failure), Chronic diastolic heart failure (04/05/2018), Glaucoma suspect, Hyperlipidemia, Hypertension, Neuromuscular disorder, NSTEMI (non-ST elevated myocardial infarction) (01/01/2024), JOHN on CPAP, Osteoporosis, Other neutropenia (08/31/2023), Recurrent sinusitis (03/25/2014), Recurrent upper respiratory infection (URI), and Urticaria.    History of Present Illness    CHIEF COMPLAINT:  Patient presents today for follow up     RESPIRATORY SYMPTOMS:  She reports persistent nasal congestion with associated postnasal drainage. She experiences coughing spells and describes a sensation of having a "seed" in her throat. She reports breathing difficulty due to nasal congestion, which she attributes to her sinuses, but denies feeling labored breathing.    Her allergies are triggered by environmental factors, including weather changes and various smells. She experiences flare-ups when transitioning between indoor and outdoor environments. She is scheduled to see an allergist in December for further evaluation and management of her allergy symptoms.    CARDIOVASCULAR AND RENAL:  She was recently started on Crestor for elevated cholesterol     MUSCULOSKELETAL:  She reports pain in her right great toe, which she describes as similar to gout but not as severe as her previous experience with the condition 15-20 years ago.     WEIGHT MANAGEMENT:  She acknowledges recent weight gain and expresses awareness of the need to modify her eating habits. She currently has sugar cravings but denies any recent dietary indiscretions.          Active Medications:  Current Outpatient Medications   Medication Instructions    albuterol (VENTOLIN HFA) 90 mcg/actuation inhaler 2 puffs, Inhalation, Every 6 hours PRN, Rescue    albuterol-ipratropium (DUO-NEB) 2.5 mg-0.5 mg/3 mL " "nebulizer solution 3 mLs, Nebulization, Every 6 hours PRN    aspirin 81 MG Chew Chew 1 tablet (81 mg total) by mouth once daily.    azelastine (ASTELIN) 274 mcg, Nasal, 2 times daily    benzonatate (TESSALON) 100 mg, Oral, 3 times daily PRN    budesonide-formoterol 80-4.5 mcg (SYMBICORT) 80-4.5 mcg/actuation HFAA 2 puffs, Inhalation, 2 times daily, Controller    cholecalciferol (vitamin D3) (VITAMIN D3) 360 mg, Daily    COD LIVER OIL ORAL 1 tablet, Daily    colchicine (COLCRYS) 0.6 mg, Oral, Daily    ferrous sulfate 325 mg, Oral, 3 times daily with meals    fluticasone propionate (FLONASE) 100 mcg, Each Nostril, 2 times daily    furosemide (LASIX) 40 mg, Oral, 2 times daily    guaiFENesin (MUCINEX) 600 mg, Oral, 2 times daily    hydrocortisone 2.5 % cream Topical (Top), 2 times daily, Use to affected areas for up to 2 weeks then take a 1 week break or decrease to 3 times weekly. Use to dry or itchy areas on face when flaring    JARDIANCE 10 mg, Oral, Daily    ketoconazole (NIZORAL) 2 % cream Topical (Top), Daily, Apply to dry areas of face    mepolizumab 100 mg, Subcutaneous, Every 28 days    milk thistle 150 mg Cap 1 capsule, Oral, Daily    multivitamin (THERAGRAN) per tablet 1 tablet, Daily    rosuvastatin (CRESTOR) 5 mg, Oral, Daily    sacubitriL-valsartan (ENTRESTO)  mg per tablet 1 tablet, Oral, 2 times daily    spironolactone (ALDACTONE) 25 mg, Oral, Daily       Vitals:    11/19/24 1420   BP: 110/66   BP Location: Right arm   Patient Position: Sitting   Pulse: 85   Temp: 98.5 °F (36.9 °C)   SpO2: 98%   Weight: 81 kg (178 lb 9.2 oz)   Height: 5' 5" (1.651 m)       Physical Exam  Vitals reviewed.   Constitutional:       General: She is not in acute distress.  Cardiovascular:      Rate and Rhythm: Normal rate and regular rhythm.      Pulses: Normal pulses.      Heart sounds: Normal heart sounds.   Pulmonary:      Effort: Pulmonary effort is normal.      Breath sounds: Wheezing (scattered) present. No rales. "   Abdominal:      General: Abdomen is flat. Bowel sounds are normal.      Palpations: Abdomen is soft.   Musculoskeletal:      Right lower leg: No edema.      Left lower leg: No edema.      Right foot: Swelling and tenderness present.   Neurological:      General: No focal deficit present.      Mental Status: She is oriented to person, place, and time.            Assessment & Plan      Assessed nasal congestion  Evaluated possible gout flare in right great toe, considering patient's history and kidney function  Noted slight improvement in kidney function based on recent lab results  Observed weight gain, considering potential for volume retention    GOUT:  - Explained relationship between declining kidney function and increased frequency of gout flares due to reduced uric acid clearance.  - Discussed allopurinol as a preventive medication for future gout flares, noting it is not to be started during an active flare.  - Started colchicine for treatment of suspected gout flare.  - Ordered uric acid level blood test.    CHRONIC KIDNEY DISEASE:  - Improving   - Monitor     CHRONIC SINUSITIS  CHRONIC MIXED RHINITIS  SEVERE PERSISTENT ASTHMA WITHOUT COMPLICATION:   - F/u with allergy   - Continue Nucala, Symbicort, albuterol, Singulair, and duonebs     HLD:  - Continued Crestor.    HFrEF:   - Current regimen lasix 40 mg daily, Entresto  mg BID, spironolactone 25 mg daily, and jardiance  - Monitor weight      HTN:   - As above             Orders Placed This Encounter    URIC ACID    colchicine (COLCRYS) 0.6 mg tablet    budesonide-formoterol 80-4.5 mcg (SYMBICORT) 80-4.5 mcg/actuation HFAA         Previous labs, records, and notes reviewed and considered for their impact on clinical decision making today.                Upcoming Scheduled Appointments and Follow Up:    Future Appointments   Date Time Provider Department Center   12/3/2024  6:30 PM Vonda Michelle MD [facility] HO [facility]   1/31/2025  1:00 PM Jj  YUNI Tong [facility] CARDIO [facility]   3/31/2025  8:20 AM Mayela Broussard MD [facility]       Follow Up [facility] (with who? when?): Follow up in about 4 months (around 3/19/2025).      Extended Emergency Contact Information  Primary Emergency Contact: Charley Ahmadi  Address: [address] [place] [address] of [facility]  Mobile Phone: [phone]  Relation: Sister      Mayela Broussard MD   Internal Medicine  11/19/2024 - 2:54 PM    I spent a total of 30 minutes on the day of the visit.This includes face to face time and non-face to face time preparing to see the patient (eg, review of tests), obtaining and/or reviewing separately obtained history, documenting clinical information in the electronic or other health record, independently interpreting results and communicating results to the patient/family/caregiver, or care coordinator.    This note was generated with the assistance of ambient listening technology. Verbal consent was obtained by the patient and accompanying visitor(s) for the recording of patient appointment to facilitate this note. I attest to having reviewed and edited the generated note for accuracy, though some syntax or spelling errors may persist. Please contact the author of this note for any clarification.      While patients have the right to access their medical record, it is essential to recognize that progress notes primarily serve as a means of communication among healthcare professionals.

## 2024-11-20 RX ORDER — ALLOPURINOL 100 MG/1
50 TABLET ORAL
Qty: 23 TABLET | Refills: 3 | Status: SHIPPED | OUTPATIENT
Start: 2024-11-20

## 2024-12-03 ENCOUNTER — OFFICE VISIT (OUTPATIENT)
Dept: ALLERGY | Facility: CLINIC | Age: 79
End: 2024-12-03
Payer: MEDICARE

## 2024-12-03 VITALS — WEIGHT: 182.56 LBS | BODY MASS INDEX: 30.42 KG/M2 | HEIGHT: 65 IN

## 2024-12-03 DIAGNOSIS — J45.50 SEVERE PERSISTENT ASTHMA WITHOUT COMPLICATION: ICD-10-CM

## 2024-12-03 DIAGNOSIS — J45.909 ASTHMA, UNSPECIFIED ASTHMA SEVERITY, UNSPECIFIED WHETHER COMPLICATED, UNSPECIFIED WHETHER PERSISTENT: Primary | ICD-10-CM

## 2024-12-03 DIAGNOSIS — J30.81 ALLERGIC RHINITIS DUE TO ANIMAL (CAT) (DOG) HAIR AND DANDER: ICD-10-CM

## 2024-12-03 DIAGNOSIS — J40 BRONCHITIS: ICD-10-CM

## 2024-12-03 DIAGNOSIS — J45.40 MODERATE PERSISTENT ASTHMA WITHOUT COMPLICATION: ICD-10-CM

## 2024-12-03 PROCEDURE — 99999 PR PBB SHADOW E&M-EST. PATIENT-LVL III: CPT | Mod: PBBFAC,HCNC,, | Performed by: STUDENT IN AN ORGANIZED HEALTH CARE EDUCATION/TRAINING PROGRAM

## 2024-12-03 PROCEDURE — 90677 PCV20 VACCINE IM: CPT | Mod: HCNC,S$GLB,, | Performed by: STUDENT IN AN ORGANIZED HEALTH CARE EDUCATION/TRAINING PROGRAM

## 2024-12-03 PROCEDURE — 99215 OFFICE O/P EST HI 40 MIN: CPT | Mod: 25,HCNC,S$GLB, | Performed by: STUDENT IN AN ORGANIZED HEALTH CARE EDUCATION/TRAINING PROGRAM

## 2024-12-03 PROCEDURE — G0009 ADMIN PNEUMOCOCCAL VACCINE: HCPCS | Mod: HCNC,S$GLB,, | Performed by: STUDENT IN AN ORGANIZED HEALTH CARE EDUCATION/TRAINING PROGRAM

## 2024-12-03 PROCEDURE — 1126F AMNT PAIN NOTED NONE PRSNT: CPT | Mod: HCNC,CPTII,S$GLB, | Performed by: STUDENT IN AN ORGANIZED HEALTH CARE EDUCATION/TRAINING PROGRAM

## 2024-12-03 PROCEDURE — 1157F ADVNC CARE PLAN IN RCRD: CPT | Mod: HCNC,CPTII,S$GLB, | Performed by: STUDENT IN AN ORGANIZED HEALTH CARE EDUCATION/TRAINING PROGRAM

## 2024-12-03 PROCEDURE — 1159F MED LIST DOCD IN RCRD: CPT | Mod: HCNC,CPTII,S$GLB, | Performed by: STUDENT IN AN ORGANIZED HEALTH CARE EDUCATION/TRAINING PROGRAM

## 2024-12-03 RX ORDER — AZITHROMYCIN 250 MG/1
TABLET, FILM COATED ORAL
Qty: 6 TABLET | Refills: 0 | Status: SHIPPED | OUTPATIENT
Start: 2024-12-03 | End: 2024-12-09

## 2024-12-03 RX ORDER — PREDNISONE 20 MG/1
20 TABLET ORAL DAILY
Qty: 7 TABLET | Refills: 0 | Status: SHIPPED | OUTPATIENT
Start: 2024-12-03

## 2024-12-03 RX ORDER — DUPILUMAB 300 MG/2ML
300 INJECTION, SOLUTION SUBCUTANEOUS
Qty: 4 ML | Refills: 12 | Status: ACTIVE | OUTPATIENT
Start: 2024-12-03

## 2024-12-03 RX ORDER — BUDESONIDE AND FORMOTEROL FUMARATE DIHYDRATE 80; 4.5 UG/1; UG/1
2 AEROSOL RESPIRATORY (INHALATION) 2 TIMES DAILY
Qty: 10.2 G | Refills: 11 | Status: SHIPPED | OUTPATIENT
Start: 2024-12-03 | End: 2025-12-03

## 2024-12-03 RX ORDER — AZELASTINE 1 MG/ML
2 SPRAY, METERED NASAL 2 TIMES DAILY
Qty: 30 ML | Refills: 11 | Status: SHIPPED | OUTPATIENT
Start: 2024-12-03 | End: 2025-12-03

## 2024-12-03 RX ORDER — DUPILUMAB 300 MG/2ML
300 INJECTION, SOLUTION SUBCUTANEOUS
Qty: 4 ML | Refills: 12 | Status: SHIPPED | OUTPATIENT
Start: 2024-12-03 | End: 2024-12-03

## 2024-12-03 RX ORDER — ALBUTEROL SULFATE 90 UG/1
2 INHALANT RESPIRATORY (INHALATION) EVERY 6 HOURS PRN
Qty: 8.5 G | Refills: 11 | Status: SHIPPED | OUTPATIENT
Start: 2024-12-03

## 2024-12-03 NOTE — PROGRESS NOTES
"ALLERGY & IMMUNOLOGY CLINIC - FOLLOW UP     HISTORY OF PRESENT ILLNESS   CC: follow-up PFT and Nucala    Patient ID: Michela Franco is a 78 y.o. female  -Has ?asthma (ex-smoker, never childhood asthma, no BD response but mild obstruction)  -Mixed rhinitis with GERD/allergic rhinitis  -chronic pansinusitis  -HFrEF    A week ago started with cough, wet, green yellow mucus, wheezing, post nasal drip, nasal and chest congestion, using albuterol MDI 4x/day since last Tuesday, duonebs 3x/day since last Tuesday, azelastine and flonase BID, daily controlled is Symbicort 80 2 puffs BID, mucinex BID, Nucala last shot 11/15 has been on it for 6 months. Felt a little warm on Wednesday, which was the worst day, and since then has been doing better but still with cough.     Prior visit  She reports feeling ok since LV. She received dupixent for 3 years but insurance stopped covering, and started Nucala 4/2024 and hasn't had a flare up that was severe since. Symbicort weekly 160. Mucinex BID. Nose sprays. Inhalers weekly. She continues to have some mild-moderate nasal congestion, although is using fluticasone 2 SEN BID, azelastine 2 SEN BID, nasal rinse 1x/daily     PHYSICAL EXAM   VS: Ht 5' 5" (1.651 m)   Wt 78 kg (171 lb 15.3 oz)   BMI 28.62 kg/m²   GENERAL: NAD, well nourished, well appearing  EYES: no conjunctival injection, no discharge, no infraorbital shiners  EARS: external auditory canals normal B/L  ORAL: MMM, no ulcers, no thrush, no cobblestoning  LUNGS: no wheezes but mild wet crackles in lung bases  DERM: no rashes       PULMONARY FUNCTION   Spirometry 8/23:   Spirometry shows mild obstruction. Lung volume determination is normal. Spirometry remains unimproved following bronchodilator. DLCO is moderately decreased. Notes: The failure to demonstrate improvement in spirometry does not preclude a clinical response to a trial of bronchodilators. DLCO interpretation based on the adjusted DLCO value due to a low " hemoglobin. Discrepancy in TLC and VA makes DLCO interpretation potentially unreliable.      11/26/2021:              Pre:                   Post:  FEV1: 1.49 (82.8%)--> 1.56 (+4.1%)  FVC:   2.22  (95%)----> 2.30 (+3.6%)  Ratio:  67.37%      ----> 67.77%       5/28/2018  FVC: 2.30 (78%)  FEV1: 1.62 (70%)  Ratio: 70  No reversibility post bronchodilator.     Also performed 12/2019 in pulmonary clinic    PFT 2024 mild obstruction no BD response     ASSESSMENT & PLAN     Acute bronchitis  - Change in sputum color to green/yellow with worsening cough and crackles in bases improved with coughing, reviewed options include CXR, steroids, antibiotics  - Z-pack sent    Severe persistent asthma: PFT with mild obstruction no BD, while on Nucala.   - Failed Nucala 300mg Q4 weeks since 5/2024-12/2024  - Restart dupixent 300mg Q14 days Rx sent 12/3/24  - S/p dupixent for 3 years until insurance stopped covering  - Symbicort decreased to 80, typically using about once a week  - Still has albuterol PRN MDI, reviewed SMART  - Still has duonebs PRN  - Prednisolone 20mg daily x 7 days for emergency rescue sent, going on cruise next week     Chronic mixed rhinitis: chronic congestion. Villareal sensitized, +GERD  -fluticasone up to 2 SEN BID  -azelastine up to SEN BID  -Increase nasal irrigation to BID, using with budesonide  -AIT previously discussed. In this patient who is 78 will hold off on AIT at this time due to risk of inability to compensate in setting of possible anaphylaxis to AIT  -montelukast 10mg daily, trial off    Chronic sinusitis  - Inflammatory (not infectious) in nature, sinus rinses with steroid rinses are mainstay of therapy    GERD  - Can contribute to stubborn post nasal drip via protective reflux/reflex mechanism  - Saline rinse with distilled water daily as needed to flush thick mucus out of sinuses  - Guiafenesin (mucinex) 600mg ER twice a day with large glass of water as needed as mucolytic to thin out  secretions    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  - Has low titers despite vaccine, although no post vaccine titers drawn after most recent vaccine, but no pneumonia, AOM, or acute sinus infections  - To diagnose would need to recheck titers, and have history of infections, and neither patient nor I are convinced her chronic sinusitis is from recurrent infections  - Reviewed if give strep [id number] vaccine we would need to immediately draw labs, or wait a few months at least, before doing any immune eval  - Strep [id number] administered 12/3/24 in patient with asthma, per her request     Follow-up: 2 months, consider stopping montelukast    I spent a total of 40 minutes on the day of the visit. This includes face to face time and non-face to face time preparing to see the patient (eg, review of tests), obtaining and/or reviewing separately obtained history, documenting clinical information in the electronic or other health record, independently interpreting results and communicating results to the patient/family/caregiver, or care coordinator.

## 2024-12-05 DIAGNOSIS — J45.909 SEVERE ASTHMA, UNSPECIFIED WHETHER COMPLICATED, UNSPECIFIED WHETHER PERSISTENT: ICD-10-CM

## 2024-12-23 DIAGNOSIS — J45.51 SEVERE PERSISTENT ASTHMA WITH ACUTE EXACERBATION: ICD-10-CM

## 2024-12-31 RX ORDER — IPRATROPIUM BROMIDE AND ALBUTEROL SULFATE 2.5; .5 MG/3ML; MG/3ML
3 SOLUTION RESPIRATORY (INHALATION) EVERY 6 HOURS PRN
Qty: 75 ML | Refills: 5 | Status: SHIPPED | OUTPATIENT
Start: 2024-12-31

## 2025-01-07 ENCOUNTER — PATIENT MESSAGE (OUTPATIENT)
Dept: ADMINISTRATIVE | Facility: OTHER | Age: 80
End: 2025-01-07
Payer: MEDICARE

## 2025-01-09 RX ORDER — SPIRONOLACTONE 25 MG/1
25 TABLET ORAL DAILY
Qty: 30 TABLET | Refills: 11 | Status: SHIPPED | OUTPATIENT
Start: 2025-01-09 | End: 2026-01-09

## 2025-01-09 NOTE — TELEPHONE ENCOUNTER
----- Message from Radha sent at 1/9/2025  8:18 AM CST -----  Regarding: Refill  Patient need a new prescription for spironolactone (ALDACTONE) 25 MG tablet @ [facility]   Phone: [phone]  Fax: [phone]      LOV  10/25/24    Please call back @ [phone]. Thank you Radha

## 2025-01-27 NOTE — PROGRESS NOTES
General Cardiology Clinic Note  Reason for Visit: Follow up   Last Clinic Visit: 10/25/24     HPI:     Michela Franco is a 79 y.o. , who presents for follow up of HFrEF     PROBLEM LIST:  Non-obstructive CAD ([facility] 3/06/24)   HFrEF (EF 20-25%)  Hypertension   Hyperlipidemia  Aortic atherosclerosis   Mild MR   Asthma    Interval HPI:   She presents today for routine clinical follow up. Patient has been under some stress taking care of her mother who is in home hospice. She states nurse aid will start coming daily instead of every other day and this will help alleviate some of the stress and burden. Patient reports doing well from a cardiac standpoint. She has been weighing herself daily, home weights have been stable and has lost ~2-3 lbs since her last visit. Patient is adhering to sodium and fluid restriction, reports feeling a little dehydrated. Is currently taking furosemide 40 mg qd. She reports no leg swelling or shortness of breath. She is staying active with exercise and walking 1 mile daily. She reports R thigh numbness that is present when walking and worsens with activity. She states this is chronic and ongoing for years. Patient has not been monitoring blood pressure at home, BP elevated today 148/70. She states she was unable to sleep last night due to taking care of her mother. Previous BP well controlled. Interested in enrolling in digital hypertension clinic. She denies chest pain/pressure/tightness/discomfort, dyspnea on exertion, orthopnea, PND, peripheral edema, palpitations, syncope or claudication.    Last visit with me: 10/25/24   Patient reports doing well overall from a cardiac standpoint. She endorses some dietary indiscretions that have contributed to recent weight increase. Patient denies any BLE or shortness of breath. She is able to walk a mile comfortably and goes up and down stairs with no cardiac restrictions or limitations. Patient adheres to sodium and fluid restriction. She  weighs herself daily. Her furosemide was decreased from 40 mg BID to 40 mg qd due to CMP with Cr 1.5 > 1.2, BUN 48 > 37. She denies chest pain/pressure/tightness/discomfort, dyspnea on exertion, orthopnea, PND, peripheral edema, palpitations, syncope or claudication.     Last visit with Dr. Toribio: 06/12/24  She was seen a few months ago. At that time she had a reduced EF and her valve regurgitation was worsening. She presents for follow up. Saw Dr. Matthews and had LHC which was negative. She saw Dr. Perdue and recommended medical management vs MitralClip. She has no complaints today. Denies any SOB, Paulson, LE swelling, PND, CP       HPI 02/08/24 Dr. Toribio   She has been seen in [facility] numerous times. Last seen she was complaining of SOB. She had an echo performed showing a reduced EF, mod-sev MR and mod-sev TR. . She had a SPECT ordered which was equivocal due to soft tissue shadow. She presents today for follow up. She has no complaints. She is walking 35min 3x a week and having no issues. No orthopnea. LE swelling no longer present. She is taking her lasix and doing a low salt low fluid diet. She denies any palpitations or chest pain.     ROS:    Review of Systems   Constitutional: Negative.    HENT:  Positive for congestion and sinus pain.    Eyes: Negative.    Respiratory:  Positive for cough. Negative for shortness of breath.    Cardiovascular:  Negative for chest pain, palpitations and leg swelling.   Gastrointestinal: Negative.    Musculoskeletal:  Positive for myalgias (R thigh pain).   Neurological: Negative.    Endo/Heme/Allergies: Negative.    Psychiatric/Behavioral: Negative.       PMH:     Past Medical History:   Diagnosis Date    Allergy     Asthma     CHF (congestive heart failure)     Chronic diastolic heart failure 04/05/2018    Glaucoma suspect     Hyperlipidemia     Hypertension     Neuromuscular disorder     NSTEMI (non-ST elevated myocardial infarction) 01/01/2024    JOHN on CPAP      Osteoporosis     Other neutropenia 08/31/2023    Recurrent sinusitis 03/25/2014    Recurrent upper respiratory infection (URI)     Urticaria      Past Surgical History:   Procedure Laterality Date    ANGIOGRAM, CORONARY, WITH LEFT HEART CATHETERIZATION N/A 3/6/2024    Procedure: Angiogram, Coronary, with Left Heart Cath;  Surgeon: Edin Matthews MD;  Location: [facility] CATH LAB;  Service: Cardiology;  Laterality: N/A;    CATARACT EXTRACTION W/  INTRAOCULAR LENS IMPLANT Right 02/11/2021    Procedure: EXTRACTION, CATARACT, WITH IOL INSERTION;  Surgeon: John Merino MD;  Location: [facility] OR;  Service: Ophthalmology;  Laterality: Right;    CATARACT EXTRACTION W/  INTRAOCULAR LENS IMPLANT Left 03/04/2021    Procedure: EXTRACTION, CATARACT, WITH IOL INSERTION;  Surgeon: John Merino MD;  Location: [facility] OR;  Service: Ophthalmology;  Laterality: Left;    COLONOSCOPY N/A 05/18/2018    Procedure: COLONOSCOPY;  Surgeon: Calixto Brian MD;  Location: [facility] ENDO (4TH FLR);  Service: Endoscopy;  Laterality: N/A;    COLONOSCOPY N/A 10/24/2023    Procedure: COLONOSCOPY;  Surgeon: Ward Merino MD;  Location: [facility] ENDO (4TH FLR);  Service: Endoscopy;  Laterality: N/A;  ref by / golytely inst [id number]  10/17-lvm for precall-MS  10/20-precall complete-MS    EYE SURGERY      HYSTERECTOMY      total    OOPHORECTOMY      ROTATOR CUFF REPAIR Left     SINUS SURGERY       Allergies:     Review of patient's allergies indicates:   Allergen Reactions    Carvedilol Shortness Of Breath    Metoprolol Shortness Of Breath    Adhesive      PAPER TAPE    Diazepam Other (See Comments)     Nervous, jittery    Gabapentin      Nervous      Keppra [levetiracetam]      nervous    Mold      sneezing    Calcium channel blocking agents-dihydropyridines Other (See Comments)     Leg swelling     Thiazides Other (See Comments)     Hyponatremia      Medications:     Current Outpatient Medications on File Prior to Visit   Medication Sig  Dispense Refill    albuterol (VENTOLIN HFA) 90 mcg/actuation inhaler Inhale 2 puffs into the lungs every 6 (six) hours as needed for Wheezing. Rescue 8.5 g 11    albuterol-ipratropium (DUO-NEB) 2.5 mg-0.5 mg/3 mL nebulizer solution Take 3 mLs by nebulization every 6 (six) hours as needed for Wheezing. 75 mL 5    allopurinoL (ZYLOPRIM) 100 MG tablet Take 0.5 tablets (50 mg total) by mouth every 48 hours. 23 tablet 3    azelastine (ASTELIN) 137 mcg (0.1 %) nasal spray 2 sprays (274 mcg total) by Nasal route 2 (two) times daily. 30 mL 11    benzonatate (TESSALON) 100 MG capsule Take 1 capsule (100 mg total) by mouth 3 (three) times daily as needed for Cough. 60 capsule 2    budesonide-formoterol 80-4.5 mcg (SYMBICORT) 80-4.5 mcg/actuation HFAA Inhale 2 puffs into the lungs 2 (two) times a day. Controller 10.2 g 11    cholecalciferol, vitamin D3, 10,000 unit Cap Take 360 mg by mouth once daily. Take 6 capsules (6,000 units total) by mouth once daily      COD LIVER OIL ORAL Take 1 tablet by mouth once daily.      dupilumab (DUPIXENT PEN) 300 mg/2 mL PnIj Inject 2 mLs (300 mg total) into the skin every 14 (fourteen) days. 4 mL 12    ferrous sulfate 325 (65 FE) MG EC tablet Take 1 tablet (325 mg total) by mouth 3 (three) times daily with meals. (Patient taking differently: Take 325 mg by mouth once daily.) 270 tablet 3    fluticasone propionate (FLONASE) 50 mcg/actuation nasal spray 2 sprays (100 mcg total) by Each Nostril route 2 (two) times a day. 16 g 11    guaiFENesin (MUCINEX) 600 mg 12 hr tablet Take 1 tablet (600 mg total) by mouth 2 (two) times daily. (Patient taking differently: Take 600 mg by mouth 2 (two) times daily as needed.) 60 tablet 5    milk thistle 150 mg Cap Take 1 capsule by mouth once daily. 90 each 3    multivitamin (THERAGRAN) per tablet Take 1 tablet by mouth once daily.      [DISCONTINUED] aspirin 81 MG Chew Chew 1 tablet (81 mg total) by mouth once daily. 90 tablet 3    [DISCONTINUED] furosemide  (LASIX) 40 MG tablet Take 1 tablet (40 mg total) by mouth 2 (two) times daily. 60 tablet 11    [DISCONTINUED] rosuvastatin (CRESTOR) 5 MG tablet Take 1 tablet (5 mg total) by mouth once daily. 30 tablet 11    [DISCONTINUED] sacubitriL-valsartan (ENTRESTO)  mg per tablet Take 1 tablet by mouth 2 (two) times daily. 60 tablet 11    [DISCONTINUED] spironolactone (ALDACTONE) 25 MG tablet Take 1 tablet (25 mg total) by mouth once daily. 30 tablet 11    [DISCONTINUED] empagliflozin (JARDIANCE) 10 mg tablet Take 1 tablet (10 mg total) by mouth once daily. 30 tablet 11     No current facility-administered medications on file prior to visit.     Social History:     Social History     Tobacco Use    Smoking status: Former     Current packs/day: 0.00     Average packs/day: 0.3 packs/day for 40.0 years (10.0 ttl pk-yrs)     Types: Cigarettes     Start date: 1965     Quit date: 2005     Years since quittin.0     Passive exposure: Never    Smokeless tobacco: Never   Substance Use Topics    Alcohol use: Yes     Comment: rare beer-occ special events     Family History:     Family History   Problem Relation Name Age of Onset    No Known Problems Mother          healthy in 90s    No Known Problems Father 50's         accident-related death in 50s    Kidney cancer Sister 60     Cancer Sister 60         renal    Ovarian cancer Sister      No Known Problems Maternal Aunt      No Known Problems Maternal Uncle      No Known Problems Paternal Aunt      No Known Problems Paternal Uncle      No Known Problems Maternal Grandmother      No Known Problems Maternal Grandfather      No Known Problems Paternal Grandmother      No Known Problems Paternal Grandfather      Hypertension Daughter [place]     Hypertension Daughter Jacequlyn     No Known Problems Other      Allergic rhinitis Neg Hx      Allergies Neg Hx      Angioedema Neg Hx      Asthma Neg Hx      Atopy Neg Hx      Eczema Neg Hx      Immunodeficiency Neg Hx       "Rhinitis Neg Hx      Urticaria Neg Hx       Physical Exam:   BP (!) 148/70 (Patient Position: Sitting)   Pulse 82   Ht 5' 5" (1.651 m)   Wt 81.4 kg (179 lb 7.3 oz)   SpO2 98%   BMI 29.86 kg/m²      Physical Exam  Constitutional:       General: She is not in acute distress.     Appearance: Normal appearance. She is obese. She is not ill-appearing.   HENT:      Head: Normocephalic and atraumatic.      Nose: Congestion and rhinorrhea present.      Mouth/Throat:      Mouth: Mucous membranes are moist.      Pharynx: Oropharynx is clear. No oropharyngeal exudate or posterior oropharyngeal erythema.   Eyes:      General:         Right eye: No discharge.         Left eye: No discharge.      Extraocular Movements: Extraocular movements intact.      Conjunctiva/sclera: Conjunctivae normal.   Neck:      Vascular: No carotid bruit.   Cardiovascular:      Rate and Rhythm: Normal rate and regular rhythm.      Pulses: Normal pulses.      Heart sounds: Normal heart sounds. No murmur heard.     No friction rub. No gallop.   Pulmonary:      Effort: Pulmonary effort is normal. No respiratory distress.      Breath sounds: Normal breath sounds. No wheezing or rales.   Musculoskeletal:         General: No swelling. Normal range of motion.      Cervical back: Normal range of motion. No rigidity or tenderness.      Right lower leg: No edema.      Left lower leg: No edema.   Skin:     General: Skin is warm and dry.      Coloration: Skin is not jaundiced.      Findings: No bruising or lesion.   Neurological:      General: No focal deficit present.      Mental Status: She is alert and oriented to person, place, and time. Mental status is at baseline.      Cranial Nerves: No cranial nerve deficit.      Motor: No weakness.      Gait: Gait normal.   Psychiatric:         Mood and Affect: Mood normal.         Behavior: Behavior normal.         Thought Content: Thought content normal.         Judgment: Judgment normal.        Labs:     Blood " Tests:  Lab Results   Component Value Date     (H) 02/15/2024     10/28/2024    K 4.9 10/28/2024     10/28/2024    CO2 23 10/28/2024    BUN 35 (H) 10/28/2024    CREATININE 1.3 10/28/2024    GLU 73 10/28/2024    HGBA1C 5.3 10/18/2022    MG 2.7 (H) 07/16/2024    AST 31 10/28/2024    ALT 15 10/28/2024    ALBUMIN 3.7 10/28/2024    PROT 7.6 10/28/2024    BILITOT 0.6 10/28/2024    WBC 3.97 01/12/2024    HGB 13.6 01/12/2024    HCT 42.5 01/12/2024    MCV 83 01/12/2024     01/12/2024    TSH 2.405 04/25/2022       Lab Results   Component Value Date    CHOL 233 (H) 10/28/2024    HDL 93 (H) 10/28/2024    TRIG 40 10/28/2024       Lab Results   Component Value Date    LDLCALC 132.0 10/28/2024       Lab Results   Component Value Date    TSH 2.405 04/25/2022       Lab Results   Component Value Date    HGBA1C 5.3 10/18/2022     Imaging:     Echocardiogram  TTE 03/26/24    Left Ventricle: Regional wall motion abnormalities present. See diagram for wall motion findings. There is severely reduced systolic function with a visually estimated ejection fraction of 20 - 25%. Ejection fraction by visual approximation is 25%. Unable to assess diastolic function due to E-A fusion.    Right Ventricle: Normal right ventricular cavity size. Systolic function is mildly reduced.    Aortic Valve: The aortic valve is a trileaflet valve. There is mild aortic valve sclerosis. There is normal leaflet mobility.    Mitral Valve: The mitral valve is structurally normal. There is normal leaflet mobility. There is mild regurgitation with a centrally directed jet.    Tricuspid Valve: The tricuspid valve is structurally normal.    Pulmonary Artery: The estimated pulmonary artery systolic pressure is 28 mmHg.    IVC/SVC: Intermediate venous pressure at 8 mmHg.     TTE 01/02/24    Left Ventricle: The left ventricle is mildly dilated. Ventricular mass is normal. Normal wall thickness. Regional wall motion abnormalities present. See  diagram for wall motion findings. There is severely reduced systolic function with a visually estimated ejection fraction of 20 - 25%. Ejection fraction by visual approximation is 23%. Grade III diastolic dysfunction.    Right Ventricle: Mild right ventricular enlargement. Wall thickness is normal. Right ventricle wall motion  is normal. Systolic function is borderline low.    Left Atrium: Left atrium is severely dilated.    Right Atrium: Right atrium is mildly dilated.    Aortic Valve: There is mild aortic valve sclerosis.    Mitral Valve: There is mild mitral annular calcification present. There is moderate to  moderate-severe regurgitation.    Tricuspid Valve: There is moderate- severe  to severe (3-4+) regurgitation.    Pulmonary Artery: There is mild pulmonary hypertension. The estimated pulmonary artery systolic pressure is 44 mmHg.    IVC/SVC: Elevated venous pressure at 15 mmHg.    Pericardium: There is a trivial posterior effusion.     TTE 07/18/23  The left ventricle is moderately enlarged with eccentric hypertrophy and low normal systolic function. The estimated ejection fraction is 50%.  Normal right ventricular size with normal right ventricular systolic function.  Indeterminate left ventricular diastolic function.  Mild left atrial enlargement.  Normal central venous pressure (3 mmHg).     TTE 04/04/18  CONCLUSIONS     1 - Eccentric hypertrophy.     2 - Normal left ventricular systolic function (EF 55-60%).     3 - Normal right ventricular systolic function .     4 - Impaired LV relaxation, normal LAP (grade 1 diastolic dysfunction).     5 - Moderate left atrial enlargement.     6 - The estimated PA systolic pressure is 34 mmHg.      Stress testing  Nuclear Stress test 01/22/24    Equivocal myocardial perfusion scan.    There is a mild intensity, small sized, equivocal perfusion abnormality that is reversible in the lateral wall(s). This finding is equivocal due to soft tissue shadow.    A PET stress  exam is recommended if clinically indicated.    There are no other significant perfusion abnormalities.    The gated perfusion images showed an ejection fraction of 32% at rest. The gated perfusion images showed an ejection fraction of 29% post stress. Normal ejection fraction is greater than 47%.    There is moderate global hypokinesis at rest and stress.    LV cavity size is mildly enlarged at rest and mildly enlarged at stress.    The ECG portion of the study is negative for ischemia.    The patient reported no chest pain during the stress test.    During stress, frequent PVCs are noted.    There are no prior studies for comparison     Cath Lab  [facility] 03/06/24    The pre-procedure left ventricular end diastolic pressure was 3.    The estimated blood loss was none.    There was non-obstructive coronary artery disease..    Non-ischemic cardiomyopathy.     Other  None     EKG:   10/25/24: Normal sinus rhythm Left anterior fascicular block at 74 bpm (personally reviewed)    Assessment:     1. HFrEF (heart failure with reduced ejection fraction)    2. Primary hypertension    3. Aortic atherosclerosis    4. Mild mitral regurgitation      Plan:     HFrEF (heart failure with reduced ejection fraction)  TTE 3/26/24 EF 20-25%  Patient euvolemic and well compensated during examination today. She reports feeling slightly dehydrate   Decrease furosemide 40 mg to every other day   Continue Entresto  mg BID   Continue Spironolactone 25 mg qd  Continue Jardiance 10 mg qd   Repeat CMP   Continue daily weights and sodium/fluid restriction  Repeat TTE     Primary hypertension  BP slightly elevated today in clinic, previous blood pressure readings well controlled. Under a lot of stress currently taking care of her mother who is in home hospice   Continue medications as above   Diet and exercise reinforced  Maintain log of home BP readings   Enrollment to digital medicine clinic placed     Aortic atherosclerosis  CT abdomen  pelvis 7/17/13 mild aortoiliac atherosclerosis    Continue rosuvastatin 5 mg qd   Continue ASA 81 mg qd   Repeat FLP     Mild mitral regurgitation  Mild mitral regurgitation noted on TTE 2/24  Repeat TTE for monitoring       Follow up in 6 months       Signed:  Sharon Lagos, PA-C [facility]     1/31/2025 3:12 PM    Follow-up:     Future Appointments   Date Time Provider Department Center   2/1/2025  9:10 AM LAB, APPOINTMENT [facility] LAB IM [facility]   3/14/2025  2:30 PM ECHO, [facility] ECHOSTR [place]   3/31/2025  8:20 AM Mayela Broussard MD [facility]

## 2025-01-30 PROBLEM — I34.0 SEVERE MITRAL REGURGITATION: Status: ACTIVE | Noted: 2025-01-30

## 2025-01-30 PROBLEM — I07.1 SEVERE TRICUSPID REGURGITATION: Status: ACTIVE | Noted: 2025-01-30

## 2025-01-31 ENCOUNTER — OFFICE VISIT (OUTPATIENT)
Dept: CARDIOLOGY | Facility: CLINIC | Age: 80
End: 2025-01-31
Payer: MEDICARE

## 2025-01-31 VITALS
HEIGHT: 65 IN | HEART RATE: 82 BPM | SYSTOLIC BLOOD PRESSURE: 148 MMHG | WEIGHT: 179.44 LBS | OXYGEN SATURATION: 98 % | BODY MASS INDEX: 29.9 KG/M2 | DIASTOLIC BLOOD PRESSURE: 70 MMHG

## 2025-01-31 DIAGNOSIS — I50.20 HFREF (HEART FAILURE WITH REDUCED EJECTION FRACTION): Primary | ICD-10-CM

## 2025-01-31 DIAGNOSIS — I70.0 AORTIC ATHEROSCLEROSIS: ICD-10-CM

## 2025-01-31 DIAGNOSIS — I10 PRIMARY HYPERTENSION: ICD-10-CM

## 2025-01-31 DIAGNOSIS — I34.0 MILD MITRAL REGURGITATION: ICD-10-CM

## 2025-01-31 PROBLEM — I07.1 SEVERE TRICUSPID REGURGITATION: Status: RESOLVED | Noted: 2025-01-30 | Resolved: 2025-01-31

## 2025-01-31 PROCEDURE — 1159F MED LIST DOCD IN RCRD: CPT | Mod: HCNC,CPTII,S$GLB,

## 2025-01-31 PROCEDURE — 1101F PT FALLS ASSESS-DOCD LE1/YR: CPT | Mod: HCNC,CPTII,S$GLB,

## 2025-01-31 PROCEDURE — 3077F SYST BP >= 140 MM HG: CPT | Mod: HCNC,CPTII,S$GLB,

## 2025-01-31 PROCEDURE — 99999 PR PBB SHADOW E&M-EST. PATIENT-LVL IV: CPT | Mod: PBBFAC,HCNC,,

## 2025-01-31 PROCEDURE — 1160F RVW MEDS BY RX/DR IN RCRD: CPT | Mod: HCNC,CPTII,S$GLB,

## 2025-01-31 PROCEDURE — 3078F DIAST BP <80 MM HG: CPT | Mod: HCNC,CPTII,S$GLB,

## 2025-01-31 PROCEDURE — 1157F ADVNC CARE PLAN IN RCRD: CPT | Mod: HCNC,CPTII,S$GLB,

## 2025-01-31 PROCEDURE — 1126F AMNT PAIN NOTED NONE PRSNT: CPT | Mod: HCNC,CPTII,S$GLB,

## 2025-01-31 PROCEDURE — 3288F FALL RISK ASSESSMENT DOCD: CPT | Mod: HCNC,CPTII,S$GLB,

## 2025-01-31 PROCEDURE — 99214 OFFICE O/P EST MOD 30 MIN: CPT | Mod: HCNC,S$GLB,,

## 2025-01-31 RX ORDER — SACUBITRIL AND VALSARTAN 97; 103 MG/1; MG/1
1 TABLET, FILM COATED ORAL 2 TIMES DAILY
Qty: 60 TABLET | Refills: 11 | Status: SHIPPED | OUTPATIENT
Start: 2025-01-31

## 2025-01-31 RX ORDER — SPIRONOLACTONE 25 MG/1
25 TABLET ORAL DAILY
Qty: 90 TABLET | Refills: 3 | Status: SHIPPED | OUTPATIENT
Start: 2025-01-31 | End: 2026-01-31

## 2025-01-31 RX ORDER — FUROSEMIDE 40 MG/1
40 TABLET ORAL DAILY
Qty: 90 TABLET | Refills: 3 | Status: SHIPPED | OUTPATIENT
Start: 2025-01-31 | End: 2026-01-31

## 2025-01-31 RX ORDER — ROSUVASTATIN CALCIUM 5 MG/1
5 TABLET, COATED ORAL DAILY
Qty: 90 TABLET | Refills: 3 | Status: SHIPPED | OUTPATIENT
Start: 2025-01-31 | End: 2026-01-31

## 2025-01-31 RX ORDER — NAPROXEN SODIUM 220 MG/1
81 TABLET, FILM COATED ORAL DAILY
Qty: 90 TABLET | Refills: 3 | Status: SHIPPED | OUTPATIENT
Start: 2025-01-31 | End: 2026-01-31

## 2025-01-31 NOTE — PATIENT INSTRUCTIONS
The goal for your systolic BP (SBP) is <130 mm Hg and diastolic (DBP) is <80 mm Hg.  For your BP to be deemed well controlled you should have 80% or more of the readings in that range. Sit for 5 min with your feet on the ground in a calm quiet environment before measuring your BP.  A bicep cuff is preferred to a wrist cuff. Check your blood pressure with your arm resting on a table.  Take the average of 2 readings back to back.  If the 2 SBP readings are more than 5 mm Hg apart, get a 3rd reading and throw out the outlier.      Take furosemide 40 mg by mouth every other day

## 2025-02-04 ENCOUNTER — LAB VISIT (OUTPATIENT)
Dept: LAB | Facility: HOSPITAL | Age: 80
End: 2025-02-04
Payer: MEDICARE

## 2025-02-04 DIAGNOSIS — I70.0 AORTIC ATHEROSCLEROSIS: ICD-10-CM

## 2025-02-04 DIAGNOSIS — I50.20 HFREF (HEART FAILURE WITH REDUCED EJECTION FRACTION): ICD-10-CM

## 2025-02-04 LAB
ALBUMIN SERPL BCP-MCNC: 3.6 G/DL (ref 3.5–5.2)
ALP SERPL-CCNC: 103 U/L (ref 40–150)
ALT SERPL W/O P-5'-P-CCNC: 13 U/L (ref 10–44)
ANION GAP SERPL CALC-SCNC: 8 MMOL/L (ref 8–16)
AST SERPL-CCNC: 28 U/L (ref 10–40)
BILIRUB SERPL-MCNC: 0.5 MG/DL (ref 0.1–1)
BUN SERPL-MCNC: 42 MG/DL (ref 8–23)
CALCIUM SERPL-MCNC: 10.1 MG/DL (ref 8.7–10.5)
CHLORIDE SERPL-SCNC: 102 MMOL/L (ref 95–110)
CHOLEST SERPL-MCNC: 207 MG/DL (ref 120–199)
CHOLEST/HDLC SERPL: 2 {RATIO} (ref 2–5)
CO2 SERPL-SCNC: 24 MMOL/L (ref 23–29)
CREAT SERPL-MCNC: 1.3 MG/DL (ref 0.5–1.4)
EST. GFR  (NO RACE VARIABLE): 42.4 ML/MIN/1.73 M^2
GLUCOSE SERPL-MCNC: 88 MG/DL (ref 70–110)
HDLC SERPL-MCNC: 101 MG/DL (ref 40–75)
HDLC SERPL: 48.8 % (ref 20–50)
LDLC SERPL CALC-MCNC: 96.8 MG/DL (ref 63–159)
NONHDLC SERPL-MCNC: 106 MG/DL
POTASSIUM SERPL-SCNC: 5.1 MMOL/L (ref 3.5–5.1)
PROT SERPL-MCNC: 8.1 G/DL (ref 6–8.4)
SODIUM SERPL-SCNC: 134 MMOL/L (ref 136–145)
TRIGL SERPL-MCNC: 46 MG/DL (ref 30–150)

## 2025-02-04 PROCEDURE — 80061 LIPID PANEL: CPT | Mod: HCNC

## 2025-02-04 PROCEDURE — 36415 COLL VENOUS BLD VENIPUNCTURE: CPT | Mod: HCNC,PN

## 2025-02-04 PROCEDURE — 80053 COMPREHEN METABOLIC PANEL: CPT | Mod: HCNC

## 2025-02-18 RX ORDER — ALLOPURINOL 100 MG/1
50 TABLET ORAL
Qty: 23 TABLET | Refills: 3 | Status: SHIPPED | OUTPATIENT
Start: 2025-02-18

## 2025-02-18 NOTE — TELEPHONE ENCOUNTER
Care Due:                  Date            Visit Type   Department     Provider  --------------------------------------------------------------------------------                                EP -                              PRIMARY      [facility]  Last Visit: 11-      CARE ([facility])   CARE           Mayela Broussard  Next Visit: None Scheduled  None         None Found                                                            Last  Test          Frequency    Reason                     Performed    Due Date  --------------------------------------------------------------------------------    CBC.........  12 months..  allopurinoL..............  01- 01-    [facility] Embedded Care Due Messages. Reference number: [id number].   2/18/2025 11:01:57 AM CST

## 2025-02-18 NOTE — TELEPHONE ENCOUNTER
----- Message from Jenni sent at 2/18/2025 10:08 AM CST -----  Contact: [phone]  .[email or web address] Patient is calling for Medical Advice regarding:allopurinol prescription How long has patient had these symptoms:needs the refill to go to [facility] name and phone#:[facility] [place] [id number]: [phone] Fax: [phone] Patient wants a call back or thru [facility]:call back Comments:Please advise she states she only has 2 more doses left Please advise patient replies from provider may take up to 48 hours.

## 2025-02-19 DIAGNOSIS — Z78.0 MENOPAUSE: ICD-10-CM

## 2025-02-27 ENCOUNTER — PATIENT MESSAGE (OUTPATIENT)
Dept: ADMINISTRATIVE | Facility: OTHER | Age: 80
End: 2025-02-27
Payer: MEDICARE

## 2025-02-28 ENCOUNTER — TELEPHONE (OUTPATIENT)
Dept: ALLERGY | Facility: CLINIC | Age: 80
End: 2025-02-28
Payer: MEDICARE

## 2025-02-28 NOTE — TELEPHONE ENCOUNTER
----- Message from Harsh sent at 2/27/2025  2:52 PM CST -----  Regarding: Appt  Contact: [phone]  Who call ? Michela Franco  What is the request Details : Pt calling to speak with someone in provider office regards scheduling an follow  up appt for March . Please call pt back.  Can clinic  use patient portal  : [facility] number to call back : [phone]

## 2025-02-28 NOTE — TELEPHONE ENCOUNTER
----- Message from Pia sent at 2/28/2025 12:30 PM CST -----  Regarding: appt access  Contact: [phone]  Type:  Patient Returning CallWho Called:pt Who Left Message for Patient:Fabi Does the patient know what this is regarding?:yes, to set up appt Would the patient rather a call back or a response via [facility]? [facility] Number:[phone]

## 2025-03-13 ENCOUNTER — HOSPITAL ENCOUNTER (OUTPATIENT)
Dept: RADIOLOGY | Facility: CLINIC | Age: 80
Discharge: HOME | End: 2025-03-13
Attending: STUDENT IN AN ORGANIZED HEALTH CARE EDUCATION/TRAINING PROGRAM
Payer: MEDICARE

## 2025-03-13 ENCOUNTER — OFFICE VISIT (OUTPATIENT)
Dept: ALLERGY | Facility: CLINIC | Age: 80
End: 2025-03-13
Payer: MEDICARE

## 2025-03-13 VITALS — HEIGHT: 65 IN | BODY MASS INDEX: 30.08 KG/M2 | WEIGHT: 180.56 LBS

## 2025-03-13 DIAGNOSIS — J45.51 SEVERE PERSISTENT ASTHMA WITH ACUTE EXACERBATION: ICD-10-CM

## 2025-03-13 DIAGNOSIS — R76.0 ABNORMAL ANTIBODY TITER: ICD-10-CM

## 2025-03-13 DIAGNOSIS — Z78.0 MENOPAUSE: ICD-10-CM

## 2025-03-13 DIAGNOSIS — J31.0 CHRONIC RHINITIS: Primary | ICD-10-CM

## 2025-03-13 PROCEDURE — 99215 OFFICE O/P EST HI 40 MIN: CPT | Mod: HCNC,S$GLB,, | Performed by: STUDENT IN AN ORGANIZED HEALTH CARE EDUCATION/TRAINING PROGRAM

## 2025-03-13 PROCEDURE — 99999 PR PBB SHADOW E&M-EST. PATIENT-LVL IV: CPT | Mod: PBBFAC,HCNC,, | Performed by: STUDENT IN AN ORGANIZED HEALTH CARE EDUCATION/TRAINING PROGRAM

## 2025-03-13 PROCEDURE — 1159F MED LIST DOCD IN RCRD: CPT | Mod: HCNC,CPTII,S$GLB, | Performed by: STUDENT IN AN ORGANIZED HEALTH CARE EDUCATION/TRAINING PROGRAM

## 2025-03-13 PROCEDURE — 1126F AMNT PAIN NOTED NONE PRSNT: CPT | Mod: HCNC,CPTII,S$GLB, | Performed by: STUDENT IN AN ORGANIZED HEALTH CARE EDUCATION/TRAINING PROGRAM

## 2025-03-13 PROCEDURE — 77080 DXA BONE DENSITY AXIAL: CPT | Mod: TC,HCNC

## 2025-03-13 PROCEDURE — 1157F ADVNC CARE PLAN IN RCRD: CPT | Mod: HCNC,CPTII,S$GLB, | Performed by: STUDENT IN AN ORGANIZED HEALTH CARE EDUCATION/TRAINING PROGRAM

## 2025-03-13 RX ORDER — BUDESONIDE 0.5 MG/2ML
INHALANT ORAL
Qty: 60 ML | Refills: 5 | Status: SHIPPED | OUTPATIENT
Start: 2025-03-13

## 2025-03-13 NOTE — PROGRESS NOTES
"ALLERGY & IMMUNOLOGY CLINIC - FOLLOW UP     HISTORY OF PRESENT ILLNESS   CC: allergic rhinitis and asthma/COPD    Patient ID: Michela Franco is a 78 y.o. female. Patient of Dr Michelle. Last seen on 12/2024    Allergic rhinitis: feels congested all the time and drip leading to congestion in the chest. This is what leads to cough.   Also endorses watery eyes.   -Mucus from nose described as yellow  -ENT 2 surgeries with a balloon and a clean out. Improved for two weeks and then sxs returned. Has not seen them recently   -Using nasal spray until she had some blood tinged  Uses watch out before the nasal sprays   -No abx needed      Severe Persistent Asthma vs Asthma/COPD:   -Only on Dupilumab  for three months.   -Using Symbicort 80 mcg 2P BID   -Albuterol       When on Dupilumab in the past she was better from a congestion stand point.     Clinical summary:   -Has ?asthma (ex-smoker, never childhood asthma, no BD response but mild obstruction)  -Mixed rhinitis with GERD/allergic rhinitis  -chronic pansinusitis  -HFrEF     She received dupixent for 3 years but insurance stopped covering, and started Nucala 4/2024 Durign last visit notes to have failed Nucala and was restarted on Dupilumab on 12/3/2024. Symbicort weekly 160. Mucinex BID. Nose sprays. Inhalers weekly. She continues to have some mild-moderate nasal congestion, although is using fluticasone 2 SEN BID, azelastine 2 SEN BID, nasal rinse 1x/daily    Infectious:   Admission on 7/2023 fro possible PNA vs fluid overload. Negative Legionella noted. All cx negative.   In the past work up IgG ad IgM normal  IgA high 415 IgE range from 4000-253, strep titers 2/14 serotypes.   In the past has recevied three PPSV23 (07, 14, 17), PCV13 14 and PCV 20 12/3/2024     PHYSICAL EXAM   VS: Ht 5' 5" (1.651 m)   Wt 78 kg (171 lb 15.3 oz)   BMI 28.62 kg/m²   GENERAL: NAD, well nourished, well appearing  EYES: no conjunctival injection, no discharge, no infraorbital " shiners  ORAL: MMM, no ulcers, no thrush, no cobblestoning  LUNGS: no wheezes          PULMONARY FUNCTION   Spirometry     8/2024   FEV1/FVC 58% (below LLN)  FVC Pre 2.03 --> 2.33 (15%)  FEV1 Pre 1.18--> 1.31 (11.1%)    TLC normal  DLCO 53%     8/2023  Spirometry shows mild obstruction. Lung volume determination is normal. Spirometry remains unimproved following bronchodilator. DLCO is moderately decreased.      11/26/2021:              Pre:                   Post:  FEV1: 1.49 (82.8%)--> 1.56 (+4.1%)  FVC:   2.22  (95%)----> 2.30 (+3.6%)  Ratio:  67.37%      ----> 67.77%           Component      Latest Ref [facility] 12/18/2019   D. farinae      <0.10 kU/L 1.94 (H)    D. farinae Class CLASS 2    D. pteronyssinus Dust Mite IgE      <0.10 kU/L 0.19 (H)    D. pteronyssinus Class CLASS 0/1    [place] Grass IgE      <0.10 kU/L 0.71 (H)    [place] Grass Class CLASS 2    [place] Grass IgE      <0.10 kU/L 0.72 (H)    [place] Grass Class CLASS 2    [place] IgE      <0.10 kU/L 0.17 (H)    [place] Class CLASS 0/1    English Plantain IgE      <0.10 kU/L 0.63 (H)    English Plantain Class CLASS 1    [place] Tree IgE      <0.10 kU/L 0.74 (H)    [place], Class CLASS 2    Pecan [place] Tree IgE      <0.10 kU/L 0.47 (H)    Pecan, Class CLASS 1    Marshelder IgE      <0.10 kU/L 0.59 (H)    Marshelder Class CLASS 1    Ragweed, Western IgE      <0.10 kU/L 0.54 (H)    Ragweed, Western, Class CLASS 1    A. alternata IgE      <0.10 kU/L <0.10    Altern. alternata Class CLASS 0    Aspergillus Fumigatus IgE      <0.10 kU/L 0.22 (H)    A. fumigatus Class CLASS 0/1    Cat Dander IgE      <0.10 kU/L <0.10    Cat Epithelium Class CLASS 0    Cockroach, IgE      <0.10 kU/L 0.28 (H)    Cockroach, IgE       CLASS 0/1    Dog Dander IgE      <0.10 kU/L 0.51 (H)    Dog Dander Class CLASS 1      Component      Latest Ref [facility] 12/18/2019   S.pneumoniae Type 1      mcg/mL 1.6    S.pneumoniae Type 3      mcg/mL 0.7    Strep pneumo Type 4      mcg/mL <0.3     S.pneumoniae Type 5      mcg/mL 0.8    S.pneumoniae Type 8      mcg/mL <0.3    S.pneumoniae Type 9N      mcg/mL <0.3    S.pneumoniae Type 12F      mcg/mL <0.3    Strep pneumo Type 14      mcg/mL 2.1    S.pneumoniae Type 19F      mcg/mL <0.3    S.pneumoniae Type 23F      mcg/mL <0.3    S.pneumoniae Type 6B      mcg/mL <0.3    S.pneumoniae Type 7F      mcg/mL <0.3    S.pneumoniae Type 18C      mcg/mL 0.5    S.pneumoniae Type 9V Abs      mcg/mL <0.3    IgA Level      40 - 350 mg/dL 575 (H)    Total IgE      0 - 100 IU/mL 2460 (H)    IgG      650 - 1600 mg/dL 1579    IgM      50 - 300 mg/dL 59          CTA Chest: shows solid and GG airspace consolidation noted with septal thickening, atelectassis and scar. Small bilateral pleural effusions     CT sinus 6/2020: postoperative changes the paranasal sinuses with bilateral maxillary uncinectomies, partial ethmoidectomies and sphenoidotomies There is diffuse paranasal sinus disease which is overall similar when compared to prior as some areas are improved and some areas are slightly worse. Air-fluid levels and aerated secretions in the maxillary and sphenoid sinuses may represent a component of acute sinusitis.      ASSESSMENT & PLAN     Chronic mixed rhinitis: chronic congestion and PND that seems to be the cause of her cough and her biggest concern today. Pan sensitized, +GERD. Was using saline rinses after her medicated sprays.   -Budesonide rinses BID, instructed how to do this. Stop flonase while performing this   -azelastine up to SEN BID after rinse  -AIT previously discussed. In this patient who is 78 with CHrEFwill hold off on AIT at this time due to risk of inability to compensate in setting of possible anaphylaxis to AIT  -montelukast 10mg daily, trial off  -low threshold to repeat CT sinus  -Repeating allergy testing today and total immunoglobulins      Severe persistent asthma: PFT with mild obstruction no BD, while on Nucala. Was switched to Dupilumab on  12/2024 which had previously worked  - Failed Nucala 300mg Q4 weeks since 5/2024-12/2024  - Cont Dupixent 300mg Q14 days Rx sent 12/3/24  - Cont Symbicort 80, could consider ICS/LABA/LAMA for possible COPD but I do not think her issues are from her lungs currently. Cont SMART  - Prednisolone 20mg daily x 7 days for emergency rescue sent, going on cruise next week: Not refilled   -Obtain PFT in the Summer   -Consider Chest CT without. 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 Its unclear if her presentation is bacterial or just inflammatory. Possible admission once for PNA in 2023 but could have been volume overload.   - Strep [id number] administered 12/3/24 in patient with asthma, per her request  -Repeating immunoglobulin isotypes further work up depending on that   -CBC w diff      Follow-up:1 month to discuss labs and see how she is on budesonide rinses. consider stopping montelukast    I spent a total of 40 minutes on the day of the visit. This includes face to face time and non-face to face time preparing to see the patient (eg, review of tests), obtaining and/or reviewing separately obtained history, documenting clinical information in the electronic or other health record, independently interpreting results and communicating results to the patient/family/caregiver, or care coordinator.        Yoshi Soriano MD  [facility]

## 2025-03-13 NOTE — PATIENT INSTRUCTIONS
Saline Nasal Gel every day     Please rinse before using the nasal spray     Nasal irrigation with Budesonide (0.5mg/2cc)    Budesonide (Pulmicort) is an anti-inflammatory steroid medication used to decrease nasal and sinus inflammation. It is dispensed in liquid form in a vial. Although it is manufactured for use with a nebulizer, we intend for you to use it with the NeilMed Sinus Rinse bottle (preferred) or a Neti pot.   Instructions:   1. Make 240cc of saline in the NeilMed bottle using the salt packets or your own saline recipe   2. Add the entire 2cc/ml vial of liquid Budesonide (Pulmicort) to the rinse bottle and mix together.   3. While in the shower or over the sink, tilt your head forward to a comfortable level. Put the tip of the sinus rinse bottle in your nostril and aim it towards the crown or top of your head. Gently squeeze the bottle to flush out your nose. The fluid will circulate in and out of your sinus cavities, coming back out from either nostril or through your mouth. Try not to swallow large quantities and spit it out instead.   4. Perform Budesonide (Pulmicort) + Saline irrigations 2 times daily    Please avoid using tap water.

## 2025-03-14 ENCOUNTER — HOSPITAL ENCOUNTER (OUTPATIENT)
Dept: CARDIOLOGY | Facility: HOSPITAL | Age: 80
Discharge: HOME | End: 2025-03-14
Payer: MEDICARE

## 2025-03-14 ENCOUNTER — RESULTS FOLLOW-UP (OUTPATIENT)
Dept: ALLERGY | Facility: CLINIC | Age: 80
End: 2025-03-14

## 2025-03-14 VITALS
HEIGHT: 65 IN | BODY MASS INDEX: 30.08 KG/M2 | HEART RATE: 70 BPM | SYSTOLIC BLOOD PRESSURE: 139 MMHG | DIASTOLIC BLOOD PRESSURE: 79 MMHG | WEIGHT: 180.56 LBS

## 2025-03-14 DIAGNOSIS — I50.20 HFREF (HEART FAILURE WITH REDUCED EJECTION FRACTION): ICD-10-CM

## 2025-03-14 LAB
ASCENDING AORTA: 2.91 CM
AV AREA BY CONTINUOUS VTI: 2.5 CM2
AV INDEX (PROSTH): 0.87
AV LVOT MEAN GRADIENT: 2 MMHG
AV LVOT PEAK GRADIENT: 3 MMHG
AV MEAN GRADIENT: 3 MMHG
AV PEAK GRADIENT: 5 MMHG
AV VALVE AREA BY VELOCITY RATIO: 2.3 CM²
AV VALVE AREA: 2.5 CM2
AV VELOCITY RATIO: 0.82
BSA FOR ECHO PROCEDURE: 1.94 M2
CV ECHO LV RWT: 0.39 CM
DOP CALC AO PEAK VEL: 1.1 M/S
DOP CALC AO VTI: 22.4 CM
DOP CALC LVOT AREA: 2.8 CM2
DOP CALC LVOT DIAMETER: 1.9 CM
DOP CALC LVOT PEAK VEL: 0.9 M/S
DOP CALC LVOT STROKE VOLUME: 55 CM3
DOP CALC RVOT AREA: 3.8 CM2
DOP CALC RVOT DIAMETER: 2.2 CM
DOP CALCLVOT PEAK VEL VTI: 19.4 CM
E WAVE DECELERATION TIME: 398 MS
E/A RATIO: 0.59
E/E' RATIO: 8 M/S
ECHO EF ESTIMATED: 34 %
ECHO LV POSTERIOR WALL: 0.9 CM (ref 0.6–1.1)
FRACTIONAL SHORTENING: 15.2 % (ref 28–44)
HR MV ECHO: 70 BPM
INTERVENTRICULAR SEPTUM: 0.9 CM (ref 0.6–1.1)
IVC DIAMETER: 1.75 CM
IVRT: 108 MS
LA MAJOR: 5.3 CM
LA MINOR: 5.1 CM
LA WIDTH: 4.1 CM
LEFT ATRIUM SIZE: 3.6 CM
LEFT ATRIUM VOLUME INDEX MOD: 25 ML/M2
LEFT ATRIUM VOLUME INDEX: 35 ML/M2
LEFT ATRIUM VOLUME MOD: 47 ML
LEFT ATRIUM VOLUME: 65 CM3
LEFT INTERNAL DIMENSION IN SYSTOLE: 3.9 CM (ref 2.1–4)
LEFT VENTRICLE DIASTOLIC VOLUME INDEX: 51.32 ML/M2
LEFT VENTRICLE DIASTOLIC VOLUME: 97 ML
LEFT VENTRICLE MASS INDEX: 72.9 G/M2
LEFT VENTRICLE SYSTOLIC VOLUME INDEX: 33.9 ML/M2
LEFT VENTRICLE SYSTOLIC VOLUME: 64 ML
LEFT VENTRICULAR INTERNAL DIMENSION IN DIASTOLE: 4.6 CM (ref 3.5–6)
LEFT VENTRICULAR MASS: 137.7 G
LV LATERAL E/E' RATIO: 6.7
LV SEPTAL E/E' RATIO: 10
MV A" WAVE DURATION": 51.38 MS
MV MEAN GRADIENT: 1 MMHG
MV PEAK A VEL: 0.68 M/S
MV PEAK E VEL: 0.4 M/S
MV PEAK GRADIENT: 2 MMHG
OHS CV RV/LV RATIO: 0.83 CM
PISA TR MAX VEL: 2.3 M/S
PULM VEIN A" WAVE DURATION": 51.38 MS
PULM VEIN S/D RATIO: 1.73
PULMONIC VEIN PEAK A VELOCITY: 0.4 M/S
PV PEAK D VEL: 0.26 M/S
PV PEAK S VEL: 0.45 M/S
RA MAJOR: 5.04 CM
RA PRESSURE ESTIMATED: 3 MMHG
RA WIDTH: 4.37 CM
RIGHT ATRIAL AREA: 17.5 CM2
RIGHT VENTRICLE DIASTOLIC BASEL DIMENSION: 3.8 CM
RV TB RVSP: 5 MMHG
RV TISSUE DOPPLER FREE WALL SYSTOLIC VELOCITY 1 (APICAL 4 CHAMBER VIEW): 13.87 CM/S
SINUS: 2.61 CM
STJ: 2.3 CM
TDI LATERAL: 0.06 M/S
TDI SEPTAL: 0.04 M/S
TDI: 0.05 M/S
TRICUSPID ANNULAR PLANE SYSTOLIC EXCURSION: 2.31 CM
TV PEAK GRADIENT: 21 MMHG
TV REST PULMONARY ARTERY PRESSURE: 24 MMHG
Z-SCORE OF LEFT VENTRICULAR DIMENSION IN END DIASTOLE: -1.4
Z-SCORE OF LEFT VENTRICULAR DIMENSION IN END SYSTOLE: 1.41

## 2025-03-14 PROCEDURE — 93306 TTE W/DOPPLER COMPLETE: CPT | Mod: HCNC

## 2025-03-14 PROCEDURE — 93306 TTE W/DOPPLER COMPLETE: CPT | Mod: 26,HCNC,, | Performed by: INTERNAL MEDICINE

## 2025-03-17 ENCOUNTER — RESULTS FOLLOW-UP (OUTPATIENT)
Dept: CARDIOLOGY | Facility: HOSPITAL | Age: 80
End: 2025-03-17

## 2025-03-21 ENCOUNTER — RESULTS FOLLOW-UP (OUTPATIENT)
Dept: PRIMARY CARE CLINIC | Facility: CLINIC | Age: 80
End: 2025-03-21

## 2025-03-22 ENCOUNTER — PATIENT OUTREACH (OUTPATIENT)
Dept: INTERNAL MEDICINE | Facility: CLINIC | Age: 80
End: 2025-03-22
Payer: MEDICARE

## 2025-03-22 VITALS — DIASTOLIC BLOOD PRESSURE: 79 MMHG | SYSTOLIC BLOOD PRESSURE: 139 MMHG

## 2025-03-28 ENCOUNTER — PATIENT MESSAGE (OUTPATIENT)
Dept: ADMINISTRATIVE | Facility: OTHER | Age: 80
End: 2025-03-28
Payer: MEDICARE

## 2025-03-31 ENCOUNTER — OFFICE VISIT (OUTPATIENT)
Dept: PRIMARY CARE CLINIC | Facility: CLINIC | Age: 80
End: 2025-03-31
Payer: MEDICARE

## 2025-03-31 VITALS
BODY MASS INDEX: 30.12 KG/M2 | HEART RATE: 77 BPM | OXYGEN SATURATION: 98 % | TEMPERATURE: 98 F | HEIGHT: 65 IN | SYSTOLIC BLOOD PRESSURE: 128 MMHG | DIASTOLIC BLOOD PRESSURE: 70 MMHG | WEIGHT: 180.75 LBS

## 2025-03-31 DIAGNOSIS — J45.50 SEVERE PERSISTENT ASTHMA WITHOUT COMPLICATION: Primary | ICD-10-CM

## 2025-03-31 DIAGNOSIS — N18.31 STAGE 3A CHRONIC KIDNEY DISEASE: ICD-10-CM

## 2025-03-31 PROCEDURE — 1157F ADVNC CARE PLAN IN RCRD: CPT | Mod: HCNC,CPTII,S$GLB, | Performed by: STUDENT IN AN ORGANIZED HEALTH CARE EDUCATION/TRAINING PROGRAM

## 2025-03-31 PROCEDURE — G2211 COMPLEX E/M VISIT ADD ON: HCPCS | Mod: HCNC,S$GLB,, | Performed by: STUDENT IN AN ORGANIZED HEALTH CARE EDUCATION/TRAINING PROGRAM

## 2025-03-31 PROCEDURE — 1126F AMNT PAIN NOTED NONE PRSNT: CPT | Mod: HCNC,CPTII,S$GLB, | Performed by: STUDENT IN AN ORGANIZED HEALTH CARE EDUCATION/TRAINING PROGRAM

## 2025-03-31 PROCEDURE — 1159F MED LIST DOCD IN RCRD: CPT | Mod: HCNC,CPTII,S$GLB, | Performed by: STUDENT IN AN ORGANIZED HEALTH CARE EDUCATION/TRAINING PROGRAM

## 2025-03-31 PROCEDURE — 1101F PT FALLS ASSESS-DOCD LE1/YR: CPT | Mod: HCNC,CPTII,S$GLB, | Performed by: STUDENT IN AN ORGANIZED HEALTH CARE EDUCATION/TRAINING PROGRAM

## 2025-03-31 PROCEDURE — 99999 PR PBB SHADOW E&M-EST. PATIENT-LVL IV: CPT | Mod: PBBFAC,HCNC,, | Performed by: STUDENT IN AN ORGANIZED HEALTH CARE EDUCATION/TRAINING PROGRAM

## 2025-03-31 PROCEDURE — 3288F FALL RISK ASSESSMENT DOCD: CPT | Mod: HCNC,CPTII,S$GLB, | Performed by: STUDENT IN AN ORGANIZED HEALTH CARE EDUCATION/TRAINING PROGRAM

## 2025-03-31 PROCEDURE — 3078F DIAST BP <80 MM HG: CPT | Mod: HCNC,CPTII,S$GLB, | Performed by: STUDENT IN AN ORGANIZED HEALTH CARE EDUCATION/TRAINING PROGRAM

## 2025-03-31 PROCEDURE — 3074F SYST BP LT 130 MM HG: CPT | Mod: HCNC,CPTII,S$GLB, | Performed by: STUDENT IN AN ORGANIZED HEALTH CARE EDUCATION/TRAINING PROGRAM

## 2025-03-31 PROCEDURE — 99214 OFFICE O/P EST MOD 30 MIN: CPT | Mod: HCNC,S$GLB,, | Performed by: STUDENT IN AN ORGANIZED HEALTH CARE EDUCATION/TRAINING PROGRAM

## 2025-03-31 RX ORDER — FAMOTIDINE 20 MG/1
20 TABLET, FILM COATED ORAL NIGHTLY PRN
Qty: 60 TABLET | Refills: 1 | Status: SHIPPED | OUTPATIENT
Start: 2025-03-31

## 2025-03-31 NOTE — PROGRESS NOTES
Primary Care  Office Visit - In Person  3/31/2025      HPI    Patient is a 79 y.o.   Michela Franco  has a past medical history of Allergy, Asthma, CHF (congestive heart failure), Chronic diastolic heart failure (04/05/2018), Glaucoma suspect, Hyperlipidemia, Hypertension, Neuromuscular disorder, NSTEMI (non-ST elevated myocardial infarction) (01/01/2024), JOHN on CPAP, Osteoporosis, Other neutropenia (08/31/2023), Recurrent sinusitis (03/25/2014), Recurrent upper respiratory infection (URI), and Urticaria.    History of Present Illness    CHIEF COMPLAINT:  Patient presents today for follow up    ALLERGIC RHINITIS:  She experiences nasal congestion and postnasal drip leading to cough, with symptom exacerbation during environmental changes between home, outdoors, and workplace. Previously, prednisone provided relief but is no longer an option.         Active Medications:  Current Outpatient Medications   Medication Instructions    albuterol (VENTOLIN HFA) 90 mcg/actuation inhaler 2 puffs, Inhalation, Every 6 hours PRN, Rescue    albuterol-ipratropium (DUO-NEB) 2.5 mg-0.5 mg/3 mL nebulizer solution 3 mLs, Nebulization, Every 6 hours PRN    allopurinoL (ZYLOPRIM) 50 mg, Oral, Every 48 hours    aspirin 81 MG Chew Chew 1 tablet (81 mg total) by mouth once daily.    azelastine (ASTELIN) 274 mcg, Nasal, 2 times daily    benzonatate (TESSALON) 100 mg, Oral, 3 times daily PRN    budesonide (PULMICORT) 0.5 mg/2 mL nebulizer solution Make 240cc of saline using the salt packets or your own saline recipe  Add the entire 2cc/ml vial of liquid Budesonide (Pulmicort) to the rinse bottle and mix together  Do this twice a day    budesonide-formoterol 80-4.5 mcg (SYMBICORT) 80-4.5 mcg/actuation HFAA 2 puffs, Inhalation, 2 times daily, Controller    cholecalciferol (vitamin D3) (VITAMIN D3) 360 mg, Daily    COD LIVER OIL ORAL 1 tablet, Daily    DUPIXENT  mg, Subcutaneous, Every 14 days    famotidine (PEPCID) 20 mg, Oral, Nightly  "PRN    ferrous sulfate 325 mg, Oral, 3 times daily with meals    fluticasone propionate (FLONASE) 100 mcg, Each Nostril, 2 times daily    furosemide (LASIX) 40 mg, Oral, Daily    guaiFENesin (MUCINEX) 600 mg, Oral, 2 times daily    JARDIANCE 10 mg, Oral, Daily    milk thistle 150 mg Cap 1 capsule, Oral, Daily    multivitamin (THERAGRAN) per tablet 1 tablet, Daily    rosuvastatin (CRESTOR) 5 mg, Oral, Daily    sacubitriL-valsartan (ENTRESTO)  mg per tablet 1 tablet, Oral, 2 times daily    spironolactone (ALDACTONE) 25 mg, Oral, Daily       Vitals:    03/31/25 0822   BP: 128/70   BP Location: Right arm   Patient Position: Sitting   Pulse: 77   Temp: 98.1 °F (36.7 °C)   SpO2: 98%   Weight: 82 kg (180 lb 12.4 oz)   Height: 5' 5" (1.651 m)       Physical Exam  Vitals reviewed.   Constitutional:       General: She is not in acute distress.  Cardiovascular:      Rate and Rhythm: Normal rate and regular rhythm.      Pulses: Normal pulses.      Heart sounds: Normal heart sounds.   Pulmonary:      Effort: Pulmonary effort is normal.      Breath sounds: Wheezing (scattered) present.   Abdominal:      General: Abdomen is flat. Bowel sounds are normal.      Palpations: Abdomen is soft.   Musculoskeletal:      Right lower leg: No edema.      Left lower leg: No edema.   Neurological:      General: No focal deficit present.      Mental Status: She is oriented to person, place, and time.            Assessment & Plan      STAGE 3B CHRONIC KIDNEY DISEASE:  - Stable   - Monitor      CHRONIC SINUSITIS  CHRONIC MIXED RHINITIS  SEVERE PERSISTENT ASTHMA WITHOUT COMPLICATION:   - F/u with allergy   - Continue budesonide, astelin, and Singulair. Recommended added pecid for post-nasal drip   - Continue Dupilumab, Symbicort, Dupixent, albuterol, and duonebs   - F/u CXR     HLD:  - Continued Crestor.     HFrEF:   - Low suspicion ADHF today   - Current regimen lasix 40 mg every other day, Entresto  mg BID, spironolactone 25 mg daily, " and jardiance  - Monitor weight       HTN:   - As above       Orders Placed This Encounter    X-Ray Chest PA And Lateral    famotidine (PEPCID) 20 MG tablet         Previous labs, records, and notes reviewed and considered for their impact on clinical decision making today.                Upcoming Scheduled Appointments and Follow Up:    Future Appointments   Date Time Provider Department Center   4/2/2025 11:30 AM [facility]   4/15/2025  5:30 PM Yoshi Weston MD [facility] [address]   6/27/2025  1:20 PM Mayela Broussard MD [facility]       Follow Up [facility] Care (with who? when?): Follow up in about 3 months (around 6/30/2025).      Extended Emergency Contact Information  Primary Emergency Contact: Charley Ahmadi  Address: [address] [place] [address]  Mobile Phone: [phone]  Relation: Sister      Mayela Broussard MD   Internal Medicine  3/31/2025 - 12:09 PM    I spent a total of 30 minutes on the day of the visit.This includes face to face time and non-face to face time preparing to see the patient (eg, review of tests), obtaining and/or reviewing separately obtained history, documenting clinical information in the electronic or other health record, independently interpreting results and communicating results to the patient/family/caregiver, or care coordinator.    This note was generated with the assistance of ambient listening technology. Verbal consent was obtained by the patient and accompanying visitor(s) for the recording of patient appointment to facilitate this note. I attest to having reviewed and edited the generated note for accuracy, though some syntax or spelling errors may persist. Please contact the author of this note for any clarification.      While patients have the right to access their medical record, it is essential to recognize that progress notes primarily serve as a means of  communication among healthcare professionals.

## 2025-04-02 ENCOUNTER — HOSPITAL ENCOUNTER (OUTPATIENT)
Dept: RADIOLOGY | Facility: HOSPITAL | Age: 80
Discharge: HOME | End: 2025-04-02
Attending: STUDENT IN AN ORGANIZED HEALTH CARE EDUCATION/TRAINING PROGRAM
Payer: MEDICARE

## 2025-04-02 ENCOUNTER — RESULTS FOLLOW-UP (OUTPATIENT)
Dept: PRIMARY CARE CLINIC | Facility: CLINIC | Age: 80
End: 2025-04-02

## 2025-04-02 DIAGNOSIS — J45.50 SEVERE PERSISTENT ASTHMA WITHOUT COMPLICATION: ICD-10-CM

## 2025-04-02 PROCEDURE — 71046 X-RAY EXAM CHEST 2 VIEWS: CPT | Mod: 26,HCNC,, | Performed by: RADIOLOGY

## 2025-04-02 PROCEDURE — 71046 X-RAY EXAM CHEST 2 VIEWS: CPT | Mod: TC,HCNC

## 2025-04-08 ENCOUNTER — OFFICE VISIT (OUTPATIENT)
Dept: PRIMARY CARE CLINIC | Facility: CLINIC | Age: 80
End: 2025-04-08
Payer: MEDICARE

## 2025-04-08 DIAGNOSIS — M81.0 AGE-RELATED OSTEOPOROSIS WITHOUT CURRENT PATHOLOGICAL FRACTURE: Primary | ICD-10-CM

## 2025-04-08 NOTE — PROGRESS NOTES
Telehealth Visit    The patient location is: [place]   The chief complaint leading to consultation is: Osteoporosis     Visit type: audiovisual    Face to Face time with patient: Greater than 10 minutes  15 minutes of total time spent on the encounter, which includes face to face time and non-face to face time preparing to see the patient (eg, review of tests), Obtaining and/or reviewing separately obtained history, Documenting clinical information in the electronic or other health record, Independently interpreting results (not separately reported) and communicating results to the patient/family/caregiver, or Care coordination (not separately reported).       HPI    Patient is a 79 y.o.   Michela Franco  has a past medical history of Allergy, Asthma, CHF (congestive heart failure), Chronic diastolic heart failure (04/05/2018), Glaucoma suspect, Hyperlipidemia, Hypertension, Neuromuscular disorder, NSTEMI (non-ST elevated myocardial infarction) (01/01/2024), JOHN on CPAP, Osteoporosis, Other neutropenia (08/31/2023), Recurrent sinusitis (03/25/2014), Recurrent upper respiratory infection (URI), and Urticaria.       History of Present Illness            Patient requested phone visit to discuss results of DEXA scan    Active Medications:  Current Outpatient Medications   Medication Instructions    albuterol (VENTOLIN HFA) 90 mcg/actuation inhaler 2 puffs, Inhalation, Every 6 hours PRN, Rescue    albuterol-ipratropium (DUO-NEB) 2.5 mg-0.5 mg/3 mL nebulizer solution 3 mLs, Nebulization, Every 6 hours PRN    allopurinoL (ZYLOPRIM) 50 mg, Oral, Every 48 hours    aspirin 81 MG Chew Chew 1 tablet (81 mg total) by mouth once daily.    azelastine (ASTELIN) 274 mcg, Nasal, 2 times daily    benzonatate (TESSALON) 100 mg, Oral, 3 times daily PRN    budesonide (PULMICORT) 0.5 mg/2 mL nebulizer solution Make 240cc of saline using the salt packets or your own saline recipe  Add the entire 2cc/ml vial of liquid Budesonide  (Pulmicort) to the rinse bottle and mix together  Do this twice a day    budesonide-formoterol 80-4.5 mcg (SYMBICORT) 80-4.5 mcg/actuation HFAA 2 puffs, Inhalation, 2 times daily, Controller    cholecalciferol (vitamin D3) (VITAMIN D3) 360 mg, Daily    COD LIVER OIL ORAL 1 tablet, Daily    DUPIXENT  mg, Subcutaneous, Every 14 days    famotidine (PEPCID) 20 mg, Oral, Nightly PRN    ferrous sulfate 325 mg, Oral, 3 times daily with meals    fluticasone propionate (FLONASE) 100 mcg, Each Nostril, 2 times daily    furosemide (LASIX) 40 mg, Oral, Daily    guaiFENesin (MUCINEX) 600 mg, Oral, 2 times daily    JARDIANCE 10 mg, Oral, Daily    milk thistle 150 mg Cap 1 capsule, Oral, Daily    multivitamin (THERAGRAN) per tablet 1 tablet, Daily    rosuvastatin (CRESTOR) 5 mg, Oral, Daily    sacubitriL-valsartan (ENTRESTO)  mg per tablet 1 tablet, Oral, 2 times daily    spironolactone (ALDACTONE) 25 mg, Oral, Daily       Physical Exam    General: Does not appear to be in acute distress      1. Age-related osteoporosis without current pathological fracture  Counseled on vitamin-D/calcium intake and weight-bearing exercises  -     Ambulatory referral/consult to Endocrinology; Future; Expected date: 04/15/2025            Orders Placed This Encounter    Ambulatory referral/consult to Endocrinology         Upcoming Scheduled Appointments and Follow Up:    Future Appointments   Date Time Provider Department Center   4/15/2025  5:30 PM Yoshi Weston MD [facility]   6/27/2025  1:20 PM Mayela Broussard MD [facility]       Follow Up [facility] Care (with who? when?): No follow-ups on file.        Extended Emergency Contact Information  Primary Emergency Contact: Charley Ahmadi  Address: [address] [place] [address] [place]  Mobile Phone: [phone]  Relation: Sister      Mayela Broussard MD   Internal Medicine  4/8/2025 - 11:47 AM        Each  patient to whom he or she provides medical services by telemedicine is:  (1) informed of the relationship between the physician and patient and the respective role of any other health care provider with respect to management of the patient; and (2) notified that he or she may decline to receive medical services by telemedicine and may withdraw from such care at any time.    This note was generated with the assistance of ambient listening technology. Verbal consent was obtained by the patient and accompanying visitor(s) for the recording of patient appointment to facilitate this note. I attest to having reviewed and edited the generated note for accuracy, though some syntax or spelling errors may persist. Please contact the author of this note for any clarification.      While patients have the right to access their medical record, it is essential to recognize that progress notes primarily serve as a means of communication among healthcare professionals.

## 2025-04-15 ENCOUNTER — OFFICE VISIT (OUTPATIENT)
Dept: ALLERGY | Facility: CLINIC | Age: 80
End: 2025-04-15
Payer: MEDICARE

## 2025-04-15 VITALS — BODY MASS INDEX: 30.01 KG/M2 | HEIGHT: 65 IN | WEIGHT: 180.13 LBS

## 2025-04-15 DIAGNOSIS — R09.82 POST-NASAL DRIP: ICD-10-CM

## 2025-04-15 DIAGNOSIS — R05.9 COUGH, UNSPECIFIED TYPE: Primary | ICD-10-CM

## 2025-04-15 DIAGNOSIS — J31.0 CHRONIC RHINITIS: ICD-10-CM

## 2025-04-15 PROCEDURE — 1126F AMNT PAIN NOTED NONE PRSNT: CPT | Mod: HCNC,CPTII,S$GLB, | Performed by: STUDENT IN AN ORGANIZED HEALTH CARE EDUCATION/TRAINING PROGRAM

## 2025-04-15 PROCEDURE — 99214 OFFICE O/P EST MOD 30 MIN: CPT | Mod: HCNC,S$GLB,, | Performed by: STUDENT IN AN ORGANIZED HEALTH CARE EDUCATION/TRAINING PROGRAM

## 2025-04-15 PROCEDURE — 1157F ADVNC CARE PLAN IN RCRD: CPT | Mod: HCNC,CPTII,S$GLB, | Performed by: STUDENT IN AN ORGANIZED HEALTH CARE EDUCATION/TRAINING PROGRAM

## 2025-04-15 PROCEDURE — 1159F MED LIST DOCD IN RCRD: CPT | Mod: HCNC,CPTII,S$GLB, | Performed by: STUDENT IN AN ORGANIZED HEALTH CARE EDUCATION/TRAINING PROGRAM

## 2025-04-15 PROCEDURE — 99999 PR PBB SHADOW E&M-EST. PATIENT-LVL IV: CPT | Mod: PBBFAC,HCNC,, | Performed by: STUDENT IN AN ORGANIZED HEALTH CARE EDUCATION/TRAINING PROGRAM

## 2025-04-15 NOTE — PROGRESS NOTES
ALLERGY & IMMUNOLOGY CLINIC - FOLLOW UP     HISTORY OF PRESENT ILLNESS   CC: allergic rhinitis and asthma/COPD    Patient ID: Michela Franco is a 78 y.o. female. Patient of Dr Michelle. Last seen on 12/2024    Cough: from PND and trickle that leads to the cough. Started pepcid at night two weeks ago (or so) unable to tell if its working. Has improved somewhat with budesonide irrigations BID but needs something during the middle of the day. Worse when she talks. She works as a  so talks all day.     Chronic rhinitis: has mucus but no fever. No abx since last seen.     Severe asthma: got Dupixent in 2/25 and ahs been doing it with no  issues. Using her inhaler Symbicort but not albuterol.     Prior appt:   Allergic rhinitis: feels congested all the time and drip leading to congestion in the chest. This is what leads to cough.   Also endorses watery eyes.   -Mucus from nose described as yellow  -ENT 2 surgeries with a balloon and a clean out. Improved for two weeks and then sxs returned. Has not seen them recently   -Using nasal spray until she had some blood tinged  Uses watch out before the nasal sprays   -No abx needed      Severe Persistent Asthma vs Asthma/COPD:   -Only on Dupilumab  for three months.   -Using Symbicort 80 mcg 2P BID   -Albuterol       When on Dupilumab in the past she was better from a congestion stand point.     Clinical summary:   -Has ?asthma (ex-smoker, never childhood asthma, no BD response but mild obstruction)  -Mixed rhinitis with GERD/allergic rhinitis  -chronic pansinusitis  -HFrEF     She received dupixent for 3 years but insurance stopped covering, and started Nucala 4/2024 Tamia last visit notes to have failed Nucala and was restarted on Dupilumab on 12/3/2024. Symbicort weekly 160. Mucinex BID. Nose sprays. Inhalers weekly. She continues to have some mild-moderate nasal congestion, although is using fluticasone 2 SEN BID, azelastine 2 SEN BID, nasal rinse  "1x/daily    Infectious:   Admission on 7/2023 fro possible PNA vs fluid overload. Negative Legionella noted. All cx negative.   In the past work up IgG ad IgM normal  IgA high 415 IgE range from 4000-253, strep titers 2/14 serotypes.   In the past has recevied three PPSV23 (07, 14, 17), PCV13 14 and PCV 20 12/3/2024     PHYSICAL EXAM   VS: Ht 5' 5" (1.651 m)   Wt 78 kg (171 lb 15.3 oz)   BMI 28.62 kg/m²   GENERAL: NAD, well nourished, well appearing  EYES: no conjunctival injection, no discharge, no infraorbital shiners  ORAL: MMM, no ulcers, no thrush, no cobblestoning  LUNGS: no wheezes          PULMONARY FUNCTION   Spirometry     8/2024   FEV1/FVC 58% (below LLN)  FVC Pre 2.03 --> 2.33 (15%)  FEV1 Pre 1.18--> 1.31 (11.1%)    TLC normal  DLCO 53%     8/2023  Spirometry shows mild obstruction. Lung volume determination is normal. Spirometry remains unimproved following bronchodilator. DLCO is moderately decreased.      11/26/2021:              Pre:                   Post:  FEV1: 1.49 (82.8%)--> 1.56 (+4.1%)  FVC:   2.22  (95%)----> 2.30 (+3.6%)  Ratio:  67.37%      ----> 67.77%           Component      Latest Ref [facility] 12/18/2019   D. farinae      <0.10 kU/L 1.94 (H)    D. farinae Class CLASS 2    D. pteronyssinus Dust Mite IgE      <0.10 kU/L 0.19 (H)    D. pteronyssinus Class CLASS 0/1    [place] Grass IgE      <0.10 kU/L 0.71 (H)    [place] Grass Class CLASS 2    Joshua Grass IgE      <0.10 kU/L 0.72 (H)    Joshua Grass Class CLASS 2    Sayre IgE      <0.10 kU/L 0.17 (H)    Sayre Class CLASS 0/1    English Plantain IgE      <0.10 kU/L 0.63 (H)    English Plantain Class CLASS 1    [place] Tree IgE      <0.10 kU/L 0.74 (H)    [place], Class CLASS 2    Pecan Hardy Tree IgE      <0.10 kU/L 0.47 (H)    Pecan, Class CLASS 1    Marshelder IgE      <0.10 kU/L 0.59 (H)    Marshelder Class CLASS 1    Ragweed, Western IgE      <0.10 kU/L 0.54 (H)    Ragweed, Western, Class CLASS 1    A. alternata IgE      <0.10 kU/L " <0.10    Altern. alternata Class CLASS 0    Aspergillus Fumigatus IgE      <0.10 kU/L 0.22 (H)    A. fumigatus Class CLASS 0/1    Cat Dander IgE      <0.10 kU/L <0.10    Cat Epithelium Class CLASS 0    Cockroach, IgE      <0.10 kU/L 0.28 (H)    Cockroach, IgE       CLASS 0/1    Dog Dander IgE      <0.10 kU/L 0.51 (H)    Dog Dander Class CLASS 1      Component      Latest Ref [facility] 3/14/2025   Allergen [place] Class CLASS 0    Stirum Tree IgE      <0.10 kU/L <0.10    [place], Class CLASS 0    Pecan [place] Tree IgE      <0.10 kU/L <0.10    Pecan, Class CLASS 0    Penicillium IgE      <0.10 kU/L <0.10    Penicillium Class CLASS 0    Marshelder IgE      <0.10 kU/L <0.10    Marshelder Class CLASS 0    Pigweed IgE      <0.10 kU/L <0.10    Common Pigweed Class CLASS 0    Joshua Grass IgE      <0.10 kU/L <0.10    Joshua Grass Class CLASS 0    [place] Tree IgE      <0.10 kU/L <0.10    [place] Tree Class CLASS 0    A. alternata IgE      <0.10 kU/L <0.10    Altern. alternata Class CLASS 0    Cat Dander IgE      <0.10 kU/L <0.10    Cat Epithelium Class CLASS 0    Cladosporium IgE      <0.10 kU/L <0.10    Cladosporium Class CLASS 0    Macedonian Cockroach IgE      <0.10 kU/L <0.10    Macedonian Cockroach Class CLASS 0    D. farinae      <0.10 kU/L <0.10    D. farinae Class CLASS 0    D. pteronyssinus Dust Mite IgE      <0.10 kU/L <0.10    D. pteronyssinus Class CLASS 0    Dog Dander IgE      <0.10 kU/L <0.10    Dog Dander Class CLASS 0    Mouse Urine Proteins IgE      <0.10 kU/L <0.10    Mouse Urine Protein Class CLASS 0    Total IgE      <114.0 IU/mL 36.3        Component      Latest Ref Rn 12/18/2019   S.pneumoniae Type 1      mcg/mL 1.6    S.pneumoniae Type 3      mcg/mL 0.7    Strep pneumo Type 4      mcg/mL <0.3    S.pneumoniae Type 5      mcg/mL 0.8    S.pneumoniae Type 8      mcg/mL <0.3    S.pneumoniae Type 9N      mcg/mL <0.3    S.pneumoniae Type 12F      mcg/mL <0.3    Strep pneumo Type 14      mcg/mL 2.1    S.pneumoniae Type  19F      mcg/mL <0.3    S.pneumoniae Type 23F      mcg/mL <0.3    S.pneumoniae Type 6B      mcg/mL <0.3    S.pneumoniae Type 7F      mcg/mL <0.3    S.pneumoniae Type 18C      mcg/mL 0.5    S.pneumoniae Type 9V Abs      mcg/mL <0.3    IgA Level      40 - 350 mg/dL 575 (H)    Total IgE      0 - 100 IU/mL 2460 (H)    IgG      650 - 1600 mg/dL 1579    IgM      50 - 300 mg/dL 59       Component      Latest Ref Rn 3/14/2025   IgG      650 - 1600 mg/dL 1379    IgA Level      40 - 350 mg/dL 478 (H)    IgM      50 - 300 mg/dL 47 (L)       Component      Latest Ref [facility] 3/14/2025   WBC      3.90 - 12.70 K/uL 4.67    RBC      4.00 - 5.40 M/uL 4.49    Hemoglobin      12.0 - 16.0 g/dL 12.6    Hematocrit      37.0 - 48.5 % 40.1    MCV      82 - 98 fL 89    MCH      27.0 - 31.0 pg 28.1    MCHC      32.0 - 36.0 g/dL 31.4 (L)    RDW      11.5 - 14.5 % 15.3 (H)    Platelet Count      150 - 450 K/uL 282    MPV      9.2 - 12.9 fL 11.1    Immature Granulocytes      0.0 - 0.5 % 0.2    Gran # (ANC)      1.8 - 7.7 K/uL 2.1    Immature Grans (Abs)      0.00 - 0.04 K/uL 0.01    Lymph #      1.0 - 4.8 K/uL 1.9    Mono #      0.3 - 1.0 K/uL 0.6    Eos #      0.0 - 0.5 K/uL 0.0    Baso #      0.00 - 0.20 K/uL 0.06    nRBC      0 /100 WBC 0    Gran %      38.0 - 73.0 % 44.2    Lymph %      18.0 - 48.0 % 41.5    Mono %      4.0 - 15.0 % 12.2    Eos %      0.0 - 8.0 % 0.6    Basophil %      0.0 - 1.9 % 1.3            CTA Chest: shows solid and GG airspace consolidation noted with septal thickening, atelectassis and scar. Small bilateral pleural effusions     CT sinus 6/2020: postoperative changes the paranasal sinuses with bilateral maxillary uncinectomies, partial ethmoidectomies and sphenoidotomies There is diffuse paranasal sinus disease which is overall similar when compared to prior as some areas are improved and some areas are slightly worse. Air-fluid levels and aerated secretions in the maxillary and sphenoid sinuses may represent a  component of acute sinusitis.      ASSESSMENT & PLAN     Chronic non allergic rhinitis: chronic congestion and PND that seems to be the cause of her cough and her biggest concern today. No longer sensitized to inhalants, +GERD. Mild improvement with budesonide irrigation BID.     -Budesonide rinses BID, instructed how to do this. Do irrigation with nasal saline in the afternoon if possible to bridge you to the evening.   -azelastine up to SEN BID after rinse  -Referral to ENT for further evaluation. Also discussed this can be LRP in which case might need to switch to PPI  -low threshold to repeat CT sinus      Severe persistent asthma: PFT with mild obstruction no BD, while on Nucala. Was switched to Dupilumab on 12/2024 which had previously worked  - Failed Nucala 300mg Q4 weeks since 5/2024-12/2024  - Cont Dupixent 300mg Q14 days Rx sent 12/3/24  - Cont Symbicort 80, could consider ICS/LABA/LAMA for possible COPD but I do not think her issues are from her lungs currently. Cont SMART  - Prednisolone 20mg daily x 7 days for emergency rescue sent, going on cruise next week: Not refilled   -Obtain PFT in the Summer      GERD  - Can contribute to stubborn post nasal drip via protective reflux/reflex mechanism  - Saline rinse with distilled water daily as needed to flush thick mucus out of sinuses  - In the past tried Guaifenesin (mucinex) 600mg ER twice a day with large glass of water as needed as mucolytic to thin out secretions. Can trial in the future again.     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 Its unclear if her presentation is bacterial or just inflammatory. Possible admission once for PNA in 2023 but could have been volume overload.   - Strep [id number] administered 12/3/24 in patient with asthma, per her request  -Clinicallly not behaving as SPAD as she is  not getting bacterial infections  -Can't trial Azitrhomycin 500 mg wkly pending ent eval      Follow-up: 2 months after seeing ENT     I spent a total of 30 minutes on the day of the visit. This includes face to face time and non-face to face time preparing to see the patient (eg, review of tests), obtaining and/or reviewing separately obtained history, documenting clinical information in the electronic or other health record, independently interpreting results and communicating results to the patient/family/caregiver, or care coordinator.        Yoshi Soriano MD  [facility]

## 2025-04-20 NOTE — PLAN OF CARE
Problem: Adult Inpatient Plan of Care  Goal: Plan of Care Review  Outcome: Ongoing, Progressing  Flowsheets (Taken 1/2/2024 0511)  Plan of Care Reviewed With: patient  Goal: Optimal Comfort and Wellbeing  Outcome: Ongoing, Progressing  Intervention: Monitor Pain and Promote Comfort  Flowsheets (Taken 1/2/2024 0511)  Pain Management Interventions:   care clustered   quiet environment facilitated   pillow support provided  Intervention: Provide Person-Centered Care  Flowsheets (Taken 1/2/2024 0511)  Trust Relationship/Rapport:   care explained   choices provided   questions answered   questions encouraged   reassurance provided   Pt arrived to the floor in NAD she is amb.  NO injuries this shift.    ERP notified of pt BP. No orders received.

## 2025-04-21 ENCOUNTER — OFFICE VISIT (OUTPATIENT)
Dept: OTOLARYNGOLOGY | Facility: CLINIC | Age: 80
End: 2025-04-21
Payer: MEDICARE

## 2025-04-21 VITALS
BODY MASS INDEX: 30.64 KG/M2 | DIASTOLIC BLOOD PRESSURE: 81 MMHG | SYSTOLIC BLOOD PRESSURE: 154 MMHG | HEIGHT: 65 IN | WEIGHT: 183.88 LBS | HEART RATE: 80 BPM

## 2025-04-21 DIAGNOSIS — J30.89 PERENNIAL ALLERGIC RHINITIS WITH SEASONAL VARIATION: ICD-10-CM

## 2025-04-21 DIAGNOSIS — Z98.890 S/P FESS (FUNCTIONAL ENDOSCOPIC SINUS SURGERY): ICD-10-CM

## 2025-04-21 DIAGNOSIS — R05.3 CHRONIC COUGH: ICD-10-CM

## 2025-04-21 DIAGNOSIS — J30.2 PERENNIAL ALLERGIC RHINITIS WITH SEASONAL VARIATION: ICD-10-CM

## 2025-04-21 DIAGNOSIS — J32.4 CHRONIC PANSINUSITIS: Primary | ICD-10-CM

## 2025-04-21 PROCEDURE — 99999 PR PBB SHADOW E&M-EST. PATIENT-LVL V: CPT | Mod: PBBFAC,,, | Performed by: PHYSICIAN ASSISTANT

## 2025-04-21 PROCEDURE — 87070 CULTURE OTHR SPECIMN AEROBIC: CPT | Mod: HCNC | Performed by: PHYSICIAN ASSISTANT

## 2025-04-21 PROCEDURE — 87075 CULTR BACTERIA EXCEPT BLOOD: CPT | Mod: HCNC | Performed by: PHYSICIAN ASSISTANT

## 2025-04-21 RX ORDER — AMOXICILLIN AND CLAVULANATE POTASSIUM 875; 125 MG/1; MG/1
1 TABLET, FILM COATED ORAL EVERY 12 HOURS
Qty: 20 TABLET | Refills: 0 | Status: SHIPPED | OUTPATIENT
Start: 2025-04-21 | End: 2025-05-03

## 2025-04-21 NOTE — PROCEDURES
Laryngoscopy    Date/Time: 4/21/2025 3:00 PM    Performed by: Rodney Shelton PA-C  Authorized by: Rodney Shelton PA-C    Consent Done?:  Yes (Verbal)  Anesthesia:     Local anesthetic:  Phenylephrine spray    Patient tolerance:  Patient tolerated the procedure well with no immediate complications  Laryngoscopy:     Areas examined:  Nasal cavities, nasopharynx, oropharynx, hypopharynx, larynx and vocal cords    Laryngoscope size:  4 mm  Nose External:      No external nasal deformity  Nose Intranasal:      Mucosa no polyps     Mucosa ulcers not present     No mucosa lesions present     No septum gross deformity     Enlarged turbinates  Nasopharynx:      No mucosa lesions     Adenoids present     Posterior choanae patent     Eustachian tube patent  Larynx/hypopharynx:      No epiglottis lesions     No epiglottis edema     No AE folds lesions     No vocal cord polyps     Equal and normal bilateral     No hypopharynx lesions     No piriform sinus pooling     No piriform sinus lesions     Post cricoid edema     No post cricoid erythema     Surgical changes bilaterally  L max and left anterior ethmoid with white/yellow mucopurulence (swabbed)  Moderate LPR changes

## 2025-04-21 NOTE — PROGRESS NOTES
Subjective:     HPI: Michela Franco is a 79 y.o. female who was self-referred for chronic cough.    Patient reports daily postnasal drip with associated cough, throat clearing, and productive mucus.  Patient also reports chronic anosmia with sneezing and rhinorrhea.  She denies any recent episodes of acid reflux and denies any dysphagia.  She was recently started on Pepcid at night and has been compliant with the last 3 weeks without significant improvement.  Patient is on budesonide irrigations twice daily.      Triggers for the cough include the following:   - voice use:  yes  - breathing heavily:  no  - laughing:  no  - eating:  no  - drinking cold liquids:  no  - strong odors:  yes    - post-prandial:  no  - lying down:  no    Taking an ARB/ACEI: yes    Current sinonasal medications as above.  The last course of antibiotics was a long time ago.    She recalls previously having allergy testing recently which was negative. Per chart review, positive testing in 2019 with multiple positives.  3/13/25 inhalant immunocaps with IgE 36 and no significant positives.   She relates a history of asthma which is currently managed with Symbicort.  She denies a history of reflux symptoms but currently taking pepcid 20 mg PO QHS.     She denies a diagnosis of obstructive sleep apnea.   She does not recall a prior history of nasal trauma.  She has previously had sinonasal surgery consisting of FESS with Dr. Boo on 9/22/2017 and prior BSP.    She has not had a tonsillectomy.  She is not a tobacco smoker.     SNOT-22 score: : (Patient-Rptd) (P) 63  NOSE score:: (Patient-Rptd) (P) 80%  ETDQ-7 score:: (Patient-Rptd) (P) 3.4    Past Medical/Past Surgical History  Past Medical History:   Diagnosis Date    Allergy     Asthma     CHF (congestive heart failure)     Chronic diastolic heart failure 04/05/2018    Glaucoma suspect     Hyperlipidemia     Hypertension     Neuromuscular disorder     NSTEMI (non-ST elevated myocardial  infarction) 01/01/2024    JOHN on CPAP     Osteoporosis     Other neutropenia 08/31/2023    Recurrent sinusitis 03/25/2014    Recurrent upper respiratory infection (URI)     Urticaria      She has a past surgical history that includes Sinus surgery; Rotator cuff repair (Left); Hysterectomy; Colonoscopy (N/A, 05/18/2018); Oophorectomy; Cataract extraction w/  intraocular lens implant (Right, 02/11/2021); Cataract extraction w/  intraocular lens implant (Left, 03/04/2021); Colonoscopy (N/A, 10/24/2023); Eye surgery; and ANGIOGRAM, CORONARY, WITH LEFT HEART CATHETERIZATION (N/A, 3/6/2024).    Family History/Social History  Her family history includes Cancer in her sister; Hypertension in her daughter and daughter; Kidney cancer in her sister; No Known Problems in her father, maternal aunt, maternal grandfather, maternal grandmother, maternal uncle, mother, paternal aunt, paternal grandfather, paternal grandmother, paternal uncle, and another family member; Ovarian cancer in her sister.  She reports that she quit smoking about 20 years ago. Her smoking use included cigarettes. She started smoking about 60 years ago. She has a 10 pack-year smoking history. She has never been exposed to tobacco smoke. She has never used smokeless tobacco. She reports current alcohol use. She reports that she does not use drugs.    Allergies/Immunizations  She is allergic to carvedilol, metoprolol, adhesive, diazepam, gabapentin, keppra [levetiracetam], mold, calcium channel blocking agents-dihydropyridines, and thiazides.  Immunization History   Administered Date(s) Administered    COVID-19, MRNA, LN-S, PF (Pfizer) (Purple Cap) 01/16/2021, 02/06/2021, 10/09/2021    COVID-19, mRNA, LNP-S, PF (Moderna) Ages 12+ 11/07/2023    COVID-19, mRNA, LNP-S, PF, juan-sucrose, 30 mcg/0.3 mL (Pfizer Ages 12+) 09/21/2024    COVID-19, mRNA, LNP-S, bivalent booster, PF (PFIZER OMICRON) 09/16/2022    Hepatitis A, Adult 05/05/2017, 11/06/2017    Influenza  (FLUAD) - Quadrivalent - Adjuvanted - PF *Preferred* (65+) 09/18/2020, 10/09/2021, 09/13/2022, 09/18/2023    Influenza - Quadrivalent - PF *Preferred* (6 months and older) 10/02/2008, 10/26/2009, 10/29/2010, 11/02/2011    Influenza - Trivalent - Fluad - Adjuvanted - PF (65 years and older 09/21/2024    Influenza - Trivalent - Fluzone High Dose - PF (65 years and older) 11/06/2012, 12/03/2013, 10/10/2016, 10/17/2017, 10/31/2018, 10/31/2018, 10/02/2019    Meningococcal Conjugate (MCV4P) 05/05/2017    Pneumococcal Conjugate - 13 Valent 06/03/2014, 06/03/2014    Pneumococcal Conjugate - 20 Valent 12/03/2024    Pneumococcal Polysaccharide - 23 Valent 07/26/2007, 03/25/2014, 05/19/2017    RSVpreF (Arexvy) 09/18/2023    Tdap 12/03/2013    Yellow Fever 05/05/2017    Zoster 11/02/2011    Zoster Recombinant 10/02/2019, 12/19/2019        Medications   albuterol  albuterol-ipratropium  allopurinoL  aspirin Chew  azelastine  benzonatate  budesonide  budesonide-formoterol 80-4.5 mcg Hfaa  cholecalciferol (vitamin D3) Cap  COD LIVER OIL ORAL  DUPIXENT PEN Pnij  empagliflozin  famotidine  ferrous sulfate  fluticasone propionate  furosemide  guaiFENesin  milk thistle Cap  multivitamin  rosuvastatin  sacubitriL-valsartan  spironolactone     Review of Systems     Constitutional: Negative for appetite change, chills, fatigue, fever and unexpected weight loss.      HENT: Positive for postnasal drip, sinus pressure and voice change.      Eyes:  Positive for eye itching.     Respiratory:  Positive for cough, shortness of breath, sleep apnea and wheezing.      Cardiovascular:  Positive for foot swelling.     Gastrointestinal:  Negative for abdominal pain, acid reflux, constipation, diarrhea, heartburn and vomiting.     Genitourinary: Negative for difficulty urinating, sexual problems and frequent urination.     Skin: Negative for rash.     Allergy: Positive for seasonal allergies.     Endocrine: Negative for cold intolerance and heat  "intolerance.      Neurological: Negative for dizziness, headaches, light-headedness, seizures and tremors.      Hematologic: Positive for bruises/bleeds easily.     Psychiatric: Negative for decreased concentration, depression, nervous/anxious and sleep disturbance.            Objective:     BP (!) 154/81 (BP Location: Right arm, Patient Position: Sitting)   Pulse 80   Ht 5' 5" (1.651 m)   Wt 83.4 kg (183 lb 13.8 oz)   BMI 30.60 kg/m²      Physical Exam  Vitals reviewed.   Constitutional:       Appearance: Normal appearance. She is well-developed.   HENT:      Head: Normocephalic and atraumatic.      Right Ear: External ear normal.      Left Ear: External ear normal.      Nose: Rhinorrhea present. No septal deviation or mucosal edema.      Right Nostril: No epistaxis.      Left Nostril: No epistaxis.      Right Turbinates: Enlarged.      Left Turbinates: Enlarged.      Right Sinus: No maxillary sinus tenderness or frontal sinus tenderness.      Left Sinus: No maxillary sinus tenderness or frontal sinus tenderness.      Mouth/Throat:      Lips: Pink. No lesions.      Dentition: Has dentures.      Tongue: No lesions. Tongue does not deviate from midline.      Palate: No mass and lesions.      Pharynx: Oropharynx is clear. Uvula midline. No pharyngeal swelling, oropharyngeal exudate, posterior oropharyngeal erythema or uvula swelling.      Tonsils: No tonsillar exudate or tonsillar abscesses.   Eyes:      Extraocular Movements: Extraocular movements intact.      Conjunctiva/sclera: Conjunctivae normal.   Neck:      Thyroid: No thyromegaly or thyroid tenderness.   Pulmonary:      Effort: Pulmonary effort is normal.   Lymphadenopathy:      Cervical: No cervical adenopathy.   Neurological:      Mental Status: She is alert.   Psychiatric:         Mood and Affect: Mood normal.         Behavior: Behavior normal. Behavior is cooperative.         Procedure    Flexible laryngoscopy performed.  See procedure note.     L " NV     L ethmoid     L max with mucopurulence     R PC with drainage     R NV with stringy mucus    R ethmoid     R ET     [id number]/larynx with LPR changes     VF mobility intact      Data Reviewed  I personally reviewed the chart, including any outside records, and pertinent data below:    I reviewed the following notes Internal Medicine, ENT, and Allergy     WBC (K/uL)   Date Value   03/14/2025 4.67     Eosinophil % (%)   Date Value   03/14/2025 0.6     Eos # (K/uL)   Date Value   03/14/2025 0.0     Platelets (K/uL)   Date Value   03/14/2025 282     Glucose (mg/dL)   Date Value   02/04/2025 88     Total IgE (IU/mL)   Date Value   03/14/2025 36.3     Prior sinus cultures have grown mostly Pseudomonas but did grow beta lactamase positive H. Flu in 2019.  Surgery with +p. Acnes.    I independently reviewed the images of the CT sinuses dated 6/30/2020. Pertinent findings include surgical changes with extensive mucosal thickening throughout all sinuses.    Assessment & Plan:     1. Chronic pansinusitis  2. Chronic cough  3. Perennial allergic rhinitis with seasonal variation  4. S/P FESS (functional endoscopic sinus surgery)  Laryngoscopy with evidence of left maxillary sinusitis.  I will empirically treat patient with Augmentin based on prior culture showing BLP H. Flu.  Prior sinus cultures did grow Pseudomonas, but mucopurulence does not appear like typical Pseudomonas.   I swabbed her sinus exudate for culture and will use the results to direct antibiotic therapy as indicated.  Continue budesonide rinses BID  Finish pepcid  Follow up in 2 months    I had a discussion with the patient regarding her condition and the further workup and management options.    All questions were answered, and the patient is in agreement with the above.     Disclaimer:  This note may have been prepared utilizing voice recognition software which may result in occasional typographical errors in the text such as sound alike words.   If  further clarification is needed, please contact the ENT department of [facility].

## 2025-04-24 LAB — BACTERIA SPEC ANAEROBE CULT: NORMAL

## 2025-04-26 LAB — BACTERIA SPEC AEROBE CULT: ABNORMAL

## 2025-04-28 ENCOUNTER — PATIENT MESSAGE (OUTPATIENT)
Dept: ADMINISTRATIVE | Facility: OTHER | Age: 80
End: 2025-04-28
Payer: MEDICARE

## 2025-04-28 ENCOUNTER — PATIENT MESSAGE (OUTPATIENT)
Dept: OTOLARYNGOLOGY | Facility: CLINIC | Age: 80
End: 2025-04-28
Payer: MEDICARE

## 2025-04-28 DIAGNOSIS — J32.4 CHRONIC PANSINUSITIS: Primary | ICD-10-CM

## 2025-04-28 DIAGNOSIS — J30.89 PERENNIAL ALLERGIC RHINITIS WITH SEASONAL VARIATION: ICD-10-CM

## 2025-04-28 DIAGNOSIS — J30.2 PERENNIAL ALLERGIC RHINITIS WITH SEASONAL VARIATION: ICD-10-CM

## 2025-04-28 RX ORDER — SULFAMETHOXAZOLE AND TRIMETHOPRIM 800; 160 MG/1; MG/1
1 TABLET ORAL 2 TIMES DAILY
Qty: 20 TABLET | Refills: 0 | Status: SHIPPED | OUTPATIENT
Start: 2025-04-28 | End: 2025-05-10

## 2025-06-10 ENCOUNTER — OFFICE VISIT (OUTPATIENT)
Dept: ALLERGY | Facility: CLINIC | Age: 80
End: 2025-06-10
Payer: MEDICARE

## 2025-06-10 VITALS — HEIGHT: 65 IN | WEIGHT: 177.69 LBS | BODY MASS INDEX: 29.61 KG/M2

## 2025-06-10 DIAGNOSIS — J32.9 RECURRENT SINUSITIS: Primary | ICD-10-CM

## 2025-06-10 DIAGNOSIS — J45.50 SEVERE PERSISTENT ASTHMA WITHOUT COMPLICATION: ICD-10-CM

## 2025-06-10 DIAGNOSIS — R76.0 ABNORMAL ANTIBODY TITER: ICD-10-CM

## 2025-06-10 PROCEDURE — 99999 PR PBB SHADOW E&M-EST. PATIENT-LVL I: CPT | Mod: PBBFAC,HCNC,, | Performed by: STUDENT IN AN ORGANIZED HEALTH CARE EDUCATION/TRAINING PROGRAM

## 2025-06-10 PROCEDURE — 1157F ADVNC CARE PLAN IN RCRD: CPT | Mod: CPTII,HCNC,S$GLB, | Performed by: STUDENT IN AN ORGANIZED HEALTH CARE EDUCATION/TRAINING PROGRAM

## 2025-06-10 PROCEDURE — 99214 OFFICE O/P EST MOD 30 MIN: CPT | Mod: HCNC,S$GLB,, | Performed by: STUDENT IN AN ORGANIZED HEALTH CARE EDUCATION/TRAINING PROGRAM

## 2025-06-10 RX ORDER — AZITHROMYCIN 250 MG/1
250 TABLET, FILM COATED ORAL EVERY OTHER DAY
Qty: 20 TABLET | Refills: 11 | Status: SHIPPED | OUTPATIENT
Start: 2025-06-10

## 2025-06-10 NOTE — PROGRESS NOTES
ALLERGY & IMMUNOLOGY CLINIC - FOLLOW UP     HISTORY OF PRESENT ILLNESS   CC: follow up for cough and PND     Patient ID: Michela Franco is a 78 y.o. female. Patient of Dr Michelle. Last seen on 12/2024    Was feeling well after doing the Augmentin and Bactrim. No longer coughing as much but its returning. Still irrigating. She feels that she did get benefit from the antibiotics.     Has seen ENT endoscopy with evidence of left maxillary sinusitis with culture growing Moderate acinetobacter baumanni for which she was rx Augmentin and bactrim     Prior appt:   Allergic rhinitis: feels congested all the time and drip leading to congestion in the chest. This is what leads to cough.   Also endorses watery eyes.   -Mucus from nose described as yellow  -ENT 2 surgeries with a balloon and a clean out. Improved for two weeks and then sxs returned. Has not seen them recently   -Using nasal spray until she had some blood tinged  Uses watch out before the nasal sprays   -No abx needed      Severe Persistent Asthma vs Asthma/COPD:   -Only on Dupilumab  for three months. Restarted on 2/25 with no issues  -Using Symbicort 80 mcg 2P BID   -Albuterol     Cough: from PND and trickle that leads to the cough. Started pepcid at night two weeks ago (or so) unable to tell if its working. Has improved somewhat with budesonide irrigations BID but needs something during the middle of the day. Worse when she talks. She works as a  so talks all day.     When on Dupilumab in the past she was better from a congestion stand point.     Clinical summary:   -Has ?asthma (ex-smoker, never childhood asthma, no BD response but mild obstruction)  -Mixed rhinitis with GERD/allergic rhinitis  -chronic pansinusitis  -HFrEF     She received dupixent for 3 years but insurance stopped covering, and started Nucala 4/2024 Tamia last visit notes to have failed Nucala and was restarted on Dupilumab on 12/3/2024. Symbicort weekly 160. Mucinex BID. Nose  "sprays. Inhalers weekly. She continues to have some mild-moderate nasal congestion, although is using fluticasone 2 SEN BID, azelastine 2 SEN BID, nasal rinse 1x/daily    Infectious:   Admission on 7/2023 fro possible PNA vs fluid overload. Negative Legionella noted. All cx negative.   In the past work up IgG ad IgM normal  IgA high 415 IgE range from 4000-253, strep titers 2/14 serotypes.     In the past has recevied three PPSV23. Summary seen below:     7/26/2007 received PPSV23 no titers   3/25/2014 received PPSV23 and PCV13 on 6/3/2014 then Strep titers done at 6/24/2014 with 1/14 then repeated on 5/6/2014 with 2/14 and repeated on 7/2/2014 with 2/14 protection  -----------------------------------------  Strep titers on 4/1/2017 with protection to 3/14 serotypes. She then received PPSV23 on 5/19/2017 with repeat titers on 7/5/2017 with 6/14 serotypes which is 43% and below normal. Was then repeated 7 months on 12/18/2019 with 2/14 protection which is 14%  and represents loss of protection     Last vaccine: PCV 20 12/3/2024     PHYSICAL EXAM   VS: Ht 5' 5" (1.651 m)   Wt 78 kg (171 lb 15.3 oz)   BMI 28.62 kg/m²   GENERAL: NAD, well nourished, well appearing       PULMONARY FUNCTION   Allergy LABS:     Component      Latest Ref Rng 12/18/2019   D. farinae      <0.10 kU/L 1.94 (H)    D. farinae Class CLASS 2    D. pteronyssinus Dust Mite IgE      <0.10 kU/L 0.19 (H)    D. pteronyssinus Class CLASS 0/1    [place] Grass IgE      <0.10 kU/L 0.71 (H)    Berm[place] Grass Class CLASS 2    Josh[place] Grass IgE      <0.10 kU/L 0.72 (H)    [place] Class CLASS 2    Bailey IgE      <0.10 kU/L 0.17 (H)    Bailey Class CLASS 0/1    English Plantain IgE      <0.10 kU/L 0.63 (H)    English Plantain Class CLASS 1    [place] Tree IgE      <0.10 kU/L 0.74 (H)    [place], Class CLASS 2    Pecan [place] Tree IgE      <0.10 kU/L 0.47 (H)    Pecan, Class CLASS 1    Marshelder IgE      <0.10 kU/L 0.59 (H)    Marshelder Class CLASS 1  "   Ragweed, Western IgE      <0.10 kU/L 0.54 (H)    Ragweed, Western, Class CLASS 1    A. alternata IgE      <0.10 kU/L <0.10    Altern. alternata Class CLASS 0    Aspergillus Fumigatus IgE      <0.10 kU/L 0.22 (H)    A. fumigatus Class CLASS 0/1    Cat Dander IgE      <0.10 kU/L <0.10    Cat Epithelium Class CLASS 0    Cockroach, IgE      <0.10 kU/L 0.28 (H)    Cockroach, IgE       CLASS 0/1    Dog Dander IgE      <0.10 kU/L 0.51 (H)    Dog Dander Class CLASS 1      Component      Latest Ref [facility] 3/14/2025   Allergen [place] Class CLASS 0    Margie Tree IgE      <0.10 kU/L <0.10    [place], Class CLASS 0    Pecan [place] Tree IgE      <0.10 kU/L <0.10    Pecan, Class CLASS 0    Penicillium IgE      <0.10 kU/L <0.10    Penicillium Class CLASS 0    Marshelder IgE      <0.10 kU/L <0.10    Marshelder Class CLASS 0    Pigweed IgE      <0.10 kU/L <0.10    Common Pigweed Class CLASS 0    [place] IgE      <0.10 kU/L <0.10    [place] Class CLASS 0    [place] Tree IgE      <0.10 kU/L <0.10    [place] Tree Class CLASS 0    A. alternata IgE      <0.10 kU/L <0.10    Altern. alternata Class CLASS 0    Cat Dander IgE      <0.10 kU/L <0.10    Cat Epithelium Class CLASS 0    Cladosporium IgE      <0.10 kU/L <0.10    Cladosporium Class CLASS 0    Iraqi Cockroach IgE      <0.10 kU/L <0.10    Iraqi Cockroach Class CLASS 0    D. farinae      <0.10 kU/L <0.10    D. farinae Class CLASS 0    D. pteronyssinus Dust Mite IgE      <0.10 kU/L <0.10    D. pteronyssinus Class CLASS 0    Dog Dander IgE      <0.10 kU/L <0.10    Dog Dander Class CLASS 0    Mouse Urine Proteins IgE      <0.10 kU/L <0.10    Mouse Urine Protein Class CLASS 0    Total IgE      <114.0 IU/mL 36.3      Immune labs:     Strep titers on 4/1/2017 with protection to 3/14 serotypes. She then received PPSV23 on 5/19/2017 with repeat titers on 7/5/2017 with 6/14 serotypes which is 43% and below normal. Was then repeated 7 months on 12/18/2019 with 2/14 protection which  is 14%  and represents loss of protection     Component      Latest Ref Rn 3/14/2025   IgG      650 - 1600 mg/dL 1379    IgA Level      40 - 350 mg/dL 478 (H)    IgM      50 - 300 mg/dL 47 (L)       Component      Latest Ref Rng 3/14/2025   WBC      3.90 - 12.70 K/uL 4.67    RBC      4.00 - 5.40 M/uL 4.49    Hemoglobin      12.0 - 16.0 g/dL 12.6    Hematocrit      37.0 - 48.5 % 40.1    MCV      82 - 98 fL 89    MCH      27.0 - 31.0 pg 28.1    MCHC      32.0 - 36.0 g/dL 31.4 (L)    RDW      11.5 - 14.5 % 15.3 (H)    Platelet Count      150 - 450 K/uL 282    MPV      9.2 - 12.9 fL 11.1    Immature Granulocytes      0.0 - 0.5 % 0.2    Gran # (ANC)      1.8 - 7.7 K/uL 2.1    Immature Grans (Abs)      0.00 - 0.04 K/uL 0.01    Lymph #      1.0 - 4.8 K/uL 1.9    Mono #      0.3 - 1.0 K/uL 0.6    Eos #      0.0 - 0.5 K/uL 0.0    Baso #      0.00 - 0.20 K/uL 0.06    nRBC      0 /100 WBC 0    Gran %      38.0 - 73.0 % 44.2    Lymph %      18.0 - 48.0 % 41.5    Mono %      4.0 - 15.0 % 12.2    Eos %      0.0 - 8.0 % 0.6    Basophil %      0.0 - 1.9 % 1.3       Spirometry   8/2024   FEV1/FVC 58% (below LLN)  FVC Pre 2.03 --> 2.33 (15%)  FEV1 Pre 1.18--> 1.31 (11.1%)    TLC normal  DLCO 53%     8/2023  Spirometry shows mild obstruction. Lung volume determination is normal. Spirometry remains unimproved following bronchodilator. DLCO is moderately decreased.      11/26/2021:              Pre:                   Post:  FEV1: 1.49 (82.8%)--> 1.56 (+4.1%)  FVC:   2.22  (95%)----> 2.30 (+3.6%)  Ratio:  67.37%      ----> 67.77%    IMAGING:    CTA Chest: shows solid and GG airspace consolidation noted with septal thickening, atelectassis and scar. Small bilateral pleural effusions     CT sinus 6/2020: postoperative changes the paranasal sinuses with bilateral maxillary uncinectomies, partial ethmoidectomies and sphenoidotomies There is diffuse paranasal sinus disease which is overall similar when compared to prior as some areas are  improved and some areas are slightly worse. Air-fluid levels and aerated secretions in the maxillary and sphenoid sinuses may represent a component of acute sinusitis.      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 She has had persistent PND which leads to cough which only improved with abx. She did grow bacteria from the left maxillary. In 2023 possible admission for PNA. In the past, Strep titers on 4/1/2017 with protection to 3/14 serotypes. She then received PPSV23 on 5/19/2017 with repeat titers on 7/5/2017 with 6/14 serotypes which is 43% and below normal. Was then repeated 7 months on 12/18/2019 with 2/14 protection which is 14%  and represents loss of protection. This represents Specific polysaccharide antibody deficiency which increase patient to bacterial sinopulmonary infections, AOM, and other severe systemic infections such as bacteriemia and meningitis.       -We have reviewed the possible treatment  options which include Watchful waiting, antibiotic Ppx and IGRT.  -She has received PCV 20 for added protection   -We will try Azithromycin PPX with 250 MWF  -If she fails Abx Ppx she would be a great candidate fro IGRT       Chronic non allergic rhinitis: chronic congestion and PND that seems to be the cause of her cough and her biggest concern which might be 2.2 bacterial sinusitis since she improved with abx. No longer sensitized to inhalants, +GERD.     -Cont Budesonide rinses BID, instructed how to do this. Do irrigation with nasal saline in the afternoon if possible to bridge you to the evening.   -azelastine up to SEN BID after rinse  -low threshold to repeat CT sinus  -Continue to follow up with ENT     Severe persistent asthma: PFT with mild obstruction no BD, while on Nucala. Was switched to Dupilumab on 12/2024 which had previously worked. Failed Nucala 300mg Q4 weeks since 5/2024-12/2024.    - Cont  Dupixent 300mg Q14 days Rx sent 12/3/24  - Cont Symbicort 80, could consider ICS/LABA/LAMA for possible COPD but I do not think her issues are from her lungs currently. Cont SMART  - Prednisolone 20mg daily x 7 days for emergency rescue sent, going on cruise next week: Not refilled           Follow-up: 2 months to see how she has done on PPX and need for IGRT     I spent a total of 30 minutes on the day of the visit. This includes face to face time and non-face to face time preparing to see the patient (eg, review of tests), obtaining and/or reviewing separately obtained history, documenting clinical information in the electronic or other health record, independently interpreting results and communicating results to the patient/family/caregiver, or care coordinator.        Yoshi Soriano MD  [facility]

## 2025-06-20 ENCOUNTER — PATIENT MESSAGE (OUTPATIENT)
Dept: ADMINISTRATIVE | Facility: OTHER | Age: 80
End: 2025-06-20
Payer: MEDICARE

## 2025-06-24 ENCOUNTER — OFFICE VISIT (OUTPATIENT)
Dept: OTOLARYNGOLOGY | Facility: CLINIC | Age: 80
End: 2025-06-24
Payer: MEDICARE

## 2025-06-24 VITALS
SYSTOLIC BLOOD PRESSURE: 127 MMHG | HEART RATE: 93 BPM | BODY MASS INDEX: 29.61 KG/M2 | WEIGHT: 177.94 LBS | DIASTOLIC BLOOD PRESSURE: 79 MMHG

## 2025-06-24 DIAGNOSIS — J31.0 CHRONIC RHINITIS: ICD-10-CM

## 2025-06-24 DIAGNOSIS — R05.3 CHRONIC COUGH: ICD-10-CM

## 2025-06-24 DIAGNOSIS — Z98.890 S/P FESS (FUNCTIONAL ENDOSCOPIC SINUS SURGERY): ICD-10-CM

## 2025-06-24 DIAGNOSIS — J32.4 CHRONIC PANSINUSITIS: Primary | ICD-10-CM

## 2025-06-24 PROCEDURE — 1126F AMNT PAIN NOTED NONE PRSNT: CPT | Mod: CPTII,S$GLB,, | Performed by: PHYSICIAN ASSISTANT

## 2025-06-24 PROCEDURE — 99999 PR PBB SHADOW E&M-EST. PATIENT-LVL III: CPT | Mod: PBBFAC,,, | Performed by: PHYSICIAN ASSISTANT

## 2025-06-24 PROCEDURE — 31231 NASAL ENDOSCOPY DX: CPT | Mod: S$GLB,,, | Performed by: PHYSICIAN ASSISTANT

## 2025-06-24 PROCEDURE — 3288F FALL RISK ASSESSMENT DOCD: CPT | Mod: CPTII,S$GLB,, | Performed by: PHYSICIAN ASSISTANT

## 2025-06-24 PROCEDURE — 3078F DIAST BP <80 MM HG: CPT | Mod: CPTII,S$GLB,, | Performed by: PHYSICIAN ASSISTANT

## 2025-06-24 PROCEDURE — 99214 OFFICE O/P EST MOD 30 MIN: CPT | Mod: 25,S$GLB,, | Performed by: PHYSICIAN ASSISTANT

## 2025-06-24 PROCEDURE — 3074F SYST BP LT 130 MM HG: CPT | Mod: CPTII,S$GLB,, | Performed by: PHYSICIAN ASSISTANT

## 2025-06-24 PROCEDURE — 1157F ADVNC CARE PLAN IN RCRD: CPT | Mod: CPTII,S$GLB,, | Performed by: PHYSICIAN ASSISTANT

## 2025-06-24 PROCEDURE — 1101F PT FALLS ASSESS-DOCD LE1/YR: CPT | Mod: CPTII,S$GLB,, | Performed by: PHYSICIAN ASSISTANT

## 2025-06-24 PROCEDURE — 1159F MED LIST DOCD IN RCRD: CPT | Mod: CPTII,S$GLB,, | Performed by: PHYSICIAN ASSISTANT

## 2025-06-24 RX ORDER — FAMOTIDINE 20 MG/1
20 TABLET, FILM COATED ORAL NIGHTLY PRN
Qty: 60 TABLET | Refills: 1 | Status: SHIPPED | OUTPATIENT
Start: 2025-06-24 | End: 2025-06-27 | Stop reason: SDUPTHER

## 2025-06-24 NOTE — TELEPHONE ENCOUNTER
Refill Routing Note   Medication(s) are not appropriate for processing by [facility] for the following reason(s):        Required labs abnormal  New or recently adjusted medication    [facility] action(s):  Defer             Appointments  past 12m or future 3m with PCP    Date Provider   Last Visit   4/8/2025 Mayela Broussard MD   Next Visit   6/27/2025 Mayela Broussard MD   ED visits in past 90 days: 0        Note composed:12:23 PM 06/24/2025

## 2025-06-24 NOTE — PROCEDURES
Nasal/sinus endoscopy    Date/Time: 6/24/2025 3:00 PM    Performed by: Rodney Shelton PA-C  Authorized by: Rodney Shelton PA-C    Consent Done?:  Yes (Verbal)  Anesthesia:     Local anesthetic:  Phenylephrine spray    Type of Endoscope:  Flexible    Patient tolerance:  Patient tolerated the procedure well with no immediate complications  Nose:     Procedure Performed:  Nasal Endoscopy  External:      No external nasal deformity  Intranasal:      Mucosa no polyps     Mucosa ulcers not present     No mucosa lesions present     Enlarged turbinates     No septum gross deformity  Nasopharynx:      No mucosa lesions     Adenoids not present     Posterior choanae patent     Eustachian tube patent     S/p surgical changes  White mucopurulence BL with extensive edema

## 2025-06-24 NOTE — TELEPHONE ENCOUNTER
No care due was identified.  [facility] Embedded Care Due Messages. Reference number: [id number].   6/24/2025 12:13:35 PM CDT

## 2025-06-27 ENCOUNTER — LAB VISIT (OUTPATIENT)
Dept: LAB | Facility: HOSPITAL | Age: 80
End: 2025-06-27
Attending: STUDENT IN AN ORGANIZED HEALTH CARE EDUCATION/TRAINING PROGRAM
Payer: MEDICARE

## 2025-06-27 ENCOUNTER — OFFICE VISIT (OUTPATIENT)
Dept: PRIMARY CARE CLINIC | Facility: CLINIC | Age: 80
End: 2025-06-27
Payer: MEDICARE

## 2025-06-27 VITALS
SYSTOLIC BLOOD PRESSURE: 102 MMHG | WEIGHT: 174.19 LBS | TEMPERATURE: 98 F | HEART RATE: 82 BPM | BODY MASS INDEX: 29.02 KG/M2 | OXYGEN SATURATION: 98 % | DIASTOLIC BLOOD PRESSURE: 70 MMHG | HEIGHT: 65 IN

## 2025-06-27 DIAGNOSIS — J31.0 CHRONIC RHINITIS: ICD-10-CM

## 2025-06-27 DIAGNOSIS — I50.20 HFREF (HEART FAILURE WITH REDUCED EJECTION FRACTION): ICD-10-CM

## 2025-06-27 DIAGNOSIS — N18.31 STAGE 3A CHRONIC KIDNEY DISEASE: Primary | ICD-10-CM

## 2025-06-27 DIAGNOSIS — J45.50 SEVERE PERSISTENT ASTHMA WITHOUT COMPLICATION: ICD-10-CM

## 2025-06-27 DIAGNOSIS — N18.31 STAGE 3A CHRONIC KIDNEY DISEASE: ICD-10-CM

## 2025-06-27 DIAGNOSIS — E78.2 MIXED HYPERLIPIDEMIA: ICD-10-CM

## 2025-06-27 DIAGNOSIS — J32.4 CHRONIC PANSINUSITIS: ICD-10-CM

## 2025-06-27 DIAGNOSIS — I10 PRIMARY HYPERTENSION: ICD-10-CM

## 2025-06-27 DIAGNOSIS — M81.0 AGE-RELATED OSTEOPOROSIS WITHOUT CURRENT PATHOLOGICAL FRACTURE: ICD-10-CM

## 2025-06-27 LAB
ANION GAP (OHS): 11 MMOL/L (ref 8–16)
BNP SERPL-MCNC: 64 PG/ML (ref 0–99)
BUN SERPL-MCNC: 25 MG/DL (ref 8–23)
CALCIUM SERPL-MCNC: 9.4 MG/DL (ref 8.7–10.5)
CHLORIDE SERPL-SCNC: 103 MMOL/L (ref 95–110)
CO2 SERPL-SCNC: 20 MMOL/L (ref 23–29)
CREAT SERPL-MCNC: 1.2 MG/DL (ref 0.5–1.4)
GFR SERPLBLD CREATININE-BSD FMLA CKD-EPI: 46 ML/MIN/1.73/M2
GLUCOSE SERPL-MCNC: 76 MG/DL (ref 70–110)
POTASSIUM SERPL-SCNC: 5.2 MMOL/L (ref 3.5–5.1)
SODIUM SERPL-SCNC: 134 MMOL/L (ref 136–145)

## 2025-06-27 PROCEDURE — 99999 PR PBB SHADOW E&M-EST. PATIENT-LVL V: CPT | Mod: PBBFAC,,, | Performed by: STUDENT IN AN ORGANIZED HEALTH CARE EDUCATION/TRAINING PROGRAM

## 2025-06-27 PROCEDURE — 83880 ASSAY OF NATRIURETIC PEPTIDE: CPT

## 2025-06-27 PROCEDURE — 80048 BASIC METABOLIC PNL TOTAL CA: CPT

## 2025-06-27 PROCEDURE — 36415 COLL VENOUS BLD VENIPUNCTURE: CPT | Mod: PN

## 2025-06-27 RX ORDER — FAMOTIDINE 20 MG/1
20 TABLET, FILM COATED ORAL NIGHTLY PRN
Qty: 60 TABLET | Refills: 1 | Status: SHIPPED | OUTPATIENT
Start: 2025-06-27

## 2025-06-27 NOTE — PROGRESS NOTES
Primary Care  Annual Office Visit - In Person  6/27/2025          Patient is a 80 y.o.   Michela Franco  has a past medical history of Allergy, Asthma, CHF (congestive heart failure), Chronic diastolic heart failure (04/05/2018), Glaucoma suspect, Hyperlipidemia, Hypertension, Neuromuscular disorder, NSTEMI (non-ST elevated myocardial infarction) (01/01/2024), JOHN on CPAP, Osteoporosis, Other neutropenia (08/31/2023), Recurrent sinusitis (03/25/2014), Recurrent upper respiratory infection (URI), and Urticaria.    History of Present Illness      She reports respiratory symptoms that began after returning from vacation in [place]. She did not experience symptoms there to the degree that she does here. Sx started with sneezing after outdoor exposure when she landed and progressing to feeling unwell by Monday morning. She sleeps on two pillows, which is her usual arrangement, and denies needing additional elevation.         Active Medications  Current Outpatient Medications   Medication Instructions    albuterol (VENTOLIN HFA) 90 mcg/actuation inhaler 2 puffs, Inhalation, Every 6 hours PRN, Rescue    albuterol-ipratropium (DUO-NEB) 2.5 mg-0.5 mg/3 mL nebulizer solution 3 mLs, Nebulization, Every 6 hours PRN    allopurinoL (ZYLOPRIM) 50 mg, Oral, Every 48 hours    aspirin 81 MG Chew Chew 1 tablet (81 mg total) by mouth once daily.    azelastine (ASTELIN) 274 mcg, Nasal, 2 times daily    azithromycin (Z-SANDY) 250 mg, Oral, Every other day    benzonatate (TESSALON) 100 mg, Oral, 3 times daily PRN    budesonide (PULMICORT) 0.5 mg/2 mL nebulizer solution Make 240cc of saline using the salt packets or your own saline recipe  Add the entire 2cc/ml vial of liquid Budesonide (Pulmicort) to the rinse bottle and mix together  Do this twice a day    budesonide-formoterol 80-4.5 mcg (SYMBICORT) 80-4.5 mcg/actuation HFAA 2 puffs, Inhalation, 2 times daily, Controller    cholecalciferol (vitamin D3) (VITAMIN D3) 360 mg, Daily    COD LIVER  OIL ORAL 1 tablet, Daily    DUPIXENT  mg, Subcutaneous, Every 14 days    famotidine (PEPCID) 20 mg, Oral, Nightly PRN    ferrous sulfate 325 mg, Oral, 3 times daily with meals    fluticasone propionate (FLONASE) 100 mcg, Each Nostril, 2 times daily    furosemide (LASIX) 40 mg, Oral, Daily    guaiFENesin (MUCINEX) 600 mg, Oral, 2 times daily    JARDIANCE 10 mg, Oral, Daily    milk thistle 150 mg Cap 1 capsule, Oral, Daily    multivitamin (THERAGRAN) per tablet 1 tablet, Daily    rosuvastatin (CRESTOR) 5 mg, Oral, Daily    sacubitriL-valsartan (ENTRESTO)  mg per tablet 1 tablet, Oral, 2 times daily    spironolactone (ALDACTONE) 25 mg, Oral, Daily       Annual Review of Preventative Care    Health Maintenance Due   Topic Date Due    TETANUS VACCINE  12/03/2023     Diabetes Screening:    Lab Results   Component Value Date    HGBA1C 5.3 10/18/2022    HGBA1C 6.0 11/01/2012    HGBA1C 6.2 10/26/2011     BP Readings from Last 3 Encounters:   06/27/25 102/70   06/24/25 127/79   04/21/25 (!) 154/81     Wt Readings from Last 3 Encounters:   06/27/25 1319 79 kg (174 lb 2.6 oz)   06/24/25 1444 80.7 kg (177 lb 14.6 oz)   06/10/25 1901 80.6 kg (177 lb 11.1 oz)            Physical Exam  Vitals reviewed.   Constitutional:       General: She is not in acute distress.  Eyes:      Pupils: Pupils are equal, round, and reactive to light.   Cardiovascular:      Rate and Rhythm: Normal rate and regular rhythm.      Pulses: Normal pulses.      Heart sounds: Normal heart sounds.   Pulmonary:      Effort: Pulmonary effort is normal.      Breath sounds: Wheezing (scattered) present.   Musculoskeletal:      Right lower leg: No edema.      Left lower leg: No edema.             Assessment & Plan       HFrEF:   - Low suspicion ADHF today. Patient would like to increase her water intake due to concern about dehydration. Counseled on fluid management in the setting of heart failure and the goal of volume depletion. Increase water intake  slowly while monitoring weight. Parameters provided for weight gain.   - Current regimen lasix 40 mg every other day, Entresto  mg BID, spironolactone 25 mg daily, and jardiance  - F/u BNP    HTN:   - As above     STAGE 3B CHRONIC KIDNEY DISEASE:  - Stable   - Monitor      CHRONIC SINUSITIS  CHRONIC MIXED RHINITIS  SEVERE PERSISTENT ASTHMA WITHOUT COMPLICATION:   - Sx poorly controlled due to environmental factors   - F/u with allergy   - Continue Astelin/Flonase, Pepcid, azithromycin, Dupixent, Symbicort, albuterol, duo-nebs and budesonide nebulizers    HLD:  - Continued Crestor.    OSTEOPOROSIS:   - F/u scheduled with endocrinology     Orders Placed This Encounter    Basic Metabolic Panel    BNP    famotidine (PEPCID) 20 MG tablet                             Upcoming Scheduled Appointments and Follow Up:    Future Appointments   Date Time Provider Department [place]   6/27/2025  2:15 PM [facility] LAB [facility]   6/28/2025  2:00 PM [facility] CT OP LIMIT 450 LBS [facility]ANO [facility]   7/8/2025 11:00 AM Alexys Boo MD [facility] [place]   8/12/2025  5:30 PM Yoshi Weston MD [facility]   9/30/2025 10:00 AM Wilfred Marti MD [facility]   10/1/2025  3:20 PM Mayela Broussard MD [facility]       Follow Up [facility] (with who? when?): Follow up in about 4 months (around 10/27/2025).        Extended Emergency Contact Information  Primary Emergency Contact: Charley Ahmadi  Address: [address] [place] [address] [place]  Mobile Phone: [phone]  Relation: Sister      Mayela Broussard MD   Internal Medicine  6/27/2025 - 2:14 PM    I spent a total of 30 minutes on the day of the visit.This includes face to face time and non-face to face time preparing to see the patient (eg, review of tests), obtaining and/or reviewing separately obtained history, documenting clinical information in the electronic or  other health record, independently interpreting results and communicating results to the patient/family/caregiver, or care coordinator.    This note was generated with the assistance of ambient listening technology. Verbal consent was obtained by the patient and accompanying visitor(s) for the recording of patient appointment to facilitate this note. I attest to having reviewed and edited the generated note for accuracy, though some syntax or spelling errors may persist. Please contact the author of this note for any clarification.      While patients have the right to access their medical record, it is essential to recognize that progress notes primarily serve as a means of communication among healthcare professionals.

## 2025-06-27 NOTE — PATIENT INSTRUCTIONS
It's okay to increase your water intake slowly     Reduce water intake if your weight increases 3 lbs in one day or 5 lbs in one week     A simpler goal is to remain below 180 lbs

## 2025-06-28 ENCOUNTER — HOSPITAL ENCOUNTER (OUTPATIENT)
Dept: RADIOLOGY | Facility: OTHER | Age: 80
Discharge: HOME | End: 2025-06-28
Attending: PHYSICIAN ASSISTANT
Payer: MEDICARE

## 2025-06-28 DIAGNOSIS — J32.4 CHRONIC PANSINUSITIS: ICD-10-CM

## 2025-06-28 PROCEDURE — 70486 CT MAXILLOFACIAL W/O DYE: CPT | Mod: TC

## 2025-06-28 PROCEDURE — 70486 CT MAXILLOFACIAL W/O DYE: CPT | Mod: 26,,, | Performed by: RADIOLOGY

## 2025-06-30 ENCOUNTER — RESULTS FOLLOW-UP (OUTPATIENT)
Dept: PRIMARY CARE CLINIC | Facility: CLINIC | Age: 80
End: 2025-06-30

## 2025-06-30 ENCOUNTER — RESULTS FOLLOW-UP (OUTPATIENT)
Dept: OTOLARYNGOLOGY | Facility: CLINIC | Age: 80
End: 2025-06-30

## 2025-07-08 ENCOUNTER — OFFICE VISIT (OUTPATIENT)
Dept: OTOLARYNGOLOGY | Facility: CLINIC | Age: 80
End: 2025-07-08
Payer: MEDICARE

## 2025-07-08 VITALS
SYSTOLIC BLOOD PRESSURE: 128 MMHG | WEIGHT: 178.13 LBS | DIASTOLIC BLOOD PRESSURE: 77 MMHG | BODY MASS INDEX: 29.64 KG/M2

## 2025-07-08 DIAGNOSIS — J31.0 CHRONIC RHINITIS: Primary | ICD-10-CM

## 2025-07-08 DIAGNOSIS — J32.4 CHRONIC PANSINUSITIS: ICD-10-CM

## 2025-07-08 DIAGNOSIS — R05.3 CHRONIC COUGH: ICD-10-CM

## 2025-07-08 PROCEDURE — 99999 PR PBB SHADOW E&M-EST. PATIENT-LVL IV: CPT | Mod: PBBFAC,,, | Performed by: OTOLARYNGOLOGY

## 2025-07-08 PROCEDURE — 1160F RVW MEDS BY RX/DR IN RCRD: CPT | Mod: CPTII,S$GLB,, | Performed by: OTOLARYNGOLOGY

## 2025-07-08 PROCEDURE — 31231 NASAL ENDOSCOPY DX: CPT | Mod: S$GLB,,, | Performed by: OTOLARYNGOLOGY

## 2025-07-08 PROCEDURE — 99214 OFFICE O/P EST MOD 30 MIN: CPT | Mod: 25,S$GLB,, | Performed by: OTOLARYNGOLOGY

## 2025-07-08 PROCEDURE — 3078F DIAST BP <80 MM HG: CPT | Mod: CPTII,S$GLB,, | Performed by: OTOLARYNGOLOGY

## 2025-07-08 PROCEDURE — 1159F MED LIST DOCD IN RCRD: CPT | Mod: CPTII,S$GLB,, | Performed by: OTOLARYNGOLOGY

## 2025-07-08 PROCEDURE — 1126F AMNT PAIN NOTED NONE PRSNT: CPT | Mod: CPTII,S$GLB,, | Performed by: OTOLARYNGOLOGY

## 2025-07-08 PROCEDURE — 3288F FALL RISK ASSESSMENT DOCD: CPT | Mod: CPTII,S$GLB,, | Performed by: OTOLARYNGOLOGY

## 2025-07-08 PROCEDURE — 1101F PT FALLS ASSESS-DOCD LE1/YR: CPT | Mod: CPTII,S$GLB,, | Performed by: OTOLARYNGOLOGY

## 2025-07-08 PROCEDURE — 1157F ADVNC CARE PLAN IN RCRD: CPT | Mod: CPTII,S$GLB,, | Performed by: OTOLARYNGOLOGY

## 2025-07-08 PROCEDURE — 3074F SYST BP LT 130 MM HG: CPT | Mod: CPTII,S$GLB,, | Performed by: OTOLARYNGOLOGY

## 2025-07-08 RX ORDER — SULFAMETHOXAZOLE AND TRIMETHOPRIM 800; 160 MG/1; MG/1
1 TABLET ORAL 2 TIMES DAILY
Qty: 56 TABLET | Refills: 0 | Status: SHIPPED | OUTPATIENT
Start: 2025-07-08 | End: 2025-08-06

## 2025-07-08 NOTE — PROCEDURES
Nasal/sinus endoscopy    Date/Time: 7/8/2025 11:00 AM    Performed by: Alexys Boo MD  Authorized by: Alexys Boo MD    Anesthesia:     Local anesthetic:  Lidocaine 4% and Emerson-Synephrine 1/2%    Patient tolerance:  Patient tolerated the procedure well with no immediate complications  Nose:     Procedure Performed:  Nasal Endoscopy  External:      No external nasal deformity  Intranasal:      Mucosa no polyps     Mucosa ulcers not present     No mucosa lesions present     Turbinates not enlarged     No septum gross deformity  Nasopharynx:      No mucosa lesions     Adenoids not present     Posterior choanae patent     Eustachian tube not patent     Left sinuses all patent, focal white mucopurulent glob in maxillary lumen  Right frontal and anterior ethmoid obstructed with manuel purulent white mucus in frontal recess and right maxillary  No polyps

## 2025-07-08 NOTE — PROGRESS NOTES
Subjective:      Michela is a 80 y.o. female who comes for follow-up of sinusitis. Her last visit with me was on 7/7/2020. Now nearly 8 years status-post endoscopic sinus surgery.    History of Present Illness    CHIEF COMPLAINT:  Patient presents with recurrent sinus congestion, thick mucus, and chest congestion that have persisted since sinus surgery in 2017.    HPI:  Patient underwent sinus surgery with Dr. Licona in 2017 to open up her sinuses. She had relief for about 6-7 months post-surgery before symptoms recurred. Since then, she has flare-ups every 3 months, lasting for at least 2 weeks each time. Symptoms include sinus congestion, thick mucus, pressure and pain throughout her sinuses (cheeks, between eyes, and forehead), postnasal drip, dry cough, and chest congestion. Patient has severe facial pain and pressure associated with the congestion.    She has had complete loss of smell since the onset of her sinus problems, which has persisted even after surgery. During flare-ups, she has thick yellowish-green mucus, which she expels through sinus rinses and feels dripping down her throat. She also has wheezing and difficulty breathing due to the congestion.    She uses tobramycin rinses twice daily, which may have provided some relief. She also uses an inhaler and Astelin nasal spray. Recently, she was prescribed Pepcid for potential acid reflux, but has not taken it for about 1.5-2 weeks due to prescription issues.    She has been on Dupixent, an injectable medication, for about 4 years. She initially took it for a year, then switched to another medication when insurance would not cover it, but later returned to Dupixent when the alternative did not work.    Two weeks to a month ago, she was prescribed oral antibiotics by Dr. Montanez for her symptoms. She has been diagnosed with Stage III kidney disease, which needs to be considered when prescribing medications. She also uses a CPAP machine at night.    She  attributes the onset of her sinus problems to living in a [facility] trailer with exposure to formaldehyde and mold about 19-20 years ago. She has been evaluated by multiple specialists, including infectious disease doctors, and mentions being previously diagnosed with asthma, which she now believes may have been misdiagnosed.    She denies any foul smell in her nose on a regular basis.    MEDICATIONS:  Patient is on Tobramycin rinses twice daily and Astelin nasal spray. She has been on Dupixent injections for about 4 years, with a brief discontinuation after the first year before resuming. She uses an unspecified inhaler and a CPAP machine for nighttime use. Pepcid was recently prescribed but not started due to pharmacy issues. Patient discontinued Pepcid for about 1.5-2 weeks prior to the visit.    MEDICAL HISTORY:  Patient has a history of recurrent sinusitis since 2017, with flare-ups occurring every 3 months and lasting 2 weeks. She has Stage III kidney disease. She was previously diagnosed with congestive heart failure, which has since resolved. Patient also has a past diagnosis of asthma, but she no longer has it.    SURGICAL HISTORY:  Patient underwent sinus surgery in 2017 to open up her sinuses. This procedure provided relief for about 6 month  s.    IMAGING:  A CT of the sinuses was performed a few days ago, revealing sinusitis with inflammation in the sinuses. The scan showed most congestion in the upper area, especially on the right side.         SNOT-22 score: : (Patient-Rptd) (P) 72  NOSE score:: (Patient-Rptd) (P) 80%  ETDQ-7 score:: (Patient-Rptd) (P) 1.7    The patient's medications, allergies, past medical, surgical, social and family histories were reviewed and updated as appropriate.    A detailed review of systems was obtained with pertinent positives as per the above HPI, and otherwise negative.        Objective:     /77 (BP Location: Right arm, Patient Position: Sitting)   Wt 80.8 kg (178 lb  2.1 oz)   BMI 29.64 kg/m²        Constitutional:   She appears well-developed. She is cooperative. Normal speech.  No hoarse voice.      Head:  Normocephalic. Salivary glands normal.  Facial strength is normal.      Ears:    Right Ear: No drainage or tenderness. Tympanic membrane is not perforated. Tympanic membrane mobility is normal. No middle ear effusion. No decreased hearing is noted.   Left Ear: No drainage or tenderness. Tympanic membrane is not perforated. Tympanic membrane mobility is normal.  No middle ear effusion. No decreased hearing is noted.     Nose:  No mucosal edema, rhinorrhea, septal deviation or polyps. No epistaxis. Turbinates normal, no turbinate masses and no turbinate hypertrophy.  Right sinus exhibits no maxillary sinus tenderness and no frontal sinus tenderness. Left sinus exhibits no maxillary sinus tenderness and no frontal sinus tenderness.     Mouth/Throat  Oropharynx clear and moist without lesions or asymmetry and normal uvula midline. She does not have dentures. Normal dentition. No oral lesions or mucous membrane lesions. No oropharyngeal exudate or posterior oropharyngeal erythema. Mirror exam not performed due to patient tolerance.  Mirror exam not performed due to patient tolerance.      Neck:  Neck normal without thyromegaly masses, asymmetry, normal tracheal structure, crepitus, and tenderness, thyroid normal, trachea normal and no adenopathy. Normal range of motion present.     She has no cervical adenopathy.     Cardiovascular:    Regular rhythm.              Pulmonary/Chest:   Effort normal.     Psychiatric:   She has a normal mood and affect. Her speech is normal and behavior is normal.     Neurological:   No cranial nerve deficit.     Skin:   No rash noted.       Procedure    Nasal endoscopy performed.  See procedure note.        Data Reviewed    WBC (K/uL)   Date Value   03/14/2025 4.67     Eosinophil % (%)   Date Value   03/14/2025 0.6     Eos # (K/uL)   Date Value    03/14/2025 0.0     Platelets (K/uL)   Date Value   03/14/2025 282     Glucose (mg/dL)   Date Value   06/27/2025 76   02/04/2025 88     Total IgE (IU/mL)   Date Value   03/14/2025 36.3       Pathology report indicated chronic inflammation with eosinophilia.  Cultures showed Acinetobacter complex on 4/21/25.    I independently reviewed the images of the CT sinuses dated 6/28/25. Pertinent findings include partial opacification of all sinuses most prominent in right frontal with air-fluid level.      Assessment/Plan:     1. Chronic rhinitis    2. Chronic pansinusitis    3. Chronic cough        Assessment & Plan    IMPRESSION:  - Chronic sinusitis with recurrent flares every 3 months, lasting 2 weeks.  - Previous sinus surgery in 2017 provided only 6 months of relief.  - Recent CT Sinuses shows persistent sinusitis, particularly in the right frontal sinus.  - Endoscopy revealed open sinus spaces with mucus accumulation, likely draining from frontal sinuses.  - Dupixent therapy ineffective for inflammation type, which is more bacterial in nature.  - Opted for oral antibiotic treatment over surgical intervention.  - Started prolonged antibiotic course targeting identified bacteria, as rinses may not reach all affected areas.  - Kidney disease (stage 3) limits antibiotic options.    CHRONIC MAXILLARY SINUSITIS:  - Explained nature of sinusitis, including role of gravity in mucus drainage and accumulation.  - Discussed limitations of current treatments, including Dupixent and antibiotic rinses.  - Clarified relationship between sinus congestion and chest symptoms.  - Patient to continue using sinus rinses to clear draining mucus.  - Started Bactrim (sulfamethoxazole/trimethoprim) for several weeks to target specific bacteria and dry up mucus; discontinued antibiotic additive in sinus rinse.    FOLLOW-UP:  - Follow up in about 1 month to assess response to antibiotic treatment.         Follow up in about 1 month (around  8/8/2025) for nasal endoscopy.    This note was generated with the assistance of ambient listening technology. Verbal consent was obtained by the patient and accompanying visitor(s) for the recording of patient appointment to facilitate this note. I attest to having reviewed and edited the generated note for accuracy, though some syntax or spelling errors may persist. Please contact the author of this note for any clarification.

## 2025-07-12 ENCOUNTER — LAB VISIT (OUTPATIENT)
Dept: LAB | Facility: HOSPITAL | Age: 80
End: 2025-07-12
Attending: STUDENT IN AN ORGANIZED HEALTH CARE EDUCATION/TRAINING PROGRAM
Payer: MEDICARE

## 2025-07-12 DIAGNOSIS — E87.5 HYPERKALEMIA: ICD-10-CM

## 2025-07-12 DIAGNOSIS — N18.31 STAGE 3A CHRONIC KIDNEY DISEASE: ICD-10-CM

## 2025-07-12 DIAGNOSIS — I50.20 HFREF (HEART FAILURE WITH REDUCED EJECTION FRACTION): Primary | ICD-10-CM

## 2025-07-12 LAB — POTASSIUM SERPL-SCNC: 5.5 MMOL/L (ref 3.5–5.1)

## 2025-07-12 PROCEDURE — 36415 COLL VENOUS BLD VENIPUNCTURE: CPT | Mod: HCNC

## 2025-07-12 PROCEDURE — 84132 ASSAY OF SERUM POTASSIUM: CPT | Mod: HCNC

## 2025-07-14 ENCOUNTER — TELEPHONE (OUTPATIENT)
Dept: OTOLARYNGOLOGY | Facility: CLINIC | Age: 80
End: 2025-07-14
Payer: MEDICARE

## 2025-07-14 DIAGNOSIS — J32.4 CHRONIC PANSINUSITIS: Primary | ICD-10-CM

## 2025-07-15 RX ORDER — AMOXICILLIN AND CLAVULANATE POTASSIUM 875; 125 MG/1; MG/1
1 TABLET, FILM COATED ORAL 2 TIMES DAILY
Qty: 28 TABLET | Refills: 0 | Status: SHIPPED | OUTPATIENT
Start: 2025-07-15 | End: 2025-07-30

## 2025-07-15 NOTE — TELEPHONE ENCOUNTER
Called patient and informed her to stop current antibiotic and to go ahead  her Augmentin at [facility] pharmacy. Patient verbalized agreement. AM   
Called patient regarding her antibiotics. Patient states that she took it yesterday and started to have nausea, vomiting, leg pain, and a headache. Her last dose was yesterday and headaches is still present. Asked her if she's taken any benadryl for these symptoms patient stated that she did not. Informed her not to take any more medication and to let me send her file to Dr. Boo. Informed her if her symptoms worsen to please go to nearest ER or urgent care. Patient verbalized understanding. AM  
Copied from CRM #[id number]. Topic: General Inquiry - Patient Advice  >> Jul 14, 2025  8:14 AM Christine Tian wrote:  Type:  Needs Medical Advice    Who Called: pt is calling to advise Dr. Juarez that the Bactrim 800/160mg gave her an allergic reaction.    Symptoms (please be specific): nauseated, vomiting, leg pain and headache     How long has patient had these symptoms:  yesterday       Would the patient rather a call back or a response via [facility]? Please contact the pt first if unable to reach please leave a detail message thank you.    Best Call Back Number: [phone] (M)  
Please let her know that I'm substituting a course of Augmentin.  It will be at main campus pharmacy.  
31-Mar-2018 09:53

## 2025-07-18 ENCOUNTER — PATIENT MESSAGE (OUTPATIENT)
Dept: ADMINISTRATIVE | Facility: OTHER | Age: 80
End: 2025-07-18
Payer: MEDICARE

## 2025-07-28 ENCOUNTER — PATIENT MESSAGE (OUTPATIENT)
Dept: PRIMARY CARE CLINIC | Facility: CLINIC | Age: 80
End: 2025-07-28
Payer: MEDICARE

## 2025-07-30 ENCOUNTER — LAB VISIT (OUTPATIENT)
Dept: LAB | Facility: HOSPITAL | Age: 80
End: 2025-07-30
Attending: STUDENT IN AN ORGANIZED HEALTH CARE EDUCATION/TRAINING PROGRAM
Payer: MEDICARE

## 2025-07-30 DIAGNOSIS — E87.5 HYPERKALEMIA: ICD-10-CM

## 2025-07-30 LAB
ANION GAP (OHS): 9 MMOL/L (ref 8–16)
BUN SERPL-MCNC: 30 MG/DL (ref 8–23)
CALCIUM SERPL-MCNC: 9.4 MG/DL (ref 8.7–10.5)
CHLORIDE SERPL-SCNC: 104 MMOL/L (ref 95–110)
CO2 SERPL-SCNC: 26 MMOL/L (ref 23–29)
CREAT SERPL-MCNC: 0.9 MG/DL (ref 0.5–1.4)
GFR SERPLBLD CREATININE-BSD FMLA CKD-EPI: >60 ML/MIN/1.73/M2
GLUCOSE SERPL-MCNC: 87 MG/DL (ref 70–110)
POTASSIUM SERPL-SCNC: 5 MMOL/L (ref 3.5–5.1)
SODIUM SERPL-SCNC: 139 MMOL/L (ref 136–145)

## 2025-07-30 PROCEDURE — 36415 COLL VENOUS BLD VENIPUNCTURE: CPT | Mod: HCNC

## 2025-07-30 PROCEDURE — 82310 ASSAY OF CALCIUM: CPT | Mod: HCNC

## 2025-08-07 DIAGNOSIS — J32.4 CHRONIC PANSINUSITIS: ICD-10-CM

## 2025-08-07 RX ORDER — AMOXICILLIN AND CLAVULANATE POTASSIUM 875; 125 MG/1; MG/1
1 TABLET, FILM COATED ORAL 2 TIMES DAILY
Qty: 28 TABLET | Refills: 0 | OUTPATIENT
Start: 2025-08-07 | End: 2025-08-21

## 2025-08-07 RX ORDER — AMOXICILLIN AND CLAVULANATE POTASSIUM 875; 125 MG/1; MG/1
1 TABLET, FILM COATED ORAL 2 TIMES DAILY
Qty: 28 TABLET | Refills: 0 | Status: CANCELLED | OUTPATIENT
Start: 2025-08-07 | End: 2025-08-21

## 2025-08-07 NOTE — TELEPHONE ENCOUNTER
Patient was requesting refill for ABX. ABX was denied. Patient needs a follow up. Called patient no answer, left message to call clinic back to get her scheduled with KANIKA Shelton. AM

## 2025-08-12 ENCOUNTER — OFFICE VISIT (OUTPATIENT)
Dept: ALLERGY | Facility: CLINIC | Age: 80
End: 2025-08-12
Payer: MEDICARE

## 2025-08-12 VITALS — BODY MASS INDEX: 30.16 KG/M2 | WEIGHT: 181 LBS | HEIGHT: 65 IN

## 2025-08-12 DIAGNOSIS — D80.6 SPECIFIC ANTIBODY DEFICIENCY WITH NORMAL IG CONCENTRATION AND NORMAL NUMBER OF B CELLS: Primary | ICD-10-CM

## 2025-08-12 PROCEDURE — 1159F MED LIST DOCD IN RCRD: CPT | Mod: CPTII,HCNC,S$GLB, | Performed by: STUDENT IN AN ORGANIZED HEALTH CARE EDUCATION/TRAINING PROGRAM

## 2025-08-12 PROCEDURE — G2211 COMPLEX E/M VISIT ADD ON: HCPCS | Mod: HCNC,S$GLB,, | Performed by: STUDENT IN AN ORGANIZED HEALTH CARE EDUCATION/TRAINING PROGRAM

## 2025-08-12 PROCEDURE — 99213 OFFICE O/P EST LOW 20 MIN: CPT | Mod: HCNC,S$GLB,, | Performed by: STUDENT IN AN ORGANIZED HEALTH CARE EDUCATION/TRAINING PROGRAM

## 2025-08-12 PROCEDURE — 1157F ADVNC CARE PLAN IN RCRD: CPT | Mod: CPTII,HCNC,S$GLB, | Performed by: STUDENT IN AN ORGANIZED HEALTH CARE EDUCATION/TRAINING PROGRAM

## 2025-08-12 PROCEDURE — 99999 PR PBB SHADOW E&M-EST. PATIENT-LVL III: CPT | Mod: PBBFAC,HCNC,, | Performed by: STUDENT IN AN ORGANIZED HEALTH CARE EDUCATION/TRAINING PROGRAM

## 2025-08-12 PROCEDURE — 1126F AMNT PAIN NOTED NONE PRSNT: CPT | Mod: CPTII,HCNC,S$GLB, | Performed by: STUDENT IN AN ORGANIZED HEALTH CARE EDUCATION/TRAINING PROGRAM

## 2025-08-14 ENCOUNTER — OFFICE VISIT (OUTPATIENT)
Dept: OTOLARYNGOLOGY | Facility: CLINIC | Age: 80
End: 2025-08-14
Payer: MEDICARE

## 2025-08-14 VITALS
DIASTOLIC BLOOD PRESSURE: 82 MMHG | WEIGHT: 178.38 LBS | HEART RATE: 79 BPM | SYSTOLIC BLOOD PRESSURE: 162 MMHG | HEIGHT: 65 IN | BODY MASS INDEX: 29.72 KG/M2

## 2025-08-14 DIAGNOSIS — J32.4 CHRONIC PANSINUSITIS: ICD-10-CM

## 2025-08-14 DIAGNOSIS — J31.0 CHRONIC RHINITIS: Primary | ICD-10-CM

## 2025-08-14 PROCEDURE — 3079F DIAST BP 80-89 MM HG: CPT | Mod: CPTII,S$GLB,, | Performed by: OTOLARYNGOLOGY

## 2025-08-14 PROCEDURE — 99999 PR PBB SHADOW E&M-EST. PATIENT-LVL V: CPT | Mod: PBBFAC,,, | Performed by: OTOLARYNGOLOGY

## 2025-08-14 PROCEDURE — 1101F PT FALLS ASSESS-DOCD LE1/YR: CPT | Mod: CPTII,S$GLB,, | Performed by: OTOLARYNGOLOGY

## 2025-08-14 PROCEDURE — 1160F RVW MEDS BY RX/DR IN RCRD: CPT | Mod: CPTII,S$GLB,, | Performed by: OTOLARYNGOLOGY

## 2025-08-14 PROCEDURE — 3077F SYST BP >= 140 MM HG: CPT | Mod: CPTII,S$GLB,, | Performed by: OTOLARYNGOLOGY

## 2025-08-14 PROCEDURE — 99213 OFFICE O/P EST LOW 20 MIN: CPT | Mod: 25,S$GLB,, | Performed by: OTOLARYNGOLOGY

## 2025-08-14 PROCEDURE — 1159F MED LIST DOCD IN RCRD: CPT | Mod: CPTII,S$GLB,, | Performed by: OTOLARYNGOLOGY

## 2025-08-14 PROCEDURE — 3288F FALL RISK ASSESSMENT DOCD: CPT | Mod: CPTII,S$GLB,, | Performed by: OTOLARYNGOLOGY

## 2025-08-14 PROCEDURE — 1126F AMNT PAIN NOTED NONE PRSNT: CPT | Mod: CPTII,S$GLB,, | Performed by: OTOLARYNGOLOGY

## 2025-08-14 PROCEDURE — 1157F ADVNC CARE PLAN IN RCRD: CPT | Mod: CPTII,S$GLB,, | Performed by: OTOLARYNGOLOGY

## 2025-08-14 PROCEDURE — 31231 NASAL ENDOSCOPY DX: CPT | Mod: S$GLB,,, | Performed by: OTOLARYNGOLOGY

## 2025-08-14 RX ORDER — FLUTICASONE PROPIONATE 93 UG/1
93 SPRAY, METERED NASAL 2 TIMES DAILY
Qty: 16 ML | Refills: 2 | Status: SHIPPED | OUTPATIENT
Start: 2025-08-14

## 2025-08-15 ENCOUNTER — PATIENT MESSAGE (OUTPATIENT)
Dept: ADMINISTRATIVE | Facility: OTHER | Age: 80
End: 2025-08-15
Payer: MEDICARE

## 2025-08-15 ENCOUNTER — PATIENT MESSAGE (OUTPATIENT)
Dept: ALLERGY | Facility: CLINIC | Age: 80
End: 2025-08-15
Payer: MEDICARE

## 2025-08-27 ENCOUNTER — TELEPHONE (OUTPATIENT)
Dept: OTOLARYNGOLOGY | Facility: CLINIC | Age: 80
End: 2025-08-27
Payer: MEDICARE

## 2025-09-02 ENCOUNTER — LAB VISIT (OUTPATIENT)
Dept: LAB | Facility: HOSPITAL | Age: 80
End: 2025-09-02
Attending: STUDENT IN AN ORGANIZED HEALTH CARE EDUCATION/TRAINING PROGRAM
Payer: MEDICARE

## 2025-09-02 DIAGNOSIS — E87.5 HYPERKALEMIA: ICD-10-CM

## 2025-09-02 DIAGNOSIS — N18.31 STAGE 3A CHRONIC KIDNEY DISEASE: ICD-10-CM

## 2025-09-02 LAB
ANION GAP (OHS): 8 MMOL/L (ref 8–16)
BUN SERPL-MCNC: 42 MG/DL (ref 8–23)
CALCIUM SERPL-MCNC: 9.5 MG/DL (ref 8.7–10.5)
CHLORIDE SERPL-SCNC: 104 MMOL/L (ref 95–110)
CO2 SERPL-SCNC: 25 MMOL/L (ref 23–29)
CREAT SERPL-MCNC: 1.2 MG/DL (ref 0.5–1.4)
GFR SERPLBLD CREATININE-BSD FMLA CKD-EPI: 46 ML/MIN/1.73/M2
GLUCOSE SERPL-MCNC: 90 MG/DL (ref 70–110)
POTASSIUM SERPL-SCNC: 5.1 MMOL/L (ref 3.5–5.1)
SODIUM SERPL-SCNC: 137 MMOL/L (ref 136–145)

## 2025-09-02 PROCEDURE — 82374 ASSAY BLOOD CARBON DIOXIDE: CPT | Mod: HCNC

## 2025-09-02 PROCEDURE — 36415 COLL VENOUS BLD VENIPUNCTURE: CPT | Mod: HCNC,PN

## 2025-09-03 ENCOUNTER — IMMUNIZATION (OUTPATIENT)
Dept: INTERNAL MEDICINE | Facility: CLINIC | Age: 80
End: 2025-09-03
Payer: MEDICARE

## 2025-09-03 DIAGNOSIS — Z23 NEED FOR VACCINATION: Primary | ICD-10-CM

## 2025-09-03 PROCEDURE — 90653 IIV ADJUVANT VACCINE IM: CPT | Mod: HCNC,S$GLB,, | Performed by: INTERNAL MEDICINE

## 2025-09-03 PROCEDURE — 91320 SARSCV2 VAC 30MCG TRS-SUC IM: CPT | Mod: HCNC,S$GLB,, | Performed by: INTERNAL MEDICINE

## 2025-09-03 PROCEDURE — 90480 ADMN SARSCOV2 VAC 1/ONLY CMP: CPT | Mod: HCNC,S$GLB,, | Performed by: INTERNAL MEDICINE

## 2025-09-03 PROCEDURE — G0008 ADMIN INFLUENZA VIRUS VAC: HCPCS | Mod: HCNC,S$GLB,, | Performed by: INTERNAL MEDICINE

## (undated) DEVICE — GLOVE BIOGEL SKINSENSE PI 7.5

## (undated) DEVICE — SYR 50CC LL

## (undated) DEVICE — TRANSDUCER ADULT DISP

## (undated) DEVICE — POWDER ARISTA AH 3G

## (undated) DEVICE — PAD DEFIB CADENCE ADULT R2

## (undated) DEVICE — APPLICATOR ARISTA FLEX XL

## (undated) DEVICE — GLASSES EYE PROTECTIVE

## (undated) DEVICE — KIT CUSTOM MANIFOLD

## (undated) DEVICE — SEE MEDLINE ITEM 152622

## (undated) DEVICE — SOL BETADINE 5%

## (undated) DEVICE — SYR SLIP TIP 1CC

## (undated) DEVICE — BLADE INFERIOR TURBINATE 5/PK

## (undated) DEVICE — CATH JACKY RADIAL 5FR 100CM

## (undated) DEVICE — SNAP CAP 18 DOME COVERS

## (undated) DEVICE — DRAPE ANGIO BRACH 38X44IN

## (undated) DEVICE — OMNIPAQUE 350 200ML

## (undated) DEVICE — SUCTION COAGULATOR 10FR 6IN

## (undated) DEVICE — SEE MEDLINE ITEM 156913

## (undated) DEVICE — SPONGE PATTY SURGICAL .5X3IN

## (undated) DEVICE — CASSETTE INFINITI

## (undated) DEVICE — SPIKE SHORT LG BORE 1-WAY 2IN

## (undated) DEVICE — DRESSING TELFA STRL 4X3 LF

## (undated) DEVICE — CATH INFINITI 4F 3DRC 100CM

## (undated) DEVICE — GUIDEWIRE EMERALD .035IN 260CM

## (undated) DEVICE — CONTAINER SPECIMEN STRL 4OZ

## (undated) DEVICE — WARMER DRAPE STERILE LF

## (undated) DEVICE — ELECTRODE REM PLYHSV RETURN 9

## (undated) DEVICE — TRACKER PATIENT NON INVASIVE

## (undated) DEVICE — TRAY CATH LAB OMC

## (undated) DEVICE — HEMOSTAT VASC BAND REG 24CM

## (undated) DEVICE — Device

## (undated) DEVICE — KIT GLIDESHEATH SLEND 6FR 10CM